# Patient Record
Sex: MALE | Race: WHITE | NOT HISPANIC OR LATINO | Employment: OTHER | URBAN - METROPOLITAN AREA
[De-identification: names, ages, dates, MRNs, and addresses within clinical notes are randomized per-mention and may not be internally consistent; named-entity substitution may affect disease eponyms.]

---

## 2017-03-15 ENCOUNTER — CONVERSION ENCOUNTER (OUTPATIENT)
Dept: FAMILY MEDICINE CLINIC | Facility: CLINIC | Age: 47
End: 2017-03-15

## 2017-03-15 LAB
ALBUMIN SERPL-MCNC: 3.6 G/DL (ref 3.6–5.1)
ALBUMIN/GLOB SERPL: ABNORMAL {RATIO} (ref 1–2.5)
ALP SERPL-CCNC: 102 UNITS/L (ref 40–115)
ALT SERPL-CCNC: 35 UNITS/L (ref 9–46)
AST SERPL-CCNC: 62 UNITS/L (ref 10–40)
BASOPHILS # BLD AUTO: ABNORMAL 10*3/MM3 (ref 0–200)
BASOPHILS NFR BLD AUTO: 0.7 %
BILIRUB SERPL-MCNC: 0.6 MG/DL (ref 0.2–1.2)
BUN SERPL-MCNC: 5 MG/DL (ref 7–25)
BUN/CREAT SERPL: ABNORMAL (ref 6–22)
CALCIUM SERPL-MCNC: 9.2 MG/DL (ref 8.6–10.3)
CHLORIDE SERPL-SCNC: 103 MMOL/L (ref 98–110)
CHOLEST SERPL-MCNC: 157 MG/DL (ref 125–200)
CHOLEST/HDLC SERPL: ABNORMAL {RATIO}
CO2 CONTENT VENOUS: 26 MMOL/L (ref 20–31)
CONV % HGB A1C: ABNORMAL %
CONV NEUTROPHILS/100 LEUKOCYTES IN BODY FLUID BY MANUAL COUNT: 48 %
CONV RF TITER: 6
CONV TOTAL PROTEIN: 7.5 G/DL (ref 6.1–8.1)
CREAT UR-MCNC: 0.65 MG/DL (ref 0.6–1.35)
CRP SERPL-MCNC: 0.29 MG/DL
CYCLIC CITRULLINATED PEPTIDE ANTIBODY: NORMAL
EOSINOPHIL # BLD AUTO: 7.3 %
EOSINOPHIL # BLD AUTO: ABNORMAL 10*3/MM3 (ref 15–500)
ERYTHROCYTE [DISTWIDTH] IN BLOOD BY AUTOMATED COUNT: 14.8 % (ref 11–15)
ERYTHROCYTE [SEDIMENTATION RATE] IN BLOOD BY WESTERGREN METHOD: 22 MM/HR
GLOBULIN UR ELPH-MCNC: ABNORMAL G/DL (ref 1.9–3.7)
GLUCOSE SERPL-MCNC: 115 MG/DL (ref 65–99)
HCT VFR BLD AUTO: 47.1 % (ref 38.5–50)
HDLC SERPL-MCNC: 28 MG/DL
HGB BLD-MCNC: 15.8 G/DL (ref 13.2–17.1)
LDLC SERPL CALC-MCNC: ABNORMAL MG/DL
LYMPHOCYTES # BLD AUTO: ABNORMAL 10*3/MM3 (ref 850–3900)
LYMPHOCYTES NFR BLD AUTO: 33.1 %
MCH RBC QN AUTO: 33.6 PG (ref 27–33)
MCHC RBC AUTO-ENTMCNC: ABNORMAL % (ref 32–36)
MCV RBC AUTO: 100.2 FL (ref 80–100)
MONOCYTES # BLD AUTO: ABNORMAL 10*3/MICROLITER (ref 200–950)
MONOCYTES NFR BLD AUTO: 10.9 %
NEUTROPHILS # BLD AUTO: ABNORMAL 10*3/MM3 (ref 1500–7800)
PLATELET # BLD AUTO: ABNORMAL 10*3/MM3 (ref 140–400)
PMV BLD AUTO: 9.7 FL (ref 7.5–12.5)
POTASSIUM SERPL-SCNC: 4.1 MMOL/L (ref 3.5–5.3)
PSA SERPL-MCNC: 0.7 NG/ML
RBC # BLD AUTO: ABNORMAL 10*6/MM3 (ref 4.2–5.8)
SODIUM SERPL-SCNC: 134 MMOL/L (ref 135–146)
TRIGL SERPL-MCNC: 140 MG/DL
TSH SERPL-ACNC: 2.1 MICROINTL UNITS/ML (ref 0.4–4.5)
WBC # BLD AUTO: ABNORMAL K/UL (ref 3.8–10.8)

## 2017-04-10 ENCOUNTER — HOSPITAL ENCOUNTER (OUTPATIENT)
Dept: OTHER | Facility: HOSPITAL | Age: 47
Discharge: HOME | End: 2017-04-10
Attending: INTERNAL MEDICINE | Admitting: INTERNAL MEDICINE

## 2017-04-11 ENCOUNTER — HOSPITAL ENCOUNTER (OUTPATIENT)
Dept: ULTRASOUND IMAGING | Facility: HOSPITAL | Age: 47
Discharge: HOME | End: 2017-04-11
Attending: INTERNAL MEDICINE | Admitting: INTERNAL MEDICINE

## 2017-07-21 ENCOUNTER — HOSPITAL ENCOUNTER (OUTPATIENT)
Dept: FAMILY MEDICINE CLINIC | Facility: CLINIC | Age: 47
Setting detail: SPECIMEN
Discharge: HOME | End: 2017-07-21
Attending: INTERNAL MEDICINE | Admitting: INTERNAL MEDICINE

## 2017-07-21 LAB
ALBUMIN SERPL-MCNC: 3.3 G/DL (ref 3.5–4.8)
ALBUMIN/GLOB SERPL: 0.8 {RATIO} (ref 1–1.7)
ALP SERPL-CCNC: 104 IU/L (ref 32–91)
ALT SERPL-CCNC: 27 IU/L (ref 17–63)
ANION GAP SERPL CALC-SCNC: 13.3 MMOL/L (ref 10–20)
AST SERPL-CCNC: 45 IU/L (ref 15–41)
BILIRUB SERPL-MCNC: 0.7 MG/DL (ref 0.3–1.2)
BUN SERPL-MCNC: 6 MG/DL (ref 8–20)
BUN/CREAT SERPL: 7.5 (ref 6.2–20.3)
CALCIUM SERPL-MCNC: 9.2 MG/DL (ref 8.9–10.3)
CHLORIDE SERPL-SCNC: 104 MMOL/L (ref 101–111)
CONV CO2: 25 MMOL/L (ref 22–32)
CONV TOTAL PROTEIN: 7.5 G/DL (ref 6.1–7.9)
CREAT UR-MCNC: 0.8 MG/DL (ref 0.7–1.2)
GLOBULIN UR ELPH-MCNC: 4.2 G/DL (ref 2.5–3.8)
GLUCOSE SERPL-MCNC: 119 MG/DL (ref 65–99)
POTASSIUM SERPL-SCNC: 4.3 MMOL/L (ref 3.6–5.1)
SODIUM SERPL-SCNC: 138 MMOL/L (ref 136–144)
TESTOST SERPL-MCNC: 4.4 NG/ML (ref 1.7–7.8)

## 2018-08-09 ENCOUNTER — INPATIENT HOSPITAL (AMBULATORY)
Dept: URBAN - METROPOLITAN AREA HOSPITAL 84 | Facility: HOSPITAL | Age: 48
End: 2018-08-09
Payer: COMMERCIAL

## 2018-08-09 DIAGNOSIS — F10.10 ALCOHOL ABUSE, UNCOMPLICATED: ICD-10-CM

## 2018-08-09 DIAGNOSIS — K70.30 ALCOHOLIC CIRRHOSIS OF LIVER WITHOUT ASCITES: ICD-10-CM

## 2018-08-09 DIAGNOSIS — J44.9 CHRONIC OBSTRUCTIVE PULMONARY DISEASE, UNSPECIFIED: ICD-10-CM

## 2018-08-09 DIAGNOSIS — R94.5 ABNORMAL RESULTS OF LIVER FUNCTION STUDIES: ICD-10-CM

## 2018-08-09 DIAGNOSIS — K72.90 HEPATIC FAILURE, UNSPECIFIED WITHOUT COMA: ICD-10-CM

## 2018-08-09 PROCEDURE — 99222 1ST HOSP IP/OBS MODERATE 55: CPT | Performed by: NURSE PRACTITIONER

## 2018-08-10 ENCOUNTER — INPATIENT HOSPITAL (AMBULATORY)
Dept: URBAN - METROPOLITAN AREA HOSPITAL 84 | Facility: HOSPITAL | Age: 48
End: 2018-08-10
Payer: COMMERCIAL

## 2018-08-10 DIAGNOSIS — F10.10 ALCOHOL ABUSE, UNCOMPLICATED: ICD-10-CM

## 2018-08-10 DIAGNOSIS — K70.30 ALCOHOLIC CIRRHOSIS OF LIVER WITHOUT ASCITES: ICD-10-CM

## 2018-08-10 DIAGNOSIS — D50.9 IRON DEFICIENCY ANEMIA, UNSPECIFIED: ICD-10-CM

## 2018-08-10 DIAGNOSIS — R94.5 ABNORMAL RESULTS OF LIVER FUNCTION STUDIES: ICD-10-CM

## 2018-08-10 PROCEDURE — 99232 SBSQ HOSP IP/OBS MODERATE 35: CPT | Performed by: NURSE PRACTITIONER

## 2018-08-16 ENCOUNTER — OFFICE (AMBULATORY)
Dept: URBAN - METROPOLITAN AREA CLINIC 64 | Facility: CLINIC | Age: 48
End: 2018-08-16

## 2018-08-16 VITALS
DIASTOLIC BLOOD PRESSURE: 58 MMHG | SYSTOLIC BLOOD PRESSURE: 126 MMHG | HEIGHT: 70 IN | HEART RATE: 104 BPM | WEIGHT: 258 LBS

## 2018-08-16 DIAGNOSIS — F10.10 ALCOHOL ABUSE, UNCOMPLICATED: ICD-10-CM

## 2018-08-16 DIAGNOSIS — K70.30 ALCOHOLIC CIRRHOSIS OF LIVER WITHOUT ASCITES: ICD-10-CM

## 2018-08-16 DIAGNOSIS — K25.9 GASTRIC ULCER, UNSPECIFIED AS ACUTE OR CHRONIC, WITHOUT HEMO: ICD-10-CM

## 2018-08-16 DIAGNOSIS — R74.0 NONSPECIFIC ELEVATION OF LEVELS OF TRANSAMINASE AND LACTIC A: ICD-10-CM

## 2018-08-16 DIAGNOSIS — D50.0 IRON DEFICIENCY ANEMIA SECONDARY TO BLOOD LOSS (CHRONIC): ICD-10-CM

## 2018-08-16 DIAGNOSIS — R76.8 OTHER SPECIFIED ABNORMAL IMMUNOLOGICAL FINDINGS IN SERUM: ICD-10-CM

## 2018-08-16 DIAGNOSIS — K72.90 HEPATIC FAILURE, UNSPECIFIED WITHOUT COMA: ICD-10-CM

## 2018-08-16 PROCEDURE — 99214 OFFICE O/P EST MOD 30 MIN: CPT | Performed by: NURSE PRACTITIONER

## 2018-08-16 RX ORDER — ONDANSETRON HYDROCHLORIDE 8 MG/1
24 TABLET, FILM COATED ORAL
Qty: 45 | Refills: 1 | Status: COMPLETED
Start: 2018-08-16 | End: 2022-07-12

## 2018-08-23 ENCOUNTER — HOSPITAL ENCOUNTER (OUTPATIENT)
Dept: HOME HEALTH SERVICES | Facility: HOME HEALTHCARE | Age: 48
Setting detail: SPECIMEN
Discharge: HOME | End: 2018-08-23
Attending: INTERNAL MEDICINE | Admitting: INTERNAL MEDICINE

## 2018-08-24 ENCOUNTER — HOSPITAL ENCOUNTER (OUTPATIENT)
Dept: RESPIRATORY THERAPY | Facility: HOSPITAL | Age: 48
Discharge: HOME | End: 2018-08-24
Attending: NURSE PRACTITIONER | Admitting: NURSE PRACTITIONER

## 2018-08-24 ENCOUNTER — HOSPITAL ENCOUNTER (OUTPATIENT)
Dept: HOME HEALTH SERVICES | Facility: HOME HEALTHCARE | Age: 48
Setting detail: SPECIMEN
Discharge: HOME | End: 2018-08-24
Attending: INTERNAL MEDICINE | Admitting: INTERNAL MEDICINE

## 2018-08-24 LAB
ALBUMIN SERPL-MCNC: 2.2 G/DL (ref 3.5–4.8)
ALBUMIN/GLOB SERPL: 0.4 {RATIO} (ref 1–1.7)
ALP SERPL-CCNC: 106 IU/L (ref 32–91)
ALT SERPL-CCNC: 28 IU/L (ref 17–63)
ANION GAP SERPL CALC-SCNC: 11.1 MMOL/L (ref 10–20)
AST SERPL-CCNC: 60 IU/L (ref 15–41)
BILIRUB SERPL-MCNC: 5.7 MG/DL (ref 0.3–1.2)
BUN SERPL-MCNC: 12 MG/DL (ref 8–20)
BUN/CREAT SERPL: 10 (ref 6.2–20.3)
CALCIUM SERPL-MCNC: 8.2 MG/DL (ref 8.9–10.3)
CHLORIDE SERPL-SCNC: 88 MMOL/L (ref 101–111)
CONV CO2: 29 MMOL/L (ref 22–32)
CONV TOTAL PROTEIN: 7.5 G/DL (ref 6.1–7.9)
CREAT UR-MCNC: 1.2 MG/DL (ref 0.7–1.2)
GLOBULIN UR ELPH-MCNC: 5.3 G/DL (ref 2.5–3.8)
GLUCOSE SERPL-MCNC: 146 MG/DL (ref 65–99)
POTASSIUM SERPL-SCNC: 3.1 MMOL/L (ref 3.6–5.1)
SODIUM SERPL-SCNC: 125 MMOL/L (ref 136–144)

## 2018-08-30 ENCOUNTER — ON CAMPUS - OUTPATIENT (AMBULATORY)
Dept: URBAN - METROPOLITAN AREA HOSPITAL 85 | Facility: HOSPITAL | Age: 48
End: 2018-08-30
Payer: COMMERCIAL

## 2018-08-30 DIAGNOSIS — K70.31 ALCOHOLIC CIRRHOSIS OF LIVER WITH ASCITES: ICD-10-CM

## 2018-08-30 DIAGNOSIS — J44.9 CHRONIC OBSTRUCTIVE PULMONARY DISEASE, UNSPECIFIED: ICD-10-CM

## 2018-08-30 DIAGNOSIS — E87.6 HYPOKALEMIA: ICD-10-CM

## 2018-08-30 DIAGNOSIS — D53.9 NUTRITIONAL ANEMIA, UNSPECIFIED: ICD-10-CM

## 2018-08-30 DIAGNOSIS — R94.5 ABNORMAL RESULTS OF LIVER FUNCTION STUDIES: ICD-10-CM

## 2018-08-30 PROCEDURE — 99204 OFFICE O/P NEW MOD 45 MIN: CPT | Performed by: NURSE PRACTITIONER

## 2018-08-31 ENCOUNTER — ON CAMPUS - OUTPATIENT (AMBULATORY)
Dept: URBAN - METROPOLITAN AREA HOSPITAL 85 | Facility: HOSPITAL | Age: 48
End: 2018-08-31

## 2018-08-31 DIAGNOSIS — K31.89 OTHER DISEASES OF STOMACH AND DUODENUM: ICD-10-CM

## 2018-08-31 DIAGNOSIS — I85.00 ESOPHAGEAL VARICES WITHOUT BLEEDING: ICD-10-CM

## 2018-08-31 DIAGNOSIS — K29.70 GASTRITIS, UNSPECIFIED, WITHOUT BLEEDING: ICD-10-CM

## 2018-08-31 PROCEDURE — 43239 EGD BIOPSY SINGLE/MULTIPLE: CPT | Performed by: INTERNAL MEDICINE

## 2018-09-05 ENCOUNTER — HOSPITAL ENCOUNTER (OUTPATIENT)
Dept: INFUSION THERAPY | Facility: HOSPITAL | Age: 48
Discharge: HOME | End: 2018-09-05
Attending: INTERNAL MEDICINE | Admitting: INTERNAL MEDICINE

## 2018-09-12 ENCOUNTER — OFFICE (AMBULATORY)
Dept: URBAN - METROPOLITAN AREA CLINIC 64 | Facility: CLINIC | Age: 48
End: 2018-09-12
Payer: COMMERCIAL

## 2018-09-12 VITALS
SYSTOLIC BLOOD PRESSURE: 113 MMHG | WEIGHT: 244 LBS | DIASTOLIC BLOOD PRESSURE: 69 MMHG | HEIGHT: 70 IN | HEART RATE: 76 BPM

## 2018-09-12 DIAGNOSIS — K25.9 GASTRIC ULCER, UNSPECIFIED AS ACUTE OR CHRONIC, WITHOUT HEMO: ICD-10-CM

## 2018-09-12 DIAGNOSIS — R11.0 NAUSEA: ICD-10-CM

## 2018-09-12 DIAGNOSIS — K70.30 ALCOHOLIC CIRRHOSIS OF LIVER WITHOUT ASCITES: ICD-10-CM

## 2018-09-12 DIAGNOSIS — D50.0 IRON DEFICIENCY ANEMIA SECONDARY TO BLOOD LOSS (CHRONIC): ICD-10-CM

## 2018-09-12 DIAGNOSIS — R76.8 OTHER SPECIFIED ABNORMAL IMMUNOLOGICAL FINDINGS IN SERUM: ICD-10-CM

## 2018-09-12 DIAGNOSIS — R74.0 NONSPECIFIC ELEVATION OF LEVELS OF TRANSAMINASE AND LACTIC A: ICD-10-CM

## 2018-09-12 DIAGNOSIS — K72.90 HEPATIC FAILURE, UNSPECIFIED WITHOUT COMA: ICD-10-CM

## 2018-09-12 PROCEDURE — 99214 OFFICE O/P EST MOD 30 MIN: CPT | Performed by: NURSE PRACTITIONER

## 2018-09-12 RX ORDER — PROMETHAZINE HYDROCHLORIDE 12.5 MG/1
50 TABLET ORAL
Qty: 60 | Refills: 3 | Status: COMPLETED
Start: 2018-09-12 | End: 2023-07-31

## 2018-09-24 ENCOUNTER — HOSPITAL ENCOUNTER (OUTPATIENT)
Dept: SLEEP MEDICINE | Facility: HOSPITAL | Age: 48
Discharge: HOME | End: 2018-09-24
Attending: NURSE PRACTITIONER | Admitting: NURSE PRACTITIONER

## 2018-10-10 ENCOUNTER — HOSPITAL ENCOUNTER (OUTPATIENT)
Dept: LAB | Facility: HOSPITAL | Age: 48
Setting detail: SPECIMEN
Discharge: HOME | End: 2018-10-10
Attending: INTERNAL MEDICINE | Admitting: INTERNAL MEDICINE

## 2018-10-10 LAB
ALBUMIN SERPL-MCNC: 2.3 G/DL (ref 3.5–4.8)
ALBUMIN/GLOB SERPL: 0.4 {RATIO} (ref 1–1.7)
ALP SERPL-CCNC: 309 IU/L (ref 32–91)
ALT SERPL-CCNC: 28 IU/L (ref 17–63)
ANION GAP SERPL CALC-SCNC: 11.5 MMOL/L (ref 10–20)
AST SERPL-CCNC: 49 IU/L (ref 15–41)
BASOPHILS # BLD AUTO: 0 10*3/UL (ref 0–0.2)
BASOPHILS NFR BLD AUTO: 0 % (ref 0–2)
BILIRUB SERPL-MCNC: 3.8 MG/DL (ref 0.3–1.2)
BILIRUB UR QL STRIP: NEGATIVE MG/DL
BUN SERPL-MCNC: 15 MG/DL (ref 8–20)
BUN/CREAT SERPL: 11.5 (ref 6.2–20.3)
CALCIUM SERPL-MCNC: 9.4 MG/DL (ref 8.9–10.3)
CASTS URNS QL MICRO: NORMAL /[LPF]
CHLORIDE SERPL-SCNC: 78 MMOL/L (ref 101–111)
COLOR UR: YELLOW
CONV BACTERIA IN URINE MICRO: NEGATIVE
CONV CLARITY OF URINE: CLEAR
CONV CO2: 43 MMOL/L (ref 22–32)
CONV HYALINE CASTS IN URINE MICRO: 1 /[LPF] (ref 0–5)
CONV PROTEIN IN URINE BY AUTOMATED TEST STRIP: NEGATIVE MG/DL
CONV SMALL ROUND CELLS: NORMAL /[HPF]
CONV TOTAL PROTEIN: 8.1 G/DL (ref 6.1–7.9)
CONV UROBILINOGEN IN URINE BY AUTOMATED TEST STRIP: 1 MG/DL
CREAT 24H UR-MCNC: 48.3 MG/DL
CREAT UR-MCNC: 1.3 MG/DL (ref 0.7–1.2)
CULTURE INDICATED?: NORMAL
DIFFERENTIAL METHOD BLD: (no result)
EOSINOPHIL # BLD AUTO: 0.1 10*3/UL (ref 0–0.3)
EOSINOPHIL # BLD AUTO: 1 % (ref 0–3)
ERYTHROCYTE [DISTWIDTH] IN BLOOD BY AUTOMATED COUNT: 18 % (ref 11.5–14.5)
GLOBULIN UR ELPH-MCNC: 5.8 G/DL (ref 2.5–3.8)
GLUCOSE SERPL-MCNC: 135 MG/DL (ref 65–99)
GLUCOSE UR QL: NEGATIVE MG/DL
HCT VFR BLD AUTO: 32.5 % (ref 40–54)
HGB BLD-MCNC: 11 G/DL (ref 14–18)
HGB UR QL STRIP: NEGATIVE
IRON SERPL-MCNC: 86 UG/DL (ref 45–182)
KETONES UR QL STRIP: NEGATIVE MG/DL
LEUKOCYTE ESTERASE UR QL STRIP: NEGATIVE
LYMPHOCYTES # BLD AUTO: 2.9 10*3/UL (ref 0.8–4.8)
LYMPHOCYTES NFR BLD AUTO: 24 % (ref 18–42)
MCH RBC QN AUTO: 32.9 PG (ref 26–32)
MCHC RBC AUTO-ENTMCNC: 33.8 G/DL (ref 32–36)
MCV RBC AUTO: 97.1 FL (ref 80–94)
MONOCYTES # BLD AUTO: 1.4 10*3/UL (ref 0.1–1.3)
MONOCYTES NFR BLD AUTO: 11 % (ref 2–11)
NEUTROPHILS # BLD AUTO: 7.7 10*3/UL (ref 2.3–8.6)
NEUTROPHILS NFR BLD AUTO: 64 % (ref 50–75)
NITRITE UR QL STRIP: NEGATIVE
NRBC BLD AUTO-RTO: 0 /100{WBCS}
NRBC/RBC NFR BLD MANUAL: 0 10*3/UL
PH UR STRIP.AUTO: 7.5 [PH] (ref 4.5–8)
PLATELET # BLD AUTO: 222 10*3/UL (ref 150–450)
PMV BLD AUTO: 7.4 FL (ref 7.4–10.4)
POTASSIUM SERPL-SCNC: ABNORMAL MMOL/L
POTASSIUM SERPL-SCNC: ABNORMAL MMOL/L (ref 3.6–5.1)
PROT UR-MCNC: 5 MG/DL
PROT/CREAT UR: 0.1 MG/MG (ref 0–22)
RBC # BLD AUTO: 3.35 10*6/UL (ref 4.6–6)
RBC #/AREA URNS HPF: 0 /[HPF] (ref 0–3)
SODIUM SERPL-SCNC: 130 MMOL/L (ref 136–144)
SP GR UR: 1.01 (ref 1–1.03)
SPERM URNS QL MICRO: NORMAL /[HPF]
SQUAMOUS SPT QL MICRO: 0 /[HPF] (ref 0–5)
UNIDENT CRYS URNS QL MICRO: NORMAL /[HPF]
WBC # BLD AUTO: 12.2 10*3/UL (ref 4.5–11.5)
WBC #/AREA URNS HPF: 1 /[HPF] (ref 0–5)
YEAST SPEC QL WET PREP: NORMAL /[HPF]

## 2018-10-12 ENCOUNTER — HOSPITAL ENCOUNTER (OUTPATIENT)
Dept: LAB | Facility: HOSPITAL | Age: 48
Discharge: HOME | End: 2018-10-12
Attending: INTERNAL MEDICINE | Admitting: INTERNAL MEDICINE

## 2018-10-12 LAB
ANION GAP SERPL CALC-SCNC: 10.9 MMOL/L (ref 10–20)
BUN SERPL-MCNC: 16 MG/DL (ref 8–20)
BUN/CREAT SERPL: 13.3 (ref 6.2–20.3)
CALCIUM SERPL-MCNC: 9.1 MG/DL (ref 8.9–10.3)
CHLORIDE SERPL-SCNC: 82 MMOL/L (ref 101–111)
CONV CO2: 41 MMOL/L (ref 22–32)
CREAT UR-MCNC: 1.2 MG/DL (ref 0.7–1.2)
GLUCOSE SERPL-MCNC: 125 MG/DL (ref 65–99)
POTASSIUM SERPL-SCNC: 2.9 MMOL/L (ref 3.6–5.1)
SODIUM SERPL-SCNC: 131 MMOL/L (ref 136–144)

## 2018-10-26 ENCOUNTER — HOSPITAL ENCOUNTER (OUTPATIENT)
Dept: LAB | Facility: HOSPITAL | Age: 48
Setting detail: SPECIMEN
Discharge: HOME | End: 2018-10-26
Attending: INTERNAL MEDICINE | Admitting: INTERNAL MEDICINE

## 2018-10-26 LAB
ANION GAP SERPL CALC-SCNC: 10.4 MMOL/L (ref 10–20)
BUN SERPL-MCNC: 8 MG/DL (ref 8–20)
BUN/CREAT SERPL: 7.3 (ref 6.2–20.3)
CALCIUM SERPL-MCNC: 8.5 MG/DL (ref 8.9–10.3)
CHLORIDE SERPL-SCNC: 98 MMOL/L (ref 101–111)
CONV CO2: 26 MMOL/L (ref 22–32)
CREAT UR-MCNC: 1.1 MG/DL (ref 0.7–1.2)
GLUCOSE SERPL-MCNC: 166 MG/DL (ref 65–99)
POTASSIUM SERPL-SCNC: 4.4 MMOL/L (ref 3.6–5.1)
SODIUM SERPL-SCNC: 130 MMOL/L (ref 136–144)

## 2018-10-31 ENCOUNTER — HOSPITAL ENCOUNTER (OUTPATIENT)
Dept: INFUSION THERAPY | Facility: HOSPITAL | Age: 48
Discharge: HOME | End: 2018-10-31
Attending: INTERNAL MEDICINE | Admitting: INTERNAL MEDICINE

## 2018-10-31 LAB
ALBUMIN FLD-MCNC: <1 G/DL
AMYLASE FLD-CCNC: 6 U/L
AMYLASE FLUID/SERUM RATIO: NORMAL
BASOPHILS # BLD AUTO: 0.1 10*3/UL (ref 0–0.2)
BASOPHILS NFR BLD AUTO: 1 % (ref 0–2)
CONCENTRATED SMEAR: (no result)
CONV GRANULOCYTES, FLUID: 3 %
CONV MONOCYTES, FLUID: 13 %
CONV SERUM MINUS FLUID: NORMAL
DIFFERENTIAL METHOD BLD: (no result)
EOSINOPHIL # BLD AUTO: 0.8 10*3/UL (ref 0–0.3)
EOSINOPHIL # BLD AUTO: 10 % (ref 0–3)
EOSINOPHIL NFR FLD MANUAL: 4 %
ERYTHROCYTE [DISTWIDTH] IN BLOOD BY AUTOMATED COUNT: 18.1 % (ref 11.5–14.5)
GLUCOSE FLD-MCNC: 156 MG/DL
HCT VFR BLD AUTO: 28.5 % (ref 40–54)
HGB BLD-MCNC: 10 G/DL (ref 14–18)
INR PPP: 1.3
LDH FLD-CCNC: 42 [IU]/L
LDH FLUID/SERUM RATIO: NORMAL
LYMPHOCYTES # BLD AUTO: 2 10*3/UL (ref 0.8–4.8)
LYMPHOCYTES NFR BLD AUTO: 25 % (ref 18–42)
LYMPHOCYTES NFR FLD MANUAL: 79 %
MCH RBC QN AUTO: 35.3 PG (ref 26–32)
MCHC RBC AUTO-ENTMCNC: 35 G/DL (ref 32–36)
MCV RBC AUTO: 100.9 FL (ref 80–94)
MESOTHL CELL NFR FLD MANUAL: 1 %
MONOCYTES # BLD AUTO: 0.6 10*3/UL (ref 0.1–1.3)
MONOCYTES NFR BLD AUTO: 8 % (ref 2–11)
NEUTROPHILS # BLD AUTO: 4.5 10*3/UL (ref 2.3–8.6)
NEUTROPHILS NFR BLD AUTO: 56 % (ref 50–75)
NRBC BLD AUTO-RTO: 0 /100{WBCS}
NRBC/RBC NFR BLD MANUAL: 0 10*3/UL
PH FLD: 7.7 [PH]
PLATELET # BLD AUTO: 145 10*3/UL (ref 150–450)
PMV BLD AUTO: 6.9 FL (ref 7.4–10.4)
PROT FLD-MCNC: 1.4 G/DL
PROTHROMBIN TIME: 12.8 SEC (ref 9.6–11.7)
RBC # BLD AUTO: 2.82 10*6/UL (ref 4.6–6)
SPECIMEN SOURCE: (no result)
TP FLUID/SERUM RATIO: NORMAL
WBC # BLD AUTO: 8.1 10*3/UL (ref 4.5–11.5)
WBC # FLD MANUAL: (no result) {CELLS}/UL

## 2018-11-12 ENCOUNTER — OFFICE (AMBULATORY)
Dept: URBAN - METROPOLITAN AREA CLINIC 64 | Facility: CLINIC | Age: 48
End: 2018-11-12
Payer: COMMERCIAL

## 2018-11-12 VITALS
SYSTOLIC BLOOD PRESSURE: 97 MMHG | HEIGHT: 70 IN | HEART RATE: 77 BPM | DIASTOLIC BLOOD PRESSURE: 61 MMHG | WEIGHT: 232 LBS

## 2018-11-12 DIAGNOSIS — K72.90 HEPATIC FAILURE, UNSPECIFIED WITHOUT COMA: ICD-10-CM

## 2018-11-12 DIAGNOSIS — K70.30 ALCOHOLIC CIRRHOSIS OF LIVER WITHOUT ASCITES: ICD-10-CM

## 2018-11-12 DIAGNOSIS — R18.8 OTHER ASCITES: ICD-10-CM

## 2018-11-12 PROCEDURE — 99214 OFFICE O/P EST MOD 30 MIN: CPT | Performed by: INTERNAL MEDICINE

## 2018-11-12 RX ORDER — ZINC SULFATE 50(220)MG
220 CAPSULE ORAL
Qty: 30 | Refills: 11 | Status: COMPLETED
Start: 2018-11-12 | End: 2023-07-31

## 2018-11-12 RX ORDER — NADOLOL 20 MG/1
TABLET ORAL
Qty: 30 | Status: COMPLETED
End: 2018-11-12

## 2018-11-12 RX ORDER — HYDROXYZINE HYDROCHLORIDE 25 MG/1
100 TABLET, FILM COATED ORAL
Qty: 60 | Refills: 5 | Status: COMPLETED
Start: 2018-11-12 | End: 2019-02-08

## 2018-11-12 RX ORDER — URSODIOL 500 MG/1
TABLET ORAL
Qty: 60 | Status: COMPLETED
End: 2018-11-12

## 2018-12-03 ENCOUNTER — HOSPITAL ENCOUNTER (OUTPATIENT)
Dept: GENERAL RADIOLOGY | Facility: HOSPITAL | Age: 48
Discharge: HOME | End: 2018-12-03
Attending: INTERNAL MEDICINE | Admitting: INTERNAL MEDICINE

## 2018-12-03 ENCOUNTER — HOSPITAL ENCOUNTER (OUTPATIENT)
Dept: FAMILY MEDICINE CLINIC | Facility: CLINIC | Age: 48
Setting detail: SPECIMEN
Discharge: HOME | End: 2018-12-03
Attending: INTERNAL MEDICINE | Admitting: INTERNAL MEDICINE

## 2018-12-03 LAB
ALBUMIN SERPL-MCNC: 2.1 G/DL (ref 3.5–4.8)
ALBUMIN/GLOB SERPL: 0.4 {RATIO} (ref 1–1.7)
ALP SERPL-CCNC: 141 IU/L (ref 32–91)
ALT SERPL-CCNC: 23 IU/L (ref 17–63)
AMMONIA PLAS-MCNC: 21 UMOL/L (ref 9–35)
ANION GAP SERPL CALC-SCNC: 11.2 MMOL/L (ref 10–20)
AST SERPL-CCNC: 47 IU/L (ref 15–41)
BASOPHILS # BLD AUTO: 0.3 10*3/UL (ref 0–0.2)
BASOPHILS NFR BLD AUTO: 1 % (ref 0–2)
BILIRUB SERPL-MCNC: 5.9 MG/DL (ref 0.3–1.2)
BUN SERPL-MCNC: 13 MG/DL (ref 8–20)
BUN/CREAT SERPL: 8.7 (ref 6.2–20.3)
CALCIUM SERPL-MCNC: 8.7 MG/DL (ref 8.9–10.3)
CHLORIDE SERPL-SCNC: 93 MMOL/L (ref 101–111)
CONV ANISOCYTES: SLIGHT
CONV CO2: 24 MMOL/L (ref 22–32)
CONV SMEAR REVIEW: (no result)
CONV TOTAL PROTEIN: 7.5 G/DL (ref 6.1–7.9)
CONV TOXIC GRANULES IN BLOOD BY LIGHT MICROSCOPY: SLIGHT
CREAT UR-MCNC: 1.5 MG/DL (ref 0.7–1.2)
DIFFERENTIAL METHOD BLD: (no result)
EOSINOPHIL # BLD AUTO: 0.9 10*3/UL (ref 0–0.3)
EOSINOPHIL # BLD AUTO: 3 % (ref 0–3)
ERYTHROCYTE [DISTWIDTH] IN BLOOD BY AUTOMATED COUNT: 15.1 % (ref 11.5–14.5)
GLOBULIN UR ELPH-MCNC: 5.4 G/DL (ref 2.5–3.8)
GLUCOSE SERPL-MCNC: 137 MG/DL (ref 65–99)
HCT VFR BLD AUTO: 31.2 % (ref 40–54)
HGB BLD-MCNC: 10.7 G/DL (ref 14–18)
INR PPP: 1.7 (ref 2–3)
LYMPHOCYTES # BLD AUTO: 2.7 10*3/UL (ref 0.8–4.8)
LYMPHOCYTES NFR BLD AUTO: 9 % (ref 18–42)
MCH RBC QN AUTO: 33.3 PG (ref 26–32)
MCHC RBC AUTO-ENTMCNC: 34.4 G/DL (ref 32–36)
MCV RBC AUTO: 96.8 FL (ref 80–94)
MONOCYTES # BLD AUTO: 1.5 10*3/UL (ref 0.1–1.3)
MONOCYTES NFR BLD AUTO: 5 % (ref 2–11)
NEUTROPHILS # BLD AUTO: 24.7 10*3/UL (ref 2.3–8.6)
NEUTROPHILS NFR BLD AUTO: 82 % (ref 50–75)
NRBC BLD AUTO-RTO: 0 /100{WBCS}
PLATELET # BLD AUTO: 184 10*3/UL (ref 150–450)
PMV BLD AUTO: 8.3 FL (ref 7.4–10.4)
POTASSIUM SERPL-SCNC: 4.2 MMOL/L (ref 3.6–5.1)
PROTHROMBIN TIME: 16.1 SEC (ref 19.4–28.5)
RBC # BLD AUTO: 3.22 10*6/UL (ref 4.6–6)
SODIUM SERPL-SCNC: 124 MMOL/L (ref 136–144)
WBC # BLD AUTO: 30.1 10*3/UL (ref 4.5–11.5)

## 2018-12-04 ENCOUNTER — HOSPITAL ENCOUNTER (OUTPATIENT)
Dept: FAMILY MEDICINE CLINIC | Facility: CLINIC | Age: 48
Setting detail: SPECIMEN
Discharge: HOME | End: 2018-12-04
Attending: INTERNAL MEDICINE | Admitting: INTERNAL MEDICINE

## 2018-12-05 ENCOUNTER — HOSPITAL ENCOUNTER (OUTPATIENT)
Dept: INFUSION THERAPY | Facility: HOSPITAL | Age: 48
Discharge: HOME | End: 2018-12-05
Attending: INTERNAL MEDICINE | Admitting: INTERNAL MEDICINE

## 2018-12-05 LAB
BACTERIA SPEC AEROBE CULT: NORMAL
BACTERIA SPEC AEROBE CULT: NORMAL
BASOPHILS # BLD AUTO: 0.1 10*3/UL (ref 0–0.2)
BASOPHILS NFR BLD AUTO: 1 % (ref 0–2)
CONV ABS BANDS: 0.8 10*3/UL
CONV ABS IMM GRAN: 0.24 10*3/UL
CONV ABS OTHER: 0.12 10*3/UL
CONV ANISOCYTES: SLIGHT
CONV MACROCYTES IN BLOOD BY LIGHT MICROSCOPY: SLIGHT
CONV PLATELETS GIANT IN BLOOD BY LIGHT MICROSCOPY: (no result)
CONV POIKILOCYTOSIS IN BLOOD BY LIGHT MICROSCOPY: SLIGHT
CONV PROMYELOCYTES RELATIVE PERCENT BY MANUAL COUNT: 1 %
D-LACTATE SERPL-SCNC: 2.4 MMOL/L (ref 0.5–2.2)
DIFFERENTIAL METHOD BLD: (no result)
EOSINOPHIL # BLD AUTO: 2.6 10*3/UL (ref 0–0.3)
EOSINOPHIL # BLD AUTO: 22 % (ref 0–3)
ERYTHROCYTE [DISTWIDTH] IN BLOOD BY AUTOMATED COUNT: 15.1 % (ref 11.5–14.5)
HCT VFR BLD AUTO: 27.8 % (ref 40–54)
HGB BLD-MCNC: 10 G/DL (ref 14–18)
INR PPP: 1.4
LYMPHOCYTES # BLD AUTO: 1.3 10*3/UL (ref 0.8–4.8)
LYMPHOCYTES NFR BLD AUTO: 11 % (ref 18–42)
Lab: AC
Lab: NORMAL
MCH RBC QN AUTO: 35.1 PG (ref 26–32)
MCHC RBC AUTO-ENTMCNC: 36.1 G/DL (ref 32–36)
MCV RBC AUTO: 97.3 FL (ref 80–94)
METAMYELOCYTES NFR BLD: 1 %
MICRO REPORT STATUS: NORMAL
MICRO REPORT STATUS: NORMAL
MONOCYTES # BLD AUTO: 0.7 10*3/UL (ref 0.1–1.3)
MONOCYTES NFR BLD AUTO: 6 % (ref 2–11)
MYELOCYTES NFR BLD MANUAL: 1 %
NEUTROPHILS # BLD AUTO: 6.1 10*3/UL (ref 2.3–8.6)
NEUTROPHILS NFR BLD AUTO: 50 % (ref 50–75)
NEUTS BAND NFR BLD MANUAL: 7 % (ref 0–5)
PATHOLOGIST REVIEW: (no result)
PLATELET # BLD AUTO: 210 10*3/UL (ref 150–450)
PMV BLD AUTO: 6.8 FL (ref 7.4–10.4)
PROTHROMBIN TIME: 13.6 SEC (ref 9.6–11.7)
RBC # BLD AUTO: 2.86 10*6/UL (ref 4.6–6)
SPECIMEN SOURCE: NORMAL
SPECIMEN SOURCE: NORMAL
WBC # BLD AUTO: 12 10*3/UL (ref 4.5–11.5)

## 2018-12-10 ENCOUNTER — HOSPITAL ENCOUNTER (OUTPATIENT)
Dept: LAB | Facility: HOSPITAL | Age: 48
Discharge: HOME | End: 2018-12-10
Attending: INTERNAL MEDICINE | Admitting: INTERNAL MEDICINE

## 2018-12-10 LAB
ALBUMIN SERPL-MCNC: 2.1 G/DL (ref 3.5–4.8)
ALBUMIN/GLOB SERPL: 0.4 {RATIO} (ref 1–1.7)
ALP SERPL-CCNC: 128 IU/L (ref 32–91)
ALT SERPL-CCNC: 19 IU/L (ref 17–63)
ANION GAP SERPL CALC-SCNC: 14.1 MMOL/L (ref 10–20)
AST SERPL-CCNC: 44 IU/L (ref 15–41)
BASOPHILS # BLD AUTO: 0.1 10*3/UL (ref 0–0.2)
BASOPHILS NFR BLD AUTO: 1 % (ref 0–2)
BILIRUB SERPL-MCNC: 5.6 MG/DL (ref 0.3–1.2)
BILIRUB UR QL STRIP: NEGATIVE MG/DL
BUN SERPL-MCNC: 9 MG/DL (ref 8–20)
BUN/CREAT SERPL: 6.9 (ref 6.2–20.3)
CALCIUM SERPL-MCNC: 8.8 MG/DL (ref 8.9–10.3)
CASTS URNS QL MICRO: ABNORMAL /[LPF]
CHLORIDE SERPL-SCNC: 93 MMOL/L (ref 101–111)
COLOR UR: YELLOW
CONV BACTERIA IN URINE MICRO: NEGATIVE
CONV CLARITY OF URINE: CLEAR
CONV CO2: 23 MMOL/L (ref 22–32)
CONV HYALINE CASTS IN URINE MICRO: 3 /[LPF] (ref 0–5)
CONV PROTEIN IN URINE BY AUTOMATED TEST STRIP: NEGATIVE MG/DL
CONV SMALL ROUND CELLS: ABNORMAL /[HPF]
CONV TOTAL PROTEIN: 7.9 G/DL (ref 6.1–7.9)
CONV UROBILINOGEN IN URINE BY AUTOMATED TEST STRIP: 1 MG/DL
CREAT UR-MCNC: 1.3 MG/DL (ref 0.7–1.2)
CRP SERPL-MCNC: 4.74 MG/DL (ref 0–0.7)
CULTURE INDICATED?: ABNORMAL
DIFFERENTIAL METHOD BLD: (no result)
EOSINOPHIL # BLD AUTO: 1.8 10*3/UL (ref 0–0.3)
EOSINOPHIL # BLD AUTO: 13 % (ref 0–3)
ERYTHROCYTE [DISTWIDTH] IN BLOOD BY AUTOMATED COUNT: 15.7 % (ref 11.5–14.5)
ERYTHROCYTE [SEDIMENTATION RATE] IN BLOOD BY WESTERGREN METHOD: 112 MM/HR (ref 0–20)
GLOBULIN UR ELPH-MCNC: 5.8 G/DL (ref 2.5–3.8)
GLUCOSE SERPL-MCNC: 119 MG/DL (ref 65–99)
GLUCOSE UR QL: NEGATIVE MG/DL
HCT VFR BLD AUTO: 30.4 % (ref 40–54)
HGB BLD-MCNC: 10.4 G/DL (ref 14–18)
HGB UR QL STRIP: ABNORMAL
KETONES UR QL STRIP: NEGATIVE MG/DL
LEUKOCYTE ESTERASE UR QL STRIP: NEGATIVE
LYMPHOCYTES # BLD AUTO: 2.3 10*3/UL (ref 0.8–4.8)
LYMPHOCYTES NFR BLD AUTO: 16 % (ref 18–42)
MCH RBC QN AUTO: 34.7 PG (ref 26–32)
MCHC RBC AUTO-ENTMCNC: 34.2 G/DL (ref 32–36)
MCV RBC AUTO: 101.4 FL (ref 80–94)
MONOCYTES # BLD AUTO: 1.2 10*3/UL (ref 0.1–1.3)
MONOCYTES NFR BLD AUTO: 8 % (ref 2–11)
NEUTROPHILS # BLD AUTO: 9 10*3/UL (ref 2.3–8.6)
NEUTROPHILS NFR BLD AUTO: 62 % (ref 50–75)
NITRITE UR QL STRIP: NEGATIVE
NRBC BLD AUTO-RTO: 0 /100{WBCS}
NRBC/RBC NFR BLD MANUAL: 0 10*3/UL
PH UR STRIP.AUTO: 6.5 [PH] (ref 4.5–8)
PLATELET # BLD AUTO: 236 10*3/UL (ref 150–450)
PMV BLD AUTO: 6.8 FL (ref 7.4–10.4)
POTASSIUM SERPL-SCNC: 4.1 MMOL/L (ref 3.6–5.1)
RBC # BLD AUTO: 2.99 10*6/UL (ref 4.6–6)
RBC #/AREA URNS HPF: 4 /[HPF] (ref 0–3)
SODIUM SERPL-SCNC: 126 MMOL/L (ref 136–144)
SP GR UR: 1.01 (ref 1–1.03)
SPERM URNS QL MICRO: ABNORMAL /[HPF]
SQUAMOUS SPT QL MICRO: 0 /[HPF] (ref 0–5)
UNIDENT CRYS URNS QL MICRO: ABNORMAL /[HPF]
WBC # BLD AUTO: 14.5 10*3/UL (ref 4.5–11.5)
WBC #/AREA URNS HPF: 0 /[HPF] (ref 0–5)
YEAST SPEC QL WET PREP: ABNORMAL /[HPF]

## 2018-12-10 PROCEDURE — 86481 TB AG RESPONSE T-CELL SUSP: CPT

## 2018-12-11 ENCOUNTER — LAB REQUISITION (OUTPATIENT)
Dept: LAB | Facility: HOSPITAL | Age: 48
End: 2018-12-11

## 2018-12-11 DIAGNOSIS — Z00.00 ROUTINE GENERAL MEDICAL EXAMINATION AT A HEALTH CARE FACILITY: ICD-10-CM

## 2018-12-11 LAB
CCP IGG ANTIBODIES: <0.4 U/ML
CONV HIV-1/ HIV-2: NEGATIVE
HCV AB SER DONR QL: NONREACTIVE

## 2018-12-12 LAB
CHROMATIN AB SERPL-ACNC: <20 [IU]/ML (ref 0–20)
TSPOT INTERPRETATION: NEGATIVE
TSPOT INTERPRETATION: NEGATIVE
TSPOT NIL CONTROL INTERPRETATION: NORMAL
TSPOT PANEL A: 0
TSPOT PANEL B: 0
TSPOT POS CONTROL INTERPRETATION: NORMAL

## 2018-12-14 ENCOUNTER — HOSPITAL ENCOUNTER (OUTPATIENT)
Dept: MAMMOGRAPHY | Facility: HOSPITAL | Age: 48
Discharge: HOME | End: 2018-12-14
Attending: INTERNAL MEDICINE | Admitting: INTERNAL MEDICINE

## 2018-12-17 ENCOUNTER — HOSPITAL ENCOUNTER (OUTPATIENT)
Dept: FAMILY MEDICINE CLINIC | Facility: CLINIC | Age: 48
Setting detail: SPECIMEN
Discharge: HOME | End: 2018-12-17
Attending: INTERNAL MEDICINE | Admitting: INTERNAL MEDICINE

## 2018-12-17 LAB
ANION GAP SERPL CALC-SCNC: 14.1 MMOL/L (ref 10–20)
BUN SERPL-MCNC: 5 MG/DL (ref 8–20)
BUN/CREAT SERPL: 4.5 (ref 6.2–20.3)
CALCIUM SERPL-MCNC: 8.7 MG/DL (ref 8.9–10.3)
CHLORIDE SERPL-SCNC: 94 MMOL/L (ref 101–111)
CONV CO2: 23 MMOL/L (ref 22–32)
CREAT UR-MCNC: 1.1 MG/DL (ref 0.7–1.2)
GLUCOSE SERPL-MCNC: 144 MG/DL (ref 65–99)
POTASSIUM SERPL-SCNC: 4.1 MMOL/L (ref 3.6–5.1)
SODIUM SERPL-SCNC: 127 MMOL/L (ref 136–144)

## 2018-12-28 ENCOUNTER — HOSPITAL ENCOUNTER (OUTPATIENT)
Dept: INFUSION THERAPY | Facility: HOSPITAL | Age: 48
Discharge: HOME | End: 2018-12-28
Attending: INTERNAL MEDICINE | Admitting: INTERNAL MEDICINE

## 2019-01-04 ENCOUNTER — HOSPITAL ENCOUNTER (OUTPATIENT)
Dept: FAMILY MEDICINE CLINIC | Facility: CLINIC | Age: 49
Setting detail: SPECIMEN
Discharge: HOME | End: 2019-01-04
Attending: INTERNAL MEDICINE | Admitting: INTERNAL MEDICINE

## 2019-01-04 LAB
ALBUMIN SERPL-MCNC: 2.2 G/DL (ref 3.5–4.8)
ALBUMIN/GLOB SERPL: 0.4 {RATIO} (ref 1–1.7)
ALP SERPL-CCNC: 194 IU/L (ref 32–91)
ALT SERPL-CCNC: 19 IU/L (ref 17–63)
ANION GAP SERPL CALC-SCNC: 11.5 MMOL/L (ref 10–20)
AST SERPL-CCNC: 51 IU/L (ref 15–41)
BASOPHILS # BLD AUTO: 0.2 10*3/UL (ref 0–0.2)
BASOPHILS NFR BLD AUTO: 2 % (ref 0–2)
BILIRUB SERPL-MCNC: 3.6 MG/DL (ref 0.3–1.2)
BUN SERPL-MCNC: 8 MG/DL (ref 8–20)
BUN/CREAT SERPL: 6.2 (ref 6.2–20.3)
CALCIUM SERPL-MCNC: 8.8 MG/DL (ref 8.9–10.3)
CHLORIDE SERPL-SCNC: 93 MMOL/L (ref 101–111)
CONV ABS BANDS: 0.3 10*3/UL
CONV ANISOCYTES: SLIGHT
CONV CO2: 27 MMOL/L (ref 22–32)
CONV POIKILOCYTOSIS IN BLOOD BY LIGHT MICROSCOPY: SLIGHT
CONV TOTAL PROTEIN: 8.1 G/DL (ref 6.1–7.9)
CREAT UR-MCNC: 1.3 MG/DL (ref 0.7–1.2)
DIFFERENTIAL METHOD BLD: (no result)
EOSINOPHIL # BLD AUTO: 1.5 10*3/UL (ref 0–0.3)
EOSINOPHIL # BLD AUTO: 15 % (ref 0–3)
ERYTHROCYTE [DISTWIDTH] IN BLOOD BY AUTOMATED COUNT: 15.7 % (ref 11.5–14.5)
GLOBULIN UR ELPH-MCNC: 5.9 G/DL (ref 2.5–3.8)
GLUCOSE SERPL-MCNC: 129 MG/DL (ref 65–99)
HCT VFR BLD AUTO: 31.8 % (ref 40–54)
HGB BLD-MCNC: 11 G/DL (ref 14–18)
INR PPP: 1.4 (ref 2–3)
LYMPHOCYTES # BLD AUTO: 2 10*3/UL (ref 0.8–4.8)
LYMPHOCYTES NFR BLD AUTO: 20 % (ref 18–42)
MCH RBC QN AUTO: 33.6 PG (ref 26–32)
MCHC RBC AUTO-ENTMCNC: 34.6 G/DL (ref 32–36)
MCV RBC AUTO: 96.9 FL (ref 80–94)
MONOCYTES # BLD AUTO: 1 10*3/UL (ref 0.1–1.3)
MONOCYTES NFR BLD AUTO: 10 % (ref 2–11)
NEUTROPHILS # BLD AUTO: 5.2 10*3/UL (ref 2.3–8.6)
NEUTROPHILS NFR BLD AUTO: 50 % (ref 50–75)
NEUTS BAND NFR BLD MANUAL: 3 % (ref 0–5)
PATHOLOGIST REVIEW: (no result)
PATHOLOGIST REVIEW: (no result)
PLATELET # BLD AUTO: 229 10*3/UL (ref 150–450)
PMV BLD AUTO: 7.2 FL (ref 7.4–10.4)
POTASSIUM SERPL-SCNC: 3.5 MMOL/L (ref 3.6–5.1)
PROTHROMBIN TIME: 13.4 SEC (ref 19.4–28.5)
RBC # BLD AUTO: 3.28 10*6/UL (ref 4.6–6)
SODIUM SERPL-SCNC: 128 MMOL/L (ref 136–144)
WBC # BLD AUTO: 10.2 10*3/UL (ref 4.5–11.5)

## 2019-01-11 ENCOUNTER — HOSPITAL ENCOUNTER (OUTPATIENT)
Dept: FAMILY MEDICINE CLINIC | Facility: CLINIC | Age: 49
Setting detail: SPECIMEN
Discharge: HOME | End: 2019-01-11
Attending: INTERNAL MEDICINE | Admitting: INTERNAL MEDICINE

## 2019-01-11 LAB
ANION GAP SERPL CALC-SCNC: 11.9 MMOL/L (ref 10–20)
BUN SERPL-MCNC: 12 MG/DL (ref 8–20)
BUN/CREAT SERPL: 10.9 (ref 6.2–20.3)
CALCIUM SERPL-MCNC: 8.9 MG/DL (ref 8.9–10.3)
CHLORIDE SERPL-SCNC: 95 MMOL/L (ref 101–111)
CONV CO2: 26 MMOL/L (ref 22–32)
CREAT UR-MCNC: 1.1 MG/DL (ref 0.7–1.2)
GLUCOSE SERPL-MCNC: 142 MG/DL (ref 65–99)
POTASSIUM SERPL-SCNC: 3.9 MMOL/L (ref 3.6–5.1)
SODIUM SERPL-SCNC: 129 MMOL/L (ref 136–144)

## 2019-01-31 ENCOUNTER — HOSPITAL ENCOUNTER (OUTPATIENT)
Dept: FAMILY MEDICINE CLINIC | Facility: CLINIC | Age: 49
Setting detail: SPECIMEN
Discharge: HOME | End: 2019-01-31
Attending: INTERNAL MEDICINE | Admitting: INTERNAL MEDICINE

## 2019-01-31 LAB
ALBUMIN SERPL-MCNC: 2.4 G/DL (ref 3.5–4.8)
ALBUMIN/GLOB SERPL: 0.5 {RATIO} (ref 1–1.7)
ALP SERPL-CCNC: 241 IU/L (ref 32–91)
ALT SERPL-CCNC: 23 IU/L (ref 17–63)
ANION GAP SERPL CALC-SCNC: 13.1 MMOL/L (ref 10–20)
AST SERPL-CCNC: 42 IU/L (ref 15–41)
BASOPHILS # BLD AUTO: 0.1 10*3/UL (ref 0–0.2)
BASOPHILS NFR BLD AUTO: 1 % (ref 0–2)
BILIRUB SERPL-MCNC: 4.2 MG/DL (ref 0.3–1.2)
BUN SERPL-MCNC: 12 MG/DL (ref 8–20)
BUN/CREAT SERPL: 10 (ref 6.2–20.3)
CALCIUM SERPL-MCNC: 9.1 MG/DL (ref 8.9–10.3)
CHLORIDE SERPL-SCNC: 93 MMOL/L (ref 101–111)
CONV CO2: 24 MMOL/L (ref 22–32)
CONV TOTAL PROTEIN: 6.8 G/DL (ref 6.1–7.9)
CREAT UR-MCNC: 1.2 MG/DL (ref 0.7–1.2)
CRP SERPL-MCNC: 0.94 MG/DL (ref 0–0.7)
DIFFERENTIAL METHOD BLD: (no result)
EOSINOPHIL # BLD AUTO: 0.7 10*3/UL (ref 0–0.3)
EOSINOPHIL # BLD AUTO: 8 % (ref 0–3)
ERYTHROCYTE [DISTWIDTH] IN BLOOD BY AUTOMATED COUNT: 17.7 % (ref 11.5–14.5)
GLOBULIN UR ELPH-MCNC: 4.4 G/DL (ref 2.5–3.8)
GLUCOSE SERPL-MCNC: 144 MG/DL (ref 65–99)
HCT VFR BLD AUTO: 31.9 % (ref 40–54)
HGB BLD-MCNC: 11 G/DL (ref 14–18)
INR PPP: 1.3 (ref 2–3)
LYMPHOCYTES # BLD AUTO: 1.8 10*3/UL (ref 0.8–4.8)
LYMPHOCYTES NFR BLD AUTO: 21 % (ref 18–42)
MCH RBC QN AUTO: 33.5 PG (ref 26–32)
MCHC RBC AUTO-ENTMCNC: 34.5 G/DL (ref 32–36)
MCV RBC AUTO: 97.3 FL (ref 80–94)
MONOCYTES # BLD AUTO: 1.2 10*3/UL (ref 0.1–1.3)
MONOCYTES NFR BLD AUTO: 14 % (ref 2–11)
NEUTROPHILS # BLD AUTO: 4.7 10*3/UL (ref 2.3–8.6)
NEUTROPHILS NFR BLD AUTO: 56 % (ref 50–75)
NRBC BLD AUTO-RTO: 0 /100{WBCS}
NRBC/RBC NFR BLD MANUAL: 0 10*3/UL
PLATELET # BLD AUTO: 153 10*3/UL (ref 150–450)
PMV BLD AUTO: 7.8 FL (ref 7.4–10.4)
POTASSIUM SERPL-SCNC: 4.1 MMOL/L (ref 3.6–5.1)
PROTHROMBIN TIME: 12.6 SEC (ref 19.4–28.5)
RBC # BLD AUTO: 3.28 10*6/UL (ref 4.6–6)
SODIUM SERPL-SCNC: 126 MMOL/L (ref 136–144)
WBC # BLD AUTO: 8.4 10*3/UL (ref 4.5–11.5)

## 2019-02-08 ENCOUNTER — OFFICE (AMBULATORY)
Dept: URBAN - METROPOLITAN AREA CLINIC 64 | Facility: CLINIC | Age: 49
End: 2019-02-08
Payer: COMMERCIAL

## 2019-02-08 VITALS
HEART RATE: 99 BPM | WEIGHT: 259 LBS | DIASTOLIC BLOOD PRESSURE: 61 MMHG | SYSTOLIC BLOOD PRESSURE: 99 MMHG | HEIGHT: 70 IN

## 2019-02-08 DIAGNOSIS — R18.8 OTHER ASCITES: ICD-10-CM

## 2019-02-08 DIAGNOSIS — K70.30 ALCOHOLIC CIRRHOSIS OF LIVER WITHOUT ASCITES: ICD-10-CM

## 2019-02-08 PROCEDURE — 99214 OFFICE O/P EST MOD 30 MIN: CPT | Performed by: INTERNAL MEDICINE

## 2019-02-13 ENCOUNTER — HOSPITAL ENCOUNTER (OUTPATIENT)
Dept: INFUSION THERAPY | Facility: HOSPITAL | Age: 49
Discharge: HOME | End: 2019-02-13
Attending: INTERNAL MEDICINE | Admitting: INTERNAL MEDICINE

## 2019-02-13 LAB
INR PPP: 1.3
PROTHROMBIN TIME: 12.9 SEC (ref 9.6–11.7)

## 2019-03-01 ENCOUNTER — HOSPITAL ENCOUNTER (OUTPATIENT)
Dept: LAB | Facility: HOSPITAL | Age: 49
Discharge: HOME | End: 2019-03-01
Attending: INTERNAL MEDICINE | Admitting: INTERNAL MEDICINE

## 2019-03-01 LAB
ANION GAP SERPL CALC-SCNC: 14.3 MMOL/L (ref 10–20)
BUN SERPL-MCNC: 16 MG/DL (ref 8–20)
BUN/CREAT SERPL: 13.3 (ref 6.2–20.3)
CALCIUM SERPL-MCNC: 9.5 MG/DL (ref 8.9–10.3)
CHLORIDE SERPL-SCNC: 95 MMOL/L (ref 101–111)
CONV CO2: 25 MMOL/L (ref 22–32)
CREAT UR-MCNC: 1.2 MG/DL (ref 0.7–1.2)
GLUCOSE SERPL-MCNC: 184 MG/DL (ref 65–99)
POTASSIUM SERPL-SCNC: 4.3 MMOL/L (ref 3.6–5.1)
SODIUM SERPL-SCNC: 130 MMOL/L (ref 136–144)

## 2019-04-03 ENCOUNTER — HOSPITAL ENCOUNTER (OUTPATIENT)
Dept: INFUSION THERAPY | Facility: HOSPITAL | Age: 49
Discharge: HOME | End: 2019-04-03
Attending: INTERNAL MEDICINE | Admitting: INTERNAL MEDICINE

## 2019-04-03 LAB
CONV ABS BANDS: 0.6 10*3/UL
CONV ABS IMM GRAN: 0.09 10*3/UL
CONV MACROCYTES IN BLOOD BY LIGHT MICROSCOPY: SLIGHT
DIFFERENTIAL METHOD BLD: (no result)
EOSINOPHIL # BLD AUTO: 1 10*3/UL (ref 0–0.3)
EOSINOPHIL # BLD AUTO: 11 % (ref 0–3)
ERYTHROCYTE [DISTWIDTH] IN BLOOD BY AUTOMATED COUNT: 15.8 % (ref 11.5–14.5)
HCT VFR BLD AUTO: 35.3 % (ref 40–54)
HGB BLD-MCNC: 12.3 G/DL (ref 14–18)
LYMPHOCYTES # BLD AUTO: 1.7 10*3/UL (ref 0.8–4.8)
LYMPHOCYTES NFR BLD AUTO: 20 % (ref 18–42)
MCH RBC QN AUTO: 34.9 PG (ref 26–32)
MCHC RBC AUTO-ENTMCNC: 34.8 G/DL (ref 32–36)
MCV RBC AUTO: 100.4 FL (ref 80–94)
METAMYELOCYTES NFR BLD: 1 %
MONOCYTES # BLD AUTO: 1.2 10*3/UL (ref 0.1–1.3)
MONOCYTES NFR BLD AUTO: 14 % (ref 2–11)
NEUTROPHILS # BLD AUTO: 4.1 10*3/UL (ref 2.3–8.6)
NEUTROPHILS NFR BLD AUTO: 47 % (ref 50–75)
NEUTS BAND NFR BLD MANUAL: 7 % (ref 0–5)
PLATELET # BLD AUTO: 266 10*3/UL (ref 150–450)
PMV BLD AUTO: 8.4 FL (ref 7.4–10.4)
RBC # BLD AUTO: 3.52 10*6/UL (ref 4.6–6)
WBC # BLD AUTO: 8.7 10*3/UL (ref 4.5–11.5)

## 2019-04-11 ENCOUNTER — HOSPITAL ENCOUNTER (OUTPATIENT)
Dept: LAB | Facility: HOSPITAL | Age: 49
Setting detail: SPECIMEN
Discharge: HOME | End: 2019-04-11
Attending: INTERNAL MEDICINE | Admitting: INTERNAL MEDICINE

## 2019-04-11 LAB
ANION GAP SERPL CALC-SCNC: 14.8 MMOL/L (ref 10–20)
BASOPHILS # BLD AUTO: 0 10*3/UL (ref 0–0.2)
BASOPHILS NFR BLD AUTO: 0 % (ref 0–2)
BILIRUB UR QL STRIP: ABNORMAL
BILIRUB UR QL STRIP: ABNORMAL MG/DL
BUN SERPL-MCNC: 12 MG/DL (ref 8–20)
BUN/CREAT SERPL: 10.9 (ref 6.2–20.3)
CALCIUM SERPL-MCNC: 9.3 MG/DL (ref 8.9–10.3)
CASTS URNS QL MICRO: ABNORMAL /[LPF]
CHLORIDE SERPL-SCNC: 95 MMOL/L (ref 101–111)
COLOR UR: ABNORMAL
CONV BACTERIA IN URINE MICRO: NEGATIVE
CONV CLARITY OF URINE: CLEAR
CONV CO2: 24 MMOL/L (ref 22–32)
CONV HYALINE CASTS IN URINE MICRO: ABNORMAL /[LPF] (ref 0–5)
CONV PROTEIN IN URINE BY AUTOMATED TEST STRIP: NEGATIVE MG/DL
CONV SMALL ROUND CELLS: ABNORMAL /[HPF]
CONV UROBILINOGEN IN URINE BY AUTOMATED TEST STRIP: 1 MG/DL
CREAT 24H UR-MCNC: 143.9 MG/DL
CREAT UR-MCNC: 1.1 MG/DL (ref 0.7–1.2)
CULTURE INDICATED?: ABNORMAL
DIFFERENTIAL METHOD BLD: (no result)
EOSINOPHIL # BLD AUTO: 0.5 10*3/UL (ref 0–0.3)
EOSINOPHIL # BLD AUTO: 8 % (ref 0–3)
ERYTHROCYTE [DISTWIDTH] IN BLOOD BY AUTOMATED COUNT: 15 % (ref 11.5–14.5)
GLUCOSE SERPL-MCNC: 180 MG/DL (ref 65–99)
GLUCOSE UR QL: NEGATIVE MG/DL
HCT VFR BLD AUTO: 35.1 % (ref 40–54)
HGB BLD-MCNC: 12 G/DL (ref 14–18)
HGB UR QL STRIP: ABNORMAL
KETONES UR QL STRIP: NEGATIVE MG/DL
LEUKOCYTE ESTERASE UR QL STRIP: NEGATIVE
LYMPHOCYTES # BLD AUTO: 1.7 10*3/UL (ref 0.8–4.8)
LYMPHOCYTES NFR BLD AUTO: 27 % (ref 18–42)
MAGNESIUM SERPL-MCNC: 1.6 MG/DL (ref 1.8–2.5)
MCH RBC QN AUTO: 34.5 PG (ref 26–32)
MCHC RBC AUTO-ENTMCNC: 34.1 G/DL (ref 32–36)
MCV RBC AUTO: 101.1 FL (ref 80–94)
MONOCYTES # BLD AUTO: 0.8 10*3/UL (ref 0.1–1.3)
MONOCYTES NFR BLD AUTO: 13 % (ref 2–11)
NEUTROPHILS # BLD AUTO: 3.3 10*3/UL (ref 2.3–8.6)
NEUTROPHILS NFR BLD AUTO: 52 % (ref 50–75)
NITRITE UR QL STRIP: NEGATIVE
NRBC BLD AUTO-RTO: 0 /100{WBCS}
NRBC/RBC NFR BLD MANUAL: 0 10*3/UL
PH UR STRIP.AUTO: 5.5 [PH] (ref 4.5–8)
PLATELET # BLD AUTO: 189 10*3/UL (ref 150–450)
PMV BLD AUTO: 8.8 FL (ref 7.4–10.4)
POTASSIUM SERPL-SCNC: 3.8 MMOL/L (ref 3.6–5.1)
PROT UR-MCNC: 3 MG/DL
PROT/CREAT UR: 0 MG/MG (ref 0–22)
RBC # BLD AUTO: 3.47 10*6/UL (ref 4.6–6)
RBC #/AREA URNS HPF: 1 /[HPF] (ref 0–3)
SODIUM SERPL-SCNC: 130 MMOL/L (ref 136–144)
SP GR UR: 1.02 (ref 1–1.03)
SPERM URNS QL MICRO: ABNORMAL /[HPF]
SQUAMOUS SPT QL MICRO: 0 /[HPF] (ref 0–5)
UNIDENT CRYS URNS QL MICRO: ABNORMAL /[HPF]
WBC # BLD AUTO: 6.4 10*3/UL (ref 4.5–11.5)
WBC #/AREA URNS HPF: 0 /[HPF] (ref 0–5)
YEAST SPEC QL WET PREP: ABNORMAL /[HPF]

## 2019-05-07 ENCOUNTER — HOSPITAL ENCOUNTER (OUTPATIENT)
Dept: OTHER | Facility: HOSPITAL | Age: 49
Discharge: HOME | End: 2019-05-07
Attending: INTERNAL MEDICINE | Admitting: INTERNAL MEDICINE

## 2019-05-07 LAB
ALBUMIN SERPL-MCNC: 2.6 G/DL (ref 3.5–4.8)
ALBUMIN/GLOB SERPL: 0.6 {RATIO} (ref 1–1.7)
ALP SERPL-CCNC: 158 IU/L (ref 32–91)
ALT SERPL-CCNC: 19 IU/L (ref 17–63)
ANION GAP SERPL CALC-SCNC: 17.4 MMOL/L (ref 10–20)
AST SERPL-CCNC: 41 IU/L (ref 15–41)
BASOPHILS # BLD AUTO: 0.1 10*3/UL (ref 0–0.2)
BASOPHILS NFR BLD AUTO: 1 % (ref 0–2)
BILIRUB SERPL-MCNC: 4 MG/DL (ref 0.3–1.2)
BUN SERPL-MCNC: 10 MG/DL (ref 8–20)
BUN/CREAT SERPL: 8.3 (ref 6.2–20.3)
CALCIUM SERPL-MCNC: 9.2 MG/DL (ref 8.9–10.3)
CHLORIDE SERPL-SCNC: 97 MMOL/L (ref 101–111)
CONV CO2: 23 MMOL/L (ref 22–32)
CONV TOTAL PROTEIN: 7.2 G/DL (ref 6.1–7.9)
CREAT UR-MCNC: 1.2 MG/DL (ref 0.7–1.2)
DIFFERENTIAL METHOD BLD: (no result)
EOSINOPHIL # BLD AUTO: 0.8 10*3/UL (ref 0–0.3)
EOSINOPHIL # BLD AUTO: 11 % (ref 0–3)
ERYTHROCYTE [DISTWIDTH] IN BLOOD BY AUTOMATED COUNT: 14.7 % (ref 11.5–14.5)
GLOBULIN UR ELPH-MCNC: 4.6 G/DL (ref 2.5–3.8)
GLUCOSE SERPL-MCNC: 145 MG/DL (ref 65–99)
HCT VFR BLD AUTO: 35.8 % (ref 40–54)
HGB BLD-MCNC: 12.2 G/DL (ref 14–18)
INR PPP: 1.3 (ref 2–3)
LYMPHOCYTES # BLD AUTO: 1.8 10*3/UL (ref 0.8–4.8)
LYMPHOCYTES NFR BLD AUTO: 24 % (ref 18–42)
MCH RBC QN AUTO: 34 PG (ref 26–32)
MCHC RBC AUTO-ENTMCNC: 34 G/DL (ref 32–36)
MCV RBC AUTO: 100 FL (ref 80–94)
MONOCYTES # BLD AUTO: 0.9 10*3/UL (ref 0.1–1.3)
MONOCYTES NFR BLD AUTO: 12 % (ref 2–11)
NEUTROPHILS # BLD AUTO: 3.9 10*3/UL (ref 2.3–8.6)
NEUTROPHILS NFR BLD AUTO: 52 % (ref 50–75)
NRBC BLD AUTO-RTO: 0 /100{WBCS}
NRBC/RBC NFR BLD MANUAL: 0 10*3/UL
PLATELET # BLD AUTO: 147 10*3/UL (ref 150–450)
PMV BLD AUTO: 9.2 FL (ref 7.4–10.4)
POTASSIUM SERPL-SCNC: 3.4 MMOL/L (ref 3.6–5.1)
PROTHROMBIN TIME: 12.8 SEC (ref 19.4–28.5)
RBC # BLD AUTO: 3.57 10*6/UL (ref 4.6–6)
SODIUM SERPL-SCNC: 134 MMOL/L (ref 136–144)
WBC # BLD AUTO: 7.5 10*3/UL (ref 4.5–11.5)

## 2019-05-14 ENCOUNTER — OFFICE (AMBULATORY)
Dept: URBAN - METROPOLITAN AREA CLINIC 64 | Facility: CLINIC | Age: 49
End: 2019-05-14
Payer: COMMERCIAL

## 2019-05-14 VITALS
HEIGHT: 70 IN | DIASTOLIC BLOOD PRESSURE: 62 MMHG | HEART RATE: 80 BPM | SYSTOLIC BLOOD PRESSURE: 101 MMHG | WEIGHT: 257 LBS

## 2019-05-14 DIAGNOSIS — R18.8 OTHER ASCITES: ICD-10-CM

## 2019-05-14 DIAGNOSIS — K72.90 HEPATIC FAILURE, UNSPECIFIED WITHOUT COMA: ICD-10-CM

## 2019-05-14 DIAGNOSIS — K70.30 ALCOHOLIC CIRRHOSIS OF LIVER WITHOUT ASCITES: ICD-10-CM

## 2019-05-14 PROCEDURE — 99214 OFFICE O/P EST MOD 30 MIN: CPT | Performed by: INTERNAL MEDICINE

## 2019-05-14 RX ORDER — BUMETANIDE 2 MG/1
4 TABLET ORAL
Qty: 60 | Refills: 11 | Status: COMPLETED
End: 2021-06-07

## 2019-05-14 RX ORDER — SPIRONOLACTONE 25 MG/1
25 TABLET ORAL
Qty: 30 | Refills: 11 | Status: COMPLETED
End: 2023-07-31

## 2019-05-24 ENCOUNTER — CONVERSION ENCOUNTER (OUTPATIENT)
Dept: SURGERY | Facility: CLINIC | Age: 49
End: 2019-05-24

## 2019-06-03 ENCOUNTER — CONVERSION ENCOUNTER (OUTPATIENT)
Dept: FAMILY MEDICINE CLINIC | Facility: CLINIC | Age: 49
End: 2019-06-03

## 2019-06-03 ENCOUNTER — HOSPITAL ENCOUNTER (OUTPATIENT)
Dept: FAMILY MEDICINE CLINIC | Facility: CLINIC | Age: 49
Setting detail: SPECIMEN
Discharge: HOME | End: 2019-06-03
Attending: INTERNAL MEDICINE | Admitting: INTERNAL MEDICINE

## 2019-06-04 VITALS
OXYGEN SATURATION: 97 % | HEART RATE: 93 BPM | WEIGHT: 262.8 LBS | SYSTOLIC BLOOD PRESSURE: 117 MMHG | BODY MASS INDEX: 37.62 KG/M2 | DIASTOLIC BLOOD PRESSURE: 74 MMHG | HEIGHT: 70 IN

## 2019-06-05 ENCOUNTER — HOSPITAL ENCOUNTER (OUTPATIENT)
Dept: FAMILY MEDICINE CLINIC | Facility: CLINIC | Age: 49
Setting detail: SPECIMEN
Discharge: HOME | End: 2019-06-05
Attending: INTERNAL MEDICINE | Admitting: INTERNAL MEDICINE

## 2019-06-05 LAB
ALBUMIN SERPL-MCNC: 2.5 G/DL (ref 3.5–4.8)
ALBUMIN/GLOB SERPL: 0.6 {RATIO} (ref 1–1.7)
ALP SERPL-CCNC: 137 IU/L (ref 32–91)
ALT SERPL-CCNC: 20 IU/L (ref 17–63)
ANION GAP SERPL CALC-SCNC: 11.3 MMOL/L (ref 10–20)
AST SERPL-CCNC: 42 IU/L (ref 15–41)
BASOPHILS # BLD AUTO: 0.1 10*3/UL (ref 0–0.2)
BASOPHILS NFR BLD AUTO: 1 % (ref 0–2)
BILIRUB SERPL-MCNC: 4.3 MG/DL (ref 0.3–1.2)
BILIRUB UR QL STRIP: NEGATIVE MG/DL
BNP SERPL-MCNC: 75 PG/ML
BUN SERPL-MCNC: 10 MG/DL (ref 8–20)
BUN/CREAT SERPL: 9.1 (ref 6.2–20.3)
CALCIUM SERPL-MCNC: 8.8 MG/DL (ref 8.9–10.3)
CASTS URNS QL MICRO: NORMAL /[LPF]
CHLORIDE SERPL-SCNC: 100 MMOL/L (ref 101–111)
COLOR UR: YELLOW
CONV BACTERIA IN URINE MICRO: NEGATIVE
CONV CLARITY OF URINE: CLEAR
CONV CO2: 24 MMOL/L (ref 22–32)
CONV HYALINE CASTS IN URINE MICRO: 1 /[LPF] (ref 0–5)
CONV PROTEIN IN URINE BY AUTOMATED TEST STRIP: NEGATIVE MG/DL
CONV SMALL ROUND CELLS: NORMAL /[HPF]
CONV TOTAL PROTEIN: 7 G/DL (ref 6.1–7.9)
CONV UROBILINOGEN IN URINE BY AUTOMATED TEST STRIP: 1 MG/DL
CREAT UR-MCNC: 1.1 MG/DL (ref 0.7–1.2)
CRP SERPL-MCNC: 0.74 MG/DL (ref 0–0.7)
DIFFERENTIAL METHOD BLD: (no result)
EOSINOPHIL # BLD AUTO: 0.9 10*3/UL (ref 0–0.3)
EOSINOPHIL # BLD AUTO: 12 % (ref 0–3)
ERYTHROCYTE [DISTWIDTH] IN BLOOD BY AUTOMATED COUNT: 14.8 % (ref 11.5–14.5)
ERYTHROCYTE [SEDIMENTATION RATE] IN BLOOD BY WESTERGREN METHOD: 65 MM/HR (ref 0–20)
GLOBULIN UR ELPH-MCNC: 4.5 G/DL (ref 2.5–3.8)
GLUCOSE SERPL-MCNC: 157 MG/DL (ref 65–99)
GLUCOSE UR QL: NEGATIVE MG/DL
HCT VFR BLD AUTO: 35.1 % (ref 40–54)
HGB BLD-MCNC: 12.3 G/DL (ref 14–18)
HGB UR QL STRIP: NEGATIVE
INR PPP: 1.3 (ref 2–3)
KETONES UR QL STRIP: NEGATIVE MG/DL
LEUKOCYTE ESTERASE UR QL STRIP: NEGATIVE
LYMPHOCYTES # BLD AUTO: 2 10*3/UL (ref 0.8–4.8)
LYMPHOCYTES NFR BLD AUTO: 28 % (ref 18–42)
MAGNESIUM SERPL-MCNC: 1.7 MG/DL (ref 1.8–2.5)
MCH RBC QN AUTO: 34.1 PG (ref 26–32)
MCHC RBC AUTO-ENTMCNC: 34.9 G/DL (ref 32–36)
MCV RBC AUTO: 97.5 FL (ref 80–94)
MONOCYTES # BLD AUTO: 0.8 10*3/UL (ref 0.1–1.3)
MONOCYTES NFR BLD AUTO: 12 % (ref 2–11)
NEUTROPHILS # BLD AUTO: 3.3 10*3/UL (ref 2.3–8.6)
NEUTROPHILS NFR BLD AUTO: 47 % (ref 50–75)
NITRITE UR QL STRIP: NEGATIVE
NRBC BLD AUTO-RTO: 0 /100{WBCS}
NRBC/RBC NFR BLD MANUAL: 0 10*3/UL
PH UR STRIP.AUTO: 7.5 [PH] (ref 4.5–8)
PLATELET # BLD AUTO: 172 10*3/UL (ref 150–450)
PMV BLD AUTO: 8.4 FL (ref 7.4–10.4)
POTASSIUM SERPL-SCNC: 3.3 MMOL/L (ref 3.6–5.1)
PROTHROMBIN TIME: 13 SEC (ref 19.4–28.5)
RBC # BLD AUTO: 3.6 10*6/UL (ref 4.6–6)
RBC #/AREA URNS HPF: 1 /[HPF] (ref 0–3)
SODIUM SERPL-SCNC: 132 MMOL/L (ref 136–144)
SP GR UR: 1.01 (ref 1–1.03)
SPERM URNS QL MICRO: NORMAL /[HPF]
SQUAMOUS SPT QL MICRO: 0 /[HPF] (ref 0–5)
UNIDENT CRYS URNS QL MICRO: NORMAL /[HPF]
WBC # BLD AUTO: 7 10*3/UL (ref 4.5–11.5)
WBC #/AREA URNS HPF: 1 /[HPF] (ref 0–5)
YEAST SPEC QL WET PREP: NORMAL /[HPF]

## 2019-06-06 NOTE — PROGRESS NOTES
Visit Type:  Follow-up visit  Referring Provider:  ELIZABETH Talavera  Primary Provider:  Aldair Moreau MD    CC:  umbilical hernia.    History of Present Illness:  He has had a history of midline laparotomy for duodenal ulcer.  He has a history of alcoholic liver disease.  He takes diuretics for ascites.  He has not been drinking alcohol for several months.  He has developed the reducible umbilical hernia that he   would like to get repaired      Past medical history also significant for COPD.      Past Medical History:     Reviewed history from 2019 and no changes required:        rheumatoid arthritis (diagnosed, age 16, per pt)        COPD        benign essential tremors        ED        HTN        allergic rhinitis         etoh cirrhosis - liver failure        CKD        perforated duodenal ulcer s/p patch        alcoholish        Depression        hepatitis        kidney disease        COPD        nonhealing sore    Past Surgical History:     Reviewed history from 2018 and no changes required:              right bunion removal              left forearm cyst removal        perforated duodenal ulcer s/p patch        ascities/paracentesis/endoscopy    Family History Summary:      Reviewed history Last on 2019 and no changes required:2019  Father - Has Family History of Other Medical Problems - prostate problems - Entered On: 3/20/2017  Brother - Has Family History of Other Medical Problems - suicide () - Entered On: 3/20/2017  Father - Has Family History of Lung/Respiratory Disease - COPD - Entered On: 3/20/2017  Mother - Has Family History of Other Cancer - metastatic - Entered On: 3/20/2017    General Comments - FH:  Siblings: 1 brother - healthy    Denies FH colon cancer    Social History:     Reviewed history from 2019 and no changes required:        Patient is a former smoker.        Passive Smoke: N        Alcohol Use: N        Drug Use: N        HIV/High Risk: N         Regular Exercise: N        Hx Domestic Abuse: N        Tenriism Affecting Care: N                Alcohol Use: Y    Social History Summary:  Patient is a former smoker.  Passive Smoke: N  Alcohol Use: N  Drug Use: N  HIV/High Risk: N  Regular Exercise: N  Hx Domestic Abuse: N  Tenriism Affecting Care: N    Alcohol Use: Y  Social History Reviewed: 2019          Vital Signs:    Patient Profile:    49 Years Old Male  Height:     70 inches (177.80 cm)  Weight:     262.8 pounds  BMI:        37.70     O2 Sat:     97 %  Temp:       97.2 degrees F oral  Pulse rate: 93 / minute  BP Sittin / 74  (right arm)    Cuff size:  regular      Problems: Active problems were reviewed with the patient during this visit.  Medications: Medications were reviewed with the patient during this visit.  Allergies: Allergies were reviewed with the patient during this visit.        Vitals Entered By: Marylin Avelar MA (May 24, 2019 12:24 PM)    Active Medications (reviewed today):  BUMETANIDE 1 MG TABLET (BUMETANIDE) TAKE 1 TABLET BY MOUTH TWICE A DAY  ZINC SULFATE 220 (50 ZN) MG ORAL TABLET (ZINC SULFATE) Take 1 tablet by mouth daily  XIFAXAN 550 MG ORAL TABLET (RIFAXIMIN) Take one (1) tablet by mouth twice a day  TAMSULOSIN HCL 0.4 MG ORAL CAPSULE (TAMSULOSIN HCL) Take 1 tablet by mouth daily  PROMETHAZINE HCL 12.5 MG ORAL TABLET (PROMETHAZINE HCL) Take 1 tablet by mouth every 6 hours PRN  ONDANSETRON HCL 8 MG ORAL TABLET (ONDANSETRON HCL) Take one (1) tablet by mouth three times a day  KLOR-CON M20 20 MEQ ORAL TABLET EXTENDED RELEASE (POTASSIUM CHLORIDE DAPHNE ER) Take one (1) tablet by mouth three times a day  MECLIZINE HCL 25 MG ORAL TABLET (MECLIZINE HCL) one tablet by mouth three times a day prn dizziness  FLOMAX 0.4 MG ORAL CAPSULE (TAMSULOSIN HCL)   ZINC SULFATE 220 (50 ZN) MG ORAL TABLET (ZINC SULFATE) one tab daily  ALDACTONE 100 MG ORAL TABLET (SPIRONOLACTONE) one tab daily  XIFAXAN 550 MG TABLET (RIFAXIMIN) TAKE 1 TABLET  BY MOUTH TWICE A DAY  NASACORT ALLERGY 24HR 55 MCG/ACT NASAL AEROSOL (TRIAMCINOLONE ACETONIDE) 1 spray intranasally qd prn  LACTULOSE 10 GM/15 ML SOLUTION (LACTULOSE) TAKE 30 ML BY MOUTH THREE TIMES A DAY AS NEEDED.  FLUTICASONE PROPIONATE 50 MCG/ACT NASAL SUSPENSION (FLUTICASONE PROPIONATE) two puffs each nostril once a day  AZELASTINE 0.1% (137 MCG) SPRY (AZELASTINE HCL) USE 2 SPRAYS EACH NOSTRIL TWICE A DAY    Current Allergies (reviewed today):  * DULOXETINE (Critical)      Risk Factors:     Smoked Tobacco Use:  Former smoker     Cigarettes:  Yes -- ~1 pack(s) per day,      Years smoked:  36  Smokeless Tobacco Use:  Unknown  Passive smoke exposure:  no  Drug use:  no  HIV high-risk behavior:  no  Alcohol use:  no  Exercise:  no  Seatbelt use:  100 %  Sun Exposure:  occasionally    Dietary Counseling: yes    Previous Tobacco Use: Signed On - 03/29/2019  Smoked Tobacco Use:  Current every day smoker     Cigarettes:  Yes -- ~1 pack(s) per day,      Years smoked:  36  Smokeless Tobacco Use:  Unknown     Counseled to quit/cut down:  yes  Passive smoke exposure:  yes  Drug use:  no  HIV high-risk behavior:  no    Previous Alcohol Use: Signed On - 03/29/2019  Alcohol use:  yes     Type:  beer/whiskey     Drinks per day:  social     Has patient --        Felt need to cut down:  no        Been annoyed by complaints:  no        Felt guilty about drinking:  no        Needed eye opener in the morning:  no     Counseled to quit/cut down alcohol use:  no  Exercise:  no  Seatbelt use:  100 %  Sun Exposure:  occasionally      Review of Systems     General       Denies fever, anorexia and weight loss.    GI       Denies abdominal pain, nausea, vomiting, diarrhea, constipation, change in bowel habits, melena, hematochezia, jaundice, gas/bloating, indigestion/heartburn, dysphagia and odynophagia.    Breast       Denies left breast lump, right breast lump, nipple discharge, bloody discharge from nipple, breast pain, abnormal  mammogram and breast enlargement.    CV       Denies chest pains, palpitations, syncope and peripheral edema.    Resp       Denies cough, shortness of breath, hemoptysis, wheezing and pleuritic chest pain.    Vascular       Denies varicose veins, leg swelling, leg redness, leg coolness, pain in legs with walking, resting leg pain, pain at night in legs and blue toe(s).           Denies dysuria, hematuria, discharge, urinary frequency, urinary hesitancy, nocturia, incontinence and erectile dysfunction.    Wound       Denies wound redness, wound discharge, wound pain, opening of wound, purulent discharge and bleeding from wound.    Derm       Denies suspicious lesions, new skin lesions, changing mole(s), rash, itching and history of skin cancer.    Neuro       Denies paralysis, paresthesias, seizures and frequent headaches.    Psych       Denies depression, anxiety, memory loss, suicidal ideation, hallucinations, paranoia, phobia and confusion.    Endo       Denies cold intolerance, heat intolerance, polydipsia, polyphagia, polyuria and unusual weight change.    Heme       Denies abnormal bruising, bleeding and enlarged lymph nodes.    MS       Denies back pain, sciatica and arthritis.    Other       Denies stoma redness, pain around stoma, discharge from stoma, pain from venous catheter, redness at vascular access site and purulent drainage from vascular access site.      Physical Exam    General:      well developed, well nourished, in no acute distress  Head:      normocephalic and atraumatic  Eyes:      PERRLA/EOM intact; fundi benign, conjunctiva and sclera clear  Neck:      no masses, thyromegaly, or abnormal cervical nodes  Chest Wall:      no deformities or breast masses noted  Lungs:      clear bilaterally to A & P  Heart:      regular rate and rhythm, S1, S2 without murmurs, rubs, gallops, or clicks  Abdomen:        Reducible umbilical hernia well-healed midline incision no abdominal distention  Pulses:       pulses normal in all 4 extremities  Extremities:      no clubbing, cyanosis, edema, or deformity noted with normal full range of motion of joints of all four extremities  Neurologic:      no focal deficits, CN II-XII grossly intact with normal reflexes, coordination, muscle strength and tone  Skin:      intact without lesions or rashes  Psych:      alert and cooperative; normal mood and affect; normal attention span and concentration    Diabetes Management Exam:      Foot Exam (with socks and/or shoes not present):        Pulses:           pulses normal in all 4 extremities      Blood Pressure:  Today's BP: 117/74 mm Hg    Labwork:   Most Recent Lab Results:   LDL: 101 MG/DL (CALC) mg/dL 03/14/2017  HbA1c: : 5.2 % OF TOTAL HGB % 03/14/2017          Impression & Recommendations:    Problem # 1:  Hernia Umbilical (ICD-553.1) (DUF55-X99.9)    Orders:  SCHEDULE SURGICAL PROCEDURE (ssp)  Ofc Vst, Est Level V (71032)  SCHEDULE SURGICAL PROCEDURE (ssp)     plan for laparoscopic umbilical hernia repair.  I discussed with him that there is a possibility of significant adhesions   And we may have to convert the operation to an open repair and he understands. there is a possibility of bowel injury and also the   possibility of bowel incorporating into the mesh.    Other Orders:  ENT Office Consult (ENTOC)    Assessment   New Problems:  Depression (NHE27-I49.8)  Skin lesion of face (ICD-709.9) (ZBC78-F85.9)  Hernia Umbilical (ICD-553.1) (OEU95-H70.9)      Plan   Updated Medication List:   BUMETANIDE 1 MG TABLET (BUMETANIDE) TAKE 1 TABLET BY MOUTH TWICE A DAY  ZINC SULFATE 220 (50 ZN) MG ORAL TABLET (ZINC SULFATE) Take 1 tablet by mouth daily  XIFAXAN 550 MG ORAL TABLET (RIFAXIMIN) Take one (1) tablet by mouth twice a day  TAMSULOSIN HCL 0.4 MG ORAL CAPSULE (TAMSULOSIN HCL) Take 1 tablet by mouth daily  PROMETHAZINE HCL 12.5 MG ORAL TABLET (PROMETHAZINE HCL) Take 1 tablet by mouth every 6 hours PRN  ONDANSETRON HCL 8 MG ORAL  TABLET (ONDANSETRON HCL) Take one (1) tablet by mouth three times a day  KLOR-CON M20 20 MEQ ORAL TABLET EXTENDED RELEASE (POTASSIUM CHLORIDE DAPHEN ER) Take one (1) tablet by mouth three times a day  MECLIZINE HCL 25 MG ORAL TABLET (MECLIZINE HCL) one tablet by mouth three times a day prn dizziness  FLOMAX 0.4 MG ORAL CAPSULE (TAMSULOSIN HCL)   ALDACTONE 100 MG ORAL TABLET (SPIRONOLACTONE) one tab daily  XIFAXAN 550 MG TABLET (RIFAXIMIN) TAKE 1 TABLET BY MOUTH TWICE A DAY  NASACORT ALLERGY 24HR 55 MCG/ACT NASAL AEROSOL (TRIAMCINOLONE ACETONIDE) 1 spray intranasally qd prn  LACTULOSE 10 GM/15 ML SOLUTION (LACTULOSE) TAKE 30 ML BY MOUTH THREE TIMES A DAY AS NEEDED.  FLUTICASONE PROPIONATE 50 MCG/ACT NASAL SUSPENSION (FLUTICASONE PROPIONATE) two puffs each nostril once a day  AZELASTINE 0.1% (137 MCG) SPRY (AZELASTINE HCL) USE 2 SPRAYS EACH NOSTRIL TWICE A DAY    New Orders:   SCHEDULE SURGICAL PROCEDURE [ssp]  Ofc Vst, Est Level V [21977]  ENT Office Consult [ENTOC]  SCHEDULE SURGICAL PROCEDURE [ssp]        Surgery Worksheet           ]      Electronically signed by Sapna Elizalde MD on 05/28/2019 at 3:39 PM  ________________________________________________________________________       Disclaimer: Converted Note message may not contain all data elements that existed in the legacy source system. Please see [facility] Legacy System for the original note details.

## 2019-06-07 LAB
INTERPRETATION UR IFE-IMP: NORMAL
PROT PATTERN SERPL IFE-IMP: NORMAL

## 2019-06-12 ENCOUNTER — HOSPITAL ENCOUNTER (OUTPATIENT)
Dept: PREADMISSION TESTING | Facility: HOSPITAL | Age: 49
Discharge: HOME | End: 2019-06-12
Attending: SURGERY | Admitting: SURGERY

## 2019-06-12 LAB
MICRO REPORT STATUS: NORMAL
POTASSIUM SERPL-SCNC: 3.8 MMOL/L (ref 3.6–5.1)

## 2019-06-12 RX ORDER — TAMSULOSIN HYDROCHLORIDE 0.4 MG/1
1 CAPSULE ORAL 2 TIMES DAILY
COMMUNITY
End: 2022-09-21 | Stop reason: SDUPTHER

## 2019-06-12 RX ORDER — SPIRONOLACTONE 25 MG/1
25 TABLET ORAL DAILY
COMMUNITY
End: 2022-11-18 | Stop reason: SDUPTHER

## 2019-06-12 RX ORDER — ONDANSETRON HYDROCHLORIDE 8 MG/1
8 TABLET, FILM COATED ORAL EVERY 8 HOURS PRN
COMMUNITY
End: 2021-01-27

## 2019-06-12 RX ORDER — AZELASTINE 1 MG/ML
2 SPRAY, METERED NASAL 2 TIMES DAILY
COMMUNITY
End: 2019-06-27 | Stop reason: SDUPTHER

## 2019-06-12 RX ORDER — ERGOCALCIFEROL 1.25 MG/1
50000 CAPSULE ORAL
COMMUNITY
End: 2020-11-13 | Stop reason: SDUPTHER

## 2019-06-12 RX ORDER — MECLIZINE HCL 25MG 25 MG/1
25 TABLET, CHEWABLE ORAL 3 TIMES DAILY PRN
COMMUNITY
End: 2022-08-12 | Stop reason: SDUPTHER

## 2019-06-12 RX ORDER — DIPHENHYDRAMINE HCL 50 MG
50 CAPSULE ORAL NIGHTLY PRN
Status: ON HOLD | COMMUNITY
End: 2019-06-18

## 2019-06-12 RX ORDER — UREA 10 %
220 LOTION (ML) TOPICAL DAILY
COMMUNITY
End: 2019-08-05 | Stop reason: SDUPTHER

## 2019-06-12 RX ORDER — LACTULOSE 10 G/15ML
20 SOLUTION ORAL 3 TIMES DAILY
COMMUNITY
End: 2019-09-02 | Stop reason: SDUPTHER

## 2019-06-12 RX ORDER — PROMETHAZINE HYDROCHLORIDE 12.5 MG/1
12.5 TABLET ORAL EVERY 6 HOURS PRN
COMMUNITY
End: 2021-01-27

## 2019-06-12 RX ORDER — POTASSIUM CHLORIDE 20 MEQ/1
20 TABLET, EXTENDED RELEASE ORAL 2 TIMES DAILY
COMMUNITY

## 2019-06-17 ENCOUNTER — TELEPHONE (OUTPATIENT)
Dept: BARIATRICS/WEIGHT MGMT | Facility: CLINIC | Age: 49
End: 2019-06-17

## 2019-06-17 NOTE — TELEPHONE ENCOUNTER
PHILLIP notified us of pt positive MRSA swab. Surgery is tomorrow. S/W MIRNA damian, and no instructions needed.

## 2019-06-18 ENCOUNTER — ANESTHESIA (OUTPATIENT)
Dept: PERIOP | Facility: HOSPITAL | Age: 49
End: 2019-06-18

## 2019-06-18 ENCOUNTER — HOSPITAL ENCOUNTER (OUTPATIENT)
Facility: HOSPITAL | Age: 49
Discharge: HOME | End: 2019-06-19
Attending: SURGERY | Admitting: SURGERY

## 2019-06-18 ENCOUNTER — ANESTHESIA EVENT (OUTPATIENT)
Dept: PERIOP | Facility: HOSPITAL | Age: 49
End: 2019-06-18

## 2019-06-18 VITALS
HEART RATE: 84 BPM | WEIGHT: 263.8 LBS | HEIGHT: 70 IN | BODY MASS INDEX: 37.77 KG/M2 | SYSTOLIC BLOOD PRESSURE: 114 MMHG | RESPIRATION RATE: 20 BRPM | OXYGEN SATURATION: 99 % | DIASTOLIC BLOOD PRESSURE: 68 MMHG

## 2019-06-18 DIAGNOSIS — L72.3 SEBACEOUS CYST: ICD-10-CM

## 2019-06-18 PROBLEM — K42.0 UMBILICAL HERNIA, INCARCERATED: Status: ACTIVE | Noted: 2019-06-18

## 2019-06-18 LAB
ABO GROUP BLD: NORMAL
ALBUMIN SERPL-MCNC: 2.6 G/DL (ref 3.5–4.8)
ALBUMIN/GLOB SERPL: 0.6 G/DL (ref 1–1.7)
ALP SERPL-CCNC: 139 U/L (ref 32–91)
ALT SERPL W P-5'-P-CCNC: 20 U/L (ref 17–63)
ANION GAP SERPL CALCULATED.3IONS-SCNC: 12 MMOL/L (ref 10–20)
AST SERPL-CCNC: 40 U/L (ref 15–41)
BILIRUB SERPL-MCNC: 3.4 MG/DL (ref 0.3–1.2)
BLD GP AB SCN SERPL QL: NEGATIVE
BUN BLD-MCNC: 11 MG/DL (ref 8–20)
BUN/CREAT SERPL: 11 (ref 6.2–20.3)
CALCIUM SPEC-SCNC: 8.8 MG/DL (ref 8.9–10.3)
CHLORIDE SERPL-SCNC: 102 MMOL/L (ref 101–111)
CO2 SERPL-SCNC: 21 MMOL/L (ref 22–32)
CREAT BLD-MCNC: 1 MG/DL (ref 0.7–1.2)
GFR SERPL CREATININE-BSD FRML MDRD: 79 ML/MIN/1.73
GLOBULIN UR ELPH-MCNC: 4.7 GM/DL (ref 2.5–3.8)
GLUCOSE BLD-MCNC: 137 MG/DL (ref 65–99)
POTASSIUM BLD-SCNC: 3.8 MMOL/L (ref 3.6–5.1)
PROT SERPL-MCNC: 7.3 G/DL (ref 6.1–7.9)
RH BLD: POSITIVE
SODIUM BLD-SCNC: 135 MMOL/L (ref 136–144)
T&S EXPIRATION DATE: NORMAL

## 2019-06-18 PROCEDURE — 49652 PR LAP, VENTRAL HERNIA REPAIR,REDUCIBLE: CPT | Performed by: SURGERY

## 2019-06-18 PROCEDURE — 86901 BLOOD TYPING SEROLOGIC RH(D): CPT | Performed by: SURGERY

## 2019-06-18 PROCEDURE — C1781 MESH (IMPLANTABLE): HCPCS | Performed by: SURGERY

## 2019-06-18 PROCEDURE — 25010000002 FENTANYL CITRATE (PF) 250 MCG/5ML SOLUTION: Performed by: ANESTHESIOLOGIST ASSISTANT

## 2019-06-18 PROCEDURE — 86850 RBC ANTIBODY SCREEN: CPT | Performed by: SURGERY

## 2019-06-18 PROCEDURE — 86901 BLOOD TYPING SEROLOGIC RH(D): CPT

## 2019-06-18 PROCEDURE — C9290 INJ, BUPIVACAINE LIPOSOME: HCPCS | Performed by: SURGERY

## 2019-06-18 PROCEDURE — 11403 EXC TR-EXT B9+MARG 2.1-3CM: CPT | Performed by: SURGERY

## 2019-06-18 PROCEDURE — 86900 BLOOD TYPING SEROLOGIC ABO: CPT | Performed by: SURGERY

## 2019-06-18 PROCEDURE — 25010000002 MIDAZOLAM PER 1 MG: Performed by: ANESTHESIOLOGIST ASSISTANT

## 2019-06-18 PROCEDURE — 25010000003 BUPIVACAINE LIPOSOME 1.3 % SUSPENSION: Performed by: SURGERY

## 2019-06-18 PROCEDURE — 25010000002 PROPOFOL 200 MG/20ML EMULSION: Performed by: ANESTHESIOLOGIST ASSISTANT

## 2019-06-18 PROCEDURE — 88304 TISSUE EXAM BY PATHOLOGIST: CPT | Performed by: SURGERY

## 2019-06-18 PROCEDURE — 25010000002 ONDANSETRON PER 1 MG: Performed by: ANESTHESIOLOGIST ASSISTANT

## 2019-06-18 PROCEDURE — 25010000002 HYDROMORPHONE PER 4 MG: Performed by: ANESTHESIOLOGIST ASSISTANT

## 2019-06-18 PROCEDURE — G0378 HOSPITAL OBSERVATION PER HR: HCPCS

## 2019-06-18 PROCEDURE — 80053 COMPREHEN METABOLIC PANEL: CPT | Performed by: SURGERY

## 2019-06-18 PROCEDURE — 25010000002 DEXAMETHASONE PER 1 MG: Performed by: ANESTHESIOLOGIST ASSISTANT

## 2019-06-18 PROCEDURE — 86900 BLOOD TYPING SEROLOGIC ABO: CPT

## 2019-06-18 DEVICE — VENTRALIGHT ST MESH WITH ECHO PS POSITIONING SYSTEM
Type: IMPLANTABLE DEVICE | Site: ABDOMEN | Status: FUNCTIONAL
Brand: VENTRALIGHT ST MESH WITH ECHO PS POSITIONING SYSTEM

## 2019-06-18 RX ORDER — NALOXONE HCL 0.4 MG/ML
0.4 VIAL (ML) INJECTION
Status: DISCONTINUED | OUTPATIENT
Start: 2019-06-18 | End: 2019-06-19 | Stop reason: HOSPADM

## 2019-06-18 RX ORDER — LABETALOL HYDROCHLORIDE 5 MG/ML
5 INJECTION, SOLUTION INTRAVENOUS
Status: DISCONTINUED | OUTPATIENT
Start: 2019-06-18 | End: 2019-06-18 | Stop reason: HOSPADM

## 2019-06-18 RX ORDER — OXYCODONE HYDROCHLORIDE 5 MG/1
7.5 TABLET ORAL EVERY 4 HOURS PRN
Status: DISCONTINUED | OUTPATIENT
Start: 2019-06-18 | End: 2019-06-19 | Stop reason: HOSPADM

## 2019-06-18 RX ORDER — DOCUSATE SODIUM 100 MG/1
100 CAPSULE, LIQUID FILLED ORAL 2 TIMES DAILY PRN
Status: DISCONTINUED | OUTPATIENT
Start: 2019-06-18 | End: 2019-06-19 | Stop reason: HOSPADM

## 2019-06-18 RX ORDER — PROMETHAZINE HYDROCHLORIDE 25 MG/1
25 TABLET ORAL ONCE AS NEEDED
Status: DISCONTINUED | OUTPATIENT
Start: 2019-06-18 | End: 2019-06-18 | Stop reason: HOSPADM

## 2019-06-18 RX ORDER — MORPHINE SULFATE 4 MG/ML
4 INJECTION, SOLUTION INTRAMUSCULAR; INTRAVENOUS
Status: DISCONTINUED | OUTPATIENT
Start: 2019-06-18 | End: 2019-06-18 | Stop reason: HOSPADM

## 2019-06-18 RX ORDER — IPRATROPIUM BROMIDE AND ALBUTEROL SULFATE 2.5; .5 MG/3ML; MG/3ML
3 SOLUTION RESPIRATORY (INHALATION) ONCE AS NEEDED
Status: DISCONTINUED | OUTPATIENT
Start: 2019-06-18 | End: 2019-06-18 | Stop reason: HOSPADM

## 2019-06-18 RX ORDER — OXYCODONE HYDROCHLORIDE 5 MG/1
7.5 TABLET ORAL ONCE AS NEEDED
Status: DISCONTINUED | OUTPATIENT
Start: 2019-06-18 | End: 2019-06-18 | Stop reason: HOSPADM

## 2019-06-18 RX ORDER — ONDANSETRON 4 MG/1
4 TABLET, FILM COATED ORAL EVERY 6 HOURS PRN
Status: DISCONTINUED | OUTPATIENT
Start: 2019-06-18 | End: 2019-06-19 | Stop reason: HOSPADM

## 2019-06-18 RX ORDER — MORPHINE SULFATE 4 MG/ML
2 INJECTION, SOLUTION INTRAMUSCULAR; INTRAVENOUS
Status: DISCONTINUED | OUTPATIENT
Start: 2019-06-18 | End: 2019-06-18 | Stop reason: HOSPADM

## 2019-06-18 RX ORDER — SODIUM CHLORIDE 0.9 % (FLUSH) 0.9 %
3-10 SYRINGE (ML) INJECTION AS NEEDED
Status: DISCONTINUED | OUTPATIENT
Start: 2019-06-18 | End: 2019-06-18 | Stop reason: HOSPADM

## 2019-06-18 RX ORDER — HYDROMORPHONE HCL 110MG/55ML
0.5 PATIENT CONTROLLED ANALGESIA SYRINGE INTRAVENOUS
Status: DISCONTINUED | OUTPATIENT
Start: 2019-06-18 | End: 2019-06-18 | Stop reason: HOSPADM

## 2019-06-18 RX ORDER — ONDANSETRON 4 MG/1
8 TABLET, FILM COATED ORAL EVERY 8 HOURS PRN
Status: DISCONTINUED | OUTPATIENT
Start: 2019-06-18 | End: 2019-06-19 | Stop reason: HOSPADM

## 2019-06-18 RX ORDER — AZELASTINE 1 MG/ML
2 SPRAY, METERED NASAL 2 TIMES DAILY
Status: DISCONTINUED | OUTPATIENT
Start: 2019-06-18 | End: 2019-06-19 | Stop reason: HOSPADM

## 2019-06-18 RX ORDER — SODIUM CHLORIDE 9 MG/ML
INJECTION, SOLUTION INTRAVENOUS AS NEEDED
Status: DISCONTINUED | OUTPATIENT
Start: 2019-06-18 | End: 2019-06-18 | Stop reason: HOSPADM

## 2019-06-18 RX ORDER — BUMETANIDE 1 MG/1
2 TABLET ORAL ONCE
Status: COMPLETED | OUTPATIENT
Start: 2019-06-18 | End: 2019-06-18

## 2019-06-18 RX ORDER — PROMETHAZINE HYDROCHLORIDE 25 MG/1
12.5 TABLET ORAL EVERY 6 HOURS PRN
Status: DISCONTINUED | OUTPATIENT
Start: 2019-06-18 | End: 2019-06-19 | Stop reason: HOSPADM

## 2019-06-18 RX ORDER — POTASSIUM CHLORIDE 20 MEQ/1
40 TABLET, EXTENDED RELEASE ORAL DAILY
Status: DISCONTINUED | OUTPATIENT
Start: 2019-06-19 | End: 2019-06-19 | Stop reason: HOSPADM

## 2019-06-18 RX ORDER — ONDANSETRON 2 MG/ML
4 INJECTION INTRAMUSCULAR; INTRAVENOUS EVERY 6 HOURS PRN
Status: DISCONTINUED | OUTPATIENT
Start: 2019-06-18 | End: 2019-06-19 | Stop reason: HOSPADM

## 2019-06-18 RX ORDER — FENTANYL CITRATE 50 UG/ML
INJECTION, SOLUTION INTRAMUSCULAR; INTRAVENOUS AS NEEDED
Status: DISCONTINUED | OUTPATIENT
Start: 2019-06-18 | End: 2019-06-18 | Stop reason: SURG

## 2019-06-18 RX ORDER — SPIRONOLACTONE 25 MG/1
25 TABLET ORAL DAILY
Status: DISCONTINUED | OUTPATIENT
Start: 2019-06-19 | End: 2019-06-19 | Stop reason: HOSPADM

## 2019-06-18 RX ORDER — BUMETANIDE 2 MG/1
2 TABLET ORAL 2 TIMES DAILY
COMMUNITY

## 2019-06-18 RX ORDER — ACETAMINOPHEN 325 MG/1
650 TABLET ORAL EVERY 4 HOURS PRN
Status: DISCONTINUED | OUTPATIENT
Start: 2019-06-18 | End: 2019-06-19 | Stop reason: HOSPADM

## 2019-06-18 RX ORDER — FENTANYL CITRATE 50 UG/ML
25 INJECTION, SOLUTION INTRAMUSCULAR; INTRAVENOUS
Status: DISCONTINUED | OUTPATIENT
Start: 2019-06-18 | End: 2019-06-18 | Stop reason: HOSPADM

## 2019-06-18 RX ORDER — ROCURONIUM BROMIDE 10 MG/ML
INJECTION, SOLUTION INTRAVENOUS AS NEEDED
Status: DISCONTINUED | OUTPATIENT
Start: 2019-06-18 | End: 2019-06-18 | Stop reason: SURG

## 2019-06-18 RX ORDER — FLUMAZENIL 0.1 MG/ML
0.2 INJECTION INTRAVENOUS AS NEEDED
Status: DISCONTINUED | OUTPATIENT
Start: 2019-06-18 | End: 2019-06-18 | Stop reason: HOSPADM

## 2019-06-18 RX ORDER — ACETAMINOPHEN 160 MG/5ML
650 SOLUTION ORAL EVERY 4 HOURS PRN
Status: DISCONTINUED | OUTPATIENT
Start: 2019-06-18 | End: 2019-06-19 | Stop reason: HOSPADM

## 2019-06-18 RX ORDER — ONDANSETRON 2 MG/ML
4 INJECTION INTRAMUSCULAR; INTRAVENOUS ONCE AS NEEDED
Status: COMPLETED | OUTPATIENT
Start: 2019-06-18 | End: 2019-06-18

## 2019-06-18 RX ORDER — DEXAMETHASONE SODIUM PHOSPHATE 4 MG/ML
INJECTION, SOLUTION INTRA-ARTICULAR; INTRALESIONAL; INTRAMUSCULAR; INTRAVENOUS; SOFT TISSUE AS NEEDED
Status: DISCONTINUED | OUTPATIENT
Start: 2019-06-18 | End: 2019-06-18 | Stop reason: SURG

## 2019-06-18 RX ORDER — MEPERIDINE HYDROCHLORIDE 25 MG/ML
12.5 INJECTION INTRAMUSCULAR; INTRAVENOUS; SUBCUTANEOUS
Status: DISCONTINUED | OUTPATIENT
Start: 2019-06-18 | End: 2019-06-18 | Stop reason: HOSPADM

## 2019-06-18 RX ORDER — CLINDAMYCIN PHOSPHATE 900 MG/50ML
900 INJECTION, SOLUTION INTRAVENOUS ONCE
Status: COMPLETED | OUTPATIENT
Start: 2019-06-18 | End: 2019-06-18

## 2019-06-18 RX ORDER — LORAZEPAM 2 MG/ML
1 INJECTION INTRAMUSCULAR
Status: DISCONTINUED | OUTPATIENT
Start: 2019-06-18 | End: 2019-06-18 | Stop reason: HOSPADM

## 2019-06-18 RX ORDER — FENTANYL CITRATE 50 UG/ML
50 INJECTION, SOLUTION INTRAMUSCULAR; INTRAVENOUS
Status: DISCONTINUED | OUTPATIENT
Start: 2019-06-18 | End: 2019-06-18 | Stop reason: HOSPADM

## 2019-06-18 RX ORDER — FAMOTIDINE 20 MG/1
20 TABLET, FILM COATED ORAL 2 TIMES DAILY
Status: DISCONTINUED | OUTPATIENT
Start: 2019-06-18 | End: 2019-06-19 | Stop reason: HOSPADM

## 2019-06-18 RX ORDER — PROMETHAZINE HYDROCHLORIDE 25 MG/ML
6.25 INJECTION, SOLUTION INTRAMUSCULAR; INTRAVENOUS ONCE AS NEEDED
Status: DISCONTINUED | OUTPATIENT
Start: 2019-06-18 | End: 2019-06-18 | Stop reason: HOSPADM

## 2019-06-18 RX ORDER — MIDAZOLAM HYDROCHLORIDE 1 MG/ML
INJECTION INTRAMUSCULAR; INTRAVENOUS AS NEEDED
Status: DISCONTINUED | OUTPATIENT
Start: 2019-06-18 | End: 2019-06-18 | Stop reason: SURG

## 2019-06-18 RX ORDER — SODIUM CHLORIDE 9 MG/ML
100 INJECTION, SOLUTION INTRAVENOUS CONTINUOUS
Status: DISCONTINUED | OUTPATIENT
Start: 2019-06-18 | End: 2019-06-18

## 2019-06-18 RX ORDER — TAMSULOSIN HYDROCHLORIDE 0.4 MG/1
0.4 CAPSULE ORAL 2 TIMES DAILY
Status: DISCONTINUED | OUTPATIENT
Start: 2019-06-18 | End: 2019-06-19 | Stop reason: HOSPADM

## 2019-06-18 RX ORDER — LACTULOSE 10 G/15ML
20 SOLUTION ORAL 3 TIMES DAILY
Status: DISCONTINUED | OUTPATIENT
Start: 2019-06-18 | End: 2019-06-19 | Stop reason: HOSPADM

## 2019-06-18 RX ORDER — PROMETHAZINE HYDROCHLORIDE 25 MG/1
25 SUPPOSITORY RECTAL ONCE AS NEEDED
Status: DISCONTINUED | OUTPATIENT
Start: 2019-06-18 | End: 2019-06-18 | Stop reason: HOSPADM

## 2019-06-18 RX ORDER — SODIUM CHLORIDE, SODIUM LACTATE, POTASSIUM CHLORIDE, CALCIUM CHLORIDE 600; 310; 30; 20 MG/100ML; MG/100ML; MG/100ML; MG/100ML
9 INJECTION, SOLUTION INTRAVENOUS CONTINUOUS PRN
Status: DISCONTINUED | OUTPATIENT
Start: 2019-06-18 | End: 2019-06-19 | Stop reason: HOSPADM

## 2019-06-18 RX ORDER — HYDROMORPHONE HCL 110MG/55ML
0.25 PATIENT CONTROLLED ANALGESIA SYRINGE INTRAVENOUS
Status: DISCONTINUED | OUTPATIENT
Start: 2019-06-18 | End: 2019-06-18 | Stop reason: HOSPADM

## 2019-06-18 RX ORDER — MECLIZINE HYDROCHLORIDE 25 MG/1
25 TABLET ORAL DAILY
Status: DISCONTINUED | OUTPATIENT
Start: 2019-06-19 | End: 2019-06-19 | Stop reason: HOSPADM

## 2019-06-18 RX ORDER — PROPOFOL 10 MG/ML
INJECTION, EMULSION INTRAVENOUS AS NEEDED
Status: DISCONTINUED | OUTPATIENT
Start: 2019-06-18 | End: 2019-06-18 | Stop reason: SURG

## 2019-06-18 RX ORDER — SODIUM CHLORIDE 0.9 % (FLUSH) 0.9 %
3 SYRINGE (ML) INJECTION EVERY 12 HOURS SCHEDULED
Status: DISCONTINUED | OUTPATIENT
Start: 2019-06-18 | End: 2019-06-18 | Stop reason: HOSPADM

## 2019-06-18 RX ORDER — LIDOCAINE HYDROCHLORIDE 10 MG/ML
INJECTION, SOLUTION EPIDURAL; INFILTRATION; INTRACAUDAL; PERINEURAL AS NEEDED
Status: DISCONTINUED | OUTPATIENT
Start: 2019-06-18 | End: 2019-06-18 | Stop reason: SURG

## 2019-06-18 RX ORDER — BUMETANIDE 1 MG/1
2 TABLET ORAL
Status: DISCONTINUED | OUTPATIENT
Start: 2019-06-19 | End: 2019-06-19 | Stop reason: HOSPADM

## 2019-06-18 RX ORDER — HYDROMORPHONE HCL 110MG/55ML
1 PATIENT CONTROLLED ANALGESIA SYRINGE INTRAVENOUS
Status: DISCONTINUED | OUTPATIENT
Start: 2019-06-18 | End: 2019-06-18 | Stop reason: HOSPADM

## 2019-06-18 RX ORDER — SODIUM CHLORIDE 9 MG/ML
50 INJECTION, SOLUTION INTRAVENOUS CONTINUOUS
Status: DISCONTINUED | OUTPATIENT
Start: 2019-06-18 | End: 2019-06-19 | Stop reason: HOSPADM

## 2019-06-18 RX ORDER — ACETAMINOPHEN 650 MG/1
650 SUPPOSITORY RECTAL EVERY 4 HOURS PRN
Status: DISCONTINUED | OUTPATIENT
Start: 2019-06-18 | End: 2019-06-19 | Stop reason: HOSPADM

## 2019-06-18 RX ORDER — NALOXONE HCL 0.4 MG/ML
0.4 VIAL (ML) INJECTION AS NEEDED
Status: DISCONTINUED | OUTPATIENT
Start: 2019-06-18 | End: 2019-06-18 | Stop reason: HOSPADM

## 2019-06-18 RX ORDER — PHENYLEPHRINE HCL IN 0.9% NACL 0.5 MG/5ML
SYRINGE (ML) INTRAVENOUS AS NEEDED
Status: DISCONTINUED | OUTPATIENT
Start: 2019-06-18 | End: 2019-06-18 | Stop reason: SURG

## 2019-06-18 RX ORDER — MORPHINE SULFATE 4 MG/ML
4 INJECTION, SOLUTION INTRAMUSCULAR; INTRAVENOUS
Status: DISCONTINUED | OUTPATIENT
Start: 2019-06-18 | End: 2019-06-19 | Stop reason: HOSPADM

## 2019-06-18 RX ORDER — HYDRALAZINE HYDROCHLORIDE 20 MG/ML
5 INJECTION INTRAMUSCULAR; INTRAVENOUS
Status: DISCONTINUED | OUTPATIENT
Start: 2019-06-18 | End: 2019-06-18 | Stop reason: HOSPADM

## 2019-06-18 RX ORDER — BUPIVACAINE HYDROCHLORIDE AND EPINEPHRINE 2.5; 5 MG/ML; UG/ML
INJECTION, SOLUTION EPIDURAL; INFILTRATION; INTRACAUDAL; PERINEURAL AS NEEDED
Status: DISCONTINUED | OUTPATIENT
Start: 2019-06-18 | End: 2019-06-18 | Stop reason: HOSPADM

## 2019-06-18 RX ORDER — MORPHINE SULFATE 4 MG/ML
1 INJECTION, SOLUTION INTRAMUSCULAR; INTRAVENOUS
Status: DISCONTINUED | OUTPATIENT
Start: 2019-06-18 | End: 2019-06-18 | Stop reason: HOSPADM

## 2019-06-18 RX ADMIN — SODIUM CHLORIDE 100 ML/HR: 9 INJECTION, SOLUTION INTRAVENOUS at 11:15

## 2019-06-18 RX ADMIN — PHENYLEPHRINE HYDROCHLORIDE 200 MCG: 10 INJECTION INTRAVENOUS at 14:38

## 2019-06-18 RX ADMIN — LACTULOSE 20 G: 10 SOLUTION ORAL at 23:16

## 2019-06-18 RX ADMIN — SODIUM CHLORIDE 50 ML/HR: 900 INJECTION, SOLUTION INTRAVENOUS at 23:21

## 2019-06-18 RX ADMIN — HYDROMORPHONE HYDROCHLORIDE 0.5 MG: 2 INJECTION, SOLUTION INTRAMUSCULAR; INTRAVENOUS; SUBCUTANEOUS at 17:18

## 2019-06-18 RX ADMIN — PHENYLEPHRINE HYDROCHLORIDE 100 MCG: 10 INJECTION INTRAVENOUS at 14:34

## 2019-06-18 RX ADMIN — TAMSULOSIN HYDROCHLORIDE 0.4 MG: 0.4 CAPSULE ORAL at 23:18

## 2019-06-18 RX ADMIN — RIFAXIMIN 550 MG: 550 TABLET ORAL at 23:19

## 2019-06-18 RX ADMIN — ROCURONIUM BROMIDE 50 MG: 10 INJECTION, SOLUTION INTRAVENOUS at 14:02

## 2019-06-18 RX ADMIN — AZELASTINE HYDROCHLORIDE 2 SPRAY: 137 SPRAY, METERED NASAL at 23:16

## 2019-06-18 RX ADMIN — FENTANYL CITRATE 50 MCG: 50 INJECTION, SOLUTION INTRAMUSCULAR; INTRAVENOUS at 13:56

## 2019-06-18 RX ADMIN — FAMOTIDINE 20 MG: 20 TABLET, FILM COATED ORAL at 23:18

## 2019-06-18 RX ADMIN — PHENYLEPHRINE HYDROCHLORIDE 100 MCG: 10 INJECTION INTRAVENOUS at 15:02

## 2019-06-18 RX ADMIN — FENTANYL CITRATE 100 MCG: 50 INJECTION, SOLUTION INTRAMUSCULAR; INTRAVENOUS at 15:43

## 2019-06-18 RX ADMIN — ROCURONIUM BROMIDE 10 MG: 10 INJECTION, SOLUTION INTRAVENOUS at 14:49

## 2019-06-18 RX ADMIN — HYDROMORPHONE HYDROCHLORIDE 0.5 MG: 2 INJECTION, SOLUTION INTRAMUSCULAR; INTRAVENOUS; SUBCUTANEOUS at 16:23

## 2019-06-18 RX ADMIN — PHENYLEPHRINE HYDROCHLORIDE 200 MCG: 10 INJECTION INTRAVENOUS at 15:04

## 2019-06-18 RX ADMIN — MAGNESIUM OXIDE TAB 400 MG (241.3 MG ELEMENTAL MG) 400 MG: 400 (241.3 MG) TAB at 23:19

## 2019-06-18 RX ADMIN — CLINDAMYCIN PHOSPHATE 900 MG: 900 INJECTION, SOLUTION INTRAVENOUS at 13:57

## 2019-06-18 RX ADMIN — DEXAMETHASONE SODIUM PHOSPHATE 4 MG: 4 INJECTION, SOLUTION INTRAMUSCULAR; INTRAVENOUS at 14:10

## 2019-06-18 RX ADMIN — FENTANYL CITRATE 50 MCG: 50 INJECTION, SOLUTION INTRAMUSCULAR; INTRAVENOUS at 14:00

## 2019-06-18 RX ADMIN — PHENYLEPHRINE HYDROCHLORIDE 100 MCG: 10 INJECTION INTRAVENOUS at 14:11

## 2019-06-18 RX ADMIN — ONDANSETRON 4 MG: 2 INJECTION INTRAMUSCULAR; INTRAVENOUS at 15:55

## 2019-06-18 RX ADMIN — PROPOFOL 200 MG: 10 INJECTION, EMULSION INTRAVENOUS at 14:00

## 2019-06-18 RX ADMIN — HYDROMORPHONE HYDROCHLORIDE 0.5 MG: 2 INJECTION, SOLUTION INTRAMUSCULAR; INTRAVENOUS; SUBCUTANEOUS at 16:50

## 2019-06-18 RX ADMIN — SODIUM CHLORIDE: 9 INJECTION, SOLUTION INTRAVENOUS at 13:54

## 2019-06-18 RX ADMIN — OXYCODONE HYDROCHLORIDE 7.5 MG: 5 TABLET ORAL at 23:34

## 2019-06-18 RX ADMIN — PHENYLEPHRINE HYDROCHLORIDE 100 MCG: 10 INJECTION INTRAVENOUS at 15:08

## 2019-06-18 RX ADMIN — MIDAZOLAM 2 MG: 1 INJECTION INTRAMUSCULAR; INTRAVENOUS at 13:56

## 2019-06-18 RX ADMIN — SODIUM CHLORIDE: 9 INJECTION, SOLUTION INTRAVENOUS at 14:50

## 2019-06-18 RX ADMIN — LIDOCAINE HYDROCHLORIDE 50 MG: 10 INJECTION, SOLUTION EPIDURAL; INFILTRATION; INTRACAUDAL; PERINEURAL at 14:00

## 2019-06-18 RX ADMIN — BUMETANIDE 2 MG: 1 TABLET ORAL at 23:17

## 2019-06-18 NOTE — ANESTHESIA POSTPROCEDURE EVALUATION
Patient: Mc Selby    Procedure Summary     Date:  06/18/19 Room / Location:  [facility] OR  / [facility] MAIN OR    Anesthesia Start:  1354 Anesthesia Stop:      Procedures:       UMBILICAL HERNIA REPAIR LAPAROSCOPIC WITH MESH (N/A Abdomen)      EXCISION OF SEBACEOUS CYST OF THE RIGHT BACK (Right ) Diagnosis:  (SEBACEOUS CYST)    Surgeon:  Sapna Elizalde MD Provider:  Darren Cordoba MD    Anesthesia Type:  general ASA Status:  3          Anesthesia Type: general  Last vitals  BP   105/54 (06/18/19 1020)   Temp   97.8 °F (36.6 °C) (06/18/19 1020)   Pulse   89 (06/18/19 1020)   Resp   14 (06/18/19 1020)     SpO2   97 % (06/18/19 1020)     Post Anesthesia Care and Evaluation    Patient location during evaluation: PACU  Patient participation: complete - patient participated  Level of consciousness: sleepy but conscious and responsive to verbal stimuli  Pain score: 3  Pain management: adequate  Airway patency: patent  Anesthetic complications: No anesthetic complications  PONV Status: controlled  Cardiovascular status: acceptable  Respiratory status: acceptable  Hydration status: acceptable  Post Neuraxial Block status: Motor and sensory function returned to baseline and No signs or symptoms of PDPH  Comments: Patient seen and evaluated. Satisfactory progress.

## 2019-06-18 NOTE — ANESTHESIA PREPROCEDURE EVALUATION
Anesthesia Evaluation     Patient summary reviewed and Nursing notes reviewed                Airway   Mallampati: I  TM distance: >3 FB  Neck ROM: full  No difficulty expected  Dental - normal exam     Pulmonary - normal exam   (+) COPD mild,   Cardiovascular - negative cardio ROS and normal exam        Neuro/Psych- negative ROS  GI/Hepatic/Renal/Endo    (+)   liver disease, renal disease CRI,     Musculoskeletal (-) negative ROS    Abdominal   (+) obese,     Bowel sounds: normal.   Substance History - negative use     OB/GYN negative ob/gyn ROS         Other                      Anesthesia Plan    ASA 3     general     intravenous induction   Anesthetic plan, all risks, benefits, and alternatives have been provided, discussed and informed consent has been obtained with: patient.

## 2019-06-18 NOTE — OP NOTE
UMBILICAL HERNIA REPAIR LAPAROSCOPIC, MASS SOFT TISSUE EXCISION  Procedure Note    Mc Selby  6/18/2019    Pre-op Diagnosis:   SEBACEOUS CYST    Incarcerated umbilical hernia      Post-op Diagnosis:   Same    Indications: This is a 49-year-old gentleman with a history of perforated duodenal ulcer who presented with umbilical hernia.  He also had a sebaceous cyst of the right upper back that he wanted removed.    Procedure(s):  UMBILICAL HERNIA REPAIR LAPAROSCOPIC WITH MESH  EXCISION OF SEBACEOUS CYST OF THE RIGHT BACK    Surgeon(s):  Sapna Elizalde MD    Anesthesia: General    Staff:   Circulator: Greer Valdivia RN; Mónica Rosario, RN; Yue Hernandez RN  Scrub Person: Yumiko Alvarado; Bridger Fisher; Cole Almendarez  Assistant: Vannesa Braden RN    Findings: Extensive adhesions.    Operative Procedure:   General endotracheal anesthesia was provided.  The abdomen was prepped and draped in a sterile fashion.  Optiview technique was used to enter the abdomen with a 5 mm trocar.  2 more 5 mm trochars were placed in the left midabdomen and left lower abdomen.  There was extensive adhesions of the small bowel up to the anterior abdominal wall and also the omentum was anterior and adhesed to the abdominal wall.  Combination of scissors and harmonic scalpel was used to take all of this tissue down.  The patient had an umbilical hernia defect about 4 cm in size.  He had redundant skin overlying this umbilical hernia defect and I decided to excise some of this redundant skin.  I also was able to deliver the piece of small intestine that was densely adhesed to the anterior abdominal wall through this opening through the hernia defect and inspected extracorporeally.  There was no enterotomy but there was some bleeding from the surface of the jejunum and I was able to use some interrupted 3-0 silk sutures to achieve hemostasis.  Bowels and return to the abdominal cavity and I used a 1 Ethibond suture to close the  hernia defect by an open technique.  I then reinsufflated the abdominal cavity and used a 6 x 8 inch ventral light ST mesh with echo technology to deploy the mesh right underneath the hernia defect.  I use OPTi fix tacks to tack it in the place and used about 45 tacks.  Local anesthetic was also administered around the periphery of the mesh.  There was good hemostasis achieved.  The abdomen was desufflated and the trochars were removed.  2-0 Vicryl suture was also used to help close the upper layers of the umbilical hernia defect and also 4-0 Monocryl was used to close all the skin incisions.  Patient tolerated the procedure well.    The patient was then turned into a lateral position and the right upper back was prepped and draped.  A 2.1 cm incision was made over a palpable lump in the right upper back.  The lesion was obviously a sebaceous cyst.  It was excised completely with the cyst wall.  The wound was irrigated and closed with interrupted 4-0 Monocryl.    Sapna Elizalde MD  6/18/2019  4:16 PM

## 2019-06-18 NOTE — PROGRESS NOTES
Vital Signs:    Patient Profile:    49 Years Old Male  Height:     70 inches (177.80 cm)  Weight:     263.8 pounds  BMI:        37.85     O2 Sat:     99 %  Temp:       98.8 degrees F oral  Pulse rate: 84 / minute  Resp:       20 per minute  BP Sittin / 68  (left arm)    Cuff size:  regular          Vitals Entered By: Ifeoma Reyes CMA (Torie  3, 2019 2:15 PM)      Referring Provider:  ELIZABETH Talavera  Primary Provider:  Aldair Moreau MD    CC:  3 mos f/u of cirrhosis.    History of Present Illness:  bc of gynecomastia, decreased aldactone to 25 and increased bumex to 2 bid  renal made these changes on   has been eating more lately, so not sure how much of weight gain is from fluid  vs from soft tissue/muscle/fat  doesn't have an appt with renal soon  and doesn't have labs scheduled soon    also having more trouble with joint pain  did see rheum in the past  but since physical exam was ok at that time  was recommended to monitor off of med         Past Medical History:     Reviewed history from 2019 and no changes required:        rheumatoid arthritis (diagnosed, age 16, per pt)        COPD        benign essential tremors        ED        HTN        allergic rhinitis         etoh cirrhosis - liver failure        CKD        perforated duodenal ulcer s/p patch        alcoholish        Depression        hepatitis        kidney disease        COPD        nonhealing sore    Past Surgical History:     Reviewed history from 2019 and no changes required:              right bunion removal              left forearm cyst removal        perforated duodenal ulcer s/p patch        ascities/paracentesis/endoscopy    Family History Summary:      Reviewed history Last on 2019 and no changes required:2019  Father - Has Family History of Other Medical Problems - prostate problems - Entered On: 3/20/2017  Brother - Has Family History of Other Medical Problems - suicide () - Entered On:  3/20/2017  Father - Has Family History of Lung/Respiratory Disease - COPD - Entered On: 3/20/2017  Mother - Has Family History of Other Cancer - metastatic - Entered On: 3/20/2017    General Comments - FH:  Siblings: 1 brother - healthy    Denies FH colon cancer    Social History:     Reviewed history from 05/24/2019 and no changes required:        Patient is a former smoker.        Passive Smoke: N        Alcohol Use: N        Drug Use: N        HIV/High Risk: N        Regular Exercise: N        Hx Domestic Abuse: N        Yazidism Affecting Care: N                Alcohol Use: Y        Risk Factors:     Smoked Tobacco Use:  Former smoker     Cigarettes:  Yes -- ~1 pack(s) per day,      Years smoked:  36  Smokeless Tobacco Use:  Unknown  Passive smoke exposure:  no  Drug use:  no  HIV high-risk behavior:  no  Alcohol use:  no  Exercise:  no  Seatbelt use:  100 %  Sun Exposure:  occasionally    Dietary Counseling: yes    Previous Tobacco Use: Signed On - 05/24/2019  Smoked Tobacco Use:  Former smoker     Cigarettes:  Yes -- ~1 pack(s) per day,      Years smoked:  36  Smokeless Tobacco Use:  Unknown     Counseled to quit/cut down:  yes  Passive smoke exposure:  no  Drug use:  no  HIV high-risk behavior:  no    Previous Alcohol Use: Signed On - 05/24/2019  Alcohol use:  no  Exercise:  no  Seatbelt use:  100 %  Sun Exposure:  occasionally    Dietary Counseling: yes        Physical Exam   Height:  70  Weight:  262.8  BP:  114/68 mm HG    Medication List:  BUMETANIDE 2 MG ORAL TABLET (BUMETANIDE) 1 tab bid  ZINC SULFATE 220 (50 ZN) MG ORAL TABLET (ZINC SULFATE) Take 1 tablet by mouth daily  TAMSULOSIN HCL 0.4 MG ORAL CAPSULE (TAMSULOSIN HCL) Take 1 tablet by mouth daily  PROMETHAZINE HCL 12.5 MG ORAL TABLET (PROMETHAZINE HCL) Take 1 tablet by mouth every 6 hours PRN  ONDANSETRON HCL 8 MG ORAL TABLET (ONDANSETRON HCL) Take one (1) tablet by mouth three times a day  KLOR-CON M20 20 MEQ ORAL TABLET EXTENDED RELEASE  (POTASSIUM CHLORIDE DAPHNE ER) Take one (1) tablet by mouth three times a day  MECLIZINE HCL 25 MG ORAL TABLET (MECLIZINE HCL) one tablet by mouth three times a day prn dizziness  FLOMAX 0.4 MG ORAL CAPSULE (TAMSULOSIN HCL)   ALDACTONE 25 MG ORAL TABLET (SPIRONOLACTONE)   XIFAXAN 550 MG TABLET (RIFAXIMIN) TAKE 1 TABLET BY MOUTH TWICE A DAY  NASACORT ALLERGY 24HR 55 MCG/ACT NASAL AEROSOL (TRIAMCINOLONE ACETONIDE) 1 spray intranasally qd prn  LACTULOSE 10 GM/15 ML SOLUTION (LACTULOSE) TAKE 30 ML BY MOUTH THREE TIMES A DAY AS NEEDED.  FLUTICASONE PROPIONATE 50 MCG/ACT NASAL SUSPENSION (FLUTICASONE PROPIONATE) two puffs each nostril once a day  AZELASTINE 0.1% (137 MCG) SPRY (AZELASTINE HCL) USE 2 SPRAYS EACH NOSTRIL TWICE A DAY      Surgical History         right bunion removal        left forearm cyst removal  perforated duodenal ulcer s/p patch  ascities/paracentesis/endoscopy  ,    Risk Factors  Tobacco Use: Former smoker  Pack per day: ~1  Passive smoke exposure: no  Exercise: no  Illicit Drug use: no      Physical Examination   General Appearance   In no acute distress.  Alert & oriented.  Behavior and affect appropriate to situation  HEENT   PERRLA, EOMI, TM's normal.  Pharynx clear  Cardiovascular   Regular rate and rhythm  Lungs   Clear to auscultation  Musculoskeletal   2+ LE edema        Impression & Recommendations:    Problem # 1:  Anasarca (ICD-782.3) (LUA22-M32.1)  Assessment: Deteriorated  check labs  cont higher dose of bumex for now  His updated medication list for this problem includes:     Bumetanide 2 Mg Oral Tablet (Bumetanide) ..... 1 tab bid     Aldactone 25 Mg Oral Tablet (Spironolactone)      Problem # 2:  HYPOMAGNESEMIA (ICD-275.2) (SLT92-M65.42)  Assessment: Unchanged  check labs    Problem # 3:  Arthritis, rheumatoid, seronegative (ICD-714.0) (MNS56-R81.00)  Assessment: Deteriorated  f/u with rheum    Medications Added to Medication List This Visit:  1)  Bumetanide 2 Mg Oral Tablet  (Bumetanide) .... 1 tab bid  2)  Aldactone 25 Mg Oral Tablet (Spironolactone)    Other Orders:  Future Orders:  [facility] BRAIN NATRIURETIC PEPTIDE (BNP) ... 06/05/2019  [facility] PROTHROMBIN TIME THERAPEUTIC (TPT) ... 06/05/2019  [facility] MAGNESIUM (MG) ... 06/05/2019      Patient Instructions:  1)  During this office visit we discussed the pertinent aspects of the visit and the treatment recommendations.  Patient was given the opportunity to ask questions and discuss other concerns.                Medication Administration    Orders Added:  1)  [facility] BRAIN NATRIURETIC PEPTIDE [BNP]  2)  [facility] PROTHROMBIN TIME THERAPEUTIC [TPT]  3)  [facility] MAGNESIUM [MG]  4)  Ofc Vst, Est Level III [11401]        Electronically signed by Carlita Shepherd on 06/18/2019 at 1:02 PM  ________________________________________________________________________       Disclaimer: Converted Note message may not contain all data elements that existed in the legacy source system. Please see [facility] Legacy System for the original note details.

## 2019-06-19 ENCOUNTER — TELEPHONE (OUTPATIENT)
Dept: BARIATRICS/WEIGHT MGMT | Facility: CLINIC | Age: 49
End: 2019-06-19

## 2019-06-19 VITALS
SYSTOLIC BLOOD PRESSURE: 133 MMHG | TEMPERATURE: 97.8 F | DIASTOLIC BLOOD PRESSURE: 69 MMHG | OXYGEN SATURATION: 98 % | RESPIRATION RATE: 18 BRPM | BODY MASS INDEX: 38.66 KG/M2 | HEIGHT: 70 IN | WEIGHT: 270.06 LBS | HEART RATE: 92 BPM

## 2019-06-19 PROBLEM — K42.9 UMBILICAL HERNIA WITHOUT OBSTRUCTION OR GANGRENE: Status: ACTIVE | Noted: 2019-05-24

## 2019-06-19 PROBLEM — K70.30 ALCOHOLIC CIRRHOSIS: Status: ACTIVE | Noted: 2018-08-07

## 2019-06-19 PROBLEM — K42.9 UMBILICAL HERNIA: Status: ACTIVE | Noted: 2019-06-19

## 2019-06-19 LAB
ALBUMIN SERPL-MCNC: 2.4 G/DL (ref 3.5–4.8)
ALBUMIN/GLOB SERPL: 0.5 G/DL (ref 1–1.7)
ALP SERPL-CCNC: 99 U/L (ref 32–91)
ALT SERPL W P-5'-P-CCNC: 18 U/L (ref 17–63)
ANION GAP SERPL CALCULATED.3IONS-SCNC: 15 MMOL/L (ref 10–20)
AST SERPL-CCNC: 43 U/L (ref 15–41)
BASOPHILS # BLD AUTO: 0 10*3/MM3 (ref 0–0.2)
BASOPHILS NFR BLD AUTO: 0.1 % (ref 0–1.5)
BILIRUB SERPL-MCNC: 3.4 MG/DL (ref 0.3–1.2)
BUN BLD-MCNC: 18 MG/DL (ref 8–20)
BUN/CREAT SERPL: 13.8 (ref 6.2–20.3)
CALCIUM SPEC-SCNC: 8.8 MG/DL (ref 8.9–10.3)
CHLORIDE SERPL-SCNC: 98 MMOL/L (ref 101–111)
CO2 SERPL-SCNC: 17 MMOL/L (ref 22–32)
CREAT BLD-MCNC: 1.3 MG/DL (ref 0.7–1.2)
DEPRECATED RDW RBC AUTO: 52.1 FL (ref 37–54)
EOSINOPHIL # BLD AUTO: 0 10*3/MM3 (ref 0–0.4)
EOSINOPHIL NFR BLD AUTO: 0.1 % (ref 0.3–6.2)
ERYTHROCYTE [DISTWIDTH] IN BLOOD BY AUTOMATED COUNT: 14.9 % (ref 12.3–15.4)
GFR SERPL CREATININE-BSD FRML MDRD: 58 ML/MIN/1.73
GLOBULIN UR ELPH-MCNC: 4.5 GM/DL (ref 2.5–3.8)
GLUCOSE BLD-MCNC: 243 MG/DL (ref 65–99)
HCT VFR BLD AUTO: 33.7 % (ref 37.5–51)
HGB BLD-MCNC: 11.6 G/DL (ref 13–17.7)
LYMPHOCYTES # BLD AUTO: 0.9 10*3/MM3 (ref 0.7–3.1)
LYMPHOCYTES NFR BLD AUTO: 11.5 % (ref 19.6–45.3)
MCH RBC QN AUTO: 34 PG (ref 26.6–33)
MCHC RBC AUTO-ENTMCNC: 34.5 G/DL (ref 31.5–35.7)
MCV RBC AUTO: 98.7 FL (ref 79–97)
MONOCYTES # BLD AUTO: 0.8 10*3/MM3 (ref 0.1–0.9)
MONOCYTES NFR BLD AUTO: 10.8 % (ref 5–12)
NEUTROPHILS # BLD AUTO: 5.9 10*3/MM3 (ref 1.7–7)
NEUTROPHILS NFR BLD AUTO: 77.5 % (ref 42.7–76)
NRBC BLD AUTO-RTO: 0.1 /100 WBC (ref 0–0.2)
PLATELET # BLD AUTO: 143 10*3/MM3 (ref 140–450)
PMV BLD AUTO: 8.5 FL (ref 6–12)
POTASSIUM BLD-SCNC: 4.4 MMOL/L (ref 3.6–5.1)
PROT SERPL-MCNC: 6.9 G/DL (ref 6.1–7.9)
RBC # BLD AUTO: 3.41 10*6/MM3 (ref 4.14–5.8)
SODIUM BLD-SCNC: 130 MMOL/L (ref 136–144)
WBC NRBC COR # BLD: 7.6 10*3/MM3 (ref 3.4–10.8)

## 2019-06-19 PROCEDURE — 80053 COMPREHEN METABOLIC PANEL: CPT | Performed by: SURGERY

## 2019-06-19 PROCEDURE — 85025 COMPLETE CBC W/AUTO DIFF WBC: CPT | Performed by: SURGERY

## 2019-06-19 PROCEDURE — G0378 HOSPITAL OBSERVATION PER HR: HCPCS

## 2019-06-19 PROCEDURE — 99024 POSTOP FOLLOW-UP VISIT: CPT | Performed by: SURGERY

## 2019-06-19 PROCEDURE — 94799 UNLISTED PULMONARY SVC/PX: CPT

## 2019-06-19 RX ORDER — HYDROCODONE BITARTRATE AND ACETAMINOPHEN 5; 325 MG/1; MG/1
1 TABLET ORAL EVERY 6 HOURS PRN
Qty: 30 TABLET | Refills: 0 | Status: ON HOLD | OUTPATIENT
Start: 2019-06-19 | End: 2019-08-27

## 2019-06-19 RX ADMIN — BUMETANIDE 2 MG: 1 TABLET ORAL at 05:54

## 2019-06-19 NOTE — TELEPHONE ENCOUNTER
Farrah ADEN S/w wife and made pt 2week fu appt.     Wife was then transferred to me for questions.  Wife wants to know if it is ok that pt have dental work/fillings done 5 days post op, I let her know that it is ok to proceed with this.

## 2019-06-19 NOTE — DISCHARGE SUMMARY
Date of Discharge:  6/19/2019    Discharge Diagnosis: Incarcerated umbilical hernia with a history of alcoholic liver disease    Presenting Problem/History of Present Illness  Active Hospital Problems    Diagnosis  POA   • Umbilical hernia, incarcerated [K42.0]  Yes      Resolved Hospital Problems   No resolved problems to display.          Hospital Course  Patient is a 49 y.o. male presented with an umbilical hernia for elective repair.  He has had a history of alcoholic liver disease and perforated duodenal ulcer a year ago.  A laparoscopic repair was performed.  There was also an extensive lysis of adhesions.  Postoperatively his vitals were stable and he was able to ambulate.  He did have a decrease in his sodium and chloride and also his creatinine.  There was also a rise of his blood glucose.  I made him aware of these findings but he was asymptomatic and ambulating very well and therefore I discharged him home and told him to follow-up with his primary care physician.  He has an appointment with Dr. Loo of gastroenterology in the near future.    Procedures Performed    Procedure(s):  UMBILICAL HERNIA REPAIR LAPAROSCOPIC WITH MESH WITH EXTENSIVE LYSIS OF ADHESIONS  EXCISION OF SEBACEOUS CYST OF THE RIGHT BACK  -------------------       Consults:   Consults     No orders found for last 30 day(s).          Pertinent Test Results:     Condition on Discharge: Stable    Vital Signs  Temp:  [97 °F (36.1 °C)-98.4 °F (36.9 °C)] 98.1 °F (36.7 °C)  Heart Rate:  [] 92  Resp:  [10-19] 19  BP: ()/(44-78) 123/74    Physical Exam:     General Appearance:    Alert, cooperative, in no acute distress   Head:    Normocephalic, without obvious abnormality, atraumatic   Eyes:            Lids and lashes normal, conjunctivae and sclerae normal, no   icterus, no pallor, corneas clear, PERRLA   Ears:    Ears appear intact with no abnormalities noted   Throat:   No oral lesions, no thrush, oral mucosa moist   Neck:    No adenopathy, supple, trachea midline, no thyromegaly, no   carotid bruit, no JVD   Back:     No kyphosis present, no scoliosis present, no skin lesions,      erythema or scars, no tenderness to percussion or                   palpation,   range of motion normal   Lungs:     Clear to auscultation,respirations regular, even and                  unlabored    Heart:    Regular rhythm and normal rate, normal S1 and S2, no            murmur, no gallop, no rub, no click   Chest Wall:    No abnormalities observed   Abdomen:    Incision sites intact appropriate abdominal tenderness   Rectal:     Deferred   Extremities:   Moves all extremities well, no edema, no cyanosis, no             redness   Pulses:   Pulses palpable and equal bilaterally   Skin:   No bleeding, bruising or rash   Lymph nodes:   No palpable adenopathy   Neurologic:   Cranial nerves 2 - 12 grossly intact, sensation intact, DTR       present and equal bilaterally       Discharge Disposition  Home or Self Care    Discharge Medications     Discharge Medications      New Medications      Instructions Start Date   HYDROcodone-acetaminophen 5-325 MG per tablet  Commonly known as:  NORCO   1 tablet, Oral, Every 6 Hours PRN         Continue These Medications      Instructions Start Date   azelastine 0.1 % nasal spray  Commonly known as:  ASTELIN   2 sprays, Nasal, 2 Times Daily, Use in each nostril as directed       bumetanide 2 MG tablet  Commonly known as:  BUMEX   2 mg, Oral, 2 Times Daily      lactulose 10 GM/15ML solution  Commonly known as:  CHRONULAC   20 g, Oral, 3 Times Daily      meclizine 25 MG chewable tablet chewable tablet   25 mg, Oral      ondansetron 8 MG tablet  Commonly known as:  ZOFRAN   Oral, Every 8 Hours PRN      promethazine 12.5 MG tablet  Commonly known as:  PHENERGAN   12.5 mg, Oral, Every 6 Hours PRN      rifaximin 550 MG tablet  Commonly known as:  XIFAXAN   550 mg, Oral, Every 12 Hours Scheduled      spironolactone 25 MG  tablet  Commonly known as:  ALDACTONE   25 mg, Oral, Daily      tamsulosin 0.4 MG capsule 24 hr capsule  Commonly known as:  FLOMAX   1 capsule, Oral, 2 Times Daily      vitamin D 30112 units capsule capsule  Commonly known as:  ERGOCALCIFEROL   50,000 Units, Oral, Every 14 Days         ASK your doctor about these medications      Instructions Start Date   magnesium oxide 400 (241.3 Mg) MG tablet tablet  Commonly known as:  MAGOX   400 mg, Oral, 2 Times Daily      potassium chloride 20 MEQ CR tablet  Commonly known as:  K-DUR,KLOR-CON   40 mEq, Oral, 2 Times Daily      Zinc Sulfate 220 (50 Zn) MG tablet   220 mg, Oral, Daily             Discharge Diet:   Diet Instructions     Diet:      Diet Texture / Consistency:  Bariatric    Select stage:  Stage 1 Clear Liq. Sugar Free (no carbonation, no straw)          Activity at Discharge:   Activity Instructions     Discharge Activity      No lifting more than 20 pounds for 4 weeks.  Okay to shower          Follow-up Appointments  Future Appointments   Date Time Provider Department Center   7/10/2019  2:00 PM Carlita Shepherd MD [facility] None   12/12/2019  1:20 PM Lala Costa MD [facility] None     [unfilled]    Test Results Pending at Discharge  [unfilled]     Sapna Elizalde MD  06/19/19  7:38 AM

## 2019-06-19 NOTE — PROGRESS NOTES
Case Management Discharge Note    Final Note: Routine D/C Home with spouse. Denies any needs at this time.          Final Discharge Disposition Code: 01 - home or self-care

## 2019-06-19 NOTE — PROGRESS NOTES
Discharge Planning Assessment  [facility]     Patient Name: Mc Selby  MRN: [id number]  Today's Date: 6/19/2019    Admit Date: 6/18/2019    Discharge Needs Assessment     Row Name 06/19/19 1256       Living Environment    Lives With  spouse    Primary Care Provided by  self;spouse/significant other    Provides Primary Care For  no one, unable/limited ability to care for self    Family Caregiver if Needed  none    Able to Return to Prior Arrangements  yes       Discharge Needs Assessment    Equipment Currently Used at Home  cane, straight;walker, rolling uses cane / walker prn    Anticipated Changes Related to Illness  none    Equipment Needed After Discharge  none        Discharge Plan     Row Name 06/19/19 1259       Plan    Plan  Routine D/C Home with spouse. Denies any needs at this time.     Final Discharge Disposition Code  01 - home or self-care    Final Note  Routine D/C Home with spouse. Denies any needs at this time.              Expected Discharge Date and Time     Expected Discharge Date Expected Discharge Time    Jun 19, 2019         Demographic Summary     Row Name 06/19/19 1253       General Information    Admission Type  inpatient    Arrived From  home    Referral Source  interdisciplinary rounds    Reason for Consult  care coordination/care conference    Preferred Language  English     Used During This Interaction  no       Contact Information    Permission Granted to Share Info With      Contact Information Obtained for             Name,   A. Naegele RN    Phone,   [phone]        Functional Status     Row Name 06/19/19 1254       Functional Status    Usual Activity Tolerance  good    Current Activity Tolerance  good       Functional Status, IADL    Medications  independent    Meal Preparation  independent    Housekeeping  independent    Laundry  independent    Shopping  independent       Mental Status    General  Appearance WDL  WDL       Mental Status Summary    Recent Changes in Mental Status/Cognitive Functioning  no changes       Employment/    Employment Status  disabled        Psychosocial     Row Name 06/19/19 1255       Coping/Stress    Major Change/Loss/Stressor  none    Patient Personal Strengths  able to adapt    Sources of Support  none    Reaction to Health Status  accepting    Understanding of Condition and Treatment  adequate understanding of medical condition       Violence Risk    Feels Like Hurting Others  no    Previous Attempt to Harm Others  no        Abuse/Neglect     Row Name 06/19/19 1255       Personal Safety    Feels Unsafe at Home or Work/School  no    Feels Threatened by Someone  no    Does Anyone Try to Keep You From Having Contact with Others or Doing Things Outside Your Home?  no    Physical Signs of Abuse Present  no        Legal    No documentation.       Substance Abuse    No documentation.       Patient Forms    No documentation.           Anna L Naegele, RN

## 2019-06-19 NOTE — PLAN OF CARE
Problem: Alcohol Withdrawal Acute, Risk/Actual (Adult)  Goal: Signs and Symptoms of Listed Potential Problems Will be Absent, Minimized or Managed (Alcohol Withdrawal Acute, Risk/Actual)  Outcome: Ongoing (interventions implemented as appropriate)      Problem: Pain, Acute (Adult)  Goal: Identify Related Risk Factors and Signs and Symptoms  Outcome: Ongoing (interventions implemented as appropriate)    Goal: Acceptable Pain Control/Comfort Level  Outcome: Ongoing (interventions implemented as appropriate)

## 2019-06-20 ENCOUNTER — READMISSION MANAGEMENT (OUTPATIENT)
Dept: CALL CENTER | Facility: HOSPITAL | Age: 49
End: 2019-06-20

## 2019-06-20 LAB
LAB AP CASE REPORT: NORMAL
PATH REPORT.FINAL DX SPEC: NORMAL
PATH REPORT.GROSS SPEC: NORMAL

## 2019-06-20 NOTE — OUTREACH NOTE
Prep Survey      Responses   Facility patient discharged from?  [facility]   Is patient eligible?  Yes   Discharge diagnosis  Incarcerated umbilical hernia, s/p  laparoscopic repair    Does the patient have one of the following disease processes/diagnoses(primary or secondary)?  General Surgery   Does the patient have Home health ordered?  No   Is there a DME ordered?  No   Prep survey completed?  Yes          Keerthi Paulino RN

## 2019-06-20 NOTE — PAYOR COMM NOTE
"Dc routine to Home No Needs 6/19/19.      Pamela Rodgers (49 y.o. Male)     Date of Birth Social Security Number Address Home Phone MRN    1970  [id number] [place] [address] [phone] [id number]    Congregational Marital Status          Ep[employer]        Admission Date Admission Type Admitting Provider Attending Provider Department, Room/Bed    6/18/19 Elective Sapna Elizalde MD  [facility], 4101/1    Discharge Date Discharge Disposition Discharge Destination        6/19/2019 Home or Self Care Home             Attending Provider:  (none)   Allergies:  Duloxetine    Isolation:  None   Infection:  None   Code Status:  Prior    Ht:  177.8 cm (70\")   Wt:  123 kg (270 lb 1 oz)    Admission Cmt:  None   Principal Problem:  None                Active Insurance as of 6/18/2019     Primary Coverage     Payor Plan Insurance Group Employer/Plan Group    ANTH MEDICAID [facility] [id number]     Payor Plan Address Payor Plan Phone Number Payor Plan Fax Number Effective Dates    MAIL STOP:   10/1/2018 - None Entered    [address] [place] [address]       Subscriber Name Subscriber Birth Date Member ID       PAMELA RODGERS 1970 [id number]                 Emergency Contacts      (Rel.) Home Phone Work Phone Mobile Phone    JOSELYN RODGERS (Spouse) [phone] -- [phone]               Discharge Summary      Sapna Elizalde MD at 6/19/2019  7:37 AM            Date of Discharge:  6/19/2019    Discharge Diagnosis: Incarcerated umbilical hernia with a history of alcoholic liver disease    Presenting Problem/History of Present Illness  Active Hospital Problems    Diagnosis  POA   • Umbilical hernia, incarcerated [K42.0]  Yes      Resolved Hospital Problems   No resolved problems to display.          Hospital Course  Patient is a 49 y.o. male presented with an umbilical hernia for elective repair.  He has had a history of alcoholic liver " disease and perforated duodenal ulcer a year ago.  A laparoscopic repair was performed.  There was also an extensive lysis of adhesions.  Postoperatively his vitals were stable and he was able to ambulate.  He did have a decrease in his sodium and chloride and also his creatinine.  There was also a rise of his blood glucose.  I made him aware of these findings but he was asymptomatic and ambulating very well and therefore I discharged him home and told him to follow-up with his primary care physician.  He has an appointment with Dr. Loo of gastroenterology in the near future.    Procedures Performed    Procedure(s):  UMBILICAL HERNIA REPAIR LAPAROSCOPIC WITH MESH WITH EXTENSIVE LYSIS OF ADHESIONS  EXCISION OF SEBACEOUS CYST OF THE RIGHT BACK  -------------------       Consults:   Consults     No orders found for last 30 day(s).          Pertinent Test Results:     Condition on Discharge: Stable    Vital Signs  Temp:  [97 °F (36.1 °C)-98.4 °F (36.9 °C)] 98.1 °F (36.7 °C)  Heart Rate:  [] 92  Resp:  [10-19] 19  BP: ()/(44-78) 123/74    Physical Exam:     General Appearance:    Alert, cooperative, in no acute distress   Head:    Normocephalic, without obvious abnormality, atraumatic   Eyes:            Lids and lashes normal, conjunctivae and sclerae normal, no   icterus, no pallor, corneas clear, PERRLA   Ears:    Ears appear intact with no abnormalities noted   Throat:   No oral lesions, no thrush, oral mucosa moist   Neck:   No adenopathy, supple, trachea midline, no thyromegaly, no   carotid bruit, no JVD   Back:     No kyphosis present, no scoliosis present, no skin lesions,      erythema or scars, no tenderness to percussion or                   palpation,   range of motion normal   Lungs:     Clear to auscultation,respirations regular, even and                  unlabored    Heart:    Regular rhythm and normal rate, normal S1 and S2, no            murmur, no gallop, no rub, no click   Chest Wall:     No abnormalities observed   Abdomen:    Incision sites intact appropriate abdominal tenderness   Rectal:     Deferred   Extremities:   Moves all extremities well, no edema, no cyanosis, no             redness   Pulses:   Pulses palpable and equal bilaterally   Skin:   No bleeding, bruising or rash   Lymph nodes:   No palpable adenopathy   Neurologic:   Cranial nerves 2 - 12 grossly intact, sensation intact, DTR       present and equal bilaterally       Discharge Disposition  Home or Self Care    Discharge Medications     Discharge Medications      New Medications      Instructions Start Date   HYDROcodone-acetaminophen 5-325 MG per tablet  Commonly known as:  NORCO   1 tablet, Oral, Every 6 Hours PRN         Continue These Medications      Instructions Start Date   azelastine 0.1 % nasal spray  Commonly known as:  ASTELIN   2 sprays, Nasal, 2 Times Daily, Use in each nostril as directed       bumetanide 2 MG tablet  Commonly known as:  BUMEX   2 mg, Oral, 2 Times Daily      lactulose 10 GM/15ML solution  Commonly known as:  CHRONULAC   20 g, Oral, 3 Times Daily      meclizine 25 MG chewable tablet chewable tablet   25 mg, Oral      ondansetron 8 MG tablet  Commonly known as:  ZOFRAN   Oral, Every 8 Hours PRN      promethazine 12.5 MG tablet  Commonly known as:  PHENERGAN   12.5 mg, Oral, Every 6 Hours PRN      rifaximin 550 MG tablet  Commonly known as:  XIFAXAN   550 mg, Oral, Every 12 Hours Scheduled      spironolactone 25 MG tablet  Commonly known as:  ALDACTONE   25 mg, Oral, Daily      tamsulosin 0.4 MG capsule 24 hr capsule  Commonly known as:  FLOMAX   1 capsule, Oral, 2 Times Daily      vitamin D 99651 units capsule capsule  Commonly known as:  ERGOCALCIFEROL   50,000 Units, Oral, Every 14 Days         ASK your doctor about these medications      Instructions Start Date   magnesium oxide 400 (241.3 Mg) MG tablet tablet  Commonly known as:  MAGOX   400 mg, Oral, 2 Times Daily      potassium chloride 20 MEQ  CR tablet  Commonly known as:  K-DUR,KLOR-CON   40 mEq, Oral, 2 Times Daily      Zinc Sulfate 220 (50 Zn) MG tablet   220 mg, Oral, Daily             Discharge Diet:   Diet Instructions     Diet:      Diet Texture / Consistency:  Bariatric    Select stage:  Stage 1 Clear Liq. Sugar Free (no carbonation, no straw)          Activity at Discharge:   Activity Instructions     Discharge Activity      No lifting more than 20 pounds for 4 weeks.  Okay to shower          Follow-up Appointments  Future Appointments   Date Time Provider Department Center   7/10/2019  2:00 PM Carlita Shepherd MD [facility] None   12/12/2019  1:20 PM Lala Costa MD [facility] None     [unfilled]    Test Results Pending at Discharge  [unfilled]     Sapna Elizalde MD  06/19/19  7:38 AM      Electronically signed by Sapna Elizalde MD at 6/19/2019  7:40 AM

## 2019-06-21 ENCOUNTER — READMISSION MANAGEMENT (OUTPATIENT)
Dept: CALL CENTER | Facility: HOSPITAL | Age: 49
End: 2019-06-21

## 2019-06-21 NOTE — OUTREACH NOTE
General Surgery Week 1 Survey      Responses   Facility patient discharged from?  [facility]   Does the patient have one of the following disease processes/diagnoses(primary or secondary)?  General Surgery   Is there a successful TCM telephone encounter documented?  No   Week 1 attempt successful?  No   Rescheduled  Revoked   Revoke  Decline to participate [NO ANSWER, LEFT VM]          Arlen Urban LPN

## 2019-06-24 DIAGNOSIS — N18.9 CHRONIC KIDNEY DISEASE, UNSPECIFIED CKD STAGE: Primary | ICD-10-CM

## 2019-06-25 ENCOUNTER — LAB (OUTPATIENT)
Dept: FAMILY MEDICINE CLINIC | Facility: CLINIC | Age: 49
End: 2019-06-25

## 2019-06-25 DIAGNOSIS — N18.9 CHRONIC KIDNEY DISEASE, UNSPECIFIED CKD STAGE: ICD-10-CM

## 2019-06-25 LAB
ANION GAP SERPL CALCULATED.3IONS-SCNC: 9 MMOL/L (ref 10–20)
BUN BLD-MCNC: 12 MG/DL (ref 8–20)
BUN/CREAT SERPL: 10 (ref 6.2–20.3)
CALCIUM SPEC-SCNC: 8.5 MG/DL (ref 8.9–10.3)
CHLORIDE SERPL-SCNC: 98 MMOL/L (ref 101–111)
CO2 SERPL-SCNC: 25 MMOL/L (ref 22–32)
CREAT BLD-MCNC: 1.2 MG/DL (ref 0.7–1.2)
GFR SERPL CREATININE-BSD FRML MDRD: 64 ML/MIN/1.73
GLUCOSE BLD-MCNC: 153 MG/DL (ref 65–99)
POTASSIUM BLD-SCNC: 3.3 MMOL/L (ref 3.6–5.1)
SODIUM BLD-SCNC: 132 MMOL/L (ref 136–144)

## 2019-06-25 PROCEDURE — 80048 BASIC METABOLIC PNL TOTAL CA: CPT | Performed by: INTERNAL MEDICINE

## 2019-06-25 PROCEDURE — 36415 COLL VENOUS BLD VENIPUNCTURE: CPT | Performed by: INTERNAL MEDICINE

## 2019-06-27 RX ORDER — AZELASTINE HYDROCHLORIDE 137 UG/1
SPRAY, METERED NASAL
Qty: 30 SPRAY | Refills: 2 | Status: SHIPPED | OUTPATIENT
Start: 2019-06-27 | End: 2019-09-28 | Stop reason: SDUPTHER

## 2019-06-27 NOTE — PROGRESS NOTES
Please let pt know his potassium is low again.  I see it's been checked a few times since I last saw him 6/5, so i'm not sure what changes have been made.  What are his current doses of bumex, potassium, and aldactone?

## 2019-07-02 DIAGNOSIS — E87.5 HYPERKALEMIA: Primary | ICD-10-CM

## 2019-07-03 ENCOUNTER — TELEPHONE (OUTPATIENT)
Dept: RHEUMATOLOGY | Facility: CLINIC | Age: 49
End: 2019-07-03

## 2019-07-03 NOTE — TELEPHONE ENCOUNTER
Pt wife called and stated they saw Gastro and he was ok to start the Enbrel. They just wanted to let Dr. Riggs know.

## 2019-07-05 ENCOUNTER — OFFICE VISIT (OUTPATIENT)
Dept: BARIATRICS/WEIGHT MGMT | Facility: CLINIC | Age: 49
End: 2019-07-05

## 2019-07-05 ENCOUNTER — TELEPHONE (OUTPATIENT)
Dept: FAMILY MEDICINE CLINIC | Facility: CLINIC | Age: 49
End: 2019-07-05

## 2019-07-05 VITALS
TEMPERATURE: 98.5 F | WEIGHT: 267.6 LBS | SYSTOLIC BLOOD PRESSURE: 110 MMHG | DIASTOLIC BLOOD PRESSURE: 74 MMHG | BODY MASS INDEX: 38.31 KG/M2 | HEIGHT: 70 IN | HEART RATE: 76 BPM | OXYGEN SATURATION: 98 %

## 2019-07-05 DIAGNOSIS — E87.6 HYPOKALEMIA: Primary | ICD-10-CM

## 2019-07-05 DIAGNOSIS — N18.9 CHRONIC KIDNEY DISEASE, UNSPECIFIED CKD STAGE: ICD-10-CM

## 2019-07-05 DIAGNOSIS — Z51.89 VISIT FOR WOUND CHECK: Primary | ICD-10-CM

## 2019-07-05 PROCEDURE — 99024 POSTOP FOLLOW-UP VISIT: CPT | Performed by: SURGERY

## 2019-07-05 NOTE — TELEPHONE ENCOUNTER
Spouse called and scheduled appt on 7/16 to have labs rechecked.  What would you like to order. Patient will also bring lab order from Dr. Hussein.

## 2019-07-11 NOTE — PROGRESS NOTES
He underwent recent umbilical hernia repair with mesh.  Doing very well.  He is eating and having normal bowel movements        Incisions are healing well with no evidence of erythema.      Resume normal activity and follow-up with me as needed

## 2019-07-16 ENCOUNTER — TELEPHONE (OUTPATIENT)
Dept: FAMILY MEDICINE CLINIC | Facility: CLINIC | Age: 49
End: 2019-07-16

## 2019-07-16 ENCOUNTER — LAB (OUTPATIENT)
Dept: FAMILY MEDICINE CLINIC | Facility: CLINIC | Age: 49
End: 2019-07-16

## 2019-07-16 DIAGNOSIS — R80.9 PROTEINURIA, UNSPECIFIED TYPE: ICD-10-CM

## 2019-07-16 DIAGNOSIS — N18.2 CHRONIC RENAL DISEASE, STAGE II: ICD-10-CM

## 2019-07-16 DIAGNOSIS — E87.1 ACUTE HYPONATREMIA: ICD-10-CM

## 2019-07-16 DIAGNOSIS — E87.6 HYPOKALEMIA: ICD-10-CM

## 2019-07-16 DIAGNOSIS — N18.9 CHRONIC KIDNEY DISEASE, UNSPECIFIED CKD STAGE: ICD-10-CM

## 2019-07-16 DIAGNOSIS — I10 ESSENTIAL HYPERTENSION, MALIGNANT: ICD-10-CM

## 2019-07-16 DIAGNOSIS — E55.9 VITAMIN D DEFICIENCY: ICD-10-CM

## 2019-07-16 DIAGNOSIS — E87.1 ACUTE HYPONATREMIA: Primary | ICD-10-CM

## 2019-07-16 LAB
ALBUMIN SERPL-MCNC: 2.6 G/DL (ref 3.5–4.8)
ALBUMIN/GLOB SERPL: 0.5 G/DL (ref 1–1.7)
ALP SERPL-CCNC: 120 U/L (ref 32–91)
ALT SERPL W P-5'-P-CCNC: 13 U/L (ref 17–63)
ANION GAP SERPL CALCULATED.3IONS-SCNC: 12.5 MMOL/L (ref 5–15)
ANION GAP SERPL CALCULATED.3IONS-SCNC: 13.5 MMOL/L (ref 5–15)
AST SERPL-CCNC: 31 U/L (ref 15–41)
BASOPHILS # BLD AUTO: 0.1 10*3/MM3 (ref 0–0.2)
BASOPHILS NFR BLD AUTO: 1.2 % (ref 0–1.5)
BILIRUB SERPL-MCNC: 2.6 MG/DL (ref 0.3–1.2)
BILIRUB UR QL STRIP: NEGATIVE
BNP SERPL-MCNC: 50 PG/ML
BUN BLD-MCNC: 9 MG/DL (ref 8–20)
BUN BLD-MCNC: 9 MG/DL (ref 8–20)
BUN/CREAT SERPL: 9 (ref 6.2–20.3)
BUN/CREAT SERPL: 9 (ref 6.2–20.3)
CALCIUM SPEC-SCNC: 8.7 MG/DL (ref 8.9–10.3)
CALCIUM SPEC-SCNC: 8.9 MG/DL (ref 8.9–10.3)
CHLORIDE SERPL-SCNC: 100 MMOL/L (ref 101–111)
CHLORIDE SERPL-SCNC: 99 MMOL/L (ref 101–111)
CLARITY UR: CLEAR
CO2 SERPL-SCNC: 21 MMOL/L (ref 22–32)
CO2 SERPL-SCNC: 22 MMOL/L (ref 22–32)
COLOR UR: YELLOW
CREAT BLD-MCNC: 1 MG/DL (ref 0.7–1.2)
CREAT BLD-MCNC: 1 MG/DL (ref 0.7–1.2)
CREAT UR-MCNC: 34.6 MG/DL
DEPRECATED RDW RBC AUTO: 52.5 FL (ref 37–54)
EOSINOPHIL # BLD AUTO: 0.6 10*3/MM3 (ref 0–0.4)
EOSINOPHIL NFR BLD AUTO: 8.1 % (ref 0.3–6.2)
ERYTHROCYTE [DISTWIDTH] IN BLOOD BY AUTOMATED COUNT: 15.2 % (ref 12.3–15.4)
GFR SERPL CREATININE-BSD FRML MDRD: 79 ML/MIN/1.73
GFR SERPL CREATININE-BSD FRML MDRD: 79 ML/MIN/1.73
GLOBULIN UR ELPH-MCNC: 5.2 GM/DL (ref 2.5–3.8)
GLUCOSE BLD-MCNC: 169 MG/DL (ref 65–99)
GLUCOSE BLD-MCNC: 170 MG/DL (ref 65–99)
GLUCOSE UR STRIP-MCNC: NEGATIVE MG/DL
HCT VFR BLD AUTO: 35.9 % (ref 37.5–51)
HGB BLD-MCNC: 12.1 G/DL (ref 13–17.7)
HGB UR QL STRIP.AUTO: NEGATIVE
IRON 24H UR-MRATE: 86 MCG/DL (ref 45–182)
KETONES UR QL STRIP: NEGATIVE
LEUKOCYTE ESTERASE UR QL STRIP.AUTO: NEGATIVE
LYMPHOCYTES # BLD AUTO: 1.9 10*3/MM3 (ref 0.7–3.1)
LYMPHOCYTES NFR BLD AUTO: 26.6 % (ref 19.6–45.3)
MCH RBC QN AUTO: 33.5 PG (ref 26.6–33)
MCHC RBC AUTO-ENTMCNC: 33.6 G/DL (ref 31.5–35.7)
MCV RBC AUTO: 99.8 FL (ref 79–97)
MONOCYTES # BLD AUTO: 0.8 10*3/MM3 (ref 0.1–0.9)
MONOCYTES NFR BLD AUTO: 11.8 % (ref 5–12)
NEUTROPHILS # BLD AUTO: 3.7 10*3/MM3 (ref 1.7–7)
NEUTROPHILS NFR BLD AUTO: 52.3 % (ref 42.7–76)
NITRITE UR QL STRIP: NEGATIVE
NRBC BLD AUTO-RTO: 0.1 /100 WBC (ref 0–0.2)
PH UR STRIP.AUTO: 7 [PH] (ref 5–8)
PLATELET # BLD AUTO: 246 10*3/MM3 (ref 140–450)
PMV BLD AUTO: 7.7 FL (ref 6–12)
POTASSIUM BLD-SCNC: 3.5 MMOL/L (ref 3.6–5.1)
POTASSIUM BLD-SCNC: 3.5 MMOL/L (ref 3.6–5.1)
PROT SERPL-MCNC: 7.8 G/DL (ref 6.1–7.9)
PROT UR QL STRIP: NEGATIVE
PROT UR-MCNC: <2 MG/DL
PROT/CREAT UR: NORMAL MG/G CREA (ref 0–200)
RBC # BLD AUTO: 3.6 10*6/MM3 (ref 4.14–5.8)
SODIUM BLD-SCNC: 130 MMOL/L (ref 136–144)
SODIUM BLD-SCNC: 131 MMOL/L (ref 136–144)
SP GR UR STRIP: 1.01 (ref 1–1.03)
TSH SERPL DL<=0.05 MIU/L-ACNC: 1.74 MIU/ML (ref 0.34–5.6)
URATE SERPL-MCNC: 8.3 MG/DL (ref 4.8–8.7)
UROBILINOGEN UR QL STRIP: NORMAL
WBC NRBC COR # BLD: 7 10*3/MM3 (ref 3.4–10.8)

## 2019-07-16 PROCEDURE — 84156 ASSAY OF PROTEIN URINE: CPT | Performed by: INTERNAL MEDICINE

## 2019-07-16 PROCEDURE — 83880 ASSAY OF NATRIURETIC PEPTIDE: CPT | Performed by: INTERNAL MEDICINE

## 2019-07-16 PROCEDURE — 85025 COMPLETE CBC W/AUTO DIFF WBC: CPT | Performed by: INTERNAL MEDICINE

## 2019-07-16 PROCEDURE — 84550 ASSAY OF BLOOD/URIC ACID: CPT | Performed by: INTERNAL MEDICINE

## 2019-07-16 PROCEDURE — 36415 COLL VENOUS BLD VENIPUNCTURE: CPT | Performed by: INTERNAL MEDICINE

## 2019-07-16 PROCEDURE — 80053 COMPREHEN METABOLIC PANEL: CPT | Performed by: INTERNAL MEDICINE

## 2019-07-16 PROCEDURE — 84443 ASSAY THYROID STIM HORMONE: CPT | Performed by: INTERNAL MEDICINE

## 2019-07-16 PROCEDURE — 83540 ASSAY OF IRON: CPT | Performed by: INTERNAL MEDICINE

## 2019-07-16 PROCEDURE — 82306 VITAMIN D 25 HYDROXY: CPT | Performed by: INTERNAL MEDICINE

## 2019-07-16 PROCEDURE — 81003 URINALYSIS AUTO W/O SCOPE: CPT | Performed by: INTERNAL MEDICINE

## 2019-07-16 PROCEDURE — 82570 ASSAY OF URINE CREATININE: CPT | Performed by: INTERNAL MEDICINE

## 2019-07-16 NOTE — TELEPHONE ENCOUNTER
"Pt in today for labwork. Pt states he was told by his \"liver\" doctor that he needs to get Hep B immunization asap. Pt scheduled for shot tomorrow, is this ok?  "

## 2019-07-17 ENCOUNTER — CLINICAL SUPPORT (OUTPATIENT)
Dept: FAMILY MEDICINE CLINIC | Facility: CLINIC | Age: 49
End: 2019-07-17

## 2019-07-17 DIAGNOSIS — Z23 IMMUNIZATION DUE: Primary | ICD-10-CM

## 2019-07-17 LAB — 25(OH)D3 SERPL-MCNC: 33.2 NG/ML (ref 30–100)

## 2019-07-17 PROCEDURE — 90471 IMMUNIZATION ADMIN: CPT | Performed by: INTERNAL MEDICINE

## 2019-07-17 PROCEDURE — 90746 HEPB VACCINE 3 DOSE ADULT IM: CPT | Performed by: INTERNAL MEDICINE

## 2019-07-18 ENCOUNTER — TELEPHONE (OUTPATIENT)
Dept: FAMILY MEDICINE CLINIC | Facility: CLINIC | Age: 49
End: 2019-07-18

## 2019-07-26 ENCOUNTER — TELEPHONE (OUTPATIENT)
Dept: FAMILY MEDICINE CLINIC | Facility: CLINIC | Age: 49
End: 2019-07-26

## 2019-07-26 NOTE — TELEPHONE ENCOUNTER
Wife left a voice message asking if patient can stop taking Magnesium since his levels were good.  Please advise.

## 2019-07-26 NOTE — TELEPHONE ENCOUNTER
Nope - levels are good because he's on it. His water pill makes him lose magnesium, so that's why he has to continue taking it. Thanks

## 2019-08-05 ENCOUNTER — OFFICE VISIT (OUTPATIENT)
Dept: FAMILY MEDICINE CLINIC | Facility: CLINIC | Age: 49
End: 2019-08-05

## 2019-08-05 VITALS
HEIGHT: 70 IN | BODY MASS INDEX: 37.54 KG/M2 | TEMPERATURE: 98.3 F | HEART RATE: 93 BPM | DIASTOLIC BLOOD PRESSURE: 73 MMHG | SYSTOLIC BLOOD PRESSURE: 109 MMHG | RESPIRATION RATE: 16 BRPM | WEIGHT: 262.2 LBS

## 2019-08-05 DIAGNOSIS — M06.00 SERONEGATIVE RHEUMATOID ARTHRITIS (HCC): Primary | ICD-10-CM

## 2019-08-05 DIAGNOSIS — J30.2 SEASONAL ALLERGIES: ICD-10-CM

## 2019-08-05 DIAGNOSIS — K70.31 ALCOHOLIC CIRRHOSIS OF LIVER WITH ASCITES (HCC): ICD-10-CM

## 2019-08-05 DIAGNOSIS — J44.9 CHRONIC OBSTRUCTIVE PULMONARY DISEASE, UNSPECIFIED COPD TYPE (HCC): ICD-10-CM

## 2019-08-05 PROCEDURE — 99214 OFFICE O/P EST MOD 30 MIN: CPT | Performed by: INTERNAL MEDICINE

## 2019-08-05 RX ORDER — ZINC SULFATE 50(220)MG
220 CAPSULE ORAL DAILY
Refills: 3 | COMMUNITY
Start: 2019-07-07 | End: 2022-08-12 | Stop reason: SDUPTHER

## 2019-08-05 RX ORDER — MAGNESIUM OXIDE 400 MG/1
2 TABLET ORAL 2 TIMES DAILY
Refills: 6 | COMMUNITY
Start: 2019-06-06

## 2019-08-05 RX ORDER — MONTELUKAST SODIUM 10 MG/1
10 TABLET ORAL NIGHTLY
Qty: 30 TABLET | Refills: 3 | Status: SHIPPED | OUTPATIENT
Start: 2019-08-05 | End: 2019-11-27 | Stop reason: SDUPTHER

## 2019-08-05 RX ORDER — ALBUTEROL SULFATE 90 UG/1
2 AEROSOL, METERED RESPIRATORY (INHALATION) EVERY 4 HOURS PRN
Qty: 1 INHALER | Refills: 3 | Status: SHIPPED | OUTPATIENT
Start: 2019-08-05 | End: 2019-11-27 | Stop reason: SDUPTHER

## 2019-08-05 NOTE — PROGRESS NOTES
Subjective   Mc Selby is a 49 y.o. male.     Pt is here for med check hypokalema, hypomag, cirrhosis with anasarca/ascites  Also c/o worsening RA pain, atiya in b/l hips  Has been trying to contact rheum, but has not received any calls back yet  Overall is doing well on meds  But today, biggest concerns are SOA, nasal congestion/allergies  And also, thinks area where sebaceous cyst was removed surgically has grown bigger again  Was previously on 2 inhalers  But doesn't have either one currently  And has only been taking zyrtec for allergies         The following portions of the patient's history were reviewed and updated as appropriate: allergies, current medications, past family history, past medical history, past social history, past surgical history and problem list.    Review of Systems   Constitutional: Negative for fatigue and fever.   HENT: Positive for congestion, postnasal drip and rhinorrhea. Negative for ear pain and sore throat.    Eyes: Negative for blurred vision and itching.   Respiratory: Positive for chest tightness, shortness of breath and wheezing.    Cardiovascular: Negative for chest pain and palpitations.   Gastrointestinal: Negative for abdominal pain, diarrhea and vomiting.   Endocrine: Negative for polydipsia and polyuria.   Genitourinary: Negative for dysuria, frequency, hematuria and urgency.   Musculoskeletal: Positive for arthralgias and back pain. Negative for joint swelling and myalgias.   Skin: Positive for skin lesions. Negative for rash.   Neurological: Negative for dizziness, numbness and headache.   Psychiatric/Behavioral: Negative for depressed mood. The patient is not nervous/anxious.          Current Outpatient Medications:   •  Azelastine HCl 137 MCG/SPRAY solution, USE 2 SPRAYS EACH NOSTRIL TWICE A DAY, Disp: 30 spray, Rfl: 2  •  bumetanide (BUMEX) 2 MG tablet, Take 2 mg by mouth 2 (Two) Times a Day., Disp: , Rfl:   •  HYDROcodone-acetaminophen (NORCO) 5-325 MG per tablet,  "Take 1 tablet by mouth Every 6 (Six) Hours As Needed for Moderate Pain ., Disp: 30 tablet, Rfl: 0  •  lactulose (CHRONULAC) 10 GM/15ML solution, Take 20 g by mouth 3 (Three) Times a Day., Disp: , Rfl:   •  magnesium oxide (MAG-OX) 400 MG tablet, Take 1 tablet by mouth 2 (Two) Times a Day., Disp: , Rfl: 6  •  meclizine 25 MG chewable tablet chewable tablet, Chew 25 mg., Disp: , Rfl:   •  ondansetron (ZOFRAN) 8 MG tablet, Take  by mouth Every 8 (Eight) Hours As Needed for Nausea or Vomiting., Disp: , Rfl:   •  potassium chloride (K-DUR,KLOR-CON) 20 MEQ CR tablet, Take 20 mEq by mouth 3 (Three) Times a Day., Disp: , Rfl:   •  promethazine (PHENERGAN) 12.5 MG tablet, Take 12.5 mg by mouth Every 6 (Six) Hours As Needed for Nausea or Vomiting., Disp: , Rfl:   •  rifaximin (XIFAXAN) 550 MG tablet, Take 550 mg by mouth Every 12 (Twelve) Hours., Disp: , Rfl:   •  spironolactone (ALDACTONE) 25 MG tablet, Take 25 mg by mouth Daily., Disp: , Rfl:   •  tamsulosin (FLOMAX) 0.4 MG capsule 24 hr capsule, Take 1 capsule by mouth 2 (Two) Times a Day., Disp: , Rfl:   •  vitamin D (ERGOCALCIFEROL) 57524 units capsule capsule, Take 50,000 Units by mouth Every 14 (Fourteen) Days., Disp: , Rfl:   •  zinc sulfate (ZINCATE) 220 (50 Zn) MG capsule, Take 220 mg by mouth Daily., Disp: , Rfl: 3    Objective   /73 (BP Location: Left arm, Patient Position: Sitting, Cuff Size: Large Adult)   Pulse 93   Temp 98.3 °F (36.8 °C)   Resp 16   Ht 177.8 cm (70\")   Wt 119 kg (262 lb 3.2 oz)   BMI 37.62 kg/m²   Physical Exam   Constitutional: He is oriented to person, place, and time. He appears well-developed and well-nourished. No distress.   HENT:   Head: Normocephalic and atraumatic.   Right Ear: External ear normal.   Left Ear: External ear normal.   Mouth/Throat: Oropharynx is clear and moist. No oropharyngeal exudate.   Eyes: Conjunctivae and EOM are normal. Right eye exhibits no discharge. Left eye exhibits no discharge. No scleral " icterus.   Neck: Normal range of motion. Neck supple. No thyromegaly present.   Cardiovascular: Normal rate, regular rhythm and normal heart sounds. Exam reveals no gallop and no friction rub.   No murmur heard.  Pulmonary/Chest: Effort normal and breath sounds normal. No respiratory distress. He has no wheezes. He has no rales.   Abdominal: Soft. Bowel sounds are normal. He exhibits no distension. There is no tenderness. There is no guarding.   Musculoskeletal: Normal range of motion. He exhibits no edema or deformity.   Lymphadenopathy:     He has no cervical adenopathy.   Neurological: He is alert and oriented to person, place, and time. No cranial nerve deficit.   Skin: Skin is warm and dry. No rash noted. He is not diaphoretic. No erythema.   Does seem that cyst has returned - able to express discharge   Psychiatric: He has a normal mood and affect. His behavior is normal. Thought content normal.   Vitals reviewed.        Assessment/Plan   Problems Addressed this Visit        Digestive    Alcoholic cirrhosis (CMS/HCC)      Other Visit Diagnoses     Seronegative rheumatoid arthritis (CMS/HCC)    -  Primary    Relevant Orders    Ambulatory Referral to Rheumatology    Chronic obstructive pulmonary disease, unspecified COPD type (CMS/HCC)        Relevant Medications    albuterol sulfate  (90 Base) MCG/ACT inhaler    montelukast (SINGULAIR) 10 MG tablet    Seasonal allergies              Restart singulair  Will refill albuterol  If needs it frequently, will restart daily inhaler, I.e. symbicort or advair  Will refer to different rheum since dr pearson has cut back on hours  Cont current meds for cirrhosis - pt is clinically improving  Pt will readdress sebaceous cyst with gen surg       Procedures

## 2019-08-09 ENCOUNTER — TELEPHONE (OUTPATIENT)
Dept: FAMILY MEDICINE CLINIC | Facility: CLINIC | Age: 49
End: 2019-08-09

## 2019-08-09 NOTE — TELEPHONE ENCOUNTER
, I referred the patient to [facility] and I got a fax back saying that they closed in 2018, where would you like me to refer this patient ? Please advise.        Thanks, Katalina

## 2019-08-12 NOTE — TELEPHONE ENCOUNTER
I looked up his insurance and looked online for drs it is either showing me Dr. Oneil Carr or drs in [place] so what would you suggest? Please advise.      Thanks, Katalina

## 2019-08-12 NOTE — TELEPHONE ENCOUNTER
They are both in [facility] is close to [place] and not sure about [place]. I will contact patient and see if he has a preference or not.      Thanks, Katalina

## 2019-08-16 ENCOUNTER — OFFICE VISIT (OUTPATIENT)
Dept: BARIATRICS/WEIGHT MGMT | Facility: CLINIC | Age: 49
End: 2019-08-16

## 2019-08-16 VITALS
TEMPERATURE: 98.2 F | SYSTOLIC BLOOD PRESSURE: 117 MMHG | HEART RATE: 89 BPM | BODY MASS INDEX: 37.51 KG/M2 | WEIGHT: 262 LBS | DIASTOLIC BLOOD PRESSURE: 78 MMHG | HEIGHT: 70 IN

## 2019-08-16 DIAGNOSIS — Z51.89 VISIT FOR WOUND CHECK: Primary | ICD-10-CM

## 2019-08-16 PROCEDURE — 99024 POSTOP FOLLOW-UP VISIT: CPT | Performed by: SURGERY

## 2019-08-16 NOTE — PROGRESS NOTES
"Subjective:    complains of wound near umbilicus  Objective:      /78 (BP Location: Right arm, Patient Position: Sitting, Cuff Size: Adult)   Pulse 89   Temp 98.2 °F (36.8 °C) (Oral)   Ht 177.8 cm (70\")   Wt 119 kg (262 lb)   BMI 37.59 kg/m²     General:  alert, appears stated age and cooperative   Abdomen: Small incision near umbilicus, some scar but no erythema   Incision:   healing well, no drainage, no erythema, no hernia, no seroma, no swelling, no dehiscence, incision well approximated   Heart: Regular rate   Lungs: Clear to auscultation bilaterally     Back- no active drainage from area of excision cyst in the right upper back      I reviewed the patient's new clinical results.        Assessment:   S/p ventral hernia repair, he has a resolving stitch abscess near umbilicus    S/p right upper back seb cyst removal, some recent drainage but no active drainage now     Patient will consider re-excision of the right upper back  "

## 2019-08-20 ENCOUNTER — PREP FOR SURGERY (OUTPATIENT)
Dept: OTHER | Facility: HOSPITAL | Age: 49
End: 2019-08-20

## 2019-08-20 ENCOUNTER — CLINICAL SUPPORT (OUTPATIENT)
Dept: FAMILY MEDICINE CLINIC | Facility: CLINIC | Age: 49
End: 2019-08-20

## 2019-08-20 ENCOUNTER — LAB (OUTPATIENT)
Dept: FAMILY MEDICINE CLINIC | Facility: CLINIC | Age: 49
End: 2019-08-20

## 2019-08-20 DIAGNOSIS — Z23 IMMUNIZATION DUE: Primary | ICD-10-CM

## 2019-08-20 DIAGNOSIS — E87.6 HYPOKALEMIA: Primary | ICD-10-CM

## 2019-08-20 DIAGNOSIS — L72.3 SEBACEOUS CYST: Primary | ICD-10-CM

## 2019-08-20 DIAGNOSIS — L72.3 SEBACEOUS CYST: ICD-10-CM

## 2019-08-20 LAB
ANION GAP SERPL CALCULATED.3IONS-SCNC: 15.8 MMOL/L (ref 5–15)
BUN BLD-MCNC: 11 MG/DL (ref 8–20)
BUN/CREAT SERPL: 10 (ref 6.2–20.3)
CALCIUM SPEC-SCNC: 9.4 MG/DL (ref 8.9–10.3)
CHLORIDE SERPL-SCNC: 102 MMOL/L (ref 101–111)
CO2 SERPL-SCNC: 21 MMOL/L (ref 22–32)
CREAT BLD-MCNC: 1.1 MG/DL (ref 0.7–1.2)
GFR SERPL CREATININE-BSD FRML MDRD: 71 ML/MIN/1.73
GLUCOSE BLD-MCNC: 146 MG/DL (ref 65–99)
POTASSIUM BLD-SCNC: 3.7 MMOL/L (ref 3.6–5.1)
POTASSIUM BLD-SCNC: 3.8 MMOL/L (ref 3.6–5.1)
SODIUM BLD-SCNC: 135 MMOL/L (ref 136–144)

## 2019-08-20 PROCEDURE — 90746 HEPB VACCINE 3 DOSE ADULT IM: CPT | Performed by: INTERNAL MEDICINE

## 2019-08-20 PROCEDURE — 90471 IMMUNIZATION ADMIN: CPT | Performed by: INTERNAL MEDICINE

## 2019-08-20 PROCEDURE — 36415 COLL VENOUS BLD VENIPUNCTURE: CPT

## 2019-08-20 PROCEDURE — 80048 BASIC METABOLIC PNL TOTAL CA: CPT | Performed by: INTERNAL MEDICINE

## 2019-08-20 PROCEDURE — 84132 ASSAY OF SERUM POTASSIUM: CPT | Performed by: INTERNAL MEDICINE

## 2019-08-20 RX ORDER — SODIUM CHLORIDE 0.9 % (FLUSH) 0.9 %
3 SYRINGE (ML) INJECTION EVERY 12 HOURS SCHEDULED
Status: CANCELLED | OUTPATIENT
Start: 2019-08-20

## 2019-08-20 RX ORDER — SODIUM CHLORIDE 0.9 % (FLUSH) 0.9 %
3-10 SYRINGE (ML) INJECTION AS NEEDED
Status: CANCELLED | OUTPATIENT
Start: 2019-08-20

## 2019-08-20 RX ORDER — CEFAZOLIN SODIUM IN 0.9 % NACL 3 G/100 ML
3 INTRAVENOUS SOLUTION, PIGGYBACK (ML) INTRAVENOUS ONCE
Status: CANCELLED | OUTPATIENT
Start: 2019-08-20 | End: 2019-08-20

## 2019-08-22 PROBLEM — L72.3 SEBACEOUS CYST: Status: ACTIVE | Noted: 2019-08-22

## 2019-08-23 ENCOUNTER — APPOINTMENT (OUTPATIENT)
Dept: PREADMISSION TESTING | Facility: HOSPITAL | Age: 49
End: 2019-08-23

## 2019-08-23 VITALS
SYSTOLIC BLOOD PRESSURE: 103 MMHG | OXYGEN SATURATION: 95 % | HEART RATE: 77 BPM | HEIGHT: 70 IN | BODY MASS INDEX: 37.22 KG/M2 | DIASTOLIC BLOOD PRESSURE: 70 MMHG | WEIGHT: 260 LBS

## 2019-08-23 DIAGNOSIS — L72.3 SEBACEOUS CYST: ICD-10-CM

## 2019-08-23 LAB
BASOPHILS # BLD AUTO: 0.1 10*3/MM3 (ref 0–0.2)
BASOPHILS NFR BLD AUTO: 1.1 % (ref 0–1.5)
DEPRECATED RDW RBC AUTO: 47.7 FL (ref 37–54)
EOSINOPHIL # BLD AUTO: 0.6 10*3/MM3 (ref 0–0.4)
EOSINOPHIL NFR BLD AUTO: 8 % (ref 0.3–6.2)
ERYTHROCYTE [DISTWIDTH] IN BLOOD BY AUTOMATED COUNT: 14.3 % (ref 12.3–15.4)
HCT VFR BLD AUTO: 38.1 % (ref 37.5–51)
HGB BLD-MCNC: 13.3 G/DL (ref 13–17.7)
LYMPHOCYTES # BLD AUTO: 2.1 10*3/MM3 (ref 0.7–3.1)
LYMPHOCYTES NFR BLD AUTO: 26.6 % (ref 19.6–45.3)
MCH RBC QN AUTO: 33.3 PG (ref 26.6–33)
MCHC RBC AUTO-ENTMCNC: 34.8 G/DL (ref 31.5–35.7)
MCV RBC AUTO: 95.7 FL (ref 79–97)
MONOCYTES # BLD AUTO: 0.9 10*3/MM3 (ref 0.1–0.9)
MONOCYTES NFR BLD AUTO: 11.5 % (ref 5–12)
NEUTROPHILS # BLD AUTO: 4.3 10*3/MM3 (ref 1.7–7)
NEUTROPHILS NFR BLD AUTO: 52.8 % (ref 42.7–76)
NRBC BLD AUTO-RTO: 0.1 /100 WBC (ref 0–0.2)
PLATELET # BLD AUTO: 196 10*3/MM3 (ref 140–450)
PMV BLD AUTO: 8.3 FL (ref 6–12)
RBC # BLD AUTO: 3.99 10*6/MM3 (ref 4.14–5.8)
WBC NRBC COR # BLD: 8.1 10*3/MM3 (ref 3.4–10.8)

## 2019-08-23 PROCEDURE — 36415 COLL VENOUS BLD VENIPUNCTURE: CPT

## 2019-08-23 PROCEDURE — 85025 COMPLETE CBC W/AUTO DIFF WBC: CPT | Performed by: SURGERY

## 2019-08-27 ENCOUNTER — HOSPITAL ENCOUNTER (OUTPATIENT)
Facility: HOSPITAL | Age: 49
Setting detail: HOSPITAL OUTPATIENT SURGERY
Discharge: HOME | End: 2019-08-27
Attending: SURGERY | Admitting: SURGERY

## 2019-08-27 VITALS
OXYGEN SATURATION: 97 % | HEART RATE: 100 BPM | TEMPERATURE: 98.5 F | DIASTOLIC BLOOD PRESSURE: 73 MMHG | SYSTOLIC BLOOD PRESSURE: 113 MMHG | RESPIRATION RATE: 16 BRPM

## 2019-08-27 DIAGNOSIS — L72.3 SEBACEOUS CYST: ICD-10-CM

## 2019-08-27 PROCEDURE — 88304 TISSUE EXAM BY PATHOLOGIST: CPT | Performed by: SURGERY

## 2019-08-27 PROCEDURE — 11404 EXC TR-EXT B9+MARG 3.1-4 CM: CPT | Performed by: SURGERY

## 2019-08-27 RX ORDER — LIDOCAINE HYDROCHLORIDE AND EPINEPHRINE 10; 10 MG/ML; UG/ML
INJECTION, SOLUTION INFILTRATION; PERINEURAL AS NEEDED
Status: DISCONTINUED | OUTPATIENT
Start: 2019-08-27 | End: 2019-08-27 | Stop reason: HOSPADM

## 2019-08-27 RX ORDER — SODIUM CHLORIDE 0.9 % (FLUSH) 0.9 %
3-10 SYRINGE (ML) INJECTION AS NEEDED
Status: DISCONTINUED | OUTPATIENT
Start: 2019-08-27 | End: 2019-08-27 | Stop reason: HOSPADM

## 2019-08-27 RX ORDER — CEFAZOLIN SODIUM IN 0.9 % NACL 3 G/100 ML
3 INTRAVENOUS SOLUTION, PIGGYBACK (ML) INTRAVENOUS ONCE
Status: COMPLETED | OUTPATIENT
Start: 2019-08-27 | End: 2019-08-27

## 2019-08-27 RX ORDER — SODIUM CHLORIDE 0.9 % (FLUSH) 0.9 %
3 SYRINGE (ML) INJECTION EVERY 12 HOURS SCHEDULED
Status: DISCONTINUED | OUTPATIENT
Start: 2019-08-27 | End: 2019-08-27 | Stop reason: HOSPADM

## 2019-08-27 RX ORDER — SODIUM CHLORIDE 9 MG/ML
9 INJECTION, SOLUTION INTRAVENOUS CONTINUOUS PRN
Status: DISCONTINUED | OUTPATIENT
Start: 2019-08-27 | End: 2019-08-27 | Stop reason: HOSPADM

## 2019-08-27 RX ADMIN — CEFAZOLIN 3 G: 1 INJECTION, POWDER, FOR SOLUTION INTRAMUSCULAR; INTRAVENOUS; PARENTERAL at 12:08

## 2019-08-27 RX ADMIN — SODIUM CHLORIDE 9 ML/HR: 900 INJECTION, SOLUTION INTRAVENOUS at 09:54

## 2019-08-27 NOTE — OP NOTE
MASS SOFT TISSUE EXCISION  Procedure Report    Patient Name:  Mc Selby  YOB: 1970    Date of Surgery:  8/27/2019     Indications: This is a gentleman with a recurrent sebaceous cyst of the right back    Pre-op Diagnosis:   Sebaceous cyst [L72.3]       Post-Op Diagnosis Codes:     * Sebaceous cyst [L72.3]    Procedure/CPT® Codes:      Procedure(s):  EXCISION LESION of right back    Staff:  Surgeon(s):  Sapna Elizalde MD         Anesthesia: Monitored Anesthesia Care    Estimated Blood Loss: none    Implants:    Nothing was implanted during the procedure    Specimen:          Specimens     ID Source Type Tests Collected By Collected At Frozen?      A Back, Upper Tissue · TISSUE PATHOLOGY EXAM   Sapna Elizalde MD 8/27/19 1223      Comment: RIGHT BACK SUBACEOUS CYST              Findings: Sebaceous cyst of the right back    Complications: None    Description of Procedure: Patient was in a lateral position left side down.  The right back was prepped and draped in a sterile fashion.  Local anesthetic was administered an elliptical incision was made around the lesion.  The size of the incision after excision was 3.1 cm.  Hemostasis was achieved with cautery and I closed with interrupted 3-0 and 4-0 Monocryl.  Surgical glue was also applied.      Sapna Elizalde MD     Date: 8/27/2019  Time: 12:36 PM

## 2019-08-29 LAB
LAB AP CASE REPORT: NORMAL
LAB AP CLINICAL INFORMATION: NORMAL
PATH REPORT.FINAL DX SPEC: NORMAL
PATH REPORT.GROSS SPEC: NORMAL

## 2019-09-03 ENCOUNTER — TELEPHONE (OUTPATIENT)
Dept: RHEUMATOLOGY | Facility: CLINIC | Age: 49
End: 2019-09-03

## 2019-09-03 RX ORDER — LACTULOSE 10 G/15ML
SOLUTION ORAL
Qty: 2700 ML | Refills: 1 | Status: SHIPPED | OUTPATIENT
Start: 2019-09-03 | End: 2019-10-11 | Stop reason: SDUPTHER

## 2019-09-03 NOTE — TELEPHONE ENCOUNTER
Pt wife called and stated that the PA was approved for enbrel but there is no RX for this and nothing in last office note about what dose it is and was approved from gastro as well. Please advise

## 2019-09-16 ENCOUNTER — TELEPHONE (OUTPATIENT)
Dept: RHEUMATOLOGY | Facility: CLINIC | Age: 49
End: 2019-09-16

## 2019-09-16 NOTE — TELEPHONE ENCOUNTER
Mc MUÑOZ Approved.  Coverage dates: 8/9/19 - 8/8/20.  PA# [id number].  Needs to go to [facility].

## 2019-09-18 NOTE — TELEPHONE ENCOUNTER
Pts wife Nicky Called wanting an update on pa for enbrel. I spoke with Jalen and this is not completed yet. We will inform them when it is.  I tried returning Nicky's call bbut she did not answer. I left a vmail for her to return my call if needed

## 2019-09-30 RX ORDER — AZELASTINE HYDROCHLORIDE 137 UG/1
SPRAY, METERED NASAL
Qty: 30 SPRAY | Refills: 2 | Status: SHIPPED | OUTPATIENT
Start: 2019-09-30 | End: 2019-12-12 | Stop reason: SDUPTHER

## 2019-10-01 ENCOUNTER — TELEPHONE (OUTPATIENT)
Dept: RHEUMATOLOGY | Facility: CLINIC | Age: 49
End: 2019-10-01

## 2019-10-01 NOTE — TELEPHONE ENCOUNTER
Pts wife Nicky called upset because Mc has being waiting to get started on enbrel mini.   I called [employer] and did PA over the phone    PA Approved for 6 months: 10/1/19- 4/1/2020     I called and notified Nicky by voicemail that PA was approved. They will be notified by mail.Aecharlenena will also notify our office. I also informed her that Mc will need labs and also need to come in for injection training

## 2019-10-02 NOTE — TELEPHONE ENCOUNTER
Received paper copy of PA from [employer].  Patient is scheduled for injection training - Enbrel Mini.  Also received fax from [facility] stating that the prescription has been filled and the patient's copay is $0.  They are contacting the patient for delivery.

## 2019-10-03 ENCOUNTER — CLINICAL SUPPORT (OUTPATIENT)
Dept: RHEUMATOLOGY | Facility: CLINIC | Age: 49
End: 2019-10-03

## 2019-10-03 NOTE — PROGRESS NOTES
Mc is here for Enbrel mini injection training. Pt was given a demo on how to used the injection. Injection given in the right thigh. Mc was monitored in office for 15 minutes. Injection tolerated well. Pt was sent home with enbrel device. He was also infomred of the enbrel nurse. Enbrel Sample- Lot [id number] exp 10/21   How Severe Is Your Skin Lesion?: moderate Has Your Skin Lesion Been Treated?: not been treated Is This A New Presentation, Or A Follow-Up?: Skin Lesions

## 2019-10-07 ENCOUNTER — OFFICE VISIT (OUTPATIENT)
Dept: FAMILY MEDICINE CLINIC | Facility: CLINIC | Age: 49
End: 2019-10-07

## 2019-10-07 VITALS
TEMPERATURE: 97.8 F | BODY MASS INDEX: 38.37 KG/M2 | SYSTOLIC BLOOD PRESSURE: 100 MMHG | RESPIRATION RATE: 20 BRPM | OXYGEN SATURATION: 98 % | WEIGHT: 268 LBS | HEIGHT: 70 IN | DIASTOLIC BLOOD PRESSURE: 68 MMHG | HEART RATE: 77 BPM

## 2019-10-07 DIAGNOSIS — Z23 NEED FOR IMMUNIZATION AGAINST INFLUENZA: ICD-10-CM

## 2019-10-07 DIAGNOSIS — J30.2 SEASONAL ALLERGIES: Primary | ICD-10-CM

## 2019-10-07 DIAGNOSIS — M06.00 SERONEGATIVE RHEUMATOID ARTHRITIS (HCC): ICD-10-CM

## 2019-10-07 DIAGNOSIS — K70.31 ALCOHOLIC CIRRHOSIS OF LIVER WITH ASCITES (HCC): ICD-10-CM

## 2019-10-07 PROCEDURE — 90674 CCIIV4 VAC NO PRSV 0.5 ML IM: CPT | Performed by: INTERNAL MEDICINE

## 2019-10-07 PROCEDURE — 90471 IMMUNIZATION ADMIN: CPT | Performed by: INTERNAL MEDICINE

## 2019-10-07 PROCEDURE — 99213 OFFICE O/P EST LOW 20 MIN: CPT | Performed by: INTERNAL MEDICINE

## 2019-10-07 NOTE — PROGRESS NOTES
Subjective   Mc Selby is a 49 y.o. male.     Allergy meds had been helping until recently  Did do allergy shots before, but was briefly off ins  And hasn't restarted them  Not happy with advanced ent and allergy, however    Does c/o fatigue  But otherwise doing alright  Got first dose of enbrel last Thursday    Continues to abstain from alcohol  And has f/u with GI  Has gained weight, but from PO intake and being sedentary  Not from fluid retention         The following portions of the patient's history were reviewed and updated as appropriate: allergies, current medications, past family history, past medical history, past social history, past surgical history and problem list.    Review of Systems   Constitutional: Positive for fatigue. Negative for fever.   HENT: Positive for congestion, hearing loss, postnasal drip and sinus pressure. Negative for ear pain, rhinorrhea and sore throat.    Eyes: Negative for blurred vision and itching.   Respiratory: Negative for cough and shortness of breath.    Cardiovascular: Negative for chest pain and palpitations.   Gastrointestinal: Negative for abdominal pain, diarrhea and vomiting.   Endocrine: Negative for polydipsia and polyuria.   Genitourinary: Negative for dysuria, frequency, hematuria and urgency.   Musculoskeletal: Positive for arthralgias. Negative for joint swelling and myalgias.   Skin: Negative for rash and skin lesions.   Neurological: Negative for dizziness, numbness and headache.   Psychiatric/Behavioral: Negative for depressed mood. The patient is not nervous/anxious.          Current Outpatient Medications:   •  albuterol sulfate  (90 Base) MCG/ACT inhaler, Inhale 2 puffs Every 4 (Four) Hours As Needed for Wheezing or Shortness of Air., Disp: 1 inhaler, Rfl: 3  •  Azelastine HCl 137 MCG/SPRAY solution, USE 2 SPRAYS EACH NOSTRIL TWICE A DAY, Disp: 30 spray, Rfl: 2  •  bumetanide (BUMEX) 2 MG tablet, Take 2 mg by mouth 2 (Two) Times a Day., Disp: ,  "Rfl:   •  Etanercept (ENBREL MINI) 50 MG/ML solution cartridge, Inject 50 mg under the skin into the appropriate area as directed 1 (One) Time Per Week., Disp: 4 mL, Rfl: 2  •  lactulose (CHRONULAC) 10 GM/15ML solution, TAKE 30 ML BY MOUTH THREE TIMES A DAY AS NEEDED., Disp: 2700 mL, Rfl: 1  •  magnesium oxide (MAG-OX) 400 MG tablet, Take 1 tablet by mouth 2 (Two) Times a Day., Disp: , Rfl: 6  •  meclizine 25 MG chewable tablet chewable tablet, Chew 25 mg., Disp: , Rfl:   •  montelukast (SINGULAIR) 10 MG tablet, Take 1 tablet by mouth Every Night., Disp: 30 tablet, Rfl: 3  •  ondansetron (ZOFRAN) 8 MG tablet, Take  by mouth Every 8 (Eight) Hours As Needed for Nausea or Vomiting., Disp: , Rfl:   •  potassium chloride (K-DUR,KLOR-CON) 20 MEQ CR tablet, Take 20 mEq by mouth 3 (Three) Times a Day., Disp: , Rfl:   •  promethazine (PHENERGAN) 12.5 MG tablet, Take 12.5 mg by mouth Every 6 (Six) Hours As Needed for Nausea or Vomiting., Disp: , Rfl:   •  rifaximin (XIFAXAN) 550 MG tablet, Take 550 mg by mouth Every 12 (Twelve) Hours., Disp: , Rfl:   •  spironolactone (ALDACTONE) 25 MG tablet, Take 25 mg by mouth Daily., Disp: , Rfl:   •  tamsulosin (FLOMAX) 0.4 MG capsule 24 hr capsule, Take 1 capsule by mouth 2 (Two) Times a Day., Disp: , Rfl:   •  vitamin D (ERGOCALCIFEROL) 34120 units capsule capsule, Take 50,000 Units by mouth Every 14 (Fourteen) Days., Disp: , Rfl:   •  zinc sulfate (ZINCATE) 220 (50 Zn) MG capsule, Take 220 mg by mouth Daily., Disp: , Rfl: 3    Objective   /68 (BP Location: Right arm, Patient Position: Sitting, Cuff Size: Large Adult)   Pulse 77   Temp 97.8 °F (36.6 °C) (Oral)   Resp 20   Ht 177.8 cm (70\")   Wt 122 kg (268 lb)   SpO2 98%   BMI 38.45 kg/m²   Physical Exam   Constitutional: He is oriented to person, place, and time. He appears well-developed and well-nourished. No distress.   HENT:   Head: Normocephalic and atraumatic.   Mouth/Throat: Oropharynx is clear and moist. No " oropharyngeal exudate.   Eyes: Conjunctivae and EOM are normal. Right eye exhibits no discharge. Left eye exhibits no discharge. No scleral icterus.   Neck: Normal range of motion. Neck supple. No thyromegaly present.   Cardiovascular: Normal rate, regular rhythm and normal heart sounds. Exam reveals no gallop and no friction rub.   No murmur heard.  Pulmonary/Chest: Effort normal and breath sounds normal. No respiratory distress. He has no wheezes. He has no rales.   Musculoskeletal: Normal range of motion. He exhibits no edema or deformity.   Lymphadenopathy:     He has no cervical adenopathy.   Neurological: He is alert and oriented to person, place, and time. No cranial nerve deficit.   Skin: Skin is warm and dry. No rash noted. He is not diaphoretic. No erythema.   Psychiatric: He has a normal mood and affect. His behavior is normal. Thought content normal.   Vitals reviewed.        Assessment/Plan   Problems Addressed this Visit        Digestive    Alcoholic cirrhosis (CMS/HCC)      Other Visit Diagnoses     Seasonal allergies    -  Primary    Relevant Orders    Ambulatory Referral to Allergy    Seronegative rheumatoid arthritis (CMS/[facility])        Need for immunization against influenza        Relevant Orders    Flucelvax Quad=>4Years ([id number]) (Completed)          Resume allergy injections - refer to family allergy and asthma  Otherwise, recent labs acceptable - potassium was normal  Keep f/u with GI and rheum       Procedures

## 2019-10-11 RX ORDER — LACTULOSE 10 G/15ML
SOLUTION ORAL
Qty: 2700 ML | Refills: 1 | Status: SHIPPED | OUTPATIENT
Start: 2019-10-11 | End: 2020-01-07 | Stop reason: SDUPTHER

## 2019-10-14 ENCOUNTER — TRANSCRIBE ORDERS (OUTPATIENT)
Dept: ADMINISTRATIVE | Facility: HOSPITAL | Age: 49
End: 2019-10-14

## 2019-10-14 DIAGNOSIS — R42 DIZZINESS AND GIDDINESS: Primary | ICD-10-CM

## 2019-10-18 ENCOUNTER — APPOINTMENT (OUTPATIENT)
Dept: MRI IMAGING | Facility: HOSPITAL | Age: 49
End: 2019-10-18

## 2019-10-19 RX ORDER — RIFAXIMIN 550 MG/1
TABLET ORAL
Qty: 42 TABLET | Refills: 8 | Status: CANCELLED | OUTPATIENT
Start: 2019-10-19

## 2019-10-21 ENCOUNTER — TELEPHONE (OUTPATIENT)
Dept: FAMILY MEDICINE CLINIC | Facility: CLINIC | Age: 49
End: 2019-10-21

## 2019-10-21 NOTE — TELEPHONE ENCOUNTER
Spouse called requesting refill for patients   rifaximin (XIFAXAN) 550 MG   Spouse states that patient is out of med, and they will be leaving town on Friday.

## 2019-10-23 ENCOUNTER — APPOINTMENT (OUTPATIENT)
Dept: MRI IMAGING | Facility: HOSPITAL | Age: 49
End: 2019-10-23

## 2019-10-25 NOTE — TELEPHONE ENCOUNTER
Mc Mock spoke with patient who stated that he wants his medication to come from [facility].  [facility] has discontinued the prescription on their end.

## 2019-11-06 ENCOUNTER — APPOINTMENT (OUTPATIENT)
Dept: MRI IMAGING | Facility: HOSPITAL | Age: 49
End: 2019-11-06

## 2019-11-26 DIAGNOSIS — R42 DIZZINESS AND GIDDINESS: ICD-10-CM

## 2019-11-27 RX ORDER — ALBUTEROL SULFATE 90 UG/1
2 AEROSOL, METERED RESPIRATORY (INHALATION) EVERY 4 HOURS PRN
Qty: 6.7 INHALER | Refills: 3 | Status: SHIPPED | OUTPATIENT
Start: 2019-11-27 | End: 2020-01-08

## 2019-11-27 RX ORDER — MONTELUKAST SODIUM 10 MG/1
TABLET ORAL
Qty: 90 TABLET | Refills: 1 | Status: SHIPPED | OUTPATIENT
Start: 2019-11-27 | End: 2020-01-08

## 2019-12-05 ENCOUNTER — LAB (OUTPATIENT)
Dept: FAMILY MEDICINE CLINIC | Facility: CLINIC | Age: 49
End: 2019-12-05

## 2019-12-05 DIAGNOSIS — M06.00 SERONEGATIVE RHEUMATOID ARTHRITIS (HCC): ICD-10-CM

## 2019-12-05 DIAGNOSIS — N39.0 URINARY TRACT INFECTION WITHOUT HEMATURIA, SITE UNSPECIFIED: ICD-10-CM

## 2019-12-05 DIAGNOSIS — M06.00 SERONEGATIVE RHEUMATOID ARTHRITIS (HCC): Primary | ICD-10-CM

## 2019-12-05 LAB
ALBUMIN SERPL-MCNC: 3.3 G/DL (ref 3.5–5.2)
ALBUMIN/GLOB SERPL: 0.6 G/DL
ALP SERPL-CCNC: 158 U/L (ref 39–117)
ALT SERPL W P-5'-P-CCNC: 12 U/L (ref 1–41)
ANION GAP SERPL CALCULATED.3IONS-SCNC: 11.3 MMOL/L (ref 5–15)
AST SERPL-CCNC: 18 U/L (ref 1–40)
BACTERIA UR QL AUTO: ABNORMAL /HPF
BASOPHILS # BLD AUTO: 0.09 10*3/MM3 (ref 0–0.2)
BASOPHILS NFR BLD AUTO: 1.1 % (ref 0–1.5)
BILIRUB SERPL-MCNC: 2.6 MG/DL (ref 0.2–1.2)
BILIRUB UR QL STRIP: NEGATIVE
BUN BLD-MCNC: 10 MG/DL (ref 6–20)
BUN/CREAT SERPL: 9.3 (ref 7–25)
CALCIUM SPEC-SCNC: 9.4 MG/DL (ref 8.6–10.5)
CHLORIDE SERPL-SCNC: 96 MMOL/L (ref 98–107)
CLARITY UR: CLEAR
CO2 SERPL-SCNC: 22.7 MMOL/L (ref 22–29)
COLOR UR: YELLOW
CREAT BLD-MCNC: 1.07 MG/DL (ref 0.76–1.27)
CRP SERPL-MCNC: 0.38 MG/DL (ref 0–0.5)
DEPRECATED RDW RBC AUTO: 41.3 FL (ref 37–54)
EOSINOPHIL # BLD AUTO: 0.53 10*3/MM3 (ref 0–0.4)
EOSINOPHIL NFR BLD AUTO: 6.7 % (ref 0.3–6.2)
ERYTHROCYTE [DISTWIDTH] IN BLOOD BY AUTOMATED COUNT: 12.7 % (ref 12.3–15.4)
ERYTHROCYTE [SEDIMENTATION RATE] IN BLOOD: 53 MM/HR (ref 0–20)
GFR SERPL CREATININE-BSD FRML MDRD: 73 ML/MIN/1.73
GLOBULIN UR ELPH-MCNC: 5.1 GM/DL
GLUCOSE BLD-MCNC: 349 MG/DL (ref 65–99)
GLUCOSE UR STRIP-MCNC: ABNORMAL MG/DL
HCT VFR BLD AUTO: 42.6 % (ref 37.5–51)
HGB BLD-MCNC: 14.5 G/DL (ref 13–17.7)
HGB UR QL STRIP.AUTO: NEGATIVE
HYALINE CASTS UR QL AUTO: ABNORMAL /LPF
IMM GRANULOCYTES # BLD AUTO: 0.03 10*3/MM3 (ref 0–0.05)
IMM GRANULOCYTES NFR BLD AUTO: 0.4 % (ref 0–0.5)
KETONES UR QL STRIP: NEGATIVE
LEUKOCYTE ESTERASE UR QL STRIP.AUTO: NEGATIVE
LYMPHOCYTES # BLD AUTO: 2.31 10*3/MM3 (ref 0.7–3.1)
LYMPHOCYTES NFR BLD AUTO: 29.3 % (ref 19.6–45.3)
MCH RBC QN AUTO: 30.7 PG (ref 26.6–33)
MCHC RBC AUTO-ENTMCNC: 34 G/DL (ref 31.5–35.7)
MCV RBC AUTO: 90.3 FL (ref 79–97)
MONOCYTES # BLD AUTO: 0.92 10*3/MM3 (ref 0.1–0.9)
MONOCYTES NFR BLD AUTO: 11.7 % (ref 5–12)
NEUTROPHILS # BLD AUTO: 4.01 10*3/MM3 (ref 1.7–7)
NEUTROPHILS NFR BLD AUTO: 50.8 % (ref 42.7–76)
NITRITE UR QL STRIP: NEGATIVE
NRBC BLD AUTO-RTO: 0 /100 WBC (ref 0–0.2)
PH UR STRIP.AUTO: 6.5 [PH] (ref 5–8)
PLATELET # BLD AUTO: 146 10*3/MM3 (ref 140–450)
PMV BLD AUTO: 11.9 FL (ref 6–12)
POTASSIUM BLD-SCNC: 3.9 MMOL/L (ref 3.5–5.2)
PROT SERPL-MCNC: 8.4 G/DL (ref 6–8.5)
PROT UR QL STRIP: NEGATIVE
RBC # BLD AUTO: 4.72 10*6/MM3 (ref 4.14–5.8)
RBC # UR: ABNORMAL /HPF
REF LAB TEST METHOD: ABNORMAL
SODIUM BLD-SCNC: 130 MMOL/L (ref 136–145)
SP GR UR STRIP: 1.01 (ref 1–1.03)
SQUAMOUS #/AREA URNS HPF: ABNORMAL /HPF
UROBILINOGEN UR QL STRIP: ABNORMAL
WBC NRBC COR # BLD: 7.89 10*3/MM3 (ref 3.4–10.8)
WBC UR QL AUTO: ABNORMAL /HPF

## 2019-12-05 PROCEDURE — 80053 COMPREHEN METABOLIC PANEL: CPT | Performed by: INTERNAL MEDICINE

## 2019-12-05 PROCEDURE — 36415 COLL VENOUS BLD VENIPUNCTURE: CPT

## 2019-12-05 PROCEDURE — 81001 URINALYSIS AUTO W/SCOPE: CPT | Performed by: INTERNAL MEDICINE

## 2019-12-05 PROCEDURE — 85652 RBC SED RATE AUTOMATED: CPT | Performed by: INTERNAL MEDICINE

## 2019-12-05 PROCEDURE — 86140 C-REACTIVE PROTEIN: CPT | Performed by: INTERNAL MEDICINE

## 2019-12-05 PROCEDURE — 85025 COMPLETE CBC W/AUTO DIFF WBC: CPT | Performed by: INTERNAL MEDICINE

## 2019-12-06 DIAGNOSIS — R73.9 ELEVATED SERUM GLUCOSE: Primary | ICD-10-CM

## 2019-12-11 ENCOUNTER — LAB (OUTPATIENT)
Dept: FAMILY MEDICINE CLINIC | Facility: CLINIC | Age: 49
End: 2019-12-11

## 2019-12-11 DIAGNOSIS — R73.9 ELEVATED SERUM GLUCOSE: ICD-10-CM

## 2019-12-11 LAB
ANION GAP SERPL CALCULATED.3IONS-SCNC: 13.9 MMOL/L (ref 5–15)
BUN BLD-MCNC: 10 MG/DL (ref 6–20)
BUN/CREAT SERPL: 9.3 (ref 7–25)
CALCIUM SPEC-SCNC: 9.5 MG/DL (ref 8.6–10.5)
CHLORIDE SERPL-SCNC: 93 MMOL/L (ref 98–107)
CO2 SERPL-SCNC: 25.1 MMOL/L (ref 22–29)
CREAT BLD-MCNC: 1.08 MG/DL (ref 0.76–1.27)
GFR SERPL CREATININE-BSD FRML MDRD: 72 ML/MIN/1.73
GLUCOSE BLD-MCNC: 227 MG/DL (ref 65–99)
HBA1C MFR BLD: 9.6 % (ref 3.5–5.6)
POTASSIUM BLD-SCNC: 3.6 MMOL/L (ref 3.5–5.2)
SODIUM BLD-SCNC: 132 MMOL/L (ref 136–145)

## 2019-12-11 PROCEDURE — 36415 COLL VENOUS BLD VENIPUNCTURE: CPT

## 2019-12-11 PROCEDURE — 83036 HEMOGLOBIN GLYCOSYLATED A1C: CPT | Performed by: INTERNAL MEDICINE

## 2019-12-11 PROCEDURE — 80048 BASIC METABOLIC PNL TOTAL CA: CPT | Performed by: INTERNAL MEDICINE

## 2019-12-12 ENCOUNTER — APPOINTMENT (OUTPATIENT)
Dept: LAB | Facility: HOSPITAL | Age: 49
End: 2019-12-12

## 2019-12-12 ENCOUNTER — OFFICE VISIT (OUTPATIENT)
Dept: RHEUMATOLOGY | Facility: CLINIC | Age: 49
End: 2019-12-12

## 2019-12-12 VITALS
DIASTOLIC BLOOD PRESSURE: 71 MMHG | BODY MASS INDEX: 37.31 KG/M2 | HEART RATE: 76 BPM | WEIGHT: 260 LBS | SYSTOLIC BLOOD PRESSURE: 105 MMHG

## 2019-12-12 DIAGNOSIS — M06.00 SERONEGATIVE RHEUMATOID ARTHRITIS (HCC): Primary | ICD-10-CM

## 2019-12-12 DIAGNOSIS — D64.9 ANEMIA, UNSPECIFIED TYPE: ICD-10-CM

## 2019-12-12 DIAGNOSIS — R89.9 ABNORMAL LABORATORY TEST: ICD-10-CM

## 2019-12-12 DIAGNOSIS — Z79.899 LONG-TERM USE OF HIGH-RISK MEDICATION: ICD-10-CM

## 2019-12-12 DIAGNOSIS — K74.60 CIRRHOSIS OF LIVER WITH ASCITES, UNSPECIFIED HEPATIC CIRRHOSIS TYPE (HCC): ICD-10-CM

## 2019-12-12 DIAGNOSIS — R18.8 CIRRHOSIS OF LIVER WITH ASCITES, UNSPECIFIED HEPATIC CIRRHOSIS TYPE (HCC): ICD-10-CM

## 2019-12-12 PROCEDURE — 86335 IMMUNFIX E-PHORSIS/URINE/CSF: CPT | Performed by: INTERNAL MEDICINE

## 2019-12-12 PROCEDURE — 86334 IMMUNOFIX E-PHORESIS SERUM: CPT | Performed by: INTERNAL MEDICINE

## 2019-12-12 PROCEDURE — 36415 COLL VENOUS BLD VENIPUNCTURE: CPT | Performed by: INTERNAL MEDICINE

## 2019-12-12 PROCEDURE — 82784 ASSAY IGA/IGD/IGG/IGM EACH: CPT | Performed by: INTERNAL MEDICINE

## 2019-12-12 PROCEDURE — 99214 OFFICE O/P EST MOD 30 MIN: CPT | Performed by: INTERNAL MEDICINE

## 2019-12-12 RX ORDER — METFORMIN HYDROCHLORIDE 500 MG/1
500 TABLET, EXTENDED RELEASE ORAL
Qty: 30 TABLET | Refills: 1 | Status: SHIPPED | OUTPATIENT
Start: 2019-12-12 | End: 2020-01-03

## 2019-12-12 RX ORDER — AZELASTINE HYDROCHLORIDE 137 UG/1
SPRAY, METERED NASAL
Qty: 30 SPRAY | Refills: 2 | Status: SHIPPED | OUTPATIENT
Start: 2019-12-12 | End: 2020-01-08

## 2019-12-12 NOTE — PROGRESS NOTES
HPI:  The patient is a very pleasant 49-year-old male who comes today in follow-up for management of rheumatoid arthritis.  He has background of cirrhosis of the liver secondary to hepatitis B and alcoholism's.  He also has COPD and kidney disease.  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  He was referred to hematologist oncologist but the patient says that he does not recall to be referred to the hematologist oncologist as he has many doctors and appointments to go to.    He has been already cleared to start on Enbrel by his gastroenterologist however I was awaiting for the hematologist oncologist evaluation before starting this medication.  The PA process had already started and Enbrel was approved.  The patient started taking Enbrel approximately 2 months ago when it was approved.  He claims that he has not noticed any improvement in his symptoms and on the contrary, he feels worse while being on Enbrel.    He comes today to the office ambulating without assistance, he says that he has been able to drive again, he is more active, he has been going to his appointments alone and feels more independent but he feels fatigued and more achy, he is not sure if this fatigue is because he has not been as active in the recent year  as he is now and he is just deconditioned.    Today her joint pain is rated as 8 out of 10, he feels stiff all day long, his thumbs, his hips his knees and his feet are the most tender areas.    Denies fever or chills, no changes in his weight, denies oral nasal ulcers, no nausea, vomiting, diarrhea constipation, no chest pain.  No skin rashes, no headaches.  He had a recent sinoplasty by his ENT doctor.  Otherwise no new events.    Social and family history reviewed and unchanged.    Laboratories done 12/5/2019 show a ESR of 53, glucose in the urine approximately 100, CRP 0.38, hemoglobin 14.5, platelet count 146,  glucose 349, sodium 130, alkaline phosphatase 158 (117), ALT and AST normal.      Past Medical History:   Diagnosis Date   • Acute perforated gastric ulcer with hemorrhage (CMS/HCC) 07/16/2018   • Alcoholism (CMS/HCC)    • Allergic rhinitis    • Arthritis     rheumatoid (diagnosed at age 16)   • Cirrhosis (CMS/HCC)     etoh cirrhosis-liver failure   • CKD (chronic kidney disease)    • COPD (chronic obstructive pulmonary disease) (CMS/HCC)    • Depression    • Duodenal ulcer      perforated duodenal s/p patch   • History of transfusion    • Infectious viral hepatitis    • MRSA (methicillin resistant Staphylococcus aureus) carrier 06/12/2019    nasal swab   • Tumors      benign essential   • Umbilical hernia u   • Wound, open     nonhealing sore       Current Outpatient Medications   Medication Sig Dispense Refill   • ALBUTEROL SULFATE  (90 Base) MCG/ACT inhaler INHALE 2 PUFFS EVERY 4 (FOUR) HOURS AS NEEDED FOR WHEEZING OR SHORTNESS OF AIR. 6.7 inhaler 3   • Azelastine HCl 137 MCG/SPRAY solution USE 2 SPRAYS EACH NOSTRIL TWICE A DAY 30 spray 2   • bumetanide (BUMEX) 2 MG tablet Take 2 mg by mouth 2 (Two) Times a Day.     • Etanercept (ENBREL MINI) 50 MG/ML solution cartridge Inject 50 mg under the skin into the appropriate area as directed 1 (One) Time Per Week. 4 mL 2   • lactulose (CHRONULAC) 10 GM/15ML solution TAKE 30 ML BY MOUTH THREE TIMES A DAY AS NEEDED. 2700 mL 1   • magnesium oxide (MAG-OX) 400 MG tablet Take 1 tablet by mouth 2 (Two) Times a Day.  6   • meclizine 25 MG chewable tablet chewable tablet Chew 25 mg.     • metFORMIN ER (GLUCOPHAGE-XR) 500 MG 24 hr tablet Take 1 tablet by mouth Daily With Breakfast. 30 tablet 1   • montelukast (SINGULAIR) 10 MG tablet TAKE 1 TABLET BY MOUTH EVERY DAY AT NIGHT 90 tablet 1   • ondansetron (ZOFRAN) 8 MG tablet Take  by mouth Every 8 (Eight) Hours As Needed for Nausea or Vomiting.     • potassium chloride (K-DUR,KLOR-CON) 20 MEQ CR tablet Take 20 mEq by mouth  3 (Three) Times a Day.     • promethazine (PHENERGAN) 12.5 MG tablet Take 12.5 mg by mouth Every 6 (Six) Hours As Needed for Nausea or Vomiting.     • rifaximin (XIFAXAN) 550 MG tablet Take 1 tablet by mouth Every 12 (Twelve) Hours. 60 tablet 3   • spironolactone (ALDACTONE) 25 MG tablet Take 25 mg by mouth Daily.     • tamsulosin (FLOMAX) 0.4 MG capsule 24 hr capsule Take 1 capsule by mouth 2 (Two) Times a Day.     • vitamin D (ERGOCALCIFEROL) 35147 units capsule capsule Take 50,000 Units by mouth Every 14 (Fourteen) Days.     • zinc sulfate (ZINCATE) 220 (50 Zn) MG capsule Take 220 mg by mouth Daily.  3     No current facility-administered medications for this visit.        Physical exam:    Vitals:    12/12/19 1331   BP: 105/71   Pulse: 76      GENERAL: Well-developed, well-nourished in no acute distress. Alert and oriented x3.  HEENT: Normocephalic, atraumatic. Pupils are equal, round, and reactive to light. Extraocular muscles are intact. Mucous membranes are pink and moist. Nostrils are clear.   NECK: Supple without lymphadenopathy.  LUNGS: Clear to auscultation bilaterally.  HEART: Regular rate and rhythm without murmur, rub or gallop.  CHEST: Respirations easy and unlabored.  EXTREMITIES: No cyanosis, edema or clubbing.  SKIN: Warm, dry and intact.  MSK: Tenderness in both wrists, tenderness in 8 MCP joints and 4 PIP joints.  Mild synovitis noted on the right wrist and 1 PIP joint.    Assessment:  1.  Rheumatoid arthritis.  Unchanged.  He continues to have active disease.  I will recommend the patient to stop Enbrel until he is evaluated by hematology oncology.  Discussed with the patient regarding starting him on a low-dose of prednisone to help with his current symptoms, he prefers to defer for now, he tells me that his blood sugars have been out of control and he wants to avoid prednisone, he received a Medrol Dosepak not too long ago after his recent surgery and his glycemia was very high.    2.   Long-term high-risk medications, the patient is on medications for management of inflammatory arthritis.  I am monitoring for side effects.    2.  Abnormal laboratory test.  Found on prior visit a free kappa lambda light chain.  Referred again to hematologist oncologist.    4.  Cirrhosis of the liver in the setting of alcoholism and hepatitis B.  Followed by her gastroenterologist.  Overall as per the patient doing better.    Plan:  Hold Enbrel  Hematology oncology referral if okay with hematology oncology and if no malignancy is found we will resume Enbrel or change to Humira.  RTC 3 months or sooner if needed          Cancer screening:  Colonoscopy:   NO   Breast U/S; 12/28/18        Bone health: Calcium and vitamin D:  NO        DXA Scan:  12/14/18  Vaccines: Flu: 2019       PNA 23:  12/12/18      Shingles:  Zoster; 1/9/19   X-rays of the chest; 12/14/18 Hands;  NO  Feet:  12/14/18  Hepatitis panel, HIV, QTB/PPD: HEP, HIV and TB 12/10/18    Orders Placed This Encounter   Procedures   • Comprehensive Metabolic Panel     Standing Status:   Future     Standing Expiration Date:   12/12/2020   • C-reactive Protein     Standing Status:   Future     Standing Expiration Date:   12/12/2020   • Sedimentation Rate     Standing Status:   Future     Standing Expiration Date:   12/12/2020   • Immunofixation, Urine - Urine, Clean Catch   • Immunofixation, Serum   • Ambulatory Referral to Hematology / Oncology     Referral Priority:   Routine     Requested Specialty:   Hematology and Oncology     Number of Visits Requested:   1   • CBC With Manual Differential     Standing Status:   Future

## 2019-12-13 ENCOUNTER — TELEPHONE (OUTPATIENT)
Dept: RHEUMATOLOGY | Facility: CLINIC | Age: 49
End: 2019-12-13

## 2019-12-13 DIAGNOSIS — Z79.899 LONG-TERM USE OF HIGH-RISK MEDICATION: ICD-10-CM

## 2019-12-13 DIAGNOSIS — M06.00 SERONEGATIVE RHEUMATOID ARTHRITIS (HCC): Primary | ICD-10-CM

## 2019-12-13 LAB
IGA SERPL-MCNC: 703 MG/DL (ref 90–386)
IGG SERPL-MCNC: 2111 MG/DL (ref 700–1600)
IGM SERPL-MCNC: 754 MG/DL (ref 20–172)
PROT PATTERN SERPL IFE-IMP: ABNORMAL

## 2019-12-13 NOTE — TELEPHONE ENCOUNTER
Mc stopped by office saying that you were going to call in prednisone for him. I do not see this on the feet ticket or in your notes. Was this supposed to get sent in?

## 2019-12-15 NOTE — TELEPHONE ENCOUNTER
We are having communications problems with Mc. I recommended to him a low dose, but he told me that his blood sugar was not under good control specially after the prednisone they gave him when he had the surgery by his ENT.  Please send 7.5 mg of prednisone p.o. daily.  He should be also on Pepcid 20 mg p.o. daily.  Please asked him to touch base with his gastroenterologist if he is okay to take it.  He should follow his blood glucose regularly as the prednisone is probably going to increase his levels.  Once he is evaluated by the hematologist oncologist, I would like him to get back with me to see if we can start him on Enbrel.

## 2019-12-16 LAB — INTERPRETATION UR IFE-IMP: NORMAL

## 2019-12-18 RX ORDER — PREDNISONE 1 MG/1
7.5 TABLET ORAL DAILY
Qty: 45 TABLET | Refills: 0 | Status: SHIPPED | OUTPATIENT
Start: 2019-12-18 | End: 2020-01-15

## 2019-12-18 RX ORDER — FAMOTIDINE 20 MG/1
20 TABLET, FILM COATED ORAL DAILY
Qty: 30 TABLET | Refills: 0 | Status: ON HOLD | OUTPATIENT
Start: 2019-12-18 | End: 2020-01-09

## 2020-01-02 ENCOUNTER — TELEPHONE (OUTPATIENT)
Dept: RHEUMATOLOGY | Facility: CLINIC | Age: 50
End: 2020-01-02

## 2020-01-02 NOTE — TELEPHONE ENCOUNTER
----- Message from Lala Costa MD sent at 12/23/2019 12:04 AM EST -----  Laboratories were reviewed, I just want to make sure that the patient has already been scheduled with hematology oncology.  If the hematologist oncologist is not within [facility], please send the results of serum and urine immunofixation and other records.

## 2020-01-02 NOTE — TELEPHONE ENCOUNTER
It doesn't look like pt has been scheduled. I called cancer [facility] and had to leave a message

## 2020-01-03 RX ORDER — METFORMIN HYDROCHLORIDE 500 MG/1
TABLET, EXTENDED RELEASE ORAL
Qty: 30 TABLET | Refills: 1 | Status: SHIPPED | OUTPATIENT
Start: 2020-01-03 | End: 2020-01-08

## 2020-01-07 ENCOUNTER — CONSULT (OUTPATIENT)
Dept: ONCOLOGY | Facility: CLINIC | Age: 50
End: 2020-01-07

## 2020-01-07 ENCOUNTER — TELEPHONE (OUTPATIENT)
Dept: ONCOLOGY | Facility: HOSPITAL | Age: 50
End: 2020-01-07

## 2020-01-07 ENCOUNTER — OFFICE VISIT (OUTPATIENT)
Dept: LAB | Facility: HOSPITAL | Age: 50
End: 2020-01-07

## 2020-01-07 ENCOUNTER — HOSPITAL ENCOUNTER (INPATIENT)
Facility: HOSPITAL | Age: 50
LOS: 3 days | Discharge: HOME | End: 2020-01-10
Attending: EMERGENCY MEDICINE | Admitting: HOSPITALIST

## 2020-01-07 VITALS
HEIGHT: 70 IN | RESPIRATION RATE: 16 BRPM | TEMPERATURE: 98.3 F | DIASTOLIC BLOOD PRESSURE: 78 MMHG | SYSTOLIC BLOOD PRESSURE: 123 MMHG | WEIGHT: 247.8 LBS | HEART RATE: 73 BPM | BODY MASS INDEX: 35.48 KG/M2

## 2020-01-07 DIAGNOSIS — E87.1 HYPONATREMIA: ICD-10-CM

## 2020-01-07 DIAGNOSIS — K70.31 ALCOHOLIC CIRRHOSIS OF LIVER WITH ASCITES (HCC): ICD-10-CM

## 2020-01-07 DIAGNOSIS — K74.5 BILIARY CIRRHOSIS (HCC): ICD-10-CM

## 2020-01-07 DIAGNOSIS — R73.9 HYPERGLYCEMIA: ICD-10-CM

## 2020-01-07 DIAGNOSIS — R79.9 ABNORMAL BLOOD FINDINGS: Primary | ICD-10-CM

## 2020-01-07 DIAGNOSIS — R53.1 WEAKNESS: Primary | ICD-10-CM

## 2020-01-07 DIAGNOSIS — D47.2 MGUS (MONOCLONAL GAMMOPATHY OF UNKNOWN SIGNIFICANCE): ICD-10-CM

## 2020-01-07 DIAGNOSIS — Z00.00 ROUTINE GENERAL MEDICAL EXAMINATION AT A HEALTH CARE FACILITY: Primary | ICD-10-CM

## 2020-01-07 DIAGNOSIS — R79.89 INCREASED AMMONIA LEVEL: Primary | ICD-10-CM

## 2020-01-07 LAB
ALBUMIN SERPL-MCNC: 3.4 G/DL (ref 3.5–5.2)
ALBUMIN SERPL-MCNC: 3.6 G/DL (ref 3.5–5.2)
ALBUMIN/GLOB SERPL: 0.7 G/DL
ALBUMIN/GLOB SERPL: 0.8 G/DL
ALP SERPL-CCNC: 208 U/L (ref 39–117)
ALP SERPL-CCNC: 242 U/L (ref 39–117)
ALT SERPL W P-5'-P-CCNC: 13 U/L (ref 1–41)
ALT SERPL W P-5'-P-CCNC: 14 U/L (ref 1–41)
AMMONIA BLD-SCNC: 119 UMOL/L (ref 16–60)
ANION GAP SERPL CALCULATED.3IONS-SCNC: 13 MMOL/L (ref 5–15)
ANION GAP SERPL CALCULATED.3IONS-SCNC: 17 MMOL/L (ref 5–15)
AST SERPL-CCNC: 18 U/L (ref 1–40)
AST SERPL-CCNC: 18 U/L (ref 1–40)
B2 MICROGLOB SERPL-MCNC: 4 MG/L (ref 0.8–2.2)
BASOPHILS # BLD AUTO: 0.06 10*3/MM3 (ref 0–0.2)
BASOPHILS # BLD AUTO: 0.1 10*3/MM3 (ref 0–0.2)
BASOPHILS NFR BLD AUTO: 0.7 % (ref 0–1.5)
BASOPHILS NFR BLD AUTO: 0.7 % (ref 0–1.5)
BILIRUB SERPL-MCNC: 1.9 MG/DL (ref 0.2–1.2)
BILIRUB SERPL-MCNC: 2 MG/DL (ref 0.2–1.2)
BUN BLD-MCNC: 13 MG/DL (ref 6–20)
BUN BLD-MCNC: 15 MG/DL (ref 6–20)
BUN/CREAT SERPL: 12 (ref 7–25)
BUN/CREAT SERPL: 12.4 (ref 7–25)
CALCIUM SPEC-SCNC: 10.5 MG/DL (ref 8.6–10.5)
CALCIUM SPEC-SCNC: 9.6 MG/DL (ref 8.6–10.5)
CHLORIDE SERPL-SCNC: 83 MMOL/L (ref 98–107)
CHLORIDE SERPL-SCNC: 86 MMOL/L (ref 98–107)
CO2 SERPL-SCNC: 20 MMOL/L (ref 22–29)
CO2 SERPL-SCNC: 24 MMOL/L (ref 22–29)
CREAT BLD-MCNC: 1.05 MG/DL (ref 0.76–1.27)
CREAT BLD-MCNC: 1.25 MG/DL (ref 0.76–1.27)
DEPRECATED RDW RBC AUTO: 41.7 FL (ref 37–54)
DEPRECATED RDW RBC AUTO: 48.6 FL (ref 37–54)
EOSINOPHIL # BLD AUTO: 0.1 10*3/MM3 (ref 0–0.4)
EOSINOPHIL # BLD AUTO: 0.36 10*3/MM3 (ref 0–0.4)
EOSINOPHIL NFR BLD AUTO: 2.1 % (ref 0.3–6.2)
EOSINOPHIL NFR BLD AUTO: 4 % (ref 0.3–6.2)
ERYTHROCYTE [DISTWIDTH] IN BLOOD BY AUTOMATED COUNT: 13.4 % (ref 12.3–15.4)
ERYTHROCYTE [DISTWIDTH] IN BLOOD BY AUTOMATED COUNT: 14.6 % (ref 12.3–15.4)
GFR SERPL CREATININE-BSD FRML MDRD: 61 ML/MIN/1.73
GFR SERPL CREATININE-BSD FRML MDRD: 74 ML/MIN/1.73
GLOBULIN UR ELPH-MCNC: 4.7 GM/DL
GLOBULIN UR ELPH-MCNC: 4.8 GM/DL
GLUCOSE BLD-MCNC: 610 MG/DL (ref 65–99)
GLUCOSE BLD-MCNC: 827 MG/DL (ref 65–99)
HCT VFR BLD AUTO: 43.2 % (ref 37.5–51)
HCT VFR BLD AUTO: 44.3 % (ref 37.5–51)
HGB BLD-MCNC: 15 G/DL (ref 13–17.7)
HGB BLD-MCNC: 15.7 G/DL (ref 13–17.7)
LDH SERPL-CCNC: 115 U/L (ref 135–225)
LYMPHOCYTES # BLD AUTO: 1.4 10*3/MM3 (ref 0.7–3.1)
LYMPHOCYTES # BLD AUTO: 1.47 10*3/MM3 (ref 0.7–3.1)
LYMPHOCYTES NFR BLD AUTO: 16.5 % (ref 19.6–45.3)
LYMPHOCYTES NFR BLD AUTO: 19.8 % (ref 19.6–45.3)
MCH RBC QN AUTO: 31.5 PG (ref 26.6–33)
MCH RBC QN AUTO: 32.1 PG (ref 26.6–33)
MCHC RBC AUTO-ENTMCNC: 33.9 G/DL (ref 31.5–35.7)
MCHC RBC AUTO-ENTMCNC: 36.3 G/DL (ref 31.5–35.7)
MCV RBC AUTO: 86.6 FL (ref 79–97)
MCV RBC AUTO: 94.7 FL (ref 79–97)
MONOCYTES # BLD AUTO: 0.6 10*3/MM3 (ref 0.1–0.9)
MONOCYTES # BLD AUTO: 1.01 10*3/MM3 (ref 0.1–0.9)
MONOCYTES NFR BLD AUTO: 11.3 % (ref 5–12)
MONOCYTES NFR BLD AUTO: 9 % (ref 5–12)
NEUTROPHILS # BLD AUTO: 4.8 10*3/MM3 (ref 1.7–7)
NEUTROPHILS # BLD AUTO: 6 10*3/MM3 (ref 1.7–7)
NEUTROPHILS NFR BLD AUTO: 67.5 % (ref 42.7–76)
NEUTROPHILS NFR BLD AUTO: 68.4 % (ref 42.7–76)
NRBC BLD AUTO-RTO: 0.1 /100 WBC (ref 0–0.2)
PLATELET # BLD AUTO: 132 10*3/MM3 (ref 140–450)
PLATELET # BLD AUTO: 148 10*3/MM3 (ref 140–450)
PMV BLD AUTO: 11.2 FL (ref 6–12)
PMV BLD AUTO: 9.5 FL (ref 6–12)
POTASSIUM BLD-SCNC: 4.5 MMOL/L (ref 3.5–5.2)
POTASSIUM BLD-SCNC: 4.6 MMOL/L (ref 3.5–5.2)
PROT SERPL-MCNC: 8.1 G/DL (ref 6–8.5)
PROT SERPL-MCNC: 8.4 G/DL (ref 6–8.5)
RBC # BLD AUTO: 4.68 10*6/MM3 (ref 4.14–5.8)
RBC # BLD AUTO: 4.99 10*6/MM3 (ref 4.14–5.8)
SODIUM BLD-SCNC: 120 MMOL/L (ref 136–145)
SODIUM BLD-SCNC: 123 MMOL/L (ref 136–145)
WBC NRBC COR # BLD: 7 10*3/MM3 (ref 3.4–10.8)
WBC NRBC COR # BLD: 8.9 10*3/MM3 (ref 3.4–10.8)

## 2020-01-07 PROCEDURE — 82962 GLUCOSE BLOOD TEST: CPT

## 2020-01-07 PROCEDURE — 80053 COMPREHEN METABOLIC PANEL: CPT | Performed by: EMERGENCY MEDICINE

## 2020-01-07 PROCEDURE — 99223 1ST HOSP IP/OBS HIGH 75: CPT | Performed by: NURSE PRACTITIONER

## 2020-01-07 PROCEDURE — 83880 ASSAY OF NATRIURETIC PEPTIDE: CPT | Performed by: NURSE PRACTITIONER

## 2020-01-07 PROCEDURE — 99205 OFFICE O/P NEW HI 60 MIN: CPT | Performed by: INTERNAL MEDICINE

## 2020-01-07 PROCEDURE — 82140 ASSAY OF AMMONIA: CPT | Performed by: INTERNAL MEDICINE

## 2020-01-07 PROCEDURE — 85025 COMPLETE CBC W/AUTO DIFF WBC: CPT

## 2020-01-07 PROCEDURE — 80053 COMPREHEN METABOLIC PANEL: CPT | Performed by: INTERNAL MEDICINE

## 2020-01-07 PROCEDURE — 82232 ASSAY OF BETA-2 PROTEIN: CPT | Performed by: INTERNAL MEDICINE

## 2020-01-07 PROCEDURE — 85025 COMPLETE CBC W/AUTO DIFF WBC: CPT | Performed by: EMERGENCY MEDICINE

## 2020-01-07 PROCEDURE — 83690 ASSAY OF LIPASE: CPT | Performed by: NURSE PRACTITIONER

## 2020-01-07 PROCEDURE — 83615 LACTATE (LD) (LDH) ENZYME: CPT | Performed by: INTERNAL MEDICINE

## 2020-01-07 PROCEDURE — 36415 COLL VENOUS BLD VENIPUNCTURE: CPT

## 2020-01-07 PROCEDURE — 63710000001 INSULIN REGULAR HUMAN PER 5 UNITS: Performed by: EMERGENCY MEDICINE

## 2020-01-07 PROCEDURE — 99284 EMERGENCY DEPT VISIT MOD MDM: CPT

## 2020-01-07 RX ORDER — LACTULOSE 10 G/15ML
30 SOLUTION ORAL 4 TIMES DAILY
Qty: 3600 ML | Refills: 3 | Status: SHIPPED | OUTPATIENT
Start: 2020-01-07 | End: 2020-01-10 | Stop reason: HOSPADM

## 2020-01-07 RX ORDER — INSULIN GLARGINE 100 [IU]/ML
10 INJECTION, SOLUTION SUBCUTANEOUS NIGHTLY
Status: DISCONTINUED | OUTPATIENT
Start: 2020-01-08 | End: 2020-01-10 | Stop reason: HOSPADM

## 2020-01-07 RX ORDER — DEXTROSE MONOHYDRATE 25 G/50ML
25 INJECTION, SOLUTION INTRAVENOUS
Status: DISCONTINUED | OUTPATIENT
Start: 2020-01-07 | End: 2020-01-10 | Stop reason: HOSPADM

## 2020-01-07 RX ORDER — NICOTINE POLACRILEX 4 MG
15 LOZENGE BUCCAL
Status: DISCONTINUED | OUTPATIENT
Start: 2020-01-07 | End: 2020-01-10 | Stop reason: HOSPADM

## 2020-01-07 RX ORDER — SODIUM CHLORIDE 0.9 % (FLUSH) 0.9 %
10 SYRINGE (ML) INJECTION AS NEEDED
Status: DISCONTINUED | OUTPATIENT
Start: 2020-01-07 | End: 2020-01-10 | Stop reason: HOSPADM

## 2020-01-07 RX ADMIN — INSULIN HUMAN 10 UNITS: 100 INJECTION, SOLUTION PARENTERAL at 23:10

## 2020-01-07 RX ADMIN — SODIUM CHLORIDE 500 ML: 900 INJECTION, SOLUTION INTRAVENOUS at 23:10

## 2020-01-07 NOTE — PROGRESS NOTES
Hematology/Oncology Outpatient Consultation    Patient name: Mc Selby  : 1970  MRN: [id number]  Primary Care Physician: Carlita Shepherd MD  Referring Physician: Lala Costa,*  Reason For Consult:   Monoclonal gammopathy  Chief Complaint   Patient presents with   • Appointment     New patient       History of Present Illness:  · Mr. Selby has a long history of rheumatoid arthritis on and off disease modifying agents.  Patient sees Dr. French and laboratory work-up was initiated secondary to renal failure which revealed monoclonal gammopathy patient was sent to the cancer [facility] and seen initially on 2020.  · Patient has cirrhosis related to alcohol abuse had episodes of hepatic encephalopathy.    · Patient has renal failure stage II at one point he was on hemodialysis briefly.  · Had a perforated gastric ulcer with hemorrhage  Past Medical History:   Diagnosis Date   • Acute perforated gastric ulcer with hemorrhage (CMS/HCC) 2018   • Alcoholism (CMS/HCC)    • Allergic rhinitis    • Arthritis     rheumatoid (diagnosed at age 16)   • Cirrhosis (CMS/HCC)     etoh cirrhosis-liver failure   • CKD (chronic kidney disease)    • COPD (chronic obstructive pulmonary disease) (CMS/HCC)    • Depression    • Duodenal ulcer      perforated duodenal s/p patch   • History of transfusion    • Infectious viral hepatitis    • MRSA (methicillin resistant Staphylococcus aureus) carrier 2019    nasal swab   • Tumors      benign essential   • Umbilical hernia u   • Wound, open     nonhealing sore       Past Surgical History:   Procedure Laterality Date   • CYST REMOVAL Left     forarm   • ENDOSCOPY      ascities/paracentesis   • EXPLORATORY LAPAROTOMY  2018    Over-sew Gastric Ulcer With Gastrostomy and Jejunostomy Tube   • FOOT SURGERY Right     right bunion removal   • FRACTURE SURGERY Right     bunionectomy/ broke toe and put in rods   • MASS EXCISION Right 2019     Procedure: EXCISION OF SEBACEOUS CYST OF THE RIGHT BACK;  Surgeon: Sapna Elizalde MD;  Location: [facility] MAIN OR;  Service: General   • MASS EXCISION Right 8/27/2019    Procedure: EXCISION LESION of right back;  Surgeon: Sapna Elizalde MD;  Location: [facility] MAIN OR;  Service: General   • UMBILICAL HERNIA REPAIR N/A 6/18/2019    Procedure: UMBILICAL HERNIA REPAIR LAPAROSCOPIC WITH MESH WITH EXTENSIVE LYSIS OF ADHESIONS;  Surgeon: Sapna Elizalde MD;  Location: [facility] MAIN OR;  Service: General         Current Outpatient Medications:   •  ALBUTEROL SULFATE  (90 Base) MCG/ACT inhaler, INHALE 2 PUFFS EVERY 4 (FOUR) HOURS AS NEEDED FOR WHEEZING OR SHORTNESS OF AIR., Disp: 6.7 inhaler, Rfl: 3  •  Azelastine HCl 137 MCG/SPRAY solution, USE 2 SPRAYS EACH NOSTRIL TWICE A DAY, Disp: 30 spray, Rfl: 2  •  bumetanide (BUMEX) 2 MG tablet, Take 2 mg by mouth 2 (Two) Times a Day., Disp: , Rfl:   •  lactulose (CHRONULAC) 10 GM/15ML solution, TAKE 30 ML BY MOUTH THREE TIMES A DAY AS NEEDED., Disp: 2700 mL, Rfl: 1  •  magnesium oxide (MAG-OX) 400 MG tablet, Take 1 tablet by mouth 2 (Two) Times a Day., Disp: , Rfl: 6  •  meclizine 25 MG chewable tablet chewable tablet, Chew 25 mg., Disp: , Rfl:   •  metFORMIN ER (GLUCOPHAGE-XR) 500 MG 24 hr tablet, TAKE 1 TABLET BY MOUTH EVERY DAY WITH BREAKFAST, Disp: 30 tablet, Rfl: 1  •  montelukast (SINGULAIR) 10 MG tablet, TAKE 1 TABLET BY MOUTH EVERY DAY AT NIGHT, Disp: 90 tablet, Rfl: 1  •  ondansetron (ZOFRAN) 8 MG tablet, Take  by mouth Every 8 (Eight) Hours As Needed for Nausea or Vomiting., Disp: , Rfl:   •  potassium chloride (K-DUR,KLOR-CON) 20 MEQ CR tablet, Take 20 mEq by mouth 3 (Three) Times a Day., Disp: , Rfl:   •  predniSONE (DELTASONE) 5 MG tablet, Take 1.5 tablets by mouth Daily., Disp: 45 tablet, Rfl: 0  •  promethazine (PHENERGAN) 12.5 MG tablet, Take 12.5 mg by mouth Every 6 (Six) Hours As Needed for Nausea or Vomiting., Disp: , Rfl:   •  rifaximin (XIFAXAN) 550 MG tablet, Take  1 tablet by mouth Every 12 (Twelve) Hours., Disp: 60 tablet, Rfl: 3  •  spironolactone (ALDACTONE) 25 MG tablet, Take 25 mg by mouth Daily., Disp: , Rfl:   •  tamsulosin (FLOMAX) 0.4 MG capsule 24 hr capsule, Take 1 capsule by mouth 2 (Two) Times a Day., Disp: , Rfl:   •  vitamin D (ERGOCALCIFEROL) 06694 units capsule capsule, Take 50,000 Units by mouth Every 14 (Fourteen) Days., Disp: , Rfl:   •  zinc sulfate (ZINCATE) 220 (50 Zn) MG capsule, Take 220 mg by mouth Daily., Disp: , Rfl: 3  •  Etanercept (ENBREL MINI) 50 MG/ML solution cartridge, Inject 50 mg under the skin into the appropriate area as directed 1 (One) Time Per Week., Disp: 4 mL, Rfl: 2  •  famotidine (PEPCID) 20 MG tablet, Take 1 tablet by mouth Daily., Disp: 30 tablet, Rfl: 0    Allergies   Allergen Reactions   • Duloxetine Unknown (See Comments)     Unknown reaction        Immunization History   Administered Date(s) Administered   • Flu Vaccine Intradermal Quad 18-64YR 12/03/2018   • Hepatitis A 12/03/2018   • Hepatitis B Vaccine Adult IM 07/17/2019, 08/20/2019   • Pneumococcal Polysaccharide (PPSV23) 12/12/2018   • Tdap 12/03/2018   • Zostavax 01/09/2019   • flucelvax quad pfs =>4 YRS 10/07/2019       Family History   Problem Relation Age of Onset   • Cancer Mother    • Lung disease Father    • Other Father         prostate problems   • Mental illness Brother        Cancer-related family history includes Cancer in his mother.    Subjective:  Patient is my office for initial visit accompanied by his wife.  Reports to feeling sleepy a lot.  Does not drive.  Reports to losing fluid office extremities.  ROS:    Review of Systems   Constitutional: Negative for fever.   HENT: Negative for nosebleeds and trouble swallowing.    Eyes: Negative for visual disturbance.   Respiratory: Negative for cough, shortness of breath and wheezing.    Cardiovascular: Negative for chest pain.   Gastrointestinal: Negative for abdominal pain and blood in stool.   Endocrine:  "Negative for cold intolerance.   Genitourinary: Negative for dysuria and hematuria.   Musculoskeletal: Negative for joint swelling.   Skin: Negative for rash.   Allergic/Immunologic: Negative for environmental allergies.   Neurological: Negative for seizures.   Hematological: Does not bruise/bleed easily.   Psychiatric/Behavioral: The patient is not nervous/anxious.        Objective:    Vitals:    01/07/20 1432   BP: 123/78   Pulse: 73   Resp: 16   Temp: 98.3 °F (36.8 °C)   Weight: 112 kg (247 lb 12.8 oz)   Height: 177.8 cm (70\")       ECOG  1    Physical Exam:    Physical Exam   Constitutional: He is oriented to person, place, and time. No distress.   Well-built obese   HENT:   Head: Normocephalic and atraumatic.   Eyes: Conjunctivae and EOM are normal. Right eye exhibits no discharge. Left eye exhibits no discharge. No scleral icterus.   Neck: Normal range of motion. Neck supple. No thyromegaly present.   Cardiovascular: Normal rate, regular rhythm and normal heart sounds. Exam reveals no gallop and no friction rub.   Pulmonary/Chest: Effort normal. No stridor. No respiratory distress. He has no wheezes.   Abdominal: Soft. Bowel sounds are normal. He exhibits no mass. There is no tenderness. There is no rebound and no guarding.   Distended no ascites   Musculoskeletal: Normal range of motion. He exhibits no tenderness.   No lower extremity edema   Lymphadenopathy:     He has no cervical adenopathy.   Neurological: He is alert and oriented to person, place, and time. He exhibits normal muscle tone.   Skin: Skin is warm. No rash noted. He is not diaphoretic. No erythema.   Psychiatric: He has a normal mood and affect. His behavior is normal.   Nursing note and vitals reviewed.      RECENT LABS  WBC   Date Value Ref Range Status   01/07/2020 8.90 3.40 - 10.80 10*3/mm3 Final     RBC   Date Value Ref Range Status   01/07/2020 4.99 4.14 - 5.80 10*6/mm3 Final     Hemoglobin   Date Value Ref Range Status   01/07/2020 15.7 " 13.0 - 17.7 g/dL Final     Hematocrit   Date Value Ref Range Status   01/07/2020 43.2 37.5 - 51.0 % Final     MCV   Date Value Ref Range Status   01/07/2020 86.6 79.0 - 97.0 fL Final     MCH   Date Value Ref Range Status   01/07/2020 31.5 26.6 - 33.0 pg Final     MCHC   Date Value Ref Range Status   01/07/2020 36.3 (H) 31.5 - 35.7 g/dL Final     RDW   Date Value Ref Range Status   01/07/2020 13.4 12.3 - 15.4 % Final     RDW-SD   Date Value Ref Range Status   01/07/2020 41.7 37.0 - 54.0 fl Final     MPV   Date Value Ref Range Status   01/07/2020 11.2 6.0 - 12.0 fL Final     Platelets   Date Value Ref Range Status   01/07/2020 148 140 - 450 10*3/mm3 Final     Neutrophil %   Date Value Ref Range Status   01/07/2020 67.5 42.7 - 76.0 % Final     Lymphocyte %   Date Value Ref Range Status   01/07/2020 16.5 (L) 19.6 - 45.3 % Final     Monocyte %   Date Value Ref Range Status   01/07/2020 11.3 5.0 - 12.0 % Final     Eosinophil %   Date Value Ref Range Status   01/07/2020 4.0 0.3 - 6.2 % Final     Basophil %   Date Value Ref Range Status   01/07/2020 0.7 0.0 - 1.5 % Final     Immature Grans %   Date Value Ref Range Status   12/05/2019 0.4 0.0 - 0.5 % Final     Neutrophils, Absolute   Date Value Ref Range Status   01/07/2020 6.00 1.70 - 7.00 10*3/mm3 Final     Lymphocytes, Absolute   Date Value Ref Range Status   01/07/2020 1.47 0.70 - 3.10 10*3/mm3 Final     Monocytes, Absolute   Date Value Ref Range Status   01/07/2020 1.01 (H) 0.10 - 0.90 10*3/mm3 Final     Eosinophils, Absolute   Date Value Ref Range Status   01/07/2020 0.36 0.00 - 0.40 10*3/mm3 Final     Basophils, Absolute   Date Value Ref Range Status   01/07/2020 0.06 0.00 - 0.20 10*3/mm3 Final     Immature Grans, Absolute   Date Value Ref Range Status   12/05/2019 0.03 0.00 - 0.05 10*3/mm3 Final     nRBC   Date Value Ref Range Status   12/05/2019 0.0 0.0 - 0.2 /100 WBC Final       Lab Results   Component Value Date    GLUCOSE 227 (H) 12/11/2019    BUN 10 12/11/2019     CREATININE 1.08 12/11/2019    EGFRIFNONA 72 12/11/2019    EGFRIFAFRI 114 03/15/2017    BCR 9.3 12/11/2019    K 3.6 12/11/2019    CO2 25.1 12/11/2019    CALCIUM 9.5 12/11/2019    ALBUMIN 3.30 (L) 12/05/2019    LABIL2 0.6 (L) 06/05/2019    AST 18 12/05/2019    ALT 12 12/05/2019         Assessment/Plan     Abnormal blood findings  - CBC & Differential  - XR Bone Survey Complete  - Immunoglobulin Free LT Chains Blood  - Lactate Dehydrogenase  - Beta 2 Microglobulin, Serum    Biliary cirrhosis (CMS/HCC)  - Ammonia    Assessment:  1. Hepatic cenephalopathy  2. MGUS  3. Chronic renal failure stage II  4. Cirrhosis  5. Excessive sleepiness  6. ECOG 1    1. Plan:  2. Reviewed the CBC results with the patient CBC is normal.  3. Complete the myeloma work-up obtain a 24-hour urine immunoelectrophoretic studies kappa lambda free light chain ratio.  Obtain whole-body skeletal survey and schedule for bone marrow biopsy.  4. I will check serum ammonia level given his excessive sleepiness.  Patient is not compliant with his lactulose  5. I will see him back in my office in 4 weeks when laboratory work-up and bone marrow results will be available      I have reviewed labs results, imaging, vitals, and medications with the patient today.    Patient verbalized understanding and is in agreement of the above plan.

## 2020-01-07 NOTE — PROGRESS NOTES
Called pt to advise elevation of ammonia level.   Spoke with his wife.   Advised to start lactulose 30 ml po QID - escribed new prescription     [phone] (home)     Electronically signed by JEFFY White, 01/07/20, 5:46 PM.

## 2020-01-08 PROBLEM — S37.009A RENAL INJURY: Status: ACTIVE | Noted: 2020-01-08

## 2020-01-08 PROBLEM — E11.65 TYPE 2 DIABETES MELLITUS WITH HYPERGLYCEMIA: Status: ACTIVE | Noted: 2020-01-08

## 2020-01-08 PROBLEM — K76.82 ENCEPHALOPATHY, HEPATIC: Status: ACTIVE | Noted: 2020-01-08

## 2020-01-08 PROBLEM — E72.20 HYPERAMMONEMIA: Status: ACTIVE | Noted: 2020-01-08

## 2020-01-08 LAB
ALBUMIN SERPL-MCNC: 3.2 G/DL (ref 3.5–5.2)
ALBUMIN/GLOB SERPL: 0.7 G/DL
ALP SERPL-CCNC: 204 U/L (ref 39–117)
ALT SERPL W P-5'-P-CCNC: 13 U/L (ref 1–41)
ANION GAP SERPL CALCULATED.3IONS-SCNC: 12 MMOL/L (ref 5–15)
AST SERPL-CCNC: 17 U/L (ref 1–40)
BASOPHILS # BLD AUTO: 0.1 10*3/MM3 (ref 0–0.2)
BASOPHILS # BLD AUTO: 0.1 10*3/MM3 (ref 0–0.2)
BASOPHILS NFR BLD AUTO: 0.7 % (ref 0–1.5)
BASOPHILS NFR BLD AUTO: 0.9 % (ref 0–1.5)
BILIRUB SERPL-MCNC: 1.6 MG/DL (ref 0.2–1.2)
BUN BLD-MCNC: 11 MG/DL (ref 6–20)
BUN/CREAT SERPL: 10.1 (ref 7–25)
CALCIUM SPEC-SCNC: 10.3 MG/DL (ref 8.6–10.5)
CHLORIDE SERPL-SCNC: 90 MMOL/L (ref 98–107)
CO2 SERPL-SCNC: 25 MMOL/L (ref 22–29)
CREAT BLD-MCNC: 1.09 MG/DL (ref 0.76–1.27)
DEPRECATED RDW RBC AUTO: 45.1 FL (ref 37–54)
DEPRECATED RDW RBC AUTO: 45.5 FL (ref 37–54)
EOSINOPHIL # BLD AUTO: 0.2 10*3/MM3 (ref 0–0.4)
EOSINOPHIL # BLD AUTO: 0.4 10*3/MM3 (ref 0–0.4)
EOSINOPHIL NFR BLD AUTO: 3.4 % (ref 0.3–6.2)
EOSINOPHIL NFR BLD AUTO: 4.6 % (ref 0.3–6.2)
ERYTHROCYTE [DISTWIDTH] IN BLOOD BY AUTOMATED COUNT: 14.1 % (ref 12.3–15.4)
ERYTHROCYTE [DISTWIDTH] IN BLOOD BY AUTOMATED COUNT: 14.2 % (ref 12.3–15.4)
GFR SERPL CREATININE-BSD FRML MDRD: 71 ML/MIN/1.73
GLOBULIN UR ELPH-MCNC: 4.6 GM/DL
GLUCOSE BLD-MCNC: 399 MG/DL (ref 65–99)
GLUCOSE BLDC GLUCOMTR-MCNC: 254 MG/DL (ref 70–105)
GLUCOSE BLDC GLUCOMTR-MCNC: 285 MG/DL (ref 70–105)
GLUCOSE BLDC GLUCOMTR-MCNC: 340 MG/DL (ref 70–105)
GLUCOSE BLDC GLUCOMTR-MCNC: 366 MG/DL (ref 70–105)
GLUCOSE BLDC GLUCOMTR-MCNC: 387 MG/DL (ref 70–105)
GLUCOSE BLDC GLUCOMTR-MCNC: >500 MG/DL (ref 70–105)
GLUCOSE BLDC GLUCOMTR-MCNC: >500 MG/DL (ref 70–105)
HCT VFR BLD AUTO: 40.3 % (ref 37.5–51)
HCT VFR BLD AUTO: 43.6 % (ref 37.5–51)
HGB BLD-MCNC: 14.2 G/DL (ref 13–17.7)
HGB BLD-MCNC: 15 G/DL (ref 13–17.7)
KAPPA LC SERPL-MCNC: 80.1 MG/L (ref 3.3–19.4)
KAPPA LC/LAMBDA SER: 1.83 {RATIO} (ref 0.26–1.65)
LAMBDA LC FREE SERPL-MCNC: 43.7 MG/L (ref 5.7–26.3)
LIPASE SERPL-CCNC: 40 U/L (ref 13–60)
LYMPHOCYTES # BLD AUTO: 1.5 10*3/MM3 (ref 0.7–3.1)
LYMPHOCYTES # BLD AUTO: 2.6 10*3/MM3 (ref 0.7–3.1)
LYMPHOCYTES NFR BLD AUTO: 20.8 % (ref 19.6–45.3)
LYMPHOCYTES NFR BLD AUTO: 29.3 % (ref 19.6–45.3)
MCH RBC QN AUTO: 31.8 PG (ref 26.6–33)
MCH RBC QN AUTO: 32.3 PG (ref 26.6–33)
MCHC RBC AUTO-ENTMCNC: 34.5 G/DL (ref 31.5–35.7)
MCHC RBC AUTO-ENTMCNC: 35.1 G/DL (ref 31.5–35.7)
MCV RBC AUTO: 92 FL (ref 79–97)
MCV RBC AUTO: 92.2 FL (ref 79–97)
MONOCYTES # BLD AUTO: 0.7 10*3/MM3 (ref 0.1–0.9)
MONOCYTES # BLD AUTO: 0.8 10*3/MM3 (ref 0.1–0.9)
MONOCYTES NFR BLD AUTO: 10.4 % (ref 5–12)
MONOCYTES NFR BLD AUTO: 9.1 % (ref 5–12)
NEUTROPHILS # BLD AUTO: 4.6 10*3/MM3 (ref 1.7–7)
NEUTROPHILS # BLD AUTO: 5 10*3/MM3 (ref 1.7–7)
NEUTROPHILS NFR BLD AUTO: 56.1 % (ref 42.7–76)
NEUTROPHILS NFR BLD AUTO: 64.7 % (ref 42.7–76)
NRBC BLD AUTO-RTO: 0.1 /100 WBC (ref 0–0.2)
NRBC BLD AUTO-RTO: 0.1 /100 WBC (ref 0–0.2)
NT-PROBNP SERPL-MCNC: 71.1 PG/ML (ref 5–900)
PLATELET # BLD AUTO: 137 10*3/MM3 (ref 140–450)
PLATELET # BLD AUTO: 144 10*3/MM3 (ref 140–450)
PMV BLD AUTO: 9.7 FL (ref 6–12)
PMV BLD AUTO: 9.8 FL (ref 6–12)
POTASSIUM BLD-SCNC: 3.7 MMOL/L (ref 3.5–5.2)
PROCALCITONIN SERPL-MCNC: 0.18 NG/ML (ref 0.1–0.25)
PROT SERPL-MCNC: 7.8 G/DL (ref 6–8.5)
RBC # BLD AUTO: 4.38 10*6/MM3 (ref 4.14–5.8)
RBC # BLD AUTO: 4.73 10*6/MM3 (ref 4.14–5.8)
SODIUM BLD-SCNC: 127 MMOL/L (ref 136–145)
TSH SERPL DL<=0.05 MIU/L-ACNC: 2.94 UIU/ML (ref 0.27–4.2)
WBC NRBC COR # BLD: 7.1 10*3/MM3 (ref 3.4–10.8)
WBC NRBC COR # BLD: 8.9 10*3/MM3 (ref 3.4–10.8)

## 2020-01-08 PROCEDURE — 82962 GLUCOSE BLOOD TEST: CPT

## 2020-01-08 PROCEDURE — 63710000001 PREDNISONE PER 5 MG: Performed by: NURSE PRACTITIONER

## 2020-01-08 PROCEDURE — 85025 COMPLETE CBC W/AUTO DIFF WBC: CPT | Performed by: HOSPITALIST

## 2020-01-08 PROCEDURE — 84443 ASSAY THYROID STIM HORMONE: CPT | Performed by: NURSE PRACTITIONER

## 2020-01-08 PROCEDURE — 85025 COMPLETE CBC W/AUTO DIFF WBC: CPT | Performed by: NURSE PRACTITIONER

## 2020-01-08 PROCEDURE — 84145 PROCALCITONIN (PCT): CPT | Performed by: HOSPITALIST

## 2020-01-08 PROCEDURE — 63710000001 INSULIN LISPRO (HUMAN) PER 5 UNITS: Performed by: HOSPITALIST

## 2020-01-08 PROCEDURE — 63710000001 INSULIN LISPRO (HUMAN) PER 5 UNITS: Performed by: NURSE PRACTITIONER

## 2020-01-08 PROCEDURE — 80053 COMPREHEN METABOLIC PANEL: CPT | Performed by: NURSE PRACTITIONER

## 2020-01-08 PROCEDURE — 63710000001 INSULIN GLARGINE PER 5 UNITS: Performed by: NURSE PRACTITIONER

## 2020-01-08 PROCEDURE — 99232 SBSQ HOSP IP/OBS MODERATE 35: CPT | Performed by: HOSPITALIST

## 2020-01-08 PROCEDURE — G0108 DIAB MANAGE TRN  PER INDIV: HCPCS

## 2020-01-08 PROCEDURE — 63710000001 INSULIN REGULAR HUMAN PER 5 UNITS: Performed by: NURSE PRACTITIONER

## 2020-01-08 PROCEDURE — 25010000002 ENOXAPARIN PER 10 MG: Performed by: NURSE PRACTITIONER

## 2020-01-08 RX ORDER — METFORMIN HYDROCHLORIDE 500 MG/1
500 TABLET, EXTENDED RELEASE ORAL
COMMUNITY
End: 2020-01-17

## 2020-01-08 RX ORDER — LACTULOSE 10 G/15ML
20 SOLUTION ORAL
Status: COMPLETED | OUTPATIENT
Start: 2020-01-08 | End: 2020-01-08

## 2020-01-08 RX ORDER — PANTOPRAZOLE SODIUM 40 MG/1
40 TABLET, DELAYED RELEASE ORAL
Status: DISCONTINUED | OUTPATIENT
Start: 2020-01-08 | End: 2020-01-10 | Stop reason: HOSPADM

## 2020-01-08 RX ORDER — EPINEPHRINE 0.3 MG/.3ML
0.3 INJECTION SUBCUTANEOUS ONCE AS NEEDED
COMMUNITY
End: 2020-09-02

## 2020-01-08 RX ORDER — TRIAMCINOLONE ACETONIDE 55 UG/1
1 SPRAY, METERED NASAL DAILY
COMMUNITY

## 2020-01-08 RX ORDER — ALBUTEROL SULFATE 90 UG/1
2 AEROSOL, METERED RESPIRATORY (INHALATION) EVERY 4 HOURS PRN
COMMUNITY
End: 2021-08-17 | Stop reason: SDUPTHER

## 2020-01-08 RX ORDER — SODIUM CHLORIDE 9 MG/ML
125 INJECTION, SOLUTION INTRAVENOUS CONTINUOUS
Status: DISCONTINUED | OUTPATIENT
Start: 2020-01-08 | End: 2020-01-10 | Stop reason: HOSPADM

## 2020-01-08 RX ORDER — LACTULOSE 10 G/15ML
10 SOLUTION ORAL 4 TIMES DAILY
Status: DISCONTINUED | OUTPATIENT
Start: 2020-01-08 | End: 2020-01-08 | Stop reason: ALTCHOICE

## 2020-01-08 RX ORDER — AZELASTINE 1 MG/ML
2 SPRAY, METERED NASAL 2 TIMES DAILY
COMMUNITY
End: 2020-03-19

## 2020-01-08 RX ORDER — ALBUTEROL SULFATE 2.5 MG/3ML
2.5 SOLUTION RESPIRATORY (INHALATION) EVERY 6 HOURS PRN
Status: DISCONTINUED | OUTPATIENT
Start: 2020-01-08 | End: 2020-01-10 | Stop reason: HOSPADM

## 2020-01-08 RX ORDER — LACTULOSE 10 G/15ML
30 SOLUTION ORAL 4 TIMES DAILY
Status: DISCONTINUED | OUTPATIENT
Start: 2020-01-08 | End: 2020-01-10 | Stop reason: HOSPADM

## 2020-01-08 RX ORDER — MONTELUKAST SODIUM 10 MG/1
10 TABLET ORAL NIGHTLY
Status: DISCONTINUED | OUTPATIENT
Start: 2020-01-08 | End: 2020-01-10 | Stop reason: HOSPADM

## 2020-01-08 RX ORDER — TAMSULOSIN HYDROCHLORIDE 0.4 MG/1
0.4 CAPSULE ORAL 2 TIMES DAILY
Status: DISCONTINUED | OUTPATIENT
Start: 2020-01-08 | End: 2020-01-10 | Stop reason: HOSPADM

## 2020-01-08 RX ORDER — MONTELUKAST SODIUM 10 MG/1
10 TABLET ORAL NIGHTLY
COMMUNITY
End: 2020-05-26

## 2020-01-08 RX ORDER — AZELASTINE 1 MG/ML
2 SPRAY, METERED NASAL DAILY
Status: DISCONTINUED | OUTPATIENT
Start: 2020-01-08 | End: 2020-01-10 | Stop reason: HOSPADM

## 2020-01-08 RX ORDER — PREDNISONE 1 MG/1
7.5 TABLET ORAL DAILY
Status: DISCONTINUED | OUTPATIENT
Start: 2020-01-08 | End: 2020-01-10 | Stop reason: HOSPADM

## 2020-01-08 RX ADMIN — TAMSULOSIN HYDROCHLORIDE 0.4 MG: 0.4 CAPSULE ORAL at 10:05

## 2020-01-08 RX ADMIN — PANTOPRAZOLE SODIUM 40 MG: 40 TABLET, DELAYED RELEASE ORAL at 06:10

## 2020-01-08 RX ADMIN — LACTULOSE 30 ML: 10 SOLUTION ORAL at 21:11

## 2020-01-08 RX ADMIN — RIFAXIMIN 550 MG: 550 TABLET ORAL at 10:09

## 2020-01-08 RX ADMIN — SODIUM CHLORIDE 125 ML/HR: 0.9 INJECTION, SOLUTION INTRAVENOUS at 06:11

## 2020-01-08 RX ADMIN — INSULIN GLARGINE 10 UNITS: 100 INJECTION, SOLUTION SUBCUTANEOUS at 21:12

## 2020-01-08 RX ADMIN — INSULIN LISPRO 12 UNITS: 100 INJECTION, SOLUTION INTRAVENOUS; SUBCUTANEOUS at 18:22

## 2020-01-08 RX ADMIN — MONTELUKAST SODIUM 10 MG: 10 TABLET, COATED ORAL at 21:11

## 2020-01-08 RX ADMIN — INSULIN LISPRO 7 UNITS: 100 INJECTION, SOLUTION INTRAVENOUS; SUBCUTANEOUS at 01:44

## 2020-01-08 RX ADMIN — Medication 400 MG: at 10:05

## 2020-01-08 RX ADMIN — INSULIN HUMAN 2 UNITS: 100 INJECTION, SOLUTION PARENTERAL at 10:04

## 2020-01-08 RX ADMIN — LACTULOSE 20 G: 10 SOLUTION ORAL at 06:42

## 2020-01-08 RX ADMIN — PREDNISONE 7.5 MG: 5 TABLET ORAL at 10:05

## 2020-01-08 RX ADMIN — INSULIN LISPRO 16 UNITS: 100 INJECTION, SOLUTION INTRAVENOUS; SUBCUTANEOUS at 10:04

## 2020-01-08 RX ADMIN — INSULIN LISPRO 12 UNITS: 100 INJECTION, SOLUTION INTRAVENOUS; SUBCUTANEOUS at 13:04

## 2020-01-08 RX ADMIN — LACTULOSE 20 G: 10 SOLUTION ORAL at 05:45

## 2020-01-08 RX ADMIN — LACTULOSE 30 ML: 10 SOLUTION ORAL at 13:04

## 2020-01-08 RX ADMIN — Medication 400 MG: at 21:11

## 2020-01-08 RX ADMIN — ENOXAPARIN SODIUM 40 MG: 40 INJECTION SUBCUTANEOUS at 16:22

## 2020-01-08 RX ADMIN — INSULIN GLARGINE 10 UNITS: 100 INJECTION, SOLUTION SUBCUTANEOUS at 01:43

## 2020-01-08 RX ADMIN — AZELASTINE 2 SPRAY: 1 SPRAY, METERED NASAL at 10:04

## 2020-01-08 RX ADMIN — TAMSULOSIN HYDROCHLORIDE 0.4 MG: 0.4 CAPSULE ORAL at 21:11

## 2020-01-08 RX ADMIN — RIFAXIMIN 550 MG: 550 TABLET ORAL at 21:12

## 2020-01-08 RX ADMIN — INSULIN LISPRO 20 UNITS: 100 INJECTION, SOLUTION INTRAVENOUS; SUBCUTANEOUS at 21:12

## 2020-01-08 RX ADMIN — LACTULOSE 30 ML: 10 SOLUTION ORAL at 18:23

## 2020-01-08 NOTE — ED PROVIDER NOTES
Subjective   Patient is a 49-year-old male who states that outpatient laboratory data performed earlier today and was called at home by his physician stating that he had hyponatremia and hyperglycemia.  He was instructed come to the ED to be admitted.  The patient has no complaints other than generalized weakness.  His cough fever chest pain vomiting or other complaint          Review of Systems  For headache ears throat cough fever chest pain shortness of breath abdominal pain Bondar dysuria is weight loss or other associated complaints.  A complete review systems is obtained and is otherwise negative.  Past Medical History:   Diagnosis Date   • Acute perforated gastric ulcer with hemorrhage (CMS/HCC) 07/16/2018   • Alcoholism (CMS/HCC)    • Allergic rhinitis    • Arthritis     rheumatoid (diagnosed at age 16)   • Cirrhosis (CMS/HCC)     etoh cirrhosis-liver failure   • CKD (chronic kidney disease)    • COPD (chronic obstructive pulmonary disease) (CMS/HCC)    • Depression    • Duodenal ulcer      perforated duodenal s/p patch   • History of transfusion    • Infectious viral hepatitis    • MRSA (methicillin resistant Staphylococcus aureus) carrier 06/12/2019    nasal swab   • Tumors      benign essential   • Umbilical hernia u   • Wound, open     nonhealing sore       Allergies   Allergen Reactions   • Duloxetine Unknown (See Comments)     Unknown reaction        Past Surgical History:   Procedure Laterality Date   • CYST REMOVAL Left     forarm   • ENDOSCOPY      ascities/paracentesis   • EXPLORATORY LAPAROTOMY  07/16/2018    Over-sew Gastric Ulcer With Gastrostomy and Jejunostomy Tube   • FOOT SURGERY Right     right bunion removal   • FRACTURE SURGERY Right 2005    bunionectomy/ broke toe and put in rods   • MASS EXCISION Right 6/18/2019    Procedure: EXCISION OF SEBACEOUS CYST OF THE RIGHT BACK;  Surgeon: Sapna Elizalde MD;  Location: [facility] OR;  Service: General   • MASS EXCISION Right 8/27/2019     Procedure: EXCISION LESION of right back;  Surgeon: Sapna Elizalde MD;  Location: [facility] MAIN OR;  Service: General   • UMBILICAL HERNIA REPAIR N/A 6/18/2019    Procedure: UMBILICAL HERNIA REPAIR LAPAROSCOPIC WITH MESH WITH EXTENSIVE LYSIS OF ADHESIONS;  Surgeon: Sapna Elizalde MD;  Location: [facility] MAIN OR;  Service: General       Family History   Problem Relation Age of Onset   • Cancer Mother    • Lung disease Father    • Other Father         prostate problems   • Mental illness Brother        Social History     Socioeconomic History   • Marital status:      Spouse name: Nicky Selby   • Number of children: Not on file   • Years of education: Not on file   • Highest education level: Not on file   Tobacco Use   • Smoking status: Former Smoker   • Smokeless tobacco: Former User   Substance and Sexual Activity   • Alcohol use: No     Frequency: Never     Comment: Off alcohol since July 2018   • Drug use: No   • Sexual activity: Defer           Objective   Physical Exam  HEENT exam shows TMs to be clear.  Oropharynx comers but sclerae nonicteric.  Neck has no adenopathy JVD or bruits.  Lungs are clear.  Heart has regular rate rhythm without murmur gallop.  Chest is nontender.  Abdomen soft nontender.  Extremities M is no cyanosis or edema.  Procedures           ED Course            Results for orders placed or performed during the hospital encounter of 01/07/20   Comprehensive Metabolic Panel   Result Value Ref Range    Glucose 827 (C) 65 - 99 mg/dL    BUN 15 6 - 20 mg/dL    Creatinine 1.25 0.76 - 1.27 mg/dL    Sodium 120 (L) 136 - 145 mmol/L    Potassium 4.6 3.5 - 5.2 mmol/L    Chloride 83 (L) 98 - 107 mmol/L    CO2 20.0 (L) 22.0 - 29.0 mmol/L    Calcium 9.6 8.6 - 10.5 mg/dL    Total Protein 8.1 6.0 - 8.5 g/dL    Albumin 3.40 (L) 3.50 - 5.20 g/dL    ALT (SGPT) 14 1 - 41 U/L    AST (SGOT) 18 1 - 40 U/L    Alkaline Phosphatase 208 (H) 39 - 117 U/L    Total Bilirubin 2.0 (H) 0.2 - 1.2 mg/dL    eGFR Non African Amer  61 >60 mL/min/1.73    Globulin 4.7 gm/dL    A/G Ratio 0.7 g/dL    BUN/Creatinine Ratio 12.0 7.0 - 25.0    Anion Gap 17.0 (H) 5.0 - 15.0 mmol/L   CBC Auto Differential   Result Value Ref Range    WBC 7.00 3.40 - 10.80 10*3/mm3    RBC 4.68 4.14 - 5.80 10*6/mm3    Hemoglobin 15.0 13.0 - 17.7 g/dL    Hematocrit 44.3 37.5 - 51.0 %    MCV 94.7 79.0 - 97.0 fL    MCH 32.1 26.6 - 33.0 pg    MCHC 33.9 31.5 - 35.7 g/dL    RDW 14.6 12.3 - 15.4 %    RDW-SD 48.6 37.0 - 54.0 fl    MPV 9.5 6.0 - 12.0 fL    Platelets 132 (L) 140 - 450 10*3/mm3    Neutrophil % 68.4 42.7 - 76.0 %    Lymphocyte % 19.8 19.6 - 45.3 %    Monocyte % 9.0 5.0 - 12.0 %    Eosinophil % 2.1 0.3 - 6.2 %    Basophil % 0.7 0.0 - 1.5 %    Neutrophils, Absolute 4.80 1.70 - 7.00 10*3/mm3    Lymphocytes, Absolute 1.40 0.70 - 3.10 10*3/mm3    Monocytes, Absolute 0.60 0.10 - 0.90 10*3/mm3    Eosinophils, Absolute 0.10 0.00 - 0.40 10*3/mm3    Basophils, Absolute 0.10 0.00 - 0.20 10*3/mm3    nRBC 0.1 0.0 - 0.2 /100 WBC     No radiology results for the last day                                        MDM  Number of Diagnoses or Management Options  Diagnosis management comments: Patient finds consistent with hyperglycemia as well as hyponatremia.  He has no evidence of infectious process or other metabolic abnormalities.  His ammonia is elevated however the patient has no neurologic deficits.  Patient will be started on IV insulin as well as sodium replacement.  He did speak the on-call hospitalist.    Risk of Complications, Morbidity, and/or Mortality  Presenting problems: high  Diagnostic procedures: high  Management options: high    Patient Progress  Patient progress: stable      Final diagnoses:   Weakness   Hyperglycemia   Hyponatremia            Arvind Graff MD  01/07/20 9059

## 2020-01-08 NOTE — H&P
[facility] Medicine Services      Patient Name: Mc Selby  : 1970  MRN: [id number]  Primary Care Physician: Carlita Shepherd MD  Date of admission: 2020    Patient Care Team:  Carlita Shepherd MD as PCP - General (Internal Medicine)  Carlita Shepherd MD as PCP - Family Medicine          Subjective   History Present Illness     Chief Complaint:   Chief Complaint   Patient presents with   • Hyperglycemia       Mr. Selby is a 49 y.o.  presents to [facility] complaining of abnormal lab work     49-year-old male presents to the ER with a chief complaint of abnormal labs with noted elevated ammonia level and significantly elevated glucose.  The patient was contacted by his PCP encouraged to seek immediate medical attention.  The patient reports feeling thirsty and having nausea and vomiting over the last week.  He otherwise denies any complaints of discomfort.  Approximately 3 weeks ago the patient had been taken off of Enbrel, which is prescribed for rheumatoid arthritis, and put on prednisone by his rheumatologist secondary to abnormal lab work (patient does not know what the lab work abnormality was).  The patient denies any recent chest pain, shortness of breath cough or dysuria.  He has had intermittent chills without overt fever.  The patient's PCP called in metformin for the patient but he has not started this medication yet.  The patient has recently seen hematology secondary to abnormal lab work found prior to discontinuation of Enbrel with planned bone marrow biopsy in the near future.      Patient reported past medical history includes CKD stage II that improved from CKD stage III.  Patient had brief period of dialysis following an acute illness in 2017 after having a gastric ulcer perforation with acute interabdominal infection requiring surgical debridement.  The patient has a history of alcoholic cirrhosis.  He had quit drinking several  years ago.  He had a positive hepatitis B screening prior to his dialysis but repeat lab evaluation was negative for hepatitis B.  He is currently is currently saving his hepatitis B vaccination with third shot due in the next few weeks.  The patient has a history of COPD.  He quit smoking 3 years ago after smoking 2 to 3 packs x 38 years.        Review of Systems   Constitution: Positive for chills. Negative for fever.   Cardiovascular: Negative for chest pain.   Respiratory: Negative for cough and shortness of breath.    Gastrointestinal: Positive for nausea and vomiting. Negative for abdominal pain and diarrhea.   Genitourinary: Negative for dysuria.   All other systems reviewed and are negative.          Personal History     Past Medical History:   Past Medical History:   Diagnosis Date   • Acute perforated gastric ulcer with hemorrhage (CMS/HCC) 07/16/2018   • Alcoholism (CMS/HCC)    • Allergic rhinitis    • Arthritis     rheumatoid (diagnosed at age 16)   • Cirrhosis (CMS/HCC)     etoh cirrhosis-liver failure   • CKD (chronic kidney disease)    • COPD (chronic obstructive pulmonary disease) (CMS/HCC)    • Depression    • Duodenal ulcer      perforated duodenal s/p patch   • History of transfusion    • Infectious viral hepatitis    • MRSA (methicillin resistant Staphylococcus aureus) carrier 06/12/2019    nasal swab   • Tumors      benign essential   • Umbilical hernia u   • Wound, open     nonhealing sore       Surgical History:      Past Surgical History:   Procedure Laterality Date   • CYST REMOVAL Left     forarm   • ENDOSCOPY      ascities/paracentesis   • EXPLORATORY LAPAROTOMY  07/16/2018    Over-sew Gastric Ulcer With Gastrostomy and Jejunostomy Tube   • FOOT SURGERY Right     right bunion removal   • FRACTURE SURGERY Right 2005    bunionectomy/ broke toe and put in rods   • MASS EXCISION Right 6/18/2019    Procedure: EXCISION OF SEBACEOUS CYST OF THE RIGHT BACK;  Surgeon: Sapna Elizalde MD;  Location:   [facility];  Service: General   • MASS EXCISION Right 8/27/2019    Procedure: EXCISION LESION of right back;  Surgeon: Sapna Elizalde MD;  Location: [facility] MAIN OR;  Service: General   • UMBILICAL HERNIA REPAIR N/A 6/18/2019    Procedure: UMBILICAL HERNIA REPAIR LAPAROSCOPIC WITH MESH WITH EXTENSIVE LYSIS OF ADHESIONS;  Surgeon: Sapna Elizalde MD;  Location: [facility] MAIN OR;  Service: General           Family History: family history includes Cancer in his mother; Lung disease in his father; Mental illness in his brother; Other in his father. Otherwise pertinent FHx was reviewed and unremarkable.     Social History:  reports that he has quit smoking. He has quit using smokeless tobacco. He reports that he does not drink alcohol or use drugs.      Medications:  Prior to Admission medications    Medication Sig Start Date End Date Taking? Authorizing Provider   ALBUTEROL SULFATE  (90 Base) MCG/ACT inhaler INHALE 2 PUFFS EVERY 4 (FOUR) HOURS AS NEEDED FOR WHEEZING OR SHORTNESS OF AIR. 11/27/19   Carlita Shepherd MD   Azelastine HCl 137 MCG/SPRAY solution USE 2 SPRAYS EACH NOSTRIL TWICE A DAY 12/12/19   Carlita Shepherd MD   bumetanide (BUMEX) 2 MG tablet Take 2 mg by mouth 2 (Two) Times a Day.    Deon Mayes MD   Etanercept (ENBREL MINI) 50 MG/ML solution cartridge Inject 50 mg under the skin into the appropriate area as directed 1 (One) Time Per Week. 9/13/19   Lala Costa MD   famotidine (PEPCID) 20 MG tablet Take 1 tablet by mouth Daily. 12/18/19   Lala Costa MD   lactulose (CHRONULAC) 10 GM/15ML solution Take 30 mL by mouth 4 (Four) Times a Day. 1/7/20   Barbara Salazar APRN   magnesium oxide (MAG-OX) 400 MG tablet Take 1 tablet by mouth 2 (Two) Times a Day. 6/6/19   Deon Mayes MD   meclizine 25 MG chewable tablet chewable tablet Chew 25 mg.    Deon Mayes MD   metFORMIN ER (GLUCOPHAGE-XR) 500 MG 24 hr tablet TAKE 1 TABLET BY MOUTH EVERY DAY WITH  BREAKFAST 1/3/20   Carlita Shepherd MD   montelukast (SINGULAIR) 10 MG tablet TAKE 1 TABLET BY MOUTH EVERY DAY AT NIGHT 11/27/19   Carlita Shepherd MD   ondansetron (ZOFRAN) 8 MG tablet Take  by mouth Every 8 (Eight) Hours As Needed for Nausea or Vomiting.    Deon Mayes MD   potassium chloride (K-DUR,KLOR-CON) 20 MEQ CR tablet Take 20 mEq by mouth 3 (Three) Times a Day.    Deon Mayes MD   predniSONE (DELTASONE) 5 MG tablet Take 1.5 tablets by mouth Daily. 12/18/19   Lala Costa MD   promethazine (PHENERGAN) 12.5 MG tablet Take 12.5 mg by mouth Every 6 (Six) Hours As Needed for Nausea or Vomiting.    Deon Mayes MD   rifaximin (XIFAXAN) 550 MG tablet Take 1 tablet by mouth Every 12 (Twelve) Hours. 10/21/19   Carlita Shepherd MD   spironolactone (ALDACTONE) 25 MG tablet Take 25 mg by mouth Daily.    Deon Mayes MD   tamsulosin (FLOMAX) 0.4 MG capsule 24 hr capsule Take 1 capsule by mouth 2 (Two) Times a Day.    Deon Mayes MD   vitamin D (ERGOCALCIFEROL) 64708 units capsule capsule Take 50,000 Units by mouth Every 14 (Fourteen) Days.    Deon Mayes MD   zinc sulfate (ZINCATE) 220 (50 Zn) MG capsule Take 220 mg by mouth Daily. 7/7/19   Deon Mayes MD   lactulose (CHRONULAC) 10 GM/15ML solution TAKE 30 ML BY MOUTH THREE TIMES A DAY AS NEEDED. 10/11/19 1/7/20  Carlita Shepherd MD       Allergies:    Allergies   Allergen Reactions   • Duloxetine Unknown (See Comments)     Unknown reaction        Objective   Objective     Vital Signs  Temp:  [97.8 °F (36.6 °C)-98.3 °F (36.8 °C)] 97.8 °F (36.6 °C)  Heart Rate:  [73-81] 81  Resp:  [16] 16  BP: (103-123)/(49-78) 103/50  SpO2:  [93 %-95 %] 95 %  on   ;   Device (Oxygen Therapy): room air  Body mass index is 35.87 kg/m².    Physical Exam   Constitutional: He is oriented to person, place, and time. He appears well-developed and well-nourished. No distress.   HENT:   Head:  Normocephalic and atraumatic.   Mouth/Throat: No oropharyngeal exudate.   Significantly dry mucosa   Eyes: Pupils are equal, round, and reactive to light. Conjunctivae and EOM are normal. No scleral icterus.   Neck: Normal range of motion. Neck supple. No JVD present. No tracheal deviation present. No thyromegaly present.   Cardiovascular: Normal rate, regular rhythm, normal heart sounds and intact distal pulses.   No murmur heard.  Pulmonary/Chest: Effort normal and breath sounds normal.   Abdominal: Soft. Bowel sounds are normal. He exhibits distension. There is no tenderness.   Mild abdominal distention with left side being more prominent than the right.  Patient states this is normal for his abdomen since having his abdominal surgery in 2017.   Musculoskeletal: Normal range of motion. He exhibits no edema.   Lymphadenopathy:     He has no cervical adenopathy.   Neurological: He is alert and oriented to person, place, and time. He displays normal reflexes. No cranial nerve deficit or sensory deficit. He exhibits normal muscle tone. Coordination normal.   Mildly slow to answer   Skin: Skin is warm and dry. Capillary refill takes less than 2 seconds. He is not diaphoretic.   Psychiatric: He has a normal mood and affect. His behavior is normal. Judgment and thought content normal.   Vitals reviewed.      Results Review:  I have personally reviewed most recent lab results and agree with findings, most notably: Acute hyperglycemia, hyponatremia.    Results from last 7 days   Lab Units 01/07/20  2203   WBC 10*3/mm3 7.00   HEMOGLOBIN g/dL 15.0   HEMATOCRIT % 44.3   PLATELETS 10*3/mm3 132*     Results from last 7 days   Lab Units 01/07/20  2203   SODIUM mmol/L 120*   POTASSIUM mmol/L 4.6   CHLORIDE mmol/L 83*   CO2 mmol/L 20.0*   BUN mg/dL 15   CREATININE mg/dL 1.25   GLUCOSE mg/dL 827*   CALCIUM mg/dL 9.6   ALT (SGPT) U/L 14   AST (SGOT) U/L 18     Estimated Creatinine Clearance: 88 mL/min (by C-G formula based on SCr  of 1.25 mg/dL).  Brief Urine Lab Results  (Last result in the past 365 days)      Color   Clarity   Blood   Leuk Est   Nitrite   Protein   CREAT   Urine HCG        12/05/19 1425 Yellow Clear Negative Negative Negative Negative               Microbiology Results (last 10 days)     ** No results found for the last 240 hours. **          ECG/EMG Results (most recent)     None              No radiology results for the last 7 days      Estimated Creatinine Clearance: 88 mL/min (by C-G formula based on SCr of 1.25 mg/dL).    Assessment/Plan   Assessment/Plan       Active Hospital Problems    Diagnosis  POA   • Type 2 diabetes mellitus with hyperglycemia (CMS/HCC) [E11.65]  Unknown     Priority: High   • Hyperammonemia (CMS/HCC) [E72.20]  Yes     Priority: High   • Renal injury [S37.009A]  Yes     Priority: High   • Weakness [R53.1]  Yes     Priority: Medium   • Alcoholic cirrhosis (CMS/HCC) [K70.30]  Yes     Priority: Medium      Resolved Hospital Problems   No resolved problems to display.       Active Hospital Problems:  No notes have been filed under this hospital service.  Service: Hospitalist    Hyperglycemia, acute, severe, blood glucose 827-- secondary to new onset diabetes type 2 with steroid therapy: blood glucose every 2 hours and treat with sliding scale x3; Then before meals and at bedtime; add steroid induced hyperglycemia protocol; add Lantus 10 units x 1    --sodium 120, CO2 21 anion gap 17    Diabetes type 2, new onset: Hold metformin; check hemoglobin A1c; add sliding scale; add Lantus 10 units x 1; check TSH    Liver cirrhosis--alcoholic cirrhosis: Continue Xifaxan, spironolactone; continue lactulose 4 times daily; hold Bumex with potassium until hyperglycemia and dehydration resolved    --, protein total 8.1, ALT and AST normal, bilirubin total 2.0, ammonia 119; INR 1.3    Hyperammonia anemia, ammonia level 119: Lactulose every 2 hours x 2 then resume 4 times daily; repeat ammonia  level    Thrombocytopenia, mild, platelets 132--likely secondary to liver cirrhosis: Repeat CBC    --INR 1.3    Renal injury, mild, creatinine 1.25 with comparison 1.08--likely secondary to dehydration related to hyperglycemia and diuretic therapy: IV fluids; repeat BMP; hold Bumex until renal injury resolved    Hyponatremia, marked, sodium 120 with corrected sodium 132: IV fluids; repeat BMP    COPD, chronic:  Hold albuterol HFA; add albuterol mini nebs as needed; continue Astelin nasal spray, Singulair, Nasacort    GERD, chronic: Continue Pepcid    BPH, chronic: Continue Flomax    VTE Prophylaxis - Lovenox 40 mg SC daily.    CODE STATUS:    Code Status and Medical Interventions:   Ordered at: 01/07/20 6549     Code Status:    CPR     Medical Interventions (Level of Support Prior to Arrest):    Full       Admission Status:  I believe this patient meets observation criteria.      I discussed the patient's findings and my recommendations with family.        Electronically signed by CARMEN Dacosta, 01/07/20, 11:57 PM.  [facility] Hospitalist Team

## 2020-01-08 NOTE — PROGRESS NOTES
"      [facility] Medicine Services Daily Progress Note      Hospitalist Team  LOS 1 days      Patient Care Team:  Carlita Shepherd MD as PCP - General (Internal Medicine)  Carlita Shepherd MD as PCP - Family Medicine    Patient Location: 228/1      Subjective   Subjective     Chief Complaint / Subjective  Chief Complaint   Patient presents with   • Hyperglycemia     Evaluated the patient in the morning of 1/8/2020.  Confused.  Afebrile.  No BM and has been getting lactulose.  Blood glucose improving      Present on Admission:  • Weakness  • Alcoholic cirrhosis (CMS/HCC)  • Hyperammonemia (CMS/HCC)  • Renal injury  • Encephalopathy, hepatic (CMS/HCC)      Brief Synopsis of Hospital Course/HPI    The patient is a 49-year-old male with history of alcoholic cirrhosis and rheumatoid arthritis that was sent to the emergency room on 1/7/2020 because of elevated ammonia levels and blood glucose.  The patient apparently was recently put on prednisone about 3 weeks ago and Enbrel had been discontinued.  The patient was recently prescribed metformin by PCP but never took it. The patient follows with hematology/oncology for MGUS.    Review of Systems   Unable to perform ROS: mental status change         Objective   Objective      Vital Signs  Temp:  [97.8 °F (36.6 °C)-98.1 °F (36.7 °C)] 98.1 °F (36.7 °C)  Heart Rate:  [62-81] 62  Resp:  [16-18] 18  BP: (103-131)/(49-82) 118/75  Oxygen Therapy  SpO2: 95 %  Pulse Oximetry Type: Intermittent  Device (Oxygen Therapy): room air  Flowsheet Rows      First Filed Value   Admission Height  177.8 cm (70\") Documented at 01/07/2020 2135   Admission Weight  113 kg (250 lb) Documented at 01/07/2020 2135        Intake & Output (last 3 days)       01/05 0701 - 01/06 0700 01/06 0701 - 01/07 0700 01/07 0701 - 01/08 0700 01/08 0701 - 01/09 0700    IV Piggyback   500     Total Intake(mL/kg)   500 (4.4)     Net   +500                 Lines, Drains & Airways    Active LDAs "     Name:   Placement date:   Placement time:   Site:   Days:    Peripheral IV 01/07/20 2201 Left Forearm   01/07/20 2201    Forearm   less than 1                  Physical Exam:    Physical Exam   Constitutional: He appears well-developed and well-nourished.   HENT:   Head: Normocephalic and atraumatic.   Eyes: Pupils are equal, round, and reactive to light. EOM are normal. No scleral icterus.   Neck: Normal range of motion. Neck supple.   Cardiovascular: Normal rate and regular rhythm.   Pulmonary/Chest: Effort normal and breath sounds normal.   Abdominal: Soft. Bowel sounds are normal. There is no tenderness.   Musculoskeletal:   Moves all extremities equally.   Lymphadenopathy:     He has no cervical adenopathy.   Neurological: He is alert. No cranial nerve deficit or sensory deficit.   Skin: Skin is warm and dry. No rash noted.   Psychiatric: His speech is normal. Cognition and memory are impaired.         Procedures:              Results Review:     I reviewed the patient's new clinical results.      Lab Results (last 24 hours)     Procedure Component Value Units Date/Time    POC Glucose Once [[id number]]  (Abnormal) Collected:  01/07/20 2136    Specimen:  Blood Updated:  01/08/20 1526     Glucose >500 mg/dL      Comment: Serial Number: [id number]:  [id number]       POC Glucose Once [[id number]]  (Abnormal) Collected:  01/08/20 1139    Specimen:  Blood Updated:  01/08/20 1152     Glucose 254 mg/dL      Comment: Serial Number: [id number]:  [id number]       POC Glucose Once [[id number]]  (Abnormal) Collected:  01/08/20 0723    Specimen:  Blood Updated:  01/08/20 0724     Glucose 340 mg/dL      Comment: Serial Number: [id number]:  [id number]       BNP [[id number]]  (Normal) Collected:  01/07/20 2203    Specimen:  Blood Updated:  01/08/20 0715     proBNP 71.1 pg/mL     Narrative:       Among patients with dyspnea, NT-proBNP is highly sensitive for the detection of acute congestive heart failure.  In addition NT-proBNP of <300 pg/ml effectively rules out acute congestive heart failure with 99% negative predictive value.      TSH [[id number]]  (Normal) Collected:  01/08/20 0500    Specimen:  Blood Updated:  01/08/20 0603     TSH 2.940 uIU/mL     Comprehensive Metabolic Panel [[id number]]  (Abnormal) Collected:  01/08/20 0500    Specimen:  Blood Updated:  01/08/20 0556     Glucose 399 mg/dL      BUN 11 mg/dL      Creatinine 1.09 mg/dL      Sodium 127 mmol/L      Potassium 3.7 mmol/L      Chloride 90 mmol/L      CO2 25.0 mmol/L      Calcium 10.3 mg/dL      Total Protein 7.8 g/dL      Albumin 3.20 g/dL      ALT (SGPT) 13 U/L      AST (SGOT) 17 U/L      Alkaline Phosphatase 204 U/L      Total Bilirubin 1.6 mg/dL      eGFR Non African Amer 71 mL/min/1.73      Globulin 4.6 gm/dL      A/G Ratio 0.7 g/dL      BUN/Creatinine Ratio 10.1     Anion Gap 12.0 mmol/L     Narrative:       GFR Normal >60  Chronic Kidney Disease <60  Kidney Failure <15      CBC & Differential [[id number]] Collected:  01/08/20 0500    Specimen:  Blood Updated:  01/08/20 0528    Narrative:       The following orders were created for panel order CBC & Differential.  Procedure                               Abnormality         Status                     ---------                               -----------         ------                     CBC Auto Differential[[id number]]        Normal              Final result                 Please view results for these tests on the individual orders.    CBC Auto Differential [[id number]]  (Normal) Collected:  01/08/20 0500    Specimen:  Blood Updated:  01/08/20 0528     WBC 8.90 10*3/mm3      RBC 4.73 10*6/mm3      Hemoglobin 15.0 g/dL      Hematocrit 43.6 %      MCV 92.2 fL      MCH 31.8 pg      MCHC 34.5 g/dL      RDW 14.2 %      RDW-SD 45.5 fl      MPV 9.7 fL      Platelets 144 10*3/mm3      Neutrophil % 56.1 %      Lymphocyte % 29.3 %      Monocyte % 9.1 %      Eosinophil % 4.6 %      Basophil % 0.9 %       Neutrophils, Absolute 5.00 10*3/mm3      Lymphocytes, Absolute 2.60 10*3/mm3      Monocytes, Absolute 0.80 10*3/mm3      Eosinophils, Absolute 0.40 10*3/mm3      Basophils, Absolute 0.10 10*3/mm3      nRBC 0.1 /100 WBC     POC Glucose Once [[id number]]  (Abnormal) Collected:  01/08/20 0411    Specimen:  Blood Updated:  01/08/20 0412     Glucose 387 mg/dL      Comment: Serial Number: [id number]:  [id number]       POC Glucose Once [[id number]]  (Abnormal) Collected:  01/08/20 0126    Specimen:  Blood Updated:  01/08/20 0127     Glucose >500 mg/dL      Comment: Serial Number: [id number]:  [id number]       Lipase [[id number]]  (Normal) Collected:  01/07/20 2203    Specimen:  Blood Updated:  01/08/20 0029     Lipase 40 U/L     Comprehensive Metabolic Panel [[id number]]  (Abnormal) Collected:  01/07/20 2203    Specimen:  Blood Updated:  01/07/20 2244     Glucose 827 mg/dL      BUN 15 mg/dL      Creatinine 1.25 mg/dL      Sodium 120 mmol/L      Potassium 4.6 mmol/L      Chloride 83 mmol/L      CO2 20.0 mmol/L      Calcium 9.6 mg/dL      Total Protein 8.1 g/dL      Albumin 3.40 g/dL      ALT (SGPT) 14 U/L      AST (SGOT) 18 U/L      Alkaline Phosphatase 208 U/L      Total Bilirubin 2.0 mg/dL      eGFR Non African Amer 61 mL/min/1.73      Globulin 4.7 gm/dL      A/G Ratio 0.7 g/dL      BUN/Creatinine Ratio 12.0     Anion Gap 17.0 mmol/L     Narrative:       GFR Normal >60  Chronic Kidney Disease <60  Kidney Failure <15      CBC & Differential [[id number]] Collected:  01/07/20 2203    Specimen:  Blood Updated:  01/07/20 2212    Narrative:       The following orders were created for panel order CBC & Differential.  Procedure                               Abnormality         Status                     ---------                               -----------         ------                     CBC Auto Differential[[id number]]        Abnormal            Final result                 Please view results for these tests on the  individual orders.    CBC Auto Differential [[id number]]  (Abnormal) Collected:  01/07/20 2203    Specimen:  Blood Updated:  01/07/20 2212     WBC 7.00 10*3/mm3      RBC 4.68 10*6/mm3      Hemoglobin 15.0 g/dL      Hematocrit 44.3 %      MCV 94.7 fL      MCH 32.1 pg      MCHC 33.9 g/dL      RDW 14.6 %      RDW-SD 48.6 fl      MPV 9.5 fL      Platelets 132 10*3/mm3      Neutrophil % 68.4 %      Lymphocyte % 19.8 %      Monocyte % 9.0 %      Eosinophil % 2.1 %      Basophil % 0.7 %      Neutrophils, Absolute 4.80 10*3/mm3      Lymphocytes, Absolute 1.40 10*3/mm3      Monocytes, Absolute 0.60 10*3/mm3      Eosinophils, Absolute 0.10 10*3/mm3      Basophils, Absolute 0.10 10*3/mm3      nRBC 0.1 /100 WBC         No results found for: HGBA1C                Microbiology Results (last 10 days)     ** No results found for the last 240 hours. **          ECG/EMG Results (most recent)     None                  No radiology results for the last 7 days    Xrays, labs reviewed personally by physician.    Medication Review:   I have reviewed the patient's current medication list      Scheduled Meds    azelastine 2 spray Each Nare Daily   enoxaparin 40 mg Subcutaneous Daily   insulin glargine 10 Units Subcutaneous Nightly   insulin lispro 0-24 Units Subcutaneous 4x Daily With Meals & Nightly   insulin regular 2 Units Subcutaneous Daily   lactulose 30 mL Oral 4x Daily   magnesium oxide 400 mg Oral BID   montelukast 10 mg Oral Nightly   pantoprazole 40 mg Oral Q AM   predniSONE 7.5 mg Oral Daily   riFAXIMin 550 mg Oral Q12H   tamsulosin 0.4 mg Oral BID       Meds Infusions    Pharmacy Consult - Steroid Insulin Protocol     sodium chloride 125 mL/hr Last Rate: 125 mL/hr (01/08/20 0611)       Meds PRN  •  albuterol  •  dextrose  •  dextrose  •  glucagon (human recombinant)  •  insulin lispro **AND** insulin lispro  •  Pharmacy Consult - Steroid Insulin Protocol  •  [COMPLETED] Insert peripheral IV **AND** sodium  chloride        Assessment/Plan   Assessment/Plan     Active Hospital Problems    Diagnosis  POA   • Type 2 diabetes mellitus with hyperglycemia (CMS/HCC) [E11.65]  Unknown     Priority: High   • Hyperammonemia (CMS/HCC) [E72.20]  Yes     Priority: High   • Encephalopathy, hepatic (CMS/HCC) [K72.90]  Yes     Priority: High   • Renal injury [S37.009A]  Yes   • Weakness [R53.1]  Yes   • Alcoholic cirrhosis (CMS/HCC) [K70.30]  Yes      Resolved Hospital Problems   No resolved problems to display.       MEDICAL DECISION MAKING COMPLEXITY BY PROBLEM:  Moderate      Uncontrolled diabetes mellitus (POA)  --Recent diagnosis diabetes--> A1c 12.6  --Continue Lantus and ISS.    Hepatic encephalopathy:  --Continue lactulose, Continue Xifaxan, spironolactone  --History of cirrhosis from alcohol abuse     JON:  --Nephrotoxin holds    MGUS:  --Following hematology/oncology consulted     COPD:  --Not in exacerbation  --Continue bronchodilators    GERD:  --Continue Pepcid     BPH:  --Continue Flomax.      Code Status -   Code Status and Medical Interventions:   Ordered at: 01/07/20 4159     Code Status:    CPR     Medical Interventions (Level of Support Prior to Arrest):    Full       Discharge Planning        Electronically signed by Mauro Geiger DO, 01/08/20, 3:49 PM.  [facility] Hospitalist Team

## 2020-01-08 NOTE — ED PROVIDER NOTES
Subjective   History of Present Illness    Review of Systems    Past Medical History:   Diagnosis Date   • Acute perforated gastric ulcer with hemorrhage (CMS/HCC) 07/16/2018   • Alcoholism (CMS/HCC)    • Allergic rhinitis    • Arthritis     rheumatoid (diagnosed at age 16)   • Cirrhosis (CMS/HCC)     etoh cirrhosis-liver failure   • CKD (chronic kidney disease)    • COPD (chronic obstructive pulmonary disease) (CMS/HCC)    • Depression    • Duodenal ulcer      perforated duodenal s/p patch   • History of transfusion    • Infectious viral hepatitis    • MRSA (methicillin resistant Staphylococcus aureus) carrier 06/12/2019    nasal swab   • Tumors      benign essential   • Umbilical hernia u   • Wound, open     nonhealing sore       Allergies   Allergen Reactions   • Duloxetine Unknown (See Comments)     Unknown reaction        Past Surgical History:   Procedure Laterality Date   • CYST REMOVAL Left     forarm   • ENDOSCOPY      ascities/paracentesis   • EXPLORATORY LAPAROTOMY  07/16/2018    Over-sew Gastric Ulcer With Gastrostomy and Jejunostomy Tube   • FOOT SURGERY Right     right bunion removal   • FRACTURE SURGERY Right 2005    bunionectomy/ broke toe and put in rods   • MASS EXCISION Right 6/18/2019    Procedure: EXCISION OF SEBACEOUS CYST OF THE RIGHT BACK;  Surgeon: Sapna Elizalde MD;  Location: [facility] MAIN OR;  Service: General   • MASS EXCISION Right 8/27/2019    Procedure: EXCISION LESION of right back;  Surgeon: Sapna Elizalde MD;  Location: [facility] MAIN OR;  Service: General   • UMBILICAL HERNIA REPAIR N/A 6/18/2019    Procedure: UMBILICAL HERNIA REPAIR LAPAROSCOPIC WITH MESH WITH EXTENSIVE LYSIS OF ADHESIONS;  Surgeon: Sapna Elizalde MD;  Location: [facility] MAIN OR;  Service: General       Family History   Problem Relation Age of Onset   • Cancer Mother    • Lung disease Father    • Other Father         prostate problems   • Mental illness Brother        Social History     Socioeconomic History   • Marital  status:      Spouse name: Nicky Selby   • Number of children: Not on file   • Years of education: Not on file   • Highest education level: Not on file   Tobacco Use   • Smoking status: Former Smoker   • Smokeless tobacco: Former User   Substance and Sexual Activity   • Alcohol use: No     Frequency: Never     Comment: Off alcohol since July 2018   • Drug use: No   • Sexual activity: Defer           Objective   Physical Exam    Procedures           ED Course                                               MDM    Final diagnoses:   None

## 2020-01-08 NOTE — CONSULTS
"Diabetes Education  Assessment/Teaching    Patient Name:  Mc Selby  YOB: 1970  MRN: [id number]  Admit Date:  1/7/2020      Assessment Date:  1/8/2020    Most Recent Value   General Information    Referral From:  MD Linda order, Other (comment) [MD consult for new diagnosis diabetes and admisison blood sugar 827.]   Height  177.8 cm (70\")   Height Method  Stated   Weight  114 kg (251 lb 5.2 oz)   Weight Method  Standing scale   Pregnancy Assessment   Diabetes History   What type of diabetes do you have?  Type 2   Length of Diabetes Diagnosis  Newly diagnosed <6 months   Current DM knowledge  poor   Have you had diabetes education/teaching in the past?  no   Do you test your blood sugar at home?  no   Do you have any diabetes complications?  nephropathy   Education Preferences   What areas of diabetes would you like to learn about?  avoiding high blood sugar, avoiding low blood sugar, diabetes complications, medications for diabetes, testing my blood sugar at home, understanding diabetes   Nutrition Information   Assessment Topics   Healthy Eating - Assessment  Needs education   Taking Medication - Assessment  Needs education   Problem Solving - Assessment  Needs education   Reducing Risk - Assessment  Needs education   Monitoring - Assessment  Needs education   DM Goals   Healthy Eating - Goal  0-7 days from discharge   Taking Medication - Goal  Today   Problem Solving - Goal  Today   Reducing Risk - Goal  Today   Monitoring - Goal  Today            Most Recent Value   DM Education Needs   Meter  Meter provided   Meter Type  Accuchek [Accu-chek Guide Me glucometer given to patient with return demonstration completed by patient using the lancing device, inserting the test strip into the meter and applying blood to the test strip.  Blood sugar 260.]   Frequency of Testing  AC/HS [Discusssed with patient about checking blood sugar before meals and at bedtime. Log book given to patient to record " results.]   Medication  Insulin, Actions, Side effects, Administration, Pen [Handouts given with discussion on types of insulin, storage of insulin, disposal of needles, injection sites, rotation of sites, and how to use and insulin pen. ]   Problem Solving  Hypoglycemia, Hyperglycemia, Signs, Symptoms, Treatment [Handouts given with discussion on hypoglycemia and hyperglycemia signs, symptoms, and treatment.]   Reducing Risks  A1C testing [On 12/11/2019 A1C was 9.6%.  A1C info sheet given with discussion on A1C target and healthy blood sugar range.]   Healthy Eating  RD consult, Other (comment) [Inpatient Nutrition consult ordered. Discussed with patient how 15 grams of carbs is equal to one serving and as a man he could have 4 servings of carbs per meal. ]   Discharge Plan  Home   Motivation  Engaged, Strong   Teaching Method  Discussion, Demonstration, Handouts, Teach back   Patient Response  Verbalized understanding, Demonstrates adequately (wife at bedside and involved with the discussion)            Other Comments:  Patient did return demonstration of applying the pen needle to the pen, priming the pen, administering insulin into the foam pad, and holding the pen for 10 seconds after administration. First Steps Book to managing diabetes given to patient. Prescriptions started in discharge orders for lancets, test strips, alcohol wipes, Lantus Solostar, Humalog kwikpen, and pen needles.        Electronically signed by:  Shital Kelly RN  01/08/20 1:00 PM

## 2020-01-08 NOTE — PROGRESS NOTES
Discharge Planning Assessment  [facility]     Patient Name: Mc Selby  MRN: [id number]  Today's Date: 1/8/2020    Admit Date: 1/7/2020    Discharge Needs Assessment     Row Name 01/08/20 1238       Living Environment    Lives With  spouse    Current Living Arrangements  home/apartment/condo    Primary Care Provided by  self    Provides Primary Care For  no one    Family Caregiver if Needed  spouse    Quality of Family Relationships  helpful;involved    Able to Return to Prior Arrangements  yes       Resource/Environmental Concerns    Resource/Environmental Concerns  none    Transportation Concerns  car, none       Transition Planning    Patient/Family Anticipates Transition to  home with family    Patient/Family Anticipated Services at Transition  none    Transportation Anticipated  family or friend will provide       Discharge Needs Assessment    Readmission Within the Last 30 Days  no previous admission in last 30 days    Concerns to be Addressed  discharge planning;denies needs/concerns at this time    Equipment Currently Used at Home  walker, rolling;shower chair    Anticipated Changes Related to Illness  none    Equipment Needed After Discharge  glucometer        Discharge Plan     Row Name 01/08/20 1239       Plan    Plan  Anticipate routine home.     Patient/Family in Agreement with Plan  yes    Plan Comments  Met with patient at bedside. Lives at home with spouse. Reports IADLs. Patietn still drives. PCP Dr. Shepherd. No issues affording medications. Currently denies any discharge needs or concerns. DC barriers: new DM diagnosis, diabetic educator consulting on case.           Expected Discharge Date and Time     Expected Discharge Date Expected Discharge Time    Jan 11, 2020         Demographic Summary     Row Name 01/08/20 1238       General Information    Admission Type  inpatient    Arrived From  emergency department    Referral Source  admission list    Reason for Consult  discharge planning    Preferred  Language  English     Used During This Interaction  no        Functional Status     Row Name 01/08/20 1238       Functional Status    Usual Activity Tolerance  good    Current Activity Tolerance  good       Functional Status, IADL    Medications  independent    Meal Preparation  independent    Housekeeping  independent    Laundry  independent    Shopping  independent       Mental Status    General Appearance WDL  WDL            Latonya Trevizo RN

## 2020-01-08 NOTE — TELEPHONE ENCOUNTER
I received a call from [facility] that patient had a critical glucose of 610 [facility]. Upon reviewing the patient's chart I saw that his sodium was 123 and LDH was also low. He also had an elevated ammonia level. I discussed this with Dr. Jones and he said because of the combination of these abnormal labs that he needed to go to the ER to be evaluated. I spoke to the patient and then in more detail with his wife and she said she would try to get him to go to the ER.

## 2020-01-08 NOTE — PLAN OF CARE
Problem: Patient Care Overview  Goal: Plan of Care Review  1/8/2020 1642 by Frank Flores RN  Outcome: Ongoing (interventions implemented as appropriate)  Flowsheets  Taken 1/8/2020 0528 by Bebe Campbell RN  Progress: no change  Taken 1/8/2020 0710 by Frank Flores RN  Plan of Care Reviewed With: patient  Taken 1/8/2020 1642 by Frank Flores RN  Outcome Summary: pt taking lactulose. pt has not had a bm during shift. pt is hard of hearing. hearing aid at bedside. pt has family and wife at bedside.  1/8/2020 1641 by Frank Flores RN  Outcome: Ongoing (interventions implemented as appropriate)

## 2020-01-08 NOTE — PLAN OF CARE
Problem: Patient Care Overview  Goal: Plan of Care Review  Outcome: Ongoing (interventions implemented as appropriate)  Flowsheets  Taken 1/8/2020 0528  Progress: no change  Taken 1/8/2020 0314  Plan of Care Reviewed With: patient;spouse  Goal: Individualization and Mutuality  Outcome: Ongoing (interventions implemented as appropriate)  Goal: Discharge Needs Assessment  Outcome: Ongoing (interventions implemented as appropriate)  Flowsheets  Taken 1/8/2020 0304  Equipment Currently Used at Home: none  Taken 1/8/2020 0308  Transportation Anticipated: family or friend will provide  Patient/Family Anticipated Services at Transition: none  Patient/Family Anticipates Transition to: home  Taken 1/8/2020 0528  Concerns to be Addressed: no discharge needs identified  Goal: Interprofessional Rounds/Family Conf  Outcome: Ongoing (interventions implemented as appropriate)     Problem: Renal Failure/Kidney Injury, Acute (Adult)  Goal: Signs and Symptoms of Listed Potential Problems Will be Absent, Minimized or Managed (Renal Failure/Kidney Injury, Acute)  Outcome: Ongoing (interventions implemented as appropriate)

## 2020-01-09 ENCOUNTER — APPOINTMENT (OUTPATIENT)
Dept: GENERAL RADIOLOGY | Facility: HOSPITAL | Age: 50
End: 2020-01-09

## 2020-01-09 DIAGNOSIS — M06.00 SERONEGATIVE RHEUMATOID ARTHRITIS (HCC): ICD-10-CM

## 2020-01-09 DIAGNOSIS — Z79.899 LONG-TERM USE OF HIGH-RISK MEDICATION: ICD-10-CM

## 2020-01-09 LAB
ALBUMIN SERPL-MCNC: 3 G/DL (ref 3.5–5.2)
ALBUMIN/GLOB SERPL: 0.7 G/DL
ALP SERPL-CCNC: 130 U/L (ref 39–117)
ALT SERPL W P-5'-P-CCNC: 13 U/L (ref 1–41)
AMMONIA BLD-SCNC: 75 UMOL/L (ref 16–60)
ANION GAP SERPL CALCULATED.3IONS-SCNC: 11 MMOL/L (ref 5–15)
AST SERPL-CCNC: 19 U/L (ref 1–40)
BASOPHILS # BLD AUTO: 0.1 10*3/MM3 (ref 0–0.2)
BASOPHILS NFR BLD AUTO: 1 % (ref 0–1.5)
BILIRUB SERPL-MCNC: 1.8 MG/DL (ref 0.2–1.2)
BUN BLD-MCNC: 10 MG/DL (ref 6–20)
BUN/CREAT SERPL: 10 (ref 7–25)
CALCIUM SPEC-SCNC: 9.2 MG/DL (ref 8.6–10.5)
CHLORIDE SERPL-SCNC: 100 MMOL/L (ref 98–107)
CO2 SERPL-SCNC: 24 MMOL/L (ref 22–29)
CREAT BLD-MCNC: 1 MG/DL (ref 0.76–1.27)
CRP SERPL-MCNC: 0.44 MG/DL (ref 0–0.5)
DEPRECATED RDW RBC AUTO: 46.4 FL (ref 37–54)
EOSINOPHIL # BLD AUTO: 0.3 10*3/MM3 (ref 0–0.4)
EOSINOPHIL NFR BLD AUTO: 4.7 % (ref 0.3–6.2)
ERYTHROCYTE [DISTWIDTH] IN BLOOD BY AUTOMATED COUNT: 14.3 % (ref 12.3–15.4)
GFR SERPL CREATININE-BSD FRML MDRD: 79 ML/MIN/1.73
GLOBULIN UR ELPH-MCNC: 4.1 GM/DL
GLUCOSE BLD-MCNC: 122 MG/DL (ref 65–99)
GLUCOSE BLDC GLUCOMTR-MCNC: 106 MG/DL (ref 70–105)
GLUCOSE BLDC GLUCOMTR-MCNC: 301 MG/DL (ref 70–105)
GLUCOSE BLDC GLUCOMTR-MCNC: 311 MG/DL (ref 70–105)
GLUCOSE BLDC GLUCOMTR-MCNC: 342 MG/DL (ref 70–105)
HCT VFR BLD AUTO: 41 % (ref 37.5–51)
HGB BLD-MCNC: 14.2 G/DL (ref 13–17.7)
LYMPHOCYTES # BLD AUTO: 2.3 10*3/MM3 (ref 0.7–3.1)
LYMPHOCYTES NFR BLD AUTO: 32.3 % (ref 19.6–45.3)
MAGNESIUM SERPL-MCNC: 1.9 MG/DL (ref 1.6–2.6)
MCH RBC QN AUTO: 32.2 PG (ref 26.6–33)
MCHC RBC AUTO-ENTMCNC: 34.6 G/DL (ref 31.5–35.7)
MCV RBC AUTO: 93.2 FL (ref 79–97)
MONOCYTES # BLD AUTO: 0.7 10*3/MM3 (ref 0.1–0.9)
MONOCYTES NFR BLD AUTO: 9.5 % (ref 5–12)
NEUTROPHILS # BLD AUTO: 3.7 10*3/MM3 (ref 1.7–7)
NEUTROPHILS NFR BLD AUTO: 52.5 % (ref 42.7–76)
NRBC BLD AUTO-RTO: 0.1 /100 WBC (ref 0–0.2)
PLATELET # BLD AUTO: 120 10*3/MM3 (ref 140–450)
PMV BLD AUTO: 9.5 FL (ref 6–12)
POTASSIUM BLD-SCNC: 3.5 MMOL/L (ref 3.5–5.2)
PROCALCITONIN SERPL-MCNC: 0.17 NG/ML (ref 0.1–0.25)
PROT SERPL-MCNC: 7.1 G/DL (ref 6–8.5)
RBC # BLD AUTO: 4.4 10*6/MM3 (ref 4.14–5.8)
SODIUM BLD-SCNC: 135 MMOL/L (ref 136–145)
WBC NRBC COR # BLD: 7 10*3/MM3 (ref 3.4–10.8)

## 2020-01-09 PROCEDURE — 85025 COMPLETE CBC W/AUTO DIFF WBC: CPT | Performed by: HOSPITALIST

## 2020-01-09 PROCEDURE — 63710000001 INSULIN GLARGINE PER 5 UNITS: Performed by: NURSE PRACTITIONER

## 2020-01-09 PROCEDURE — 25010000002 ENOXAPARIN PER 10 MG: Performed by: NURSE PRACTITIONER

## 2020-01-09 PROCEDURE — 80053 COMPREHEN METABOLIC PANEL: CPT | Performed by: HOSPITALIST

## 2020-01-09 PROCEDURE — 86140 C-REACTIVE PROTEIN: CPT | Performed by: HOSPITALIST

## 2020-01-09 PROCEDURE — 63710000001 PREDNISONE PER 5 MG: Performed by: NURSE PRACTITIONER

## 2020-01-09 PROCEDURE — 63710000001 INSULIN REGULAR HUMAN PER 5 UNITS: Performed by: NURSE PRACTITIONER

## 2020-01-09 PROCEDURE — 99232 SBSQ HOSP IP/OBS MODERATE 35: CPT | Performed by: HOSPITALIST

## 2020-01-09 PROCEDURE — 84145 PROCALCITONIN (PCT): CPT | Performed by: HOSPITALIST

## 2020-01-09 PROCEDURE — 74018 RADEX ABDOMEN 1 VIEW: CPT

## 2020-01-09 PROCEDURE — 82962 GLUCOSE BLOOD TEST: CPT

## 2020-01-09 PROCEDURE — 63710000001 INSULIN LISPRO (HUMAN) PER 5 UNITS: Performed by: HOSPITALIST

## 2020-01-09 PROCEDURE — 83735 ASSAY OF MAGNESIUM: CPT | Performed by: HOSPITALIST

## 2020-01-09 PROCEDURE — 82140 ASSAY OF AMMONIA: CPT | Performed by: NURSE PRACTITIONER

## 2020-01-09 RX ORDER — SENNA PLUS 8.6 MG/1
1 TABLET ORAL 2 TIMES DAILY PRN
Status: DISCONTINUED | OUTPATIENT
Start: 2020-01-09 | End: 2020-01-10 | Stop reason: HOSPADM

## 2020-01-09 RX ORDER — FAMOTIDINE 20 MG/1
TABLET, FILM COATED ORAL
Qty: 30 TABLET | Refills: 2 | Status: SHIPPED | OUTPATIENT
Start: 2020-01-09 | End: 2020-04-20

## 2020-01-09 RX ADMIN — Medication 10 ML: at 20:46

## 2020-01-09 RX ADMIN — INSULIN LISPRO 16 UNITS: 100 INJECTION, SOLUTION INTRAVENOUS; SUBCUTANEOUS at 17:34

## 2020-01-09 RX ADMIN — PREDNISONE 7.5 MG: 5 TABLET ORAL at 09:04

## 2020-01-09 RX ADMIN — ENOXAPARIN SODIUM 40 MG: 40 INJECTION SUBCUTANEOUS at 16:03

## 2020-01-09 RX ADMIN — LACTULOSE 30 ML: 10 SOLUTION ORAL at 09:03

## 2020-01-09 RX ADMIN — AZELASTINE 2 SPRAY: 1 SPRAY, METERED NASAL at 09:03

## 2020-01-09 RX ADMIN — RIFAXIMIN 550 MG: 550 TABLET ORAL at 20:46

## 2020-01-09 RX ADMIN — TAMSULOSIN HYDROCHLORIDE 0.4 MG: 0.4 CAPSULE ORAL at 09:04

## 2020-01-09 RX ADMIN — PANTOPRAZOLE SODIUM 40 MG: 40 TABLET, DELAYED RELEASE ORAL at 06:35

## 2020-01-09 RX ADMIN — LACTULOSE 30 ML: 10 SOLUTION ORAL at 20:46

## 2020-01-09 RX ADMIN — SORBITOL SOLUTION (BULK) 400 ML: 70 SOLUTION at 16:03

## 2020-01-09 RX ADMIN — RIFAXIMIN 550 MG: 550 TABLET ORAL at 09:04

## 2020-01-09 RX ADMIN — LACTULOSE 30 ML: 10 SOLUTION ORAL at 17:35

## 2020-01-09 RX ADMIN — INSULIN GLARGINE 10 UNITS: 100 INJECTION, SOLUTION SUBCUTANEOUS at 20:45

## 2020-01-09 RX ADMIN — INSULIN HUMAN 2 UNITS: 100 INJECTION, SOLUTION PARENTERAL at 09:04

## 2020-01-09 RX ADMIN — Medication 400 MG: at 09:04

## 2020-01-09 RX ADMIN — Medication 400 MG: at 20:46

## 2020-01-09 RX ADMIN — INSULIN LISPRO 12 UNITS: 100 INJECTION, SOLUTION INTRAVENOUS; SUBCUTANEOUS at 13:12

## 2020-01-09 RX ADMIN — MONTELUKAST SODIUM 10 MG: 10 TABLET, COATED ORAL at 20:46

## 2020-01-09 RX ADMIN — LACTULOSE 30 ML: 10 SOLUTION ORAL at 13:11

## 2020-01-09 RX ADMIN — TAMSULOSIN HYDROCHLORIDE 0.4 MG: 0.4 CAPSULE ORAL at 20:46

## 2020-01-09 RX ADMIN — INSULIN LISPRO 16 UNITS: 100 INJECTION, SOLUTION INTRAVENOUS; SUBCUTANEOUS at 20:45

## 2020-01-09 NOTE — PLAN OF CARE
Problem: Patient Care Overview  Goal: Plan of Care Review  Outcome: Ongoing (interventions implemented as appropriate)  Flowsheets  Taken 1/8/2020 0528 by Bebe Campbell, RN  Progress: no change  Taken 1/8/2020 1905 by Bebe Campbell RN  Plan of Care Reviewed With: patient  Taken 1/8/2020 1642 by Frank Flores RN  Outcome Summary: pt taking lactulose. pt has not had a bm during shift. pt is hard of hearing. hearing aid at bedside. pt has family and wife at bedside.  Goal: Individualization and Mutuality  Outcome: Ongoing (interventions implemented as appropriate)  Goal: Discharge Needs Assessment  Outcome: Ongoing (interventions implemented as appropriate)  Flowsheets (Taken 1/8/2020 1238 by Latonya Trevizo RN)  Equipment Needed After Discharge: glucometer  Equipment Currently Used at Home: walker, rolling;shower chair  Anticipated Changes Related to Illness: none  Transportation Anticipated: family or friend will provide  Transportation Concerns: car, none  Concerns to be Addressed: discharge planning;denies needs/concerns at this time  Readmission Within the Last 30 Days: no previous admission in last 30 days  Patient/Family Anticipated Services at Transition: none  Patient/Family Anticipates Transition to: home with family  Goal: Interprofessional Rounds/Family Conf  Outcome: Ongoing (interventions implemented as appropriate)     Problem: Renal Failure/Kidney Injury, Acute (Adult)  Goal: Signs and Symptoms of Listed Potential Problems Will be Absent, Minimized or Managed (Renal Failure/Kidney Injury, Acute)  Outcome: Ongoing (interventions implemented as appropriate)

## 2020-01-09 NOTE — PLAN OF CARE
Problem: Patient Care Overview  Goal: Individualization and Mutuality  Outcome: Ongoing (interventions implemented as appropriate)     Problem: Patient Care Overview  Goal: Plan of Care Review  Outcome: Ongoing (interventions implemented as appropriate)  Flowsheets  Taken 1/8/2020 0528 by Bebe Campbell RN  Progress: no change  Taken 1/8/2020 1905 by Bebe Campbell, RN  Plan of Care Reviewed With: patient  Taken 1/8/2020 1642 by Frank Flores RN  Outcome Summary: pt taking lactulose. pt has not had a bm during shift. pt is hard of hearing. hearing aid at bedside. pt has family and wife at bedside.

## 2020-01-09 NOTE — CONSULTS
Nutrition Services    Patient Name:  Mc Selby  YOB: 1970  MRN: [id number]  Admit Date:  1/7/2020    Progress note:    Consulted for diet education r/t pt with a new diagnosis of diabetes. Reviewed in depth carbohydrate counting, Label reading, portion control, & general tips for diabetes management. Answered all questions. Will follow up prn.     Electronically signed by:  Pinky Hancock RD  01/09/20 3:22 PM

## 2020-01-09 NOTE — PAYOR COMM NOTE
"Inpatient authorization request.    Criteria Review   Clinical Indications for Admission to Inpatient Care     Return to top of Systemic or Infectious Condition GRG - GRG  •Hospital admission is needed for appropriate care of the patient because of 1 or more of the following:   Systemic or infectious condition causing severe symptoms or findings not responsive to emergency or observation care treatment (as appropriate) as indicated by 1 or more of the following(1)(2):  ? Severe electrolyte abnormalities(43)        •Severe electrolyte abnormalities as indicated by ALL of the following(1)(2)(3):  •Electrolytes and associated findings are not as expected for patient baseline or acceptable treatment effects.  •Severe abnormalities as indicated by 1 or more of the following:  •Sodium less than 130 mEq/L (mmol/L) (new)        Patient with new onset diabetes. Glucose high of 827; Na low of 120; Ammonia level 119. Diabetic treatment regimen not established at this time         RETURN CONTACT  KAUSHIK LEIVA RN   [facility]   U.. /    PH: [phone]  FX: [phone]    cM Selby (49 y.o. Male)     Date of Birth Social Security Number Address Home Phone MRN    1970  [address] [place] [address] [phone] [id number]    Yazidi Marital Status          Presybeterian        Admission Date Admission Type Admitting Provider Attending Provider Department, Room/Bed    1/7/20 Emergency Mauro Geiger DO Gebre-Egziabher, Aman I[facility] 228/[facility]    Discharge Date Discharge Disposition Discharge Destination                       Attending Provider:  Mauro Geiger DO    Allergies:  Duloxetine    Isolation:  None   Infection:  None   Code Status:  CPR    Ht:  177.8 cm (70\")   Wt:  114 kg (251 lb 5.2 oz)    Admission Cmt:  None   Principal Problem:  None                Active Insurance as of 1/7/2020     Primary Coverage  "    Payor Plan Insurance Group Employer/Plan Group    [employer] A[employer] [id number]     Payor Plan Address Payor Plan Phone Number Payor Plan Fax Number Effective Dates    [address] [phone]  2013 - None Entered    [place] [address]       Subscriber Name Subscriber Birth Date Member ID       JOSELYN RODGERS [birth year]1970 [id number]           Secondary Coverage     Payor Plan Insurance Group Employer/Plan Group    [employer]ICA[employer] [facility] [id number]     Payor Plan Address Payor Plan Phone Number Payor Plan Fax Number Effective Dates    MAIL STOP:   10/1/2018 - None Entered    [address] [address]       [facility] [address]       Subscriber Name Subscriber Birth Date Member ID       MC RODGERS 1970 [id number]                 Emergency Contacts      (Rel.) Home Phone Work Phone Mobile Phone    JOSELYN RODGERS (Spouse) [phone] -- [phone]               History & Physical      Jeannie Galvan FNP at 20 2357     Attestation signed by Mauro Geiger DO at 20 1547    I have reviewed the H&P by Jeannie MORALES.                        [facility] Medicine Services      Patient Name: Mc Rodgers  : 1970  MRN: [id number]  Primary Care Physician: Carlita Shepherd MD  Date of admission: 2020    Patient Care Team:  Carlita Shepherd MD as PCP - General (Internal Medicine)  Carlita Shepherd MD as PCP - Family Medicine          Subjective   History Present Illness     Chief Complaint:   Chief Complaint   Patient presents with   • Hyperglycemia       Mr. Rodgers is a 49 y.o.  presents to [facility] complaining of abnormal lab work     49-year-old male presents to the ER with a chief complaint of abnormal labs with noted elevated ammonia level and significantly elevated glucose.  The patient was contacted by his PCP encouraged to seek immediate medical attention.  The patient reports feeling thirsty and having  nausea and vomiting over the last week.  He otherwise denies any complaints of discomfort.  Approximately 3 weeks ago the patient had been taken off of Enbrel, which is prescribed for rheumatoid arthritis, and put on prednisone by his rheumatologist secondary to abnormal lab work (patient does not know what the lab work abnormality was).  The patient denies any recent chest pain, shortness of breath cough or dysuria.  He has had intermittent chills without overt fever.  The patient's PCP called in metformin for the patient but he has not started this medication yet.  The patient has recently seen hematology secondary to abnormal lab work found prior to discontinuation of Enbrel with planned bone marrow biopsy in the near future.      Patient reported past medical history includes CKD stage II that improved from CKD stage III.  Patient had brief period of dialysis following an acute illness in August 2017 after having a gastric ulcer perforation with acute interabdominal infection requiring surgical debridement.  The patient has a history of alcoholic cirrhosis.  He had quit drinking several years ago.  He had a positive hepatitis B screening prior to his dialysis but repeat lab evaluation was negative for hepatitis B.  He is currently is currently saving his hepatitis B vaccination with third shot due in the next few weeks.  The patient has a history of COPD.  He quit smoking 3 years ago after smoking 2 to 3 packs x 38 years.        Review of Systems   Constitution: Positive for chills. Negative for fever.   Cardiovascular: Negative for chest pain.   Respiratory: Negative for cough and shortness of breath.    Gastrointestinal: Positive for nausea and vomiting. Negative for abdominal pain and diarrhea.   Genitourinary: Negative for dysuria.   All other systems reviewed and are negative.          Personal History     Past Medical History:   Past Medical History:   Diagnosis Date   • Acute perforated gastric ulcer with  hemorrhage (CMS/HCC) 07/16/2018   • Alcoholism (CMS/HCC)    • Allergic rhinitis    • Arthritis     rheumatoid (diagnosed at age 16)   • Cirrhosis (CMS/HCC)     etoh cirrhosis-liver failure   • CKD (chronic kidney disease)    • COPD (chronic obstructive pulmonary disease) (CMS/HCC)    • Depression    • Duodenal ulcer      perforated duodenal s/p patch   • History of transfusion    • Infectious viral hepatitis    • MRSA (methicillin resistant Staphylococcus aureus) carrier 06/12/2019    nasal swab   • Tumors      benign essential   • Umbilical hernia u   • Wound, open     nonhealing sore       Surgical History:      Past Surgical History:   Procedure Laterality Date   • CYST REMOVAL Left     forarm   • ENDOSCOPY      ascities/paracentesis   • EXPLORATORY LAPAROTOMY  07/16/2018    Over-sew Gastric Ulcer With Gastrostomy and Jejunostomy Tube   • FOOT SURGERY Right     right bunion removal   • FRACTURE SURGERY Right 2005    bunionectomy/ broke toe and put in rods   • MASS EXCISION Right 6/18/2019    Procedure: EXCISION OF SEBACEOUS CYST OF THE RIGHT BACK;  Surgeon: Sapna Elizalde MD;  Location: [facility] OR;  Service: General   • MASS EXCISION Right 8/27/2019    Procedure: EXCISION LESION of right back;  Surgeon: Sapna Elizalde MD;  Location: [facility] OR;  Service: General   • UMBILICAL HERNIA REPAIR N/A 6/18/2019    Procedure: UMBILICAL HERNIA REPAIR LAPAROSCOPIC WITH MESH WITH EXTENSIVE LYSIS OF ADHESIONS;  Surgeon: Sapna Elizalde MD;  Location: [facility] OR;  Service: General           Family History: family history includes Cancer in his mother; Lung disease in his father; Mental illness in his brother; Other in his father. Otherwise pertinent FHx was reviewed and unremarkable.     Social History:  reports that he has quit smoking. He has quit using smokeless tobacco. He reports that he does not drink alcohol or use drugs.      Medications:  Prior to Admission medications    Medication Sig Start Date End Date  Taking? Authorizing Provider   ALBUTEROL SULFATE  (90 Base) MCG/ACT inhaler INHALE 2 PUFFS EVERY 4 (FOUR) HOURS AS NEEDED FOR WHEEZING OR SHORTNESS OF AIR. 11/27/19   Carlita Shepherd MD   Azelastine HCl 137 MCG/SPRAY solution USE 2 SPRAYS EACH NOSTRIL TWICE A DAY 12/12/19   Carlita Shepherd MD   bumetanide (BUMEX) 2 MG tablet Take 2 mg by mouth 2 (Two) Times a Day.    Deon Mayes MD   Etanercept (ENBREL MINI) 50 MG/ML solution cartridge Inject 50 mg under the skin into the appropriate area as directed 1 (One) Time Per Week. 9/13/19   Lala Costa MD   famotidine (PEPCID) 20 MG tablet Take 1 tablet by mouth Daily. 12/18/19   Lala Costa MD   lactulose (CHRONULAC) 10 GM/15ML solution Take 30 mL by mouth 4 (Four) Times a Day. 1/7/20   Barbara Salazar APRN   magnesium oxide (MAG-OX) 400 MG tablet Take 1 tablet by mouth 2 (Two) Times a Day. 6/6/19   Deon Mayes MD   meclizine 25 MG chewable tablet chewable tablet Chew 25 mg.    Deon Mayes MD   metFORMIN ER (GLUCOPHAGE-XR) 500 MG 24 hr tablet TAKE 1 TABLET BY MOUTH EVERY DAY WITH BREAKFAST 1/3/20   Carlita Shepherd MD   montelukast (SINGULAIR) 10 MG tablet TAKE 1 TABLET BY MOUTH EVERY DAY AT NIGHT 11/27/19   Carlita Shepherd MD   ondansetron (ZOFRAN) 8 MG tablet Take  by mouth Every 8 (Eight) Hours As Needed for Nausea or Vomiting.    Deon Mayes MD   potassium chloride (K-DUR,KLOR-CON) 20 MEQ CR tablet Take 20 mEq by mouth 3 (Three) Times a Day.    Deon Mayes MD   predniSONE (DELTASONE) 5 MG tablet Take 1.5 tablets by mouth Daily. 12/18/19   Lala Costa MD   promethazine (PHENERGAN) 12.5 MG tablet Take 12.5 mg by mouth Every 6 (Six) Hours As Needed for Nausea or Vomiting.    Deon Mayes MD   rifaximin (XIFAXAN) 550 MG tablet Take 1 tablet by mouth Every 12 (Twelve) Hours. 10/21/19   Carlita Shepherd MD   spironolactone (ALDACTONE) 25 MG tablet  Take 25 mg by mouth Daily.    Deon Mayes MD   tamsulosin (FLOMAX) 0.4 MG capsule 24 hr capsule Take 1 capsule by mouth 2 (Two) Times a Day.    Deon Mayes MD   vitamin D (ERGOCALCIFEROL) 19007 units capsule capsule Take 50,000 Units by mouth Every 14 (Fourteen) Days.    Deon Mayes MD   zinc sulfate (ZINCATE) 220 (50 Zn) MG capsule Take 220 mg by mouth Daily. 7/7/19   Deon Mayes MD   lactulose (CHRONULAC) 10 GM/15ML solution TAKE 30 ML BY MOUTH THREE TIMES A DAY AS NEEDED. 10/11/19 1/7/20  Carlita Shepherd MD       Allergies:    Allergies   Allergen Reactions   • Duloxetine Unknown (See Comments)     Unknown reaction        Objective   Objective     Vital Signs  Temp:  [97.8 °F (36.6 °C)-98.3 °F (36.8 °C)] 97.8 °F (36.6 °C)  Heart Rate:  [73-81] 81  Resp:  [16] 16  BP: (103-123)/(49-78) 103/50  SpO2:  [93 %-95 %] 95 %  on   ;   Device (Oxygen Therapy): room air  Body mass index is 35.87 kg/m².    Physical Exam   Constitutional: He is oriented to person, place, and time. He appears well-developed and well-nourished. No distress.   HENT:   Head: Normocephalic and atraumatic.   Mouth/Throat: No oropharyngeal exudate.   Significantly dry mucosa   Eyes: Pupils are equal, round, and reactive to light. Conjunctivae and EOM are normal. No scleral icterus.   Neck: Normal range of motion. Neck supple. No JVD present. No tracheal deviation present. No thyromegaly present.   Cardiovascular: Normal rate, regular rhythm, normal heart sounds and intact distal pulses.   No murmur heard.  Pulmonary/Chest: Effort normal and breath sounds normal.   Abdominal: Soft. Bowel sounds are normal. He exhibits distension. There is no tenderness.   Mild abdominal distention with left side being more prominent than the right.  Patient states this is normal for his abdomen since having his abdominal surgery in 2017.   Musculoskeletal: Normal range of motion. He exhibits no edema.   Lymphadenopathy:      He has no cervical adenopathy.   Neurological: He is alert and oriented to person, place, and time. He displays normal reflexes. No cranial nerve deficit or sensory deficit. He exhibits normal muscle tone. Coordination normal.   Mildly slow to answer   Skin: Skin is warm and dry. Capillary refill takes less than 2 seconds. He is not diaphoretic.   Psychiatric: He has a normal mood and affect. His behavior is normal. Judgment and thought content normal.   Vitals reviewed.      Results Review:  I have personally reviewed most recent lab results and agree with findings, most notably: Acute hyperglycemia, hyponatremia.    Results from last 7 days   Lab Units 01/07/20  2203   WBC 10*3/mm3 7.00   HEMOGLOBIN g/dL 15.0   HEMATOCRIT % 44.3   PLATELETS 10*3/mm3 132*     Results from last 7 days   Lab Units 01/07/20  2203   SODIUM mmol/L 120*   POTASSIUM mmol/L 4.6   CHLORIDE mmol/L 83*   CO2 mmol/L 20.0*   BUN mg/dL 15   CREATININE mg/dL 1.25   GLUCOSE mg/dL 827*   CALCIUM mg/dL 9.6   ALT (SGPT) U/L 14   AST (SGOT) U/L 18     Estimated Creatinine Clearance: 88 mL/min (by C-G formula based on SCr of 1.25 mg/dL).  Brief Urine Lab Results  (Last result in the past 365 days)      Color   Clarity   Blood   Leuk Est   Nitrite   Protein   CREAT   Urine HCG        12/05/19 1425 Yellow Clear Negative Negative Negative Negative               Microbiology Results (last 10 days)     ** No results found for the last 240 hours. **          ECG/EMG Results (most recent)     None              No radiology results for the last 7 days      Estimated Creatinine Clearance: 88 mL/min (by C-G formula based on SCr of 1.25 mg/dL).    Assessment/Plan   Assessment/Plan       Active Hospital Problems    Diagnosis  POA   • Type 2 diabetes mellitus with hyperglycemia (CMS/HCC) [E11.65]  Unknown     Priority: High   • Hyperammonemia (CMS/HCC) [E72.20]  Yes     Priority: High   • Renal injury [S37.009A]  Yes     Priority: High   • Weakness [R53.1]  Yes      Priority: Medium   • Alcoholic cirrhosis (CMS/HCC) [K70.30]  Yes     Priority: Medium      Resolved Hospital Problems   No resolved problems to display.       Active Hospital Problems:  No notes have been filed under this hospital service.  Service: Hospitalist    Hyperglycemia, acute, severe, blood glucose 827-- secondary to new onset diabetes type 2 with steroid therapy: blood glucose every 2 hours and treat with sliding scale x3; Then before meals and at bedtime; add steroid induced hyperglycemia protocol; add Lantus 10 units x 1    --sodium 120, CO2 21 anion gap 17    Diabetes type 2, new onset: Hold metformin; check hemoglobin A1c; add sliding scale; add Lantus 10 units x 1; check TSH    Liver cirrhosis--alcoholic cirrhosis: Continue Xifaxan, spironolactone; continue lactulose 4 times daily; hold Bumex with potassium until hyperglycemia and dehydration resolved    --, protein total 8.1, ALT and AST normal, bilirubin total 2.0, ammonia 119; INR 1.3    Hyperammonia anemia, ammonia level 119: Lactulose every 2 hours x 2 then resume 4 times daily; repeat ammonia level    Thrombocytopenia, mild, platelets 132--likely secondary to liver cirrhosis: Repeat CBC    --INR 1.3    Renal injury, mild, creatinine 1.25 with comparison 1.08--likely secondary to dehydration related to hyperglycemia and diuretic therapy: IV fluids; repeat BMP; hold Bumex until renal injury resolved    Hyponatremia, marked, sodium 120 with corrected sodium 132: IV fluids; repeat BMP    COPD, chronic:  Hold albuterol HFA; add albuterol mini nebs as needed; continue Astelin nasal spray, Singulair, Nasacort    GERD, chronic: Continue Pepcid    BPH, chronic: Continue Flomax    VTE Prophylaxis - Lovenox 40 mg SC daily.    CODE STATUS:    Code Status and Medical Interventions:   Ordered at: 01/07/20 3740     Code Status:    CPR     Medical Interventions (Level of Support Prior to Arrest):    Full       Admission Status:  I believe this patient  meets observation criteria.      I discussed the patient's findings and my recommendations with family.        Electronically signed by CARMEN Dacosta, 01/07/20, 11:57 PM.  [facility] Hospitalist Team          Electronically signed by Mauro Geiger,  at 01/08/20 1543          Emergency Department Notes      Arvind Graff MD at 01/07/20 0964          Subjective   Patient is a 49-year-old male who states that outpatient laboratory data performed earlier today and was called at home by his physician stating that he had hyponatremia and hyperglycemia.  He was instructed come to the ED to be admitted.  The patient has no complaints other than generalized weakness.  His cough fever chest pain vomiting or other complaint          Review of Systems  For headache ears throat cough fever chest pain shortness of breath abdominal pain Bondar dysuria is weight loss or other associated complaints.  A complete review systems is obtained and is otherwise negative.  Past Medical History:   Diagnosis Date   • Acute perforated gastric ulcer with hemorrhage (CMS/HCC) 07/16/2018   • Alcoholism (CMS/HCC)    • Allergic rhinitis    • Arthritis     rheumatoid (diagnosed at age 16)   • Cirrhosis (CMS/HCC)     etoh cirrhosis-liver failure   • CKD (chronic kidney disease)    • COPD (chronic obstructive pulmonary disease) (CMS/HCC)    • Depression    • Duodenal ulcer      perforated duodenal s/p patch   • History of transfusion    • Infectious viral hepatitis    • MRSA (methicillin resistant Staphylococcus aureus) carrier 06/12/2019    nasal swab   • Tumors      benign essential   • Umbilical hernia u   • Wound, open     nonhealing sore       Allergies   Allergen Reactions   • Duloxetine Unknown (See Comments)     Unknown reaction        Past Surgical History:   Procedure Laterality Date   • CYST REMOVAL Left     forarm   • ENDOSCOPY      ascities/paracentesis   • EXPLORATORY LAPAROTOMY  07/16/2018    Over-sew Gastric Ulcer With  Gastrostomy and Jejunostomy Tube   • FOOT SURGERY Right     right bunion removal   • FRACTURE SURGERY Right 2005    bunionectomy/ broke toe and put in rods   • MASS EXCISION Right 6/18/2019    Procedure: EXCISION OF SEBACEOUS CYST OF THE RIGHT BACK;  Surgeon: Sapna Elizalde MD;  Location: [facility] MAIN OR;  Service: General   • MASS EXCISION Right 8/27/2019    Procedure: EXCISION LESION of right back;  Surgeon: Sapna Elizalde MD;  Location: [facility] MAIN OR;  Service: General   • UMBILICAL HERNIA REPAIR N/A 6/18/2019    Procedure: UMBILICAL HERNIA REPAIR LAPAROSCOPIC WITH MESH WITH EXTENSIVE LYSIS OF ADHESIONS;  Surgeon: Sapna Elizalde MD;  Location: [facility] MAIN OR;  Service: General       Family History   Problem Relation Age of Onset   • Cancer Mother    • Lung disease Father    • Other Father         prostate problems   • Mental illness Brother        Social History     Socioeconomic History   • Marital status:      Spouse name: Nicky Selby   • Number of children: Not on file   • Years of education: Not on file   • Highest education level: Not on file   Tobacco Use   • Smoking status: Former Smoker   • Smokeless tobacco: Former User   Substance and Sexual Activity   • Alcohol use: No     Frequency: Never     Comment: Off alcohol since July 2018   • Drug use: No   • Sexual activity: Defer           Objective   Physical Exam  HEENT exam shows TMs to be clear.  Oropharynx comers but sclerae nonicteric.  Neck has no adenopathy JVD or bruits.  Lungs are clear.  Heart has regular rate rhythm without murmur gallop.  Chest is nontender.  Abdomen soft nontender.  Extremities M is no cyanosis or edema.  Procedures          ED Course            Results for orders placed or performed during the hospital encounter of 01/07/20   Comprehensive Metabolic Panel   Result Value Ref Range    Glucose 827 (C) 65 - 99 mg/dL    BUN 15 6 - 20 mg/dL    Creatinine 1.25 0.76 - 1.27 mg/dL    Sodium 120 (L) 136 - 145 mmol/L    Potassium 4.6 3.5  - 5.2 mmol/L    Chloride 83 (L) 98 - 107 mmol/L    CO2 20.0 (L) 22.0 - 29.0 mmol/L    Calcium 9.6 8.6 - 10.5 mg/dL    Total Protein 8.1 6.0 - 8.5 g/dL    Albumin 3.40 (L) 3.50 - 5.20 g/dL    ALT (SGPT) 14 1 - 41 U/L    AST (SGOT) 18 1 - 40 U/L    Alkaline Phosphatase 208 (H) 39 - 117 U/L    Total Bilirubin 2.0 (H) 0.2 - 1.2 mg/dL    eGFR Non African Amer 61 >60 mL/min/1.73    Globulin 4.7 gm/dL    A/G Ratio 0.7 g/dL    BUN/Creatinine Ratio 12.0 7.0 - 25.0    Anion Gap 17.0 (H) 5.0 - 15.0 mmol/L   CBC Auto Differential   Result Value Ref Range    WBC 7.00 3.40 - 10.80 10*3/mm3    RBC 4.68 4.14 - 5.80 10*6/mm3    Hemoglobin 15.0 13.0 - 17.7 g/dL    Hematocrit 44.3 37.5 - 51.0 %    MCV 94.7 79.0 - 97.0 fL    MCH 32.1 26.6 - 33.0 pg    MCHC 33.9 31.5 - 35.7 g/dL    RDW 14.6 12.3 - 15.4 %    RDW-SD 48.6 37.0 - 54.0 fl    MPV 9.5 6.0 - 12.0 fL    Platelets 132 (L) 140 - 450 10*3/mm3    Neutrophil % 68.4 42.7 - 76.0 %    Lymphocyte % 19.8 19.6 - 45.3 %    Monocyte % 9.0 5.0 - 12.0 %    Eosinophil % 2.1 0.3 - 6.2 %    Basophil % 0.7 0.0 - 1.5 %    Neutrophils, Absolute 4.80 1.70 - 7.00 10*3/mm3    Lymphocytes, Absolute 1.40 0.70 - 3.10 10*3/mm3    Monocytes, Absolute 0.60 0.10 - 0.90 10*3/mm3    Eosinophils, Absolute 0.10 0.00 - 0.40 10*3/mm3    Basophils, Absolute 0.10 0.00 - 0.20 10*3/mm3    nRBC 0.1 0.0 - 0.2 /100 WBC     No radiology results for the last day                                        MDM  Number of Diagnoses or Management Options  Diagnosis management comments: Patient finds consistent with hyperglycemia as well as hyponatremia.  He has no evidence of infectious process or other metabolic abnormalities.  His ammonia is elevated however the patient has no neurologic deficits.  Patient will be started on IV insulin as well as sodium replacement.  He did speak the on-call hospitalist.    Risk of Complications, Morbidity, and/or Mortality  Presenting problems: high  Diagnostic procedures: high  Management  options: high    Patient Progress  Patient progress: stable      Final diagnoses:   Weakness   Hyperglycemia   Hyponatremia            Arvind Graff MD  01/07/20 5364      Electronically signed by Arvind Graff MD at 01/07/20 2358          Physician Progress Notes (last 48 hours) (Notes from 01/07/20 1034 through 01/09/20 1034)      Fely Mauroshell STEWART DO at 01/08/20 1547                [facility] Medicine Services Daily Progress Note      Hospitalist Team  LOS 1 days      Patient Care Team:  Carlita Shepherd MD as PCP - General (Internal Medicine)  Carlita Shepherd MD as PCP - Family Medicine    Patient Location: 228/1      Subjective   Subjective     Chief Complaint / Subjective  Chief Complaint   Patient presents with   • Hyperglycemia     Evaluated the patient in the morning of 1/8/2020.  Confused.  Afebrile.  No BM and has been getting lactulose.  Blood glucose improving      Present on Admission:  • Weakness  • Alcoholic cirrhosis (CMS/HCC)  • Hyperammonemia (CMS/HCC)  • Renal injury  • Encephalopathy, hepatic (CMS/HCC)      Brief Synopsis of Hospital Course/HPI    The patient is a 49-year-old male with history of alcoholic cirrhosis and rheumatoid arthritis that was sent to the emergency room on 1/7/2020 because of elevated ammonia levels and blood glucose.  The patient apparently was recently put on prednisone about 3 weeks ago and Enbrel had been discontinued.  The patient was recently prescribed metformin by PCP but never took it. The patient follows with hematology/oncology for MGUS.    Review of Systems   Unable to perform ROS: mental status change         Objective   Objective      Vital Signs  Temp:  [97.8 °F (36.6 °C)-98.1 °F (36.7 °C)] 98.1 °F (36.7 °C)  Heart Rate:  [62-81] 62  Resp:  [16-18] 18  BP: (103-131)/(49-82) 118/75  Oxygen Therapy  SpO2: 95 %  Pulse Oximetry Type: Intermittent  Device (Oxygen Therapy): room air  Flowsheet Rows      First Filed Value  "  Admission Height  177.8 cm (70\") Documented at 01/07/2020 2135   Admission Weight  113 kg (250 lb) Documented at 01/07/2020 2135        Intake & Output (last 3 days)       01/05 0701 - 01/06 0700 01/06 0701 - 01/07 0700 01/07 0701 - 01/08 0700 01/08 0701 - 01/09 0700    IV Piggyback   500     Total Intake(mL/kg)   500 (4.4)     Net   +500                 Lines, Drains & Airways    Active LDAs     Name:   Placement date:   Placement time:   Site:   Days:    Peripheral IV 01/07/20 2201 Left Forearm   01/07/20 2201    Forearm   less than 1                  Physical Exam:    Physical Exam   Constitutional: He appears well-developed and well-nourished.   HENT:   Head: Normocephalic and atraumatic.   Eyes: Pupils are equal, round, and reactive to light. EOM are normal. No scleral icterus.   Neck: Normal range of motion. Neck supple.   Cardiovascular: Normal rate and regular rhythm.   Pulmonary/Chest: Effort normal and breath sounds normal.   Abdominal: Soft. Bowel sounds are normal. There is no tenderness.   Musculoskeletal:   Moves all extremities equally.   Lymphadenopathy:     He has no cervical adenopathy.   Neurological: He is alert. No cranial nerve deficit or sensory deficit.   Skin: Skin is warm and dry. No rash noted.   Psychiatric: His speech is normal. Cognition and memory are impaired.         Procedures:              Results Review:     I reviewed the patient's new clinical results.      Lab Results (last 24 hours)     Procedure Component Value Units Date/Time    POC Glucose Once [[id number]]  (Abnormal) Collected:  01/07/20 2136    Specimen:  Blood Updated:  01/08/20 1526     Glucose >500 mg/dL      Comment: Serial Number: [id number]:  [id number]       POC Glucose Once [[id number]]  (Abnormal) Collected:  01/08/20 1139    Specimen:  Blood Updated:  01/08/20 1152     Glucose 254 mg/dL      Comment: Serial Number: [id number]:  [id number]       POC Glucose Once [[id number]]  (Abnormal) Collected:  " 01/08/20 0723    Specimen:  Blood Updated:  01/08/20 0724     Glucose 340 mg/dL      Comment: Serial Number: [id number]:  [id number]       BNP [[id number]]  (Normal) Collected:  01/07/20 2203    Specimen:  Blood Updated:  01/08/20 0715     proBNP 71.1 pg/mL     Narrative:       Among patients with dyspnea, NT-proBNP is highly sensitive for the detection of acute congestive heart failure. In addition NT-proBNP of <300 pg/ml effectively rules out acute congestive heart failure with 99% negative predictive value.      TSH [[id number]]  (Normal) Collected:  01/08/20 0500    Specimen:  Blood Updated:  01/08/20 0603     TSH 2.940 uIU/mL     Comprehensive Metabolic Panel [[id number]]  (Abnormal) Collected:  01/08/20 0500    Specimen:  Blood Updated:  01/08/20 0556     Glucose 399 mg/dL      BUN 11 mg/dL      Creatinine 1.09 mg/dL      Sodium 127 mmol/L      Potassium 3.7 mmol/L      Chloride 90 mmol/L      CO2 25.0 mmol/L      Calcium 10.3 mg/dL      Total Protein 7.8 g/dL      Albumin 3.20 g/dL      ALT (SGPT) 13 U/L      AST (SGOT) 17 U/L      Alkaline Phosphatase 204 U/L      Total Bilirubin 1.6 mg/dL      eGFR Non African Amer 71 mL/min/1.73      Globulin 4.6 gm/dL      A/G Ratio 0.7 g/dL      BUN/Creatinine Ratio 10.1     Anion Gap 12.0 mmol/L     Narrative:       GFR Normal >60  Chronic Kidney Disease <60  Kidney Failure <15      CBC & Differential [[id number]] Collected:  01/08/20 0500    Specimen:  Blood Updated:  01/08/20 0528    Narrative:       The following orders were created for panel order CBC & Differential.  Procedure                               Abnormality         Status                     ---------                               -----------         ------                     CBC Auto Differential[[id number]]        Normal              Final result                 Please view results for these tests on the individual orders.    CBC Auto Differential [[id number]]  (Normal) Collected:  01/08/20 0500     Specimen:  Blood Updated:  01/08/20 0528     WBC 8.90 10*3/mm3      RBC 4.73 10*6/mm3      Hemoglobin 15.0 g/dL      Hematocrit 43.6 %      MCV 92.2 fL      MCH 31.8 pg      MCHC 34.5 g/dL      RDW 14.2 %      RDW-SD 45.5 fl      MPV 9.7 fL      Platelets 144 10*3/mm3      Neutrophil % 56.1 %      Lymphocyte % 29.3 %      Monocyte % 9.1 %      Eosinophil % 4.6 %      Basophil % 0.9 %      Neutrophils, Absolute 5.00 10*3/mm3      Lymphocytes, Absolute 2.60 10*3/mm3      Monocytes, Absolute 0.80 10*3/mm3      Eosinophils, Absolute 0.40 10*3/mm3      Basophils, Absolute 0.10 10*3/mm3      nRBC 0.1 /100 WBC     POC Glucose Once [[id number]]  (Abnormal) Collected:  01/08/20 0411    Specimen:  Blood Updated:  01/08/20 0412     Glucose 387 mg/dL      Comment: Serial Number: [id number]:  [id number]       POC Glucose Once [[id number]]  (Abnormal) Collected:  01/08/20 0126    Specimen:  Blood Updated:  01/08/20 0127     Glucose >500 mg/dL      Comment: Serial Number: [id number]:  [id number]       Lipase [[id number]]  (Normal) Collected:  01/07/20 2203    Specimen:  Blood Updated:  01/08/20 0029     Lipase 40 U/L     Comprehensive Metabolic Panel [[id number]]  (Abnormal) Collected:  01/07/20 2203    Specimen:  Blood Updated:  01/07/20 2244     Glucose 827 mg/dL      BUN 15 mg/dL      Creatinine 1.25 mg/dL      Sodium 120 mmol/L      Potassium 4.6 mmol/L      Chloride 83 mmol/L      CO2 20.0 mmol/L      Calcium 9.6 mg/dL      Total Protein 8.1 g/dL      Albumin 3.40 g/dL      ALT (SGPT) 14 U/L      AST (SGOT) 18 U/L      Alkaline Phosphatase 208 U/L      Total Bilirubin 2.0 mg/dL      eGFR Non African Amer 61 mL/min/1.73      Globulin 4.7 gm/dL      A/G Ratio 0.7 g/dL      BUN/Creatinine Ratio 12.0     Anion Gap 17.0 mmol/L     Narrative:       GFR Normal >60  Chronic Kidney Disease <60  Kidney Failure <15      CBC & Differential [[id number]] Collected:  01/07/20 2203    Specimen:  Blood Updated:  01/07/20 2212     Narrative:       The following orders were created for panel order CBC & Differential.  Procedure                               Abnormality         Status                     ---------                               -----------         ------                     CBC Auto Differential[[id number]]        Abnormal            Final result                 Please view results for these tests on the individual orders.    CBC Auto Differential [[id number]]  (Abnormal) Collected:  01/07/20 2203    Specimen:  Blood Updated:  01/07/20 2212     WBC 7.00 10*3/mm3      RBC 4.68 10*6/mm3      Hemoglobin 15.0 g/dL      Hematocrit 44.3 %      MCV 94.7 fL      MCH 32.1 pg      MCHC 33.9 g/dL      RDW 14.6 %      RDW-SD 48.6 fl      MPV 9.5 fL      Platelets 132 10*3/mm3      Neutrophil % 68.4 %      Lymphocyte % 19.8 %      Monocyte % 9.0 %      Eosinophil % 2.1 %      Basophil % 0.7 %      Neutrophils, Absolute 4.80 10*3/mm3      Lymphocytes, Absolute 1.40 10*3/mm3      Monocytes, Absolute 0.60 10*3/mm3      Eosinophils, Absolute 0.10 10*3/mm3      Basophils, Absolute 0.10 10*3/mm3      nRBC 0.1 /100 WBC         No results found for: HGBA1C                Microbiology Results (last 10 days)     ** No results found for the last 240 hours. **          ECG/EMG Results (most recent)     None                  No radiology results for the last 7 days    Xrays, labs reviewed personally by physician.    Medication Review:   I have reviewed the patient's current medication list      Scheduled Meds    azelastine 2 spray Each Nare Daily   enoxaparin 40 mg Subcutaneous Daily   insulin glargine 10 Units Subcutaneous Nightly   insulin lispro 0-24 Units Subcutaneous 4x Daily With Meals & Nightly   insulin regular 2 Units Subcutaneous Daily   lactulose 30 mL Oral 4x Daily   magnesium oxide 400 mg Oral BID   montelukast 10 mg Oral Nightly   pantoprazole 40 mg Oral Q AM   predniSONE 7.5 mg Oral Daily   riFAXIMin 550 mg Oral Q12H   tamsulosin 0.4 mg Oral  BID       Meds Infusions    Pharmacy Consult - Steroid Insulin Protocol     sodium chloride 125 mL/hr Last Rate: 125 mL/hr (01/08/20 0611)       Meds PRN  •  albuterol  •  dextrose  •  dextrose  •  glucagon (human recombinant)  •  insulin lispro **AND** insulin lispro  •  Pharmacy Consult - Steroid Insulin Protocol  •  [COMPLETED] Insert peripheral IV **AND** sodium chloride        Assessment/Plan   Assessment/Plan     Active Hospital Problems    Diagnosis  POA   • Type 2 diabetes mellitus with hyperglycemia (CMS/formerly [facility]) [E11.65]  Unknown     Priority: High   • Hyperammonemia (CMS/formerly [facility]) [E72.20]  Yes     Priority: High   • Encephalopathy, hepatic (CMS/HCC) [K72.90]  Yes     Priority: High   • Renal injury [S37.009A]  Yes   • Weakness [R53.1]  Yes   • Alcoholic cirrhosis (CMS/formerly [facility]) [K70.30]  Yes      Resolved Hospital Problems   No resolved problems to display.       MEDICAL DECISION MAKING COMPLEXITY BY PROBLEM:  Moderate      Uncontrolled diabetes mellitus (POA)  --Recent diagnosis diabetes--> A1c 12.6  --Continue Lantus and ISS.    Hepatic encephalopathy:  --Continue lactulose, Continue Xifaxan, spironolactone  --History of cirrhosis from alcohol abuse     JON:  --Nephrotoxin holds    MGUS:  --Following hematology/oncology consulted     COPD:  --Not in exacerbation  --Continue bronchodilators    GERD:  --Continue Pepcid     BPH:  --Continue Flomax.      Code Status -   Code Status and Medical Interventions:   Ordered at: 01/07/20 9656     Code Status:    CPR     Medical Interventions (Level of Support Prior to Arrest):    Full       Discharge Planning        Electronically signed by Mauro Gegier DO, 01/08/20, 3:49 PM.  [facility] Hospitalist Team        Electronically signed by Mauro Geiger DO at 01/08/20 1601       Consult Notes (last 48 hours) (Notes from 01/07/20 1034 through 01/09/20 1034)    No notes of this type exist for this encounter.

## 2020-01-09 NOTE — PROGRESS NOTES
"      [facility] Medicine Services Daily Progress Note      Hospitalist Team  LOS 2 days      Patient Care Team:  Carlita Shepherd MD as PCP - General (Internal Medicine)  Carlita Shepherd MD as PCP - Family Medicine    Patient Location: 228/1      Subjective   Subjective     Chief Complaint / Subjective  Chief Complaint   Patient presents with   • Hyperglycemia     Confusion better.  No bowel movement yet despite taking lactulose.  Afebrile.  Blood glucose controlled.    Present on Admission:  • Weakness  • Alcoholic cirrhosis (CMS/HCC)  • Hyperammonemia (CMS/HCC)  • Renal injury  • Encephalopathy, hepatic (CMS/HCC)      Brief Synopsis of Hospital Course/HPI    The patient is a 49-year-old male with history of alcoholic cirrhosis and rheumatoid arthritis that was sent to the emergency room on 1/7/2020 because of elevated ammonia levels and blood glucose.  The patient apparently was recently put on prednisone about 3 weeks ago and Enbrel had been discontinued.  The patient was recently prescribed metformin by PCP but never took it. The patient follows with hematology/oncology for MGUS.    Review of Systems   Unable to perform ROS: mental status change         Objective   Objective      Vital Signs  Temp:  [97.6 °F (36.4 °C)-97.8 °F (36.6 °C)] 97.6 °F (36.4 °C)  Heart Rate:  [68-71] 71  Resp:  [16-18] 18  BP: (111-126)/(67-82) 126/82  Oxygen Therapy  SpO2: 95 %  Pulse Oximetry Type: Intermittent  Device (Oxygen Therapy): room air  Flowsheet Rows      First Filed Value   Admission Height  177.8 cm (70\") Documented at 01/07/2020 2135   Admission Weight  113 kg (250 lb) Documented at 01/07/2020 2135        Intake & Output (last 3 days)       01/06 0701 - 01/07 0700 01/07 0701 - 01/08 0700 01/08 0701 - 01/09 0700 01/09 0701 - 01/10 0700    P.O.   240     IV Piggyback  500      Total Intake(mL/kg)  500 (4.4) 240 (2.1)     Net  +500 +240                 Lines, Drains & Airways    Active LDAs     " Name:   Placement date:   Placement time:   Site:   Days:    Peripheral IV 01/07/20 2201 Left Forearm   01/07/20 2201    Forearm   less than 1                  Physical Exam:    Physical Exam   Constitutional: He appears well-developed and well-nourished.   HENT:   Head: Normocephalic and atraumatic.   Eyes: Pupils are equal, round, and reactive to light. EOM are normal. No scleral icterus.   Neck: Normal range of motion. Neck supple.   Cardiovascular: Normal rate and regular rhythm.   Pulmonary/Chest: Effort normal and breath sounds normal.   Abdominal: Soft. Bowel sounds are normal. There is no tenderness.   Musculoskeletal:   Moves all extremities equally.   Lymphadenopathy:     He has no cervical adenopathy.   Neurological: He is alert. No cranial nerve deficit or sensory deficit.   Skin: Skin is warm and dry. No rash noted.   Psychiatric: His speech is normal. Cognition and memory are impaired.         Procedures:              Results Review:     I reviewed the patient's new clinical results.      Lab Results (last 24 hours)     Procedure Component Value Units Date/Time    POC Glucose Once [[id number]]  (Abnormal) Collected:  01/09/20 1223    Specimen:  Blood Updated:  01/09/20 1233     Glucose 301 mg/dL      Comment: Serial Number: [id number]:  [id number]       POC Glucose Once [[id number]]  (Abnormal) Collected:  01/09/20 0714    Specimen:  Blood Updated:  01/09/20 0716     Glucose 106 mg/dL      Comment: Serial Number: [id number]:  [id number]       Comprehensive Metabolic Panel [[id number]]  (Abnormal) Collected:  01/09/20 0444    Specimen:  Blood Updated:  01/09/20 0524     Glucose 122 mg/dL      BUN 10 mg/dL      Creatinine 1.00 mg/dL      Sodium 135 mmol/L      Potassium 3.5 mmol/L      Chloride 100 mmol/L      CO2 24.0 mmol/L      Calcium 9.2 mg/dL      Total Protein 7.1 g/dL      Albumin 3.00 g/dL      ALT (SGPT) 13 U/L      AST (SGOT) 19 U/L      Alkaline Phosphatase 130 U/L      Total  Bilirubin 1.8 mg/dL      eGFR Non African Amer 79 mL/min/1.73      Globulin 4.1 gm/dL      A/G Ratio 0.7 g/dL      BUN/Creatinine Ratio 10.0     Anion Gap 11.0 mmol/L     Narrative:       GFR Normal >60  Chronic Kidney Disease <60  Kidney Failure <15      Procalcitonin [[id number]]  (Normal) Collected:  01/09/20 0444    Specimen:  Blood Updated:  01/09/20 0520     Procalcitonin 0.17 ng/mL     Narrative:       Results may be falsely decreased if patient taking Biotin.     Magnesium [[id number]]  (Normal) Collected:  01/09/20 0444    Specimen:  Blood Updated:  01/09/20 0518     Magnesium 1.9 mg/dL     C-reactive Protein [[id number]]  (Normal) Collected:  01/09/20 0444    Specimen:  Blood Updated:  01/09/20 0518     C-Reactive Protein 0.44 mg/dL     Ammonia [[id number]]  (Abnormal) Collected:  01/09/20 0444    Specimen:  Blood Updated:  01/09/20 0516     Ammonia 75 umol/L     CBC & Differential [[id number]] Collected:  01/09/20 0444    Specimen:  Blood Updated:  01/09/20 0454    Narrative:       The following orders were created for panel order CBC & Differential.  Procedure                               Abnormality         Status                     ---------                               -----------         ------                     CBC Auto Differential[[id number]]        Abnormal            Final result                 Please view results for these tests on the individual orders.    CBC Auto Differential [[id number]]  (Abnormal) Collected:  01/09/20 0444    Specimen:  Blood Updated:  01/09/20 0454     WBC 7.00 10*3/mm3      RBC 4.40 10*6/mm3      Hemoglobin 14.2 g/dL      Hematocrit 41.0 %      MCV 93.2 fL      MCH 32.2 pg      MCHC 34.6 g/dL      RDW 14.3 %      RDW-SD 46.4 fl      MPV 9.5 fL      Platelets 120 10*3/mm3      Neutrophil % 52.5 %      Lymphocyte % 32.3 %      Monocyte % 9.5 %      Eosinophil % 4.7 %      Basophil % 1.0 %      Neutrophils, Absolute 3.70 10*3/mm3      Lymphocytes, Absolute 2.30 10*3/mm3       Monocytes, Absolute 0.70 10*3/mm3      Eosinophils, Absolute 0.30 10*3/mm3      Basophils, Absolute 0.10 10*3/mm3      nRBC 0.1 /100 WBC     POC Glucose Once [[id number]]  (Abnormal) Collected:  01/08/20 2006    Specimen:  Blood Updated:  01/08/20 2009     Glucose 366 mg/dL      Comment: Serial Number: [id number]:  [id number]       Procalcitonin [[id number]]  (Normal) Collected:  01/08/20 1720    Specimen:  Blood Updated:  01/08/20 1914     Procalcitonin 0.18 ng/mL     Narrative:       Results may be falsely decreased if patient taking Biotin.     CBC & Differential [[id number]] Collected:  01/08/20 1720    Specimen:  Blood Updated:  01/08/20 1826    Narrative:       The following orders were created for panel order CBC & Differential.  Procedure                               Abnormality         Status                     ---------                               -----------         ------                     CBC Auto Differential[[id number]]        Abnormal            Final result                 Please view results for these tests on the individual orders.    CBC Auto Differential [[id number]]  (Abnormal) Collected:  01/08/20 1720    Specimen:  Blood Updated:  01/08/20 1826     WBC 7.10 10*3/mm3      RBC 4.38 10*6/mm3      Hemoglobin 14.2 g/dL      Hematocrit 40.3 %      MCV 92.0 fL      MCH 32.3 pg      MCHC 35.1 g/dL      RDW 14.1 %      RDW-SD 45.1 fl      MPV 9.8 fL      Platelets 137 10*3/mm3      Neutrophil % 64.7 %      Lymphocyte % 20.8 %      Monocyte % 10.4 %      Eosinophil % 3.4 %      Basophil % 0.7 %      Neutrophils, Absolute 4.60 10*3/mm3      Lymphocytes, Absolute 1.50 10*3/mm3      Monocytes, Absolute 0.70 10*3/mm3      Eosinophils, Absolute 0.20 10*3/mm3      Basophils, Absolute 0.10 10*3/mm3      nRBC 0.1 /100 WBC     POC Glucose Once [[id number]]  (Abnormal) Collected:  01/08/20 1817    Specimen:  Blood Updated:  01/08/20 1818     Glucose 285 mg/dL      Comment: Serial Number:  [id number]:  [id number]       POC Glucose Once [[id number]]  (Abnormal) Collected:  01/07/20 2136    Specimen:  Blood Updated:  01/08/20 1526     Glucose >500 mg/dL      Comment: Serial Number: [id number]:  [id number]           No results found for: HGBA1C                Microbiology Results (last 10 days)     ** No results found for the last 240 hours. **          ECG/EMG Results (most recent)     None                  Xr Abdomen Kub    Result Date: 1/9/2020  Moderate to large generalized colonic stool burden. Correlate for constipation. No acute abdominal findings.  Electronically Signed By-Dr. Marylin Scherer MD On:1/9/2020 10:21 AM This report was finalized on [id number] by Dr. Marylin Scherer MD.      Xrays, labs reviewed personally by physician.    Medication Review:   I have reviewed the patient's current medication list      Scheduled Meds    azelastine 2 spray Each Nare Daily   enoxaparin 40 mg Subcutaneous Daily   insulin glargine 10 Units Subcutaneous Nightly   insulin lispro 0-24 Units Subcutaneous 4x Daily With Meals & Nightly   insulin regular 2 Units Subcutaneous Daily   lactulose 30 mL Oral 4x Daily   magnesium oxide 400 mg Oral BID   montelukast 10 mg Oral Nightly   pantoprazole 40 mg Oral Q AM   predniSONE 7.5 mg Oral Daily   riFAXIMin 550 mg Oral Q12H   smog enema 400 mL Rectal Once   tamsulosin 0.4 mg Oral BID       Meds Infusions    Pharmacy Consult - Steroid Insulin Protocol     sodium chloride 125 mL/hr Last Rate: 125 mL/hr (01/08/20 0611)       Meds PRN  •  albuterol  •  dextrose  •  dextrose  •  glucagon (human recombinant)  •  insulin lispro **AND** insulin lispro  •  Pharmacy Consult - Steroid Insulin Protocol  •  senna  •  [COMPLETED] Insert peripheral IV **AND** sodium chloride        Assessment/Plan   Assessment/Plan     Active Hospital Problems    Diagnosis  POA   • Type 2 diabetes mellitus with hyperglycemia (CMS/[facility]) [E11.65]  Unknown     Priority: High   •  Hyperammonemia (CMS/HCC) [E72.20]  Yes     Priority: High   • Encephalopathy, hepatic (CMS/HCC) [K72.90]  Yes     Priority: High   • Renal injury [S37.009A]  Yes   • Weakness [R53.1]  Yes   • Alcoholic cirrhosis (CMS/HCC) [K70.30]  Yes      Resolved Hospital Problems   No resolved problems to display.       MEDICAL DECISION MAKING COMPLEXITY BY PROBLEM:  Moderate      Uncontrolled diabetes mellitus (POA)  --Recent diagnosis diabetes--> A1c 12.6  --Continue Lantus and ISS.    Hepatic encephalopathy:  --Continue lactulose, Continue Xifaxan, spironolactone  --History of cirrhosis from alcohol abuse    Constipation:  --Ordered KUB 1/9/2020  --We will give enema     JON:  --Resolved.  --Likely from dehydration from hyperglycemia induced polyuria  --Nephrotoxin holds    MGUS:  --Following hematology/oncology consulted     COPD:  --Not in exacerbation  --Continue bronchodilators    GERD:  --Continue Pepcid     BPH:  --Continue Flomax.      Code Status -   Code Status and Medical Interventions:   Ordered at: 01/07/20 8782     Code Status:    CPR     Medical Interventions (Level of Support Prior to Arrest):    Full       Discharge Planning        Electronically signed by Mauro Geiger DO, 01/09/20, 2:44 PM.  [facility] Hospitalist Team

## 2020-01-10 ENCOUNTER — TELEPHONE (OUTPATIENT)
Dept: FAMILY MEDICINE CLINIC | Facility: CLINIC | Age: 50
End: 2020-01-10

## 2020-01-10 VITALS
SYSTOLIC BLOOD PRESSURE: 111 MMHG | WEIGHT: 255.95 LBS | BODY MASS INDEX: 36.64 KG/M2 | OXYGEN SATURATION: 92 % | RESPIRATION RATE: 18 BRPM | HEIGHT: 70 IN | DIASTOLIC BLOOD PRESSURE: 70 MMHG | TEMPERATURE: 97.6 F | HEART RATE: 81 BPM

## 2020-01-10 LAB
ALBUMIN SERPL-MCNC: 3.1 G/DL (ref 3.5–5.2)
ALBUMIN/GLOB SERPL: 0.7 G/DL
ALP SERPL-CCNC: 154 U/L (ref 39–117)
ALT SERPL W P-5'-P-CCNC: 13 U/L (ref 1–41)
ANION GAP SERPL CALCULATED.3IONS-SCNC: 8 MMOL/L (ref 5–15)
AST SERPL-CCNC: 21 U/L (ref 1–40)
BILIRUB SERPL-MCNC: 1.4 MG/DL (ref 0.2–1.2)
BUN BLD-MCNC: 8 MG/DL (ref 6–20)
BUN/CREAT SERPL: 10.1 (ref 7–25)
CALCIUM SPEC-SCNC: 9.2 MG/DL (ref 8.6–10.5)
CHLORIDE SERPL-SCNC: 100 MMOL/L (ref 98–107)
CO2 SERPL-SCNC: 24 MMOL/L (ref 22–29)
CREAT BLD-MCNC: 0.79 MG/DL (ref 0.76–1.27)
CRP SERPL-MCNC: 0.4 MG/DL (ref 0–0.5)
GFR SERPL CREATININE-BSD FRML MDRD: 103 ML/MIN/1.73
GLOBULIN UR ELPH-MCNC: 4.2 GM/DL
GLUCOSE BLD-MCNC: 170 MG/DL (ref 65–99)
GLUCOSE BLDC GLUCOMTR-MCNC: 142 MG/DL (ref 70–105)
GLUCOSE BLDC GLUCOMTR-MCNC: 271 MG/DL (ref 70–105)
MAGNESIUM SERPL-MCNC: 2 MG/DL (ref 1.6–2.6)
POTASSIUM BLD-SCNC: 3.9 MMOL/L (ref 3.5–5.2)
PROT SERPL-MCNC: 7.3 G/DL (ref 6–8.5)
SODIUM BLD-SCNC: 132 MMOL/L (ref 136–145)

## 2020-01-10 PROCEDURE — 83735 ASSAY OF MAGNESIUM: CPT | Performed by: HOSPITALIST

## 2020-01-10 PROCEDURE — 63710000001 INSULIN LISPRO (HUMAN) PER 5 UNITS: Performed by: HOSPITALIST

## 2020-01-10 PROCEDURE — 63710000001 INSULIN REGULAR HUMAN PER 5 UNITS: Performed by: NURSE PRACTITIONER

## 2020-01-10 PROCEDURE — 99238 HOSP IP/OBS DSCHRG MGMT 30/<: CPT | Performed by: HOSPITALIST

## 2020-01-10 PROCEDURE — 80053 COMPREHEN METABOLIC PANEL: CPT | Performed by: HOSPITALIST

## 2020-01-10 PROCEDURE — 63710000001 PREDNISONE PER 5 MG: Performed by: NURSE PRACTITIONER

## 2020-01-10 PROCEDURE — 82962 GLUCOSE BLOOD TEST: CPT

## 2020-01-10 PROCEDURE — 86140 C-REACTIVE PROTEIN: CPT | Performed by: HOSPITALIST

## 2020-01-10 RX ORDER — PEN NEEDLE, DIABETIC 30 GX3/16"
1 NEEDLE, DISPOSABLE MISCELLANEOUS
Qty: 200 EACH | Refills: 0 | Status: SHIPPED | OUTPATIENT
Start: 2020-01-10 | End: 2020-02-17 | Stop reason: SDUPTHER

## 2020-01-10 RX ORDER — ISOPROPYL ALCOHOL 700 MG/ML
1 CLOTH TOPICAL
Qty: 200 EACH | Refills: 0 | Status: SHIPPED | OUTPATIENT
Start: 2020-01-10 | End: 2020-09-02

## 2020-01-10 RX ORDER — INSULIN GLARGINE 100 [IU]/ML
10 INJECTION, SOLUTION SUBCUTANEOUS NIGHTLY
Qty: 10 ML | Refills: 0 | Status: SHIPPED | OUTPATIENT
Start: 2020-01-10 | End: 2020-01-17

## 2020-01-10 RX ORDER — LACTULOSE 10 G/15ML
30 SOLUTION ORAL 2 TIMES DAILY
Qty: 150 ML | Refills: 0 | Status: SHIPPED | OUTPATIENT
Start: 2020-01-10 | End: 2021-07-22 | Stop reason: HOSPADM

## 2020-01-10 RX ORDER — LANCETS
1 EACH MISCELLANEOUS
Qty: 204 EACH | Refills: 0 | Status: SHIPPED | OUTPATIENT
Start: 2020-01-10 | End: 2020-07-27 | Stop reason: SDUPTHER

## 2020-01-10 RX ORDER — INSULIN LISPRO 100 [IU]/ML
2 INJECTION, SOLUTION INTRAVENOUS; SUBCUTANEOUS
Qty: 3 ML | Refills: 0 | Status: SHIPPED | OUTPATIENT
Start: 2020-01-10 | End: 2020-01-17

## 2020-01-10 RX ORDER — SENNA PLUS 8.6 MG/1
1 TABLET ORAL 2 TIMES DAILY PRN
Qty: 30 TABLET | Refills: 0 | Status: SHIPPED | OUTPATIENT
Start: 2020-01-10 | End: 2022-08-12 | Stop reason: SDUPTHER

## 2020-01-10 RX ADMIN — TAMSULOSIN HYDROCHLORIDE 0.4 MG: 0.4 CAPSULE ORAL at 10:46

## 2020-01-10 RX ADMIN — RIFAXIMIN 550 MG: 550 TABLET ORAL at 10:47

## 2020-01-10 RX ADMIN — PANTOPRAZOLE SODIUM 40 MG: 40 TABLET, DELAYED RELEASE ORAL at 05:26

## 2020-01-10 RX ADMIN — INSULIN HUMAN 2 UNITS: 100 INJECTION, SOLUTION PARENTERAL at 10:46

## 2020-01-10 RX ADMIN — LACTULOSE 30 ML: 10 SOLUTION ORAL at 10:48

## 2020-01-10 RX ADMIN — PREDNISONE 7.5 MG: 5 TABLET ORAL at 10:46

## 2020-01-10 RX ADMIN — Medication 400 MG: at 10:46

## 2020-01-10 RX ADMIN — INSULIN LISPRO 12 UNITS: 100 INJECTION, SOLUTION INTRAVENOUS; SUBCUTANEOUS at 12:15

## 2020-01-10 NOTE — DISCHARGE SUMMARY
[facility] Medicine Services  DISCHARGE SUMMARY        Prepared For PCP:  Carlita Shepherd MD    Patient Name: Mc Selby  : 1970  MRN: [id number]      Date of Admission:   2020    Date of Discharge:  1/10/2020    Length of stay:  LOS: 3 days     Hospital Course     Presenting Problem:   Hyponatremia [E87.1]  Weakness [R53.1]  Hyperglycemia [R73.9]      Active Hospital Problems    Diagnosis  POA   • **Type 2 diabetes mellitus with hyperglycemia (CMS/HCC) [E11.65]  Unknown     Priority: High   • Hyperammonemia (CMS/HCC) [E72.20]  Yes     Priority: High   • Encephalopathy, hepatic (CMS/HCC) [K72.90]  Yes     Priority: High   • Renal injury [S37.009A]  Yes     Priority: Medium   • Weakness [R53.1]  Yes     Priority: Medium   • Alcoholic cirrhosis (CMS/HCC) [K70.30]  Yes     Priority: Medium      Resolved Hospital Problems   No resolved problems to display.           Hospital Course:    The patient was admitted to the medical floor for treatment of hepatic encephalopathy and hyperglycemia from uncontrolled diabetes mellitus.  Hemoglobin A1c came back at 9.6.  The patient's symptoms improved with IV fluids.  He did have evidence of severe dehydration as manifested with constipation since he did not have bowel movements after being given several rounds of lactulose.  He finally had a bowel movement after being given enema on 2020.  The patient admitted to drinking half a gallon of orange juice daily as a replacement for alcohol which he used to consume daily in the past.  The patient was seen by the nutritionist and was educated on diabetes mellitus and diet control.  Lactulose has been added to his discharge medication list and is also on Xifaxan at home.  Prescription for Lantus and mealtime insulin has been given.  We will defer to her rheumatologist regarding discontinuation of prednisone 7.5 mg p.o. daily . The patient will be discharged in the morning of 1/10/2020 and  needs to follow-up with his PCP within 2 weeks.      Recommendation for Outpatient Providers:             Reasons For Change In Medications and Indications for New Medications:        Day of Discharge     HPI:        The patient is a 49-year-old male with history of alcoholic cirrhosis and rheumatoid arthritis that was sent to the emergency room on 1/7/2020 because of elevated ammonia levels and blood glucose.  The patient apparently was recently put on prednisone about 3 weeks ago and Enbrel had been discontinued.  The patient was recently prescribed metformin by PCP but never took it. The patient follows with hematology/oncology for MGUS.    Vital Signs:   Temp:  [97.4 °F (36.3 °C)-97.9 °F (36.6 °C)] 97.6 °F (36.4 °C)  Heart Rate:  [69-81] 81  Resp:  [18-20] 18  BP: (110-117)/(70-72) 111/70     Physical Exam:  Physical Exam   Constitutional: He is oriented to person, place, and time. He appears well-developed and well-nourished.   HENT:   Head: Normocephalic and atraumatic.   Eyes: Pupils are equal, round, and reactive to light. EOM are normal.   Neck: Normal range of motion. Neck supple.   Cardiovascular: Normal rate and normal heart sounds.   Pulmonary/Chest: Effort normal and breath sounds normal.   Abdominal: Soft. Bowel sounds are normal.   Musculoskeletal: Normal range of motion.   Neurological: He is alert and oriented to person, place, and time.   Skin: Skin is warm and dry.   Psychiatric: He has a normal mood and affect.       Pertinent  and/or Most Recent Results     Results from last 7 days   Lab Units 01/10/20  0510 01/09/20  0444 01/08/20  1720 01/08/20  0500 01/07/20  2203 01/07/20  1536 01/07/20  1349   WBC 10*3/mm3  --  7.00 7.10 8.90 7.00  --  8.90   HEMOGLOBIN g/dL  --  14.2 14.2 15.0 15.0  --  15.7   HEMATOCRIT %  --  41.0 40.3 43.6 44.3  --  43.2   PLATELETS 10*3/mm3  --  120* 137* 144 132*  --  148   SODIUM mmol/L 132* 135*  --  127* 120* 123*  --    POTASSIUM mmol/L 3.9 3.5  --  3.7 4.6 4.5   --    CHLORIDE mmol/L 100 100  --  90* 83* 86*  --    CO2 mmol/L 24.0 24.0  --  25.0 20.0* 24.0  --    BUN mg/dL 8 10  --  11 15 13  --    CREATININE mg/dL 0.79 1.00  --  1.09 1.25 1.05  --    GLUCOSE mg/dL 170* 122*  --  399* 827* 610*  --    CALCIUM mg/dL 9.2 9.2  --  10.3 9.6 10.5  --      Results from last 7 days   Lab Units 01/10/20  0510 01/09/20  0444 01/08/20  0500 01/07/20  2203 01/07/20  1536   BILIRUBIN mg/dL 1.4* 1.8* 1.6* 2.0* 1.9*   ALK PHOS U/L 154* 130* 204* 208* 242*   ALT (SGPT) U/L 13 13 13 14 13   AST (SGOT) U/L 21 19 17 18 18           Invalid input(s): TG, LDLCALC, LDLREALC  Results from last 7 days   Lab Units 01/09/20  0444 01/08/20  1720 01/08/20  0500 01/07/20  2203   TSH uIU/mL  --   --  2.940  --    PROBNP pg/mL  --   --   --  71.1   PROCALCITONIN ng/mL 0.17 0.18  --   --        Brief Urine Lab Results  (Last result in the past 365 days)      Color   Clarity   Blood   Leuk Est   Nitrite   Protein   CREAT   Urine HCG        12/05/19 1425 Yellow Clear Negative Negative Negative Negative               Microbiology Results Abnormal     None          Xr Abdomen Kub    Result Date: 1/9/2020  Impression: Moderate to large generalized colonic stool burden. Correlate for constipation. No acute abdominal findings.  Electronically Signed By-Dr. Marylin Scherer MD On:1/9/2020 10:21 AM This report was finalized on [id number] by Dr. Marylin Scherer MD.                 Test Results Pending at Discharge: None      Procedures Performed: None           Consults:   Consults     Date and Time Order Name Status Description    1/7/2020 8641 Hospitalist (on-call MD unless specified) Completed             Discharge Details        Discharge Medications      New Medications      Instructions Start Date   ACCU-CHEK FASTCLIX LANCETS misc   1 each, Does not apply, 4 Times Daily Before Meals & Nightly      Alcohol Wipes 70 % misc   1 each, Apply externally, 4 Times Daily Before Meals & Nightly      glucose blood  test strip  Commonly known as:  ACCU-CHEK GUIDE   Check blood sugar before meals and at bedtime. Use as instructed      insulin glargine 100 UNIT/ML injection  Commonly known as:  LANTUS   10 Units, Subcutaneous, Nightly      Insulin Lispro (1 Unit Dial) 100 UNIT/ML solution pen-injector  Commonly known as:  HUMALOG KWIKPEN   2 Units, Subcutaneous, 3 Times Daily After Meals      Pen Needles 32G X 4 MM misc   1 each, Does not apply, 4 Times Daily Before Meals & Nightly      senna 8.6 MG tablet  Commonly known as:  SENOKOT   1 tablet, Oral, 2 Times Daily PRN         Changes to Medications      Instructions Start Date   lactulose 10 GM/15ML solution  Commonly known as:  CHRONULAC  What changed:  when to take this   30 mL, Oral, 2 Times Daily         Continue These Medications      Instructions Start Date   albuterol sulfate  (90 Base) MCG/ACT inhaler  Commonly known as:  PROVENTIL HFA;VENTOLIN HFA;PROAIR HFA   2 puffs, Inhalation, Every 4 Hours PRN      azelastine 0.1 % nasal spray  Commonly known as:  ASTELIN   2 sprays, Nasal, 2 Times Daily, Use in each nostril as directed       bumetanide 2 MG tablet  Commonly known as:  BUMEX   2 mg, Oral, 2 Times Daily      EPINEPHrine 0.3 MG/0.3ML solution auto-injector injection  Commonly known as:  EPIPEN   0.3 mg, Subcutaneous, Once As Needed      famotidine 20 MG tablet  Commonly known as:  PEPCID   TAKE 1 TABLET BY MOUTH EVERY DAY      magnesium oxide 400 MG tablet  Commonly known as:  MAG-OX   2 tablets, Oral, 2 Times Daily      meclizine 25 MG chewable tablet chewable tablet   25-50 mg, Oral, 3 Times Daily PRN      metFORMIN  MG 24 hr tablet  Commonly known as:  GLUCOPHAGE-XR   500 mg, Oral, Daily With Breakfast      montelukast 10 MG tablet  Commonly known as:  SINGULAIR   10 mg, Oral, Nightly      ondansetron 8 MG tablet  Commonly known as:  ZOFRAN   8 mg, Oral, Every 8 Hours PRN      potassium chloride 20 MEQ CR tablet  Commonly known as:  K-DUR,KLOR-CON    40 mEq, Oral, 3 Times Daily      predniSONE 5 MG tablet  Commonly known as:  DELTASONE   7.5 mg, Oral, Daily      promethazine 12.5 MG tablet  Commonly known as:  PHENERGAN   12.5 mg, Oral, Every 6 Hours PRN      riFAXIMin 550 MG tablet  Commonly known as:  XIFAXAN   550 mg, Oral, Every 12 Hours Scheduled      spironolactone 25 MG tablet  Commonly known as:  ALDACTONE   25 mg, Oral, Daily      tamsulosin 0.4 MG capsule 24 hr capsule  Commonly known as:  FLOMAX   1 capsule, Oral, 2 Times Daily      Triamcinolone Acetonide 55 MCG/ACT nasal inhaler  Commonly known as:  NASACORT   1 spray, Nasal, Daily      vitamin D 1.25 MG (53175 UT) capsule capsule  Commonly known as:  ERGOCALCIFEROL   50,000 Units, Oral, Every 14 Days, Every other Sunday      zinc sulfate 220 (50 Zn) MG capsule  Commonly known as:  ZINCATE   220 mg, Oral, Daily             Allergies   Allergen Reactions   • Duloxetine Unknown (See Comments)     Unknown reaction          Discharge Disposition: Home      Diet:  Hospital:  Diet Order   Procedures   • Diet Diabetic/Consistent Carbs; Diabetic - Consistent Carb         Discharge Activity: As tolerated        CODE STATUS:    Code Status and Medical Interventions:   Ordered at: 01/07/20 8276     Code Status:    CPR     Medical Interventions (Level of Support Prior to Arrest):    Full         Follow-up Appointments  Future Appointments   Date Time Provider Department Center   1/13/2020  1:30 PM LAB [facility] None   1/17/2020  2:00 PM Carlita Shepherd MD [facility] None   1/21/2020  1:00 PM [facility] 1 [facility]   2/12/2020  2:45 PM Celia Ellsworth MD [facility] None   2/12/2020  2:45 PM LAB MD [facility] LAB NA  [facility] None   4/2/2020  1:00 PM Lala Costa MD [facility] NA None       Additional Instructions for the Follow-ups that You Need to Schedule     Discharge Follow-up with PCP   As directed       Currently Documented PCP:    Carlita Shepherd MD     PCP Phone Number:    [phone]     Follow Up Details:  2 weeks                 Condition on Discharge:      Stable    Electronically signed by Mauro Geiger DO, 01/10/20, 1:04 PM.    Time: I spent  20 minutes on this discharge activity which included face-to-face encounter with the patient/reviewing the data in the system/coordination of the care with the nursing staff as well as consultants/documentation/entering orders.

## 2020-01-10 NOTE — PAYOR COMM NOTE
"RECONSIDERATION  For case# [id number]    Patient remains on medical unit with elevated glucose levels.   Per H&P note patient is newly diagnosed diabetic without established home regimen.     ---------------  [place]:  Does not meet observation d/c criteria after 3 days acute care.  Diabetes: Observation Care (OC-014)  Discharge to non-acute-care follow-up[E] is appropriate for patient with ALL of the following(1)(2)(3)(4)(5)(6)(7)(8)(9):   Glucose level acceptable and stable   Outpatient glucose management regimen arranged (ie, patient or caregiver has access to medications and supplies, understands treatment regimen, and is judged to be able to adhere to regimen)   Mental status at baseline  -----------------        RECONSIDERATION   AUTHORIZATION PENDING:   PLEASE FAX OR CALL DETERMINATION TO CONTACT BELOW:     THANK YOU,  FRANCIA Pennington, RN  Utilization Review  [facility]  Phone: [phone]  Fax: [phone]      Mc Selby (49 y.o. Male)     Date of Birth Social Security Number Address Home Phone MRN    1970  [address] [place] [address] [phone] [id number]    [employer] Marital Status          Mosque        Admission Date Admission Type Admitting Provider Attending Provider Department, Room/Bed    1/7/20 Emergency Mauro Geiger DO Gebre-Egziabher, Aman I, DO [facility] 2B MEDICAL INPATIENT, 228/1    Discharge Date Discharge Disposition Discharge Destination                       Attending Provider:  Mauro Geiger DO    Allergies:  Duloxetine    Isolation:  None   Infection:  None   Code Status:  CPR    Ht:  177.8 cm (70\")   Wt:  116 kg (255 lb 15.3 oz)    Admission Cmt:  None   Principal Problem:  None                Active Insurance as of 1/7/2020     Primary Coverage     Payor Plan Insurance Group Employer/Plan Group    [employer] COMMERCIAL AETNA [id number]     Payor Plan Address Payor Plan Phone Number Payor Plan Fax " Number Effective Dates    [address] [phone]  1/1/2013 - None Entered    [place] [facility] [place] [address]       Subscriber Name Subscriber Birth Date Member ID       RODGERSRADHAJOSELYN EMIGDIO 10/29/1972 [id number]           Secondary Coverage     Payor Plan Insurance Group Employer/Plan Group    [id number]     Payor Plan Address Payor Plan Phone Number Payor Plan Fax Number Effective Dates    MAIL STOP:   10/1/2018 - None Entered    [address] [address]       [facility] [address]       Subscriber Name Subscriber Birth Date Member ID       PAMELA RODGERS 1970 [id number]                 Emergency Contacts      (Rel.) Home Phone Work Phone Mobile Phone    JOSELYN RODGERS (Spouse) [phone] -- [phone]              Facility-Administered Medications as of 1/10/2020   Medication Dose Route Frequency Provider Last Rate Last Dose   • albuterol (PROVENTIL) nebulizer solution 0.083% 2.5 mg/3mL  2.5 mg Nebulization Q6H PRN Jeannie Galvan FNP       • azelastine (ASTELIN) nasal spray 2 spray  2 spray Each Nare Daily Jeannie Galvan FNP   2 spray at 01/09/20 0903   • dextrose (D50W) 25 g/ 50mL Intravenous Solution 25 g  25 g Intravenous Q15 Min PRN Jeannie Galvan FNP       • dextrose (GLUTOSE) oral gel 15 g  15 g Oral Q15 Min PRN Jeannie Galvan FNP       • enoxaparin (LOVENOX) syringe 40 mg  40 mg Subcutaneous Daily Jeannie Galvan FNP   40 mg at 01/09/20 1603   • glucagon (human recombinant) (GLUCAGEN DIAGNOSTIC) injection 1 mg  1 mg Subcutaneous Q15 Min PRN Jeannie Galvan FNP       • insulin glargine (LANTUS) injection 10 Units  10 Units Subcutaneous Nightly Jeannie Galvan FNP   10 Units at 01/09/20 2045   • insulin lispro (humaLOG) injection 0-24 Units  0-24 Units Subcutaneous 4x Daily With Meals & Nightly Mauro Geiger DO   12 Units at 01/10/20 1215    And   • insulin lispro (humaLOG) injection 0-24 Units  0-24 Units Subcutaneous PRN Mauro Geiger DO       • [COMPLETED]  insulin regular (humuLIN R,novoLIN R) injection 10 Units  10 Units Intravenous Once Arvind Graff MD   10 Units at 01/07/20 2310   • insulin regular (humuLIN R,novoLIN R) injection 2 Units  2 Units Subcutaneous Daily Jeannie Galvan FNP   2 Units at 01/10/20 1046   • [COMPLETED] lactulose (CHRONULAC) 10 GM/15ML solution 20 g  20 g Oral Q2H Jeannie Galvan FNP   20 g at 01/08/20 0642   • lactulose (CHRONULAC) 10 GM/15ML solution 30 mL  30 mL Oral 4x Daily Jeannie Galvan FNP   30 mL at 01/10/20 1048   • magnesium oxide (MAGOX) tablet 400 mg  400 mg Oral BID Jeannie Galvan FNP   400 mg at 01/10/20 1046   • montelukast (SINGULAIR) tablet 10 mg  10 mg Oral Nightly Jeannie Galvan FNP   10 mg at 01/09/20 2046   • pantoprazole (PROTONIX) EC tablet 40 mg  40 mg Oral Q AM Jeannie Galvan FNP   40 mg at 01/10/20 0526   • Pharmacy Consult - Steroid Insulin Protocol   Does not apply Continuous PRN Jeannie Galvan FNP       • predniSONE (DELTASONE) tablet 7.5 mg  7.5 mg Oral Daily Jeannie Galvan FNP   7.5 mg at 01/10/20 1046   • riFAXIMin (XIFAXAN) tablet 550 mg  550 mg Oral Q12H Jeannie Galvan FNP   550 mg at 01/10/20 1047   • senna (SENOKOT) tablet 1 tablet  1 tablet Oral BID PRN Hector-Egsruthiabher Mauro I, DO       • [COMPLETED] smog enema  400 mL Rectal Once Hector-Madisonabher Mauro I, DO   400 mL at 01/09/20 1603   • [COMPLETED] sodium chloride 0.9 % bolus 500 mL  500 mL Intravenous Once Arvind Graff MD   Stopped at 01/07/20 2340   • sodium chloride 0.9 % flush 10 mL  10 mL Intravenous PRN Arvind Graff MD   10 mL at 01/09/20 2046   • sodium chloride 0.9 % infusion  125 mL/hr Intravenous Continuous Jeannie Galvan  mL/hr at 01/08/20 0611 125 mL/hr at 01/08/20 0611   • tamsulosin (FLOMAX) 24 hr capsule 0.4 mg  0.4 mg Oral BID Jeannie Galvan FNP   0.4 mg at 01/10/20 1046     Lab Results (last 24 hours)     Procedure Component Value Units Date/Time    POC Glucose Once [[id number]]  (Abnormal) Collected:  01/10/20 1148    Specimen:  Blood Updated:   01/10/20 1201     Glucose 271 mg/dL      Comment: Serial Number: [id number]:  [id number]       POC Glucose Once [[id number]]  (Abnormal) Collected:  01/10/20 0654    Specimen:  Blood Updated:  01/10/20 0655     Glucose 142 mg/dL      Comment: Serial Number: [id number]:  [id number]       Comprehensive Metabolic Panel [[id number]]  (Abnormal) Collected:  01/10/20 0510    Specimen:  Blood Updated:  01/10/20 0552     Glucose 170 mg/dL      BUN 8 mg/dL      Creatinine 0.79 mg/dL      Sodium 132 mmol/L      Potassium 3.9 mmol/L      Chloride 100 mmol/L      CO2 24.0 mmol/L      Calcium 9.2 mg/dL      Total Protein 7.3 g/dL      Albumin 3.10 g/dL      ALT (SGPT) 13 U/L      AST (SGOT) 21 U/L      Alkaline Phosphatase 154 U/L      Total Bilirubin 1.4 mg/dL      eGFR Non African Amer 103 mL/min/1.73      Globulin 4.2 gm/dL      A/G Ratio 0.7 g/dL      BUN/Creatinine Ratio 10.1     Anion Gap 8.0 mmol/L     Narrative:       GFR Normal >60  Chronic Kidney Disease <60  Kidney Failure <15      Magnesium [[id number]]  (Normal) Collected:  01/10/20 0510    Specimen:  Blood Updated:  01/10/20 0552     Magnesium 2.0 mg/dL     C-reactive Protein [[id number]]  (Normal) Collected:  01/10/20 0510    Specimen:  Blood Updated:  01/10/20 0552     C-Reactive Protein 0.40 mg/dL     POC Glucose Once [[id number]]  (Abnormal) Collected:  01/09/20 1924    Specimen:  Blood Updated:  01/09/20 1926     Glucose 311 mg/dL      Comment: Serial Number: [id number]:  [id number]       POC Glucose Once [[id number]]  (Abnormal) Collected:  01/09/20 1643    Specimen:  Blood Updated:  01/09/20 1651     Glucose 342 mg/dL      Comment: Serial Number: [id number]:  [id number]              Physician Progress Notes (last 24 hours) (Notes from 01/09/20 1250 through 01/10/20 1250)      Mauor Geiger DO at 01/09/20 1443                [facility] Medicine Services Daily Progress Note      Hospitalist Team  LOS 2  "days      Patient Care Team:  Carlita Shepherd MD as PCP - General (Internal Medicine)  Carlita Shepherd MD as PCP - Family Medicine    Patient Location: 228/1      Subjective   Subjective     Chief Complaint / Subjective  Chief Complaint   Patient presents with   • Hyperglycemia     Confusion better.  No bowel movement yet despite taking lactulose.  Afebrile.  Blood glucose controlled.    Present on Admission:  • Weakness  • Alcoholic cirrhosis (CMS/HCC)  • Hyperammonemia (CMS/HCC)  • Renal injury  • Encephalopathy, hepatic (CMS/HCC)      Brief Synopsis of Hospital Course/HPI    The patient is a 49-year-old male with history of alcoholic cirrhosis and rheumatoid arthritis that was sent to the emergency room on 1/7/2020 because of elevated ammonia levels and blood glucose.  The patient apparently was recently put on prednisone about 3 weeks ago and Enbrel had been discontinued.  The patient was recently prescribed metformin by PCP but never took it. The patient follows with hematology/oncology for MGUS.    Review of Systems   Unable to perform ROS: mental status change         Objective   Objective      Vital Signs  Temp:  [97.6 °F (36.4 °C)-97.8 °F (36.6 °C)] 97.6 °F (36.4 °C)  Heart Rate:  [68-71] 71  Resp:  [16-18] 18  BP: (111-126)/(67-82) 126/82  Oxygen Therapy  SpO2: 95 %  Pulse Oximetry Type: Intermittent  Device (Oxygen Therapy): room air  Flowsheet Rows      First Filed Value   Admission Height  177.8 cm (70\") Documented at 01/07/2020 2135   Admission Weight  113 kg (250 lb) Documented at 01/07/2020 2135        Intake & Output (last 3 days)       01/06 0701 - 01/07 0700 01/07 0701 - 01/08 0700 01/08 0701 - 01/09 0700 01/09 0701 - 01/10 0700    P.O.   240     IV Piggyback  500      Total Intake(mL/kg)  500 (4.4) 240 (2.1)     Net  +500 +240                 Lines, Drains & Airways    Active LDAs     Name:   Placement date:   Placement time:   Site:   Days:    Peripheral IV 01/07/20 2201 Left Forearm   " 01/07/20    2201    Forearm   less than 1                  Physical Exam:    Physical Exam   Constitutional: He appears well-developed and well-nourished.   HENT:   Head: Normocephalic and atraumatic.   Eyes: Pupils are equal, round, and reactive to light. EOM are normal. No scleral icterus.   Neck: Normal range of motion. Neck supple.   Cardiovascular: Normal rate and regular rhythm.   Pulmonary/Chest: Effort normal and breath sounds normal.   Abdominal: Soft. Bowel sounds are normal. There is no tenderness.   Musculoskeletal:   Moves all extremities equally.   Lymphadenopathy:     He has no cervical adenopathy.   Neurological: He is alert. No cranial nerve deficit or sensory deficit.   Skin: Skin is warm and dry. No rash noted.   Psychiatric: His speech is normal. Cognition and memory are impaired.         Procedures:              Results Review:     I reviewed the patient's new clinical results.      Lab Results (last 24 hours)     Procedure Component Value Units Date/Time    POC Glucose Once [[id number]]  (Abnormal) Collected:  01/09/20 1223    Specimen:  Blood Updated:  01/09/20 1233     Glucose 301 mg/dL      Comment: Serial Number: [id number]:  [id number]       POC Glucose Once [[id number]]  (Abnormal) Collected:  01/09/20 0714    Specimen:  Blood Updated:  01/09/20 0716     Glucose 106 mg/dL      Comment: Serial Number: [id number]:  [id number]       Comprehensive Metabolic Panel [[id number]]  (Abnormal) Collected:  01/09/20 0444    Specimen:  Blood Updated:  01/09/20 0524     Glucose 122 mg/dL      BUN 10 mg/dL      Creatinine 1.00 mg/dL      Sodium 135 mmol/L      Potassium 3.5 mmol/L      Chloride 100 mmol/L      CO2 24.0 mmol/L      Calcium 9.2 mg/dL      Total Protein 7.1 g/dL      Albumin 3.00 g/dL      ALT (SGPT) 13 U/L      AST (SGOT) 19 U/L      Alkaline Phosphatase 130 U/L      Total Bilirubin 1.8 mg/dL      eGFR Non African Amer 79 mL/min/1.73      Globulin 4.1 gm/dL      A/G Ratio 0.7  g/dL      BUN/Creatinine Ratio 10.0     Anion Gap 11.0 mmol/L     Narrative:       GFR Normal >60  Chronic Kidney Disease <60  Kidney Failure <15      Procalcitonin [[id number]]  (Normal) Collected:  01/09/20 0444    Specimen:  Blood Updated:  01/09/20 0520     Procalcitonin 0.17 ng/mL     Narrative:       Results may be falsely decreased if patient taking Biotin.     Magnesium [[id number]]  (Normal) Collected:  01/09/20 0444    Specimen:  Blood Updated:  01/09/20 0518     Magnesium 1.9 mg/dL     C-reactive Protein [[id number]]  (Normal) Collected:  01/09/20 0444    Specimen:  Blood Updated:  01/09/20 0518     C-Reactive Protein 0.44 mg/dL     Ammonia [[id number]]  (Abnormal) Collected:  01/09/20 0444    Specimen:  Blood Updated:  01/09/20 0516     Ammonia 75 umol/L     CBC & Differential [[id number]] Collected:  01/09/20 0444    Specimen:  Blood Updated:  01/09/20 0454    Narrative:       The following orders were created for panel order CBC & Differential.  Procedure                               Abnormality         Status                     ---------                               -----------         ------                     CBC Auto Differential[[id number]]        Abnormal            Final result                 Please view results for these tests on the individual orders.    CBC Auto Differential [[id number]]  (Abnormal) Collected:  01/09/20 0444    Specimen:  Blood Updated:  01/09/20 0454     WBC 7.00 10*3/mm3      RBC 4.40 10*6/mm3      Hemoglobin 14.2 g/dL      Hematocrit 41.0 %      MCV 93.2 fL      MCH 32.2 pg      MCHC 34.6 g/dL      RDW 14.3 %      RDW-SD 46.4 fl      MPV 9.5 fL      Platelets 120 10*3/mm3      Neutrophil % 52.5 %      Lymphocyte % 32.3 %      Monocyte % 9.5 %      Eosinophil % 4.7 %      Basophil % 1.0 %      Neutrophils, Absolute 3.70 10*3/mm3      Lymphocytes, Absolute 2.30 10*3/mm3      Monocytes, Absolute 0.70 10*3/mm3      Eosinophils, Absolute 0.30 10*3/mm3      Basophils, Absolute  0.10 10*3/mm3      nRBC 0.1 /100 WBC     POC Glucose Once [[id number]]  (Abnormal) Collected:  01/08/20 2006    Specimen:  Blood Updated:  01/08/20 2009     Glucose 366 mg/dL      Comment: Serial Number: [id number]:  [id number]       Procalcitonin [[id number]]  (Normal) Collected:  01/08/20 1720    Specimen:  Blood Updated:  01/08/20 1914     Procalcitonin 0.18 ng/mL     Narrative:       Results may be falsely decreased if patient taking Biotin.     CBC & Differential [[id number]] Collected:  01/08/20 1720    Specimen:  Blood Updated:  01/08/20 1826    Narrative:       The following orders were created for panel order CBC & Differential.  Procedure                               Abnormality         Status                     ---------                               -----------         ------                     CBC Auto Differential[[id number]]        Abnormal            Final result                 Please view results for these tests on the individual orders.    CBC Auto Differential [[id number]]  (Abnormal) Collected:  01/08/20 1720    Specimen:  Blood Updated:  01/08/20 1826     WBC 7.10 10*3/mm3      RBC 4.38 10*6/mm3      Hemoglobin 14.2 g/dL      Hematocrit 40.3 %      MCV 92.0 fL      MCH 32.3 pg      MCHC 35.1 g/dL      RDW 14.1 %      RDW-SD 45.1 fl      MPV 9.8 fL      Platelets 137 10*3/mm3      Neutrophil % 64.7 %      Lymphocyte % 20.8 %      Monocyte % 10.4 %      Eosinophil % 3.4 %      Basophil % 0.7 %      Neutrophils, Absolute 4.60 10*3/mm3      Lymphocytes, Absolute 1.50 10*3/mm3      Monocytes, Absolute 0.70 10*3/mm3      Eosinophils, Absolute 0.20 10*3/mm3      Basophils, Absolute 0.10 10*3/mm3      nRBC 0.1 /100 WBC     POC Glucose Once [[id number]]  (Abnormal) Collected:  01/08/20 1817    Specimen:  Blood Updated:  01/08/20 1818     Glucose 285 mg/dL      Comment: Serial Number: [id number]:  [id number]       POC Glucose Once [[id number]]  (Abnormal) Collected:  01/07/20 2136    Specimen:   Blood Updated:  01/08/20 1526     Glucose >500 mg/dL      Comment: Serial Number: [id number]:  [id number]           No results found for: HGBA1C                Microbiology Results (last 10 days)     ** No results found for the last 240 hours. **          ECG/EMG Results (most recent)     None                  Xr Abdomen Kub    Result Date: 1/9/2020  Moderate to large generalized colonic stool burden. Correlate for constipation. No acute abdominal findings.  Electronically Signed By-Dr. Marylin Scherer MD On:1/9/2020 10:21 AM This report was finalized on [id number] by Dr. Marylin Scherer MD.      Xrays, labs reviewed personally by physician.    Medication Review:   I have reviewed the patient's current medication list      Scheduled Meds    azelastine 2 spray Each Nare Daily   enoxaparin 40 mg Subcutaneous Daily   insulin glargine 10 Units Subcutaneous Nightly   insulin lispro 0-24 Units Subcutaneous 4x Daily With Meals & Nightly   insulin regular 2 Units Subcutaneous Daily   lactulose 30 mL Oral 4x Daily   magnesium oxide 400 mg Oral BID   montelukast 10 mg Oral Nightly   pantoprazole 40 mg Oral Q AM   predniSONE 7.5 mg Oral Daily   riFAXIMin 550 mg Oral Q12H   smog enema 400 mL Rectal Once   tamsulosin 0.4 mg Oral BID       Meds Infusions    Pharmacy Consult - Steroid Insulin Protocol     sodium chloride 125 mL/hr Last Rate: 125 mL/hr (01/08/20 0611)       Meds PRN  •  albuterol  •  dextrose  •  dextrose  •  glucagon (human recombinant)  •  insulin lispro **AND** insulin lispro  •  Pharmacy Consult - Steroid Insulin Protocol  •  senna  •  [COMPLETED] Insert peripheral IV **AND** sodium chloride        Assessment/Plan   Assessment/Plan     Active Hospital Problems    Diagnosis  POA   • Type 2 diabetes mellitus with hyperglycemia (CMS/HCC) [E11.65]  Unknown     Priority: High   • Hyperammonemia (CMS/HCC) [E72.20]  Yes     Priority: High   • Encephalopathy, hepatic (CMS/HCC) [K72.90]  Yes     Priority: High    • Renal injury [S37.009A]  Yes   • Weakness [R53.1]  Yes   • Alcoholic cirrhosis (CMS/HCC) [K70.30]  Yes      Resolved Hospital Problems   No resolved problems to display.       MEDICAL DECISION MAKING COMPLEXITY BY PROBLEM:  Moderate      Uncontrolled diabetes mellitus (POA)  --Recent diagnosis diabetes--> A1c 12.6  --Continue Lantus and ISS.    Hepatic encephalopathy:  --Continue lactulose, Continue Xifaxan, spironolactone  --History of cirrhosis from alcohol abuse    Constipation:  --Ordered KUB 1/9/2020  --We will give enema     JON:  --Resolved.  --Likely from dehydration from hyperglycemia induced polyuria  --Nephrotoxin holds    MGUS:  --Following hematology/oncology consulted     COPD:  --Not in exacerbation  --Continue bronchodilators    GERD:  --Continue Pepcid     BPH:  --Continue Flomax.      Code Status -   Code Status and Medical Interventions:   Ordered at: 01/07/20 5202     Code Status:    CPR     Medical Interventions (Level of Support Prior to Arrest):    Full       Discharge Planning        Electronically signed by Mauro Geiger DO, 01/09/20, 2:44 PM.  [facility] Hospitalist Team        Electronically signed by Mauro Geiger DO at 01/09/20 7583

## 2020-01-10 NOTE — PAYOR COMM NOTE
"AUTHORIZATION PENDING:   PLEASE CALL OR FAX DETERMINATION TO CONTACT BELOW. THANK YOU.        Stephani Kirby RN MSN  /UR  [facility]  [phone] office  [phone] fax  [email or web address]    [facility]  NPI: [id number]  Tax: [id number]      Mc Rodgers (49 y.o. Male) 1970  Auth # [id number]      Clinical update as requested.        Date of Birth Social Security Number Address Home Phone MRN    1970  [address] [place] [address] [phone] [id number]    Holiness Marital Status          Yazdanism        Admission Date Admission Type Admitting Provider Attending Provider Department, Room/Bed    1/7/20 Emergency Mauro Geiger, Mauro Becerra DO [facility] MEDICAL INPATIENT, 228/1    Discharge Date Discharge Disposition Discharge Destination                       Attending Provider:  Mauro Geiger DO    Allergies:  Duloxetine    Isolation:  None   Infection:  None   Code Status:  CPR    Ht:  177.8 cm (70\")   Wt:  116 kg (255 lb 15.3 oz)    Admission Cmt:  None   Principal Problem:  Type 2 diabetes mellitus with hyperglycemia (CMS/HCC) [E11.65]                 Active Insurance as of 1/7/2020     Primary Coverage     Payor Plan Insurance Group Employer/Plan Group    [employer]ERCIAL AETNA [id number]     Payor Plan Address Payor Plan Phone Number Payor Plan Fax Number Effective Dates    [address] [phone]  1/1/2013 - None Entered    [place] [address]       Subscriber Name Subscriber Birth Date Member ID       JOSELYN RODGERS 10/29/1972 [id number]           Secondary Coverage     Payor Plan Insurance Group Employer/Plan Group    [id number]     Payor Plan Address Payor Plan Phone Number Payor Plan Fax Number Effective Dates    MAIL STOP:   10/1/2018 - None Entered    [address] [address]       [facility] [address]       Subscriber Name " "Subscriber Birth Date Member ID       PAMELA RODGERS 1970 [id number]                 Emergency Contacts      (Rel.) Home Phone Work Phone Mobile Phone    JOSELYN RODGERS (Spouse) [phone] -- [phone]               Physician Progress Notes (last 72 hours) (Notes from 01/07/20 1325 through 01/10/20 1325)      Mauro Geiger DO at 01/09/20 1443                [facility] Medicine Services Daily Progress Note      Hospitalist Team  LOS 2 days      Patient Care Team:  Carlita Shepherd MD as PCP - General (Internal Medicine)  Carlita Shepherd MD as PCP - Family Medicine    Patient Location: 228/1      Subjective   Subjective     Chief Complaint / Subjective  Chief Complaint   Patient presents with   • Hyperglycemia     Confusion better.  No bowel movement yet despite taking lactulose.  Afebrile.  Blood glucose controlled.    Present on Admission:  • Weakness  • Alcoholic cirrhosis (CMS/HCC)  • Hyperammonemia (CMS/HCC)  • Renal injury  • Encephalopathy, hepatic (CMS/HCC)      Brief Synopsis of Hospital Course/HPI    The patient is a 49-year-old male with history of alcoholic cirrhosis and rheumatoid arthritis that was sent to the emergency room on 1/7/2020 because of elevated ammonia levels and blood glucose.  The patient apparently was recently put on prednisone about 3 weeks ago and Enbrel had been discontinued.  The patient was recently prescribed metformin by PCP but never took it. The patient follows with hematology/oncology for MGUS.    Review of Systems   Unable to perform ROS: mental status change         Objective   Objective      Vital Signs  Temp:  [97.6 °F (36.4 °C)-97.8 °F (36.6 °C)] 97.6 °F (36.4 °C)  Heart Rate:  [68-71] 71  Resp:  [16-18] 18  BP: (111-126)/(67-82) 126/82  Oxygen Therapy  SpO2: 95 %  Pulse Oximetry Type: Intermittent  Device (Oxygen Therapy): room air  Flowsheet Rows      First Filed Value   Admission Height  177.8 cm (70\") " Documented at 01/07/2020 2135   Admission Weight  113 kg (250 lb) Documented at 01/07/2020 2135        Intake & Output (last 3 days)       01/06 0701 - 01/07 0700 01/07 0701 - 01/08 0700 01/08 0701 - 01/09 0700 01/09 0701 - 01/10 0700    P.O.   240     IV Piggyback  500      Total Intake(mL/kg)  500 (4.4) 240 (2.1)     Net  +500 +240                 Lines, Drains & Airways    Active LDAs     Name:   Placement date:   Placement time:   Site:   Days:    Peripheral IV 01/07/20 2201 Left Forearm   01/07/20 2201    Forearm   less than 1                  Physical Exam:    Physical Exam   Constitutional: He appears well-developed and well-nourished.   HENT:   Head: Normocephalic and atraumatic.   Eyes: Pupils are equal, round, and reactive to light. EOM are normal. No scleral icterus.   Neck: Normal range of motion. Neck supple.   Cardiovascular: Normal rate and regular rhythm.   Pulmonary/Chest: Effort normal and breath sounds normal.   Abdominal: Soft. Bowel sounds are normal. There is no tenderness.   Musculoskeletal:   Moves all extremities equally.   Lymphadenopathy:     He has no cervical adenopathy.   Neurological: He is alert. No cranial nerve deficit or sensory deficit.   Skin: Skin is warm and dry. No rash noted.   Psychiatric: His speech is normal. Cognition and memory are impaired.         Procedures:              Results Review:     I reviewed the patient's new clinical results.      Lab Results (last 24 hours)     Procedure Component Value Units Date/Time    POC Glucose Once [[id number]]  (Abnormal) Collected:  01/09/20 1223    Specimen:  Blood Updated:  01/09/20 1233     Glucose 301 mg/dL      Comment: Serial Number: [id number]:  [id number]       POC Glucose Once [[id number]]  (Abnormal) Collected:  01/09/20 0714    Specimen:  Blood Updated:  01/09/20 0716     Glucose 106 mg/dL      Comment: Serial Number: [id number]:  [id number]       Comprehensive Metabolic Panel [[id number]]  (Abnormal)  Collected:  01/09/20 0444    Specimen:  Blood Updated:  01/09/20 0524     Glucose 122 mg/dL      BUN 10 mg/dL      Creatinine 1.00 mg/dL      Sodium 135 mmol/L      Potassium 3.5 mmol/L      Chloride 100 mmol/L      CO2 24.0 mmol/L      Calcium 9.2 mg/dL      Total Protein 7.1 g/dL      Albumin 3.00 g/dL      ALT (SGPT) 13 U/L      AST (SGOT) 19 U/L      Alkaline Phosphatase 130 U/L      Total Bilirubin 1.8 mg/dL      eGFR Non African Amer 79 mL/min/1.73      Globulin 4.1 gm/dL      A/G Ratio 0.7 g/dL      BUN/Creatinine Ratio 10.0     Anion Gap 11.0 mmol/L     Narrative:       GFR Normal >60  Chronic Kidney Disease <60  Kidney Failure <15      Procalcitonin [[id number]]  (Normal) Collected:  01/09/20 0444    Specimen:  Blood Updated:  01/09/20 0520     Procalcitonin 0.17 ng/mL     Narrative:       Results may be falsely decreased if patient taking Biotin.     Magnesium [[id number]]  (Normal) Collected:  01/09/20 0444    Specimen:  Blood Updated:  01/09/20 0518     Magnesium 1.9 mg/dL     C-reactive Protein [[id number]]  (Normal) Collected:  01/09/20 0444    Specimen:  Blood Updated:  01/09/20 0518     C-Reactive Protein 0.44 mg/dL     Ammonia [[id number]]  (Abnormal) Collected:  01/09/20 0444    Specimen:  Blood Updated:  01/09/20 0516     Ammonia 75 umol/L     CBC & Differential [[id number]] Collected:  01/09/20 0444    Specimen:  Blood Updated:  01/09/20 0454    Narrative:       The following orders were created for panel order CBC & Differential.  Procedure                               Abnormality         Status                     ---------                               -----------         ------                     CBC Auto Differential[[id number]]        Abnormal            Final result                 Please view results for these tests on the individual orders.    CBC Auto Differential [[id number]]  (Abnormal) Collected:  01/09/20 0444    Specimen:  Blood Updated:  01/09/20 0454     WBC 7.00 10*3/mm3      RBC  4.40 10*6/mm3      Hemoglobin 14.2 g/dL      Hematocrit 41.0 %      MCV 93.2 fL      MCH 32.2 pg      MCHC 34.6 g/dL      RDW 14.3 %      RDW-SD 46.4 fl      MPV 9.5 fL      Platelets 120 10*3/mm3      Neutrophil % 52.5 %      Lymphocyte % 32.3 %      Monocyte % 9.5 %      Eosinophil % 4.7 %      Basophil % 1.0 %      Neutrophils, Absolute 3.70 10*3/mm3      Lymphocytes, Absolute 2.30 10*3/mm3      Monocytes, Absolute 0.70 10*3/mm3      Eosinophils, Absolute 0.30 10*3/mm3      Basophils, Absolute 0.10 10*3/mm3      nRBC 0.1 /100 WBC     POC Glucose Once [[id number]]  (Abnormal) Collected:  01/08/20 2006    Specimen:  Blood Updated:  01/08/20 2009     Glucose 366 mg/dL      Comment: Serial Number: [id number]:  [id number]       Procalcitonin [[id number]]  (Normal) Collected:  01/08/20 1720    Specimen:  Blood Updated:  01/08/20 1914     Procalcitonin 0.18 ng/mL     Narrative:       Results may be falsely decreased if patient taking Biotin.     CBC & Differential [[id number]] Collected:  01/08/20 1720    Specimen:  Blood Updated:  01/08/20 1826    Narrative:       The following orders were created for panel order CBC & Differential.  Procedure                               Abnormality         Status                     ---------                               -----------         ------                     CBC Auto Differential[[id number]]        Abnormal            Final result                 Please view results for these tests on the individual orders.    CBC Auto Differential [[id number]]  (Abnormal) Collected:  01/08/20 1720    Specimen:  Blood Updated:  01/08/20 1826     WBC 7.10 10*3/mm3      RBC 4.38 10*6/mm3      Hemoglobin 14.2 g/dL      Hematocrit 40.3 %      MCV 92.0 fL      MCH 32.3 pg      MCHC 35.1 g/dL      RDW 14.1 %      RDW-SD 45.1 fl      MPV 9.8 fL      Platelets 137 10*3/mm3      Neutrophil % 64.7 %      Lymphocyte % 20.8 %      Monocyte % 10.4 %      Eosinophil % 3.4 %      Basophil % 0.7 %       Neutrophils, Absolute 4.60 10*3/mm3      Lymphocytes, Absolute 1.50 10*3/mm3      Monocytes, Absolute 0.70 10*3/mm3      Eosinophils, Absolute 0.20 10*3/mm3      Basophils, Absolute 0.10 10*3/mm3      nRBC 0.1 /100 WBC     POC Glucose Once [[id number]]  (Abnormal) Collected:  01/08/20 1817    Specimen:  Blood Updated:  01/08/20 1818     Glucose 285 mg/dL      Comment: Serial Number: [id number]:  [id number]       POC Glucose Once [[id number]]  (Abnormal) Collected:  01/07/20 2136    Specimen:  Blood Updated:  01/08/20 1526     Glucose >500 mg/dL      Comment: Serial Number: [id number]:  [id number]           No results found for: HGBA1C                Microbiology Results (last 10 days)     ** No results found for the last 240 hours. **          ECG/EMG Results (most recent)     None                  Xr Abdomen Kub    Result Date: 1/9/2020  Moderate to large generalized colonic stool burden. Correlate for constipation. No acute abdominal findings.  Electronically Signed By-Dr. Marylin Scherer MD On:1/9/2020 10:21 AM This report was finalized on [id number] by Dr. Marylin Scherer MD.      Xrays, labs reviewed personally by physician.    Medication Review:   I have reviewed the patient's current medication list      Scheduled Meds    azelastine 2 spray Each Nare Daily   enoxaparin 40 mg Subcutaneous Daily   insulin glargine 10 Units Subcutaneous Nightly   insulin lispro 0-24 Units Subcutaneous 4x Daily With Meals & Nightly   insulin regular 2 Units Subcutaneous Daily   lactulose 30 mL Oral 4x Daily   magnesium oxide 400 mg Oral BID   montelukast 10 mg Oral Nightly   pantoprazole 40 mg Oral Q AM   predniSONE 7.5 mg Oral Daily   riFAXIMin 550 mg Oral Q12H   smog enema 400 mL Rectal Once   tamsulosin 0.4 mg Oral BID       Meds Infusions    Pharmacy Consult - Steroid Insulin Protocol     sodium chloride 125 mL/hr Last Rate: 125 mL/hr (01/08/20 0611)       Meds PRN  •  albuterol  •  dextrose  •  dextrose  •   glucagon (human recombinant)  •  insulin lispro **AND** insulin lispro  •  Pharmacy Consult - Steroid Insulin Protocol  •  senna  •  [COMPLETED] Insert peripheral IV **AND** sodium chloride        Assessment/Plan   Assessment/Plan     Active Hospital Problems    Diagnosis  POA   • Type 2 diabetes mellitus with hyperglycemia (CMS/HCC) [E11.65]  Unknown     Priority: High   • Hyperammonemia (CMS/HCC) [E72.20]  Yes     Priority: High   • Encephalopathy, hepatic (CMS/HCC) [K72.90]  Yes     Priority: High   • Renal injury [S37.009A]  Yes   • Weakness [R53.1]  Yes   • Alcoholic cirrhosis (CMS/HCC) [K70.30]  Yes      Resolved Hospital Problems   No resolved problems to display.       MEDICAL DECISION MAKING COMPLEXITY BY PROBLEM:  Moderate      Uncontrolled diabetes mellitus (POA)  --Recent diagnosis diabetes--> A1c 12.6  --Continue Lantus and ISS.    Hepatic encephalopathy:  --Continue lactulose, Continue Xifaxan, spironolactone  --History of cirrhosis from alcohol abuse    Constipation:  --Ordered KUB 1/9/2020  --We will give enema     JON:  --Resolved.  --Likely from dehydration from hyperglycemia induced polyuria  --Nephrotoxin holds    MGUS:  --Following hematology/oncology consulted     COPD:  --Not in exacerbation  --Continue bronchodilators    GERD:  --Continue Pepcid     BPH:  --Continue Flomax.      Code Status -   Code Status and Medical Interventions:   Ordered at: 01/07/20 1481     Code Status:    CPR     Medical Interventions (Level of Support Prior to Arrest):    Full       Discharge Planning        Electronically signed by Mauro Geiger DO, 01/09/20, 2:44 PM.  [facility], [place] by [facility] Hospitalist Team        Electronically signed by Mauro Geiger DO at 01/09/20 8657     Mauro Geiger DO at 01/08/20 1547                [facility] Medicine Services Daily Progress Note      Hospitalist Team  LOS 1 days      Patient Care Team:  Carlita Shepherd MD as PCP - General  "(Internal Medicine)  Carlita Shepherd MD as PCP - Family Medicine    Patient Location: 228/1      Subjective   Subjective     Chief Complaint / Subjective  Chief Complaint   Patient presents with   • Hyperglycemia     Evaluated the patient in the morning of 1/8/2020.  Confused.  Afebrile.  No BM and has been getting lactulose.  Blood glucose improving      Present on Admission:  • Weakness  • Alcoholic cirrhosis (CMS/HCC)  • Hyperammonemia (CMS/HCC)  • Renal injury  • Encephalopathy, hepatic (CMS/HCC)      Brief Synopsis of Hospital Course/HPI    The patient is a 49-year-old male with history of alcoholic cirrhosis and rheumatoid arthritis that was sent to the emergency room on 1/7/2020 because of elevated ammonia levels and blood glucose.  The patient apparently was recently put on prednisone about 3 weeks ago and Enbrel had been discontinued.  The patient was recently prescribed metformin by PCP but never took it. The patient follows with hematology/oncology for MGUS.    Review of Systems   Unable to perform ROS: mental status change         Objective   Objective      Vital Signs  Temp:  [97.8 °F (36.6 °C)-98.1 °F (36.7 °C)] 98.1 °F (36.7 °C)  Heart Rate:  [62-81] 62  Resp:  [16-18] 18  BP: (103-131)/(49-82) 118/75  Oxygen Therapy  SpO2: 95 %  Pulse Oximetry Type: Intermittent  Device (Oxygen Therapy): room air  Flowsheet Rows      First Filed Value   Admission Height  177.8 cm (70\") Documented at 01/07/2020 2135   Admission Weight  113 kg (250 lb) Documented at 01/07/2020 2135        Intake & Output (last 3 days)       01/05 0701 - 01/06 0700 01/06 0701 - 01/07 0700 01/07 0701 - 01/08 0700 01/08 0701 - 01/09 0700    IV Piggyback   500     Total Intake(mL/kg)   500 (4.4)     Net   +500                 Lines, Drains & Airways    Active LDAs     Name:   Placement date:   Placement time:   Site:   Days:    Peripheral IV 01/07/20 2201 Left Forearm   01/07/20 2201    Forearm   less than 1          "         Physical Exam:    Physical Exam   Constitutional: He appears well-developed and well-nourished.   HENT:   Head: Normocephalic and atraumatic.   Eyes: Pupils are equal, round, and reactive to light. EOM are normal. No scleral icterus.   Neck: Normal range of motion. Neck supple.   Cardiovascular: Normal rate and regular rhythm.   Pulmonary/Chest: Effort normal and breath sounds normal.   Abdominal: Soft. Bowel sounds are normal. There is no tenderness.   Musculoskeletal:   Moves all extremities equally.   Lymphadenopathy:     He has no cervical adenopathy.   Neurological: He is alert. No cranial nerve deficit or sensory deficit.   Skin: Skin is warm and dry. No rash noted.   Psychiatric: His speech is normal. Cognition and memory are impaired.         Procedures:              Results Review:     I reviewed the patient's new clinical results.      Lab Results (last 24 hours)     Procedure Component Value Units Date/Time    POC Glucose Once [[id number]]  (Abnormal) Collected:  01/07/20 2136    Specimen:  Blood Updated:  01/08/20 1526     Glucose >500 mg/dL      Comment: Serial Number: [id number]:  [id number]       POC Glucose Once [[id number]]  (Abnormal) Collected:  01/08/20 1139    Specimen:  Blood Updated:  01/08/20 1152     Glucose 254 mg/dL      Comment: Serial Number: [id number]:  [id number]       POC Glucose Once [[id number]]  (Abnormal) Collected:  01/08/20 0723    Specimen:  Blood Updated:  01/08/20 0724     Glucose 340 mg/dL      Comment: Serial Number: [id number]:  [id number]       BNP [[id number]]  (Normal) Collected:  01/07/20 2203    Specimen:  Blood Updated:  01/08/20 0715     proBNP 71.1 pg/mL     Narrative:       Among patients with dyspnea, NT-proBNP is highly sensitive for the detection of acute congestive heart failure. In addition NT-proBNP of <300 pg/ml effectively rules out acute congestive heart failure with 99% negative predictive value.      TSH [[id number]]  (Normal)  Collected:  01/08/20 0500    Specimen:  Blood Updated:  01/08/20 0603     TSH 2.940 uIU/mL     Comprehensive Metabolic Panel [[id number]]  (Abnormal) Collected:  01/08/20 0500    Specimen:  Blood Updated:  01/08/20 0556     Glucose 399 mg/dL      BUN 11 mg/dL      Creatinine 1.09 mg/dL      Sodium 127 mmol/L      Potassium 3.7 mmol/L      Chloride 90 mmol/L      CO2 25.0 mmol/L      Calcium 10.3 mg/dL      Total Protein 7.8 g/dL      Albumin 3.20 g/dL      ALT (SGPT) 13 U/L      AST (SGOT) 17 U/L      Alkaline Phosphatase 204 U/L      Total Bilirubin 1.6 mg/dL      eGFR Non African Amer 71 mL/min/1.73      Globulin 4.6 gm/dL      A/G Ratio 0.7 g/dL      BUN/Creatinine Ratio 10.1     Anion Gap 12.0 mmol/L     Narrative:       GFR Normal >60  Chronic Kidney Disease <60  Kidney Failure <15      CBC & Differential [[id number]] Collected:  01/08/20 0500    Specimen:  Blood Updated:  01/08/20 0528    Narrative:       The following orders were created for panel order CBC & Differential.  Procedure                               Abnormality         Status                     ---------                               -----------         ------                     CBC Auto Differential[[id number]]        Normal              Final result                 Please view results for these tests on the individual orders.    CBC Auto Differential [[id number]]  (Normal) Collected:  01/08/20 0500    Specimen:  Blood Updated:  01/08/20 0528     WBC 8.90 10*3/mm3      RBC 4.73 10*6/mm3      Hemoglobin 15.0 g/dL      Hematocrit 43.6 %      MCV 92.2 fL      MCH 31.8 pg      MCHC 34.5 g/dL      RDW 14.2 %      RDW-SD 45.5 fl      MPV 9.7 fL      Platelets 144 10*3/mm3      Neutrophil % 56.1 %      Lymphocyte % 29.3 %      Monocyte % 9.1 %      Eosinophil % 4.6 %      Basophil % 0.9 %      Neutrophils, Absolute 5.00 10*3/mm3      Lymphocytes, Absolute 2.60 10*3/mm3      Monocytes, Absolute 0.80 10*3/mm3      Eosinophils, Absolute 0.40 10*3/mm3       Basophils, Absolute 0.10 10*3/mm3      nRBC 0.1 /100 WBC     POC Glucose Once [[id number]]  (Abnormal) Collected:  01/08/20 0411    Specimen:  Blood Updated:  01/08/20 0412     Glucose 387 mg/dL      Comment: Serial Number: [id number]:  [id number]       POC Glucose Once [[id number]]  (Abnormal) Collected:  01/08/20 0126    Specimen:  Blood Updated:  01/08/20 0127     Glucose >500 mg/dL      Comment: Serial Number: [id number]:  [id number]       Lipase [[id number]]  (Normal) Collected:  01/07/20 2203    Specimen:  Blood Updated:  01/08/20 0029     Lipase 40 U/L     Comprehensive Metabolic Panel [[id number]]  (Abnormal) Collected:  01/07/20 2203    Specimen:  Blood Updated:  01/07/20 2244     Glucose 827 mg/dL      BUN 15 mg/dL      Creatinine 1.25 mg/dL      Sodium 120 mmol/L      Potassium 4.6 mmol/L      Chloride 83 mmol/L      CO2 20.0 mmol/L      Calcium 9.6 mg/dL      Total Protein 8.1 g/dL      Albumin 3.40 g/dL      ALT (SGPT) 14 U/L      AST (SGOT) 18 U/L      Alkaline Phosphatase 208 U/L      Total Bilirubin 2.0 mg/dL      eGFR Non African Amer 61 mL/min/1.73      Globulin 4.7 gm/dL      A/G Ratio 0.7 g/dL      BUN/Creatinine Ratio 12.0     Anion Gap 17.0 mmol/L     Narrative:       GFR Normal >60  Chronic Kidney Disease <60  Kidney Failure <15      CBC & Differential [[id number]] Collected:  01/07/20 2203    Specimen:  Blood Updated:  01/07/20 2212    Narrative:       The following orders were created for panel order CBC & Differential.  Procedure                               Abnormality         Status                     ---------                               -----------         ------                     CBC Auto Differential[[id number]]        Abnormal            Final result                 Please view results for these tests on the individual orders.    CBC Auto Differential [[id number]]  (Abnormal) Collected:  01/07/20 2203    Specimen:  Blood Updated:  01/07/20 2212     WBC 7.00 10*3/mm3       RBC 4.68 10*6/mm3      Hemoglobin 15.0 g/dL      Hematocrit 44.3 %      MCV 94.7 fL      MCH 32.1 pg      MCHC 33.9 g/dL      RDW 14.6 %      RDW-SD 48.6 fl      MPV 9.5 fL      Platelets 132 10*3/mm3      Neutrophil % 68.4 %      Lymphocyte % 19.8 %      Monocyte % 9.0 %      Eosinophil % 2.1 %      Basophil % 0.7 %      Neutrophils, Absolute 4.80 10*3/mm3      Lymphocytes, Absolute 1.40 10*3/mm3      Monocytes, Absolute 0.60 10*3/mm3      Eosinophils, Absolute 0.10 10*3/mm3      Basophils, Absolute 0.10 10*3/mm3      nRBC 0.1 /100 WBC         No results found for: HGBA1C                Microbiology Results (last 10 days)     ** No results found for the last 240 hours. **          ECG/EMG Results (most recent)     None                  No radiology results for the last 7 days    Xrays, labs reviewed personally by physician.    Medication Review:   I have reviewed the patient's current medication list      Scheduled Meds    azelastine 2 spray Each Nare Daily   enoxaparin 40 mg Subcutaneous Daily   insulin glargine 10 Units Subcutaneous Nightly   insulin lispro 0-24 Units Subcutaneous 4x Daily With Meals & Nightly   insulin regular 2 Units Subcutaneous Daily   lactulose 30 mL Oral 4x Daily   magnesium oxide 400 mg Oral BID   montelukast 10 mg Oral Nightly   pantoprazole 40 mg Oral Q AM   predniSONE 7.5 mg Oral Daily   riFAXIMin 550 mg Oral Q12H   tamsulosin 0.4 mg Oral BID       Meds Infusions    Pharmacy Consult - Steroid Insulin Protocol     sodium chloride 125 mL/hr Last Rate: 125 mL/hr (01/08/20 0611)       Meds PRN  •  albuterol  •  dextrose  •  dextrose  •  glucagon (human recombinant)  •  insulin lispro **AND** insulin lispro  •  Pharmacy Consult - Steroid Insulin Protocol  •  [COMPLETED] Insert peripheral IV **AND** sodium chloride        Assessment/Plan   Assessment/Plan     Active Hospital Problems    Diagnosis  POA   • Type 2 diabetes mellitus with hyperglycemia (CMS/[facility]) [E11.65]  Unknown     Priority:  High   • Hyperammonemia (CMS/HCC) [E72.20]  Yes     Priority: High   • Encephalopathy, hepatic (CMS/HCC) [K72.90]  Yes     Priority: High   • Renal injury [S37.009A]  Yes   • Weakness [R53.1]  Yes   • Alcoholic cirrhosis (CMS/HCC) [K70.30]  Yes      Resolved Hospital Problems   No resolved problems to display.       MEDICAL DECISION MAKING COMPLEXITY BY PROBLEM:  Moderate      Uncontrolled diabetes mellitus (POA)  --Recent diagnosis diabetes--> A1c 12.6  --Continue Lantus and ISS.    Hepatic encephalopathy:  --Continue lactulose, Continue Xifaxan, spironolactone  --History of cirrhosis from alcohol abuse     JON:  --Nephrotoxin holds    MGUS:  --Following hematology/oncology consulted     COPD:  --Not in exacerbation  --Continue bronchodilators    GERD:  --Continue Pepcid     BPH:  --Continue Flomax.      Code Status -   Code Status and Medical Interventions:   Ordered at: 01/07/20 8849     Code Status:    CPR     Medical Interventions (Level of Support Prior to Arrest):    Full       Discharge Planning        Electronically signed by Mauro Geiger DO, 01/08/20, 3:49 PM.  [facility] Hospitalist Team        Electronically signed by Mauro Geiger DO at 01/08/20 1006

## 2020-01-10 NOTE — PAYOR COMM NOTE
"This is discharge notice for Mc Rodgers , auth# [id number]  Pt discharged routine home on 1/10/20.    Final 2 inpt days pending--clinical update sent early today (1/10).    KRISTOPHER TRIVEDI RN  UTILIZATION REVIEW  [facility]  PH: [phone]  FX: [phone]    Mc Rodgers (49 y.o. Male)     Date of Birth Social Security Number Address Home Phone MRN    1970  [address] [place] [address] [phone] [id number]    Syn[employer] Marital Status          Hinduism        Admission Date Admission Type Admitting Provider Attending Provider Department, Room/Bed    1/7/20 Emergency Mauro Geiger DO  [facility] 2B MEDICAL INPATIENT, 228/1    Discharge Date Discharge Disposition Discharge Destination        1/10/2020 Home or Self Care              Attending Provider:  (none)   Allergies:  Duloxetine    Isolation:  None   Infection:  None   Code Status:  CPR    Ht:  177.8 cm (70\")   Wt:  116 kg (255 lb 15.3 oz)    Admission Cmt:  None   Principal Problem:  Type 2 diabetes mellitus with hyperglycemia (CMS/HCC) [E11.65]                 Active Insurance as of 1/7/2020     Primary Coverage     Payor Plan Insurance Group Employer/Plan Group    [employer]ET[employer] COMMERCIAL AETNA [id number]     Payor Plan Address Payor Plan Phone Number Payor Plan Fax Number Effective Dates    [address] [phone]  1/1/2013 - None Entered    [facility] [id number]       Subscriber Name Subscriber Birth Date Member ID       ANA MARIAJOSELYN BEAL [birth year]1970 [id number]           Secondary Coverage     Payor Plan Insurance Group Employer/Plan Group    [id number]     Payor Plan Address Payor Plan Phone Number Payor Plan Fax Number Effective Dates    MAIL STOP:   10/1/2018 - None Entered    [address] [address]       [place] [id number]       Subscriber Name Subscriber Birth Date Member ID       RODGERSNIESHAMC P 1970 [id number]                 Emergency Contacts     "  (Rel.) Home Phone Work Phone Mobile Phone    JOSELYN RODGERS (Spouse) [phone] -- [phone]               Discharge Summary      HectorRileyRowdymajo Mauro STEWARTDO at 01/10/20 1304                [facility]  DISCHARGE SUMMARY        Prepared For PCP:  Carlita Shepherd MD    Patient Name: Mc Rodgers  : 1970  MRN: [id number]      Date of Admission:   2020    Date of Discharge:  1/10/2020    Length of stay:  LOS: 3 days     Hospital Course     Presenting Problem:   Hyponatremia [E87.1]  Weakness [R53.1]  Hyperglycemia [R73.9]      Active Hospital Problems    Diagnosis  POA   • **Type 2 diabetes mellitus with hyperglycemia (CMS/HCC) [E11.65]  Unknown     Priority: High   • Hyperammonemia (CMS/HCC) [E72.20]  Yes     Priority: High   • Encephalopathy, hepatic (CMS/HCC) [K72.90]  Yes     Priority: High   • Renal injury [S37.009A]  Yes     Priority: Medium   • Weakness [R53.1]  Yes     Priority: Medium   • Alcoholic cirrhosis (CMS/HCC) [K70.30]  Yes     Priority: Medium      Resolved Hospital Problems   No resolved problems to display.           Hospital Course:    The patient was admitted to the medical floor for treatment of hepatic encephalopathy and hyperglycemia from uncontrolled diabetes mellitus.  Hemoglobin A1c came back at 9.6.  The patient's symptoms improved with IV fluids.  He did have evidence of severe dehydration as manifested with constipation since he did not have bowel movements after being given several rounds of lactulose.  He finally had a bowel movement after being given enema on 2020.  The patient admitted to drinking half a gallon of orange juice daily as a replacement for alcohol which he used to consume daily in the past.  The patient was seen by the nutritionist and was educated on diabetes mellitus and diet control.  Lactulose has been added to his discharge medication list and is also on Xifaxan at home.  Prescription  for Lantus and mealtime insulin has been given.  We will defer to her rheumatologist regarding discontinuation of prednisone 7.5 mg p.o. daily . The patient will be discharged in the morning of 1/10/2020 and needs to follow-up with his PCP within 2 weeks.      Recommendation for Outpatient Providers:             Reasons For Change In Medications and Indications for New Medications:        Day of Discharge     HPI:        The patient is a 49-year-old male with history of alcoholic cirrhosis and rheumatoid arthritis that was sent to the emergency room on 1/7/2020 because of elevated ammonia levels and blood glucose.  The patient apparently was recently put on prednisone about 3 weeks ago and Enbrel had been discontinued.  The patient was recently prescribed metformin by PCP but never took it. The patient follows with hematology/oncology for MGUS.    Vital Signs:   Temp:  [97.4 °F (36.3 °C)-97.9 °F (36.6 °C)] 97.6 °F (36.4 °C)  Heart Rate:  [69-81] 81  Resp:  [18-20] 18  BP: (110-117)/(70-72) 111/70     Physical Exam:  Physical Exam   Constitutional: He is oriented to person, place, and time. He appears well-developed and well-nourished.   HENT:   Head: Normocephalic and atraumatic.   Eyes: Pupils are equal, round, and reactive to light. EOM are normal.   Neck: Normal range of motion. Neck supple.   Cardiovascular: Normal rate and normal heart sounds.   Pulmonary/Chest: Effort normal and breath sounds normal.   Abdominal: Soft. Bowel sounds are normal.   Musculoskeletal: Normal range of motion.   Neurological: He is alert and oriented to person, place, and time.   Skin: Skin is warm and dry.   Psychiatric: He has a normal mood and affect.       Pertinent  and/or Most Recent Results     Results from last 7 days   Lab Units 01/10/20  0510 01/09/20  0444 01/08/20  1720 01/08/20  0500 01/07/20  2203 01/07/20  1536 01/07/20  1349   WBC 10*3/mm3  --  7.00 7.10 8.90 7.00  --  8.90   HEMOGLOBIN g/dL  --  14.2 14.2 15.0 15.0   --  15.7   HEMATOCRIT %  --  41.0 40.3 43.6 44.3  --  43.2   PLATELETS 10*3/mm3  --  120* 137* 144 132*  --  148   SODIUM mmol/L 132* 135*  --  127* 120* 123*  --    POTASSIUM mmol/L 3.9 3.5  --  3.7 4.6 4.5  --    CHLORIDE mmol/L 100 100  --  90* 83* 86*  --    CO2 mmol/L 24.0 24.0  --  25.0 20.0* 24.0  --    BUN mg/dL 8 10  --  11 15 13  --    CREATININE mg/dL 0.79 1.00  --  1.09 1.25 1.05  --    GLUCOSE mg/dL 170* 122*  --  399* 827* 610*  --    CALCIUM mg/dL 9.2 9.2  --  10.3 9.6 10.5  --      Results from last 7 days   Lab Units 01/10/20  0510 01/09/20  0444 01/08/20  0500 01/07/20  2203 01/07/20  1536   BILIRUBIN mg/dL 1.4* 1.8* 1.6* 2.0* 1.9*   ALK PHOS U/L 154* 130* 204* 208* 242*   ALT (SGPT) U/L 13 13 13 14 13   AST (SGOT) U/L 21 19 17 18 18           Invalid input(s): TG, LDLCALC, LDLREALC  Results from last 7 days   Lab Units 01/09/20  0444 01/08/20  1720 01/08/20  0500 01/07/20  2203   TSH uIU/mL  --   --  2.940  --    PROBNP pg/mL  --   --   --  71.1   PROCALCITONIN ng/mL 0.17 0.18  --   --        Brief Urine Lab Results  (Last result in the past 365 days)      Color   Clarity   Blood   Leuk Est   Nitrite   Protein   CREAT   Urine HCG        12/05/19 1425 Yellow Clear Negative Negative Negative Negative               Microbiology Results Abnormal     None          Xr Abdomen Kub    Result Date: 1/9/2020  Impression: Moderate to large generalized colonic stool burden. Correlate for constipation. No acute abdominal findings.  Electronically Signed By-Dr. Marylin Scherer MD On:1/9/2020 10:21 AM This report was finalized on [id number] by Dr. Marylin Scherer MD.                 Test Results Pending at Discharge: None      Procedures Performed: None           Consults:   Consults     Date and Time Order Name Status Description    1/7/2020 4906 Hospitalist (on-call MD unless specified) Completed             Discharge Details        Discharge Medications      New Medications      Instructions Start Date    ACCU-CHEK FASTCLIX LANCETS misc   1 each, Does not apply, 4 Times Daily Before Meals & Nightly      Alcohol Wipes 70 % misc   1 each, Apply externally, 4 Times Daily Before Meals & Nightly      glucose blood test strip  Commonly known as:  ACCU-CHEK GUIDE   Check blood sugar before meals and at bedtime. Use as instructed      insulin glargine 100 UNIT/ML injection  Commonly known as:  LANTUS   10 Units, Subcutaneous, Nightly      Insulin Lispro (1 Unit Dial) 100 UNIT/ML solution pen-injector  Commonly known as:  HUMALOG KWIKPEN   2 Units, Subcutaneous, 3 Times Daily After Meals      Pen Needles 32G X 4 MM misc   1 each, Does not apply, 4 Times Daily Before Meals & Nightly      senna 8.6 MG tablet  Commonly known as:  SENOKOT   1 tablet, Oral, 2 Times Daily PRN         Changes to Medications      Instructions Start Date   lactulose 10 GM/15ML solution  Commonly known as:  CHRONULAC  What changed:  when to take this   30 mL, Oral, 2 Times Daily         Continue These Medications      Instructions Start Date   albuterol sulfate  (90 Base) MCG/ACT inhaler  Commonly known as:  PROVENTIL HFA;VENTOLIN HFA;PROAIR HFA   2 puffs, Inhalation, Every 4 Hours PRN      azelastine 0.1 % nasal spray  Commonly known as:  ASTELIN   2 sprays, Nasal, 2 Times Daily, Use in each nostril as directed       bumetanide 2 MG tablet  Commonly known as:  BUMEX   2 mg, Oral, 2 Times Daily      EPINEPHrine 0.3 MG/0.3ML solution auto-injector injection  Commonly known as:  EPIPEN   0.3 mg, Subcutaneous, Once As Needed      famotidine 20 MG tablet  Commonly known as:  PEPCID   TAKE 1 TABLET BY MOUTH EVERY DAY      magnesium oxide 400 MG tablet  Commonly known as:  MAG-OX   2 tablets, Oral, 2 Times Daily      meclizine 25 MG chewable tablet chewable tablet   25-50 mg, Oral, 3 Times Daily PRN      metFORMIN  MG 24 hr tablet  Commonly known as:  GLUCOPHAGE-XR   500 mg, Oral, Daily With Breakfast      montelukast 10 MG tablet  Commonly  known as:  SINGULAIR   10 mg, Oral, Nightly      ondansetron 8 MG tablet  Commonly known as:  ZOFRAN   8 mg, Oral, Every 8 Hours PRN      potassium chloride 20 MEQ CR tablet  Commonly known as:  K-DURKLOR-CON   40 mEq, Oral, 3 Times Daily      predniSONE 5 MG tablet  Commonly known as:  DELTASONE   7.5 mg, Oral, Daily      promethazine 12.5 MG tablet  Commonly known as:  PHENERGAN   12.5 mg, Oral, Every 6 Hours PRN      riFAXIMin 550 MG tablet  Commonly known as:  XIFAXAN   550 mg, Oral, Every 12 Hours Scheduled      spironolactone 25 MG tablet  Commonly known as:  ALDACTONE   25 mg, Oral, Daily      tamsulosin 0.4 MG capsule 24 hr capsule  Commonly known as:  FLOMAX   1 capsule, Oral, 2 Times Daily      Triamcinolone Acetonide 55 MCG/ACT nasal inhaler  Commonly known as:  NASACORT   1 spray, Nasal, Daily      vitamin D 1.25 MG (91040 UT) capsule capsule  Commonly known as:  ERGOCALCIFEROL   50,000 Units, Oral, Every 14 Days, Every other Sunday      zinc sulfate 220 (50 Zn) MG capsule  Commonly known as:  ZINCATE   220 mg, Oral, Daily             Allergies   Allergen Reactions   • Duloxetine Unknown (See Comments)     Unknown reaction          Discharge Disposition: Home      Diet:  Hospital:  Diet Order   Procedures   • Diet Diabetic/Consistent Carbs; Diabetic - Consistent Carb         Discharge Activity: As tolerated        CODE STATUS:    Code Status and Medical Interventions:   Ordered at: 01/07/20 2877     Code Status:    CPR     Medical Interventions (Level of Support Prior to Arrest):    Full         Follow-up Appointments  Future Appointments   Date Time Provider Department Center   1/13/2020  1:30 PM LAB [facility] None   1/17/2020  2:00 PM Carlita Shepherd MD [facility] None   1/21/2020  1:00 PM [facility] IR 1 [facility]   2/12/2020  2:45 PM Celia Ellsworth MD [facility] ONC NA None   2/12/2020  2:45 PM LAB MD BH L[facility] ONC LAB NA [facility] None   4/2/2020  1:00 PM Julissa  MD [facility] NA None       Additional Instructions for the Follow-ups that You Need to Schedule     Discharge Follow-up with PCP   As directed       Currently Documented PCP:    Carlita Shepherd MD    PCP Phone Number:    [phone]     Follow Up Details:  2 weeks                 Condition on Discharge:      Stable    Electronically signed by Mauro Geiger DO, 01/10/20, 1:04 PM.    Time: I spent  20 minutes on this discharge activity which included face-to-face encounter with the patient/reviewing the data in the system/coordination of the care with the nursing staff as well as consultants/documentation/entering orders.      Electronically signed by Mauro Geiger DO at 01/10/20 6039

## 2020-01-10 NOTE — PLAN OF CARE
Problem: Patient Care Overview  Goal: Plan of Care Review  Outcome: Ongoing (interventions implemented as appropriate)  Flowsheets (Taken 1/10/2020 0232)  Progress: no change  Plan of Care Reviewed With: patient  Outcome Summary: Patient is still taking lactulose and reports having several BM tonight. He denies any pain or discomfort. Will continue to monitor patient

## 2020-01-11 ENCOUNTER — READMISSION MANAGEMENT (OUTPATIENT)
Dept: CALL CENTER | Facility: HOSPITAL | Age: 50
End: 2020-01-11

## 2020-01-11 NOTE — OUTREACH NOTE
Prep Survey      Responses   Facility patient discharged from?  [facility]   Is patient eligible?  Yes   Discharge diagnosis  Encephalopathy, hepatic,    Type 2 diabetes mellitus with hyperglycemia    Does the patient have one of the following disease processes/diagnoses(primary or secondary)?  Other   Does the patient have Home health ordered?  No   Is there a DME ordered?  No   Prep survey completed?  Yes          Urmila Arboleda RN

## 2020-01-12 DIAGNOSIS — Z79.899 LONG-TERM USE OF HIGH-RISK MEDICATION: ICD-10-CM

## 2020-01-12 DIAGNOSIS — M06.00 SERONEGATIVE RHEUMATOID ARTHRITIS (HCC): ICD-10-CM

## 2020-01-13 ENCOUNTER — TRANSITIONAL CARE MANAGEMENT TELEPHONE ENCOUNTER (OUTPATIENT)
Dept: FAMILY MEDICINE CLINIC | Facility: CLINIC | Age: 50
End: 2020-01-13

## 2020-01-13 ENCOUNTER — LAB (OUTPATIENT)
Dept: FAMILY MEDICINE CLINIC | Facility: CLINIC | Age: 50
End: 2020-01-13

## 2020-01-13 ENCOUNTER — TELEPHONE (OUTPATIENT)
Dept: DIABETES SERVICES | Facility: HOSPITAL | Age: 50
End: 2020-01-13

## 2020-01-13 DIAGNOSIS — K70.31 ALCOHOLIC CIRRHOSIS OF LIVER WITH ASCITES (HCC): ICD-10-CM

## 2020-01-13 DIAGNOSIS — K76.82 HEPATIC ENCEPHALOPATHY (HCC): Primary | ICD-10-CM

## 2020-01-13 DIAGNOSIS — K76.82 HEPATIC ENCEPHALOPATHY (HCC): ICD-10-CM

## 2020-01-13 LAB
ALBUMIN SERPL-MCNC: 3.7 G/DL (ref 3.5–5.2)
ALBUMIN/GLOB SERPL: 0.8 G/DL
ALP SERPL-CCNC: 159 U/L (ref 39–117)
ALPHA-FETOPROTEIN: 3.54 NG/ML (ref 0–8.3)
ALT SERPL W P-5'-P-CCNC: 17 U/L (ref 1–41)
ANION GAP SERPL CALCULATED.3IONS-SCNC: 15.6 MMOL/L (ref 5–15)
AST SERPL-CCNC: 23 U/L (ref 1–40)
BASOPHILS # BLD AUTO: 0.07 10*3/MM3 (ref 0–0.2)
BASOPHILS NFR BLD AUTO: 1 % (ref 0–1.5)
BILIRUB SERPL-MCNC: 1.8 MG/DL (ref 0.2–1.2)
BUN BLD-MCNC: 9 MG/DL (ref 6–20)
BUN/CREAT SERPL: 8.7 (ref 7–25)
CALCIUM SPEC-SCNC: 9.6 MG/DL (ref 8.6–10.5)
CHLORIDE SERPL-SCNC: 95 MMOL/L (ref 98–107)
CO2 SERPL-SCNC: 19.4 MMOL/L (ref 22–29)
CREAT BLD-MCNC: 1.04 MG/DL (ref 0.76–1.27)
DEPRECATED RDW RBC AUTO: 43.2 FL (ref 37–54)
EOSINOPHIL # BLD AUTO: 0.16 10*3/MM3 (ref 0–0.4)
EOSINOPHIL NFR BLD AUTO: 2.3 % (ref 0.3–6.2)
ERYTHROCYTE [DISTWIDTH] IN BLOOD BY AUTOMATED COUNT: 12.9 % (ref 12.3–15.4)
GFR SERPL CREATININE-BSD FRML MDRD: 75 ML/MIN/1.73
GLOBULIN UR ELPH-MCNC: 4.8 GM/DL
GLUCOSE BLD-MCNC: 298 MG/DL (ref 65–99)
HCT VFR BLD AUTO: 45 % (ref 37.5–51)
HGB BLD-MCNC: 15.3 G/DL (ref 13–17.7)
IMM GRANULOCYTES # BLD AUTO: 0.02 10*3/MM3 (ref 0–0.05)
IMM GRANULOCYTES NFR BLD AUTO: 0.3 % (ref 0–0.5)
INR PPP: 1.24 (ref 0.9–1.1)
LYMPHOCYTES # BLD AUTO: 1.53 10*3/MM3 (ref 0.7–3.1)
LYMPHOCYTES NFR BLD AUTO: 22.4 % (ref 19.6–45.3)
MCH RBC QN AUTO: 31.2 PG (ref 26.6–33)
MCHC RBC AUTO-ENTMCNC: 34 G/DL (ref 31.5–35.7)
MCV RBC AUTO: 91.8 FL (ref 79–97)
MONOCYTES # BLD AUTO: 0.77 10*3/MM3 (ref 0.1–0.9)
MONOCYTES NFR BLD AUTO: 11.3 % (ref 5–12)
NEUTROPHILS # BLD AUTO: 4.27 10*3/MM3 (ref 1.7–7)
NEUTROPHILS NFR BLD AUTO: 62.7 % (ref 42.7–76)
NRBC BLD AUTO-RTO: 0 /100 WBC (ref 0–0.2)
PLATELET # BLD AUTO: 118 10*3/MM3 (ref 140–450)
PMV BLD AUTO: 13 FL (ref 6–12)
POTASSIUM BLD-SCNC: 4.2 MMOL/L (ref 3.5–5.2)
PROT SERPL-MCNC: 8.5 G/DL (ref 6–8.5)
PROTHROMBIN TIME: 12.5 SECONDS (ref 9.6–11.7)
RBC # BLD AUTO: 4.9 10*6/MM3 (ref 4.14–5.8)
SODIUM BLD-SCNC: 130 MMOL/L (ref 136–145)
WBC NRBC COR # BLD: 6.82 10*3/MM3 (ref 3.4–10.8)

## 2020-01-13 PROCEDURE — 85610 PROTHROMBIN TIME: CPT | Performed by: INTERNAL MEDICINE

## 2020-01-13 PROCEDURE — 85025 COMPLETE CBC W/AUTO DIFF WBC: CPT | Performed by: INTERNAL MEDICINE

## 2020-01-13 PROCEDURE — 82105 ALPHA-FETOPROTEIN SERUM: CPT | Performed by: INTERNAL MEDICINE

## 2020-01-13 PROCEDURE — 80053 COMPREHEN METABOLIC PANEL: CPT | Performed by: INTERNAL MEDICINE

## 2020-01-13 PROCEDURE — 36415 COLL VENOUS BLD VENIPUNCTURE: CPT

## 2020-01-13 RX ORDER — RIFAXIMIN 550 MG/1
TABLET ORAL
Qty: 180 TABLET | Refills: 1 | Status: SHIPPED | OUTPATIENT
Start: 2020-01-13 | End: 2020-07-07

## 2020-01-13 NOTE — PAYOR COMM NOTE
"Here is 1/9 clinical requested .  Patient discharged on 01/10/2020. See attached dc summary. Auth # [id number]. Please confirm than all inpatient days have been approved.       Thank you,    Regina Segura RN  Utilization Review          [facility]   [address] [place]  [address] [phone] Office  [phone] Fax  [email or web address]   [facility]       Mc Rodgers (49 y.o. Male)     Date of Birth Social Security Number Address Home Phone MRN    1970  [address] [place] [address] [phone] [id number]    Episcopal Marital Status          Buddhism        Admission Date Admission Type Admitting Provider Attending Provider Department, Room/Bed    1/7/20 Emergency Mauro Geiger DO  [facility] 2B MEDICAL INPATIENT, 228/1    Discharge Date Discharge Disposition Discharge Destination        1/10/2020 Home or Self Care              Attending Provider:  (none)   Allergies:  Duloxetine    Isolation:  None   Infection:  None   Code Status:  Prior    Ht:  177.8 cm (70\")   Wt:  116 kg (255 lb 15.3 oz)    Admission Cmt:  None   Principal Problem:  Type 2 diabetes mellitus with hyperglycemia (CMS/[facility]) [E11.65]                 Active Insurance as of 1/7/2020     Primary Coverage     Payor Plan Insurance Group Employer/Plan Group    [employer]ET[employer] COMMERCIAL AETNA [id number]     Payor Plan Address Payor Plan Phone Number Payor Plan Fax Number Effective Dates    [address] [phone]  1/1/2013 - None Entered    [facility] [id number]       Subscriber Name Subscriber Birth Date Member ID       JOSELYN RODGERS 10/29/1972 [id number]           Secondary Coverage     Payor Plan Insurance Group Employer/Plan Group    [employer] [place] [id number]     Payor Plan Address Payor Plan Phone Number Payor Plan Fax Number Effective Dates    MAIL STOP:   10/1/2018 - None Entered    [address] [address]       [facility] [address]       " "Subscriber Name Subscriber Birth Date Member ID       PAMELA RODGERS 1970 [id number]                 Emergency Contacts      (Rel.) Home Phone Work Phone Mobile Phone    JOSELYN RODGERS (Spouse) [phone] -- [phone]               Physician Progress Notes (most recent note)      Mauro Geiger DO at 01/09/20 1443                [facility] Medicine Services Daily Progress Note      Hospitalist Team  LOS 2 days      Patient Care Team:  Carlita Shepherd MD as PCP - General (Internal Medicine)  Carlita Shepherd MD as PCP - Family Medicine    Patient Location: 228/1      Subjective   Subjective     Chief Complaint / Subjective  Chief Complaint   Patient presents with   • Hyperglycemia     Confusion better.  No bowel movement yet despite taking lactulose.  Afebrile.  Blood glucose controlled.    Present on Admission:  • Weakness  • Alcoholic cirrhosis (CMS/HCC)  • Hyperammonemia (CMS/HCC)  • Renal injury  • Encephalopathy, hepatic (CMS/HCC)      Brief Synopsis of Hospital Course/HPI    The patient is a 49-year-old male with history of alcoholic cirrhosis and rheumatoid arthritis that was sent to the emergency room on 1/7/2020 because of elevated ammonia levels and blood glucose.  The patient apparently was recently put on prednisone about 3 weeks ago and Enbrel had been discontinued.  The patient was recently prescribed metformin by PCP but never took it. The patient follows with hematology/oncology for MGUS.    Review of Systems   Unable to perform ROS: mental status change         Objective   Objective      Vital Signs  Temp:  [97.6 °F (36.4 °C)-97.8 °F (36.6 °C)] 97.6 °F (36.4 °C)  Heart Rate:  [68-71] 71  Resp:  [16-18] 18  BP: (111-126)/(67-82) 126/82  Oxygen Therapy  SpO2: 95 %  Pulse Oximetry Type: Intermittent  Device (Oxygen Therapy): room air  Flowsheet Rows      First Filed Value   Admission Height  177.8 cm (70\") Documented at 01/07/2020 2135 "   Admission Weight  113 kg (250 lb) Documented at 01/07/2020 2135        Intake & Output (last 3 days)       01/06 0701 - 01/07 0700 01/07 0701 - 01/08 0700 01/08 0701 - 01/09 0700 01/09 0701 - 01/10 0700    P.O.   240     IV Piggyback  500      Total Intake(mL/kg)  500 (4.4) 240 (2.1)     Net  +500 +240                 Lines, Drains & Airways    Active LDAs     Name:   Placement date:   Placement time:   Site:   Days:    Peripheral IV 01/07/20 2201 Left Forearm   01/07/20 2201    Forearm   less than 1                  Physical Exam:    Physical Exam   Constitutional: He appears well-developed and well-nourished.   HENT:   Head: Normocephalic and atraumatic.   Eyes: Pupils are equal, round, and reactive to light. EOM are normal. No scleral icterus.   Neck: Normal range of motion. Neck supple.   Cardiovascular: Normal rate and regular rhythm.   Pulmonary/Chest: Effort normal and breath sounds normal.   Abdominal: Soft. Bowel sounds are normal. There is no tenderness.   Musculoskeletal:   Moves all extremities equally.   Lymphadenopathy:     He has no cervical adenopathy.   Neurological: He is alert. No cranial nerve deficit or sensory deficit.   Skin: Skin is warm and dry. No rash noted.   Psychiatric: His speech is normal. Cognition and memory are impaired.         Procedures:              Results Review:     I reviewed the patient's new clinical results.      Lab Results (last 24 hours)     Procedure Component Value Units Date/Time    POC Glucose Once [[id number]]  (Abnormal) Collected:  01/09/20 1223    Specimen:  Blood Updated:  01/09/20 1233     Glucose 301 mg/dL      Comment: Serial Number: [id number]:  [id number]       POC Glucose Once [[id number]]  (Abnormal) Collected:  01/09/20 0714    Specimen:  Blood Updated:  01/09/20 0716     Glucose 106 mg/dL      Comment: Serial Number: [id number]:  [id number]       Comprehensive Metabolic Panel [[id number]]  (Abnormal) Collected:  01/09/20 0444     Specimen:  Blood Updated:  01/09/20 0524     Glucose 122 mg/dL      BUN 10 mg/dL      Creatinine 1.00 mg/dL      Sodium 135 mmol/L      Potassium 3.5 mmol/L      Chloride 100 mmol/L      CO2 24.0 mmol/L      Calcium 9.2 mg/dL      Total Protein 7.1 g/dL      Albumin 3.00 g/dL      ALT (SGPT) 13 U/L      AST (SGOT) 19 U/L      Alkaline Phosphatase 130 U/L      Total Bilirubin 1.8 mg/dL      eGFR Non African Amer 79 mL/min/1.73      Globulin 4.1 gm/dL      A/G Ratio 0.7 g/dL      BUN/Creatinine Ratio 10.0     Anion Gap 11.0 mmol/L     Narrative:       GFR Normal >60  Chronic Kidney Disease <60  Kidney Failure <15      Procalcitonin [[id number]]  (Normal) Collected:  01/09/20 0444    Specimen:  Blood Updated:  01/09/20 0520     Procalcitonin 0.17 ng/mL     Narrative:       Results may be falsely decreased if patient taking Biotin.     Magnesium [[id number]]  (Normal) Collected:  01/09/20 0444    Specimen:  Blood Updated:  01/09/20 0518     Magnesium 1.9 mg/dL     C-reactive Protein [[id number]]  (Normal) Collected:  01/09/20 0444    Specimen:  Blood Updated:  01/09/20 0518     C-Reactive Protein 0.44 mg/dL     Ammonia [[id number]]  (Abnormal) Collected:  01/09/20 0444    Specimen:  Blood Updated:  01/09/20 0516     Ammonia 75 umol/L     CBC & Differential [[id number]] Collected:  01/09/20 0444    Specimen:  Blood Updated:  01/09/20 0454    Narrative:       The following orders were created for panel order CBC & Differential.  Procedure                               Abnormality         Status                     ---------                               -----------         ------                     CBC Auto Differential[[id number]]        Abnormal            Final result                 Please view results for these tests on the individual orders.    CBC Auto Differential [[id number]]  (Abnormal) Collected:  01/09/20 0444    Specimen:  Blood Updated:  01/09/20 0454     WBC 7.00 10*3/mm3      RBC 4.40 10*6/mm3      Hemoglobin  14.2 g/dL      Hematocrit 41.0 %      MCV 93.2 fL      MCH 32.2 pg      MCHC 34.6 g/dL      RDW 14.3 %      RDW-SD 46.4 fl      MPV 9.5 fL      Platelets 120 10*3/mm3      Neutrophil % 52.5 %      Lymphocyte % 32.3 %      Monocyte % 9.5 %      Eosinophil % 4.7 %      Basophil % 1.0 %      Neutrophils, Absolute 3.70 10*3/mm3      Lymphocytes, Absolute 2.30 10*3/mm3      Monocytes, Absolute 0.70 10*3/mm3      Eosinophils, Absolute 0.30 10*3/mm3      Basophils, Absolute 0.10 10*3/mm3      nRBC 0.1 /100 WBC     POC Glucose Once [[id number]]  (Abnormal) Collected:  01/08/20 2006    Specimen:  Blood Updated:  01/08/20 2009     Glucose 366 mg/dL      Comment: Serial Number: [id number]:  [id number]       Procalcitonin [[id number]]  (Normal) Collected:  01/08/20 1720    Specimen:  Blood Updated:  01/08/20 1914     Procalcitonin 0.18 ng/mL     Narrative:       Results may be falsely decreased if patient taking Biotin.     CBC & Differential [[id number]] Collected:  01/08/20 1720    Specimen:  Blood Updated:  01/08/20 1826    Narrative:       The following orders were created for panel order CBC & Differential.  Procedure                               Abnormality         Status                     ---------                               -----------         ------                     CBC Auto Differential[[id number]]        Abnormal            Final result                 Please view results for these tests on the individual orders.    CBC Auto Differential [[id number]]  (Abnormal) Collected:  01/08/20 1720    Specimen:  Blood Updated:  01/08/20 1826     WBC 7.10 10*3/mm3      RBC 4.38 10*6/mm3      Hemoglobin 14.2 g/dL      Hematocrit 40.3 %      MCV 92.0 fL      MCH 32.3 pg      MCHC 35.1 g/dL      RDW 14.1 %      RDW-SD 45.1 fl      MPV 9.8 fL      Platelets 137 10*3/mm3      Neutrophil % 64.7 %      Lymphocyte % 20.8 %      Monocyte % 10.4 %      Eosinophil % 3.4 %      Basophil % 0.7 %      Neutrophils, Absolute 4.60  10*3/mm3      Lymphocytes, Absolute 1.50 10*3/mm3      Monocytes, Absolute 0.70 10*3/mm3      Eosinophils, Absolute 0.20 10*3/mm3      Basophils, Absolute 0.10 10*3/mm3      nRBC 0.1 /100 WBC     POC Glucose Once [[id number]]  (Abnormal) Collected:  01/08/20 1817    Specimen:  Blood Updated:  01/08/20 1818     Glucose 285 mg/dL      Comment: Serial Number: [id number]:  [id number]       POC Glucose Once [[id number]]  (Abnormal) Collected:  01/07/20 2136    Specimen:  Blood Updated:  01/08/20 1526     Glucose >500 mg/dL      Comment: Serial Number: [id number]:  [id number]           No results found for: HGBA1C                Microbiology Results (last 10 days)     ** No results found for the last 240 hours. **          ECG/EMG Results (most recent)     None                  Xr Abdomen Kub    Result Date: 1/9/2020  Moderate to large generalized colonic stool burden. Correlate for constipation. No acute abdominal findings.  Electronically Signed By-Dr. Marylin Scherer MD On:1/9/2020 10:21 AM This report was finalized on [id number] by Dr. Marylin Scheerr MD.      Xrays, labs reviewed personally by physician.    Medication Review:   I have reviewed the patient's current medication list      Scheduled Meds    azelastine 2 spray Each Nare Daily   enoxaparin 40 mg Subcutaneous Daily   insulin glargine 10 Units Subcutaneous Nightly   insulin lispro 0-24 Units Subcutaneous 4x Daily With Meals & Nightly   insulin regular 2 Units Subcutaneous Daily   lactulose 30 mL Oral 4x Daily   magnesium oxide 400 mg Oral BID   montelukast 10 mg Oral Nightly   pantoprazole 40 mg Oral Q AM   predniSONE 7.5 mg Oral Daily   riFAXIMin 550 mg Oral Q12H   smog enema 400 mL Rectal Once   tamsulosin 0.4 mg Oral BID       Meds Infusions    Pharmacy Consult - Steroid Insulin Protocol     sodium chloride 125 mL/hr Last Rate: 125 mL/hr (01/08/20 0611)       Meds PRN  •  albuterol  •  dextrose  •  dextrose  •  glucagon (human recombinant)  •   insulin lispro **AND** insulin lispro  •  Pharmacy Consult - Steroid Insulin Protocol  •  senna  •  [COMPLETED] Insert peripheral IV **AND** sodium chloride        Assessment/Plan   Assessment/Plan     Active Hospital Problems    Diagnosis  POA   • Type 2 diabetes mellitus with hyperglycemia (CMS/HCC) [E11.65]  Unknown     Priority: High   • Hyperammonemia (CMS/HCC) [E72.20]  Yes     Priority: High   • Encephalopathy, hepatic (CMS/HCC) [K72.90]  Yes     Priority: High   • Renal injury [S37.009A]  Yes   • Weakness [R53.1]  Yes   • Alcoholic cirrhosis (CMS/HCC) [K70.30]  Yes      Resolved Hospital Problems   No resolved problems to display.       MEDICAL DECISION MAKING COMPLEXITY BY PROBLEM:  Moderate      Uncontrolled diabetes mellitus (POA)  --Recent diagnosis diabetes--> A1c 12.6  --Continue Lantus and ISS.    Hepatic encephalopathy:  --Continue lactulose, Continue Xifaxan, spironolactone  --History of cirrhosis from alcohol abuse    Constipation:  --Ordered KUB 1/9/2020  --We will give enema     JON:  --Resolved.  --Likely from dehydration from hyperglycemia induced polyuria  --Nephrotoxin holds    MGUS:  --Following hematology/oncology consulted     COPD:  --Not in exacerbation  --Continue bronchodilators    GERD:  --Continue Pepcid     BPH:  --Continue Flomax.      Code Status -   Code Status and Medical Interventions:   Ordered at: 01/07/20 5307     Code Status:    CPR     Medical Interventions (Level of Support Prior to Arrest):    Full       Discharge Planning        Electronically signed by Mauro Geiger DO, 01/09/20, 2:44 PM.  [facility] Hospitalist Team        Electronically signed by Mauro Geiger DO at 01/09/20 9084       Consult Notes (most recent note)    No notes of this type exist for this encounter.            Discharge Summary      Mauro Geiger DO at 01/10/20 1304                [facility] Medicine Services  DISCHARGE SUMMARY        Prepared  For PCP:  Carlita Shepherd MD    Patient Name: Mc Selby  : 1970  MRN: [id number]      Date of Admission:   2020    Date of Discharge:  1/10/2020    Length of stay:  LOS: 3 days     Hospital Course     Presenting Problem:   Hyponatremia [E87.1]  Weakness [R53.1]  Hyperglycemia [R73.9]      Active Hospital Problems    Diagnosis  POA   • **Type 2 diabetes mellitus with hyperglycemia (CMS/HCC) [E11.65]  Unknown     Priority: High   • Hyperammonemia (CMS/HCC) [E72.20]  Yes     Priority: High   • Encephalopathy, hepatic (CMS/HCC) [K72.90]  Yes     Priority: High   • Renal injury [S37.009A]  Yes     Priority: Medium   • Weakness [R53.1]  Yes     Priority: Medium   • Alcoholic cirrhosis (CMS/HCC) [K70.30]  Yes     Priority: Medium      Resolved Hospital Problems   No resolved problems to display.           Hospital Course:    The patient was admitted to the medical floor for treatment of hepatic encephalopathy and hyperglycemia from uncontrolled diabetes mellitus.  Hemoglobin A1c came back at 9.6.  The patient's symptoms improved with IV fluids.  He did have evidence of severe dehydration as manifested with constipation since he did not have bowel movements after being given several rounds of lactulose.  He finally had a bowel movement after being given enema on 2020.  The patient admitted to drinking half a gallon of orange juice daily as a replacement for alcohol which he used to consume daily in the past.  The patient was seen by the nutritionist and was educated on diabetes mellitus and diet control.  Lactulose has been added to his discharge medication list and is also on Xifaxan at home.  Prescription for Lantus and mealtime insulin has been given.  We will defer to her rheumatologist regarding discontinuation of prednisone 7.5 mg p.o. daily . The patient will be discharged in the morning of 1/10/2020 and needs to follow-up with his PCP within 2 weeks.      Recommendation for Outpatient  Providers:             Reasons For Change In Medications and Indications for New Medications:        Day of Discharge     HPI:        The patient is a 49-year-old male with history of alcoholic cirrhosis and rheumatoid arthritis that was sent to the emergency room on 1/7/2020 because of elevated ammonia levels and blood glucose.  The patient apparently was recently put on prednisone about 3 weeks ago and Enbrel had been discontinued.  The patient was recently prescribed metformin by PCP but never took it. The patient follows with hematology/oncology for MGUS.    Vital Signs:   Temp:  [97.4 °F (36.3 °C)-97.9 °F (36.6 °C)] 97.6 °F (36.4 °C)  Heart Rate:  [69-81] 81  Resp:  [18-20] 18  BP: (110-117)/(70-72) 111/70     Physical Exam:  Physical Exam   Constitutional: He is oriented to person, place, and time. He appears well-developed and well-nourished.   HENT:   Head: Normocephalic and atraumatic.   Eyes: Pupils are equal, round, and reactive to light. EOM are normal.   Neck: Normal range of motion. Neck supple.   Cardiovascular: Normal rate and normal heart sounds.   Pulmonary/Chest: Effort normal and breath sounds normal.   Abdominal: Soft. Bowel sounds are normal.   Musculoskeletal: Normal range of motion.   Neurological: He is alert and oriented to person, place, and time.   Skin: Skin is warm and dry.   Psychiatric: He has a normal mood and affect.       Pertinent  and/or Most Recent Results     Results from last 7 days   Lab Units 01/10/20  0510 01/09/20  0444 01/08/20  1720 01/08/20  0500 01/07/20  2203 01/07/20  1536 01/07/20  1349   WBC 10*3/mm3  --  7.00 7.10 8.90 7.00  --  8.90   HEMOGLOBIN g/dL  --  14.2 14.2 15.0 15.0  --  15.7   HEMATOCRIT %  --  41.0 40.3 43.6 44.3  --  43.2   PLATELETS 10*3/mm3  --  120* 137* 144 132*  --  148   SODIUM mmol/L 132* 135*  --  127* 120* 123*  --    POTASSIUM mmol/L 3.9 3.5  --  3.7 4.6 4.5  --    CHLORIDE mmol/L 100 100  --  90* 83* 86*  --    CO2 mmol/L 24.0 24.0  --   25.0 20.0* 24.0  --    BUN mg/dL 8 10  --  11 15 13  --    CREATININE mg/dL 0.79 1.00  --  1.09 1.25 1.05  --    GLUCOSE mg/dL 170* 122*  --  399* 827* 610*  --    CALCIUM mg/dL 9.2 9.2  --  10.3 9.6 10.5  --      Results from last 7 days   Lab Units 01/10/20  0510 01/09/20  0444 01/08/20  0500 01/07/20  2203 01/07/20  1536   BILIRUBIN mg/dL 1.4* 1.8* 1.6* 2.0* 1.9*   ALK PHOS U/L 154* 130* 204* 208* 242*   ALT (SGPT) U/L 13 13 13 14 13   AST (SGOT) U/L 21 19 17 18 18           Invalid input(s): TG, LDLCALC, LDLREALC  Results from last 7 days   Lab Units 01/09/20  0444 01/08/20  1720 01/08/20  0500 01/07/20  2203   TSH uIU/mL  --   --  2.940  --    PROBNP pg/mL  --   --   --  71.1   PROCALCITONIN ng/mL 0.17 0.18  --   --        Brief Urine Lab Results  (Last result in the past 365 days)      Color   Clarity   Blood   Leuk Est   Nitrite   Protein   CREAT   Urine HCG        12/05/19 1425 Yellow Clear Negative Negative Negative Negative               Microbiology Results Abnormal     None          Xr Abdomen Kub    Result Date: 1/9/2020  Impression: Moderate to large generalized colonic stool burden. Correlate for constipation. No acute abdominal findings.  Electronically Signed By-Dr. Marylin Scherer MD On:1/9/2020 10:21 AM This report was finalized on [id number] by Dr. Marylin Scherer MD.                 Test Results Pending at Discharge: None      Procedures Performed: None           Consults:   Consults     Date and Time Order Name Status Description    1/7/2020 2558 Hospitalist (on-call MD unless specified) Completed             Discharge Details        Discharge Medications      New Medications      Instructions Start Date   ACCU-CHEK FASTCLIX LANCETS misc   1 each, Does not apply, 4 Times Daily Before Meals & Nightly      Alcohol Wipes 70 % misc   1 each, Apply externally, 4 Times Daily Before Meals & Nightly      glucose blood test strip  Commonly known as:  ACCU-CHEK GUIDE   Check blood sugar before meals  and at bedtime. Use as instructed      insulin glargine 100 UNIT/ML injection  Commonly known as:  LANTUS   10 Units, Subcutaneous, Nightly      Insulin Lispro (1 Unit Dial) 100 UNIT/ML solution pen-injector  Commonly known as:  HUMALOG KWIKPEN   2 Units, Subcutaneous, 3 Times Daily After Meals      Pen Needles 32G X 4 MM misc   1 each, Does not apply, 4 Times Daily Before Meals & Nightly      senna 8.6 MG tablet  Commonly known as:  SENOKOT   1 tablet, Oral, 2 Times Daily PRN         Changes to Medications      Instructions Start Date   lactulose 10 GM/15ML solution  Commonly known as:  CHRONULAC  What changed:  when to take this   30 mL, Oral, 2 Times Daily         Continue These Medications      Instructions Start Date   albuterol sulfate  (90 Base) MCG/ACT inhaler  Commonly known as:  PROVENTIL HFA;VENTOLIN HFA;PROAIR HFA   2 puffs, Inhalation, Every 4 Hours PRN      azelastine 0.1 % nasal spray  Commonly known as:  ASTELIN   2 sprays, Nasal, 2 Times Daily, Use in each nostril as directed       bumetanide 2 MG tablet  Commonly known as:  BUMEX   2 mg, Oral, 2 Times Daily      EPINEPHrine 0.3 MG/0.3ML solution auto-injector injection  Commonly known as:  EPIPEN   0.3 mg, Subcutaneous, Once As Needed      famotidine 20 MG tablet  Commonly known as:  PEPCID   TAKE 1 TABLET BY MOUTH EVERY DAY      magnesium oxide 400 MG tablet  Commonly known as:  MAG-OX   2 tablets, Oral, 2 Times Daily      meclizine 25 MG chewable tablet chewable tablet   25-50 mg, Oral, 3 Times Daily PRN      metFORMIN  MG 24 hr tablet  Commonly known as:  GLUCOPHAGE-XR   500 mg, Oral, Daily With Breakfast      montelukast 10 MG tablet  Commonly known as:  SINGULAIR   10 mg, Oral, Nightly      ondansetron 8 MG tablet  Commonly known as:  ZOFRAN   8 mg, Oral, Every 8 Hours PRN      potassium chloride 20 MEQ CR tablet  Commonly known as:  K-DUR,KLOR-CON   40 mEq, Oral, 3 Times Daily      predniSONE 5 MG tablet  Commonly known as:   DELTASONE   7.5 mg, Oral, Daily      promethazine 12.5 MG tablet  Commonly known as:  PHENERGAN   12.5 mg, Oral, Every 6 Hours PRN      riFAXIMin 550 MG tablet  Commonly known as:  XIFAXAN   550 mg, Oral, Every 12 Hours Scheduled      spironolactone 25 MG tablet  Commonly known as:  ALDACTONE   25 mg, Oral, Daily      tamsulosin 0.4 MG capsule 24 hr capsule  Commonly known as:  FLOMAX   1 capsule, Oral, 2 Times Daily      Triamcinolone Acetonide 55 MCG/ACT nasal inhaler  Commonly known as:  NASACORT   1 spray, Nasal, Daily      vitamin D 1.25 MG (43613 UT) capsule capsule  Commonly known as:  ERGOCALCIFEROL   50,000 Units, Oral, Every 14 Days, Every other Sunday      zinc sulfate 220 (50 Zn) MG capsule  Commonly known as:  ZINCATE   220 mg, Oral, Daily             Allergies   Allergen Reactions   • Duloxetine Unknown (See Comments)     Unknown reaction          Discharge Disposition: Home      Diet:  Hospital:  Diet Order   Procedures   • Diet Diabetic/Consistent Carbs; Diabetic - Consistent Carb         Discharge Activity: As tolerated        CODE STATUS:    Code Status and Medical Interventions:   Ordered at: 01/07/20 7465     Code Status:    CPR     Medical Interventions (Level of Support Prior to Arrest):    Full         Follow-up Appointments  Future Appointments   Date Time Provider Department Center   1/13/2020  1:30 PM LAB [facility] None   1/17/2020  2:00 PM Carlita Shepherd MD [facility] None   1/21/2020  1:00 PM [facility] IR 1 BH [facility] IR [facility]   2/12/2020  2:45 PM Celia Ellsworth MD [facility] ONC NA None   2/12/2020  2:45 PM LAB MD  [facility] ONC LAB NA  [facility] O[facility] None   4/2/2020  1:00 PM Lala Costa MD [facility] R[facility] NA None       Additional Instructions for the Follow-ups that You Need to Schedule     Discharge Follow-up with PCP   As directed       Currently Documented PCP:    Carlita Shepherd MD    PCP Phone Number:    [phone]     Follow Up Details:  2 weeks                  Condition on Discharge:      Stable    Electronically signed by Mauro Geiger DO, 01/10/20, 1:04 PM.    Time: I spent  20 minutes on this discharge activity which included face-to-face encounter with the patient/reviewing the data in the system/coordination of the care with the nursing staff as well as consultants/documentation/entering orders.      Electronically signed by Mauro Geiger DO at 01/10/20 1314

## 2020-01-13 NOTE — PAYOR COMM NOTE
"Reconsideration pending. Patient discharged on 01/10/2020. Please confirm determination on pending denial.  See attached DC summary.   Ref# [id number]    Thank you,       Regina Segura RN  Utilization Review          [facility]   [address] [place]  [address] [phone] Office  [phone] Fax  [email or web address]   [facility]       Bal Mc P (49 y.o. Male)     Date of Birth Social Security Number Address Home Phone MRN    1970  [address] [place] [address] [phone] [id number]    Y[employer] Marital Status          Jew        Admission Date Admission Type Admitting Provider Attending Provider Department, Room/Bed    1/7/20 Emergency Mauro Geiger DO  [facility] 2B MEDICAL INPATIENT, 228/1    Discharge Date Discharge Disposition Discharge Destination        1/10/2020 Home or Self Care              Attending Provider:  (none)   Allergies:  Duloxetine    Isolation:  None   Infection:  None   Code Status:  Prior    Ht:  177.8 cm (70\")   Wt:  116 kg (255 lb 15.3 oz)    Admission Cmt:  None   Principal Problem:  Type 2 diabetes mellitus with hyperglycemia (CMS/[facility]ly [facility]) [E11.65]                 Active Insurance as of 1/7/2020     Primary Coverage     Payor Plan Insurance Group Employer/Plan Group    [employer]ET[employer] COMMERCIAL AET[employer] [id number]     Payor Plan Address Payor Plan Phone Number Payor Plan Fax Number Effective Dates    [address] [phone]  1/1/2013 - None Entered    [place] [address]       Subscriber Name Subscriber Birth Date Member ID       JOSELYN RODGERS 10/29/1972 [id number]           Secondary Coverage     Payor Plan Insurance Group Employer/Plan Group    [id number]     Payor Plan Address Payor Plan Phone Number Payor Plan Fax Number Effective Dates    MAIL STOP:   10/1/2018 - None Entered    [address] [address]       [facility] [address]       Subscriber Name " Subscriber Birth Date Member ID       MC SELBY 1970 [id number]                 Emergency Contacts      (Rel.) Home Phone Work Phone Mobile Phone    JOSELYN SELBY (Spouse) [phone] -- [phone]               Discharge Summary      UlicesMauro valdovinos DO at 01/10/20 1304                [facility]  DISCHARGE SUMMARY        Prepared For PCP:  Carlita Shepherd MD    Patient Name: Mc Selby  : 1970  MRN: [id number]      Date of Admission:   2020    Date of Discharge:  1/10/2020    Length of stay:  LOS: 3 days     Hospital Course     Presenting Problem:   Hyponatremia [E87.1]  Weakness [R53.1]  Hyperglycemia [R73.9]      Active Hospital Problems    Diagnosis  POA   • **Type 2 diabetes mellitus with hyperglycemia (CMS/HCC) [E11.65]  Unknown     Priority: High   • Hyperammonemia (CMS/HCC) [E72.20]  Yes     Priority: High   • Encephalopathy, hepatic (CMS/HCC) [K72.90]  Yes     Priority: High   • Renal injury [S37.009A]  Yes     Priority: Medium   • Weakness [R53.1]  Yes     Priority: Medium   • Alcoholic cirrhosis (CMS/HCC) [K70.30]  Yes     Priority: Medium      Resolved Hospital Problems   No resolved problems to display.           Hospital Course:    The patient was admitted to the medical floor for treatment of hepatic encephalopathy and hyperglycemia from uncontrolled diabetes mellitus.  Hemoglobin A1c came back at 9.6.  The patient's symptoms improved with IV fluids.  He did have evidence of severe dehydration as manifested with constipation since he did not have bowel movements after being given several rounds of lactulose.  He finally had a bowel movement after being given enema on 2020.  The patient admitted to drinking half a gallon of orange juice daily as a replacement for alcohol which he used to consume daily in the past.  The patient was seen by the nutritionist and was educated on diabetes mellitus and diet  control.  Lactulose has been added to his discharge medication list and is also on Xifaxan at home.  Prescription for Lantus and mealtime insulin has been given.  We will defer to her rheumatologist regarding discontinuation of prednisone 7.5 mg p.o. daily . The patient will be discharged in the morning of 1/10/2020 and needs to follow-up with his PCP within 2 weeks.      Recommendation for Outpatient Providers:             Reasons For Change In Medications and Indications for New Medications:        Day of Discharge     HPI:        The patient is a 49-year-old male with history of alcoholic cirrhosis and rheumatoid arthritis that was sent to the emergency room on 1/7/2020 because of elevated ammonia levels and blood glucose.  The patient apparently was recently put on prednisone about 3 weeks ago and Enbrel had been discontinued.  The patient was recently prescribed metformin by PCP but never took it. The patient follows with hematology/oncology for MGUS.    Vital Signs:   Temp:  [97.4 °F (36.3 °C)-97.9 °F (36.6 °C)] 97.6 °F (36.4 °C)  Heart Rate:  [69-81] 81  Resp:  [18-20] 18  BP: (110-117)/(70-72) 111/70     Physical Exam:  Physical Exam   Constitutional: He is oriented to person, place, and time. He appears well-developed and well-nourished.   HENT:   Head: Normocephalic and atraumatic.   Eyes: Pupils are equal, round, and reactive to light. EOM are normal.   Neck: Normal range of motion. Neck supple.   Cardiovascular: Normal rate and normal heart sounds.   Pulmonary/Chest: Effort normal and breath sounds normal.   Abdominal: Soft. Bowel sounds are normal.   Musculoskeletal: Normal range of motion.   Neurological: He is alert and oriented to person, place, and time.   Skin: Skin is warm and dry.   Psychiatric: He has a normal mood and affect.       Pertinent  and/or Most Recent Results     Results from last 7 days   Lab Units 01/10/20  0510 01/09/20  0444 01/08/20  1720 01/08/20  0500 01/07/20  2203  01/07/20  1536 01/07/20  1349   WBC 10*3/mm3  --  7.00 7.10 8.90 7.00  --  8.90   HEMOGLOBIN g/dL  --  14.2 14.2 15.0 15.0  --  15.7   HEMATOCRIT %  --  41.0 40.3 43.6 44.3  --  43.2   PLATELETS 10*3/mm3  --  120* 137* 144 132*  --  148   SODIUM mmol/L 132* 135*  --  127* 120* 123*  --    POTASSIUM mmol/L 3.9 3.5  --  3.7 4.6 4.5  --    CHLORIDE mmol/L 100 100  --  90* 83* 86*  --    CO2 mmol/L 24.0 24.0  --  25.0 20.0* 24.0  --    BUN mg/dL 8 10  --  11 15 13  --    CREATININE mg/dL 0.79 1.00  --  1.09 1.25 1.05  --    GLUCOSE mg/dL 170* 122*  --  399* 827* 610*  --    CALCIUM mg/dL 9.2 9.2  --  10.3 9.6 10.5  --      Results from last 7 days   Lab Units 01/10/20  0510 01/09/20  0444 01/08/20  0500 01/07/20  2203 01/07/20  1536   BILIRUBIN mg/dL 1.4* 1.8* 1.6* 2.0* 1.9*   ALK PHOS U/L 154* 130* 204* 208* 242*   ALT (SGPT) U/L 13 13 13 14 13   AST (SGOT) U/L 21 19 17 18 18           Invalid input(s): TG, LDLCALC, LDLREALC  Results from last 7 days   Lab Units 01/09/20  0444 01/08/20  1720 01/08/20  0500 01/07/20  2203   TSH uIU/mL  --   --  2.940  --    PROBNP pg/mL  --   --   --  71.1   PROCALCITONIN ng/mL 0.17 0.18  --   --        Brief Urine Lab Results  (Last result in the past 365 days)      Color   Clarity   Blood   Leuk Est   Nitrite   Protein   CREAT   Urine HCG        12/05/19 1425 Yellow Clear Negative Negative Negative Negative               Microbiology Results Abnormal     None          Xr Abdomen Kub    Result Date: 1/9/2020  Impression: Moderate to large generalized colonic stool burden. Correlate for constipation. No acute abdominal findings.  Electronically Signed By-Dr. Marylin Scherer MD On:1/9/2020 10:21 AM This report was finalized on [id number] by Dr. Marylin Scherer MD.                 Test Results Pending at Discharge: None      Procedures Performed: None           Consults:   Consults     Date and Time Order Name Status Description    1/7/2020 5847 Hospitalist (on-call MD unless specified)  Completed             Discharge Details        Discharge Medications      New Medications      Instructions Start Date   ACCU-CHEK FASTCLIX LANCETS misc   1 each, Does not apply, 4 Times Daily Before Meals & Nightly      Alcohol Wipes 70 % misc   1 each, Apply externally, 4 Times Daily Before Meals & Nightly      glucose blood test strip  Commonly known as:  ACCU-CHEK GUIDE   Check blood sugar before meals and at bedtime. Use as instructed      insulin glargine 100 UNIT/ML injection  Commonly known as:  LANTUS   10 Units, Subcutaneous, Nightly      Insulin Lispro (1 Unit Dial) 100 UNIT/ML solution pen-injector  Commonly known as:  HUMALOG KWIKPEN   2 Units, Subcutaneous, 3 Times Daily After Meals      Pen Needles 32G X 4 MM misc   1 each, Does not apply, 4 Times Daily Before Meals & Nightly      senna 8.6 MG tablet  Commonly known as:  SENOKOT   1 tablet, Oral, 2 Times Daily PRN         Changes to Medications      Instructions Start Date   lactulose 10 GM/15ML solution  Commonly known as:  CHRONULAC  What changed:  when to take this   30 mL, Oral, 2 Times Daily         Continue These Medications      Instructions Start Date   albuterol sulfate  (90 Base) MCG/ACT inhaler  Commonly known as:  PROVENTIL HFA;VENTOLIN HFA;PROAIR HFA   2 puffs, Inhalation, Every 4 Hours PRN      azelastine 0.1 % nasal spray  Commonly known as:  ASTELIN   2 sprays, Nasal, 2 Times Daily, Use in each nostril as directed       bumetanide 2 MG tablet  Commonly known as:  BUMEX   2 mg, Oral, 2 Times Daily      EPINEPHrine 0.3 MG/0.3ML solution auto-injector injection  Commonly known as:  EPIPEN   0.3 mg, Subcutaneous, Once As Needed      famotidine 20 MG tablet  Commonly known as:  PEPCID   TAKE 1 TABLET BY MOUTH EVERY DAY      magnesium oxide 400 MG tablet  Commonly known as:  MAG-OX   2 tablets, Oral, 2 Times Daily      meclizine 25 MG chewable tablet chewable tablet   25-50 mg, Oral, 3 Times Daily PRN      metFORMIN  MG 24 hr  tablet  Commonly known as:  GLUCOPHAGE-XR   500 mg, Oral, Daily With Breakfast      montelukast 10 MG tablet  Commonly known as:  SINGULAIR   10 mg, Oral, Nightly      ondansetron 8 MG tablet  Commonly known as:  ZOFRAN   8 mg, Oral, Every 8 Hours PRN      potassium chloride 20 MEQ CR tablet  Commonly known as:  K-DUR,KLOR-CON   40 mEq, Oral, 3 Times Daily      predniSONE 5 MG tablet  Commonly known as:  DELTASONE   7.5 mg, Oral, Daily      promethazine 12.5 MG tablet  Commonly known as:  PHENERGAN   12.5 mg, Oral, Every 6 Hours PRN      riFAXIMin 550 MG tablet  Commonly known as:  XIFAXAN   550 mg, Oral, Every 12 Hours Scheduled      spironolactone 25 MG tablet  Commonly known as:  ALDACTONE   25 mg, Oral, Daily      tamsulosin 0.4 MG capsule 24 hr capsule  Commonly known as:  FLOMAX   1 capsule, Oral, 2 Times Daily      Triamcinolone Acetonide 55 MCG/ACT nasal inhaler  Commonly known as:  NASACORT   1 spray, Nasal, Daily      vitamin D 1.25 MG (88973 UT) capsule capsule  Commonly known as:  ERGOCALCIFEROL   50,000 Units, Oral, Every 14 Days, Every other Sunday      zinc sulfate 220 (50 Zn) MG capsule  Commonly known as:  ZINCATE   220 mg, Oral, Daily             Allergies   Allergen Reactions   • Duloxetine Unknown (See Comments)     Unknown reaction          Discharge Disposition: Home      Diet:  Hospital:  Diet Order   Procedures   • Diet Diabetic/Consistent Carbs; Diabetic - Consistent Carb         Discharge Activity: As tolerated        CODE STATUS:    Code Status and Medical Interventions:   Ordered at: 01/07/20 5498     Code Status:    CPR     Medical Interventions (Level of Support Prior to Arrest):    Full         Follow-up Appointments  Future Appointments   Date Time Provider Department Center   1/13/2020  1:30 PM LAB [facility] None   1/17/2020  2:00 PM Carlita Shepherd MD [facility] None   1/21/2020  1:00 PM [facility] 1 [facility]   2/12/2020  2:45 PM Celia Ellsworth  MD [facility] ON[facility] NA None   2/12/2020  2:45 PM LAB MD [facility] ONC LAB NA [facility] None   4/2/2020  1:00 PM Lala Costa MD [facility] NA None       Additional Instructions for the Follow-ups that You Need to Schedule     Discharge Follow-up with PCP   As directed       Currently Documented PCP:    Carlita Shepherd MD    PCP Phone Number:    [phone]     Follow Up Details:  2 weeks                 Condition on Discharge:      Stable    Electronically signed by Mauro Geiger DO, 01/10/20, 1:04 PM.    Time: I spent  20 minutes on this discharge activity which included face-to-face encounter with the patient/reviewing the data in the system/coordination of the care with the nursing staff as well as consultants/documentation/entering orders.      Electronically signed by Mauro Geiger DO at 01/10/20 1318

## 2020-01-13 NOTE — TELEPHONE ENCOUNTER
TCM started. Pt already has 30 min appt scheduled 1/17 @ 1400. Asked pt to call back if he had any other concerns.

## 2020-01-13 NOTE — PAYOR COMM NOTE
"See attached clinical for 1/10/2020.  Patient discharged 1/10/2020. Auth# [id number]    Regina Segura RN  Utilization Review          [facility]   [address] [place]  [address] [phone] Office  [phone] Fax  [email or web address]   [facility]       Mc Rodgers (49 y.o. Male)     Date of Birth Social Security Number Address Home Phone MRN    1970  [address] [place] [address] [phone] [id number]    Jewish Marital Status          Jain        Admission Date Admission Type Admitting Provider Attending Provider Department, Room/Bed    1/7/20 Emergency Mauro Geiger DO  [facility] 2B MEDICAL INPATIENT, 228/1    Discharge Date Discharge Disposition Discharge Destination        1/10/2020 Home or Self Care              Attending Provider:  (none)   Allergies:  Duloxetine    Isolation:  None   Infection:  None   Code Status:  Prior    Ht:  177.8 cm (70\")   Wt:  116 kg (255 lb 15.3 oz)    Admission Cmt:  None   Principal Problem:  Type 2 diabetes mellitus with hyperglycemia (CMS/HCC) [E11.65]                 Active Insurance as of 1/7/2020     Primary Coverage     Payor Plan Insurance Group Employer/Plan Group    [employer]ET[employer] COMMERCIAL AETNA [id number]     Payor Plan Address Payor Plan Phone Number Payor Plan Fax Number Effective Dates    [address] [phone]  1/1/2013 - None Entered    [facility] [id number]       Subscriber Name Subscriber Birth Date Member ID       RODGERSJOSELYN EMIGDIO 10/29/1972 [id number]           Secondary Coverage     Payor Plan Insurance Group Employer/Plan Group    [id number]     Payor Plan Address Payor Plan Phone Number Payor Plan Fax Number Effective Dates    MAIL STOP:   10/1/2018 - None Entered    [address] [address]       [facility] [address]       Subscriber Name Subscriber Birth Date Member ID       ANA MARIAMC CARIAS 1970 [id number]           " "      Emergency Contacts      (Rel.) Home Phone Work Phone Mobile Phone    JOSELYN RODGERS (Spouse) [phone] -- [phone]               Physician Progress Notes (most recent note)      Mauro Geiger DO at 01/09/20 1443                [facility] Medicine Services Daily Progress Note      Hospitalist Team  LOS 2 days      Patient Care Team:  Carlita Shepherd MD as PCP - General (Internal Medicine)  Carlita Shepherd MD as PCP - Family Medicine    Patient Location: 228/1      Subjective   Subjective     Chief Complaint / Subjective  Chief Complaint   Patient presents with   • Hyperglycemia     Confusion better.  No bowel movement yet despite taking lactulose.  Afebrile.  Blood glucose controlled.    Present on Admission:  • Weakness  • Alcoholic cirrhosis (CMS/HCC)  • Hyperammonemia (CMS/HCC)  • Renal injury  • Encephalopathy, hepatic (CMS/HCC)      Brief Synopsis of Hospital Course/HPI    The patient is a 49-year-old male with history of alcoholic cirrhosis and rheumatoid arthritis that was sent to the emergency room on 1/7/2020 because of elevated ammonia levels and blood glucose.  The patient apparently was recently put on prednisone about 3 weeks ago and Enbrel had been discontinued.  The patient was recently prescribed metformin by PCP but never took it. The patient follows with hematology/oncology for MGUS.    Review of Systems   Unable to perform ROS: mental status change         Objective   Objective      Vital Signs  Temp:  [97.6 °F (36.4 °C)-97.8 °F (36.6 °C)] 97.6 °F (36.4 °C)  Heart Rate:  [68-71] 71  Resp:  [16-18] 18  BP: (111-126)/(67-82) 126/82  Oxygen Therapy  SpO2: 95 %  Pulse Oximetry Type: Intermittent  Device (Oxygen Therapy): room air  Flowsheet Rows      First Filed Value   Admission Height  177.8 cm (70\") Documented at 01/07/2020 2135   Admission Weight  113 kg (250 lb) Documented at 01/07/2020 2135        Intake & Output (last 3 days)  "      01/06 0701 - 01/07 0700 01/07 0701 - 01/08 0700 01/08 0701 - 01/09 0700 01/09 0701 - 01/10 0700    P.O.   240     IV Piggyback  500      Total Intake(mL/kg)  500 (4.4) 240 (2.1)     Net  +500 +240                 Lines, Drains & Airways    Active LDAs     Name:   Placement date:   Placement time:   Site:   Days:    Peripheral IV 01/07/20 2201 Left Forearm   01/07/20 2201    Forearm   less than 1                  Physical Exam:    Physical Exam   Constitutional: He appears well-developed and well-nourished.   HENT:   Head: Normocephalic and atraumatic.   Eyes: Pupils are equal, round, and reactive to light. EOM are normal. No scleral icterus.   Neck: Normal range of motion. Neck supple.   Cardiovascular: Normal rate and regular rhythm.   Pulmonary/Chest: Effort normal and breath sounds normal.   Abdominal: Soft. Bowel sounds are normal. There is no tenderness.   Musculoskeletal:   Moves all extremities equally.   Lymphadenopathy:     He has no cervical adenopathy.   Neurological: He is alert. No cranial nerve deficit or sensory deficit.   Skin: Skin is warm and dry. No rash noted.   Psychiatric: His speech is normal. Cognition and memory are impaired.         Procedures:              Results Review:     I reviewed the patient's new clinical results.      Lab Results (last 24 hours)     Procedure Component Value Units Date/Time    POC Glucose Once [[id number]]  (Abnormal) Collected:  01/09/20 1223    Specimen:  Blood Updated:  01/09/20 1233     Glucose 301 mg/dL      Comment: Serial Number: [id number]:  [id number]       POC Glucose Once [[id number]]  (Abnormal) Collected:  01/09/20 0714    Specimen:  Blood Updated:  01/09/20 0716     Glucose 106 mg/dL      Comment: Serial Number: [id number]:  [id number]       Comprehensive Metabolic Panel [[id number]]  (Abnormal) Collected:  01/09/20 0444    Specimen:  Blood Updated:  01/09/20 0524     Glucose 122 mg/dL      BUN 10 mg/dL      Creatinine 1.00 mg/dL       Sodium 135 mmol/L      Potassium 3.5 mmol/L      Chloride 100 mmol/L      CO2 24.0 mmol/L      Calcium 9.2 mg/dL      Total Protein 7.1 g/dL      Albumin 3.00 g/dL      ALT (SGPT) 13 U/L      AST (SGOT) 19 U/L      Alkaline Phosphatase 130 U/L      Total Bilirubin 1.8 mg/dL      eGFR Non African Amer 79 mL/min/1.73      Globulin 4.1 gm/dL      A/G Ratio 0.7 g/dL      BUN/Creatinine Ratio 10.0     Anion Gap 11.0 mmol/L     Narrative:       GFR Normal >60  Chronic Kidney Disease <60  Kidney Failure <15      Procalcitonin [[id number]]  (Normal) Collected:  01/09/20 0444    Specimen:  Blood Updated:  01/09/20 0520     Procalcitonin 0.17 ng/mL     Narrative:       Results may be falsely decreased if patient taking Biotin.     Magnesium [[id number]]  (Normal) Collected:  01/09/20 0444    Specimen:  Blood Updated:  01/09/20 0518     Magnesium 1.9 mg/dL     C-reactive Protein [[id number]]  (Normal) Collected:  01/09/20 0444    Specimen:  Blood Updated:  01/09/20 0518     C-Reactive Protein 0.44 mg/dL     Ammonia [[id number]]  (Abnormal) Collected:  01/09/20 0444    Specimen:  Blood Updated:  01/09/20 0516     Ammonia 75 umol/L     CBC & Differential [[id number]] Collected:  01/09/20 0444    Specimen:  Blood Updated:  01/09/20 0454    Narrative:       The following orders were created for panel order CBC & Differential.  Procedure                               Abnormality         Status                     ---------                               -----------         ------                     CBC Auto Differential[[id number]]        Abnormal            Final result                 Please view results for these tests on the individual orders.    CBC Auto Differential [[id number]]  (Abnormal) Collected:  01/09/20 0444    Specimen:  Blood Updated:  01/09/20 0454     WBC 7.00 10*3/mm3      RBC 4.40 10*6/mm3      Hemoglobin 14.2 g/dL      Hematocrit 41.0 %      MCV 93.2 fL      MCH 32.2 pg      MCHC 34.6 g/dL      RDW 14.3 %      RDW-SD  46.4 fl      MPV 9.5 fL      Platelets 120 10*3/mm3      Neutrophil % 52.5 %      Lymphocyte % 32.3 %      Monocyte % 9.5 %      Eosinophil % 4.7 %      Basophil % 1.0 %      Neutrophils, Absolute 3.70 10*3/mm3      Lymphocytes, Absolute 2.30 10*3/mm3      Monocytes, Absolute 0.70 10*3/mm3      Eosinophils, Absolute 0.30 10*3/mm3      Basophils, Absolute 0.10 10*3/mm3      nRBC 0.1 /100 WBC     POC Glucose Once [[id number]]  (Abnormal) Collected:  01/08/20 2006    Specimen:  Blood Updated:  01/08/20 2009     Glucose 366 mg/dL      Comment: Serial Number: [id number]:  [id number]       Procalcitonin [[id number]]  (Normal) Collected:  01/08/20 1720    Specimen:  Blood Updated:  01/08/20 1914     Procalcitonin 0.18 ng/mL     Narrative:       Results may be falsely decreased if patient taking Biotin.     CBC & Differential [[id number]] Collected:  01/08/20 1720    Specimen:  Blood Updated:  01/08/20 1826    Narrative:       The following orders were created for panel order CBC & Differential.  Procedure                               Abnormality         Status                     ---------                               -----------         ------                     CBC Auto Differential[[id number]]        Abnormal            Final result                 Please view results for these tests on the individual orders.    CBC Auto Differential [[id number]]  (Abnormal) Collected:  01/08/20 1720    Specimen:  Blood Updated:  01/08/20 1826     WBC 7.10 10*3/mm3      RBC 4.38 10*6/mm3      Hemoglobin 14.2 g/dL      Hematocrit 40.3 %      MCV 92.0 fL      MCH 32.3 pg      MCHC 35.1 g/dL      RDW 14.1 %      RDW-SD 45.1 fl      MPV 9.8 fL      Platelets 137 10*3/mm3      Neutrophil % 64.7 %      Lymphocyte % 20.8 %      Monocyte % 10.4 %      Eosinophil % 3.4 %      Basophil % 0.7 %      Neutrophils, Absolute 4.60 10*3/mm3      Lymphocytes, Absolute 1.50 10*3/mm3      Monocytes, Absolute 0.70 10*3/mm3      Eosinophils, Absolute  0.20 10*3/mm3      Basophils, Absolute 0.10 10*3/mm3      nRBC 0.1 /100 WBC     POC Glucose Once [[id number]]  (Abnormal) Collected:  01/08/20 1817    Specimen:  Blood Updated:  01/08/20 1818     Glucose 285 mg/dL      Comment: Serial Number: [id number]:  [id number]       POC Glucose Once [[id number]]  (Abnormal) Collected:  01/07/20 2136    Specimen:  Blood Updated:  01/08/20 1526     Glucose >500 mg/dL      Comment: Serial Number: [id number]:  [id number]           No results found for: HGBA1C                Microbiology Results (last 10 days)     ** No results found for the last 240 hours. **          ECG/EMG Results (most recent)     None                  Xr Abdomen Kub    Result Date: 1/9/2020  Moderate to large generalized colonic stool burden. Correlate for constipation. No acute abdominal findings.  Electronically Signed By-Dr. Marylin Scherer MD On:1/9/2020 10:21 AM This report was finalized on [id number] by Dr. Marylin Scherer MD.      Xrays, labs reviewed personally by physician.    Medication Review:   I have reviewed the patient's current medication list      Scheduled Meds    azelastine 2 spray Each Nare Daily   enoxaparin 40 mg Subcutaneous Daily   insulin glargine 10 Units Subcutaneous Nightly   insulin lispro 0-24 Units Subcutaneous 4x Daily With Meals & Nightly   insulin regular 2 Units Subcutaneous Daily   lactulose 30 mL Oral 4x Daily   magnesium oxide 400 mg Oral BID   montelukast 10 mg Oral Nightly   pantoprazole 40 mg Oral Q AM   predniSONE 7.5 mg Oral Daily   riFAXIMin 550 mg Oral Q12H   smog enema 400 mL Rectal Once   tamsulosin 0.4 mg Oral BID       Meds Infusions    Pharmacy Consult - Steroid Insulin Protocol     sodium chloride 125 mL/hr Last Rate: 125 mL/hr (01/08/20 0611)       Meds PRN  •  albuterol  •  dextrose  •  dextrose  •  glucagon (human recombinant)  •  insulin lispro **AND** insulin lispro  •  Pharmacy Consult - Steroid Insulin Protocol  •  senna  •  [COMPLETED]  Insert peripheral IV **AND** sodium chloride        Assessment/Plan   Assessment/Plan     Active Hospital Problems    Diagnosis  POA   • Type 2 diabetes mellitus with hyperglycemia (CMS/HCC) [E11.65]  Unknown     Priority: High   • Hyperammonemia (CMS/HCC) [E72.20]  Yes     Priority: High   • Encephalopathy, hepatic (CMS/HCC) [K72.90]  Yes     Priority: High   • Renal injury [S37.009A]  Yes   • Weakness [R53.1]  Yes   • Alcoholic cirrhosis (CMS/HCC) [K70.30]  Yes      Resolved Hospital Problems   No resolved problems to display.       MEDICAL DECISION MAKING COMPLEXITY BY PROBLEM:  Moderate      Uncontrolled diabetes mellitus (POA)  --Recent diagnosis diabetes--> A1c 12.6  --Continue Lantus and ISS.    Hepatic encephalopathy:  --Continue lactulose, Continue Xifaxan, spironolactone  --History of cirrhosis from alcohol abuse    Constipation:  --Ordered KUB 1/9/2020  --We will give enema     JON:  --Resolved.  --Likely from dehydration from hyperglycemia induced polyuria  --Nephrotoxin holds    MGUS:  --Following hematology/oncology consulted     COPD:  --Not in exacerbation  --Continue bronchodilators    GERD:  --Continue Pepcid     BPH:  --Continue Flomax.      Code Status -   Code Status and Medical Interventions:   Ordered at: 01/07/20 2779     Code Status:    CPR     Medical Interventions (Level of Support Prior to Arrest):    Full       Discharge Planning        Electronically signed by Mauro Geiger DO, 01/09/20, 2:44 PM.  [facility] Hospitalist Team        Electronically signed by Mauro Geiger DO at 01/09/20 0321       Consult Notes (most recent note)    No notes of this type exist for this encounter.

## 2020-01-14 ENCOUNTER — TELEPHONE (OUTPATIENT)
Dept: DIABETES SERVICES | Facility: HOSPITAL | Age: 50
End: 2020-01-14

## 2020-01-14 ENCOUNTER — READMISSION MANAGEMENT (OUTPATIENT)
Dept: CALL CENTER | Facility: HOSPITAL | Age: 50
End: 2020-01-14

## 2020-01-15 RX ORDER — PREDNISONE 1 MG/1
TABLET ORAL
Qty: 45 TABLET | Refills: 0 | Status: SHIPPED | OUTPATIENT
Start: 2020-01-15 | End: 2020-02-11

## 2020-01-15 NOTE — OUTREACH NOTE
Medical Week 1 Survey      Responses   Facility patient discharged from?  [facility]   Does the patient have one of the following disease processes/diagnoses(primary or secondary)?  Other   Is there a successful TCM telephone encounter documented?  Yes          Jeannie Carbajal LPN

## 2020-01-17 ENCOUNTER — OFFICE VISIT (OUTPATIENT)
Dept: FAMILY MEDICINE CLINIC | Facility: CLINIC | Age: 50
End: 2020-01-17

## 2020-01-17 ENCOUNTER — READMISSION MANAGEMENT (OUTPATIENT)
Dept: CALL CENTER | Facility: HOSPITAL | Age: 50
End: 2020-01-17

## 2020-01-17 VITALS
DIASTOLIC BLOOD PRESSURE: 73 MMHG | RESPIRATION RATE: 20 BRPM | TEMPERATURE: 98.3 F | BODY MASS INDEX: 35.85 KG/M2 | WEIGHT: 250.4 LBS | HEIGHT: 70 IN | SYSTOLIC BLOOD PRESSURE: 104 MMHG | HEART RATE: 87 BPM

## 2020-01-17 DIAGNOSIS — M06.9 RHEUMATOID ARTHRITIS INVOLVING MULTIPLE SITES, UNSPECIFIED RHEUMATOID FACTOR PRESENCE: ICD-10-CM

## 2020-01-17 DIAGNOSIS — K76.82 ENCEPHALOPATHY, HEPATIC (HCC): ICD-10-CM

## 2020-01-17 DIAGNOSIS — E11.65 TYPE 2 DIABETES MELLITUS WITH HYPERGLYCEMIA, WITHOUT LONG-TERM CURRENT USE OF INSULIN (HCC): ICD-10-CM

## 2020-01-17 DIAGNOSIS — K70.30 ALCOHOLIC CIRRHOSIS OF LIVER WITHOUT ASCITES (HCC): ICD-10-CM

## 2020-01-17 DIAGNOSIS — Z23 IMMUNIZATION DUE: Primary | ICD-10-CM

## 2020-01-17 PROCEDURE — 90746 HEPB VACCINE 3 DOSE ADULT IM: CPT | Performed by: INTERNAL MEDICINE

## 2020-01-17 PROCEDURE — 90471 IMMUNIZATION ADMIN: CPT | Performed by: INTERNAL MEDICINE

## 2020-01-17 PROCEDURE — 99496 TRANSJ CARE MGMT HIGH F2F 7D: CPT | Performed by: INTERNAL MEDICINE

## 2020-01-17 RX ORDER — INSULIN ASPART 100 [IU]/ML
INJECTION, SOLUTION INTRAVENOUS; SUBCUTANEOUS
COMMUNITY
Start: 2020-01-10 | End: 2020-02-14 | Stop reason: SDUPTHER

## 2020-01-17 RX ORDER — BLOOD-GLUCOSE METER
1 KIT MISCELLANEOUS
Qty: 1 EACH | Refills: 0 | Status: SHIPPED | OUTPATIENT
Start: 2020-01-17 | End: 2020-09-02

## 2020-01-17 RX ORDER — INSULIN DETEMIR 100 [IU]/ML
INJECTION, SOLUTION SUBCUTANEOUS
COMMUNITY
Start: 2020-01-10 | End: 2020-01-17

## 2020-01-17 NOTE — OUTREACH NOTE
Medical Week 2 Survey      Responses   Facility patient discharged from?  [facility]   Does the patient have one of the following disease processes/diagnoses(primary or secondary)?  Other   Week 2 attempt successful?  No   Rescheduled  Revoked   Revoke  Decline to participate [NO ANSWER, LEFT VM]          Arlen Urban LPN

## 2020-01-20 ENCOUNTER — OFFICE (AMBULATORY)
Dept: URBAN - METROPOLITAN AREA CLINIC 64 | Facility: CLINIC | Age: 50
End: 2020-01-20

## 2020-01-20 VITALS
HEART RATE: 74 BPM | WEIGHT: 254 LBS | HEIGHT: 70 IN | SYSTOLIC BLOOD PRESSURE: 132 MMHG | DIASTOLIC BLOOD PRESSURE: 84 MMHG

## 2020-01-20 DIAGNOSIS — K70.31 ALCOHOLIC CIRRHOSIS OF LIVER WITH ASCITES: ICD-10-CM

## 2020-01-20 DIAGNOSIS — K72.90 HEPATIC FAILURE, UNSPECIFIED WITHOUT COMA: ICD-10-CM

## 2020-01-20 PROCEDURE — 99214 OFFICE O/P EST MOD 30 MIN: CPT | Performed by: INTERNAL MEDICINE

## 2020-01-21 ENCOUNTER — HOSPITAL ENCOUNTER (OUTPATIENT)
Dept: CT IMAGING | Facility: HOSPITAL | Age: 50
Discharge: HOME | End: 2020-01-21
Admitting: RADIOLOGY

## 2020-01-21 VITALS
RESPIRATION RATE: 15 BRPM | HEIGHT: 70 IN | SYSTOLIC BLOOD PRESSURE: 104 MMHG | DIASTOLIC BLOOD PRESSURE: 69 MMHG | BODY MASS INDEX: 36.11 KG/M2 | OXYGEN SATURATION: 96 % | HEART RATE: 74 BPM | WEIGHT: 252.2 LBS | TEMPERATURE: 98.1 F

## 2020-01-21 DIAGNOSIS — K76.82 ENCEPHALOPATHY, HEPATIC (HCC): ICD-10-CM

## 2020-01-21 DIAGNOSIS — K76.82 ENCEPHALOPATHY, HEPATIC (HCC): Primary | ICD-10-CM

## 2020-01-21 DIAGNOSIS — D47.2 MGUS (MONOCLONAL GAMMOPATHY OF UNKNOWN SIGNIFICANCE): ICD-10-CM

## 2020-01-21 LAB
APTT PPP: 27.3 SECONDS (ref 24–31)
BASOPHILS # BLD AUTO: 0.1 10*3/MM3 (ref 0–0.2)
BASOPHILS NFR BLD AUTO: 1.2 % (ref 0–1.5)
DEPRECATED RDW RBC AUTO: 46.4 FL (ref 37–54)
EOSINOPHIL # BLD AUTO: 0.3 10*3/MM3 (ref 0–0.4)
EOSINOPHIL NFR BLD AUTO: 4.1 % (ref 0.3–6.2)
ERYTHROCYTE [DISTWIDTH] IN BLOOD BY AUTOMATED COUNT: 14.5 % (ref 12.3–15.4)
HCT VFR BLD AUTO: 44.4 % (ref 37.5–51)
HGB BLD-MCNC: 15.7 G/DL (ref 13–17.7)
INR PPP: 1.13 (ref 0.9–1.1)
LYMPHOCYTES # BLD AUTO: 1.8 10*3/MM3 (ref 0.7–3.1)
LYMPHOCYTES NFR BLD AUTO: 25.4 % (ref 19.6–45.3)
MCH RBC QN AUTO: 32.6 PG (ref 26.6–33)
MCHC RBC AUTO-ENTMCNC: 35.3 G/DL (ref 31.5–35.7)
MCV RBC AUTO: 92.3 FL (ref 79–97)
MONOCYTES # BLD AUTO: 0.8 10*3/MM3 (ref 0.1–0.9)
MONOCYTES NFR BLD AUTO: 11.1 % (ref 5–12)
NEUTROPHILS # BLD AUTO: 4.2 10*3/MM3 (ref 1.7–7)
NEUTROPHILS NFR BLD AUTO: 58.2 % (ref 42.7–76)
NRBC BLD AUTO-RTO: 0.1 /100 WBC (ref 0–0.2)
PLATELET # BLD AUTO: 154 10*3/MM3 (ref 140–450)
PMV BLD AUTO: 8.8 FL (ref 6–12)
PROTHROMBIN TIME: 11.6 SECONDS (ref 9.6–11.7)
RBC # BLD AUTO: 4.81 10*6/MM3 (ref 4.14–5.8)
WBC NRBC COR # BLD: 7.2 10*3/MM3 (ref 3.4–10.8)

## 2020-01-21 PROCEDURE — 85025 COMPLETE CBC W/AUTO DIFF WBC: CPT | Performed by: RADIOLOGY

## 2020-01-21 PROCEDURE — 88311 DECALCIFY TISSUE: CPT | Performed by: NURSE PRACTITIONER

## 2020-01-21 PROCEDURE — 88312 SPECIAL STAINS GROUP 1: CPT | Performed by: NURSE PRACTITIONER

## 2020-01-21 PROCEDURE — 85610 PROTHROMBIN TIME: CPT | Performed by: RADIOLOGY

## 2020-01-21 PROCEDURE — 88333 PATH CONSLTJ SURG CYTO XM 1: CPT | Performed by: NURSE PRACTITIONER

## 2020-01-21 PROCEDURE — 88305 TISSUE EXAM BY PATHOLOGIST: CPT | Performed by: NURSE PRACTITIONER

## 2020-01-21 PROCEDURE — 99153 MOD SED SAME PHYS/QHP EA: CPT

## 2020-01-21 PROCEDURE — 77012 CT SCAN FOR NEEDLE BIOPSY: CPT

## 2020-01-21 PROCEDURE — 85730 THROMBOPLASTIN TIME PARTIAL: CPT | Performed by: RADIOLOGY

## 2020-01-21 PROCEDURE — 99152 MOD SED SAME PHYS/QHP 5/>YRS: CPT

## 2020-01-21 PROCEDURE — 25010000002 FENTANYL CITRATE (PF) 100 MCG/2ML SOLUTION: Performed by: RADIOLOGY

## 2020-01-21 PROCEDURE — 88313 SPECIAL STAINS GROUP 2: CPT | Performed by: NURSE PRACTITIONER

## 2020-01-21 PROCEDURE — 88334 PATH CONSLTJ SURG CYTO XM EA: CPT | Performed by: NURSE PRACTITIONER

## 2020-01-21 PROCEDURE — 85097 BONE MARROW INTERPRETATION: CPT | Performed by: NURSE PRACTITIONER

## 2020-01-21 PROCEDURE — 25010000002 MIDAZOLAM PER 1 MG: Performed by: RADIOLOGY

## 2020-01-21 RX ORDER — MIDAZOLAM HYDROCHLORIDE 1 MG/ML
INJECTION INTRAMUSCULAR; INTRAVENOUS
Status: COMPLETED | OUTPATIENT
Start: 2020-01-21 | End: 2020-01-21

## 2020-01-21 RX ORDER — HYDROCODONE BITARTRATE AND ACETAMINOPHEN 5; 325 MG/1; MG/1
2 TABLET ORAL EVERY 6 HOURS PRN
Status: DISCONTINUED | OUTPATIENT
Start: 2020-01-21 | End: 2020-01-22 | Stop reason: HOSPADM

## 2020-01-21 RX ORDER — FENTANYL CITRATE 50 UG/ML
INJECTION, SOLUTION INTRAMUSCULAR; INTRAVENOUS
Status: COMPLETED | OUTPATIENT
Start: 2020-01-21 | End: 2020-01-21

## 2020-01-21 RX ADMIN — FENTANYL CITRATE 100 MCG: 50 INJECTION, SOLUTION INTRAMUSCULAR; INTRAVENOUS at 12:26

## 2020-01-21 RX ADMIN — FENTANYL CITRATE 100 MCG: 50 INJECTION, SOLUTION INTRAMUSCULAR; INTRAVENOUS at 12:30

## 2020-01-21 RX ADMIN — MIDAZOLAM 1 MG: 1 INJECTION INTRAMUSCULAR; INTRAVENOUS at 12:26

## 2020-01-21 NOTE — DISCHARGE INSTRUCTIONS
A responsible adult should stay with you and you should rest quietly for the rest of the day. Do not drink alcohol, drive or cook for 24 hours following your procedure.  Progress your diet as tolerated.  Resume your usual medications including aspirin.  When you remove your dressing in 48 hours, a small amount of blood is to be expected. Do not be alarmed.  If you feel it is bleeding excessively apply pressure and proceed to the Emergency room.  Do not shower, bath, or get your dressing wet at all for 48 hours.  You may shower after the dressing is removed. No lifting more that 10 pounds for 48 hours.  If severe pain, increased shortness of air or racing heartbeat occur, seek immediate medical attention.  Follow up with Dr. Schaefer for results.

## 2020-01-21 NOTE — POST-PROCEDURE NOTE
IR POST OP NOTE    Procedure:CT guided bone marrow biopsy      Pre Op DX:MGUS      Post Op DX:same      Anesthesia: Local, CS      Findings:See dictation      Complications:No immediate.       Provider Signature: Dr. Arvind Chau

## 2020-01-23 ENCOUNTER — HOSPITAL ENCOUNTER (OUTPATIENT)
Dept: GENERAL RADIOLOGY | Facility: HOSPITAL | Age: 50
Discharge: HOME | End: 2020-01-23
Admitting: INTERNAL MEDICINE

## 2020-01-23 DIAGNOSIS — R79.9 ABNORMAL BLOOD FINDINGS: ICD-10-CM

## 2020-01-23 LAB
CYTO UR: NORMAL
LAB AP CASE REPORT: NORMAL
LAB AP DIAGNOSIS COMMENT: NORMAL
PATH REPORT.FINAL DX SPEC: NORMAL
PATH REPORT.GROSS SPEC: NORMAL
REF LAB TEST METHOD: NORMAL

## 2020-01-23 PROCEDURE — 77075 RADEX OSSEOUS SURVEY COMPL: CPT

## 2020-01-24 ENCOUNTER — TELEPHONE (OUTPATIENT)
Dept: ONCOLOGY | Facility: CLINIC | Age: 50
End: 2020-01-24

## 2020-01-24 DIAGNOSIS — E61.1 IRON DEFICIENCY: Primary | ICD-10-CM

## 2020-01-24 NOTE — TELEPHONE ENCOUNTER
Patient called back.  Informed him of stool cards.  Instructed him to read instructions.  He v/u.  Stool heme mailed to patient.

## 2020-01-29 RX ORDER — METFORMIN HYDROCHLORIDE 500 MG/1
TABLET, EXTENDED RELEASE ORAL
Qty: 30 TABLET | Refills: 1 | Status: SHIPPED | OUTPATIENT
Start: 2020-01-29 | End: 2020-02-20

## 2020-01-30 ENCOUNTER — LAB (OUTPATIENT)
Dept: LAB | Facility: HOSPITAL | Age: 50
End: 2020-01-30

## 2020-01-30 DIAGNOSIS — E61.1 IRON DEFICIENCY: ICD-10-CM

## 2020-01-30 LAB
HEMOCCULT STL QL IA: POSITIVE
KARYOTYP MAR: NORMAL

## 2020-01-30 PROCEDURE — 82274 ASSAY TEST FOR BLOOD FECAL: CPT

## 2020-01-31 ENCOUNTER — TELEPHONE (OUTPATIENT)
Dept: ONCOLOGY | Facility: CLINIC | Age: 50
End: 2020-01-31

## 2020-01-31 NOTE — TELEPHONE ENCOUNTER
Attempted to contact patient regarding f/u with Dr. Loo.  LMV informing patient that the stool heme results were positive and that he needed to f/u with Dr. Loo.  Instructed him to call back with any questions or concerns.

## 2020-01-31 NOTE — TELEPHONE ENCOUNTER
----- Message from JEFFY Stephen sent at 1/30/2020  1:26 PM EST -----  Ask patient to follow-up with I.  Dr. Loo.  Send results to [facility].

## 2020-02-03 LAB
ALBUMIN 24H MFR UR ELPH: 16.4 %
ALPHA1 GLOB 24H MFR UR ELPH: 1.7 %
ALPHA2 GLOB 24H MFR UR ELPH: 7.3 %
B-GLOBULIN MFR UR ELPH: 22.3 %
GAMMA GLOB 24H MFR UR ELPH: 52.4 %
HIV 1 & 2 AB SER-IMP: NORMAL
INTERPRETATION UR IFE-IMP: NORMAL
M PROTEIN 24H MFR UR ELPH: NORMAL %
PROT 24H UR-MRATE: <120 MG/24 HR (ref 30–150)
PROT UR-MCNC: <4 MG/DL

## 2020-02-06 DIAGNOSIS — Z79.899 LONG-TERM USE OF HIGH-RISK MEDICATION: ICD-10-CM

## 2020-02-06 DIAGNOSIS — M06.00 SERONEGATIVE RHEUMATOID ARTHRITIS (HCC): ICD-10-CM

## 2020-02-11 RX ORDER — PREDNISONE 1 MG/1
TABLET ORAL
Qty: 45 TABLET | Refills: 0 | Status: SHIPPED | OUTPATIENT
Start: 2020-02-11 | End: 2020-04-03 | Stop reason: SDUPTHER

## 2020-02-14 ENCOUNTER — TELEPHONE (OUTPATIENT)
Dept: FAMILY MEDICINE CLINIC | Facility: CLINIC | Age: 50
End: 2020-02-14

## 2020-02-14 ENCOUNTER — TELEPHONE (OUTPATIENT)
Dept: ONCOLOGY | Facility: CLINIC | Age: 50
End: 2020-02-14

## 2020-02-14 NOTE — TELEPHONE ENCOUNTER
Wife left a voice message that when patient was discharged from the hospital he was on two insulins - Novolog and Levemir.  He is almost out of Novolog that he uses three times daily.  Needs this sent in today please.

## 2020-02-14 NOTE — TELEPHONE ENCOUNTER
Spoke to I scheduling and the last appointment patient had with Dr. Loo was 1/20/20 and the office note is in patients chart. He isn't schedule for any future appointments. msd

## 2020-02-17 RX ORDER — PEN NEEDLE, DIABETIC 30 GX3/16"
1 NEEDLE, DISPOSABLE MISCELLANEOUS
Qty: 200 EACH | Refills: 0 | Status: SHIPPED | OUTPATIENT
Start: 2020-02-17 | End: 2020-02-25 | Stop reason: SDUPTHER

## 2020-02-17 RX ORDER — INSULIN ASPART 100 [IU]/ML
2 INJECTION, SOLUTION INTRAVENOUS; SUBCUTANEOUS
Qty: 3 PEN | Refills: 3 | Status: SHIPPED | OUTPATIENT
Start: 2020-02-17 | End: 2021-05-12

## 2020-02-17 NOTE — TELEPHONE ENCOUNTER
Spoke with patient's wife at 11:55am.  Said patient uses 2 units three times daily.  He also needs BD Pen Needles sent in too.

## 2020-02-19 NOTE — PROGRESS NOTES
Hematology/Oncology Outpatient Consultation    Patient name: Mc Selby  : 1970  MRN: [id number]  Primary Care Physician: Carlita Shepherd MD  Referring Physician: Carlita Shepherd MD  Reason For Consult:   Monoclonal gammopathy  History of Present Illness:  · Mr. Selby has a long history of rheumatoid arthritis on and off disease modifying agents.  Patient sees Dr. Sylvester and laboratory work-up was initiated secondary to renal failure which revealed monoclonal gammopathy patient was sent to the cancer [facility] and seen initially on 2020.  · Patient has cirrhosis related to alcohol abuse had episodes of hepatic encephalopathy.    · Patient has renal failure stage II at one point he was on hemodialysis briefly.  · Had a perforated gastric ulcer with hemorrhage.  · 2020 -  Ammonia 119.  · 2020 - [place] free light chains 80.1, Lambda free light chains 43.7, Kappa/Lambda ratio 1.83, Beta 2 Microglobulin 4.0.  · 2020 - AFP 3.54.  · 2020  - Bone marrrow biopsy negative for involvement by malignant lymphoma and metastatic malignancy.  · 2020 - Whole body skeletal survey.- nl  · 2020 - 24-hour urine protein < 120,.Unable to quantitate M-spike due to small quantity of monoclonal protein. Urine KULDEEP shows IgG monoclonal protein with kappa light chain specificity.    Past Medical History:   Diagnosis Date   • Acute perforated gastric ulcer with hemorrhage (CMS/HCC) 2018   • Alcoholism (CMS/HCC)    • Allergic rhinitis    • Arthritis     rheumatoid (diagnosed at age 16)   • Cirrhosis (CMS/HCC)     etoh cirrhosis-liver failure   • CKD (chronic kidney disease)    • COPD (chronic obstructive pulmonary disease) (CMS/HCC)    • Depression    • Duodenal ulcer      perforated duodenal s/p patch   • History of transfusion    • Infectious viral hepatitis    • MRSA (methicillin resistant Staphylococcus aureus) carrier 2019    nasal swab   • Tumors      benign essential   •  Type 2 diabetes mellitus with hyperglycemia (CMS/HCC) 1/8/2020   • Umbilical hernia u   • Wound, open     nonhealing sore       Past Surgical History:   Procedure Laterality Date   • CYST REMOVAL Left     forarm   • ENDOSCOPY      ascities/paracentesis   • EXPLORATORY LAPAROTOMY  07/16/2018    Over-sew Gastric Ulcer With Gastrostomy and Jejunostomy Tube   • FOOT SURGERY Right     right bunion removal   • FRACTURE SURGERY Right 2005    bunionectomy/ broke toe and put in rods   • MASS EXCISION Right 6/18/2019    Procedure: EXCISION OF SEBACEOUS CYST OF THE RIGHT BACK;  Surgeon: Sapna Elizalde MD;  Location: [facility] MAIN OR;  Service: General   • MASS EXCISION Right 8/27/2019    Procedure: EXCISION LESION of right back;  Surgeon: Sapna Elizalde MD;  Location: [facility] MAIN OR;  Service: General   • UMBILICAL HERNIA REPAIR N/A 6/18/2019    Procedure: UMBILICAL HERNIA REPAIR LAPAROSCOPIC WITH MESH WITH EXTENSIVE LYSIS OF ADHESIONS;  Surgeon: Sapna Elizalde MD;  Location: [facility] MAIN OR;  Service: General         Current Outpatient Medications:   •  ACCU-CHEK FASTCLIX LANCETS misc, 1 each 4 (Four) Times a Day Before Meals & at Bedtime., Disp: 204 each, Rfl: 0  •  albuterol sulfate  (90 Base) MCG/ACT inhaler, Inhale 2 puffs Every 4 (Four) Hours As Needed for Wheezing., Disp: , Rfl:   •  azelastine (ASTELIN) 0.1 % nasal spray, 2 sprays into the nostril(s) as directed by provider 2 (Two) Times a Day. Use in each nostril as directed, Disp: , Rfl:   •  bumetanide (BUMEX) 2 MG tablet, Take 2 mg by mouth 2 (Two) Times a Day., Disp: , Rfl:   •  EPINEPHrine (EPIPEN) 0.3 MG/0.3ML solution auto-injector injection, Inject 0.3 mg under the skin into the appropriate area as directed 1 (One) Time As Needed., Disp: , Rfl:   •  famotidine (PEPCID) 20 MG tablet, TAKE 1 TABLET BY MOUTH EVERY DAY, Disp: 30 tablet, Rfl: 2  •  glucose blood (ACCU-CHEK GRAZYNA PLUS) test strip, Use as instructed four times a day and prn E11.9, Disp: 600 each, Rfl:  12  •  glucose monitor monitoring kit, 1 each 4 (Four) Times a Day Before Meals & at Bedtime., Disp: 1 each, Rfl: 0  •  insulin detemir (LEVEMIR) 100 UNIT/ML injection, INJECT 20 UNITS UNDER THE SKIN INTO THE APPROPRIATE AREA AS DIRECTED DAILY., Disp: 6 pen, Rfl: 2  •  Insulin Pen Needle (PEN NEEDLES) 32G X 4 MM misc, 1 each 4 (Four) Times a Day Before Meals & at Bedtime., Disp: 200 each, Rfl: 0  •  Isopropyl Alcohol (ALCOHOL WIPES) 70 % misc, Apply 1 each topically 4 (Four) Times a Day Before Meals & at Bedtime., Disp: 200 each, Rfl: 0  •  lactulose (CHRONULAC) 10 GM/15ML solution, Take 30 mL by mouth 2 (Two) Times a Day., Disp: 150 mL, Rfl: 0  •  magnesium oxide (MAG-OX) 400 MG tablet, Take 2 tablets by mouth 2 (Two) Times a Day., Disp: , Rfl: 6  •  meclizine 25 MG chewable tablet chewable tablet, Chew 25-50 mg 3 (Three) Times a Day As Needed., Disp: , Rfl:   •  montelukast (SINGULAIR) 10 MG tablet, Take 10 mg by mouth Every Night., Disp: , Rfl:   •  NOVOLOG FLEXPEN 100 UNIT/ML solution pen-injector sc pen, Inject 2 Units under the skin into the appropriate area as directed 3 (Three) Times a Day With Meals., Disp: 3 pen, Rfl: 3  •  ondansetron (ZOFRAN) 8 MG tablet, Take 8 mg by mouth Every 8 (Eight) Hours As Needed for Nausea or Vomiting., Disp: , Rfl:   •  potassium chloride (K-DUR,KLOR-CON) 20 MEQ CR tablet, Take 40 mEq by mouth 3 (Three) Times a Day., Disp: , Rfl:   •  predniSONE (DELTASONE) 5 MG tablet, TAKE 1.5 TABLETS BY MOUTH EVERY DAY, Disp: 45 tablet, Rfl: 0  •  promethazine (PHENERGAN) 12.5 MG tablet, Take 12.5 mg by mouth Every 6 (Six) Hours As Needed for Nausea or Vomiting., Disp: , Rfl:   •  senna (SENOKOT) 8.6 MG tablet, Take 1 tablet by mouth 2 (Two) Times a Day As Needed for Constipation., Disp: 30 tablet, Rfl: 0  •  spironolactone (ALDACTONE) 25 MG tablet, Take 25 mg by mouth Daily., Disp: , Rfl:   •  tamsulosin (FLOMAX) 0.4 MG capsule 24 hr capsule, Take 1 capsule by mouth 2 (Two) Times a Day.,  Disp: , Rfl:   •  Triamcinolone Acetonide (NASACORT) 55 MCG/ACT nasal inhaler, 1 spray into the nostril(s) as directed by provider Daily., Disp: , Rfl:   •  vitamin D (ERGOCALCIFEROL) 85813 units capsule capsule, Take 50,000 Units by mouth Every 14 (Fourteen) Days. Every other Sunday, Disp: , Rfl:   •  XIFAXAN 550 MG tablet, TAKE 1 TABLET BY MOUTH EVERY 12 HOURS, Disp: 180 tablet, Rfl: 1  •  zinc sulfate (ZINCATE) 220 (50 Zn) MG capsule, Take 220 mg by mouth Daily., Disp: , Rfl: 3  •  metFORMIN ER (GLUCOPHAGE-XR) 500 MG 24 hr tablet, TAKE 1 TABLET BY MOUTH EVERY DAY WITH BREAKFAST, Disp: 30 tablet, Rfl: 1    Allergies   Allergen Reactions   • Duloxetine Unknown (See Comments)     Unknown reaction        Immunization History   Administered Date(s) Administered   • Flu Vaccine Intradermal Quad 18-64YR 12/03/2018   • Hepatitis A 12/03/2018   • Hepatitis B Vaccine Adult IM 07/17/2019, 08/20/2019, 01/17/2020   • Pneumococcal Polysaccharide (PPSV23) 12/12/2018   • Tdap 12/03/2018   • Zostavax 01/09/2019   • flucelvax quad pfs =>4 YRS 10/07/2019       Family History   Problem Relation Age of Onset   • Cancer Mother    • Lung disease Father    • Other Father         prostate problems   • Mental illness Brother        Cancer-related family history includes Cancer in his mother.    Subjective:    ROS:    Review of Systems   Constitutional: Negative for fever.   HENT: Negative for nosebleeds and trouble swallowing.    Eyes: Negative for visual disturbance.   Respiratory: Negative for cough, shortness of breath and wheezing.    Cardiovascular: Negative for chest pain.   Gastrointestinal: Negative for abdominal pain and blood in stool.   Endocrine: Negative for cold intolerance.   Genitourinary: Negative for dysuria and hematuria.   Musculoskeletal: Negative for joint swelling.   Skin: Negative for rash.   Allergic/Immunologic: Negative for environmental allergies.   Neurological: Negative for seizures.   Hematological: Does not  "bruise/bleed easily.   Psychiatric/Behavioral: The patient is not nervous/anxious.        Objective:    Vitals:    02/24/20 1413   BP: 107/74   Pulse: 91   Temp: 97.9 °F (36.6 °C)   Weight: 117 kg (258 lb 9.6 oz)   Height: 177.8 cm (70\")       ECOG  1    Physical Exam:    Physical Exam   Constitutional: He is oriented to person, place, and time. No distress.   Well-built obese   HENT:   Head: Normocephalic and atraumatic.   Eyes: Conjunctivae and EOM are normal. Right eye exhibits no discharge. Left eye exhibits no discharge. No scleral icterus.   Neck: Normal range of motion. Neck supple. No thyromegaly present.   Cardiovascular: Normal rate, regular rhythm and normal heart sounds. Exam reveals no gallop and no friction rub.   Pulmonary/Chest: Effort normal. No stridor. No respiratory distress. He has no wheezes.   Abdominal: Soft. Bowel sounds are normal. He exhibits no mass. There is no tenderness. There is no rebound and no guarding.   Distended no ascites   Musculoskeletal: Normal range of motion. He exhibits no tenderness.   No lower extremity edema   Lymphadenopathy:     He has no cervical adenopathy.   Neurological: He is alert and oriented to person, place, and time. He exhibits normal muscle tone.   Skin: Skin is warm. No rash noted. He is not diaphoretic. No erythema.   Psychiatric: He has a normal mood and affect. His behavior is normal.   Nursing note and vitals reviewed.      RECENT LABS  WBC   Date Value Ref Range Status   02/24/2020 10.52 3.40 - 10.80 10*3/mm3 Final     RBC   Date Value Ref Range Status   02/24/2020 4.89 4.14 - 5.80 10*6/mm3 Final     Hemoglobin   Date Value Ref Range Status   02/24/2020 15.3 13.0 - 17.7 g/dL Final     Hematocrit   Date Value Ref Range Status   02/24/2020 43.5 37.5 - 51.0 % Final     MCV   Date Value Ref Range Status   02/24/2020 89.0 79.0 - 97.0 fL Final     MCH   Date Value Ref Range Status   02/24/2020 31.3 26.6 - 33.0 pg Final     MCHC   Date Value Ref Range " Status   02/24/2020 35.2 31.5 - 35.7 g/dL Final     RDW   Date Value Ref Range Status   02/24/2020 14.8 12.3 - 15.4 % Final     RDW-SD   Date Value Ref Range Status   02/24/2020 46.6 37.0 - 54.0 fl Final     MPV   Date Value Ref Range Status   02/24/2020 10.5 6.0 - 12.0 fL Final     Platelets   Date Value Ref Range Status   02/24/2020 186 140 - 450 10*3/mm3 Final     Neutrophil %   Date Value Ref Range Status   02/24/2020 64.2 42.7 - 76.0 % Final     Lymphocyte %   Date Value Ref Range Status   02/24/2020 18.7 (L) 19.6 - 45.3 % Final     Monocyte %   Date Value Ref Range Status   02/24/2020 11.4 5.0 - 12.0 % Final     Eosinophil %   Date Value Ref Range Status   02/24/2020 5.1 0.3 - 6.2 % Final     Basophil %   Date Value Ref Range Status   02/24/2020 0.6 0.0 - 1.5 % Final     Immature Grans %   Date Value Ref Range Status   01/13/2020 0.3 0.0 - 0.5 % Final     Neutrophils, Absolute   Date Value Ref Range Status   02/24/2020 6.75 1.70 - 7.00 10*3/mm3 Final     Lymphocytes, Absolute   Date Value Ref Range Status   02/24/2020 1.97 0.70 - 3.10 10*3/mm3 Final     Monocytes, Absolute   Date Value Ref Range Status   02/24/2020 1.20 (H) 0.10 - 0.90 10*3/mm3 Final     Eosinophils, Absolute   Date Value Ref Range Status   02/24/2020 0.54 (H) 0.00 - 0.40 10*3/mm3 Final     Basophils, Absolute   Date Value Ref Range Status   02/24/2020 0.06 0.00 - 0.20 10*3/mm3 Final     Immature Grans, Absolute   Date Value Ref Range Status   01/13/2020 0.02 0.00 - 0.05 10*3/mm3 Final     nRBC   Date Value Ref Range Status   01/21/2020 0.1 0.0 - 0.2 /100 WBC Final       Lab Results   Component Value Date    GLUCOSE 298 (H) 01/13/2020    BUN 9 01/13/2020    CREATININE 1.04 01/13/2020    EGFRIFNONA 75 01/13/2020    EGFRIFAFRI 114 03/15/2017    BCR 8.7 01/13/2020    K 4.2 01/13/2020    CO2 19.4 (L) 01/13/2020    CALCIUM 9.6 01/13/2020    ALBUMIN 3.70 01/13/2020    LABIL2 0.6 (L) 06/05/2019    AST 23 01/13/2020    ALT 17 01/13/2020          Assessment/Plan     There are no diagnoses linked to this encounter.  Assessment:  1. Hepatic cenephalopathy  2. MGUS  3. Chronic renal failure stage II  4. Cirrhosis  5. Excessive sleepiness  6. ECOG 1    1. Plan:  2. Reviewed the CBC results with the patient CBC is normal.  3. Complete the myeloma work-up obtain a 24-hour urine immunoelectrophoretic studies kappa lambda free light chain ratio.  Obtain whole-body skeletal survey and schedule for bone marrow biopsy.  4. I will check serum ammonia level given his excessive sleepiness.  Patient is not compliant with his lactulose  5. I will see him back in my office in 4 weeks when laboratory work-up and bone marrow results will be available      I have reviewed labs results, imaging, vitals, and medications with the patient today.    Patient verbalized understanding and is in agreement of the above plan.

## 2020-02-20 RX ORDER — METFORMIN HYDROCHLORIDE 500 MG/1
TABLET, EXTENDED RELEASE ORAL
Qty: 30 TABLET | Refills: 1 | Status: SHIPPED | OUTPATIENT
Start: 2020-02-20 | End: 2020-06-09

## 2020-02-24 ENCOUNTER — OFFICE VISIT (OUTPATIENT)
Dept: LAB | Facility: HOSPITAL | Age: 50
End: 2020-02-24

## 2020-02-24 ENCOUNTER — OFFICE VISIT (OUTPATIENT)
Dept: ONCOLOGY | Facility: CLINIC | Age: 50
End: 2020-02-24

## 2020-02-24 VITALS
BODY MASS INDEX: 37.02 KG/M2 | TEMPERATURE: 97.9 F | HEIGHT: 70 IN | SYSTOLIC BLOOD PRESSURE: 107 MMHG | DIASTOLIC BLOOD PRESSURE: 74 MMHG | WEIGHT: 258.6 LBS | HEART RATE: 91 BPM

## 2020-02-24 DIAGNOSIS — C90.00 MULTIPLE MYELOMA, REMISSION STATUS UNSPECIFIED (HCC): Primary | ICD-10-CM

## 2020-02-24 DIAGNOSIS — R79.9 ABNORMAL BLOOD FINDINGS: ICD-10-CM

## 2020-02-24 LAB
BASOPHILS # BLD AUTO: 0.06 10*3/MM3 (ref 0–0.2)
BASOPHILS NFR BLD AUTO: 0.6 % (ref 0–1.5)
DEPRECATED RDW RBC AUTO: 46.6 FL (ref 37–54)
EOSINOPHIL # BLD AUTO: 0.54 10*3/MM3 (ref 0–0.4)
EOSINOPHIL NFR BLD AUTO: 5.1 % (ref 0.3–6.2)
ERYTHROCYTE [DISTWIDTH] IN BLOOD BY AUTOMATED COUNT: 14.8 % (ref 12.3–15.4)
HCT VFR BLD AUTO: 43.5 % (ref 37.5–51)
HGB BLD-MCNC: 15.3 G/DL (ref 13–17.7)
LYMPHOCYTES # BLD AUTO: 1.97 10*3/MM3 (ref 0.7–3.1)
LYMPHOCYTES NFR BLD AUTO: 18.7 % (ref 19.6–45.3)
MCH RBC QN AUTO: 31.3 PG (ref 26.6–33)
MCHC RBC AUTO-ENTMCNC: 35.2 G/DL (ref 31.5–35.7)
MCV RBC AUTO: 89 FL (ref 79–97)
MONOCYTES # BLD AUTO: 1.2 10*3/MM3 (ref 0.1–0.9)
MONOCYTES NFR BLD AUTO: 11.4 % (ref 5–12)
NEUTROPHILS # BLD AUTO: 6.75 10*3/MM3 (ref 1.7–7)
NEUTROPHILS NFR BLD AUTO: 64.2 % (ref 42.7–76)
PLATELET # BLD AUTO: 186 10*3/MM3 (ref 140–450)
PMV BLD AUTO: 10.5 FL (ref 6–12)
RBC # BLD AUTO: 4.89 10*6/MM3 (ref 4.14–5.8)
WBC NRBC COR # BLD: 10.52 10*3/MM3 (ref 3.4–10.8)

## 2020-02-24 PROCEDURE — 85025 COMPLETE CBC W/AUTO DIFF WBC: CPT

## 2020-02-24 PROCEDURE — 99215 OFFICE O/P EST HI 40 MIN: CPT | Performed by: INTERNAL MEDICINE

## 2020-02-24 PROCEDURE — 36415 COLL VENOUS BLD VENIPUNCTURE: CPT

## 2020-02-24 NOTE — PROGRESS NOTES
Hematology/Oncology Outpatient Follow Up    Mc Selby  1970    Primary Care Physician: Carlita Shepherd MD  Referring Physician: Carlita Shepherd MD  Chief Complaint:   Monoclonal gammopathy/IgG kappa monoclonal gammopathy  History of Present Illness:     · Mr. Selby has a long history of rheumatoid arthritis on and off disease modifying agents.  Patient sees Dr. Rosas and laboratory work-up was initiated secondary to renal failure which revealed monoclonal gammopathy patient was sent to the [facility] and seen initially on 1/7/2020.  · Patient has cirrhosis related to alcohol abuse had episodes of hepatic encephalopathy.    · Patient has renal failure stage II at one point he was on hemodialysis briefly.  · Had a perforated gastric ulcer with hemorrhage  · 12/12/2019 serum immunofixation shows IgG monoclonal protein with kappa light chain specificity.  IgG 2111, IgA 703, IgM 754  · 1/21/2020 bone marrow biopsy and aspirate-right iliac crest bone marrow biopsy with aspirate smears and cell block preparation shows normocellular bone marrow with 50% cellularity.  Absent iron stores.  Negative for involvement by malignant lymphoma and metastatic malignancy.  Cytogenetics normal 46 XY flow cytometry normal  · 1/23/2020 whole-body skeletal survey geographic lucency within the right distal tibial diaphysis could relate to underlying lytic lesion, consider additional imaging or bone scan. 2. Small area of lucency with endosteal scalloping involving the distal left fibular diaphysis could relate to lytic lesion, dedicated AP and lateral views of the left fibula may be helpful. 2. Incidental findings above. 3. Carotid atherosclerotic disease. Consider follow-up carotid ultrasound if not previously performed.  · 1/30/2020 urine electrophoresis shows IgG monoclonal protein with kappa light chain specificity.  Small quantity of monoclonal protein unable to quantitate M spike.  Beta-2 microglobulin 4.0.   Serum kappa lambda free light chain ratio 1.83 creatinine 1.05 alk phos 242 total bilirubin 1.9    Past Medical History:   Diagnosis Date   • Acute perforated gastric ulcer with hemorrhage (CMS/HCC) 07/16/2018   • Alcoholism (CMS/HCC)    • Allergic rhinitis    • Arthritis     rheumatoid (diagnosed at age 16)   • Cirrhosis (CMS/HCC)     etoh cirrhosis-liver failure   • CKD (chronic kidney disease)    • COPD (chronic obstructive pulmonary disease) (CMS/HCC)    • Depression    • Duodenal ulcer      perforated duodenal s/p patch   • History of transfusion    • Infectious viral hepatitis    • MRSA (methicillin resistant Staphylococcus aureus) carrier 06/12/2019    nasal swab   • Tumors      benign essential   • Type 2 diabetes mellitus with hyperglycemia (CMS/HCC) 1/8/2020   • Umbilical hernia u   • Wound, open     nonhealing sore       Past Surgical History:   Procedure Laterality Date   • CYST REMOVAL Left     forarm   • ENDOSCOPY      ascities/paracentesis   • EXPLORATORY LAPAROTOMY  07/16/2018    Over-sew Gastric Ulcer With Gastrostomy and Jejunostomy Tube   • FOOT SURGERY Right     right bunion removal   • FRACTURE SURGERY Right 2005    bunionectomy/ broke toe and put in rods   • MASS EXCISION Right 6/18/2019    Procedure: EXCISION OF SEBACEOUS CYST OF THE RIGHT BACK;  Surgeon: Sapna Elizalde MD;  Location: [facility] MAIN OR;  Service: General   • MASS EXCISION Right 8/27/2019    Procedure: EXCISION LESION of right back;  Surgeon: Sapna Elizalde MD;  Location: [facility] MAIN OR;  Service: General   • UMBILICAL HERNIA REPAIR N/A 6/18/2019    Procedure: UMBILICAL HERNIA REPAIR LAPAROSCOPIC WITH MESH WITH EXTENSIVE LYSIS OF ADHESIONS;  Surgeon: Sapna Elizalde MD;  Location: [facility] MAIN OR;  Service: General         Current Outpatient Medications:   •  ACCU-CHEK FASTCLIX LANCETS misc, 1 each 4 (Four) Times a Day Before Meals & at Bedtime., Disp: 204 each, Rfl: 0  •  albuterol sulfate  (90 Base) MCG/ACT inhaler, Inhale 2  puffs Every 4 (Four) Hours As Needed for Wheezing., Disp: , Rfl:   •  azelastine (ASTELIN) 0.1 % nasal spray, 2 sprays into the nostril(s) as directed by provider 2 (Two) Times a Day. Use in each nostril as directed, Disp: , Rfl:   •  bumetanide (BUMEX) 2 MG tablet, Take 2 mg by mouth 2 (Two) Times a Day., Disp: , Rfl:   •  EPINEPHrine (EPIPEN) 0.3 MG/0.3ML solution auto-injector injection, Inject 0.3 mg under the skin into the appropriate area as directed 1 (One) Time As Needed., Disp: , Rfl:   •  famotidine (PEPCID) 20 MG tablet, TAKE 1 TABLET BY MOUTH EVERY DAY, Disp: 30 tablet, Rfl: 2  •  glucose blood (ACCU-CHEK GRAZYNA PLUS) test strip, Use as instructed four times a day and prn E11.9, Disp: 600 each, Rfl: 12  •  glucose monitor monitoring kit, 1 each 4 (Four) Times a Day Before Meals & at Bedtime., Disp: 1 each, Rfl: 0  •  insulin detemir (LEVEMIR) 100 UNIT/ML injection, INJECT 20 UNITS UNDER THE SKIN INTO THE APPROPRIATE AREA AS DIRECTED DAILY., Disp: 6 pen, Rfl: 2  •  Insulin Pen Needle (PEN NEEDLES) 32G X 4 MM misc, 1 each 4 (Four) Times a Day Before Meals & at Bedtime., Disp: 200 each, Rfl: 0  •  Isopropyl Alcohol (ALCOHOL WIPES) 70 % misc, Apply 1 each topically 4 (Four) Times a Day Before Meals & at Bedtime., Disp: 200 each, Rfl: 0  •  lactulose (CHRONULAC) 10 GM/15ML solution, Take 30 mL by mouth 2 (Two) Times a Day., Disp: 150 mL, Rfl: 0  •  magnesium oxide (MAG-OX) 400 MG tablet, Take 2 tablets by mouth 2 (Two) Times a Day., Disp: , Rfl: 6  •  meclizine 25 MG chewable tablet chewable tablet, Chew 25-50 mg 3 (Three) Times a Day As Needed., Disp: , Rfl:   •  montelukast (SINGULAIR) 10 MG tablet, Take 10 mg by mouth Every Night., Disp: , Rfl:   •  NOVOLOG FLEXPEN 100 UNIT/ML solution pen-injector sc pen, Inject 2 Units under the skin into the appropriate area as directed 3 (Three) Times a Day With Meals., Disp: 3 pen, Rfl: 3  •  ondansetron (ZOFRAN) 8 MG tablet, Take 8 mg by mouth Every 8 (Eight) Hours As  Needed for Nausea or Vomiting., Disp: , Rfl:   •  potassium chloride (K-DUR,KLOR-CON) 20 MEQ CR tablet, Take 40 mEq by mouth 3 (Three) Times a Day., Disp: , Rfl:   •  predniSONE (DELTASONE) 5 MG tablet, TAKE 1.5 TABLETS BY MOUTH EVERY DAY, Disp: 45 tablet, Rfl: 0  •  promethazine (PHENERGAN) 12.5 MG tablet, Take 12.5 mg by mouth Every 6 (Six) Hours As Needed for Nausea or Vomiting., Disp: , Rfl:   •  senna (SENOKOT) 8.6 MG tablet, Take 1 tablet by mouth 2 (Two) Times a Day As Needed for Constipation., Disp: 30 tablet, Rfl: 0  •  spironolactone (ALDACTONE) 25 MG tablet, Take 25 mg by mouth Daily., Disp: , Rfl:   •  tamsulosin (FLOMAX) 0.4 MG capsule 24 hr capsule, Take 1 capsule by mouth 2 (Two) Times a Day., Disp: , Rfl:   •  Triamcinolone Acetonide (NASACORT) 55 MCG/ACT nasal inhaler, 1 spray into the nostril(s) as directed by provider Daily., Disp: , Rfl:   •  vitamin D (ERGOCALCIFEROL) 43999 units capsule capsule, Take 50,000 Units by mouth Every 14 (Fourteen) Days. Every other Sunday, Disp: , Rfl:   •  XIFAXAN 550 MG tablet, TAKE 1 TABLET BY MOUTH EVERY 12 HOURS, Disp: 180 tablet, Rfl: 1  •  zinc sulfate (ZINCATE) 220 (50 Zn) MG capsule, Take 220 mg by mouth Daily., Disp: , Rfl: 3  •  metFORMIN ER (GLUCOPHAGE-XR) 500 MG 24 hr tablet, TAKE 1 TABLET BY MOUTH EVERY DAY WITH BREAKFAST, Disp: 30 tablet, Rfl: 1    Allergies   Allergen Reactions   • Duloxetine Unknown (See Comments)     Unknown reaction        Family History   Problem Relation Age of Onset   • Cancer Mother    • Lung disease Father    • Other Father         prostate problems   • Mental illness Brother        Cancer-related family history includes Cancer in his mother.    Social History     Tobacco Use   • Smoking status: Former Smoker   • Smokeless tobacco: Former User   Substance Use Topics   • Alcohol use: No     Frequency: Never     Comment: Off alcohol since July 2018   • Drug use: No       I have reviewed the history of present illness, past  "medical history, family history, social history, lab results, all notes and other records since the patient was last seen at the cancer care center.    SUBJECTIVE:      Patient is my office for follow-up accompanied by his wife.  No excessive drowsiness or sleepiness.  Denies visible blood loss.  Reports a joint pains which is chronic.  Also has back pain.  Has some skin itching.    ROS:      Review of Systems   Constitutional: Negative for fever.   HENT: Negative for nosebleeds and trouble swallowing.    Eyes: Negative for visual disturbance.   Respiratory: Negative for cough, shortness of breath and wheezing.    Cardiovascular: Negative for chest pain.   Gastrointestinal: Negative for abdominal pain and blood in stool.   Endocrine: Negative for cold intolerance.   Genitourinary: Negative for dysuria and hematuria.   Musculoskeletal: Negative for joint swelling.   Skin: Negative for rash.   Allergic/Immunologic: Negative for environmental allergies.   Neurological: Negative for seizures.   Hematological: Does not bruise/bleed easily.   Psychiatric/Behavioral: The patient is not nervous/anxious.          Objective:       Vitals:    02/24/20 1413   BP: 107/74   Pulse: 91   Temp: 97.9 °F (36.6 °C)   Weight: 117 kg (258 lb 9.6 oz)   Height: 177.8 cm (70\")         PHYSICAL EXAM:      Physical Exam   Constitutional: He is oriented to person, place, and time. No distress.   Well-built obese   HENT:   Head: Normocephalic and atraumatic.   Eyes: Conjunctivae and EOM are normal. Right eye exhibits no discharge. Left eye exhibits no discharge. No scleral icterus.   Neck: Normal range of motion. Neck supple. No thyromegaly present.   Cardiovascular: Normal rate, regular rhythm and normal heart sounds. Exam reveals no gallop and no friction rub.   Pulmonary/Chest: Effort normal. No stridor. No respiratory distress. He has no wheezes.   Abdominal: Soft. Bowel sounds are normal. He exhibits no mass. There is no tenderness. There " is no rebound and no guarding.   Obese distended   Musculoskeletal: Normal range of motion. He exhibits no tenderness.   Lymphadenopathy:     He has no cervical adenopathy.   Neurological: He is alert and oriented to person, place, and time. He exhibits normal muscle tone.   Skin: Skin is warm. No rash noted. He is not diaphoretic. No erythema.   Psychiatric: He has a normal mood and affect. His behavior is normal.   Nursing note and vitals reviewed.       RECENT LABS:     WBC   Date Value Ref Range Status   02/24/2020 10.52 3.40 - 10.80 10*3/mm3 Final     RBC   Date Value Ref Range Status   02/24/2020 4.89 4.14 - 5.80 10*6/mm3 Final     Hemoglobin   Date Value Ref Range Status   02/24/2020 15.3 13.0 - 17.7 g/dL Final     Hematocrit   Date Value Ref Range Status   02/24/2020 43.5 37.5 - 51.0 % Final     MCV   Date Value Ref Range Status   02/24/2020 89.0 79.0 - 97.0 fL Final     MCH   Date Value Ref Range Status   02/24/2020 31.3 26.6 - 33.0 pg Final     MCHC   Date Value Ref Range Status   02/24/2020 35.2 31.5 - 35.7 g/dL Final     RDW   Date Value Ref Range Status   02/24/2020 14.8 12.3 - 15.4 % Final     RDW-SD   Date Value Ref Range Status   02/24/2020 46.6 37.0 - 54.0 fl Final     MPV   Date Value Ref Range Status   02/24/2020 10.5 6.0 - 12.0 fL Final     Platelets   Date Value Ref Range Status   02/24/2020 186 140 - 450 10*3/mm3 Final     Neutrophil %   Date Value Ref Range Status   02/24/2020 64.2 42.7 - 76.0 % Final     Lymphocyte %   Date Value Ref Range Status   02/24/2020 18.7 (L) 19.6 - 45.3 % Final     Monocyte %   Date Value Ref Range Status   02/24/2020 11.4 5.0 - 12.0 % Final     Eosinophil %   Date Value Ref Range Status   02/24/2020 5.1 0.3 - 6.2 % Final     Basophil %   Date Value Ref Range Status   02/24/2020 0.6 0.0 - 1.5 % Final     Immature Grans %   Date Value Ref Range Status   01/13/2020 0.3 0.0 - 0.5 % Final     Neutrophils, Absolute   Date Value Ref Range Status   02/24/2020 6.75 1.70 -  7.00 10*3/mm3 Final     Lymphocytes, Absolute   Date Value Ref Range Status   02/24/2020 1.97 0.70 - 3.10 10*3/mm3 Final     Monocytes, Absolute   Date Value Ref Range Status   02/24/2020 1.20 (H) 0.10 - 0.90 10*3/mm3 Final     Eosinophils, Absolute   Date Value Ref Range Status   02/24/2020 0.54 (H) 0.00 - 0.40 10*3/mm3 Final     Basophils, Absolute   Date Value Ref Range Status   02/24/2020 0.06 0.00 - 0.20 10*3/mm3 Final     Immature Grans, Absolute   Date Value Ref Range Status   01/13/2020 0.02 0.00 - 0.05 10*3/mm3 Final     nRBC   Date Value Ref Range Status   01/21/2020 0.1 0.0 - 0.2 /100 WBC Final       Lab Results   Component Value Date    GLUCOSE 298 (H) 01/13/2020    BUN 9 01/13/2020    CREATININE 1.04 01/13/2020    EGFRIFNONA 75 01/13/2020    EGFRIFAFRI 114 03/15/2017    BCR 8.7 01/13/2020    K 4.2 01/13/2020    CO2 19.4 (L) 01/13/2020    CALCIUM 9.6 01/13/2020    ALBUMIN 3.70 01/13/2020    LABIL2 0.6 (L) 06/05/2019    AST 23 01/13/2020    ALT 17 01/13/2020         Assessment/Plan      ASSESSMENT:     1. Hepatic cenephalopathy  2. IgG kappa multiple myeloma  3. Chronic renal failure stage II  4. Cirrhosis  5. Excessive sleepiness  6. ECOG 1    PLAN:      1. PET scan to evaluate bone lytic lesion likely related to multiple myeloma  2. GI evaluation pending  3. Continue lactulose  4. RTC after PET CT scan    I have reviewed labs results, imaging, vitals, and medications with the patient today.     I counselled Mc of the risks of continuing to use tobacco and cessation.  During this visit, I spent 3-10 minutes counseling the patient regarding tobacco cessation. Patient verbalized understanding and is in agreement of the above plan.          This report was compiled using Dragon voice recognition software. I have made every effort to proof read this document; however, typographical errors may persist.

## 2020-02-25 RX ORDER — PEN NEEDLE, DIABETIC 30 GX3/16"
1 NEEDLE, DISPOSABLE MISCELLANEOUS
Qty: 200 EACH | Refills: 1 | Status: SHIPPED | OUTPATIENT
Start: 2020-02-25 | End: 2020-02-28 | Stop reason: SDUPTHER

## 2020-02-28 ENCOUNTER — TELEPHONE (OUTPATIENT)
Dept: FAMILY MEDICINE CLINIC | Facility: CLINIC | Age: 50
End: 2020-02-28

## 2020-02-28 RX ORDER — PEN NEEDLE, DIABETIC 30 GX3/16"
1 NEEDLE, DISPOSABLE MISCELLANEOUS
Qty: 360 EACH | Refills: 1 | Status: SHIPPED | OUTPATIENT
Start: 2020-02-28 | End: 2020-09-02

## 2020-02-28 NOTE — TELEPHONE ENCOUNTER
Patients wife was calling to check the status of a [employer] message she sent 2-25-20 regarding her husbands insurance now requiring a 90 day supply of his medication please refer to open my chart message enc from 2-25-20 for further information wife Nicky can be reached at [phone]   Wife confirmed [facility] [address] [facility]

## 2020-02-28 NOTE — TELEPHONE ENCOUNTER
It looks like [facility] filled patients pen needles. Pts wife was wanting them filled for 90 day due to insurance. If I am looking at it correctly it looks like she sent it in for 50 days. Is there a way to correct this?

## 2020-03-03 ENCOUNTER — DOCUMENTATION (OUTPATIENT)
Dept: ONCOLOGY | Facility: CLINIC | Age: 50
End: 2020-03-03

## 2020-03-03 NOTE — PROGRESS NOTES
I spoke with Vilma.  I set up a peer to peer for the PET scan at 9:15 on 3/4 with myself.  Electronically signed by JEFFY Stephen, 03/03/20, 10:13 AM.

## 2020-03-04 ENCOUNTER — DOCUMENTATION (OUTPATIENT)
Dept: ONCOLOGY | Facility: HOSPITAL | Age: 50
End: 2020-03-04

## 2020-03-04 NOTE — PROGRESS NOTES
I spoke with Dr. Bradford with [facility].  I explained the PET scan was being requested to further evaluate for possible multiple myeloma.  This was initial work-up.  His bone survey showed lucency in his right tibia and left fibula.  Bone marrow aspiration was negative.  PET scan would be to evaluate for plasmacytoma or other areas of bone disease.  Auth number [id number] for PET scan.   Electronically signed by JEFFY Stephen, 03/04/20, 9:27 AM.

## 2020-03-09 ENCOUNTER — ON CAMPUS - OUTPATIENT (AMBULATORY)
Dept: URBAN - METROPOLITAN AREA HOSPITAL 2 | Facility: HOSPITAL | Age: 50
End: 2020-03-09

## 2020-03-09 ENCOUNTER — OFFICE (AMBULATORY)
Dept: URBAN - METROPOLITAN AREA PATHOLOGY 4 | Facility: PATHOLOGY | Age: 50
End: 2020-03-09
Payer: COMMERCIAL

## 2020-03-09 VITALS
SYSTOLIC BLOOD PRESSURE: 115 MMHG | SYSTOLIC BLOOD PRESSURE: 91 MMHG | HEART RATE: 75 BPM | OXYGEN SATURATION: 94 % | SYSTOLIC BLOOD PRESSURE: 146 MMHG | DIASTOLIC BLOOD PRESSURE: 98 MMHG | SYSTOLIC BLOOD PRESSURE: 148 MMHG | WEIGHT: 262 LBS | SYSTOLIC BLOOD PRESSURE: 122 MMHG | DIASTOLIC BLOOD PRESSURE: 83 MMHG | RESPIRATION RATE: 16 BRPM | DIASTOLIC BLOOD PRESSURE: 73 MMHG | SYSTOLIC BLOOD PRESSURE: 134 MMHG | SYSTOLIC BLOOD PRESSURE: 86 MMHG | SYSTOLIC BLOOD PRESSURE: 119 MMHG | HEIGHT: 70 IN | HEART RATE: 74 BPM | DIASTOLIC BLOOD PRESSURE: 64 MMHG | DIASTOLIC BLOOD PRESSURE: 54 MMHG | DIASTOLIC BLOOD PRESSURE: 65 MMHG | TEMPERATURE: 97.9 F | OXYGEN SATURATION: 97 % | DIASTOLIC BLOOD PRESSURE: 67 MMHG | OXYGEN SATURATION: 98 % | HEART RATE: 72 BPM | OXYGEN SATURATION: 95 % | HEART RATE: 80 BPM | SYSTOLIC BLOOD PRESSURE: 123 MMHG | RESPIRATION RATE: 15 BRPM | HEART RATE: 76 BPM | DIASTOLIC BLOOD PRESSURE: 74 MMHG | RESPIRATION RATE: 18 BRPM | OXYGEN SATURATION: 99 % | HEART RATE: 88 BPM | HEART RATE: 78 BPM | SYSTOLIC BLOOD PRESSURE: 92 MMHG | DIASTOLIC BLOOD PRESSURE: 78 MMHG | DIASTOLIC BLOOD PRESSURE: 60 MMHG | SYSTOLIC BLOOD PRESSURE: 157 MMHG

## 2020-03-09 DIAGNOSIS — R19.5 OTHER FECAL ABNORMALITIES: ICD-10-CM

## 2020-03-09 DIAGNOSIS — K63.5 POLYP OF COLON: ICD-10-CM

## 2020-03-09 DIAGNOSIS — D12.2 BENIGN NEOPLASM OF ASCENDING COLON: ICD-10-CM

## 2020-03-09 DIAGNOSIS — Z80.0 FAMILY HISTORY OF MALIGNANT NEOPLASM OF DIGESTIVE ORGANS: ICD-10-CM

## 2020-03-09 DIAGNOSIS — D12.6 BENIGN NEOPLASM OF COLON, UNSPECIFIED: ICD-10-CM

## 2020-03-09 DIAGNOSIS — D12.0 BENIGN NEOPLASM OF CECUM: ICD-10-CM

## 2020-03-09 LAB
GI HISTOLOGY: A. SELECT: (no result)
GI HISTOLOGY: B. UNSPECIFIED: (no result)
GI HISTOLOGY: PDF REPORT: (no result)

## 2020-03-09 PROCEDURE — 45385 COLONOSCOPY W/LESION REMOVAL: CPT | Performed by: INTERNAL MEDICINE

## 2020-03-09 PROCEDURE — 88305 TISSUE EXAM BY PATHOLOGIST: CPT | Mod: 26 | Performed by: INTERNAL MEDICINE

## 2020-03-10 ENCOUNTER — HOSPITAL ENCOUNTER (OUTPATIENT)
Dept: PET IMAGING | Facility: HOSPITAL | Age: 50
Discharge: HOME | End: 2020-03-10
Admitting: NURSE PRACTITIONER

## 2020-03-10 DIAGNOSIS — C90.00 MULTIPLE MYELOMA, REMISSION STATUS UNSPECIFIED (HCC): ICD-10-CM

## 2020-03-10 LAB — GLUCOSE BLDC GLUCOMTR-MCNC: 103 MG/DL (ref 70–105)

## 2020-03-10 PROCEDURE — A9552 F18 FDG: HCPCS | Performed by: NURSE PRACTITIONER

## 2020-03-10 PROCEDURE — 78815 PET IMAGE W/CT SKULL-THIGH: CPT

## 2020-03-10 PROCEDURE — 82962 GLUCOSE BLOOD TEST: CPT

## 2020-03-10 PROCEDURE — 0 FLUDEOXYGLUCOSE F18 SOLUTION: Performed by: NURSE PRACTITIONER

## 2020-03-10 RX ADMIN — FLUDEOXYGLUCOSE F18 1 DOSE: 300 INJECTION INTRAVENOUS at 13:04

## 2020-03-10 NOTE — PROGRESS NOTES
Hematology/Oncology Outpatient Follow Up    Mc Selby  1970    Primary Care Physician: Carlita Shepherd MD  Referring Physician: Carlita Shepherd MD  Chief Complaint:  Monoclonal gammopathy/IgG kappa monoclonal gammopathy  History of Present Illness:   · Mr. Selby has a long history of rheumatoid arthritis on and off disease modifying agents.  Patient sees Dr. Rosas and laboratory work-up was initiated secondary to renal failure which revealed monoclonal gammopathy patient was sent to the [facility] and seen initially on 1/7/2020.  · Patient has cirrhosis related to alcohol abuse had episodes of hepatic encephalopathy.    · Patient has renal failure stage II at one point he was on hemodialysis briefly.  · Had a perforated gastric ulcer with hemorrhage  · 12/12/2019 serum immunofixation shows IgG monoclonal protein with kappa light chain specificity.  IgG 2111, IgA 703, IgM 754  · 1/21/2020 bone marrow biopsy and aspirate-right iliac crest bone marrow biopsy with aspirate smears and cell block preparation shows normocellular bone marrow with 50% cellularity.  Absent iron stores.  Negative for involvement by malignant lymphoma and metastatic malignancy.  Cytogenetics normal 46 XY flow cytometry normal  · 1/23/2020 whole-body skeletal survey geographic lucency within the right distal tibial diaphysis could relate to underlying lytic lesion, consider additional imaging or bone scan. 2. Small area of lucency with endosteal scalloping involving the distal left fibular diaphysis could relate to lytic lesion, dedicated AP and lateral views of the left fibula may be helpful. 2. Incidental findings above. 3. Carotid atherosclerotic disease. Consider follow-up carotid ultrasound if not previously performed.  · 1/30/2020 urine electrophoresis shows IgG monoclonal protein with kappa light chain specificity.  Small quantity of monoclonal protein unable to quantitate M spike.  Beta-2 microglobulin 4.0.   Serum kappa lambda free light chain ratio 1.83 creatinine 1.05 alk phos 242 total bilirubin 1.9  · 3/10/2020 - PET scan -- 1. No convincing evidence of osseous metastatic disease in this patient with history of multiple myeloma. Uptake surrounding each hip and around the elbow, wrist and finger joints, is favored to represent benign inflammatory/osteoarthritic change. 2. Few mildly prominent bilateral axillary lymph nodes demonstrate intermediate FDG accumulation. Both benign and malignant etiologies are in the differential. Consider short-term CT chest follow-up. 3. Hypermetabolic activity in a symmetric fashion the bilateral tonsillar pillars without discrete mass on CT. Correlate for tonsillar inflammation. 4. Additional CT findings as described above, including: Cirrhosis, hepatosplenomegaly, ascites, cholelithiasis, right renal cyst, dense coronary artery calcification, gynecomastia, maxillary sinusitis.    Past Medical History:   Diagnosis Date   • Acute perforated gastric ulcer with hemorrhage (CMS/HCC) 07/16/2018   • Alcoholism (CMS/HCC)    • Allergic rhinitis    • Arthritis     rheumatoid (diagnosed at age 16)   • Cirrhosis (CMS/HCC)     etoh cirrhosis-liver failure   • CKD (chronic kidney disease)    • COPD (chronic obstructive pulmonary disease) (CMS/HCC)    • Depression    • Duodenal ulcer      perforated duodenal s/p patch   • History of transfusion    • Infectious viral hepatitis    • MRSA (methicillin resistant Staphylococcus aureus) carrier 06/12/2019    nasal swab   • Tumors      benign essential   • Type 2 diabetes mellitus with hyperglycemia (CMS/HCC) 1/8/2020   • Umbilical hernia u   • Wound, open     nonhealing sore       Past Surgical History:   Procedure Laterality Date   • CYST REMOVAL Left     forarm   • ENDOSCOPY      ascities/paracentesis   • EXPLORATORY LAPAROTOMY  07/16/2018    Over-sew Gastric Ulcer With Gastrostomy and Jejunostomy Tube   • FOOT SURGERY Right     right bunion removal    • FRACTURE SURGERY Right 2005    bunionectomy/ broke toe and put in rods   • MASS EXCISION Right 6/18/2019    Procedure: EXCISION OF SEBACEOUS CYST OF THE RIGHT BACK;  Surgeon: Sapna Elizalde MD;  Location: [facility] MAIN OR;  Service: General   • MASS EXCISION Right 8/27/2019    Procedure: EXCISION LESION of right back;  Surgeon: Sapna Elizalde MD;  Location: [facility] MAIN OR;  Service: General   • UMBILICAL HERNIA REPAIR N/A 6/18/2019    Procedure: UMBILICAL HERNIA REPAIR LAPAROSCOPIC WITH MESH WITH EXTENSIVE LYSIS OF ADHESIONS;  Surgeon: Sapna Elizalde MD;  Location: [facility] MAIN OR;  Service: General         Current Outpatient Medications:   •  ACCU-CHEK FASTCLIX LANCETS misc, 1 each 4 (Four) Times a Day Before Meals & at Bedtime., Disp: 204 each, Rfl: 0  •  albuterol sulfate  (90 Base) MCG/ACT inhaler, Inhale 2 puffs Every 4 (Four) Hours As Needed for Wheezing., Disp: , Rfl:   •  azelastine (ASTELIN) 0.1 % nasal spray, 2 sprays into the nostril(s) as directed by provider 2 (Two) Times a Day. Use in each nostril as directed, Disp: , Rfl:   •  bumetanide (BUMEX) 2 MG tablet, Take 2 mg by mouth 2 (Two) Times a Day., Disp: , Rfl:   •  EPINEPHrine (EPIPEN) 0.3 MG/0.3ML solution auto-injector injection, Inject 0.3 mg under the skin into the appropriate area as directed 1 (One) Time As Needed., Disp: , Rfl:   •  famotidine (PEPCID) 20 MG tablet, TAKE 1 TABLET BY MOUTH EVERY DAY, Disp: 30 tablet, Rfl: 2  •  glucose blood (ACCU-CHEK GRAZYNA PLUS) test strip, Use as instructed four times a day and prn E11.9, Disp: 600 each, Rfl: 12  •  glucose monitor monitoring kit, 1 each 4 (Four) Times a Day Before Meals & at Bedtime., Disp: 1 each, Rfl: 0  •  insulin detemir (LEVEMIR) 100 UNIT/ML injection, INJECT 20 UNITS UNDER THE SKIN INTO THE APPROPRIATE AREA AS DIRECTED DAILY., Disp: 6 pen, Rfl: 2  •  Insulin Pen Needle (PEN NEEDLES) 32G X 4 MM misc, 1 each 4 (Four) Times a Day Before Meals & at Bedtime., Disp: 360 each, Rfl: 1  •   Isopropyl Alcohol (ALCOHOL WIPES) 70 % misc, Apply 1 each topically 4 (Four) Times a Day Before Meals & at Bedtime., Disp: 200 each, Rfl: 0  •  lactulose (CHRONULAC) 10 GM/15ML solution, Take 30 mL by mouth 2 (Two) Times a Day., Disp: 150 mL, Rfl: 0  •  magnesium oxide (MAG-OX) 400 MG tablet, Take 2 tablets by mouth 2 (Two) Times a Day., Disp: , Rfl: 6  •  meclizine 25 MG chewable tablet chewable tablet, Chew 25-50 mg 3 (Three) Times a Day As Needed., Disp: , Rfl:   •  metFORMIN ER (GLUCOPHAGE-XR) 500 MG 24 hr tablet, TAKE 1 TABLET BY MOUTH EVERY DAY WITH BREAKFAST, Disp: 30 tablet, Rfl: 1  •  montelukast (SINGULAIR) 10 MG tablet, Take 10 mg by mouth Every Night., Disp: , Rfl:   •  NOVOLOG FLEXPEN 100 UNIT/ML solution pen-injector sc pen, Inject 2 Units under the skin into the appropriate area as directed 3 (Three) Times a Day With Meals., Disp: 3 pen, Rfl: 3  •  ondansetron (ZOFRAN) 8 MG tablet, Take 8 mg by mouth Every 8 (Eight) Hours As Needed for Nausea or Vomiting., Disp: , Rfl:   •  potassium chloride (K-DUR,KLOR-CON) 20 MEQ CR tablet, Take 40 mEq by mouth 3 (Three) Times a Day., Disp: , Rfl:   •  predniSONE (DELTASONE) 5 MG tablet, TAKE 1.5 TABLETS BY MOUTH EVERY DAY, Disp: 45 tablet, Rfl: 0  •  promethazine (PHENERGAN) 12.5 MG tablet, Take 12.5 mg by mouth Every 6 (Six) Hours As Needed for Nausea or Vomiting., Disp: , Rfl:   •  senna (SENOKOT) 8.6 MG tablet, Take 1 tablet by mouth 2 (Two) Times a Day As Needed for Constipation., Disp: 30 tablet, Rfl: 0  •  spironolactone (ALDACTONE) 25 MG tablet, Take 25 mg by mouth Daily., Disp: , Rfl:   •  tamsulosin (FLOMAX) 0.4 MG capsule 24 hr capsule, Take 1 capsule by mouth 2 (Two) Times a Day., Disp: , Rfl:   •  Triamcinolone Acetonide (NASACORT) 55 MCG/ACT nasal inhaler, 1 spray into the nostril(s) as directed by provider Daily., Disp: , Rfl:   •  vitamin D (ERGOCALCIFEROL) 13364 units capsule capsule, Take 50,000 Units by mouth Every 14 (Fourteen) Days. Every other  Sunday, Disp: , Rfl:   •  XIFAXAN 550 MG tablet, TAKE 1 TABLET BY MOUTH EVERY 12 HOURS, Disp: 180 tablet, Rfl: 1  •  zinc sulfate (ZINCATE) 220 (50 Zn) MG capsule, Take 220 mg by mouth Daily., Disp: , Rfl: 3    Allergies   Allergen Reactions   • Duloxetine Unknown (See Comments)     Unknown reaction        Family History   Problem Relation Age of Onset   • Cancer Mother    • Lung disease Father    • Other Father         prostate problems   • Mental illness Brother        Cancer-related family history includes Cancer in his mother.    Social History     Tobacco Use   • Smoking status: Former Smoker   • Smokeless tobacco: Former User   Substance Use Topics   • Alcohol use: No     Frequency: Never     Comment: Off alcohol since July 2018   • Drug use: No       I have reviewed the history of present illness, past medical history, family history, social history, lab results, all notes and other records since the patient was last seen at the cancer care center.    SUBJECTIVE:      Patient is my office for follow-up accompanied by his wife.  No excessive drowsiness or sleepiness.  Denies visible blood loss.  Reports a joint pains which is chronic.  Also has back pain.  Has some skin itching.  Ports a chronic sinusitis.  Has occasional difficulty swallowing.    ROS:      Review of Systems   Constitutional: Negative for fever.   HENT: Negative for nosebleeds and trouble swallowing.    Eyes: Negative for visual disturbance.   Respiratory: Negative for cough, shortness of breath and wheezing.    Cardiovascular: Negative for chest pain.   Gastrointestinal: Negative for abdominal pain and blood in stool.   Endocrine: Negative for cold intolerance.   Genitourinary: Negative for dysuria and hematuria.   Musculoskeletal: Negative for joint swelling.   Skin: Negative for rash.   Allergic/Immunologic: Negative for environmental allergies.   Neurological: Negative for seizures.   Hematological: Does not bruise/bleed easily.  "  Psychiatric/Behavioral: The patient is not nervous/anxious.          Objective:       Vitals:    03/11/20 1329   BP: 131/80   Pulse: 69   Resp: 16   Temp: 98.3 °F (36.8 °C)   Weight: 116 kg (256 lb 3.2 oz)   Height: 177.8 cm (70\")         PHYSICAL EXAM:      Physical Exam   Constitutional: He is oriented to person, place, and time. No distress.   Well-built obese   HENT:   Head: Normocephalic and atraumatic.   Eyes: Conjunctivae and EOM are normal. Right eye exhibits no discharge. Left eye exhibits no discharge. No scleral icterus.   Neck: Normal range of motion. Neck supple. No thyromegaly present.   Cardiovascular: Normal rate, regular rhythm and normal heart sounds. Exam reveals no gallop and no friction rub.   Pulmonary/Chest: Effort normal. No stridor. No respiratory distress. He has no wheezes.   Abdominal: Soft. Bowel sounds are normal. He exhibits no mass. There is no tenderness. There is no rebound and no guarding.   Obese distended   Musculoskeletal: Normal range of motion. He exhibits no tenderness.   Lymphadenopathy:     He has no cervical adenopathy.   Neurological: He is alert and oriented to person, place, and time. He exhibits normal muscle tone.   Skin: Skin is warm. No rash noted. He is not diaphoretic. No erythema.   Psychiatric: He has a normal mood and affect. His behavior is normal.   Nursing note and vitals reviewed.       RECENT LABS:     WBC   Date Value Ref Range Status   03/11/2020 8.82 3.40 - 10.80 10*3/mm3 Final     RBC   Date Value Ref Range Status   03/11/2020 4.83 4.14 - 5.80 10*6/mm3 Final     Hemoglobin   Date Value Ref Range Status   03/11/2020 14.9 13.0 - 17.7 g/dL Final     Hematocrit   Date Value Ref Range Status   03/11/2020 42.6 37.5 - 51.0 % Final     MCV   Date Value Ref Range Status   03/11/2020 88.2 79.0 - 97.0 fL Final     MCH   Date Value Ref Range Status   03/11/2020 30.8 26.6 - 33.0 pg Final     MCHC   Date Value Ref Range Status   03/11/2020 35.0 31.5 - 35.7 g/dL " Final     RDW   Date Value Ref Range Status   03/11/2020 14.2 12.3 - 15.4 % Final     RDW-SD   Date Value Ref Range Status   03/11/2020 45.5 37.0 - 54.0 fl Final     MPV   Date Value Ref Range Status   03/11/2020 10.0 6.0 - 12.0 fL Final     Platelets   Date Value Ref Range Status   03/11/2020 201 140 - 450 10*3/mm3 Final     Neutrophil %   Date Value Ref Range Status   03/11/2020 67.7 42.7 - 76.0 % Final     Lymphocyte %   Date Value Ref Range Status   03/11/2020 17.2 (L) 19.6 - 45.3 % Final     Monocyte %   Date Value Ref Range Status   03/11/2020 10.0 5.0 - 12.0 % Final     Eosinophil %   Date Value Ref Range Status   03/11/2020 4.5 0.3 - 6.2 % Final     Basophil %   Date Value Ref Range Status   03/11/2020 0.6 0.0 - 1.5 % Final     Immature Grans %   Date Value Ref Range Status   01/13/2020 0.3 0.0 - 0.5 % Final     Neutrophils, Absolute   Date Value Ref Range Status   03/11/2020 5.97 1.70 - 7.00 10*3/mm3 Final     Lymphocytes, Absolute   Date Value Ref Range Status   03/11/2020 1.52 0.70 - 3.10 10*3/mm3 Final     Monocytes, Absolute   Date Value Ref Range Status   03/11/2020 0.88 0.10 - 0.90 10*3/mm3 Final     Eosinophils, Absolute   Date Value Ref Range Status   03/11/2020 0.40 0.00 - 0.40 10*3/mm3 Final     Basophils, Absolute   Date Value Ref Range Status   03/11/2020 0.05 0.00 - 0.20 10*3/mm3 Final     Immature Grans, Absolute   Date Value Ref Range Status   01/13/2020 0.02 0.00 - 0.05 10*3/mm3 Final     nRBC   Date Value Ref Range Status   01/21/2020 0.1 0.0 - 0.2 /100 WBC Final       Lab Results   Component Value Date    GLUCOSE 298 (H) 01/13/2020    BUN 9 01/13/2020    CREATININE 1.04 01/13/2020    EGFRIFNONA 75 01/13/2020    EGFRIFAFRI 114 03/15/2017    BCR 8.7 01/13/2020    K 4.2 01/13/2020    CO2 19.4 (L) 01/13/2020    CALCIUM 9.6 01/13/2020    ALBUMIN 3.70 01/13/2020    LABIL2 0.6 (L) 06/05/2019    AST 23 01/13/2020    ALT 17 01/13/2020         Assessment/Plan      ASSESSMENT:     1. Cirrhosis and  portal hypertension and history of hepatic cenephalopathy  2. IgG kappa multiple myeloma  3. Chronic renal failure stage II  4. Cirrhosis  5. ECOG 1    PLAN:      1. Discussed the PET CT scan findings with the patient and his wife.  No evidence of bone lytic lesions.    2. Patient will be seeing ENT tomorrow we will ask for tonsillar evaluation given the abnormality on the PET CT scan.    3. Lactulose for hepatic encephalopathy  4. Creatinine is stable.  5. Repeat myeloma work-up in 6 months before I see him back in my office.    I have reviewed labs results, imaging, vitals, and medications with the patient today.     I counselled Mc of the risks of continuing to use tobacco and cessation.  During this visit, I spent 3-10 minutes counseling the patient regarding tobacco cessation. Patient verbalized understanding and is in agreement of the above plan.  Electronically signed by Celia Ellsworth MD, 03/11/20, 4:22 PM.          This report was compiled using Dragon voice recognition software. I have made every effort to proof read this document; however, typographical errors may persist.

## 2020-03-11 ENCOUNTER — OFFICE VISIT (OUTPATIENT)
Dept: ONCOLOGY | Facility: CLINIC | Age: 50
End: 2020-03-11

## 2020-03-11 ENCOUNTER — OFFICE VISIT (OUTPATIENT)
Dept: LAB | Facility: HOSPITAL | Age: 50
End: 2020-03-11

## 2020-03-11 VITALS
TEMPERATURE: 98.3 F | WEIGHT: 256.2 LBS | SYSTOLIC BLOOD PRESSURE: 131 MMHG | HEIGHT: 70 IN | RESPIRATION RATE: 16 BRPM | HEART RATE: 69 BPM | DIASTOLIC BLOOD PRESSURE: 80 MMHG | BODY MASS INDEX: 36.68 KG/M2

## 2020-03-11 DIAGNOSIS — D47.2 MGUS (MONOCLONAL GAMMOPATHY OF UNKNOWN SIGNIFICANCE): Primary | ICD-10-CM

## 2020-03-11 DIAGNOSIS — R59.0 LOCALIZED ENLARGED LYMPH NODES: ICD-10-CM

## 2020-03-11 DIAGNOSIS — E61.1 IRON DEFICIENCY: Primary | ICD-10-CM

## 2020-03-11 LAB
BASOPHILS # BLD AUTO: 0.05 10*3/MM3 (ref 0–0.2)
BASOPHILS NFR BLD AUTO: 0.6 % (ref 0–1.5)
DEPRECATED RDW RBC AUTO: 45.5 FL (ref 37–54)
EOSINOPHIL # BLD AUTO: 0.4 10*3/MM3 (ref 0–0.4)
EOSINOPHIL NFR BLD AUTO: 4.5 % (ref 0.3–6.2)
ERYTHROCYTE [DISTWIDTH] IN BLOOD BY AUTOMATED COUNT: 14.2 % (ref 12.3–15.4)
HCT VFR BLD AUTO: 42.6 % (ref 37.5–51)
HGB BLD-MCNC: 14.9 G/DL (ref 13–17.7)
LYMPHOCYTES # BLD AUTO: 1.52 10*3/MM3 (ref 0.7–3.1)
LYMPHOCYTES NFR BLD AUTO: 17.2 % (ref 19.6–45.3)
MCH RBC QN AUTO: 30.8 PG (ref 26.6–33)
MCHC RBC AUTO-ENTMCNC: 35 G/DL (ref 31.5–35.7)
MCV RBC AUTO: 88.2 FL (ref 79–97)
MONOCYTES # BLD AUTO: 0.88 10*3/MM3 (ref 0.1–0.9)
MONOCYTES NFR BLD AUTO: 10 % (ref 5–12)
NEUTROPHILS # BLD AUTO: 5.97 10*3/MM3 (ref 1.7–7)
NEUTROPHILS NFR BLD AUTO: 67.7 % (ref 42.7–76)
PLATELET # BLD AUTO: 201 10*3/MM3 (ref 140–450)
PMV BLD AUTO: 10 FL (ref 6–12)
RBC # BLD AUTO: 4.83 10*6/MM3 (ref 4.14–5.8)
WBC NRBC COR # BLD: 8.82 10*3/MM3 (ref 3.4–10.8)

## 2020-03-11 PROCEDURE — 85025 COMPLETE CBC W/AUTO DIFF WBC: CPT

## 2020-03-11 PROCEDURE — 36415 COLL VENOUS BLD VENIPUNCTURE: CPT

## 2020-03-11 PROCEDURE — 99215 OFFICE O/P EST HI 40 MIN: CPT | Performed by: INTERNAL MEDICINE

## 2020-03-13 DIAGNOSIS — Z79.899 LONG-TERM USE OF HIGH-RISK MEDICATION: ICD-10-CM

## 2020-03-13 DIAGNOSIS — M06.00 SERONEGATIVE RHEUMATOID ARTHRITIS (HCC): ICD-10-CM

## 2020-03-18 RX ORDER — PREDNISONE 1 MG/1
TABLET ORAL
Qty: 45 TABLET | Refills: 0 | OUTPATIENT
Start: 2020-03-18

## 2020-03-19 RX ORDER — AZELASTINE HYDROCHLORIDE 137 UG/1
SPRAY, METERED NASAL
Qty: 90 SPRAY | Refills: 0 | Status: SHIPPED | OUTPATIENT
Start: 2020-03-19 | End: 2020-06-11

## 2020-03-26 ENCOUNTER — LAB (OUTPATIENT)
Dept: FAMILY MEDICINE CLINIC | Facility: CLINIC | Age: 50
End: 2020-03-26

## 2020-03-26 ENCOUNTER — OFFICE VISIT (OUTPATIENT)
Dept: FAMILY MEDICINE CLINIC | Facility: CLINIC | Age: 50
End: 2020-03-26

## 2020-03-26 VITALS
RESPIRATION RATE: 24 BRPM | WEIGHT: 260.6 LBS | TEMPERATURE: 99.7 F | SYSTOLIC BLOOD PRESSURE: 124 MMHG | HEIGHT: 70 IN | OXYGEN SATURATION: 95 % | DIASTOLIC BLOOD PRESSURE: 86 MMHG | HEART RATE: 103 BPM | BODY MASS INDEX: 37.31 KG/M2

## 2020-03-26 DIAGNOSIS — M06.00 SERONEGATIVE RHEUMATOID ARTHRITIS (HCC): Primary | ICD-10-CM

## 2020-03-26 DIAGNOSIS — Z12.5 SCREENING FOR PROSTATE CANCER: ICD-10-CM

## 2020-03-26 DIAGNOSIS — E11.65 TYPE 2 DIABETES MELLITUS WITH HYPERGLYCEMIA, WITH LONG-TERM CURRENT USE OF INSULIN (HCC): ICD-10-CM

## 2020-03-26 DIAGNOSIS — E55.9 VITAMIN D DEFICIENCY: ICD-10-CM

## 2020-03-26 DIAGNOSIS — Z79.4 TYPE 2 DIABETES MELLITUS WITH HYPERGLYCEMIA, WITH LONG-TERM CURRENT USE OF INSULIN (HCC): ICD-10-CM

## 2020-03-26 DIAGNOSIS — M79.644 THUMB PAIN, RIGHT: ICD-10-CM

## 2020-03-26 DIAGNOSIS — N18.9 CHRONIC KIDNEY DISEASE, UNSPECIFIED CKD STAGE: ICD-10-CM

## 2020-03-26 DIAGNOSIS — M06.00 SERONEGATIVE RHEUMATOID ARTHRITIS (HCC): ICD-10-CM

## 2020-03-26 LAB
BASOPHILS # BLD AUTO: 0.09 10*3/MM3 (ref 0–0.2)
BASOPHILS NFR BLD AUTO: 0.9 % (ref 0–1.5)
DEPRECATED RDW RBC AUTO: 46.2 FL (ref 37–54)
EOSINOPHIL # BLD AUTO: 0.36 10*3/MM3 (ref 0–0.4)
EOSINOPHIL NFR BLD AUTO: 3.4 % (ref 0.3–6.2)
ERYTHROCYTE [DISTWIDTH] IN BLOOD BY AUTOMATED COUNT: 13.7 % (ref 12.3–15.4)
HCT VFR BLD AUTO: 46.7 % (ref 37.5–51)
HGB BLD-MCNC: 16 G/DL (ref 13–17.7)
IMM GRANULOCYTES # BLD AUTO: 0.04 10*3/MM3 (ref 0–0.05)
IMM GRANULOCYTES NFR BLD AUTO: 0.4 % (ref 0–0.5)
LYMPHOCYTES # BLD AUTO: 1.81 10*3/MM3 (ref 0.7–3.1)
LYMPHOCYTES NFR BLD AUTO: 17.1 % (ref 19.6–45.3)
MCH RBC QN AUTO: 31.3 PG (ref 26.6–33)
MCHC RBC AUTO-ENTMCNC: 34.3 G/DL (ref 31.5–35.7)
MCV RBC AUTO: 91.2 FL (ref 79–97)
MONOCYTES # BLD AUTO: 1.02 10*3/MM3 (ref 0.1–0.9)
MONOCYTES NFR BLD AUTO: 9.6 % (ref 5–12)
NEUTROPHILS # BLD AUTO: 7.26 10*3/MM3 (ref 1.7–7)
NEUTROPHILS NFR BLD AUTO: 68.6 % (ref 42.7–76)
NRBC BLD AUTO-RTO: 0 /100 WBC (ref 0–0.2)
PLATELET # BLD AUTO: 232 10*3/MM3 (ref 140–450)
PMV BLD AUTO: 11.2 FL (ref 6–12)
RBC # BLD AUTO: 5.12 10*6/MM3 (ref 4.14–5.8)
WBC NRBC COR # BLD: 10.58 10*3/MM3 (ref 3.4–10.8)

## 2020-03-26 PROCEDURE — 84443 ASSAY THYROID STIM HORMONE: CPT | Performed by: INTERNAL MEDICINE

## 2020-03-26 PROCEDURE — 85025 COMPLETE CBC W/AUTO DIFF WBC: CPT | Performed by: INTERNAL MEDICINE

## 2020-03-26 PROCEDURE — 36415 COLL VENOUS BLD VENIPUNCTURE: CPT

## 2020-03-26 PROCEDURE — 85652 RBC SED RATE AUTOMATED: CPT | Performed by: INTERNAL MEDICINE

## 2020-03-26 PROCEDURE — 80061 LIPID PANEL: CPT | Performed by: INTERNAL MEDICINE

## 2020-03-26 PROCEDURE — 82306 VITAMIN D 25 HYDROXY: CPT | Performed by: INTERNAL MEDICINE

## 2020-03-26 PROCEDURE — 80053 COMPREHEN METABOLIC PANEL: CPT | Performed by: INTERNAL MEDICINE

## 2020-03-26 PROCEDURE — 99214 OFFICE O/P EST MOD 30 MIN: CPT | Performed by: INTERNAL MEDICINE

## 2020-03-26 PROCEDURE — 83735 ASSAY OF MAGNESIUM: CPT | Performed by: INTERNAL MEDICINE

## 2020-03-26 PROCEDURE — G0103 PSA SCREENING: HCPCS | Performed by: INTERNAL MEDICINE

## 2020-03-26 PROCEDURE — 86140 C-REACTIVE PROTEIN: CPT | Performed by: INTERNAL MEDICINE

## 2020-03-26 PROCEDURE — 83036 HEMOGLOBIN GLYCOSYLATED A1C: CPT | Performed by: INTERNAL MEDICINE

## 2020-03-26 NOTE — PROGRESS NOTES
Subjective   Mc Selby is a 50 y.o. male.     Pt is here for med check DM, RA, etoh cirrhosis, and also c/o R thumb pain and vertigo  Is having more sinus issues, which is causing more trouble with vertigo  Will happen all of a sudden  Is on medications, previously had sinuplasty  R thumb has been an issue for a few months now  Is taking 2 tylenol to try to help  Currently on prednisone - was told to stop enbrel while having work up by H/O  So is hoping rheum will let him restart it    Glucoses are up and down  Fasting in AM is usually in 140s  After eating, can be 200s-300s  Starting to figure out what he can/can't eat  If he checks pre-meal, and gets a low-deng number like 75, won't take insulin then  Will check after eating, and then decide if he should take the 2 units  Does take 20u of levemir at night  He often does snack in the night  He'll doze off around 9p, wake up around 1a and stay up until 5a  Then get back up again around 8-9a    Dr corado said pt's meld score is improving to the point  Where they can likely dc some medications  And that he can drink a beer or two here and there  Pt doesn't think he'd overdrink anymore since most of his friends that drank  Don't come around anymore       The following portions of the patient's history were reviewed and updated as appropriate: allergies, current medications, past family history, past medical history, past social history, past surgical history and problem list.    Review of Systems   Constitutional: Negative for fatigue and fever.   HENT: Positive for congestion, rhinorrhea and sinus pressure. Negative for ear pain and sore throat.    Eyes: Negative for blurred vision and itching.   Respiratory: Negative for cough and shortness of breath.    Cardiovascular: Negative for chest pain and palpitations.   Gastrointestinal: Negative for abdominal pain, diarrhea and vomiting.   Endocrine: Negative for polydipsia and polyuria.   Genitourinary: Negative for  dysuria, frequency, hematuria and urgency.   Musculoskeletal: Positive for arthralgias and back pain. Negative for joint swelling and myalgias.   Skin: Negative for rash and skin lesions.   Neurological: Positive for dizziness. Negative for numbness and headache.   Psychiatric/Behavioral: Negative for depressed mood. The patient is not nervous/anxious.          Current Outpatient Medications:   •  ACCU-CHEK FASTCLIX LANCETS misc, 1 each 4 (Four) Times a Day Before Meals & at Bedtime., Disp: 204 each, Rfl: 0  •  albuterol sulfate  (90 Base) MCG/ACT inhaler, Inhale 2 puffs Every 4 (Four) Hours As Needed for Wheezing., Disp: , Rfl:   •  Azelastine HCl 137 MCG/SPRAY solution, USE 2 SPRAYS EACH NOSTRIL TWICE A DAY, Disp: 90 spray, Rfl: 0  •  bumetanide (BUMEX) 2 MG tablet, Take 2 mg by mouth 2 (Two) Times a Day., Disp: , Rfl:   •  EPINEPHrine (EPIPEN) 0.3 MG/0.3ML solution auto-injector injection, Inject 0.3 mg under the skin into the appropriate area as directed 1 (One) Time As Needed., Disp: , Rfl:   •  famotidine (PEPCID) 20 MG tablet, TAKE 1 TABLET BY MOUTH EVERY DAY, Disp: 30 tablet, Rfl: 2  •  glucose blood (ACCU-CHEK GRAZYNA PLUS) test strip, Use as instructed four times a day and prn E11.9, Disp: 600 each, Rfl: 12  •  glucose monitor monitoring kit, 1 each 4 (Four) Times a Day Before Meals & at Bedtime., Disp: 1 each, Rfl: 0  •  insulin detemir (LEVEMIR) 100 UNIT/ML injection, INJECT 20 UNITS UNDER THE SKIN INTO THE APPROPRIATE AREA AS DIRECTED DAILY., Disp: 6 pen, Rfl: 2  •  Insulin Pen Needle (PEN NEEDLES) 32G X 4 MM misc, 1 each 4 (Four) Times a Day Before Meals & at Bedtime., Disp: 360 each, Rfl: 1  •  Isopropyl Alcohol (ALCOHOL WIPES) 70 % misc, Apply 1 each topically 4 (Four) Times a Day Before Meals & at Bedtime., Disp: 200 each, Rfl: 0  •  lactulose (CHRONULAC) 10 GM/15ML solution, Take 30 mL by mouth 2 (Two) Times a Day., Disp: 150 mL, Rfl: 0  •  magnesium oxide (MAG-OX) 400 MG tablet, Take 2  tablets by mouth 2 (Two) Times a Day., Disp: , Rfl: 6  •  meclizine 25 MG chewable tablet chewable tablet, Chew 25-50 mg 3 (Three) Times a Day As Needed., Disp: , Rfl:   •  metFORMIN ER (GLUCOPHAGE-XR) 500 MG 24 hr tablet, TAKE 1 TABLET BY MOUTH EVERY DAY WITH BREAKFAST, Disp: 30 tablet, Rfl: 1  •  montelukast (SINGULAIR) 10 MG tablet, Take 10 mg by mouth Every Night., Disp: , Rfl:   •  NOVOLOG FLEXPEN 100 UNIT/ML solution pen-injector sc pen, Inject 2 Units under the skin into the appropriate area as directed 3 (Three) Times a Day With Meals., Disp: 3 pen, Rfl: 3  •  ondansetron (ZOFRAN) 8 MG tablet, Take 8 mg by mouth Every 8 (Eight) Hours As Needed for Nausea or Vomiting., Disp: , Rfl:   •  potassium chloride (K-DUR,KLOR-CON) 20 MEQ CR tablet, Take 40 mEq by mouth 3 (Three) Times a Day., Disp: , Rfl:   •  predniSONE (DELTASONE) 5 MG tablet, TAKE 1.5 TABLETS BY MOUTH EVERY DAY, Disp: 45 tablet, Rfl: 0  •  promethazine (PHENERGAN) 12.5 MG tablet, Take 12.5 mg by mouth Every 6 (Six) Hours As Needed for Nausea or Vomiting., Disp: , Rfl:   •  senna (SENOKOT) 8.6 MG tablet, Take 1 tablet by mouth 2 (Two) Times a Day As Needed for Constipation., Disp: 30 tablet, Rfl: 0  •  spironolactone (ALDACTONE) 25 MG tablet, Take 25 mg by mouth Daily., Disp: , Rfl:   •  tamsulosin (FLOMAX) 0.4 MG capsule 24 hr capsule, Take 1 capsule by mouth 2 (Two) Times a Day., Disp: , Rfl:   •  Triamcinolone Acetonide (NASACORT) 55 MCG/ACT nasal inhaler, 1 spray into the nostril(s) as directed by provider Daily., Disp: , Rfl:   •  vitamin D (ERGOCALCIFEROL) 24410 units capsule capsule, Take 50,000 Units by mouth Every 14 (Fourteen) Days. Every other Sunday, Disp: , Rfl:   •  XIFAXAN 550 MG tablet, TAKE 1 TABLET BY MOUTH EVERY 12 HOURS, Disp: 180 tablet, Rfl: 1  •  zinc sulfate (ZINCATE) 220 (50 Zn) MG capsule, Take 220 mg by mouth Daily., Disp: , Rfl: 3    Objective   /86 (BP Location: Right arm, Patient Position: Sitting, Cuff Size:  "Adult)   Pulse 103   Temp 99.7 °F (37.6 °C) (Oral)   Resp 24   Ht 177.8 cm (70\")   Wt 118 kg (260 lb 9.6 oz)   SpO2 95%   BMI 37.39 kg/m²   Physical Exam   Constitutional: He is oriented to person, place, and time. He appears well-developed and well-nourished. No distress.   HENT:   Head: Normocephalic and atraumatic.   Mouth/Throat: Oropharynx is clear and moist. No oropharyngeal exudate.   Eyes: Conjunctivae and EOM are normal. Right eye exhibits no discharge. Left eye exhibits no discharge. No scleral icterus.   Neck: Normal range of motion. Neck supple. No thyromegaly present.   Cardiovascular: Normal rate, regular rhythm and normal heart sounds. Exam reveals no gallop and no friction rub.   No murmur heard.  Pulmonary/Chest: Effort normal and breath sounds normal. No respiratory distress. He has no wheezes. He has no rales.   Musculoskeletal: Normal range of motion. He exhibits no edema or deformity.   Lymphadenopathy:     He has no cervical adenopathy.   Neurological: He is alert and oriented to person, place, and time. No cranial nerve deficit.   Skin: Skin is warm and dry. No rash noted. He is not diaphoretic. No erythema.   Psychiatric: He has a normal mood and affect. His behavior is normal. Thought content normal.   Vitals reviewed.        Assessment/Plan   Problems Addressed this Visit        Endocrine    Type 2 diabetes mellitus with hyperglycemia (CMS/[facility])    Relevant Orders    CBC Auto Differential    Comprehensive Metabolic Panel    Lipid Panel    TSH    Hemoglobin A1c    Magnesium      Other Visit Diagnoses     Seronegative rheumatoid arthritis (CMS/[facility])    -  Primary    Relevant Orders    CBC Auto Differential    Comprehensive Metabolic Panel    Lipid Panel    TSH    Magnesium    Sedimentation Rate    C-reactive protein    Ambulatory Referral to Hand Surgery    Chronic kidney disease, unspecified CKD stage        Relevant Orders    CBC Auto Differential    Comprehensive Metabolic Panel    " Lipid Panel    TSH    Magnesium    Vitamin D deficiency        Relevant Orders    Vitamin D 25 Hydroxy    Screening for prostate cancer        Relevant Orders    PSA Screen    Thumb pain, right        Relevant Orders    Ambulatory Referral to Hand Surgery          Check labs today  Discussed trying to increase prednisone to help sinuses - pt does not want to at this time  Will continue to monitor  Meclizine does help with vertigo  Will refer to hand surgery, though if dr pearson may do steroid injection into hand  Prednisone is likely causing higher glucoses  Especially if pt stops that and starts enbrel, will hopefully be able to stay on this regimen  Glucoses certainly sound like they have been running in better ranges  Continue diet/exercise       Procedures

## 2020-03-27 ENCOUNTER — TELEPHONE (OUTPATIENT)
Dept: RHEUMATOLOGY | Facility: CLINIC | Age: 50
End: 2020-03-27

## 2020-03-27 LAB
25(OH)D3 SERPL-MCNC: 31.3 NG/ML (ref 30–100)
ALBUMIN SERPL-MCNC: 3.7 G/DL (ref 3.5–5.2)
ALBUMIN/GLOB SERPL: 0.8 G/DL
ALP SERPL-CCNC: 145 U/L (ref 39–117)
ALT SERPL W P-5'-P-CCNC: 16 U/L (ref 1–41)
ANION GAP SERPL CALCULATED.3IONS-SCNC: 14.4 MMOL/L (ref 5–15)
AST SERPL-CCNC: 23 U/L (ref 1–40)
BILIRUB SERPL-MCNC: 1.1 MG/DL (ref 0.2–1.2)
BUN BLD-MCNC: 17 MG/DL (ref 6–20)
BUN/CREAT SERPL: 15.7 (ref 7–25)
CALCIUM SPEC-SCNC: 9.3 MG/DL (ref 8.6–10.5)
CHLORIDE SERPL-SCNC: 101 MMOL/L (ref 98–107)
CHOLEST SERPL-MCNC: 141 MG/DL (ref 0–200)
CO2 SERPL-SCNC: 20.6 MMOL/L (ref 22–29)
CREAT BLD-MCNC: 1.08 MG/DL (ref 0.76–1.27)
CRP SERPL-MCNC: 0.92 MG/DL (ref 0–0.5)
ERYTHROCYTE [SEDIMENTATION RATE] IN BLOOD: 42 MM/HR (ref 0–20)
GFR SERPL CREATININE-BSD FRML MDRD: 72 ML/MIN/1.73
GLOBULIN UR ELPH-MCNC: 4.8 GM/DL
GLUCOSE BLD-MCNC: 205 MG/DL (ref 65–99)
HBA1C MFR BLD: 7.9 % (ref 3.5–5.6)
HDLC SERPL-MCNC: 39 MG/DL (ref 40–60)
LDLC SERPL CALC-MCNC: 85 MG/DL (ref 0–100)
LDLC/HDLC SERPL: 2.19 {RATIO}
MAGNESIUM SERPL-MCNC: 2.1 MG/DL (ref 1.6–2.6)
POTASSIUM BLD-SCNC: 4.3 MMOL/L (ref 3.5–5.2)
PROT SERPL-MCNC: 8.5 G/DL (ref 6–8.5)
PSA SERPL-MCNC: 0.37 NG/ML (ref 0–4)
SODIUM BLD-SCNC: 136 MMOL/L (ref 136–145)
TRIGL SERPL-MCNC: 83 MG/DL (ref 0–150)
TSH SERPL DL<=0.05 MIU/L-ACNC: 2.14 UIU/ML (ref 0.27–4.2)
VLDLC SERPL-MCNC: 16.6 MG/DL (ref 5–40)

## 2020-04-03 DIAGNOSIS — Z79.899 LONG-TERM USE OF HIGH-RISK MEDICATION: ICD-10-CM

## 2020-04-03 DIAGNOSIS — M06.00 SERONEGATIVE RHEUMATOID ARTHRITIS (HCC): ICD-10-CM

## 2020-04-03 RX ORDER — PREDNISONE 1 MG/1
7.5 TABLET ORAL DAILY
Qty: 45 TABLET | Refills: 0 | Status: SHIPPED | OUTPATIENT
Start: 2020-04-03 | End: 2020-06-09

## 2020-04-07 ENCOUNTER — TELEPHONE (OUTPATIENT)
Dept: FAMILY MEDICINE CLINIC | Facility: CLINIC | Age: 50
End: 2020-04-07

## 2020-04-07 NOTE — TELEPHONE ENCOUNTER
Pts wife called back in regards to RA medication. They had to reschedule pt. Unfortunately Dr. Riggs said she will not continue the Enbrel until she see pt. Appt right now is set for 5/12/20. Pt has been put on prednisone for a month, he states that it is helping at this time and is taking tylenol as needed as well.    Pts wife also wanted to let you know that the hand surgeon will not be scheduling appts until after June. They have to call today to get him on the schedule.

## 2020-04-18 DIAGNOSIS — Z79.899 LONG-TERM USE OF HIGH-RISK MEDICATION: ICD-10-CM

## 2020-04-18 DIAGNOSIS — M06.00 SERONEGATIVE RHEUMATOID ARTHRITIS (HCC): ICD-10-CM

## 2020-04-20 RX ORDER — FAMOTIDINE 20 MG/1
TABLET, FILM COATED ORAL
Qty: 90 TABLET | Refills: 0 | Status: SHIPPED | OUTPATIENT
Start: 2020-04-20 | End: 2021-05-12

## 2020-05-03 DIAGNOSIS — Z79.899 LONG-TERM USE OF HIGH-RISK MEDICATION: ICD-10-CM

## 2020-05-03 DIAGNOSIS — M06.00 SERONEGATIVE RHEUMATOID ARTHRITIS (HCC): ICD-10-CM

## 2020-05-08 RX ORDER — PREDNISONE 1 MG/1
TABLET ORAL
Qty: 45 TABLET | Refills: 0 | OUTPATIENT
Start: 2020-05-08

## 2020-05-12 ENCOUNTER — HOSPITAL ENCOUNTER (OUTPATIENT)
Dept: GENERAL RADIOLOGY | Facility: HOSPITAL | Age: 50
Discharge: HOME | End: 2020-05-12
Admitting: INTERNAL MEDICINE

## 2020-05-12 ENCOUNTER — OFFICE VISIT (OUTPATIENT)
Dept: RHEUMATOLOGY | Facility: CLINIC | Age: 50
End: 2020-05-12

## 2020-05-12 ENCOUNTER — TELEPHONE (OUTPATIENT)
Dept: RHEUMATOLOGY | Facility: CLINIC | Age: 50
End: 2020-05-12

## 2020-05-12 ENCOUNTER — TELEPHONE (OUTPATIENT)
Dept: FAMILY MEDICINE CLINIC | Facility: CLINIC | Age: 50
End: 2020-05-12

## 2020-05-12 VITALS — WEIGHT: 263 LBS | BODY MASS INDEX: 37.74 KG/M2

## 2020-05-12 DIAGNOSIS — M06.9 RHEUMATOID ARTHRITIS, INVOLVING UNSPECIFIED SITE, UNSPECIFIED RHEUMATOID FACTOR PRESENCE: Primary | ICD-10-CM

## 2020-05-12 DIAGNOSIS — K70.31 ALCOHOLIC CIRRHOSIS OF LIVER WITH ASCITES (HCC): ICD-10-CM

## 2020-05-12 DIAGNOSIS — C90.00 MULTIPLE MYELOMA, REMISSION STATUS UNSPECIFIED (HCC): ICD-10-CM

## 2020-05-12 DIAGNOSIS — Z79.899 LONG-TERM USE OF HIGH-RISK MEDICATION: ICD-10-CM

## 2020-05-12 DIAGNOSIS — M25.551 HIP PAIN, RIGHT: ICD-10-CM

## 2020-05-12 DIAGNOSIS — M06.9 RHEUMATOID ARTHRITIS, INVOLVING UNSPECIFIED SITE, UNSPECIFIED RHEUMATOID FACTOR PRESENCE: ICD-10-CM

## 2020-05-12 PROCEDURE — 99214 OFFICE O/P EST MOD 30 MIN: CPT | Performed by: INTERNAL MEDICINE

## 2020-05-12 PROCEDURE — 73502 X-RAY EXAM HIP UNI 2-3 VIEWS: CPT

## 2020-05-12 RX ORDER — PREDNISONE 1 MG/1
TABLET ORAL
Qty: 45 TABLET | Refills: 0 | Status: SHIPPED | OUTPATIENT
Start: 2020-05-12 | End: 2020-06-09

## 2020-05-12 NOTE — PROGRESS NOTES
HPI:  The patient is a 50-year-old male who suffers from rheumatoid arthritis.  He was seen in the office 12/12/2019.  At that time, the patient was sent to the hematology oncology in evaluation of possible multiple myeloma.  The patient was seen February 24, 2020 by his hematologist oncologist, as per the records, IgG kappa multiple myeloma was diagnosed.  He continues under evaluation and monitoring.  His next follow-up appointment with hematology oncology will be next month Torie 10, 2020.  As per the patient, there was no contraindication as per hematology to restart her on Enbrel.    Since the last time I saw him, the patient was referred to [facility], hand orthopedic doctor and had received multiple injections in the right hand, the last one being approximately 4 weeks ago.  He refers that he did not have any relief from the corticosteroid injections, he will undergo a EMG nerve conduction test this coming Thursday.  He claims that his right hand is the most painful area, his right thumb particularly is achy constantly, pain is rated 9 out of 10, he has stiffness that lasts most of the days.  He feels like he is 16 again because that is when he felt his worst of his rheumatoid arthritis.    Review of systems negative for fever or chills, he has gained a few pounds last time I saw him, denies oral nasal ulcers, no nausea, vomiting, diarrhea constipation, no chest pain or shortness of breath, there is no cough.  All other systems reviewed and they were negative.    Laboratories dated 3/26/2020 show ESR of 0.92 ESR of 42, CBC for the most part unremarkable, normal creatinine and slight elevation in alkaline phosphatase.            Past Medical History:   Diagnosis Date   • Acute perforated gastric ulcer with hemorrhage (CMS/HCC) 07/16/2018   • Alcoholism (CMS/HCC)    • Allergic rhinitis    • Arthritis     rheumatoid (diagnosed at age 16)   • Cirrhosis (CMS/HCC)     etoh cirrhosis-liver failure   • CKD  (chronic kidney disease)    • COPD (chronic obstructive pulmonary disease) (CMS/[facility])    • Depression    • Duodenal ulcer      perforated duodenal s/p patch   • History of transfusion    • Infectious viral hepatitis    • MRSA (methicillin resistant Staphylococcus aureus) carrier 06/12/2019    nasal swab   • Tumors      benign essential   • Type 2 diabetes mellitus with hyperglycemia (CMS/[facility]) 1/8/2020   • Umbilical hernia u   • Wound, open     nonhealing sore       Current Outpatient Medications   Medication Sig Dispense Refill   • ACCU-CHEK FASTCLIX LANCETS misc 1 each 4 (Four) Times a Day Before Meals & at Bedtime. 204 each 0   • albuterol sulfate  (90 Base) MCG/ACT inhaler Inhale 2 puffs Every 4 (Four) Hours As Needed for Wheezing.     • Azelastine HCl 137 MCG/SPRAY solution USE 2 SPRAYS EACH NOSTRIL TWICE A DAY 90 spray 0   • bumetanide (BUMEX) 2 MG tablet Take 2 mg by mouth 2 (Two) Times a Day.     • EPINEPHrine (EPIPEN) 0.3 MG/0.3ML solution auto-injector injection Inject 0.3 mg under the skin into the appropriate area as directed 1 (One) Time As Needed.     • famotidine (PEPCID) 20 MG tablet TAKE 1 TABLET BY MOUTH EVERY DAY 90 tablet 0   • glucose blood (ACCU-CHEK GRAZYNA PLUS) test strip Use as instructed four times a day and prn E11.9 600 each 12   • glucose monitor monitoring kit 1 each 4 (Four) Times a Day Before Meals & at Bedtime. 1 each 0   • insulin detemir (LEVEMIR) 100 UNIT/ML injection INJECT 20 UNITS UNDER THE SKIN INTO THE APPROPRIATE AREA AS DIRECTED DAILY. 6 pen 2   • Insulin Pen Needle (PEN NEEDLES) 32G X 4 MM misc 1 each 4 (Four) Times a Day Before Meals & at Bedtime. 360 each 1   • Isopropyl Alcohol (ALCOHOL WIPES) 70 % misc Apply 1 each topically 4 (Four) Times a Day Before Meals & at Bedtime. 200 each 0   • lactulose (CHRONULAC) 10 GM/15ML solution Take 30 mL by mouth 2 (Two) Times a Day. 150 mL 0   • magnesium oxide (MAG-OX) 400 MG tablet Take 2 tablets by mouth 2 (Two) Times a Day.   6   • meclizine 25 MG chewable tablet chewable tablet Chew 25-50 mg 3 (Three) Times a Day As Needed.     • metFORMIN ER (GLUCOPHAGE-XR) 500 MG 24 hr tablet TAKE 1 TABLET BY MOUTH EVERY DAY WITH BREAKFAST 30 tablet 1   • montelukast (SINGULAIR) 10 MG tablet Take 10 mg by mouth Every Night.     • NOVOLOG FLEXPEN 100 UNIT/ML solution pen-injector sc pen Inject 2 Units under the skin into the appropriate area as directed 3 (Three) Times a Day With Meals. 3 pen 3   • ondansetron (ZOFRAN) 8 MG tablet Take 8 mg by mouth Every 8 (Eight) Hours As Needed for Nausea or Vomiting.     • potassium chloride (K-DUR,KLOR-CON) 20 MEQ CR tablet Take 40 mEq by mouth 3 (Three) Times a Day.     • predniSONE (DELTASONE) 5 MG tablet Take 1.5 tablets by mouth Daily. 45 tablet 0   • promethazine (PHENERGAN) 12.5 MG tablet Take 12.5 mg by mouth Every 6 (Six) Hours As Needed for Nausea or Vomiting.     • senna (SENOKOT) 8.6 MG tablet Take 1 tablet by mouth 2 (Two) Times a Day As Needed for Constipation. 30 tablet 0   • spironolactone (ALDACTONE) 25 MG tablet Take 25 mg by mouth Daily.     • tamsulosin (FLOMAX) 0.4 MG capsule 24 hr capsule Take 1 capsule by mouth 2 (Two) Times a Day.     • Triamcinolone Acetonide (NASACORT) 55 MCG/ACT nasal inhaler 1 spray into the nostril(s) as directed by provider Daily.     • vitamin D (ERGOCALCIFEROL) 47160 units capsule capsule Take 50,000 Units by mouth Every 14 (Fourteen) Days. Every other Sunday     • XIFAXAN 550 MG tablet TAKE 1 TABLET BY MOUTH EVERY 12 HOURS 180 tablet 1   • zinc sulfate (ZINCATE) 220 (50 Zn) MG capsule Take 220 mg by mouth Daily.  3     No current facility-administered medications for this visit.        Physical exam:    There were no vitals filed for this visit.  GENERAL: Well-developed, well-nourished in no acute distress. Alert and oriented x3.  HEENT: Normocephalic, atraumatic. Pupils are equal, round, and reactive to light. Extraocular muscles are intact. Mucous membranes are  pink and moist. Nostrils are clear.   NECK: Supple without lymphadenopathy.  LUNGS: Clear to auscultation bilaterally.  HEART: Regular rate and rhythm without murmur, rub or gallop.  CHEST: Respirations easy and unlabored.  EXTREMITIES: No cyanosis, edema or clubbing.  SKIN: Warm, dry and intact.  MSK: Chronic deformities unchanged.  There is tenderness in the elbows.  Tenderness and the second third and fourth MCP joints of both hands, tenderness in the first MCP joint and CMC joint on the right.  Synovitis noted in both elbows.  There is tenderness and swelling noted in the left wrist.  There is no major tenderness in the trochanteric bursa bilaterally.  Roll log test is negative.  Bogoie test elicits tenderness to the groin area on the right.    Assessment:  1.  Rheumatoid arthritis, seronegative.  Deteriorated.  He has signs of high disease activity.  We will give her a tapering dose of prednisone and restart him on Enbrel.    2.  Long-term high-risk medications.  Patient is on medications for management of inflammatory arthritis.  I am monitoring for side effects.  TB test to be updated.    Cancer screening:  Colonoscopy: 2020    Breast U/S; 12/28/18        Bone health: Calcium and vitamin D:  NO        DXA Scan:  12/14/18  Vaccines: Flu: 2019       PNA 23:  12/12/18      Shingles:  Zoster; 1/9/19   X-rays of the chest; 12/14/18 Hands;  NO  Feet:  12/14/18  Hepatitis panel, HIV, QTB/PPD: HEP, HIV and TB 12/10/18    3.  IgG kappa multiple myeloma, managed by hematology oncology.  Has an upcoming appointment this Torie 10, 2020.    4.  Cirrhosis of the liver, secondary to alcoholism. No hepatis B as per the patient.    5. Hip pain. New. Secondary to OA.     Plan:  Update TB test.  Resume Enbrel 50 mg subcutaneously once a week  prednisone 15 mg p.o. daily for 1 week and then continue with 10 mg p.o. daily for 1 week then continue with 5 mg p.o. daily for 1 week and then decrease to 5 mg every other day and then  stop  Continue with Pepsis for GI protection  Monitor BP and Glycemia regularly  Continue to follow with hematology oncology  X-ray of the right hip to be done  RTC 3 months or sooner if needed.

## 2020-05-13 DIAGNOSIS — M06.9 RHEUMATOID ARTHRITIS, INVOLVING UNSPECIFIED SITE, UNSPECIFIED RHEUMATOID FACTOR PRESENCE: Primary | ICD-10-CM

## 2020-05-14 DIAGNOSIS — M06.00 SERONEGATIVE RHEUMATOID ARTHRITIS (HCC): Primary | ICD-10-CM

## 2020-05-20 ENCOUNTER — TELEPHONE (OUTPATIENT)
Dept: FAMILY MEDICINE CLINIC | Facility: CLINIC | Age: 50
End: 2020-05-20

## 2020-05-20 NOTE — TELEPHONE ENCOUNTER
Cannot reach Medicaid because no extension was left.  There is no option to speak with someone without an extension.  So I called patient to inform him we had already done a referral to [facility] and wife said that [facility] is not taking new patients and, therefore, won't see him.  He needs a referral to a Rheumatologist in the area that takes his secondary insurance Medicaid.  Wife said she was told there was not a Rheumatologist that takes his Medicaid but she wants us to find one.  I told her someone would get back with them.  Can you refer to a Rheumatologist that takes both of his insurances?

## 2020-05-20 NOTE — TELEPHONE ENCOUNTER
[employer] CALLED WANTING TO KNOW IF REFERRAL CAN BE SENT TO SEE A RHEUMATOLOGIST. THE CURRENT ONE PT IS SEEING IS MOVING. [facility] IS SENDING OVER A FAX TO THE OFFICE.     PLEASE ADVISE

## 2020-05-21 NOTE — TELEPHONE ENCOUNTER
There is not a Rheumatologist in the area that takes Medicaid [facility] doesn't and neither does [facility]'t [facility] a [facility]ology dept anymore. So the only place that I could think of would be around [facility].

## 2020-05-21 NOTE — TELEPHONE ENCOUNTER
Gave message to patient's wife at 9:03am.  She will speak with patient's  and call us back to let us know if he is willing to go to [facility].

## 2020-05-26 RX ORDER — MONTELUKAST SODIUM 10 MG/1
TABLET ORAL
Qty: 90 TABLET | Refills: 1 | Status: SHIPPED | OUTPATIENT
Start: 2020-05-26 | End: 2020-09-02

## 2020-06-05 ENCOUNTER — LAB (OUTPATIENT)
Dept: LAB | Facility: HOSPITAL | Age: 50
End: 2020-06-05

## 2020-06-05 ENCOUNTER — APPOINTMENT (OUTPATIENT)
Dept: PET IMAGING | Facility: HOSPITAL | Age: 50
End: 2020-06-05

## 2020-06-05 DIAGNOSIS — R79.9 ABNORMAL BLOOD FINDINGS: ICD-10-CM

## 2020-06-05 DIAGNOSIS — E61.1 IRON DEFICIENCY: Primary | ICD-10-CM

## 2020-06-05 DIAGNOSIS — Z00.00 ROUTINE GENERAL MEDICAL EXAMINATION AT A HEALTH CARE FACILITY: ICD-10-CM

## 2020-06-05 DIAGNOSIS — K70.31 ALCOHOLIC CIRRHOSIS OF LIVER WITH ASCITES (HCC): ICD-10-CM

## 2020-06-08 ENCOUNTER — HOSPITAL ENCOUNTER (OUTPATIENT)
Dept: PET IMAGING | Facility: HOSPITAL | Age: 50
Discharge: HOME | End: 2020-06-08
Admitting: NURSE PRACTITIONER

## 2020-06-08 DIAGNOSIS — D47.2 MGUS (MONOCLONAL GAMMOPATHY OF UNKNOWN SIGNIFICANCE): ICD-10-CM

## 2020-06-08 DIAGNOSIS — R59.0 LOCALIZED ENLARGED LYMPH NODES: ICD-10-CM

## 2020-06-08 DIAGNOSIS — M16.0 OSTEOARTHRITIS OF BOTH HIPS, UNSPECIFIED OSTEOARTHRITIS TYPE: Primary | ICD-10-CM

## 2020-06-08 LAB — CREAT BLDA-MCNC: 1.1 MG/DL (ref 0.6–1.3)

## 2020-06-08 PROCEDURE — 71260 CT THORAX DX C+: CPT

## 2020-06-08 PROCEDURE — 0 IOPAMIDOL PER 1 ML: Performed by: NURSE PRACTITIONER

## 2020-06-08 PROCEDURE — 82565 ASSAY OF CREATININE: CPT

## 2020-06-08 RX ADMIN — IOPAMIDOL 100 ML: 755 INJECTION, SOLUTION INTRAVENOUS at 09:15

## 2020-06-09 ENCOUNTER — OFFICE VISIT (OUTPATIENT)
Dept: ORTHOPEDIC SURGERY | Facility: CLINIC | Age: 50
End: 2020-06-09

## 2020-06-09 ENCOUNTER — TELEPHONE (OUTPATIENT)
Dept: ONCOLOGY | Facility: CLINIC | Age: 50
End: 2020-06-09

## 2020-06-09 VITALS
WEIGHT: 264 LBS | BODY MASS INDEX: 37.8 KG/M2 | SYSTOLIC BLOOD PRESSURE: 112 MMHG | HEART RATE: 81 BPM | DIASTOLIC BLOOD PRESSURE: 77 MMHG | HEIGHT: 70 IN

## 2020-06-09 DIAGNOSIS — M16.11 PRIMARY OSTEOARTHRITIS OF RIGHT HIP: Primary | ICD-10-CM

## 2020-06-09 DIAGNOSIS — E66.01 MORBID OBESITY (HCC): ICD-10-CM

## 2020-06-09 LAB
ALBUMIN 24H MFR UR ELPH: 12.1 %
ALPHA1 GLOB 24H MFR UR ELPH: 4.4 %
ALPHA2 GLOB 24H MFR UR ELPH: 12.6 %
B-GLOBULIN MFR UR ELPH: 21.4 %
GAMMA GLOB 24H MFR UR ELPH: 49.6 %
HIV 1 & 2 AB SER-IMP: ABNORMAL
INTERPRETATION UR IFE-IMP: ABNORMAL
M PROTEIN 24H MFR UR ELPH: ABNORMAL %
PROT 24H UR-MRATE: <180 MG/24 HR (ref 30–150)
PROT UR-MCNC: <4 MG/DL

## 2020-06-09 PROCEDURE — 99203 OFFICE O/P NEW LOW 30 MIN: CPT | Performed by: PHYSICIAN ASSISTANT

## 2020-06-09 NOTE — PROGRESS NOTES
ORTHOPEDIC VISIT    Referring Provider: Carlita Shepherd MD  Primary Care Provider: Carlita Shepherd MD         Subjective:  Chief Complaint:  Chief Complaint   Patient presents with   • Right Hip - Initial Evaluation, Pain     X several years, nki       HPI:  Mc Selby is a 50 y.o. male who presents for his initial visit for right hip pain ongoing for many years.  He reports that the pain is increased in the last 6 to 8 months since he has stopped using alcohol.  He reports a history of rheumatoid arthritis.  He denies any specific injury.  He describes a sharp, shooting pain that radiates down to his knee, also with numbness and tingling.  His pain is mostly located in the groin.  His pain does increase with weightbearing.  He is currently on prednisone and Enbrel.  He reports that he is unable to take NSAIDs due to his kidneys and liver.  He denies any history of previous surgery or injections in the hip.  He reports he is currently being evaluated for multiple myeloma.  Referred for consultation by Dr. Shepherd.    Past Medical History:   Diagnosis Date   • Acute perforated gastric ulcer with hemorrhage (CMS/HCC) 07/16/2018   • Alcoholism (CMS/HCC)    • Allergic rhinitis    • Arthritis     rheumatoid (diagnosed at age 16)   • Cirrhosis (CMS/HCC)     etoh cirrhosis-liver failure   • CKD (chronic kidney disease)    • COPD (chronic obstructive pulmonary disease) (CMS/HCC)    • Depression    • Duodenal ulcer      perforated duodenal s/p patch   • History of transfusion    • Infectious viral hepatitis    • MRSA (methicillin resistant Staphylococcus aureus) carrier 06/12/2019    nasal swab   • Tumors      benign essential   • Type 2 diabetes mellitus with hyperglycemia (CMS/HCC) 1/8/2020   • Umbilical hernia u   • Wound, open     nonhealing sore       Past Surgical History:   Procedure Laterality Date   • CYST REMOVAL Left     forarm   • ENDOSCOPY      ascities/paracentesis   • EXPLORATORY LAPAROTOMY   2018    Over-sew Gastric Ulcer With Gastrostomy and Jejunostomy Tube   • FOOT SURGERY Right     right bunion removal   • FRACTURE SURGERY Right 2005    bunionectomy/ broke toe and put in rods   • MASS EXCISION Right 2019    Procedure: EXCISION OF SEBACEOUS CYST OF THE RIGHT BACK;  Surgeon: Sapna Elizalde MD;  Location: [facility] MAIN OR;  Service: General   • MASS EXCISION Right 2019    Procedure: EXCISION LESION of right back;  Surgeon: Sapna Elizalde MD;  Location: [facility] MAIN OR;  Service: General   • UMBILICAL HERNIA REPAIR N/A 2019    Procedure: UMBILICAL HERNIA REPAIR LAPAROSCOPIC WITH MESH WITH EXTENSIVE LYSIS OF ADHESIONS;  Surgeon: Sapna Elizalde MD;  Location: [facility] MAIN OR;  Service: General       Family History   Problem Relation Age of Onset   • Cancer Mother    • Lung disease Father    • Other Father         prostate problems   • Mental illness Brother        Social History     Occupational History   • Not on file   Tobacco Use   • Smoking status: Former Smoker     Types: Cigarettes     Last attempt to quit: 2017     Years since quittin.8   • Smokeless tobacco: Former User   Substance and Sexual Activity   • Alcohol use: No     Frequency: Never     Comment: Off alcohol since 2018   • Drug use: No   • Sexual activity: Defer        Medications:    Current Outpatient Medications:   •  ACCU-CHEK FASTCLIX LANCETS misc, 1 each 4 (Four) Times a Day Before Meals & at Bedtime., Disp: 204 each, Rfl: 0  •  albuterol sulfate  (90 Base) MCG/ACT inhaler, Inhale 2 puffs Every 4 (Four) Hours As Needed for Wheezing., Disp: , Rfl:   •  Azelastine HCl 137 MCG/SPRAY solution, USE 2 SPRAYS EACH NOSTRIL TWICE A DAY, Disp: 90 spray, Rfl: 0  •  bumetanide (BUMEX) 2 MG tablet, Take 2 mg by mouth 2 (Two) Times a Day., Disp: , Rfl:   •  EPINEPHrine (EPIPEN) 0.3 MG/0.3ML solution auto-injector injection, Inject 0.3 mg under the skin into the appropriate area as directed 1 (One) Time As Needed.,  Disp: , Rfl:   •  Etanercept (Enbrel Mini) 50 MG/ML solution cartridge, Inject 50 mg under the skin into the appropriate area as directed 1 (One) Time Per Week., Disp: 12 Cartridge, Rfl: 0  •  famotidine (PEPCID) 20 MG tablet, TAKE 1 TABLET BY MOUTH EVERY DAY, Disp: 90 tablet, Rfl: 0  •  glucose blood (ACCU-CHEK GRAZYNA PLUS) test strip, Use as instructed four times a day and prn E11.9, Disp: 600 each, Rfl: 12  •  glucose monitor monitoring kit, 1 each 4 (Four) Times a Day Before Meals & at Bedtime., Disp: 1 each, Rfl: 0  •  insulin detemir (LEVEMIR) 100 UNIT/ML injection, INJECT 20 UNITS UNDER THE SKIN INTO THE APPROPRIATE AREA AS DIRECTED DAILY., Disp: 6 pen, Rfl: 2  •  Insulin Pen Needle (PEN NEEDLES) 32G X 4 MM misc, 1 each 4 (Four) Times a Day Before Meals & at Bedtime., Disp: 360 each, Rfl: 1  •  Isopropyl Alcohol (ALCOHOL WIPES) 70 % misc, Apply 1 each topically 4 (Four) Times a Day Before Meals & at Bedtime., Disp: 200 each, Rfl: 0  •  lactulose (CHRONULAC) 10 GM/15ML solution, Take 30 mL by mouth 2 (Two) Times a Day., Disp: 150 mL, Rfl: 0  •  magnesium oxide (MAG-OX) 400 MG tablet, Take 2 tablets by mouth 2 (Two) Times a Day., Disp: , Rfl: 6  •  meclizine 25 MG chewable tablet chewable tablet, Chew 25-50 mg 3 (Three) Times a Day As Needed., Disp: , Rfl:   •  montelukast (SINGULAIR) 10 MG tablet, TAKE 1 TABLET BY MOUTH EVERY DAY EVERY NIGHT, Disp: 90 tablet, Rfl: 1  •  NOVOLOG FLEXPEN 100 UNIT/ML solution pen-injector sc pen, Inject 2 Units under the skin into the appropriate area as directed 3 (Three) Times a Day With Meals., Disp: 3 pen, Rfl: 3  •  ondansetron (ZOFRAN) 8 MG tablet, Take 8 mg by mouth Every 8 (Eight) Hours As Needed for Nausea or Vomiting., Disp: , Rfl:   •  potassium chloride (K-DUR,KLOR-CON) 20 MEQ CR tablet, Take 40 mEq by mouth 3 (Three) Times a Day., Disp: , Rfl:   •  predniSONE (DELTASONE) 5 MG tablet, Take 3 tablets by mouth Daily for 7 days, THEN 2 tablets Daily for 7 days, THEN 1  "tablet Daily for 7 days, THEN 1 tablet Every Other Day for 7 days., Disp: 45 tablet, Rfl: 0  •  promethazine (PHENERGAN) 12.5 MG tablet, Take 12.5 mg by mouth Every 6 (Six) Hours As Needed for Nausea or Vomiting., Disp: , Rfl:   •  senna (SENOKOT) 8.6 MG tablet, Take 1 tablet by mouth 2 (Two) Times a Day As Needed for Constipation., Disp: 30 tablet, Rfl: 0  •  spironolactone (ALDACTONE) 25 MG tablet, Take 25 mg by mouth Daily., Disp: , Rfl:   •  tamsulosin (FLOMAX) 0.4 MG capsule 24 hr capsule, Take 1 capsule by mouth 2 (Two) Times a Day., Disp: , Rfl:   •  Triamcinolone Acetonide (NASACORT) 55 MCG/ACT nasal inhaler, 1 spray into the nostril(s) as directed by provider Daily., Disp: , Rfl:   •  vitamin D (ERGOCALCIFEROL) 07436 units capsule capsule, Take 50,000 Units by mouth Every 14 (Fourteen) Days. Every other Sunday, Disp: , Rfl:   •  XIFAXAN 550 MG tablet, TAKE 1 TABLET BY MOUTH EVERY 12 HOURS, Disp: 180 tablet, Rfl: 1  •  zinc sulfate (ZINCATE) 220 (50 Zn) MG capsule, Take 220 mg by mouth Daily., Disp: , Rfl: 3    Allergies:  Allergies   Allergen Reactions   • Duloxetine Rash         Review of Systems:  Gen -no fever, chills , sweats, headache   Eyes - no irritation or discharge   ENT -  no ear pain , runny nose , sore throat , difficulty swallowing   Resp - no cough , congestion , excessive expectoration   CVS - no chest pain , palpitations.   Abd - no pain , nausea , vomiting , diarrhea   Skin - no rash , lesions.   Neuro - no dizziness    Please see HPI for any other pertinent positives.  All other systems were reviewed and are negative.       Objective   Objective:    /77 (BP Location: Right arm, Patient Position: Sitting, Cuff Size: Large Adult)   Pulse 81   Ht 177.8 cm (70\")   Wt 120 kg (264 lb)   BMI 37.88 kg/m²     Physical Examination:  Alert, oriented, morbidly obese individual in no acute distress, ambulating unassisted  Right lower extremity shows no appreciable swelling in the thigh area. " It is grossly well aligned, and the patient is neurovascularly intact distally. Plantar and dorsiflexion is 5/5. There is no tenderness to palpation but significant pain with range of motion, which is significantly decreased with internal rotation.         Imaging:  XR - 1 Result   I have personally reviewed   Results for orders placed during the hospital encounter of 05/12/20   XR HIP W OR WO PELVIS 2-3 VIEW RIGHT 5/12/2020    and my interpretation of the image is as follows: Poor image quality.  Moderate to severe DJD of the right hip with decreased joint space.  Concerning for avascular necrosis.  No fractures or dislocations are appreciated.            Assessment:  1. Primary osteoarthritis of right hip    2. Morbid obesity (CMS/HCC)                 Plan:  The patient has multiple risk factors for avascular necrosis and his x-rays are concerning for this as well.  At this time, I am recommending an MRI for further investigation.  Weight loss is highly recommended.  Further recommendations will be pending.  He should follow-up with Dr. Donovan after his MRI.  Natural history and expected course discussed. Questions answered.  Educational materials distributed.  OTC analgesics as needed.  MRI.  weight loss  activtiy modification  assistive devices             TONI Jefferson  06/09/20  15:22

## 2020-06-09 NOTE — PATIENT INSTRUCTIONS
"Osteoarthritis    Osteoarthritis is a type of arthritis that affects tissue that covers the ends of bones in joints (cartilage). Cartilage acts as a cushion between the bones and helps them move smoothly. Osteoarthritis results when cartilage in the joints gets worn down. Osteoarthritis is sometimes called \"wear and tear\" arthritis.  Osteoarthritis is the most common form of arthritis. It often occurs in older people. It is a condition that gets worse over time (a progressive condition). Joints that are most often affected by this condition are in:  · Fingers.  · Toes.  · Hips.  · Knees.  · Spine, including neck and lower back.  What are the causes?  This condition is caused by age-related wearing down of cartilage that covers the ends of bones.  What increases the risk?  The following factors may make you more likely to develop this condition:  · Older age.  · Being overweight or obese.  · Overuse of joints, such as in athletes.  · Past injury of a joint.  · Past surgery on a joint.  · Family history of osteoarthritis.  What are the signs or symptoms?  The main symptoms of this condition are pain, swelling, and stiffness in the joint. The joint may lose its shape over time. Small pieces of bone or cartilage may break off and float inside of the joint, which may cause more pain and damage to the joint. Small deposits of bone (osteophytes) may grow on the edges of the joint. Other symptoms may include:  · A grating or scraping feeling inside the joint when you move it.  · Popping or creaking sounds when you move.  Symptoms may affect one or more joints. Osteoarthritis in a major joint, such as your knee or hip, can make it painful to walk or exercise. If you have osteoarthritis in your hands, you might not be able to  items, twist your hand, or control small movements of your hands and fingers (fine motor skills).  How is this diagnosed?  This condition may be diagnosed based on:  · Your medical history.  · A " physical exam.  · Your symptoms.  · X-rays of the affected joint(s).  · Blood tests to rule out other types of arthritis.  How is this treated?  There is no cure for this condition, but treatment can help to control pain and improve joint function. Treatment plans may include:  · A prescribed exercise program that allows for rest and joint relief. You may work with a physical therapist.  · A weight control plan.  · Pain relief techniques, such as:  ? Applying heat and cold to the joint.  ? Electric pulses delivered to nerve endings under the skin (transcutaneous electrical nerve stimulation, or TENS).  ? Massage.  ? Certain nutritional supplements.  · NSAIDs or prescription medicines to help relieve pain.  · Medicine to help relieve pain and inflammation (corticosteroids). This can be given by mouth (orally) or as an injection.  · Assistive devices, such as a brace, wrap, splint, specialized glove, or cane.  · Surgery, such as:  ? An osteotomy. This is done to reposition the bones and relieve pain or to remove loose pieces of bone and cartilage.  ? Joint replacement surgery. You may need this surgery if you have very bad (advanced) osteoarthritis.  Follow these instructions at home:  Activity  · Rest your affected joints as directed by your health care provider.  · Do not drive or use heavy machinery while taking prescription pain medicine.  · Exercise as directed. Your health care provider or physical therapist may recommend specific types of exercise, such as:  ? Strengthening exercises. These are done to strengthen the muscles that support joints that are affected by arthritis. They can be performed with weights or with exercise bands to add resistance.  ? Aerobic activities. These are exercises, such as brisk walking or water aerobics, that get your heart pumping.  ? Range-of-motion activities. These keep your joints easy to move.  ? Balance and agility exercises.  Managing pain, stiffness, and swelling          · If directed, apply heat to the affected area as often as told by your health care provider. Use the heat source that your health care provider recommends, such as a moist heat pack or a heating pad.  ? If you have a removable assistive device, remove it as told by your health care provider.  ? Place a towel between your skin and the heat source. If your health care provider tells you to keep the assistive device on while you apply heat, place a towel between the assistive device and the heat source.  ? Leave the heat on for 20-30 minutes.  ? Remove the heat if your skin turns bright red. This is especially important if you are unable to feel pain, heat, or cold. You may have a greater risk of getting burned.  · If directed, put ice on the affected joint:  ? If you have a removable assistive device, remove it as told by your health care provider.  ? Put ice in a plastic bag.  ? Place a towel between your skin and the bag. If your health care provider tells you to keep the assistive device on during icing, place a towel between the assistive device and the bag.  ? Leave the ice on for 20 minutes, 2-3 times a day.  General instructions  · Take over-the-counter and prescription medicines only as told by your health care provider.  · Maintain a healthy weight. Follow instructions from your health care provider for weight control. These may include dietary restrictions.  · Do not use any products that contain nicotine or tobacco, such as cigarettes and e-cigarettes. These can delay bone healing. If you need help quitting, ask your health care provider.  · Use assistive devices as directed by your health care provider.  · Keep all follow-up visits as told by your health care provider. This is important.  Where to find more information  · National [place] of Arthritis and Musculoskeletal and Skin Diseases: www.niams.nih.gov  · [employer] [place] on Aging: www.sandie.nih.gov  · American College of Rheumatology:  www.rheumatology.org  Contact a health care provider if:  · Your skin turns red.  · You develop a rash.  · You have pain that gets worse.  · You have a fever along with joint or muscle aches.  Get help right away if:  · You lose a lot of weight.  · You suddenly lose your appetite.  · You have night sweats.  Summary  · Osteoarthritis is a type of arthritis that affects tissue covering the ends of bones in joints (cartilage).  · This condition is caused by age-related wearing down of cartilage that covers the ends of bones.  · The main symptom of this condition is pain, swelling, and stiffness in the joint.  · There is no cure for this condition, but treatment can help to control pain and improve joint function.  This information is not intended to replace advice given to you by your health care provider. Make sure you discuss any questions you have with your health care provider.  Document Released: 12/18/2006 Document Revised: 11/30/2018 Document Reviewed: 08/21/2017  [employer] Patient Education © 2020 [employer]      Preventing Health Risks of Being Overweight  Maintaining a healthy body weight is an important part of your overall health. Your healthy body weight depends on your age, gender, and height. Being overweight puts you at risk for many health problems, including:  · Heart disease.  · Diabetes.  · Problems sleeping.  · Joint problems.  You can make changes to your diet and lifestyle to prevent these risks. Consider working with a health care provider or a dietitian to make these changes.  What nutrition changes can be made?    · Eat only as much as your body needs. In most cases, this is about 2,000 calories a day, but the amount varies depending on your height, gender, and activity level. Ask your health care provider how many calories you should have each day. Eating more than your body needs on a regular basis can cause you to become overweight or obese.  · Eat slowly, and stop eating when you feel  full.  · Choose healthy foods, including:  ? Fruits and vegetables.  ? Lean meats.  ? Low-fat dairy products.  ? High-fiber foods, such as whole grains and beans.  ? Healthy snacks like vegetable sticks, a piece of fruit, or a small amount of yogurt or cheese.  · Avoid foods and drinks that are high in sugar, salt (sodium), saturated fat, or trans fat. This includes:  ? Many desserts such as candy, cookies, and ice cream.  ? Soda.  ? Fried foods.  ? Processed meats such as hot dogs or lunch meats.  ? Prepackaged snack foods.  What lifestyle changes can be made?    · Exercise for at least 150 minutes a week to prevent weight gain, or as often as recommended by your health care provider. Do moderate-intensity exercise, such as brisk walking.  ? Spread it out by exercising for 30 minutes 5 days a week, or in short 10-minute bursts several times a day.  · Find other ways to stay active and burn calories, such as yard work or a hobby that involves physical activity.  · Get at least 8 hours of sleep each night. When you are well-rested, you are more likely to be active and make healthy choices during the day. To sleep better:  ? Try to go to bed and wake up at about the same time every day.  ? Keep your bedroom dark, quiet, and cool.  ? Make sure that your bed is comfortable.  ? Avoid stimulating activities, such as watching television or exercising, for at least one hour before bedtime.  Why are these changes important?  Eating healthy and being active helps you lose weight and prevent health problems caused by being overweight. Making these changes can also help you manage stress, feel better mentally, and connect with friends and family.  What can happen if changes are not made?  Being overweight can affect you for your entire life. You may develop joint or bone problems that make it painful or difficult for you to play sports or do activities you enjoy. Being overweight puts stress on your heart and lungs and can lead  to medical problems like diabetes, heart disease, and sleeping problems.  Where to find support  You can get support for preventing health risks of being overweight from:  · Your health care provider or a dietitian. They can provide guidance about healthy eating and healthy lifestyle choices.  · Weight loss support groups, online or in-person.  Where to find more information  · MyPlate: www.choosemyplate.gov  ? This an online tool that provides personalized recommendations about foods to eat each day.  · The Centers for Disease Control and Prevention: www.cdc.gov/healthyweight  ? This resource gives tips for managing weight and having an active lifestyle.  Summary  · To prevent unhealthy weight gain, it is important to maintain a healthy diet high in vegetables and whole grains, exercise regularly, and get at least 8 hours of sleep each night.  · Making these changes helps prevent many long-term (chronic) health conditions that can shorten your life, such as diabetes, heart disease, and stroke.  This information is not intended to replace advice given to you by your health care provider. Make sure you discuss any questions you have with your health care provider.  Document Released: 11/14/2018 Document Revised: 12/21/2018 Document Reviewed: 11/14/2018  [employer] Patient Education © 2020 [employer]

## 2020-06-10 ENCOUNTER — OFFICE VISIT (OUTPATIENT)
Dept: ONCOLOGY | Facility: CLINIC | Age: 50
End: 2020-06-10

## 2020-06-10 ENCOUNTER — APPOINTMENT (OUTPATIENT)
Dept: LAB | Facility: HOSPITAL | Age: 50
End: 2020-06-10

## 2020-06-10 VITALS
WEIGHT: 264 LBS | HEART RATE: 93 BPM | TEMPERATURE: 98.4 F | HEIGHT: 70 IN | SYSTOLIC BLOOD PRESSURE: 124 MMHG | RESPIRATION RATE: 16 BRPM | BODY MASS INDEX: 37.8 KG/M2 | DIASTOLIC BLOOD PRESSURE: 91 MMHG

## 2020-06-10 DIAGNOSIS — C90.00 MULTIPLE MYELOMA, REMISSION STATUS UNSPECIFIED (HCC): Primary | ICD-10-CM

## 2020-06-10 LAB
ALBUMIN SERPL-MCNC: 3.8 G/DL (ref 3.5–5.2)
ALBUMIN/GLOB SERPL: 0.8 G/DL
ALP SERPL-CCNC: 136 U/L (ref 39–117)
ALT SERPL W P-5'-P-CCNC: 21 U/L (ref 1–41)
ANION GAP SERPL CALCULATED.3IONS-SCNC: 12 MMOL/L (ref 5–15)
AST SERPL-CCNC: 25 U/L (ref 1–40)
B2 MICROGLOB SERPL-MCNC: 3.1 MG/L (ref 0.8–2.2)
BASOPHILS # BLD AUTO: 0.06 10*3/MM3 (ref 0–0.2)
BASOPHILS NFR BLD AUTO: 0.8 % (ref 0–1.5)
BILIRUB SERPL-MCNC: 1.2 MG/DL (ref 0.2–1.2)
BUN BLD-MCNC: ABNORMAL MG/DL
BUN/CREAT SERPL: ABNORMAL
CALCIUM SPEC-SCNC: 9.7 MG/DL (ref 8.6–10.5)
CHLORIDE SERPL-SCNC: 101 MMOL/L (ref 98–107)
CO2 SERPL-SCNC: 23 MMOL/L (ref 22–29)
CREAT BLD-MCNC: 1.04 MG/DL (ref 0.76–1.27)
DEPRECATED RDW RBC AUTO: 48.1 FL (ref 37–54)
EOSINOPHIL # BLD AUTO: 0.5 10*3/MM3 (ref 0–0.4)
EOSINOPHIL NFR BLD AUTO: 6.9 % (ref 0.3–6.2)
ERYTHROCYTE [DISTWIDTH] IN BLOOD BY AUTOMATED COUNT: 14.9 % (ref 12.3–15.4)
GFR SERPL CREATININE-BSD FRML MDRD: 75 ML/MIN/1.73
GLOBULIN UR ELPH-MCNC: 4.5 GM/DL
GLUCOSE BLD-MCNC: 213 MG/DL (ref 65–99)
HCT VFR BLD AUTO: 46.4 % (ref 37.5–51)
HGB BLD-MCNC: 16.4 G/DL (ref 13–17.7)
IGA1 MFR SER: 427 MG/DL (ref 70–400)
IGG1 SER-MCNC: 1731 MG/DL (ref 700–1600)
IGM SERPL-MCNC: 576 MG/DL (ref 40–230)
LDH SERPL-CCNC: 176 U/L (ref 135–225)
LYMPHOCYTES # BLD AUTO: 1.38 10*3/MM3 (ref 0.7–3.1)
LYMPHOCYTES NFR BLD AUTO: 19.1 % (ref 19.6–45.3)
MCH RBC QN AUTO: 31.6 PG (ref 26.6–33)
MCHC RBC AUTO-ENTMCNC: 35.3 G/DL (ref 31.5–35.7)
MCV RBC AUTO: 89.4 FL (ref 79–97)
MONOCYTES # BLD AUTO: 0.86 10*3/MM3 (ref 0.1–0.9)
MONOCYTES NFR BLD AUTO: 11.9 % (ref 5–12)
NEUTROPHILS # BLD AUTO: 4.42 10*3/MM3 (ref 1.7–7)
NEUTROPHILS NFR BLD AUTO: 61.3 % (ref 42.7–76)
PLATELET # BLD AUTO: 149 10*3/MM3 (ref 140–450)
PMV BLD AUTO: 10.9 FL (ref 6–12)
POTASSIUM BLD-SCNC: 3.7 MMOL/L (ref 3.5–5.2)
PROT SERPL-MCNC: 8.3 G/DL (ref 6–8.5)
RBC # BLD AUTO: 5.19 10*6/MM3 (ref 4.14–5.8)
SODIUM BLD-SCNC: 136 MMOL/L (ref 136–145)
WBC NRBC COR # BLD: 7.22 10*3/MM3 (ref 3.4–10.8)

## 2020-06-10 PROCEDURE — 99214 OFFICE O/P EST MOD 30 MIN: CPT | Performed by: INTERNAL MEDICINE

## 2020-06-10 PROCEDURE — 83615 LACTATE (LD) (LDH) ENZYME: CPT | Performed by: INTERNAL MEDICINE

## 2020-06-10 PROCEDURE — 85025 COMPLETE CBC W/AUTO DIFF WBC: CPT | Performed by: INTERNAL MEDICINE

## 2020-06-10 PROCEDURE — 36415 COLL VENOUS BLD VENIPUNCTURE: CPT | Performed by: INTERNAL MEDICINE

## 2020-06-10 PROCEDURE — 80053 COMPREHEN METABOLIC PANEL: CPT | Performed by: INTERNAL MEDICINE

## 2020-06-10 PROCEDURE — 82784 ASSAY IGA/IGD/IGG/IGM EACH: CPT | Performed by: INTERNAL MEDICINE

## 2020-06-10 PROCEDURE — 82232 ASSAY OF BETA-2 PROTEIN: CPT | Performed by: INTERNAL MEDICINE

## 2020-06-10 NOTE — PROGRESS NOTES
Hematology/Oncology Outpatient Follow Up    Mc Selby  1970    Primary Care Physician: Carlita Shepherd MD  Referring Physician: Carlita Shepherd MD  Chief Complaint:  Monoclonal gammopathy/IgG kappa monoclonal gammopathy of unknown significance  History of Present Illness:   · Mr. Selby has a long history of rheumatoid arthritis on and off disease modifying agents.  Patient sees Dr. Rosas and laboratory work-up was initiated secondary to renal failure which revealed monoclonal gammopathy patient was sent to the cancer [facility] center and seen initially on 1/7/2020.  · Patient has cirrhosis related to alcohol abuse had episodes of hepatic encephalopathy.    · Patient has renal failure stage II at one point he was on hemodialysis briefly.  · Had a perforated gastric ulcer with hemorrhage  · 12/12/2019 serum immunofixation shows IgG monoclonal protein with kappa light chain specificity.  IgG 2111, IgA 703, IgM 754  · 1/21/2020 bone marrow biopsy and aspirate-right iliac crest bone marrow biopsy with aspirate smears and cell block preparation shows normocellular bone marrow with 50% cellularity.  Absent iron stores.  Negative for involvement by malignant lymphoma and metastatic malignancy.  Cytogenetics normal 46 XY flow cytometry normal  · 1/23/2020 whole-body skeletal survey geographic lucency within the right distal tibial diaphysis could relate to underlying lytic lesion, consider additional imaging or bone scan. 2. Small area of lucency with endosteal scalloping involving the distal left fibular diaphysis could relate to lytic lesion, dedicated AP and lateral views of the left fibula may be helpful. 2. Incidental findings above. 3. Carotid atherosclerotic disease. Consider follow-up carotid ultrasound if not previously performed.  · 1/30/2020 urine electrophoresis shows IgG monoclonal protein with kappa light chain specificity.  Small quantity of monoclonal protein unable to quantitate M spike.   Beta-2 microglobulin 4.0.  Serum kappa lambda free light chain ratio 1.83 creatinine 1.05 alk phos 242 total bilirubin 1.9  · 3/10/2020- PET- 1. No convincing evidence of osseous metastatic disease in this patient with history of multiple myeloma. Uptake surrounding each hip and around the elbow, wrist and finger joints, is favored to represent benign inflammatory/osteoarthritic change. 2. Few mildly prominent bilateral axillary lymph nodes demonstrate intermediate FDG accumulation. Both benign and malignant etiologies are in the differential. Consider short-term CT chest follow-up. 3. Hypermetabolic activity in a symmetric fashion the bilateral tonsillar pillars without discrete mass on CT. Correlate for tonsillar inflammation. 4. Additional CT findings as described above, including: Cirrhosis, hepatosplenomegaly, ascites, cholelithiasis, right renal cyst, dense coronary artery calcification, gynecomastia, maxillary sinusitis.  · 6/8/2020- CT chest W - 1. No evidence of mediastinal, hilar or axillary lymphadenopathy 2. Bilateral subcentimeter nodules in the lower lobes which appear increased in number from CT of 08/08/2018 and were also poorly seen on PET/CT of 03/10/2020, these are probably benign however should be followed in one year due to their very small size. 3. upper abdomen demonstrates changes of hepatosplenomegaly, cholelithiasis and upper pole right renal cyst    Past Medical History:   Diagnosis Date   • Acute perforated gastric ulcer with hemorrhage (CMS/HCC) 07/16/2018   • Alcoholism (CMS/HCC)    • Allergic rhinitis    • Arthritis     rheumatoid (diagnosed at age 16)   • Cirrhosis (CMS/HCC)     etoh cirrhosis-liver failure   • CKD (chronic kidney disease)    • COPD (chronic obstructive pulmonary disease) (CMS/HCC)    • Depression    • Duodenal ulcer      perforated duodenal s/p patch   • History of transfusion    • Infectious viral hepatitis    • MRSA (methicillin resistant Staphylococcus aureus)  carrier 06/12/2019    nasal swab   • Tumors      benign essential   • Type 2 diabetes mellitus with hyperglycemia (CMS/HCC) 1/8/2020   • Umbilical hernia u   • Wound, open     nonhealing sore       Past Surgical History:   Procedure Laterality Date   • CYST REMOVAL Left     forarm   • ENDOSCOPY      ascities/paracentesis   • EXPLORATORY LAPAROTOMY  07/16/2018    Over-sew Gastric Ulcer With Gastrostomy and Jejunostomy Tube   • FOOT SURGERY Right     right bunion removal   • FRACTURE SURGERY Right 2005    bunionectomy/ broke toe and put in rods   • MASS EXCISION Right 6/18/2019    Procedure: EXCISION OF SEBACEOUS CYST OF THE RIGHT BACK;  Surgeon: Sapna Elizalde MD;  Location: [facility] MAIN OR;  Service: General   • MASS EXCISION Right 8/27/2019    Procedure: EXCISION LESION of right back;  Surgeon: Sapna Elizalde MD;  Location: [facility] MAIN OR;  Service: General   • UMBILICAL HERNIA REPAIR N/A 6/18/2019    Procedure: UMBILICAL HERNIA REPAIR LAPAROSCOPIC WITH MESH WITH EXTENSIVE LYSIS OF ADHESIONS;  Surgeon: Sapna Elizalde MD;  Location: [facility] MAIN OR;  Service: General         Current Outpatient Medications:   •  ACCU-CHEK FASTCLIX LANCETS misc, 1 each 4 (Four) Times a Day Before Meals & at Bedtime., Disp: 204 each, Rfl: 0  •  albuterol sulfate  (90 Base) MCG/ACT inhaler, Inhale 2 puffs Every 4 (Four) Hours As Needed for Wheezing., Disp: , Rfl:   •  Azelastine HCl 137 MCG/SPRAY solution, USE 2 SPRAYS EACH NOSTRIL TWICE A DAY, Disp: 90 spray, Rfl: 0  •  bumetanide (BUMEX) 2 MG tablet, Take 2 mg by mouth 2 (Two) Times a Day., Disp: , Rfl:   •  EPINEPHrine (EPIPEN) 0.3 MG/0.3ML solution auto-injector injection, Inject 0.3 mg under the skin into the appropriate area as directed 1 (One) Time As Needed., Disp: , Rfl:   •  Etanercept (Enbrel Mini) 50 MG/ML solution cartridge, Inject 50 mg under the skin into the appropriate area as directed 1 (One) Time Per Week., Disp: 12 Cartridge, Rfl: 0  •  famotidine (PEPCID) 20 MG  tablet, TAKE 1 TABLET BY MOUTH EVERY DAY, Disp: 90 tablet, Rfl: 0  •  glucose blood (ACCU-CHEK GRAZYNA PLUS) test strip, Use as instructed four times a day and prn E11.9, Disp: 600 each, Rfl: 12  •  glucose monitor monitoring kit, 1 each 4 (Four) Times a Day Before Meals & at Bedtime., Disp: 1 each, Rfl: 0  •  insulin detemir (LEVEMIR) 100 UNIT/ML injection, INJECT 20 UNITS UNDER THE SKIN INTO THE APPROPRIATE AREA AS DIRECTED DAILY., Disp: 6 pen, Rfl: 2  •  Insulin Pen Needle (PEN NEEDLES) 32G X 4 MM misc, 1 each 4 (Four) Times a Day Before Meals & at Bedtime., Disp: 360 each, Rfl: 1  •  Isopropyl Alcohol (ALCOHOL WIPES) 70 % misc, Apply 1 each topically 4 (Four) Times a Day Before Meals & at Bedtime., Disp: 200 each, Rfl: 0  •  lactulose (CHRONULAC) 10 GM/15ML solution, Take 30 mL by mouth 2 (Two) Times a Day., Disp: 150 mL, Rfl: 0  •  magnesium oxide (MAG-OX) 400 MG tablet, Take 2 tablets by mouth 2 (Two) Times a Day., Disp: , Rfl: 6  •  meclizine 25 MG chewable tablet chewable tablet, Chew 25-50 mg 3 (Three) Times a Day As Needed., Disp: , Rfl:   •  montelukast (SINGULAIR) 10 MG tablet, TAKE 1 TABLET BY MOUTH EVERY DAY EVERY NIGHT, Disp: 90 tablet, Rfl: 1  •  NOVOLOG FLEXPEN 100 UNIT/ML solution pen-injector sc pen, Inject 2 Units under the skin into the appropriate area as directed 3 (Three) Times a Day With Meals., Disp: 3 pen, Rfl: 3  •  ondansetron (ZOFRAN) 8 MG tablet, Take 8 mg by mouth Every 8 (Eight) Hours As Needed for Nausea or Vomiting., Disp: , Rfl:   •  potassium chloride (K-DUR,KLOR-CON) 20 MEQ CR tablet, Take 40 mEq by mouth 3 (Three) Times a Day., Disp: , Rfl:   •  promethazine (PHENERGAN) 12.5 MG tablet, Take 12.5 mg by mouth Every 6 (Six) Hours As Needed for Nausea or Vomiting., Disp: , Rfl:   •  senna (SENOKOT) 8.6 MG tablet, Take 1 tablet by mouth 2 (Two) Times a Day As Needed for Constipation., Disp: 30 tablet, Rfl: 0  •  spironolactone (ALDACTONE) 25 MG tablet, Take 25 mg by mouth Daily.,  Disp: , Rfl:   •  tamsulosin (FLOMAX) 0.4 MG capsule 24 hr capsule, Take 1 capsule by mouth 2 (Two) Times a Day., Disp: , Rfl:   •  Triamcinolone Acetonide (NASACORT) 55 MCG/ACT nasal inhaler, 1 spray into the nostril(s) as directed by provider Daily., Disp: , Rfl:   •  vitamin D (ERGOCALCIFEROL) 60862 units capsule capsule, Take 50,000 Units by mouth Every 14 (Fourteen) Days. Every other , Disp: , Rfl:   •  XIFAXAN 550 MG tablet, TAKE 1 TABLET BY MOUTH EVERY 12 HOURS, Disp: 180 tablet, Rfl: 1  •  zinc sulfate (ZINCATE) 220 (50 Zn) MG capsule, Take 220 mg by mouth Daily., Disp: , Rfl: 3    Allergies   Allergen Reactions   • Duloxetine Rash       Family History   Problem Relation Age of Onset   • Cancer Mother    • Lung disease Father    • Other Father         prostate problems   • Mental illness Brother        Cancer-related family history includes Cancer in his mother.    Social History     Tobacco Use   • Smoking status: Former Smoker     Types: Cigarettes     Last attempt to quit: 2017     Years since quittin.8   • Smokeless tobacco: Former User   Substance Use Topics   • Alcohol use: No     Frequency: Never     Comment: Off alcohol since 2018   • Drug use: No       I have reviewed the history of present illness, past medical history, family history, social history, lab results, all notes and other records since the patient was last seen at the cancer care center.    SUBJECTIVE:      Patient is my office for follow-up accompanied by his wife.  No excessive drowsiness or sleepiness.  Takes lactulose maybe once a month especially for his constipation.  Denies visible blood loss.  Reports a joint pains which is chronic.  Also has back pain.  Has some skin itching.  He was diagnosed with right hip avascular necrosis and needs joint replacement.  ROS:      Review of Systems   Constitutional: Negative for fever.   HENT: Negative for nosebleeds and trouble swallowing.    Eyes: Negative for visual  "disturbance.   Respiratory: Negative for cough, shortness of breath and wheezing.    Cardiovascular: Negative for chest pain.   Gastrointestinal: Negative for abdominal pain and blood in stool.   Endocrine: Negative for cold intolerance.   Genitourinary: Negative for dysuria and hematuria.   Musculoskeletal: Negative for joint swelling.   Skin: Negative for rash.   Allergic/Immunologic: Negative for environmental allergies.   Neurological: Negative for seizures.   Hematological: Does not bruise/bleed easily.   Psychiatric/Behavioral: The patient is not nervous/anxious.        MD performed ROS and are negative except as mentioned in Subjective.    Objective:       Vitals:    06/10/20 1513   BP: 124/91   Pulse: 93   Resp: 16   Temp: 98.4 °F (36.9 °C)   Weight: 120 kg (264 lb)   Height: 177.8 cm (70\")   PainSc:   6       /91   Pulse 93   Temp 98.4 °F (36.9 °C)   Resp 16   Ht 177.8 cm (70\")   Wt 120 kg (264 lb)   BMI 37.88 kg/m²     PHYSICAL EXAM:      Physical Exam   Constitutional: He is oriented to person, place, and time. No distress.   Well-built obese   HENT:   Head: Normocephalic and atraumatic.   Eyes: Conjunctivae and EOM are normal. Right eye exhibits no discharge. Left eye exhibits no discharge. No scleral icterus.   Neck: Normal range of motion. Neck supple. No thyromegaly present.   Cardiovascular: Normal rate, regular rhythm and normal heart sounds. Exam reveals no gallop and no friction rub.   Pulmonary/Chest: Effort normal. No stridor. No respiratory distress. He has no wheezes.   Abdominal: Soft. Bowel sounds are normal. He exhibits no mass. There is no tenderness. There is no rebound and no guarding.   Obese distended   Musculoskeletal: Normal range of motion. He exhibits no tenderness.   Lymphadenopathy:     He has no cervical adenopathy.   Neurological: He is alert and oriented to person, place, and time. He exhibits normal muscle tone.   Skin: Skin is warm. No rash noted. He is not " diaphoretic. No erythema.   Psychiatric: He has a normal mood and affect. His behavior is normal.   Nursing note and vitals reviewed.     Physical exam done by MD.    RECENT LABS:     WBC   Date Value Ref Range Status   06/10/2020 7.22 3.40 - 10.80 10*3/mm3 Final     RBC   Date Value Ref Range Status   06/10/2020 5.19 4.14 - 5.80 10*6/mm3 Final     Hemoglobin   Date Value Ref Range Status   06/10/2020 16.4 13.0 - 17.7 g/dL Final     Hematocrit   Date Value Ref Range Status   06/10/2020 46.4 37.5 - 51.0 % Final     MCV   Date Value Ref Range Status   06/10/2020 89.4 79.0 - 97.0 fL Final     MCH   Date Value Ref Range Status   06/10/2020 31.6 26.6 - 33.0 pg Final     MCHC   Date Value Ref Range Status   06/10/2020 35.3 31.5 - 35.7 g/dL Final     RDW   Date Value Ref Range Status   06/10/2020 14.9 12.3 - 15.4 % Final     RDW-SD   Date Value Ref Range Status   06/10/2020 48.1 37.0 - 54.0 fl Final     MPV   Date Value Ref Range Status   06/10/2020 10.9 6.0 - 12.0 fL Final     Platelets   Date Value Ref Range Status   06/10/2020 149 140 - 450 10*3/mm3 Final     Neutrophil %   Date Value Ref Range Status   06/10/2020 61.3 42.7 - 76.0 % Final     Lymphocyte %   Date Value Ref Range Status   06/10/2020 19.1 (L) 19.6 - 45.3 % Final     Monocyte %   Date Value Ref Range Status   06/10/2020 11.9 5.0 - 12.0 % Final     Eosinophil %   Date Value Ref Range Status   06/10/2020 6.9 (H) 0.3 - 6.2 % Final     Basophil %   Date Value Ref Range Status   06/10/2020 0.8 0.0 - 1.5 % Final     Immature Grans %   Date Value Ref Range Status   03/26/2020 0.4 0.0 - 0.5 % Final     Neutrophils, Absolute   Date Value Ref Range Status   06/10/2020 4.42 1.70 - 7.00 10*3/mm3 Final     Lymphocytes, Absolute   Date Value Ref Range Status   06/10/2020 1.38 0.70 - 3.10 10*3/mm3 Final     Monocytes, Absolute   Date Value Ref Range Status   06/10/2020 0.86 0.10 - 0.90 10*3/mm3 Final     Eosinophils, Absolute   Date Value Ref Range Status   06/10/2020  0.50 (H) 0.00 - 0.40 10*3/mm3 Final     Basophils, Absolute   Date Value Ref Range Status   06/10/2020 0.06 0.00 - 0.20 10*3/mm3 Final     Immature Grans, Absolute   Date Value Ref Range Status   03/26/2020 0.04 0.00 - 0.05 10*3/mm3 Final     nRBC   Date Value Ref Range Status   03/26/2020 0.0 0.0 - 0.2 /100 WBC Final       Lab Results   Component Value Date    GLUCOSE 205 (H) 03/26/2020    BUN 17 03/26/2020    CREATININE 1.10 06/08/2020    EGFRIFNONA 72 03/26/2020    EGFRIFAFRI 114 03/15/2017    BCR 15.7 03/26/2020    K 4.3 03/26/2020    CO2 20.6 (L) 03/26/2020    CALCIUM 9.3 03/26/2020    ALBUMIN 3.70 03/26/2020    LABIL2 0.6 (L) 06/05/2019    AST 23 03/26/2020    ALT 16 03/26/2020         Assessment/Plan      ASSESSMENT:     1. Hepatic cenephalopathy  2. IgG kappa monoclonal gammopathy of unknown significance  3. Chronic renal failure stage II  4. Lung nodules  5. Right hip avascular necrosis  6. Very obese  7. ECOG 1    PLAN:      1. Reviewed the CBC results with the patient.  Hemoglobin is completely normal.   2. He reports his diabetes is controlling well now.  3. Urine electrophoretic studies does not show evidence of M protein with likely only a trace amount.  Patient does not have multiple myeloma.  No treatment indicated.  Patient has MGUS will repeat labs in 1 year time.  4. Patient can undergo hip replacement, MGUS is not a contraindication.  5. Encouraged him to lose weight by diet control.  Given his arthritis he cannot exercise much.  6. Discussed with him about the CT scan findings which shows multiple small lung nodules with recommendation to follow-up in 1 year.  I will schedule a CT scan.  7. I will see him back in my office in 1 year with prior myeloma labs and CT chest    I have reviewed labs results, imaging, vitals, and medications with the patient today.  Patient verbalized understanding and is in agreement of the above plan.          This report was compiled using Dragon voice recognition  software. I have made every effort to proof read this document; however, typographical errors may persist.

## 2020-06-11 LAB
ALBUMIN SERPL-MCNC: 3.4 G/DL (ref 2.9–4.4)
ALBUMIN/GLOB SERPL: 0.7 {RATIO} (ref 0.7–1.7)
ALPHA1 GLOB FLD ELPH-MCNC: 0.3 G/DL (ref 0–0.4)
ALPHA2 GLOB SERPL ELPH-MCNC: 0.7 G/DL (ref 0.4–1)
B-GLOBULIN SERPL ELPH-MCNC: 1.3 G/DL (ref 0.7–1.3)
BUN BLD-MCNC: 14 MG/DL (ref 6–20)
GAMMA GLOB SERPL ELPH-MCNC: 2.4 G/DL (ref 0.4–1.8)
GLOBULIN SER CALC-MCNC: 4.6 G/DL (ref 2.2–3.9)
IGA SERPL-MCNC: 433 MG/DL (ref 90–386)
IGG SERPL-MCNC: 1798 MG/DL (ref 603–1613)
IGM SERPL-MCNC: 586 MG/DL (ref 20–172)
KAPPA LC SERPL-MCNC: 70.4 MG/L (ref 3.3–19.4)
KAPPA LC/LAMBDA SER: 2.29 {RATIO} (ref 0.26–1.65)
LAMBDA LC FREE SERPL-MCNC: 30.8 MG/L (ref 5.7–26.3)
Lab: ABNORMAL
M-SPIKE: ABNORMAL G/DL
PROT PATTERN SERPL ELPH-IMP: ABNORMAL
PROT PATTERN SERPL IFE-IMP: ABNORMAL
PROT SERPL-MCNC: 8 G/DL (ref 6–8.5)

## 2020-06-11 RX ORDER — AZELASTINE 1 MG/ML
SPRAY, METERED NASAL
Qty: 3 EACH | Refills: 3 | Status: SHIPPED | OUTPATIENT
Start: 2020-06-11 | End: 2020-09-02

## 2020-06-15 ENCOUNTER — HOSPITAL ENCOUNTER (OUTPATIENT)
Dept: MRI IMAGING | Facility: HOSPITAL | Age: 50
Discharge: HOME | End: 2020-06-15
Admitting: PHYSICIAN ASSISTANT

## 2020-06-15 DIAGNOSIS — M16.11 PRIMARY OSTEOARTHRITIS OF RIGHT HIP: ICD-10-CM

## 2020-06-15 DIAGNOSIS — E66.01 MORBID OBESITY (HCC): ICD-10-CM

## 2020-06-15 PROCEDURE — 73721 MRI JNT OF LWR EXTRE W/O DYE: CPT

## 2020-06-30 ENCOUNTER — OFFICE VISIT (OUTPATIENT)
Dept: ORTHOPEDIC SURGERY | Facility: CLINIC | Age: 50
End: 2020-06-30

## 2020-06-30 VITALS — BODY MASS INDEX: 38.2 KG/M2 | HEIGHT: 70 IN | WEIGHT: 266.8 LBS

## 2020-06-30 DIAGNOSIS — M16.11 PRIMARY OSTEOARTHRITIS OF RIGHT HIP: ICD-10-CM

## 2020-06-30 DIAGNOSIS — M16.0 PRIMARY OSTEOARTHRITIS OF BOTH HIPS: Primary | ICD-10-CM

## 2020-06-30 DIAGNOSIS — M87.00 AVN (AVASCULAR NECROSIS OF BONE) (HCC): ICD-10-CM

## 2020-06-30 DIAGNOSIS — M25.551 RIGHT HIP PAIN: ICD-10-CM

## 2020-06-30 PROCEDURE — 99203 OFFICE O/P NEW LOW 30 MIN: CPT | Performed by: ORTHOPAEDIC SURGERY

## 2020-06-30 NOTE — PROGRESS NOTES
NEW VISIT    Patient: Mc Selby  ?  YOB: 1970    MRN: [id number]  ?  Chief Complaint   Patient presents with   • Right Hip - Establish Care      ?  HPI:   Mc Juan is here to establish care as far as his right hip is concerned.  He has been having pain and discomfort in the right hip for the past 6 to 8 months.  Symptoms have gotten worse over the past 3 to 4 weeks.  He has a history of rheumatoid arthritis and has been on Enbrel for several years.  He is trying to locate a rheumatologist who can accommodate him in their schedule.  His wife is his primary caregiver.  The patient states that he was very sick about a year and a half ago.  He suffered a perforated duodenal ulcer which led to his being hospitalized for prolonged period of time.  He has a history of excessive indulgence in alcohol for several years.  He also took ibuprofen for several years because of his rheumatoid disease.  The patient feels does would have affected his GI function and also possibly cause some damage to his hip.  We have discussed the etiology of avascular necrosis of the femoral head with the patient.  He has recently started driving about 8 weeks ago and wants to become functional.  We also discussed issues with his obesity where he weighs 266 pounds and definitely needs to lose some weight to improve his lower extremity function.      Pain Location: right hip  Radiation: none  Quality: dull, aching  Intensity/Severity: moderate  Duration: 10-12 months  Onset quality: gradual   Timing: intermittent  Aggravating Factors: kneeling, rising after sitting  Alleviating Factors: NSAIDs  Previous Episodes: yes  Associated Symptoms: pain, swelling  ADLs Affected: ambulating, recreational activities/sports  Previous Treatment: Anti-inflammatory medication.    This patient is a new patient.  This problem is new to this examiner.      Allergies:   Allergies   Allergen Reactions   • Duloxetine Rash       Medications:    Home Medications:  Current Outpatient Medications on File Prior to Visit   Medication Sig   • ACCU-CHEK FASTCLIX LANCETS misc 1 each 4 (Four) Times a Day Before Meals & at Bedtime.   • albuterol sulfate  (90 Base) MCG/ACT inhaler Inhale 2 puffs Every 4 (Four) Hours As Needed for Wheezing.   • azelastine (ASTELIN) 0.1 % nasal spray INSTILL 2 SPRAYS INTO EACH BNOSTRIL TWICE A DAY   • bumetanide (BUMEX) 2 MG tablet Take 2 mg by mouth 2 (Two) Times a Day.   • EPINEPHrine (EPIPEN) 0.3 MG/0.3ML solution auto-injector injection Inject 0.3 mg under the skin into the appropriate area as directed 1 (One) Time As Needed.   • Etanercept (Enbrel Mini) 50 MG/ML solution cartridge Inject 50 mg under the skin into the appropriate area as directed 1 (One) Time Per Week.   • famotidine (PEPCID) 20 MG tablet TAKE 1 TABLET BY MOUTH EVERY DAY   • glucose blood (ACCU-CHEK GRAZYNA PLUS) test strip Use as instructed four times a day and prn E11.9   • glucose monitor monitoring kit 1 each 4 (Four) Times a Day Before Meals & at Bedtime.   • insulin detemir (LEVEMIR) 100 UNIT/ML injection INJECT 20 UNITS UNDER THE SKIN INTO THE APPROPRIATE AREA AS DIRECTED DAILY.   • Insulin Pen Needle (PEN NEEDLES) 32G X 4 MM misc 1 each 4 (Four) Times a Day Before Meals & at Bedtime.   • Isopropyl Alcohol (ALCOHOL WIPES) 70 % misc Apply 1 each topically 4 (Four) Times a Day Before Meals & at Bedtime.   • lactulose (CHRONULAC) 10 GM/15ML solution Take 30 mL by mouth 2 (Two) Times a Day.   • magnesium oxide (MAG-OX) 400 MG tablet Take 2 tablets by mouth 2 (Two) Times a Day.   • meclizine 25 MG chewable tablet chewable tablet Chew 25-50 mg 3 (Three) Times a Day As Needed.   • montelukast (SINGULAIR) 10 MG tablet TAKE 1 TABLET BY MOUTH EVERY DAY EVERY NIGHT   • NOVOLOG FLEXPEN 100 UNIT/ML solution pen-injector sc pen Inject 2 Units under the skin into the appropriate area as directed 3 (Three) Times a Day With Meals.   • ondansetron (ZOFRAN) 8 MG tablet  Take 8 mg by mouth Every 8 (Eight) Hours As Needed for Nausea or Vomiting.   • potassium chloride (K-DUR,KLOR-CON) 20 MEQ CR tablet Take 40 mEq by mouth 3 (Three) Times a Day.   • promethazine (PHENERGAN) 12.5 MG tablet Take 12.5 mg by mouth Every 6 (Six) Hours As Needed for Nausea or Vomiting.   • senna (SENOKOT) 8.6 MG tablet Take 1 tablet by mouth 2 (Two) Times a Day As Needed for Constipation.   • spironolactone (ALDACTONE) 25 MG tablet Take 25 mg by mouth Daily.   • tamsulosin (FLOMAX) 0.4 MG capsule 24 hr capsule Take 1 capsule by mouth 2 (Two) Times a Day.   • Triamcinolone Acetonide (NASACORT) 55 MCG/ACT nasal inhaler 1 spray into the nostril(s) as directed by provider Daily.   • vitamin D (ERGOCALCIFEROL) 32366 units capsule capsule Take 50,000 Units by mouth Every 14 (Fourteen) Days. Every other Sunday   • XIFAXAN 550 MG tablet TAKE 1 TABLET BY MOUTH EVERY 12 HOURS   • zinc sulfate (ZINCATE) 220 (50 Zn) MG capsule Take 220 mg by mouth Daily.     No current facility-administered medications on file prior to visit.      Current Medications:  Scheduled Meds:  PRN Meds:.    I have reviewed the patient's medical history in detail and updated the computerized patient record.  Review and summarization of old records include:    Past Medical History:   Diagnosis Date   • Acute perforated gastric ulcer with hemorrhage (CMS/HCC) 07/16/2018   • Alcoholism (CMS/HCC)    • Allergic rhinitis    • Arthritis     rheumatoid (diagnosed at age 16)   • Cirrhosis (CMS/HCC)     etoh cirrhosis-liver failure   • CKD (chronic kidney disease)    • COPD (chronic obstructive pulmonary disease) (CMS/HCC)    • Depression    • Duodenal ulcer      perforated duodenal s/p patch   • History of transfusion    • Infectious viral hepatitis    • MRSA (methicillin resistant Staphylococcus aureus) carrier 06/12/2019    nasal swab   • Tumors      benign essential   • Type 2 diabetes mellitus with hyperglycemia (CMS/HCC) 1/8/2020   • Umbilical  hernia u   • Wound, open     nonhealing sore     Past Surgical History:   Procedure Laterality Date   • CYST REMOVAL Left     forarm   • ENDOSCOPY      ascities/paracentesis   • EXPLORATORY LAPAROTOMY  2018    Over-sew Gastric Ulcer With Gastrostomy and Jejunostomy Tube   • FOOT SURGERY Right     right bunion removal   • FRACTURE SURGERY Right 2005    bunionectomy/ broke toe and put in rods   • MASS EXCISION Right 2019    Procedure: EXCISION OF SEBACEOUS CYST OF THE RIGHT BACK;  Surgeon: Sapna Elizalde MD;  Location: [facility] MAIN OR;  Service: General   • MASS EXCISION Right 2019    Procedure: EXCISION LESION of right back;  Surgeon: Sapna Elizalde MD;  Location: [facility] MAIN OR;  Service: General   • UMBILICAL HERNIA REPAIR N/A 2019    Procedure: UMBILICAL HERNIA REPAIR LAPAROSCOPIC WITH MESH WITH EXTENSIVE LYSIS OF ADHESIONS;  Surgeon: Sapna Elizalde MD;  Location: [facility] MAIN OR;  Service: General     Social History     Occupational History   • Not on file   Tobacco Use   • Smoking status: Former Smoker     Types: Cigarettes     Last attempt to quit: 2017     Years since quittin.8   • Smokeless tobacco: Former User   Substance and Sexual Activity   • Alcohol use: No     Frequency: Never     Comment: Off alcohol since 2018   • Drug use: No   • Sexual activity: Defer      Family History   Problem Relation Age of Onset   • Cancer Mother    • Lung disease Father    • Other Father         prostate problems   • Mental illness Brother          Review of Systems   Constitutional: Negative.    HENT: Negative.    Eyes: Negative.    Respiratory: Negative.    Cardiovascular: Negative.    Gastrointestinal: Negative.    Endocrine: Negative.    Genitourinary: Negative.    Musculoskeletal: Positive for arthralgias and gait problem.   Skin: Negative.    Allergic/Immunologic: Negative.    Hematological: Negative.    Psychiatric/Behavioral: Negative.           Wt Readings from Last 3 Encounters:  "  06/30/20 121 kg (266 lb 12.8 oz)   06/10/20 120 kg (264 lb)   06/09/20 120 kg (264 lb)     Ht Readings from Last 3 Encounters:   06/30/20 177.8 cm (70\")   06/10/20 177.8 cm (70\")   06/09/20 177.8 cm (70\")     Body mass index is 38.28 kg/m².  Facility age limit for growth percentiles is 20 years.  There were no vitals filed for this visit.      Physical Exam  Constitutional: Patient is oriented to person, place, and time. Appears well-developed and well-nourished.   HENT:   Head: Normocephalic and atraumatic.   Eyes: Conjunctivae and EOM are normal. Pupils are equal, round, and reactive to light.   Cardiovascular: Normal rate, regular rhythm, normal heart sounds and intact distal pulses.   Pulmonary/Chest: Effort normal and breath sounds normal.   Musculoskeletal:   See detailed exam below   Neurological: Alert and oriented to person, place, and time. No sensory deficit. Coordination normal.   Skin: Skin is warm and dry. Capillary refill takes less than 2 seconds. No rash noted. No erythema.   Psychiatric: Patient has a normal mood and affect. His behavior is normal. Judgment and thought content normal.   Nursing note and vitals reviewed.      Ortho Exam:   Bilateral hip (djd): Neurovascular status is intact. Patient does have a limp on the affected side. Internal and external rotations are associated with pain and discomfort. Anterior joint line pain and tenderness is significant. Stinchfield sign is positive. Figure of 4 sign is positive. Patient is unable to perform an active straight leg raise exam. Greater trochanter is tender. Crossover adduction test is positive. Cross body adduction is limited and painful for the patient. Patient has very significant limp and joint line tenderness anteriorly, posteriorly and medially. Dorsalis pedis and posterior tibial artery pulses are palpable. Common peroneal nerve function is well preserved.           Diagnostics:  Reviewed MRI report of RIGHT HIP, summary of " impression below:  MRI reports from the hospital are available.  These images are discussed with the patient and his wife Serina who is on the telephone while he is in the office with me.  There is severe arthritis of both the hips with increased edema and a subchondral cystic change on the right side.  There is no evidence of fracture or avascular necrosis.  There are some degenerative tears involving the acetabular labrum.  Based on these MRI findings and with clinical correlation there is a good reason to treat him with conservative, nonoperative care at this point.  He will eventually need hip replacement surgery based on progression of his symptoms.    Assessment:  Mc was seen today for establish care.    Diagnoses and all orders for this visit:    Primary osteoarthritis of both hips          Procedures  ?    Plan    · For temporary relief of symptoms discussed with the patient both for diagnostic and therapeutic purposes to undergo a hip injection under radiographic imaging in the interventional radiology suite.  We will set up this injection with steroid for the patient.  · Discussed with the patient about a consultation with a rheumatologist who can monitor his rheumatological disease along with the blood work as well as placing him on disease modifying agents.  · Rest, ice, compression, and elevation (RICE) therapy  · Stretching and strengthening exercises of the hip flexors and abductors.  · Calcium and vitamin D for bone health.  · Tylenol 500-1000mg by mouth every 6 hours as needed for pain   · Follow up in 3 month(s).  · The patient was counseled regarding reduction of BMI including, but not limited to the following: Reduction of portion sizes, reduction of carbohydrate intake, decreased calorie count, increased physical exercise and activity, counseling with a professional such as a registered dietitian and consultation with a bariatric surgeon for possible bariatric procedure.  Should the  patient decide to proceed with a bariatric procedure this would not be considered a cosmetic procedure for this patient, but a means to improve the quality of life and to improve the musculoskeletal function.  Patient's current BMI is 38 and would need to lose weight which is required for elective orthopedic surgery to minimize perioperative complications.    Date of encounter: 06/30/2020   Duran Donovan MD

## 2020-07-01 ENCOUNTER — TELEPHONE (OUTPATIENT)
Dept: FAMILY MEDICINE CLINIC | Facility: CLINIC | Age: 50
End: 2020-07-01

## 2020-07-01 NOTE — TELEPHONE ENCOUNTER
JOSELYN CALLED AND REQUESTED TO SCHEDULE  A LAB APPOINTMENT . HUB ATTEMPTED TO CONTACT PRACTICE LINE WAS BUSY . PATIENT CALL BACK [phone]

## 2020-07-01 NOTE — TELEPHONE ENCOUNTER
Spoke with patient's wife at 3:38pm and scheduled a [facility] appt on 7/6/2020 at 3:30pm.  He has orders from Dr Hussein.

## 2020-07-06 ENCOUNTER — LAB (OUTPATIENT)
Dept: FAMILY MEDICINE CLINIC | Facility: CLINIC | Age: 50
End: 2020-07-06

## 2020-07-06 DIAGNOSIS — N18.2 CHRONIC KIDNEY DISEASE, STAGE II (MILD): ICD-10-CM

## 2020-07-06 DIAGNOSIS — E87.6 HYPOPOTASSEMIA: Primary | ICD-10-CM

## 2020-07-06 DIAGNOSIS — E87.6 HYPOKALEMIA: ICD-10-CM

## 2020-07-06 DIAGNOSIS — I10 ESSENTIAL (PRIMARY) HYPERTENSION: ICD-10-CM

## 2020-07-06 PROCEDURE — 81001 URINALYSIS AUTO W/SCOPE: CPT

## 2020-07-06 PROCEDURE — 36415 COLL VENOUS BLD VENIPUNCTURE: CPT

## 2020-07-06 PROCEDURE — 84156 ASSAY OF PROTEIN URINE: CPT | Performed by: INTERNAL MEDICINE

## 2020-07-06 PROCEDURE — 82570 ASSAY OF URINE CREATININE: CPT | Performed by: INTERNAL MEDICINE

## 2020-07-06 PROCEDURE — 83735 ASSAY OF MAGNESIUM: CPT | Performed by: INTERNAL MEDICINE

## 2020-07-06 PROCEDURE — 80053 COMPREHEN METABOLIC PANEL: CPT | Performed by: INTERNAL MEDICINE

## 2020-07-06 PROCEDURE — 85025 COMPLETE CBC W/AUTO DIFF WBC: CPT | Performed by: INTERNAL MEDICINE

## 2020-07-07 LAB
ALBUMIN SERPL-MCNC: 3.9 G/DL (ref 3.5–5.2)
ALBUMIN/GLOB SERPL: 1 G/DL
ALP SERPL-CCNC: 111 U/L (ref 39–117)
ALT SERPL W P-5'-P-CCNC: 15 U/L (ref 1–41)
ANION GAP SERPL CALCULATED.3IONS-SCNC: 11.2 MMOL/L (ref 5–15)
AST SERPL-CCNC: 23 U/L (ref 1–40)
BACTERIA UR QL AUTO: NORMAL /HPF
BASOPHILS # BLD AUTO: 0.1 10*3/MM3 (ref 0–0.2)
BASOPHILS NFR BLD AUTO: 1.5 % (ref 0–1.5)
BILIRUB SERPL-MCNC: 1.6 MG/DL (ref 0–1.2)
BILIRUB UR QL STRIP: NEGATIVE
BUN SERPL-MCNC: 16 MG/DL (ref 6–20)
BUN/CREAT SERPL: 14.3 (ref 7–25)
CALCIUM SPEC-SCNC: 9.2 MG/DL (ref 8.6–10.5)
CHLORIDE SERPL-SCNC: 100 MMOL/L (ref 98–107)
CLARITY UR: CLEAR
CO2 SERPL-SCNC: 21.8 MMOL/L (ref 22–29)
COLOR UR: YELLOW
CREAT SERPL-MCNC: 1.12 MG/DL (ref 0.76–1.27)
CREAT UR-MCNC: 49.1 MG/DL
DEPRECATED RDW RBC AUTO: 45 FL (ref 37–54)
EOSINOPHIL # BLD AUTO: 0.52 10*3/MM3 (ref 0–0.4)
EOSINOPHIL NFR BLD AUTO: 7.7 % (ref 0.3–6.2)
ERYTHROCYTE [DISTWIDTH] IN BLOOD BY AUTOMATED COUNT: 13.7 % (ref 12.3–15.4)
GFR SERPL CREATININE-BSD FRML MDRD: 69 ML/MIN/1.73
GLOBULIN UR ELPH-MCNC: 3.9 GM/DL
GLUCOSE SERPL-MCNC: 139 MG/DL (ref 65–99)
GLUCOSE UR STRIP-MCNC: NEGATIVE MG/DL
HCT VFR BLD AUTO: 45.8 % (ref 37.5–51)
HGB BLD-MCNC: 16.2 G/DL (ref 13–17.7)
HGB UR QL STRIP.AUTO: NEGATIVE
HYALINE CASTS UR QL AUTO: NORMAL /LPF
IMM GRANULOCYTES # BLD AUTO: 0.02 10*3/MM3 (ref 0–0.05)
IMM GRANULOCYTES NFR BLD AUTO: 0.3 % (ref 0–0.5)
KETONES UR QL STRIP: NEGATIVE
LEUKOCYTE ESTERASE UR QL STRIP.AUTO: ABNORMAL
LYMPHOCYTES # BLD AUTO: 1.35 10*3/MM3 (ref 0.7–3.1)
LYMPHOCYTES NFR BLD AUTO: 20 % (ref 19.6–45.3)
MAGNESIUM SERPL-MCNC: 2 MG/DL (ref 1.6–2.6)
MCH RBC QN AUTO: 32 PG (ref 26.6–33)
MCHC RBC AUTO-ENTMCNC: 35.4 G/DL (ref 31.5–35.7)
MCV RBC AUTO: 90.3 FL (ref 79–97)
MONOCYTES # BLD AUTO: 0.8 10*3/MM3 (ref 0.1–0.9)
MONOCYTES NFR BLD AUTO: 11.9 % (ref 5–12)
NEUTROPHILS NFR BLD AUTO: 3.96 10*3/MM3 (ref 1.7–7)
NEUTROPHILS NFR BLD AUTO: 58.6 % (ref 42.7–76)
NITRITE UR QL STRIP: NEGATIVE
NRBC BLD AUTO-RTO: 0 /100 WBC (ref 0–0.2)
PH UR STRIP.AUTO: 6.5 [PH] (ref 5–8)
PLATELET # BLD AUTO: 171 10*3/MM3 (ref 140–450)
PMV BLD AUTO: 11.9 FL (ref 6–12)
POTASSIUM SERPL-SCNC: 4 MMOL/L (ref 3.5–5.2)
PROT SERPL-MCNC: 7.8 G/DL (ref 6–8.5)
PROT UR QL STRIP: NEGATIVE
PROT UR-MCNC: 4 MG/DL
RBC # BLD AUTO: 5.07 10*6/MM3 (ref 4.14–5.8)
RBC # UR: NORMAL /HPF
REF LAB TEST METHOD: NORMAL
SODIUM SERPL-SCNC: 133 MMOL/L (ref 136–145)
SP GR UR STRIP: 1.01 (ref 1–1.03)
SQUAMOUS #/AREA URNS HPF: NORMAL /HPF
UROBILINOGEN UR QL STRIP: ABNORMAL
WBC # BLD AUTO: 6.75 10*3/MM3 (ref 3.4–10.8)
WBC UR QL AUTO: NORMAL /HPF

## 2020-07-07 RX ORDER — RIFAXIMIN 550 MG/1
TABLET ORAL
Qty: 180 TABLET | Refills: 1 | Status: SHIPPED | OUTPATIENT
Start: 2020-07-07 | End: 2020-07-23 | Stop reason: SDUPTHER

## 2020-07-22 ENCOUNTER — TELEPHONE (OUTPATIENT)
Dept: FAMILY MEDICINE CLINIC | Facility: CLINIC | Age: 50
End: 2020-07-22

## 2020-07-22 NOTE — TELEPHONE ENCOUNTER
The PA was denied. You can try to send a letter Im not sure on what she is wanting us to do. We cant make them pay.    Do you want to appeal or change medication? I will put the denial letter and copy of PA on your desk.

## 2020-07-22 NOTE — TELEPHONE ENCOUNTER
WIFE REQUESTED TO GET A PHONE CALL ABOUT THE MEDICATION XIFAXAN 550 MG tablet, WIFE STATES THAT THE MEDICATION WAS DENIED BY THE INSURANCE AND WANTED TO KNOW SOMEONE IS GOING TO WORK ON TRYING TO GET IT APPROVED.     BEST CALL BACK  [phone]

## 2020-07-23 ENCOUNTER — TELEPHONE (OUTPATIENT)
Dept: FAMILY MEDICINE CLINIC | Facility: CLINIC | Age: 50
End: 2020-07-23

## 2020-07-23 NOTE — TELEPHONE ENCOUNTER
Gave message to patient's wife at 10:01am.  She said that the GI doctor has never prescribed the medication to patient and when she called them recently they told her an appeal would have to come from the prescribing doctor, which is you.  Patient does not see GI doctor again until September.  Wife said patient has been on this medication for two years now and he is almost out of it.  What do you recommend?

## 2020-07-23 NOTE — TELEPHONE ENCOUNTER
please let pt/wife know ins approved 2nd submission of PA for xifaxan and radha is sending approval to pharmacy

## 2020-07-27 ENCOUNTER — OFFICE VISIT (OUTPATIENT)
Dept: FAMILY MEDICINE CLINIC | Facility: CLINIC | Age: 50
End: 2020-07-27

## 2020-07-27 ENCOUNTER — LAB (OUTPATIENT)
Dept: FAMILY MEDICINE CLINIC | Facility: CLINIC | Age: 50
End: 2020-07-27

## 2020-07-27 VITALS
DIASTOLIC BLOOD PRESSURE: 78 MMHG | BODY MASS INDEX: 37.8 KG/M2 | HEART RATE: 81 BPM | WEIGHT: 264 LBS | TEMPERATURE: 97.8 F | HEIGHT: 70 IN | RESPIRATION RATE: 20 BRPM | SYSTOLIC BLOOD PRESSURE: 108 MMHG

## 2020-07-27 DIAGNOSIS — K70.30 ALCOHOLIC CIRRHOSIS OF LIVER WITHOUT ASCITES (HCC): ICD-10-CM

## 2020-07-27 DIAGNOSIS — E11.65 TYPE 2 DIABETES MELLITUS WITH HYPERGLYCEMIA, WITH LONG-TERM CURRENT USE OF INSULIN (HCC): ICD-10-CM

## 2020-07-27 DIAGNOSIS — M06.00 SERONEGATIVE RHEUMATOID ARTHRITIS (HCC): ICD-10-CM

## 2020-07-27 DIAGNOSIS — Z79.4 TYPE 2 DIABETES MELLITUS WITH HYPERGLYCEMIA, WITH LONG-TERM CURRENT USE OF INSULIN (HCC): Primary | ICD-10-CM

## 2020-07-27 DIAGNOSIS — E55.9 VITAMIN D DEFICIENCY: ICD-10-CM

## 2020-07-27 DIAGNOSIS — E11.65 TYPE 2 DIABETES MELLITUS WITH HYPERGLYCEMIA, WITH LONG-TERM CURRENT USE OF INSULIN (HCC): Primary | ICD-10-CM

## 2020-07-27 DIAGNOSIS — Z79.4 TYPE 2 DIABETES MELLITUS WITH HYPERGLYCEMIA, WITH LONG-TERM CURRENT USE OF INSULIN (HCC): ICD-10-CM

## 2020-07-27 DIAGNOSIS — E83.42 HYPOMAGNESEMIA: ICD-10-CM

## 2020-07-27 PROCEDURE — 83735 ASSAY OF MAGNESIUM: CPT | Performed by: INTERNAL MEDICINE

## 2020-07-27 PROCEDURE — 86140 C-REACTIVE PROTEIN: CPT | Performed by: INTERNAL MEDICINE

## 2020-07-27 PROCEDURE — 84443 ASSAY THYROID STIM HORMONE: CPT | Performed by: INTERNAL MEDICINE

## 2020-07-27 PROCEDURE — 82306 VITAMIN D 25 HYDROXY: CPT | Performed by: INTERNAL MEDICINE

## 2020-07-27 PROCEDURE — 36415 COLL VENOUS BLD VENIPUNCTURE: CPT

## 2020-07-27 PROCEDURE — 99214 OFFICE O/P EST MOD 30 MIN: CPT | Performed by: INTERNAL MEDICINE

## 2020-07-27 PROCEDURE — 83036 HEMOGLOBIN GLYCOSYLATED A1C: CPT | Performed by: INTERNAL MEDICINE

## 2020-07-27 PROCEDURE — 80061 LIPID PANEL: CPT | Performed by: INTERNAL MEDICINE

## 2020-07-27 PROCEDURE — 85652 RBC SED RATE AUTOMATED: CPT | Performed by: INTERNAL MEDICINE

## 2020-07-27 PROCEDURE — 80053 COMPREHEN METABOLIC PANEL: CPT | Performed by: INTERNAL MEDICINE

## 2020-07-27 PROCEDURE — 85025 COMPLETE CBC W/AUTO DIFF WBC: CPT | Performed by: INTERNAL MEDICINE

## 2020-07-27 RX ORDER — LANCETS
1 EACH MISCELLANEOUS
Qty: 204 EACH | Refills: 0 | Status: SHIPPED | OUTPATIENT
Start: 2020-07-27 | End: 2020-09-02

## 2020-07-28 LAB
25(OH)D3 SERPL-MCNC: 50.9 NG/ML (ref 30–100)
ALBUMIN SERPL-MCNC: 4 G/DL (ref 3.5–5.2)
ALBUMIN/GLOB SERPL: 0.9 G/DL
ALP SERPL-CCNC: 101 U/L (ref 39–117)
ALT SERPL W P-5'-P-CCNC: 15 U/L (ref 1–41)
ANION GAP SERPL CALCULATED.3IONS-SCNC: 10.7 MMOL/L (ref 5–15)
AST SERPL-CCNC: 22 U/L (ref 1–40)
BASOPHILS # BLD AUTO: 0.08 10*3/MM3 (ref 0–0.2)
BASOPHILS NFR BLD AUTO: 1.1 % (ref 0–1.5)
BILIRUB SERPL-MCNC: 1.4 MG/DL (ref 0–1.2)
BUN SERPL-MCNC: 17 MG/DL (ref 6–20)
BUN/CREAT SERPL: 16 (ref 7–25)
CALCIUM SPEC-SCNC: 9.5 MG/DL (ref 8.6–10.5)
CHLORIDE SERPL-SCNC: 102 MMOL/L (ref 98–107)
CHOLEST SERPL-MCNC: 147 MG/DL (ref 0–200)
CO2 SERPL-SCNC: 24.3 MMOL/L (ref 22–29)
CREAT SERPL-MCNC: 1.06 MG/DL (ref 0.76–1.27)
CRP SERPL-MCNC: 0.28 MG/DL (ref 0–0.5)
DEPRECATED RDW RBC AUTO: 49.6 FL (ref 37–54)
EOSINOPHIL # BLD AUTO: 0.54 10*3/MM3 (ref 0–0.4)
EOSINOPHIL NFR BLD AUTO: 7.1 % (ref 0.3–6.2)
ERYTHROCYTE [DISTWIDTH] IN BLOOD BY AUTOMATED COUNT: 14 % (ref 12.3–15.4)
ERYTHROCYTE [SEDIMENTATION RATE] IN BLOOD: 6 MM/HR (ref 0–20)
GFR SERPL CREATININE-BSD FRML MDRD: 73 ML/MIN/1.73
GLOBULIN UR ELPH-MCNC: 4.3 GM/DL
GLUCOSE SERPL-MCNC: 117 MG/DL (ref 65–99)
HBA1C MFR BLD: 6 % (ref 3.5–5.6)
HCT VFR BLD AUTO: 50.5 % (ref 37.5–51)
HDLC SERPL-MCNC: 34 MG/DL (ref 40–60)
HGB BLD-MCNC: 16.8 G/DL (ref 13–17.7)
IMM GRANULOCYTES # BLD AUTO: 0.02 10*3/MM3 (ref 0–0.05)
IMM GRANULOCYTES NFR BLD AUTO: 0.3 % (ref 0–0.5)
LDLC SERPL CALC-MCNC: 103 MG/DL (ref 0–100)
LDLC/HDLC SERPL: 3.04 {RATIO}
LYMPHOCYTES # BLD AUTO: 1.92 10*3/MM3 (ref 0.7–3.1)
LYMPHOCYTES NFR BLD AUTO: 25.2 % (ref 19.6–45.3)
MAGNESIUM SERPL-MCNC: 2.2 MG/DL (ref 1.6–2.6)
MCH RBC QN AUTO: 31.8 PG (ref 26.6–33)
MCHC RBC AUTO-ENTMCNC: 33.3 G/DL (ref 31.5–35.7)
MCV RBC AUTO: 95.5 FL (ref 79–97)
MONOCYTES # BLD AUTO: 1.03 10*3/MM3 (ref 0.1–0.9)
MONOCYTES NFR BLD AUTO: 13.5 % (ref 5–12)
NEUTROPHILS NFR BLD AUTO: 4.02 10*3/MM3 (ref 1.7–7)
NEUTROPHILS NFR BLD AUTO: 52.8 % (ref 42.7–76)
NRBC BLD AUTO-RTO: 0 /100 WBC (ref 0–0.2)
PLATELET # BLD AUTO: 184 10*3/MM3 (ref 140–450)
PMV BLD AUTO: 11.2 FL (ref 6–12)
POTASSIUM SERPL-SCNC: 4 MMOL/L (ref 3.5–5.2)
PROT SERPL-MCNC: 8.3 G/DL (ref 6–8.5)
RBC # BLD AUTO: 5.29 10*6/MM3 (ref 4.14–5.8)
SODIUM SERPL-SCNC: 137 MMOL/L (ref 136–145)
TRIGL SERPL-MCNC: 49 MG/DL (ref 0–150)
TSH SERPL DL<=0.05 MIU/L-ACNC: 1.54 UIU/ML (ref 0.27–4.2)
VLDLC SERPL-MCNC: 9.8 MG/DL (ref 5–40)
WBC # BLD AUTO: 7.61 10*3/MM3 (ref 3.4–10.8)

## 2020-08-10 ENCOUNTER — TELEPHONE (OUTPATIENT)
Dept: FAMILY MEDICINE CLINIC | Facility: CLINIC | Age: 50
End: 2020-08-10

## 2020-08-10 NOTE — TELEPHONE ENCOUNTER
PATIENT CALLED REQUESTING MEDICAL RECORDS TO BE SENT TO  DR. MARTE, IN [facility].     THIS WAS A REFERRAL FOR RHEUMATOLOGY.    PATIENT CALL BACK NUMBER [phone]

## 2020-08-12 ENCOUNTER — TRANSCRIBE ORDERS (OUTPATIENT)
Dept: ADMINISTRATIVE | Facility: HOSPITAL | Age: 50
End: 2020-08-12

## 2020-08-12 ENCOUNTER — HOSPITAL ENCOUNTER (OUTPATIENT)
Dept: INTERVENTIONAL RADIOLOGY/VASCULAR | Facility: HOSPITAL | Age: 50
Discharge: HOME | End: 2020-08-12
Admitting: ORTHOPAEDIC SURGERY

## 2020-08-12 DIAGNOSIS — M16.0 PRIMARY OSTEOARTHRITIS OF BOTH HIPS: ICD-10-CM

## 2020-08-12 DIAGNOSIS — M87.00 AVN (AVASCULAR NECROSIS OF BONE) (HCC): ICD-10-CM

## 2020-08-12 DIAGNOSIS — M25.551 RIGHT HIP PAIN: ICD-10-CM

## 2020-08-12 PROCEDURE — 77002 NEEDLE LOCALIZATION BY XRAY: CPT

## 2020-08-12 RX ORDER — BUPIVACAINE HYDROCHLORIDE 2.5 MG/ML
30 INJECTION, SOLUTION EPIDURAL; INFILTRATION; INTRACAUDAL ONCE
Status: DISCONTINUED | OUTPATIENT
Start: 2020-08-12 | End: 2020-08-13 | Stop reason: HOSPADM

## 2020-08-12 RX ORDER — METHYLPREDNISOLONE ACETATE 80 MG/ML
80 INJECTION, SUSPENSION INTRA-ARTICULAR; INTRALESIONAL; INTRAMUSCULAR; SOFT TISSUE ONCE
Status: DISCONTINUED | OUTPATIENT
Start: 2020-08-12 | End: 2020-08-13 | Stop reason: HOSPADM

## 2020-08-13 ENCOUNTER — TELEPHONE (OUTPATIENT)
Dept: FAMILY MEDICINE CLINIC | Facility: CLINIC | Age: 50
End: 2020-08-13

## 2020-08-13 NOTE — TELEPHONE ENCOUNTER
PATIENT SAYS DR. MARTE IN [facility] SEE HIM UNTIL DEC. 11, 2020.    HE GOT A STEROID AND ANTIBIOTIC SHOT FOR INFLAMMATION IN HIS HIP ON 8/12/2020 BY DR CLEMENT.     PATIENT JUST WANTED DR VELASQUEZ TO KNOW THIS.    PATIENT CALL BACK NUMBER [phone]

## 2020-08-27 ENCOUNTER — TELEPHONE (OUTPATIENT)
Dept: FAMILY MEDICINE CLINIC | Facility: CLINIC | Age: 50
End: 2020-08-27

## 2020-09-01 RX ORDER — PREDNISONE 10 MG/1
10 TABLET ORAL DAILY
Qty: 90 TABLET | Refills: 0 | Status: SHIPPED | OUTPATIENT
Start: 2020-09-01 | End: 2020-12-06

## 2020-09-01 NOTE — TELEPHONE ENCOUNTER
PATIENT CALLED BACK TO ALERT JANIA AND DR VELASQUEZ THAT HIS LAST ENBREL WAS TAKEN ON 8-25-20 PLEASE ADVISE     PATIENT CALL BACK NUMBER [phone]

## 2020-09-02 ENCOUNTER — TELEPHONE (OUTPATIENT)
Dept: FAMILY MEDICINE CLINIC | Facility: CLINIC | Age: 50
End: 2020-09-02

## 2020-09-02 ENCOUNTER — HOSPITAL ENCOUNTER (OUTPATIENT)
Facility: HOSPITAL | Age: 50
Setting detail: OBSERVATION
Discharge: HOME | End: 2020-09-03
Attending: EMERGENCY MEDICINE | Admitting: HOSPITALIST

## 2020-09-02 ENCOUNTER — APPOINTMENT (OUTPATIENT)
Dept: CT IMAGING | Facility: HOSPITAL | Age: 50
End: 2020-09-02

## 2020-09-02 DIAGNOSIS — R10.10 PAIN OF UPPER ABDOMEN: ICD-10-CM

## 2020-09-02 DIAGNOSIS — K74.60 HEPATIC CIRRHOSIS, UNSPECIFIED HEPATIC CIRRHOSIS TYPE, UNSPECIFIED WHETHER ASCITES PRESENT (HCC): ICD-10-CM

## 2020-09-02 DIAGNOSIS — R10.9 ABDOMINAL PAIN, UNSPECIFIED ABDOMINAL LOCATION: Primary | ICD-10-CM

## 2020-09-02 DIAGNOSIS — K80.80 BILIARY CALCULUS OF OTHER SITE WITHOUT OBSTRUCTION: ICD-10-CM

## 2020-09-02 PROBLEM — E83.42 HYPOMAGNESEMIA: Chronic | Status: ACTIVE | Noted: 2020-09-02

## 2020-09-02 PROBLEM — J44.9 COPD (CHRONIC OBSTRUCTIVE PULMONARY DISEASE) (HCC): Chronic | Status: ACTIVE | Noted: 2020-09-02

## 2020-09-02 PROBLEM — E11.65 TYPE 2 DIABETES MELLITUS WITH HYPERGLYCEMIA (HCC): Chronic | Status: ACTIVE | Noted: 2020-01-08

## 2020-09-02 PROBLEM — K21.9 GERD WITHOUT ESOPHAGITIS: Chronic | Status: ACTIVE | Noted: 2020-09-02

## 2020-09-02 PROBLEM — E55.9 VITAMIN D DEFICIENCY: Chronic | Status: ACTIVE | Noted: 2020-09-02

## 2020-09-02 PROBLEM — K70.30 ALCOHOLIC CIRRHOSIS: Chronic | Status: ACTIVE | Noted: 2018-08-07

## 2020-09-02 PROBLEM — I01.1 RHEUMATOID AORTITIS: Chronic | Status: ACTIVE | Noted: 2020-09-02

## 2020-09-02 PROBLEM — J30.2 SEASONAL ALLERGIES: Chronic | Status: ACTIVE | Noted: 2020-09-02

## 2020-09-02 PROBLEM — E66.9 OBESITY (BMI 30-39.9): Chronic | Status: ACTIVE | Noted: 2020-09-02

## 2020-09-02 PROBLEM — K80.20 CHOLELITHIASIS: Chronic | Status: ACTIVE | Noted: 2020-09-02

## 2020-09-02 LAB
ALBUMIN SERPL-MCNC: 3.8 G/DL (ref 3.5–5.2)
ALBUMIN/GLOB SERPL: 0.9 G/DL
ALP SERPL-CCNC: 126 U/L (ref 39–117)
ALT SERPL W P-5'-P-CCNC: 12 U/L (ref 1–41)
AMMONIA BLD-SCNC: 35 UMOL/L (ref 16–60)
ANION GAP SERPL CALCULATED.3IONS-SCNC: 15 MMOL/L (ref 5–15)
APTT PPP: 29.9 SECONDS (ref 24–31)
AST SERPL-CCNC: 24 U/L (ref 1–40)
BASOPHILS # BLD AUTO: 0.1 10*3/MM3 (ref 0–0.2)
BASOPHILS NFR BLD AUTO: 1.4 % (ref 0–1.5)
BILIRUB SERPL-MCNC: 1 MG/DL (ref 0–1.2)
BILIRUB UR QL STRIP: NEGATIVE
BUN SERPL-MCNC: 14 MG/DL (ref 6–20)
BUN SERPL-MCNC: ABNORMAL MG/DL
BUN/CREAT SERPL: ABNORMAL
CALCIUM SPEC-SCNC: 9 MG/DL (ref 8.6–10.5)
CHLORIDE SERPL-SCNC: 99 MMOL/L (ref 98–107)
CLARITY UR: CLEAR
CO2 SERPL-SCNC: 21 MMOL/L (ref 22–29)
COLOR UR: YELLOW
CREAT SERPL-MCNC: 0.9 MG/DL (ref 0.76–1.27)
DEPRECATED RDW RBC AUTO: 46.4 FL (ref 37–54)
EOSINOPHIL # BLD AUTO: 0.5 10*3/MM3 (ref 0–0.4)
EOSINOPHIL NFR BLD AUTO: 6.6 % (ref 0.3–6.2)
ERYTHROCYTE [DISTWIDTH] IN BLOOD BY AUTOMATED COUNT: 14.4 % (ref 12.3–15.4)
GFR SERPL CREATININE-BSD FRML MDRD: 89 ML/MIN/1.73
GLOBULIN UR ELPH-MCNC: 4.2 GM/DL
GLUCOSE BLDC GLUCOMTR-MCNC: 150 MG/DL (ref 70–105)
GLUCOSE BLDC GLUCOMTR-MCNC: 165 MG/DL (ref 70–105)
GLUCOSE BLDC GLUCOMTR-MCNC: 206 MG/DL (ref 70–105)
GLUCOSE SERPL-MCNC: 196 MG/DL (ref 65–99)
GLUCOSE UR STRIP-MCNC: NEGATIVE MG/DL
HCT VFR BLD AUTO: 47.9 % (ref 37.5–51)
HGB BLD-MCNC: 16.1 G/DL (ref 13–17.7)
HGB UR QL STRIP.AUTO: NEGATIVE
INR PPP: 1.09 (ref 0.93–1.1)
KETONES UR QL STRIP: NEGATIVE
LEUKOCYTE ESTERASE UR QL STRIP.AUTO: NEGATIVE
LIPASE SERPL-CCNC: 34 U/L (ref 13–60)
LYMPHOCYTES # BLD AUTO: 1.4 10*3/MM3 (ref 0.7–3.1)
LYMPHOCYTES NFR BLD AUTO: 18.5 % (ref 19.6–45.3)
MAGNESIUM SERPL-MCNC: 1.9 MG/DL (ref 1.6–2.6)
MCH RBC QN AUTO: 31.4 PG (ref 26.6–33)
MCHC RBC AUTO-ENTMCNC: 33.7 G/DL (ref 31.5–35.7)
MCV RBC AUTO: 93.3 FL (ref 79–97)
MONOCYTES # BLD AUTO: 0.9 10*3/MM3 (ref 0.1–0.9)
MONOCYTES NFR BLD AUTO: 12.3 % (ref 5–12)
NEUTROPHILS NFR BLD AUTO: 4.6 10*3/MM3 (ref 1.7–7)
NEUTROPHILS NFR BLD AUTO: 61.2 % (ref 42.7–76)
NITRITE UR QL STRIP: NEGATIVE
NRBC BLD AUTO-RTO: 0 /100 WBC (ref 0–0.2)
PH UR STRIP.AUTO: 6.5 [PH] (ref 5–8)
PLATELET # BLD AUTO: 172 10*3/MM3 (ref 140–450)
PMV BLD AUTO: 8.7 FL (ref 6–12)
POTASSIUM SERPL-SCNC: 4.1 MMOL/L (ref 3.5–5.2)
PROT SERPL-MCNC: 8 G/DL (ref 6–8.5)
PROT UR QL STRIP: NEGATIVE
PROTHROMBIN TIME: 11.7 SECONDS (ref 9.6–11.7)
RBC # BLD AUTO: 5.14 10*6/MM3 (ref 4.14–5.8)
SODIUM SERPL-SCNC: 135 MMOL/L (ref 136–145)
SP GR UR STRIP: 1.01 (ref 1–1.03)
UROBILINOGEN UR QL STRIP: NORMAL
WBC # BLD AUTO: 7.6 10*3/MM3 (ref 3.4–10.8)

## 2020-09-02 PROCEDURE — 96376 TX/PRO/DX INJ SAME DRUG ADON: CPT

## 2020-09-02 PROCEDURE — G0378 HOSPITAL OBSERVATION PER HR: HCPCS

## 2020-09-02 PROCEDURE — 81003 URINALYSIS AUTO W/O SCOPE: CPT | Performed by: EMERGENCY MEDICINE

## 2020-09-02 PROCEDURE — 63710000001 PREDNISONE PER 5 MG: Performed by: NURSE PRACTITIONER

## 2020-09-02 PROCEDURE — 99284 EMERGENCY DEPT VISIT MOD MDM: CPT

## 2020-09-02 PROCEDURE — 25010000002 PIPERACILLIN SOD-TAZOBACTAM PER 1 G: Performed by: NURSE PRACTITIONER

## 2020-09-02 PROCEDURE — 99220 PR INITIAL OBSERVATION CARE/DAY 70 MINUTES: CPT | Performed by: HOSPITALIST

## 2020-09-02 PROCEDURE — 63710000001 INSULIN LISPRO (HUMAN) PER 5 UNITS: Performed by: NURSE PRACTITIONER

## 2020-09-02 PROCEDURE — 96365 THER/PROPH/DIAG IV INF INIT: CPT

## 2020-09-02 PROCEDURE — 85610 PROTHROMBIN TIME: CPT | Performed by: EMERGENCY MEDICINE

## 2020-09-02 PROCEDURE — 25010000002 KETOROLAC TROMETHAMINE PER 15 MG: Performed by: NURSE PRACTITIONER

## 2020-09-02 PROCEDURE — 25010000002 ONDANSETRON PER 1 MG: Performed by: EMERGENCY MEDICINE

## 2020-09-02 PROCEDURE — 96375 TX/PRO/DX INJ NEW DRUG ADDON: CPT

## 2020-09-02 PROCEDURE — 83036 HEMOGLOBIN GLYCOSYLATED A1C: CPT | Performed by: NURSE PRACTITIONER

## 2020-09-02 PROCEDURE — 85025 COMPLETE CBC W/AUTO DIFF WBC: CPT | Performed by: EMERGENCY MEDICINE

## 2020-09-02 PROCEDURE — 82140 ASSAY OF AMMONIA: CPT | Performed by: NURSE PRACTITIONER

## 2020-09-02 PROCEDURE — 85730 THROMBOPLASTIN TIME PARTIAL: CPT | Performed by: EMERGENCY MEDICINE

## 2020-09-02 PROCEDURE — 83690 ASSAY OF LIPASE: CPT | Performed by: EMERGENCY MEDICINE

## 2020-09-02 PROCEDURE — 80053 COMPREHEN METABOLIC PANEL: CPT | Performed by: EMERGENCY MEDICINE

## 2020-09-02 PROCEDURE — 96361 HYDRATE IV INFUSION ADD-ON: CPT

## 2020-09-02 PROCEDURE — 25010000002 HYDROMORPHONE PER 4 MG: Performed by: EMERGENCY MEDICINE

## 2020-09-02 PROCEDURE — 82962 GLUCOSE BLOOD TEST: CPT

## 2020-09-02 PROCEDURE — 74176 CT ABD & PELVIS W/O CONTRAST: CPT

## 2020-09-02 PROCEDURE — 25010000002 PIPERACILLIN SOD-TAZOBACTAM PER 1 G: Performed by: EMERGENCY MEDICINE

## 2020-09-02 PROCEDURE — 99214 OFFICE O/P EST MOD 30 MIN: CPT | Performed by: SURGERY

## 2020-09-02 PROCEDURE — 83735 ASSAY OF MAGNESIUM: CPT | Performed by: NURSE PRACTITIONER

## 2020-09-02 PROCEDURE — 96374 THER/PROPH/DIAG INJ IV PUSH: CPT

## 2020-09-02 PROCEDURE — 63710000001 INSULIN GLARGINE PER 5 UNITS: Performed by: NURSE PRACTITIONER

## 2020-09-02 RX ORDER — HYDROMORPHONE HCL 110MG/55ML
1 PATIENT CONTROLLED ANALGESIA SYRINGE INTRAVENOUS ONCE
Status: COMPLETED | OUTPATIENT
Start: 2020-09-02 | End: 2020-09-02

## 2020-09-02 RX ORDER — ALBUTEROL SULFATE 2.5 MG/3ML
2.5 SOLUTION RESPIRATORY (INHALATION) EVERY 4 HOURS PRN
Status: DISCONTINUED | OUTPATIENT
Start: 2020-09-02 | End: 2020-09-03 | Stop reason: HOSPADM

## 2020-09-02 RX ORDER — POTASSIUM CHLORIDE 20 MEQ/1
40 TABLET, EXTENDED RELEASE ORAL 3 TIMES DAILY
Status: DISCONTINUED | OUTPATIENT
Start: 2020-09-02 | End: 2020-09-03 | Stop reason: HOSPADM

## 2020-09-02 RX ORDER — SODIUM CHLORIDE 0.9 % (FLUSH) 0.9 %
10 SYRINGE (ML) INJECTION EVERY 12 HOURS SCHEDULED
Status: DISCONTINUED | OUTPATIENT
Start: 2020-09-02 | End: 2020-09-03 | Stop reason: HOSPADM

## 2020-09-02 RX ORDER — DEXTROSE MONOHYDRATE 25 G/50ML
25 INJECTION, SOLUTION INTRAVENOUS
Status: DISCONTINUED | OUTPATIENT
Start: 2020-09-02 | End: 2020-09-03 | Stop reason: HOSPADM

## 2020-09-02 RX ORDER — MONTELUKAST SODIUM 10 MG/1
10 TABLET ORAL NIGHTLY
Status: DISCONTINUED | OUTPATIENT
Start: 2020-09-02 | End: 2020-09-03 | Stop reason: HOSPADM

## 2020-09-02 RX ORDER — MAGNESIUM SULFATE 1 G/100ML
1 INJECTION INTRAVENOUS AS NEEDED
Status: DISCONTINUED | OUTPATIENT
Start: 2020-09-02 | End: 2020-09-03 | Stop reason: HOSPADM

## 2020-09-02 RX ORDER — ACETAMINOPHEN 650 MG/1
650 SUPPOSITORY RECTAL EVERY 4 HOURS PRN
Status: DISCONTINUED | OUTPATIENT
Start: 2020-09-02 | End: 2020-09-03 | Stop reason: HOSPADM

## 2020-09-02 RX ORDER — MAGNESIUM SULFATE HEPTAHYDRATE 40 MG/ML
2 INJECTION, SOLUTION INTRAVENOUS AS NEEDED
Status: DISCONTINUED | OUTPATIENT
Start: 2020-09-02 | End: 2020-09-03 | Stop reason: HOSPADM

## 2020-09-02 RX ORDER — PREDNISONE 10 MG/1
10 TABLET ORAL DAILY
Status: DISCONTINUED | OUTPATIENT
Start: 2020-09-02 | End: 2020-09-03 | Stop reason: HOSPADM

## 2020-09-02 RX ORDER — SODIUM CHLORIDE 0.9 % (FLUSH) 0.9 %
10 SYRINGE (ML) INJECTION AS NEEDED
Status: DISCONTINUED | OUTPATIENT
Start: 2020-09-02 | End: 2020-09-03 | Stop reason: HOSPADM

## 2020-09-02 RX ORDER — MONTELUKAST SODIUM 10 MG/1
10 TABLET ORAL NIGHTLY
COMMUNITY
End: 2020-12-22

## 2020-09-02 RX ORDER — ACETAMINOPHEN 325 MG/1
650 TABLET ORAL EVERY 4 HOURS PRN
Status: DISCONTINUED | OUTPATIENT
Start: 2020-09-02 | End: 2020-09-03 | Stop reason: HOSPADM

## 2020-09-02 RX ORDER — INSULIN GLARGINE 100 [IU]/ML
15 INJECTION, SOLUTION SUBCUTANEOUS NIGHTLY
Status: DISCONTINUED | OUTPATIENT
Start: 2020-09-02 | End: 2020-09-03 | Stop reason: HOSPADM

## 2020-09-02 RX ORDER — KETOROLAC TROMETHAMINE 30 MG/ML
30 INJECTION, SOLUTION INTRAMUSCULAR; INTRAVENOUS EVERY 6 HOURS PRN
Status: DISCONTINUED | OUTPATIENT
Start: 2020-09-02 | End: 2020-09-03 | Stop reason: HOSPADM

## 2020-09-02 RX ORDER — PROMETHAZINE HYDROCHLORIDE 12.5 MG/1
12.5 TABLET ORAL EVERY 6 HOURS PRN
Status: DISCONTINUED | OUTPATIENT
Start: 2020-09-02 | End: 2020-09-03 | Stop reason: HOSPADM

## 2020-09-02 RX ORDER — LACTULOSE 10 G/15ML
30 SOLUTION ORAL 2 TIMES DAILY
Status: DISCONTINUED | OUTPATIENT
Start: 2020-09-02 | End: 2020-09-03 | Stop reason: HOSPADM

## 2020-09-02 RX ORDER — BUMETANIDE 1 MG/1
2 TABLET ORAL
Status: DISCONTINUED | OUTPATIENT
Start: 2020-09-02 | End: 2020-09-03 | Stop reason: HOSPADM

## 2020-09-02 RX ORDER — ONDANSETRON 2 MG/ML
4 INJECTION INTRAMUSCULAR; INTRAVENOUS EVERY 6 HOURS PRN
Status: DISCONTINUED | OUTPATIENT
Start: 2020-09-02 | End: 2020-09-03 | Stop reason: HOSPADM

## 2020-09-02 RX ORDER — TAMSULOSIN HYDROCHLORIDE 0.4 MG/1
0.4 CAPSULE ORAL 2 TIMES DAILY
Status: DISCONTINUED | OUTPATIENT
Start: 2020-09-02 | End: 2020-09-03 | Stop reason: HOSPADM

## 2020-09-02 RX ORDER — PANTOPRAZOLE SODIUM 40 MG/10ML
40 INJECTION, POWDER, LYOPHILIZED, FOR SOLUTION INTRAVENOUS
Status: DISCONTINUED | OUTPATIENT
Start: 2020-09-03 | End: 2020-09-03 | Stop reason: HOSPADM

## 2020-09-02 RX ORDER — ACETAMINOPHEN 160 MG/5ML
650 SOLUTION ORAL EVERY 4 HOURS PRN
Status: DISCONTINUED | OUTPATIENT
Start: 2020-09-02 | End: 2020-09-03 | Stop reason: HOSPADM

## 2020-09-02 RX ORDER — ONDANSETRON 4 MG/1
4 TABLET, FILM COATED ORAL EVERY 6 HOURS PRN
Status: DISCONTINUED | OUTPATIENT
Start: 2020-09-02 | End: 2020-09-03 | Stop reason: HOSPADM

## 2020-09-02 RX ORDER — ONDANSETRON 2 MG/ML
4 INJECTION INTRAMUSCULAR; INTRAVENOUS ONCE
Status: COMPLETED | OUTPATIENT
Start: 2020-09-02 | End: 2020-09-02

## 2020-09-02 RX ORDER — NICOTINE POLACRILEX 4 MG
15 LOZENGE BUCCAL
Status: DISCONTINUED | OUTPATIENT
Start: 2020-09-02 | End: 2020-09-03 | Stop reason: HOSPADM

## 2020-09-02 RX ORDER — SPIRONOLACTONE 25 MG/1
25 TABLET ORAL DAILY
Status: DISCONTINUED | OUTPATIENT
Start: 2020-09-02 | End: 2020-09-03 | Stop reason: HOSPADM

## 2020-09-02 RX ORDER — POTASSIUM CHLORIDE 20 MEQ/1
40 TABLET, EXTENDED RELEASE ORAL AS NEEDED
Status: DISCONTINUED | OUTPATIENT
Start: 2020-09-02 | End: 2020-09-03 | Stop reason: HOSPADM

## 2020-09-02 RX ORDER — AZELASTINE 1 MG/ML
2 SPRAY, METERED NASAL 2 TIMES DAILY
COMMUNITY
End: 2021-08-11 | Stop reason: SDUPTHER

## 2020-09-02 RX ORDER — SODIUM CHLORIDE, SODIUM LACTATE, POTASSIUM CHLORIDE, CALCIUM CHLORIDE 600; 310; 30; 20 MG/100ML; MG/100ML; MG/100ML; MG/100ML
100 INJECTION, SOLUTION INTRAVENOUS CONTINUOUS
Status: DISCONTINUED | OUTPATIENT
Start: 2020-09-02 | End: 2020-09-03 | Stop reason: HOSPADM

## 2020-09-02 RX ADMIN — KETOROLAC TROMETHAMINE 30 MG: 30 INJECTION, SOLUTION INTRAMUSCULAR at 15:19

## 2020-09-02 RX ADMIN — SPIRONOLACTONE 25 MG: 25 TABLET, FILM COATED ORAL at 15:19

## 2020-09-02 RX ADMIN — PIPERACILLIN AND TAZOBACTAM 3.38 G: 3; .375 INJECTION, POWDER, LYOPHILIZED, FOR SOLUTION INTRAVENOUS at 13:26

## 2020-09-02 RX ADMIN — Medication 10 ML: at 20:47

## 2020-09-02 RX ADMIN — MONTELUKAST SODIUM 10 MG: 10 TABLET, COATED ORAL at 20:47

## 2020-09-02 RX ADMIN — BUMETANIDE 2 MG: 1 TABLET ORAL at 18:06

## 2020-09-02 RX ADMIN — HYDROMORPHONE HYDROCHLORIDE 1 MG: 2 INJECTION, SOLUTION INTRAMUSCULAR; INTRAVENOUS; SUBCUTANEOUS at 10:08

## 2020-09-02 RX ADMIN — LACTULOSE 30 ML: 10 SOLUTION ORAL at 20:47

## 2020-09-02 RX ADMIN — POTASSIUM CHLORIDE 40 MEQ: 1500 TABLET, EXTENDED RELEASE ORAL at 15:19

## 2020-09-02 RX ADMIN — HYDROMORPHONE HYDROCHLORIDE 1 MG: 2 INJECTION, SOLUTION INTRAMUSCULAR; INTRAVENOUS; SUBCUTANEOUS at 13:25

## 2020-09-02 RX ADMIN — INSULIN GLARGINE 15 UNITS: 100 INJECTION, SOLUTION SUBCUTANEOUS at 20:46

## 2020-09-02 RX ADMIN — PIPERACILLIN AND TAZOBACTAM 3.38 G: 3; .375 INJECTION, POWDER, FOR SOLUTION INTRAVENOUS at 19:18

## 2020-09-02 RX ADMIN — KETOROLAC TROMETHAMINE 30 MG: 30 INJECTION, SOLUTION INTRAMUSCULAR at 22:40

## 2020-09-02 RX ADMIN — RIFAXIMIN 550 MG: 550 TABLET ORAL at 21:22

## 2020-09-02 RX ADMIN — TAMSULOSIN HYDROCHLORIDE 0.4 MG: 0.4 CAPSULE ORAL at 20:47

## 2020-09-02 RX ADMIN — SODIUM CHLORIDE, SODIUM LACTATE, POTASSIUM CHLORIDE, AND CALCIUM CHLORIDE 100 ML/HR: 600; 310; 30; 20 INJECTION, SOLUTION INTRAVENOUS at 15:38

## 2020-09-02 RX ADMIN — SODIUM CHLORIDE 500 ML: 900 INJECTION, SOLUTION INTRAVENOUS at 10:07

## 2020-09-02 RX ADMIN — ONDANSETRON 4 MG: 2 INJECTION INTRAMUSCULAR; INTRAVENOUS at 10:08

## 2020-09-02 RX ADMIN — PREDNISONE 10 MG: 10 TABLET ORAL at 15:19

## 2020-09-02 RX ADMIN — INSULIN LISPRO 5 UNITS: 100 INJECTION, SOLUTION INTRAVENOUS; SUBCUTANEOUS at 20:46

## 2020-09-02 RX ADMIN — POTASSIUM CHLORIDE 40 MEQ: 1500 TABLET, EXTENDED RELEASE ORAL at 20:47

## 2020-09-02 RX ADMIN — MAGNESIUM OXIDE TAB 400 MG (241.3 MG ELEMENTAL MG) 400 MG: 400 (241.3 MG) TAB at 20:47

## 2020-09-02 NOTE — ED PROVIDER NOTES
Subjective   History of Present Illness  Abdominal pain  50-year-old male complains of moderate to severe intensity right upper abdominal pain since 530 this morning.  He states he has had some nausea vomiting.  He states he last ate around 230 with some peanut butter.  He reports no fevers or chills or diarrhea.  He reports no melena or hematochezia.  Review of Systems   Constitutional: Negative.    HENT: Negative.    Eyes: Negative.    Respiratory: Negative.    Cardiovascular: Negative.    Gastrointestinal: Positive for abdominal pain, nausea and vomiting. Negative for blood in stool, constipation and diarrhea.   Genitourinary: Negative.    Musculoskeletal: Negative.    Skin: Negative.    Neurological: Negative.    Psychiatric/Behavioral: Negative.        Past Medical History:   Diagnosis Date   • Acute perforated gastric ulcer with hemorrhage (CMS/HCC) 07/16/2018   • Alcoholism (CMS/HCC)    • Allergic rhinitis    • Arthritis     rheumatoid (diagnosed at age 16)   • Cirrhosis (CMS/HCC)     etoh cirrhosis-liver failure   • CKD (chronic kidney disease)    • COPD (chronic obstructive pulmonary disease) (CMS/HCC)    • Depression    • Duodenal ulcer      perforated duodenal s/p patch   • History of transfusion    • Infectious viral hepatitis    • MRSA (methicillin resistant Staphylococcus aureus) carrier 06/12/2019    nasal swab   • Tumors      benign essential   • Type 2 diabetes mellitus with hyperglycemia (CMS/HCC) 1/8/2020   • Umbilical hernia u   • Wound, open     nonhealing sore       Allergies   Allergen Reactions   • Duloxetine Rash       Past Surgical History:   Procedure Laterality Date   • CYST REMOVAL Left     forarm   • ENDOSCOPY      ascities/paracentesis   • EXPLORATORY LAPAROTOMY  07/16/2018    Over-sew Gastric Ulcer With Gastrostomy and Jejunostomy Tube   • FOOT SURGERY Right     right bunion removal   • FRACTURE SURGERY Right 2005    bunionectomy/ broke toe and put in rods   • MASS EXCISION Right  6/18/2019    Procedure: EXCISION OF SEBACEOUS CYST OF THE RIGHT BACK;  Surgeon: Sapna Elizalde MD;  Location: [facility] MAIN OR;  Service: General   • MASS EXCISION Right 8/27/2019    Procedure: EXCISION LESION of right back;  Surgeon: Sapna Elizalde MD;  Location: [facility] MAIN OR;  Service: General   • UMBILICAL HERNIA REPAIR N/A 6/18/2019    Procedure: UMBILICAL HERNIA REPAIR LAPAROSCOPIC WITH MESH WITH EXTENSIVE LYSIS OF ADHESIONS;  Surgeon: Sapna Elizalde MD;  Location: [facility] MAIN OR;  Service: General       Family History   Problem Relation Age of Onset   • Cancer Mother    • Lung disease Father    • Other Father         prostate problems   • Mental illness Brother        Social History     Socioeconomic History   • Marital status:      Spouse name: Nicky Selby   • Number of children: Not on file   • Years of education: Not on file   • Highest education level: Not on file   Tobacco Use   • Smoking status: Former Smoker     Types: Cigarettes     Last attempt to quit: 8/21/2017     Years since quitting: 3.0   • Smokeless tobacco: Former User   Substance and Sexual Activity   • Alcohol use: No     Frequency: Never     Comment: Off alcohol since July 2018   • Drug use: No   • Sexual activity: Defer     Prior to Admission medications    Medication Sig Start Date End Date Taking? Authorizing Provider   Accu-Chek FastClix Lancets misc 1 each 4 (Four) Times a Day Before Meals & at Bedtime. 7/27/20   Carlita Shepherd MD   albuterol sulfate  (90 Base) MCG/ACT inhaler Inhale 2 puffs Every 4 (Four) Hours As Needed for Wheezing.    ProviderDeon MD   azelastine (ASTELIN) 0.1 % nasal spray INSTILL 2 SPRAYS INTO EACH BNOSTRIL TWICE A DAY 6/11/20   Carlita Shepherd MD   bumetanide (BUMEX) 2 MG tablet Take 2 mg by mouth 2 (Two) Times a Day.    ProviderDeon MD   EPINEPHrine (EPIPEN) 0.3 MG/0.3ML solution auto-injector injection Inject 0.3 mg under the skin into the appropriate area as directed 1  (One) Time As Needed.    Deon Mayes MD   Etanercept (Enbrel Mini) 50 MG/ML solution cartridge Inject 50 mg under the skin into the appropriate area as directed 1 (One) Time Per Week. 5/13/20   Lala Costa MD   famotidine (PEPCID) 20 MG tablet TAKE 1 TABLET BY MOUTH EVERY DAY 4/20/20   Lala Costa MD   glucose blood (ACCU-CHEK GRAZYNA PLUS) test strip Use as instructed four times a day and prn E11.9 1/17/20   Carlita Shepherd MD   glucose monitor monitoring kit 1 each 4 (Four) Times a Day Before Meals & at Bedtime. 1/17/20   Carlita Shepherd MD   insulin detemir (LEVEMIR) 100 UNIT/ML injection Inject 24 Units under the skin into the appropriate area as directed Daily. 7/27/20   Carlita Shepherd MD   Insulin Pen Needle (PEN NEEDLES) 32G X 4 MM misc 1 each 4 (Four) Times a Day Before Meals & at Bedtime. 2/28/20   Carlita Shepherd MD   Isopropyl Alcohol (ALCOHOL WIPES) 70 % misc Apply 1 each topically 4 (Four) Times a Day Before Meals & at Bedtime. 1/10/20   Mauro Geiger, DO   lactulose (CHRONULAC) 10 GM/15ML solution Take 30 mL by mouth 2 (Two) Times a Day. 1/10/20   Mauro Geiger DO   magnesium oxide (MAG-OX) 400 MG tablet Take 2 tablets by mouth 2 (Two) Times a Day. 6/6/19   Deon Mayes MD   meclizine 25 MG chewable tablet chewable tablet Chew 25-50 mg 3 (Three) Times a Day As Needed.    Deon Mayes MD   montelukast (SINGULAIR) 10 MG tablet TAKE 1 TABLET BY MOUTH EVERY DAY EVERY NIGHT 5/26/20   Carlita Shepherd MD   NOVOLOG FLEXPEN 100 UNIT/ML solution pen-injector sc pen Inject 2 Units under the skin into the appropriate area as directed 3 (Three) Times a Day With Meals. 2/17/20   Carlita Shepherd MD   ondansetron (ZOFRAN) 8 MG tablet Take 8 mg by mouth Every 8 (Eight) Hours As Needed for Nausea or Vomiting.    Provider, MD Deon   potassium chloride (K-DUR,KLOR-CON) 20 MEQ CR tablet Take 40 mEq by mouth 3  "(Three) Times a Day.    Deon Mayes MD   predniSONE (DELTASONE) 10 MG tablet Take 1 tablet by mouth Daily. 9/1/20   Carlita Shepherd MD   promethazine (PHENERGAN) 12.5 MG tablet Take 12.5 mg by mouth Every 6 (Six) Hours As Needed for Nausea or Vomiting.    Deon Mayes MD   riFAXIMin (Xifaxan) 550 MG tablet Take 1 tablet by mouth Every 12 (Twelve) Hours. 7/23/20   Carlita Shepherd MD   senna (SENOKOT) 8.6 MG tablet Take 1 tablet by mouth 2 (Two) Times a Day As Needed for Constipation. 1/10/20   Mauro Geiger I,    spironolactone (ALDACTONE) 25 MG tablet Take 25 mg by mouth Daily.    Deon Mayes MD   tamsulosin (FLOMAX) 0.4 MG capsule 24 hr capsule Take 1 capsule by mouth 2 (Two) Times a Day.    Deon Mayes MD   Triamcinolone Acetonide (NASACORT) 55 MCG/ACT nasal inhaler 1 spray into the nostril(s) as directed by provider Daily.    Deon Mayes MD   vitamin D (ERGOCALCIFEROL) 76000 units capsule capsule Take 50,000 Units by mouth Every 14 (Fourteen) Days. Every other Sunday    Deon Mayes MD   zinc sulfate (ZINCATE) 220 (50 Zn) MG capsule Take 220 mg by mouth Daily. 7/7/19   Deon Mayes MD     /74   Pulse 76   Temp 97.6 °F (36.4 °C) (Oral)   Resp 18   Ht 177.8 cm (70\")   Wt 122 kg (268 lb 15.4 oz)   SpO2 94%   BMI 38.59 kg/m²   I examined the patient using the appropriate personal protective equipment.          Objective   Physical Exam  General: Well-developed well-appearing, no acute distress, alert and appropriate  Eyes:  sclera nonicteric  HEENT: Mucous membranes moist, no mucosal swelling  Neck: Supple, no nuchal rigidity, no lymphadenopathy  Respirations: Respirations nonlabored, equal breath sounds bilaterally, clear lungs  Heart regular rate and rhythm, no murmurs rubs or gallops,   Abdomen soft, tender palpation right upper quadrant, no rebound or guarding, nondistended, no hepatosplenomegaly, no hernia, no " mass, normal bowel sounds, no CVA tenderness  Extremities no clubbing cyanosis or edema, calves are symmetric and nontender  Neuro cranial nerves grossly intact, no focal limb deficits  Psych oriented, pleasant affect  Skin no rash, brisk cap refill  Procedures           ED Course      Results for orders placed or performed during the hospital encounter of 09/02/20   Comprehensive Metabolic Panel   Result Value Ref Range    Glucose 196 (H) 65 - 99 mg/dL    BUN      Creatinine 0.90 0.76 - 1.27 mg/dL    Sodium 135 (L) 136 - 145 mmol/L    Potassium 4.1 3.5 - 5.2 mmol/L    Chloride 99 98 - 107 mmol/L    CO2 21.0 (L) 22.0 - 29.0 mmol/L    Calcium 9.0 8.6 - 10.5 mg/dL    Total Protein 8.0 6.0 - 8.5 g/dL    Albumin 3.80 3.50 - 5.20 g/dL    ALT (SGPT) 12 1 - 41 U/L    AST (SGOT) 24 1 - 40 U/L    Alkaline Phosphatase 126 (H) 39 - 117 U/L    Total Bilirubin 1.0 0.0 - 1.2 mg/dL    eGFR Non African Amer 89 >60 mL/min/1.73    Globulin 4.2 gm/dL    A/G Ratio 0.9 g/dL    BUN/Creatinine Ratio      Anion Gap 15.0 5.0 - 15.0 mmol/L   Lipase   Result Value Ref Range    Lipase 34 13 - 60 U/L   Protime-INR   Result Value Ref Range    Protime 11.7 9.6 - 11.7 Seconds    INR 1.09 0.93 - 1.10   aPTT   Result Value Ref Range    PTT 29.9 24.0 - 31.0 seconds   Urinalysis With Culture If Indicated - Urine, Clean Catch   Result Value Ref Range    Color, UA Yellow Yellow, Straw    Appearance, UA Clear Clear    pH, UA 6.5 5.0 - 8.0    Specific Gravity, UA 1.012 1.005 - 1.030    Glucose, UA Negative Negative    Ketones, UA Negative Negative    Bilirubin, UA Negative Negative    Blood, UA Negative Negative    Protein, UA Negative Negative    Leuk Esterase, UA Negative Negative    Nitrite, UA Negative Negative    Urobilinogen, UA 1.0 E.U./dL 0.2 - 1.0 E.U./dL   CBC Auto Differential   Result Value Ref Range    WBC 7.60 3.40 - 10.80 10*3/mm3    RBC 5.14 4.14 - 5.80 10*6/mm3    Hemoglobin 16.1 13.0 - 17.7 g/dL    Hematocrit 47.9 37.5 - 51.0 %    MCV  93.3 79.0 - 97.0 fL    MCH 31.4 26.6 - 33.0 pg    MCHC 33.7 31.5 - 35.7 g/dL    RDW 14.4 12.3 - 15.4 %    RDW-SD 46.4 37.0 - 54.0 fl    MPV 8.7 6.0 - 12.0 fL    Platelets 172 140 - 450 10*3/mm3    Neutrophil % 61.2 42.7 - 76.0 %    Lymphocyte % 18.5 (L) 19.6 - 45.3 %    Monocyte % 12.3 (H) 5.0 - 12.0 %    Eosinophil % 6.6 (H) 0.3 - 6.2 %    Basophil % 1.4 0.0 - 1.5 %    Neutrophils, Absolute 4.60 1.70 - 7.00 10*3/mm3    Lymphocytes, Absolute 1.40 0.70 - 3.10 10*3/mm3    Monocytes, Absolute 0.90 0.10 - 0.90 10*3/mm3    Eosinophils, Absolute 0.50 (H) 0.00 - 0.40 10*3/mm3    Basophils, Absolute 0.10 0.00 - 0.20 10*3/mm3    nRBC 0.0 0.0 - 0.2 /100 WBC   BUN   Result Value Ref Range    BUN 14 6 - 20 mg/dL     Ct Abdomen Pelvis Without Contrast    Result Date: 9/2/2020   1. Cirrhotic liver morphology with portal hypertension changes including small quantity ascites and splenomegaly. 2. Cholelithiasis. There is mild bladder suspension without convincing CT evidence of acute cholecystitis. 3. No active bowel inflammatory changes are identified. 4. Small 4 mm or less noncalcified bibasilar pulmonary nodules are stable since 06/08/2020. Consider continued CT chest follow-up in 12 months to document stability. 4. Additional chronic findings as described above.    Electronically Signed By-Dr. Marylin Scherer MD On:9/2/2020 11:17 AM This report was finalized on [id number] by Dr. Marylin Scherer MD.                                         MDM  Patient presents with some right upper quadrant abdominal pain does have a history of cirrhosis does not have diffuse abdominal tenderness to suggest peritonitis or acute abdomen but does localize to the right upper quadrant does have cholelithiasis on CT scan.  He is afebrile with his persistent pain in the emergency room despite analgesia was concern for cholecystitis was ordered IV Zosyn.  The case discussed with Dr. Conley with general surgery as well as with the hospitalist  service for observation for further evaluation.  Patient is agreeable to the plan.  Final diagnoses:   Abdominal pain, unspecified abdominal location   Biliary calculus of other site without obstruction   Hepatic cirrhosis, unspecified hepatic cirrhosis type, unspecified whether ascites present (CMS/HCC)            Jefferson Booker MD  09/02/20 5918

## 2020-09-02 NOTE — PLAN OF CARE
Problem: Patient Care Overview  Goal: Plan of Care Review  Outcome: Ongoing (interventions implemented as appropriate)  Flowsheets (Taken 9/2/2020 2518)  Progress: no change  Plan of Care Reviewed With: patient  Outcome Summary: Patient admitted from ED with abdominal pain. Patient is stable at this time, will continue to monitor.

## 2020-09-02 NOTE — ED NOTES
Pt c/o epigastric pain starting apx 0530 this morning radiating to back. Pt denies cardiac hx. Pt presents extremely diaphoretic and anxious.      Prior, MIRNA Hernandez  09/02/20 [id number]

## 2020-09-02 NOTE — H&P
[facility] Medicine Services      Patient Name: Mc Selby  : 1970  MRN: [id number]  Primary Care Physician: Carlita Shepherd MD  Date of admission: 2020    Patient Care Team:  Carlita Shepherd MD as PCP - General (Internal Medicine)  Carlita Shepherd MD as PCP - Family Medicine          Subjective   History Present Illness     Chief Complaint:   Chief Complaint   Patient presents with   • Abdominal Pain         Mr. Selby is a 50 y.o. male with a past medical history of cirrhosis, COPD, GERD, hypomagnesemia, type 2 diabetes mellitus, and vitamin D deficiency who presented [facility] on 2020 with complaints of abdominal pain.  Patient states around 5:30 this morning he developed pain in the right side of his back that radiated to the right side of his abdomen.  He states his current pain is a 10 out of 10 in his right upper quadrant and describes it as sore.  He denies any aggravating or alleviating factors.  He has had accompanied diaphoresis, nausea, and vomiting x2.  His last bowel movement was Monday at 3 PM.  He denies any recent fever, chills, cough, shortness of breath, or chest pain.    In the ED, CT abdomen pelvis showed Cirrhotic liver morphology with portal hypertension changes including  small quantity ascites and splenomegaly. Cholelithiasis. There is mild bladder suspension without convincing CT evidence of acute cholecystitis. No active bowel inflammatory changes are identified.  Small 4 mm or less noncalcified bibasilar pulmonary nodules are  stable since 2020. Consider continued CT chest follow-up in 12 months to document stability.  All labs unremarkable.  all vital signs stable upon admission.  Patient received Dilaudid, Zofran, Zosyn, normal saline 500 mL bolus in the ED.  General surgery consulted.    Review of Systems   Constitution:        Diaphoresis   HENT: Negative.    Respiratory: Negative.    Skin: Negative.     Musculoskeletal: Positive for back pain.        Right sided    Gastrointestinal: Positive for abdominal pain, nausea and vomiting.   Genitourinary: Negative.    Neurological: Negative.    Psychiatric/Behavioral: Negative.          Personal History     Past Medical History:   Past Medical History:   Diagnosis Date   • Acute perforated gastric ulcer with hemorrhage (CMS/HCC) 07/16/2018   • Alcoholism (CMS/HCC)    • Allergic rhinitis    • Arthritis     rheumatoid (diagnosed at age 16)   • Cirrhosis (CMS/HCC)     etoh cirrhosis-liver failure   • CKD (chronic kidney disease)    • COPD (chronic obstructive pulmonary disease) (CMS/HCC)    • Depression    • Duodenal ulcer      perforated duodenal s/p patch   • GERD without esophagitis 9/2/2020   • History of transfusion    • Infectious viral hepatitis    • MRSA (methicillin resistant Staphylococcus aureus) carrier 06/12/2019    nasal swab   • Tumors      benign essential   • Type 2 diabetes mellitus with hyperglycemia (CMS/HCC) 1/8/2020   • Umbilical hernia u   • Wound, open     nonhealing sore       Surgical History:      Past Surgical History:   Procedure Laterality Date   • CYST REMOVAL Left     forarm   • ENDOSCOPY      ascities/paracentesis   • EXPLORATORY LAPAROTOMY  07/16/2018    Over-sew Gastric Ulcer With Gastrostomy and Jejunostomy Tube   • FOOT SURGERY Right     right bunion removal   • FRACTURE SURGERY Right 2005    bunionectomy/ broke toe and put in rods   • MASS EXCISION Right 6/18/2019    Procedure: EXCISION OF SEBACEOUS CYST OF THE RIGHT BACK;  Surgeon: Sapna Elizalde MD;  Location: [facility] OR;  Service: General   • MASS EXCISION Right 8/27/2019    Procedure: EXCISION LESION of right back;  Surgeon: Sapna Elizalde MD;  Location: [facility] OR;  Service: General   • UMBILICAL HERNIA REPAIR N/A 6/18/2019    Procedure: UMBILICAL HERNIA REPAIR LAPAROSCOPIC WITH MESH WITH EXTENSIVE LYSIS OF ADHESIONS;  Surgeon: Sapna Elizalde MD;  Location: [facility] OR;   Service: General           Family History: family history includes Cancer in his mother; Lung disease in his father; Mental illness in his brother; Other in his father. Otherwise pertinent FHx was reviewed and unremarkable.     Social History:  reports that he quit smoking about 3 years ago. His smoking use included cigarettes. He has quit using smokeless tobacco. He reports that he does not drink alcohol or use drugs.      Medications:  Prior to Admission medications    Medication Sig Start Date End Date Taking? Authorizing Provider   Accu-Chek FastClix Lancets misc 1 each 4 (Four) Times a Day Before Meals & at Bedtime. 7/27/20   Carlita Shepherd MD   albuterol sulfate  (90 Base) MCG/ACT inhaler Inhale 2 puffs Every 4 (Four) Hours As Needed for Wheezing.    ProviderDeon MD   azelastine (ASTELIN) 0.1 % nasal spray INSTILL 2 SPRAYS INTO EACH BNOSTRIL TWICE A DAY 6/11/20   Carlita Shepherd MD   bumetanide (BUMEX) 2 MG tablet Take 2 mg by mouth 2 (Two) Times a Day.    ProviderDeon MD   EPINEPHrine (EPIPEN) 0.3 MG/0.3ML solution auto-injector injection Inject 0.3 mg under the skin into the appropriate area as directed 1 (One) Time As Needed.    Deon Mayes MD   Etanercept (Enbrel Mini) 50 MG/ML solution cartridge Inject 50 mg under the skin into the appropriate area as directed 1 (One) Time Per Week. 5/13/20   Lala Costa MD   famotidine (PEPCID) 20 MG tablet TAKE 1 TABLET BY MOUTH EVERY DAY 4/20/20   Lala Costa MD   glucose blood (ACCU-CHEK GRAZYNA PLUS) test strip Use as instructed four times a day and prn E11.9 1/17/20   Carlita Shepherd MD   glucose monitor monitoring kit 1 each 4 (Four) Times a Day Before Meals & at Bedtime. 1/17/20   Carlita Shepherd MD   insulin detemir (LEVEMIR) 100 UNIT/ML injection Inject 24 Units under the skin into the appropriate area as directed Daily. 7/27/20   Carlita Shepherd MD   Insulin Pen Needle (PEN  NEEDLES) 32G X 4 MM misc 1 each 4 (Four) Times a Day Before Meals & at Bedtime. 2/28/20   Carlita Shepherd MD   Isopropyl Alcohol (ALCOHOL WIPES) 70 % misc Apply 1 each topically 4 (Four) Times a Day Before Meals & at Bedtime. 1/10/20   Mauro Geiger DO   lactulose (CHRONULAC) 10 GM/15ML solution Take 30 mL by mouth 2 (Two) Times a Day. 1/10/20   Mauro Geiger DO   magnesium oxide (MAG-OX) 400 MG tablet Take 2 tablets by mouth 2 (Two) Times a Day. 6/6/19   Deon Mayes MD   meclizine 25 MG chewable tablet chewable tablet Chew 25-50 mg 3 (Three) Times a Day As Needed.    Deon Mayes MD   montelukast (SINGULAIR) 10 MG tablet TAKE 1 TABLET BY MOUTH EVERY DAY EVERY NIGHT 5/26/20   Carlita Shepherd MD   NOVOLOG FLEXPEN 100 UNIT/ML solution pen-injector sc pen Inject 2 Units under the skin into the appropriate area as directed 3 (Three) Times a Day With Meals. 2/17/20   Carlita Shepherd MD   ondansetron (ZOFRAN) 8 MG tablet Take 8 mg by mouth Every 8 (Eight) Hours As Needed for Nausea or Vomiting.    Deon Mayes MD   potassium chloride (K-DUR,KLOR-CON) 20 MEQ CR tablet Take 40 mEq by mouth 3 (Three) Times a Day.    Deon Mayes MD   predniSONE (DELTASONE) 10 MG tablet Take 1 tablet by mouth Daily. 9/1/20   Carlita Shepherd MD   promethazine (PHENERGAN) 12.5 MG tablet Take 12.5 mg by mouth Every 6 (Six) Hours As Needed for Nausea or Vomiting.    Deon Mayes MD   riFAXIMin (Xifaxan) 550 MG tablet Take 1 tablet by mouth Every 12 (Twelve) Hours. 7/23/20   Carlita Shepherd MD   senna (SENOKOT) 8.6 MG tablet Take 1 tablet by mouth 2 (Two) Times a Day As Needed for Constipation. 1/10/20   Mauro Geiger DO   spironolactone (ALDACTONE) 25 MG tablet Take 25 mg by mouth Daily.    Provider, MD Deon   tamsulosin (FLOMAX) 0.4 MG capsule 24 hr capsule Take 1 capsule by mouth 2 (Two) Times a Day.    Provider, MD Deon    Triamcinolone Acetonide (NASACORT) 55 MCG/ACT nasal inhaler 1 spray into the nostril(s) as directed by provider Daily.    Deon Mayes MD   vitamin D (ERGOCALCIFEROL) 23540 units capsule capsule Take 50,000 Units by mouth Every 14 (Fourteen) Days. Every other Sunday    Deon Mayes MD   zinc sulfate (ZINCATE) 220 (50 Zn) MG capsule Take 220 mg by mouth Daily. 7/7/19   Deon Mayes MD       Allergies:    Allergies   Allergen Reactions   • Duloxetine Rash       Objective   Objective     Vital Signs  Temp:  [97.6 °F (36.4 °C)] 97.6 °F (36.4 °C)  Heart Rate:  [76-83] 76  Resp:  [18-22] 18  BP: (129-157)/(58-98) 142/75  SpO2:  [92 %-97 %] 92 %  on   ;      Body mass index is 38.59 kg/m².    Physical Exam   Constitutional: He is oriented to person, place, and time. He appears well-developed and well-nourished.   HENT:   Head: Normocephalic and atraumatic.   Right Ear: External ear normal.   Left Ear: External ear normal.   Nose: Nose normal.   Mouth/Throat: Oropharynx is clear and moist.   Eyes: Pupils are equal, round, and reactive to light. Conjunctivae and EOM are normal.   Neck: Normal range of motion.   Cardiovascular: Normal rate, regular rhythm and normal heart sounds.   S1, S2 audible   Pulmonary/Chest: Effort normal and breath sounds normal.   On room air    Abdominal: Soft. Bowel sounds are normal. There is tenderness.   RUQ tenderness +valles's sign   Musculoskeletal: Normal range of motion.   Neurological: He is alert and oriented to person, place, and time.   Skin: Skin is warm and dry.   Psychiatric: He has a normal mood and affect. His behavior is normal. Judgment and thought content normal.   Vitals reviewed.      Results Review:  I have personally reviewed most recent cardiac tracings, lab results and radiology images and interpretations and agree with findings.    Results from last 7 days   Lab Units 09/02/20  1006   WBC 10*3/mm3 7.60   HEMOGLOBIN g/dL 16.1   HEMATOCRIT %  47.9   PLATELETS 10*3/mm3 172   INR  1.09     Results from last 7 days   Lab Units 09/02/20  1006   SODIUM mmol/L 135*   POTASSIUM mmol/L 4.1   CHLORIDE mmol/L 99   CO2 mmol/L 21.0*   BUN  14   CREATININE mg/dL 0.90   GLUCOSE mg/dL 196*   CALCIUM mg/dL 9.0   ALT (SGPT) U/L 12   AST (SGOT) U/L 24     Estimated Creatinine Clearance: 125.8 mL/min (by C-G formula based on SCr of 0.9 mg/dL).  Brief Urine Lab Results  (Last result in the past 365 days)      Color   Clarity   Blood   Leuk Est   Nitrite   Protein   CREAT   Urine HCG        09/02/20 1133 Yellow Clear Negative Negative Negative Negative               Microbiology Results (last 10 days)     ** No results found for the last 240 hours. **          ECG/EMG Results (most recent)     None                    Ct Abdomen Pelvis Without Contrast    Result Date: 9/2/2020   1. Cirrhotic liver morphology with portal hypertension changes including small quantity ascites and splenomegaly. 2. Cholelithiasis. There is mild bladder suspension without convincing CT evidence of acute cholecystitis. 3. No active bowel inflammatory changes are identified. 4. Small 4 mm or less noncalcified bibasilar pulmonary nodules are stable since 06/08/2020. Consider continued CT chest follow-up in 12 months to document stability. 4. Additional chronic findings as described above.    Electronically Signed By-Dr. Marylin Scherer MD On:9/2/2020 11:17 AM This report was finalized on [id number] by Dr. Marylin Scherer MD.        Estimated Creatinine Clearance: 125.8 mL/min (by C-G formula based on SCr of 0.9 mg/dL).    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       Acute abdominal pain secondary to cholelithiasis   - CT abdomen pelvis showed Cirrhotic liver morphology with portal hypertension changes including small quantity ascites and splenomegaly. Cholelithiasis. There is mild bladder suspension without convincing CT evidence of acute cholecystitis. No active bowel inflammatory changes are identified.  Small 4 mm or less noncalcified bibasilar pulmonary nodules are  stable since 06/08/2020. Consider continued CT chest follow-up in 12 months to document stability.   - WBC normal, trend   - Patient received Dilaudid, Zofran, Zosyn, normal saline 500 mL bolus in ED.    - Continue Zosyn, IVF, and antiemetics for now   - NPO   - General surgery consulted    Alcoholic cirrhosis  - LFT's stable- monitor   - Check ammonia, patient currently alert and oriented X3 at this time  - Continue Bumex/potassium, spironolactone, lactulose, and rifaximin    Type II diabetes mellitus  - Start sliding scale, Accuchecks ACHS  - Holding home novolog, continue lantus (decreased dose due to patient decreased intake)   -Hemoglobin A1c 6.0    GERD  - IV protonix ordered     Vitamin D deficiency   -Patient takes 50,000 units every 2 weeks    Chronic hypomagnesemia  -Check mag  -Continue Mag-Ox    COPD  - No in exacerbation  - Currently on room air   -Continue home breathing treatments    Rheumatoid arthritis  - Continue predisone     Seasonal allergies  - Continue singulair     Obesity  - BMI 38.5  - Encourage lifestyle modifications       VTE Prophylaxis - SCD's    CODE STATUS:    Code Status and Medical Interventions:   Ordered at: 09/02/20 1339     Level Of Support Discussed With:    Patient     Code Status:    CPR     Medical  Interventions (Level of Support Prior to Arrest):    Full       This patient has been examined wearing appropriate Personal Protective Equipment. 09/02/20      I discussed the patient's findings and my recommendations with patient.        Electronically signed by JEFFY Davis, 09/02/20, 1:04 PM.  [facility]

## 2020-09-02 NOTE — CONSULTS
GENERAL SURGERY CONSULTATION NOTE    Consult requested by: Dr. Booker    Patient Care Team:  Carlita Shepherd MD as PCP - General (Internal Medicine)  Carlita Shepherd MD as PCP - Family Medicine    Reason for consult: RUQ abdominal pain    Subjective     Patient is a 50 y.o. male presents with right flank and right upper quadrant abdominal pain which started at approximately 5:30 this morning.  The pain reportedly woke the patient up from sleep, and then began to wrap around the right side of the upper abdomen and into the patient's epigastrium.  Patient reports that he has had pain similar to this in the past, however it normally resolves within an hour or so.  Since 5:00 this morning he has had bloating, and dull aching abdominal pain which is made better by moving.  His pain is associated with nausea, but no vomiting and decreased appetite.  The patient states that he has gotten some IV pain medicine which helped a little bit, however he is in significant discomfort even now.  The patient's past medical history is significant for alcoholic cirrhosis of the liver, perforated duodenal ulcer status post exploratory laparotomy and Chavez patch by Dr. Sapna Elizalde, and umbilical hernia repair performed by Dr. Sapna Elizalde.    Review of Systems   Constitutional: Positive for appetite change. Negative for chills and fever.   HENT: Negative for congestion and sore throat.    Respiratory: Negative for cough and shortness of breath.    Cardiovascular: Negative for chest pain and palpitations.   Gastrointestinal: Positive for abdominal distention, abdominal pain and nausea. Negative for constipation, diarrhea, vomiting and GERD.   Genitourinary: Negative for difficulty urinating, dysuria and frequency.   Musculoskeletal: Negative for arthralgias and back pain.   Skin: Negative for rash and skin lesions.   Neurological: Negative for dizziness, seizures and memory problem.   Hematological: Negative for adenopathy. Does  05/19/2017    DR GLORIA BARNES, THERE WAS A THROMBOSED INTERNAL HEMORRHOID. COLON MUCOSA WAS NORMAL. STOOL CX'S COLLECTED. ILEUM WAS NORMAL. 2 MM ASCENDING COLON POLYP BIOPSIED AND REMOVED. IN THE SIGMOID COLON, AN 8 MM POLYP WAS REMOVED TO A CLEAN BASE WITH HOT SNARE POLYPECTOMY. RETROFLEXED VIEW OF THE RECTUM SHOWED GRADE 1 INTERNAL HEMORRHOIDS. REPEAT IN 5 YRS.     OVARY REMOVAL Left 12/23/16     and D and C    SPINE SURGERY  1990    X3 FIRST ONE 1990 AND OTHERS IN THE FOLLOWING YEARS    TOTAL HIP ARTHROPLASTY  2007    LEFT HIP REPLACEMENT    TUBAL LIGATION  1996       Allergies   Allergen Reactions    Cephalexin     Ciprofloxacin Itching    Coumadin [Warfarin Sodium]     Diovan [Valsartan]     Lovenox [Enoxaparin Sodium]      Passing out     Ultram [Tramadol]      Blurred vision        Current Outpatient Prescriptions   Medication Sig Dispense Refill    tiZANidine (ZANAFLEX) 4 MG tablet Take 1 tablet by mouth 3 times daily 90 tablet 2    acetaminophen-codeine (TYLENOL #4) 300-60 MG per tablet Take 1 tablet by mouth every 8 hours as needed (severe pain) for up to 30 days. . 90 tablet 2    estrogens, conjugated, (PREMARIN) 0.3 MG tablet Take 1 tablet by mouth daily 21 tablet 3    chlorthalidone (HYGROTON) 25 MG tablet TAKE ONE TABLET BY MOUTH ONCE DAILY 90 tablet 0    atenolol (TENORMIN) 100 MG tablet TAKE ONE TABLET BY MOUTH ONCE DAILY 30 tablet 2    progesterone (PROMETRIUM) 100 MG capsule TAKE ONE CAPSULE BY MOUTH ONCE DAILY IN THE EVENING 30 capsule 5    folic acid (FOLVITE) 1 MG tablet Take 1 tablet by mouth daily 90 tablet 3    esomeprazole (NEXIUM) 20 MG delayed release capsule Take 2 capsules by mouth every morning (before breakfast) (Patient taking differently: Take 20 mg by mouth every morning (before breakfast) ) 30 capsule 5    amLODIPine (NORVASC) 10 MG tablet TAKE ONE-HALF TO ONE TABLET BY MOUTH ONCE DAILY 90 tablet 3    Homeopathic Products (ARNICARE EX) Apply topically      not bruise/bleed easily.   Psychiatric/Behavioral: Negative for sleep disturbance and depressed mood.        History  Past Medical History:   Diagnosis Date   • Acute perforated gastric ulcer with hemorrhage (CMS/HCC) 07/16/2018   • Alcoholism (CMS/HCC)    • Allergic rhinitis    • Arthritis     rheumatoid (diagnosed at age 16)   • Cirrhosis (CMS/HCC)     etoh cirrhosis-liver failure   • CKD (chronic kidney disease)    • COPD (chronic obstructive pulmonary disease) (CMS/HCC)    • Depression    • Duodenal ulcer      perforated duodenal s/p patch   • GERD without esophagitis 9/2/2020   • History of transfusion    • Infectious viral hepatitis    • MRSA (methicillin resistant Staphylococcus aureus) carrier 06/12/2019    nasal swab   • Tumors      benign essential   • Type 2 diabetes mellitus with hyperglycemia (CMS/HCC) 1/8/2020   • Umbilical hernia u   • Wound, open     nonhealing sore     Past Surgical History:   Procedure Laterality Date   • CYST REMOVAL Left     forarm   • ENDOSCOPY      ascities/paracentesis   • EXPLORATORY LAPAROTOMY  07/16/2018    Over-sew Gastric Ulcer With Gastrostomy and Jejunostomy Tube   • FOOT SURGERY Right     right bunion removal   • FRACTURE SURGERY Right 2005    bunionectomy/ broke toe and put in rods   • MASS EXCISION Right 6/18/2019    Procedure: EXCISION OF SEBACEOUS CYST OF THE RIGHT BACK;  Surgeon: Sapna Elizalde MD;  Location: [facility] MAIN OR;  Service: General   • MASS EXCISION Right 8/27/2019    Procedure: EXCISION LESION of right back;  Surgeon: Sapna Elizalde MD;  Location: [facility] OR;  Service: General   • UMBILICAL HERNIA REPAIR N/A 6/18/2019    Procedure: UMBILICAL HERNIA REPAIR LAPAROSCOPIC WITH MESH WITH EXTENSIVE LYSIS OF ADHESIONS;  Surgeon: Sapna Elizalde MD;  Location: [facility] MAIN OR;  Service: General     Family History   Problem Relation Age of Onset   • Cancer Mother    • Lung disease Father    • Other Father         prostate problems   • Mental illness Brother       Social History     Tobacco Use   • Smoking status: Former Smoker     Types: Cigarettes     Last attempt to quit: 8/21/2017     Years since quitting: 3.0   • Smokeless tobacco: Former User   Substance Use Topics   • Alcohol use: No     Frequency: Never     Comment: Off alcohol since July 2018   • Drug use: No       (Not in a hospital admission)  Allergies:  Duloxetine    Objective     Vital Signs  Temp:  [97.6 °F (36.4 °C)] 97.6 °F (36.4 °C)  Heart Rate:  [76-83] 76  Resp:  [18-22] 18  BP: (129-157)/(58-98) 142/75    Physical Exam   Constitutional: He is oriented to person, place, and time. He appears well-developed and well-nourished.   HENT:   Head: Normocephalic and atraumatic.   Eyes: Pupils are equal, round, and reactive to light.   Neck: Normal range of motion.   Cardiovascular: Normal rate and regular rhythm.   Pulmonary/Chest: Effort normal and breath sounds normal.   Abdominal: Soft. He exhibits no distension. There is tenderness in the right upper quadrant and epigastric area. There is positive Sutton's sign. There is no rigidity, no rebound and no guarding. No hernia.   Patient has a well-healed vertical midline incision from just below the umbilicus to the epigastrium.  The abdomen is obese.   Musculoskeletal: Normal range of motion. He exhibits no edema.   Lymphadenopathy:     He has no cervical adenopathy.     He has no axillary adenopathy.   Neurological: He is alert and oriented to person, place, and time.   Skin: Skin is warm and dry. No rash noted.   Psychiatric: He has a normal mood and affect.   Vitals reviewed.      Results Review:   Lab Results (last 24 hours)     Procedure Component Value Units Date/Time    Urinalysis With Culture If Indicated - Urine, Clean Catch [[id number]]  (Normal) Collected:  09/02/20 1133    Specimen:  Urine, Clean Catch Updated:  09/02/20 1153     Color, UA Yellow     Appearance, UA Clear     pH, UA 6.5     Specific Gravity, UA 1.012     Glucose, UA Negative      multivitamin (THERAGRAN) per tablet Take 1 tablet by mouth daily.  LORazepam (ATIVAN) 1 MG tablet BID for 3 days. Once daily for 3 days. 1/2 tab for 4 days. . 11 tablet 0     No current facility-administered medications for this visit. Family History   Problem Relation Age of Onset    Cancer Mother 36        BREAST CA    High Blood Pressure Father     Stroke Father 28        3 CVA's many TIAs    Heart Attack Father     Coronary Art Dis Father 64        CABG but  after surgery    High Cholesterol Father        Social History     Social History    Marital status:      Spouse name: N/A    Number of children: N/A    Years of education: N/A     Occupational History    Not on file. Social History Main Topics    Smoking status: Former Smoker     Years: 0.50     Types: Cigarettes     Quit date: 2013    Smokeless tobacco: Never Used      Comment: 1 pack per week.     Alcohol use Yes      Comment: occasional use 2-3 glasses a week     Drug use: Yes     Types: Marijuana      Comment: history of marijuana use in [place] Sexual activity: Yes     Partners: Male     Other Topics Concern    Not on file     Social History Narrative    No narrative on file         Imaging Studies:   MRI CS (2016)  \"Impression   Signal abnormality within the central cord at the C4 vertebral body level   consistent with infarct, age indeterminate.       Prior posterior fixation at C3-C6.  There is 2-3 mm anterolisthesis at C3-4.       Small amount of fluid is present posteriorly in the C3-4 and C4-5 disc spaces   without suspicious endplate signal change.  This likely reflects degeneration   or pseudoarthrosis effect.  Discitis is considered much less likely.  No   prevertebral soft tissue swelling.       Basilar invagination with indentation of the cervicomedullary junction   anteriorly.       Moderate spinal canal stenosis at the C7-T1, T1-2, and T2-3 levels. [place] is   otherwise Ketones, UA Negative     Bilirubin, UA Negative     Blood, UA Negative     Protein, UA Negative     Leuk Esterase, UA Negative     Nitrite, UA Negative     Urobilinogen, UA 1.0 E.U./dL    Narrative:       Urine microscopic not indicated.    BUN [[id number]]  (Normal) Collected:  09/02/20 1006    Specimen:  Blood Updated:  09/02/20 1111     BUN 14 mg/dL     Comprehensive Metabolic Panel [[id number]]  (Abnormal) Collected:  09/02/20 1006    Specimen:  Blood Updated:  09/02/20 1111     Glucose 196 mg/dL      BUN --     Comment: Testing performed by alternate method        Creatinine 0.90 mg/dL      Sodium 135 mmol/L      Potassium 4.1 mmol/L      Comment: Specimen hemolyzed.  Results may be affected. Result checked         Chloride 99 mmol/L      CO2 21.0 mmol/L      Calcium 9.0 mg/dL      Total Protein 8.0 g/dL      Albumin 3.80 g/dL      ALT (SGPT) 12 U/L      Comment: Specimen hemolyzed.  Results may be affected.        AST (SGOT) 24 U/L      Comment: Specimen hemolyzed.  Results may be affected.        Alkaline Phosphatase 126 U/L      Total Bilirubin 1.0 mg/dL      eGFR Non African Amer 89 mL/min/1.73      Globulin 4.2 gm/dL      A/G Ratio 0.9 g/dL      BUN/Creatinine Ratio --     Comment: Testing not performed        Anion Gap 15.0 mmol/L     Narrative:       GFR Normal >60  Chronic Kidney Disease <60  Kidney Failure <15      Lipase [[id number]]  (Normal) Collected:  09/02/20 1006    Specimen:  Blood Updated:  09/02/20 1100     Lipase 34 U/L     aPTT [[id number]]  (Normal) Collected:  09/02/20 1006    Specimen:  Blood Updated:  09/02/20 1039     PTT 29.9 seconds     Protime-INR [[id number]]  (Normal) Collected:  09/02/20 1006    Specimen:  Blood Updated:  09/02/20 1039     Protime 11.7 Seconds      INR 1.09    CBC & Differential [[id number]] Collected:  09/02/20 1006    Specimen:  Blood Updated:  09/02/20 1027    Narrative:       The following orders were created for panel order CBC & Differential.  Procedure                                Abnormality         Status                     ---------                               -----------         ------                     CBC Auto Differential[[id number]]        Abnormal            Final result                 Please view results for these tests on the individual orders.    CBC Auto Differential [[id number]]  (Abnormal) Collected:  09/02/20 1006    Specimen:  Blood Updated:  09/02/20 1027     WBC 7.60 10*3/mm3      RBC 5.14 10*6/mm3      Hemoglobin 16.1 g/dL      Hematocrit 47.9 %      MCV 93.3 fL      MCH 31.4 pg      MCHC 33.7 g/dL      RDW 14.4 %      RDW-SD 46.4 fl      MPV 8.7 fL      Platelets 172 10*3/mm3      Neutrophil % 61.2 %      Lymphocyte % 18.5 %      Monocyte % 12.3 %      Eosinophil % 6.6 %      Basophil % 1.4 %      Neutrophils, Absolute 4.60 10*3/mm3      Lymphocytes, Absolute 1.40 10*3/mm3      Monocytes, Absolute 0.90 10*3/mm3      Eosinophils, Absolute 0.50 10*3/mm3      Basophils, Absolute 0.10 10*3/mm3      nRBC 0.0 /100 WBC         Ct Abdomen Pelvis Without Contrast    Result Date: 9/2/2020   1. Cirrhotic liver morphology with portal hypertension changes including small quantity ascites and splenomegaly. 2. Cholelithiasis. There is mild bladder suspension without convincing CT evidence of acute cholecystitis. 3. No active bowel inflammatory changes are identified. 4. Small 4 mm or less noncalcified bibasilar pulmonary nodules are stable since 06/08/2020. Consider continued CT chest follow-up in 12 months to document stability. 4. Additional chronic findings as described above.    Electronically Signed By-Dr. Marylin Scherer MD On:9/2/2020 11:17 AM This report was finalized on [id number] by Dr. Marylin Scherer MD.        I reviewed the patient's new imaging results and agree with the interpretation.  I reviewed the patient's other test results and agree with the interpretation    Assessment/Plan     Principal Problem:    Abdominal pain  Active Problems:     Alcoholic cirrhosis (CMS/HCC)    Type 2 diabetes mellitus with hyperglycemia (CMS/HCC)    Obesity (BMI 30-39.9)    GERD without esophagitis    Vitamin D deficiency    COPD (chronic obstructive pulmonary disease) (CMS/HCC)    Hypomagnesemia    Rheumatoid aortitis    Seasonal allergies    Cholelithiasis    On CT scan, the patient has evidence of cholelithiasis without any evidence of cholecystitis.  His laboratory values also do not support cholecystitis as his white blood cell count and differential are normal.  It is possible that he may have early acute cholecystitis, and therefore I think we should begin empiric antibiotics at this time.  I discussed with the patient that given his multiple operations in the area of his gallbladder as well as his cirrhosis of the liver, any surgical intervention on this area is extremely high risk of bleeding and complications.  He would likely require an open surgical removal of his gallbladder which would leave him in the hospital for many days with drains and with significant right upper quadrant discomfort.  Therefore, I would reserve surgical intervention as a last ditch effort.  I recommend bowel rest, antibiotics, pain control.  Given his cirrhosis, I do not believe that HIDA scan will be effective in determining whether or not his gallbladder demonstrates cholecystitis, and therefore I would not recommend performing a HIDA scan at this time.  Ultrasound is also unlikely to shed any light not seen on CT scan.  We will follow the patient's symptoms closely, and hopefully he will resolve with nonoperative management.  Should the patient fail nonoperative management, I would likely consider placement of a cholecystostomy tube prior to proceeding to the operating room given his alcoholic cirrhosis with evidence of portal hypertension and scarring from his previous duodenal ulcer surgery.    Wicho Conley MD  09/02/20  13:46         Body mass index is 26.64 kg/m². Pain Score:   5 /10    Goals of chronic pain therapy:      Multi-disciplinary comprehensive pain management with functional improvement. Prescription pain medication monitoring:      MEDD current = 27   ORT Score =  0    Other Risk factors - Anxiety, concurrent benzo, recreational THC. OARRS:    Checked today: Yes    Adverse report: No   UDS:    Date of last UDS: 05/18/2018    Adverse report: No      Medication Effects -        Analgesia:      Average level of pain for the past month = 9/10     Percentage of pain relief = 75%     Activities Improved: Activities of daily living = 0%     Sleep = 0%     Mood = 0%     Social functioning = 0%     Adverse Effects: Any adverse effects: No     Type/Severity of side effect: None    Aberrant Behavior:     Requests for frequent early refills: No     Increased dose without authorization: No     Reports lost or stolen prescriptions: No     Attempts to obtain prescriptions from other providers: No     Use of pain medication in response to situational stressor: No      Increasingly unkempt or impaired: No     Negative mood changes: No     Abusing alcohol or illicit drugs: No     Appears intoxicated: No     Other: NA     Controlled Substances Monitoring:    Attestation: The Prescription Monitoring Report for this patient was reviewed today. Jerald Stanford MD)  Documentation: Possible medication side effects, risk of tolerance/dependence & alternative treatments discussed., Obtaining appropriate analgesic effect of treatment., No signs of potential drug abuse or diversion identified. Jerald Stanford MD)      Overall Progress:       PEG Score = 7 / 10     Physical Therapy: Benefit 25% - PT/Chiropractic care   Counseling: ongoing   Injections: N/A - declines     Patient compliance on plan of care:  Compliant   Progress on current plan:  Poor    Assessment:    1.  Postlaminectomy syndrome, lumbar  Chronic  - tiZANidine safe opioid use. Follow-up: RTC X 3 months. The entire treatment plan was discussed with patient and agreed. Thank you for allowing us to participate in the care of your patient - please do not hesitate to contact us if you have any questions or concerns.         Rafy Payne MD  Board Certified in Anesthesiology and Pain Medicine

## 2020-09-03 ENCOUNTER — READMISSION MANAGEMENT (OUTPATIENT)
Dept: CALL CENTER | Facility: HOSPITAL | Age: 50
End: 2020-09-03

## 2020-09-03 VITALS
HEIGHT: 70 IN | RESPIRATION RATE: 15 BRPM | SYSTOLIC BLOOD PRESSURE: 144 MMHG | WEIGHT: 266.76 LBS | OXYGEN SATURATION: 94 % | TEMPERATURE: 98.3 F | DIASTOLIC BLOOD PRESSURE: 89 MMHG | HEART RATE: 77 BPM | BODY MASS INDEX: 38.19 KG/M2

## 2020-09-03 PROBLEM — M06.9 RHEUMATOID ARTHRITIS INVOLVING MULTIPLE SITES (HCC): Status: ACTIVE | Noted: 2020-09-02

## 2020-09-03 LAB
ALBUMIN SERPL-MCNC: 3.2 G/DL (ref 3.5–5.2)
ALBUMIN/GLOB SERPL: 0.9 G/DL
ALP SERPL-CCNC: 89 U/L (ref 39–117)
ALT SERPL W P-5'-P-CCNC: 10 U/L (ref 1–41)
ANION GAP SERPL CALCULATED.3IONS-SCNC: 10 MMOL/L (ref 5–15)
AST SERPL-CCNC: 19 U/L (ref 1–40)
BILIRUB SERPL-MCNC: 1.3 MG/DL (ref 0–1.2)
BUN SERPL-MCNC: 17 MG/DL (ref 6–20)
BUN SERPL-MCNC: ABNORMAL MG/DL
BUN/CREAT SERPL: ABNORMAL
CALCIUM SPEC-SCNC: 8.5 MG/DL (ref 8.6–10.5)
CHLORIDE SERPL-SCNC: 104 MMOL/L (ref 98–107)
CO2 SERPL-SCNC: 23 MMOL/L (ref 22–29)
CREAT SERPL-MCNC: 1.07 MG/DL (ref 0.76–1.27)
DEPRECATED RDW RBC AUTO: 46.4 FL (ref 37–54)
ERYTHROCYTE [DISTWIDTH] IN BLOOD BY AUTOMATED COUNT: 14.5 % (ref 12.3–15.4)
GFR SERPL CREATININE-BSD FRML MDRD: 73 ML/MIN/1.73
GLOBULIN UR ELPH-MCNC: 3.7 GM/DL
GLUCOSE BLDC GLUCOMTR-MCNC: 143 MG/DL (ref 70–105)
GLUCOSE BLDC GLUCOMTR-MCNC: 161 MG/DL (ref 70–105)
GLUCOSE SERPL-MCNC: 119 MG/DL (ref 65–99)
HBA1C MFR BLD: 6 % (ref 3.5–5.6)
HCT VFR BLD AUTO: 43.5 % (ref 37.5–51)
HGB BLD-MCNC: 15.1 G/DL (ref 13–17.7)
MAGNESIUM SERPL-MCNC: 1.9 MG/DL (ref 1.6–2.6)
MCH RBC QN AUTO: 32.2 PG (ref 26.6–33)
MCHC RBC AUTO-ENTMCNC: 34.6 G/DL (ref 31.5–35.7)
MCV RBC AUTO: 93 FL (ref 79–97)
PLATELET # BLD AUTO: 177 10*3/MM3 (ref 140–450)
PMV BLD AUTO: 8.9 FL (ref 6–12)
POTASSIUM SERPL-SCNC: 4.1 MMOL/L (ref 3.5–5.2)
PROT SERPL-MCNC: 6.9 G/DL (ref 6–8.5)
RBC # BLD AUTO: 4.68 10*6/MM3 (ref 4.14–5.8)
SODIUM SERPL-SCNC: 137 MMOL/L (ref 136–145)
WBC # BLD AUTO: 9.4 10*3/MM3 (ref 3.4–10.8)

## 2020-09-03 PROCEDURE — 80053 COMPREHEN METABOLIC PANEL: CPT | Performed by: NURSE PRACTITIONER

## 2020-09-03 PROCEDURE — 25010000002 KETOROLAC TROMETHAMINE PER 15 MG: Performed by: NURSE PRACTITIONER

## 2020-09-03 PROCEDURE — 83735 ASSAY OF MAGNESIUM: CPT | Performed by: NURSE PRACTITIONER

## 2020-09-03 PROCEDURE — 96366 THER/PROPH/DIAG IV INF ADDON: CPT

## 2020-09-03 PROCEDURE — 85027 COMPLETE CBC AUTOMATED: CPT | Performed by: NURSE PRACTITIONER

## 2020-09-03 PROCEDURE — 82962 GLUCOSE BLOOD TEST: CPT

## 2020-09-03 PROCEDURE — 96376 TX/PRO/DX INJ SAME DRUG ADON: CPT

## 2020-09-03 PROCEDURE — 63710000001 INSULIN LISPRO (HUMAN) PER 5 UNITS: Performed by: NURSE PRACTITIONER

## 2020-09-03 PROCEDURE — 96375 TX/PRO/DX INJ NEW DRUG ADDON: CPT

## 2020-09-03 PROCEDURE — 25010000002 ONDANSETRON PER 1 MG: Performed by: NURSE PRACTITIONER

## 2020-09-03 PROCEDURE — G0378 HOSPITAL OBSERVATION PER HR: HCPCS

## 2020-09-03 PROCEDURE — 63710000001 PREDNISONE PER 5 MG: Performed by: NURSE PRACTITIONER

## 2020-09-03 PROCEDURE — 99217 PR OBSERVATION CARE DISCHARGE MANAGEMENT: CPT | Performed by: HOSPITALIST

## 2020-09-03 PROCEDURE — 25010000002 PIPERACILLIN SOD-TAZOBACTAM PER 1 G: Performed by: NURSE PRACTITIONER

## 2020-09-03 PROCEDURE — 99213 OFFICE O/P EST LOW 20 MIN: CPT | Performed by: SURGERY

## 2020-09-03 RX ORDER — OXYCODONE HYDROCHLORIDE 5 MG/1
5 TABLET ORAL EVERY 4 HOURS PRN
Qty: 18 TABLET | Refills: 0 | Status: SHIPPED | OUTPATIENT
Start: 2020-09-03 | End: 2020-09-09 | Stop reason: SDUPTHER

## 2020-09-03 RX ORDER — OXYCODONE HYDROCHLORIDE 5 MG/1
5 TABLET ORAL EVERY 4 HOURS PRN
Status: DISCONTINUED | OUTPATIENT
Start: 2020-09-03 | End: 2020-09-03 | Stop reason: HOSPADM

## 2020-09-03 RX ORDER — PANTOPRAZOLE SODIUM 40 MG/1
40 TABLET, DELAYED RELEASE ORAL
Qty: 30 TABLET | Refills: 0 | Status: SHIPPED | OUTPATIENT
Start: 2020-09-03 | End: 2020-09-09 | Stop reason: SDUPTHER

## 2020-09-03 RX ORDER — AMOXICILLIN AND CLAVULANATE POTASSIUM 875; 125 MG/1; MG/1
1 TABLET, FILM COATED ORAL 2 TIMES DAILY
Qty: 16 TABLET | Refills: 0 | Status: SHIPPED | OUTPATIENT
Start: 2020-09-03 | End: 2020-09-11

## 2020-09-03 RX ORDER — OXYCODONE HYDROCHLORIDE 5 MG/1
10 TABLET ORAL EVERY 4 HOURS PRN
Status: DISCONTINUED | OUTPATIENT
Start: 2020-09-03 | End: 2020-09-03 | Stop reason: HOSPADM

## 2020-09-03 RX ADMIN — KETOROLAC TROMETHAMINE 30 MG: 30 INJECTION, SOLUTION INTRAMUSCULAR at 04:59

## 2020-09-03 RX ADMIN — TAMSULOSIN HYDROCHLORIDE 0.4 MG: 0.4 CAPSULE ORAL at 09:03

## 2020-09-03 RX ADMIN — SPIRONOLACTONE 25 MG: 25 TABLET, FILM COATED ORAL at 09:03

## 2020-09-03 RX ADMIN — INSULIN LISPRO 2 UNITS: 100 INJECTION, SOLUTION INTRAVENOUS; SUBCUTANEOUS at 12:16

## 2020-09-03 RX ADMIN — PANTOPRAZOLE SODIUM 40 MG: 40 INJECTION, POWDER, FOR SOLUTION INTRAVENOUS at 05:57

## 2020-09-03 RX ADMIN — PREDNISONE 10 MG: 10 TABLET ORAL at 09:03

## 2020-09-03 RX ADMIN — BUMETANIDE 2 MG: 1 TABLET ORAL at 09:03

## 2020-09-03 RX ADMIN — PIPERACILLIN AND TAZOBACTAM 3.38 G: 3; .375 INJECTION, POWDER, FOR SOLUTION INTRAVENOUS at 12:17

## 2020-09-03 RX ADMIN — POTASSIUM CHLORIDE 40 MEQ: 1500 TABLET, EXTENDED RELEASE ORAL at 09:04

## 2020-09-03 RX ADMIN — PIPERACILLIN AND TAZOBACTAM 3.38 G: 3; .375 INJECTION, POWDER, FOR SOLUTION INTRAVENOUS at 03:16

## 2020-09-03 RX ADMIN — RIFAXIMIN 550 MG: 550 TABLET ORAL at 09:03

## 2020-09-03 RX ADMIN — Medication 10 ML: at 09:03

## 2020-09-03 RX ADMIN — LACTULOSE 30 ML: 10 SOLUTION ORAL at 09:03

## 2020-09-03 RX ADMIN — OXYCODONE HYDROCHLORIDE 10 MG: 5 TABLET ORAL at 14:12

## 2020-09-03 RX ADMIN — POTASSIUM CHLORIDE 40 MEQ: 1500 TABLET, EXTENDED RELEASE ORAL at 16:35

## 2020-09-03 RX ADMIN — ONDANSETRON 4 MG: 2 INJECTION INTRAMUSCULAR; INTRAVENOUS at 05:57

## 2020-09-03 RX ADMIN — MAGNESIUM OXIDE TAB 400 MG (241.3 MG ELEMENTAL MG) 400 MG: 400 (241.3 MG) TAB at 09:03

## 2020-09-03 NOTE — PROGRESS NOTES
Continued Stay Note  DUANE Kaye     Patient Name: Mc Selby  MRN: [id number]  Today's Date: 9/3/2020    Admit Date: 9/2/2020    Discharge Plan     Row Name 09/03/20 1416       Plan    Plan Comments  SW met with pt wearing appropriate PPE. Pt reported having a past history of alcohol abuse but denied current use. Pt stated that he started drinking back at the end of June twice weekly. Pt reported that he does not need any assistance with community resources for treatment and stated that he is able to do it on his own.     Row Name 09/03/20 1221         Juana GILLILAND MSW

## 2020-09-03 NOTE — DISCHARGE SUMMARY
[facility] Medicine Services  DISCHARGE SUMMARY        Prepared For PCP:  Carlita Shepherd MD    Patient Name: Mc Selby  : 1970  MRN: [id number]      Date of Admission:   2020    Date of Discharge:  9/3/2020    Length of stay:  LOS: 0 days     Hospital Course     Presenting Problem:   Biliary calculus of other site without obstruction [K80.80]  Abdominal pain, unspecified abdominal location [R10.9]  Hepatic cirrhosis, unspecified hepatic cirrhosis type, unspecified whether ascites present (CMS/HCC) [K74.60]      Active Hospital Problems    Diagnosis  POA   • **Cholelithiasis [K80.20]  Yes     Priority: High   • Abdominal pain [R10.9]  Yes   • Obesity (BMI 30-39.9) [E66.9]  Yes   • GERD without esophagitis [K21.9]  Yes   • Vitamin D deficiency [E55.9]  Yes   • COPD (chronic obstructive pulmonary disease) (CMS/HCC) [J44.9]  Yes   • Hypomagnesemia [E83.42]  Yes   • Rheumatoid arthritis involving multiple sites (CMS/HCC) [M06.9]  Yes   • Seasonal allergies [J30.2]  Yes   • Type 2 diabetes mellitus with hyperglycemia (CMS/HCC) [E11.65]  Yes   • Alcoholic cirrhosis (CMS/HCC) [K70.30]  Yes      Resolved Hospital Problems   No resolved problems to display.     Assessment and plan:     Acute abdominal pain with cholelithiasis, possible early acute cholecystitis  -Patient is a poor surgical candidate  -Conservative management is recommended by general surgery  -Empiric antibiotics Zosyn initiated by general surgery; will give Augmentin to complete course     Alcoholic cirrhosis  -AFP was normal at 3.54 2020  -Normal transaminases  -Creatinine 0.9, sodium 135, INR 1.09  -Ammonia 35  -Continue Bumex/potassium, spironolactone, lactulose, and rifaximin     Type 2 diabetes mellitus  -Hemoglobin A1c 6.0  -Continue Lantus (dosage reduced due decreased oral intake) and pre-meal humalog     COPD not in exacerbation  -Continue home inhalers     Rheumatoid arthritis  -Continue  prednisone  -Patient was on sample doses of Enbrel but has been out of it for about 2 weeks      GERD  -PPI     Vitamin D and magnesium deficiency  -Continue home replacement     Small 4 mm or less noncalcified bibasilar pulmonary nodules are  stable since 06/08/2020  -Consider continued CT chest follow-up in 12 months to document stability     Obesity  -Lifestyle modification counseling     Seasonal allergies  -Continue [facility] Course:  cM Selby is a 50 y.o. male with alcoholic cirrhosis, diabetes, COPD, GERD and vitamin D deficiency who presented to the emergency room complaining of right upper quadrant abdominal pain and right back pain associated with nausea and vomiting x2 and diaphoresis but no hematemesis, coffee-ground emesis, melena, bright red blood per rectum, constipation, or diarrhea.  Patient denied fever, chills, chest pain, or shortness of breath.     General surgery saw the patient in consultation and recommended IV Zosyn.  They felt that the patient would be a poor surgical candidate and would require an open procedure which would be high risk.  The patient had improvement and general surgery advanced his diet and recommended discharge home on oral antibiotics.  I discussed the case with Dr. Shepherd the patient's PCP for continuity of care.  Patient is now felt to be stable for discharge.      Recommendation for Outpatient Providers:     Close outpatient follow-up to assure continued improvement.      Reasons For Change In Medications and Indications for New Medications:    Augmentin and oxycodone for abdominal pain felt to possibly be related to early acute cholecystitis.  Protonix for GERD with complaints of indigestion.    Day of Discharge     HPI: Patient reports feeling better.  He had some mild substernal and midepigastric and right upper quadrant pain today that has improved with oxycodone.      Vital Signs:   Temp:  [97.9 °F (36.6 °C)-98.3 °F (36.8 °C)] 98.1 °F (36.7  °C)  Heart Rate:  [66-71] 66  Resp:  [16-18] 18  BP: (127-147)/(80-83) 127/83     Physical Exam:  Physical Exam  Well-developed over-nourished gentleman in no acute distress sitting up in bed awake and alert; mucous membranes moist; sclerae anicteric; lungs clear to auscultation bilaterally; CV regular rate and rhythm; abdomen soft mildly tender midepigastrium and right upper quadrant without guarding or masses, nondistended with active bowel sounds; extremities with no edema, cyanosis or calf tenderness; palpable pedal pulses bilaterally; no Carmona catheter.  Pertinent  and/or Most Recent Results     Results from last 7 days   Lab Units 09/03/20  0338 09/02/20  1006   WBC 10*3/mm3 9.40 7.60   HEMOGLOBIN g/dL 15.1 16.1   HEMATOCRIT % 43.5 47.9   PLATELETS 10*3/mm3 177 172   SODIUM mmol/L 137 135*   POTASSIUM mmol/L 4.1 4.1   CHLORIDE mmol/L 104 99   CO2 mmol/L 23.0 21.0*   BUN  17 14   CREATININE mg/dL 1.07 0.90   GLUCOSE mg/dL 119* 196*   CALCIUM mg/dL 8.5* 9.0     Results from last 7 days   Lab Units 09/03/20  0338 09/02/20  1006   BILIRUBIN mg/dL 1.3* 1.0   ALK PHOS U/L 89 126*   ALT (SGPT) U/L 10 12   AST (SGOT) U/L 19 24   PROTIME Seconds  --  11.7   INR   --  1.09   APTT seconds  --  29.9           Invalid input(s): TG, LDLCALC, LDLREALC  Results from last 7 days   Lab Units 09/02/20  1006   HEMOGLOBIN A1C % 6.0*       Brief Urine Lab Results  (Last result in the past 365 days)      Color   Clarity   Blood   Leuk Est   Nitrite   Protein   CREAT   Urine HCG        09/02/20 1133 Yellow Clear Negative Negative Negative Negative               Microbiology Results Abnormal     None          Ct Abdomen Pelvis Without Contrast    Result Date: 9/2/2020  Impression:  1. Cirrhotic liver morphology with portal hypertension changes including small quantity ascites and splenomegaly. 2. Cholelithiasis. There is mild bladder suspension without convincing CT evidence of acute cholecystitis. 3. No active bowel inflammatory  changes are identified. 4. Small 4 mm or less noncalcified bibasilar pulmonary nodules are stable since 06/08/2020. Consider continued CT chest follow-up in 12 months to document stability. 4. Additional chronic findings as described above.    Electronically Signed By-Dr. Marylin Scherer MD On:9/2/2020 11:17 AM This report was finalized on [id number] by Dr. Marylin Scherer MD.    Ir Inject/asp Large Joint Or Bursa    Result Date: 8/14/2020  Impression: Fluoroscopy guided steroid and anesthetic injection of right hip.  Electronically Signed By-Arvind Chau On:8/14/2020 8:17 AM This report was finalized on [id number] by  Arvind Chau, .                             Test Results Pending at Discharge        Procedures Performed           Consults:   Consults     Date and Time Order Name Status Description    9/2/2020 1239 Hospitalist (on-call MD unless specified) Completed     9/2/2020 1216 Surgery (on-call MD unless specified) Completed             Discharge Details        Discharge Medications      New Medications      Instructions Start Date   amoxicillin-clavulanate 875-125 MG per tablet  Commonly known as:  Augmentin   1 tablet, Oral, 2 Times Daily      oxyCODONE 5 MG immediate release tablet  Commonly known as:  ROXICODONE   5 mg, Oral, Every 4 Hours PRN      pantoprazole 40 MG EC tablet  Commonly known as:  Protonix   40 mg, Oral, Every Morning Before Breakfast         Changes to Medications      Instructions Start Date   insulin detemir 100 UNIT/ML injection  Commonly known as:  LEVEMIR  What changed:  how much to take   15 Units, Subcutaneous, Nightly         Continue These Medications      Instructions Start Date   albuterol sulfate  (90 Base) MCG/ACT inhaler  Commonly known as:  PROVENTIL HFA;VENTOLIN HFA;PROAIR HFA   2 puffs, Inhalation, Every 4 Hours PRN      azelastine 0.1 % nasal spray  Commonly known as:  ASTELIN   2 sprays, Nasal, 2 Times Daily      bumetanide 2 MG tablet  Commonly known as:   BUMEX   2 mg, Oral, 2 Times Daily      Etanercept 50 MG/ML solution cartridge  Commonly known as:  Enbrel Mini   50 mg, Subcutaneous, Weekly      famotidine 20 MG tablet  Commonly known as:  PEPCID   TAKE 1 TABLET BY MOUTH EVERY DAY      lactulose 10 GM/15ML solution  Commonly known as:  CHRONULAC   30 mL, Oral, 2 Times Daily      magnesium oxide 400 MG tablet  Commonly known as:  MAG-OX   2 tablets, Oral, 2 Times Daily      meclizine 25 MG chewable tablet chewable tablet   25 mg, Oral, 3 Times Daily PRN      montelukast 10 MG tablet  Commonly known as:  SINGULAIR   10 mg, Oral, Nightly      NovoLOG FlexPen 100 UNIT/ML solution pen-injector sc pen  Generic drug:  insulin aspart   2 Units, Subcutaneous, 3 Times Daily With Meals      ondansetron 8 MG tablet  Commonly known as:  ZOFRAN   8 mg, Oral, Every 8 Hours PRN      potassium chloride 20 MEQ CR tablet  Commonly known as:  K-DUR,KLOR-CON   40 mEq, Oral, 3 Times Daily      predniSONE 10 MG tablet  Commonly known as:  DELTASONE   10 mg, Oral, Daily      promethazine 12.5 MG tablet  Commonly known as:  PHENERGAN   12.5 mg, Oral, Every 6 Hours PRN      riFAXIMin 550 MG tablet  Commonly known as:  Xifaxan   550 mg, Oral, Every 12 Hours      senna 8.6 MG tablet  Commonly known as:  SENOKOT   1 tablet, Oral, 2 Times Daily PRN      spironolactone 25 MG tablet  Commonly known as:  ALDACTONE   25 mg, Oral, Daily      tamsulosin 0.4 MG capsule 24 hr capsule  Commonly known as:  FLOMAX   1 capsule, Oral, 2 Times Daily      Triamcinolone Acetonide 55 MCG/ACT nasal inhaler  Commonly known as:  NASACORT   1 spray, Nasal, Daily      vitamin D 1.25 MG (21737 UT) capsule capsule  Commonly known as:  ERGOCALCIFEROL   50,000 Units, Oral, Every 14 Days, Every other Sunday      zinc sulfate 220 (50 Zn) MG capsule  Commonly known as:  ZINCATE   220 mg, Oral, Daily             Allergies   Allergen Reactions   • Duloxetine Rash         Discharge Disposition:  Home or Self  Care    Diet:  Hospital:  Diet Order   Procedures   • Diet Gastrointestinal; Low Fat         Discharge Activity:   Activity Instructions     Activity as Tolerated              CODE STATUS:    Code Status and Medical Interventions:   Ordered at: 09/02/20 1339     Level Of Support Discussed With:    Patient     Code Status:    CPR     Medical Interventions (Level of Support Prior to Arrest):    Full         Follow-up Appointments  Future Appointments   Date Time Provider Department Center   10/20/2020  2:00 PM Carlita Shepherd MD [facility] None   6/9/2021  2:00 PM Celia Perdomo MD [facility]   6/9/2021  2:00 PM LAB MD [facility]       Additional Instructions for the Follow-ups that You Need to Schedule     Call MD With Problems / Concerns   As directed      Instructions: Call [phone] or email [email or web address] for problems or concerns.    Order Comments:  Instructions: Call [phone] or email [email or web address] for problems or concerns.          Discharge Follow-up with PCP   As directed       Currently Documented PCP:    Carlita Shepherd MD    PCP Phone Number:    [phone]     Follow Up Details:  2-3 days                 Condition on Discharge:      Stable      This patient has been examined wearing appropriate Personal Protective Equipment 09/03/20      Electronically signed by Reshma Garcia MD, 09/03/20, 2:54 PM.      Time: I spent 45 minutes on this discharge activity which included face-to-face encounter with the patient/reviewing the data in the system/coordination of the care with the nursing staff as well as consultants/documentation/entering orders.

## 2020-09-03 NOTE — OUTREACH NOTE
Prep Survey      Responses   [facility] facility patient discharged from?  [facility]   Is LACE score < 7 ?  No   Eligibility  [facility]   Date of Admission  09/02/20   Date of Discharge  09/03/20   Discharge Disposition  Home or Self Care   Discharge diagnosis  alcoholic cirrhosis  Biliary calculus of other site without obstruction    COVID-19 Test Status  Not tested   Does the patient have one of the following disease processes/diagnoses(primary or secondary)?  Other   Does the patient have Home health ordered?  No   Is there a DME ordered?  No   Prep survey completed?  Yes          Shital Heaton RN

## 2020-09-03 NOTE — PROGRESS NOTES
GENERAL SURGERY PROGRESS NOTE    9/3/2020   LOS: 0 days   Patient Care Team:  Carlita Shepherd MD as PCP - General (Internal Medicine)  Carlita Shepherd MD as PCP - Family Medicine    Chief Complaint: biliary colic, gallstones    Subjective   HPI: Patient is a 50 y.o. male presents with right flank and right upper quadrant abdominal pain which started at approximately 5:30 this morning.  The pain reportedly woke the patient up from sleep, and then began to wrap around the right side of the upper abdomen and into the patient's epigastrium.  Patient reports that he has had pain similar to this in the past, however it normally resolves within an hour or so.  Since 5:00 this morning he has had bloating, and dull aching abdominal pain which is made better by moving.  His pain is associated with nausea, but no vomiting and decreased appetite.  The patient states that he has gotten some IV pain medicine which helped a little bit, however he is in significant discomfort even now.  The patient's past medical history is significant for alcoholic cirrhosis of the liver, perforated duodenal ulcer status post exploratory laparotomy and Chavez patch by Dr. Sapna Elizalde, and umbilical hernia repair performed by Dr. Sapna Elizalde.    Interval History:   Pain improved today, no increase with CLD. Afebrile    Objective     Vital Signs  Temp:  [97.9 °F (36.6 °C)-98.3 °F (36.8 °C)] 98.3 °F (36.8 °C)  Heart Rate:  [69-80] 69  Resp:  [16-22] 18  BP: (130-157)/(58-98) 145/80    Physical Exam   Constitutional: He is oriented to person, place, and time. He appears well-developed and well-nourished. No distress.   HENT:   Head: Normocephalic and atraumatic.   Cardiovascular: Normal rate and regular rhythm.   Pulmonary/Chest: Effort normal and breath sounds normal.   Abdominal: Soft. He exhibits no distension. There is no tenderness.   Obese   Musculoskeletal: Normal range of motion. He exhibits no edema.   Neurological: He is alert and  oriented to person, place, and time.   Skin: Skin is warm and dry. No rash noted.   Psychiatric: He has a normal mood and affect. His behavior is normal.   Vitals reviewed.       Results Review:    Lab Results (last 24 hours)     Procedure Component Value Units Date/Time    BUN [[id number]]  (Normal) Collected:  09/03/20 0338    Specimen:  Blood Updated:  09/03/20 0748     BUN 17 mg/dL     POC Glucose Once [[id number]]  (Abnormal) Collected:  09/03/20 0708    Specimen:  Blood Updated:  09/03/20 0710     Glucose 143 mg/dL      Comment: Serial Number: [id number]:  [id number]       Comprehensive Metabolic Panel [[id number]]  (Abnormal) Collected:  09/03/20 0338    Specimen:  Blood Updated:  09/03/20 0434     Glucose 119 mg/dL      BUN --     Comment: Testing performed by alternate method        Creatinine 1.07 mg/dL      Sodium 137 mmol/L      Potassium 4.1 mmol/L      Chloride 104 mmol/L      CO2 23.0 mmol/L      Calcium 8.5 mg/dL      Total Protein 6.9 g/dL      Albumin 3.20 g/dL      ALT (SGPT) 10 U/L      AST (SGOT) 19 U/L      Alkaline Phosphatase 89 U/L      Total Bilirubin 1.3 mg/dL      eGFR Non African Amer 73 mL/min/1.73      Globulin 3.7 gm/dL      A/G Ratio 0.9 g/dL      BUN/Creatinine Ratio --     Comment: Testing not performed        Anion Gap 10.0 mmol/L     Narrative:       GFR Normal >60  Chronic Kidney Disease <60  Kidney Failure <15      Magnesium [[id number]]  (Normal) Collected:  09/03/20 0338    Specimen:  Blood Updated:  09/03/20 0434     Magnesium 1.9 mg/dL     CBC (No Diff) [[id number]]  (Normal) Collected:  09/03/20 0338    Specimen:  Blood Updated:  09/03/20 0420     WBC 9.40 10*3/mm3      RBC 4.68 10*6/mm3      Hemoglobin 15.1 g/dL      Hematocrit 43.5 %      MCV 93.0 fL      MCH 32.2 pg      MCHC 34.6 g/dL      RDW 14.5 %      RDW-SD 46.4 fl      MPV 8.9 fL      Platelets 177 10*3/mm3     POC Glucose Once [[id number]]  (Abnormal) Collected:  09/02/20 1941    Specimen:  Blood Updated:   09/02/20 1943     Glucose 206 mg/dL      Comment: Serial Number: [id number]:  [id number]       POC Glucose Once [[id number]]  (Abnormal) Collected:  09/02/20 1810    Specimen:  Blood Updated:  09/02/20 1812     Glucose 150 mg/dL      Comment: Serial Number: [id number]:  [id number]       POC Glucose Once [[id number]]  (Abnormal) Collected:  09/02/20 1643    Specimen:  Blood Updated:  09/02/20 1653     Glucose 165 mg/dL      Comment: Serial Number: [id number]:  [id number]       Ammonia [[id number]]  (Normal) Collected:  09/02/20 1435    Specimen:  Blood Updated:  09/02/20 1508     Ammonia 35 umol/L     Magnesium [[id number]]  (Normal) Collected:  09/02/20 1006    Specimen:  Blood Updated:  09/02/20 1419     Magnesium 1.9 mg/dL     Hemoglobin A1c [[id number]] Collected:  09/02/20 1006    Specimen:  Blood Updated:  09/02/20 1416    Urinalysis With Culture If Indicated - Urine, Clean Catch [[id number]]  (Normal) Collected:  09/02/20 1133    Specimen:  Urine, Clean Catch Updated:  09/02/20 1153     Color, UA Yellow     Appearance, UA Clear     pH, UA 6.5     Specific Gravity, UA 1.012     Glucose, UA Negative     Ketones, UA Negative     Bilirubin, UA Negative     Blood, UA Negative     Protein, UA Negative     Leuk Esterase, UA Negative     Nitrite, UA Negative     Urobilinogen, UA 1.0 E.U./dL    Narrative:       Urine microscopic not indicated.    BUN [[id number]]  (Normal) Collected:  09/02/20 1006    Specimen:  Blood Updated:  09/02/20 1111     BUN 14 mg/dL     Comprehensive Metabolic Panel [[id number]]  (Abnormal) Collected:  09/02/20 1006    Specimen:  Blood Updated:  09/02/20 1111     Glucose 196 mg/dL      BUN --     Comment: Testing performed by alternate method        Creatinine 0.90 mg/dL      Sodium 135 mmol/L      Potassium 4.1 mmol/L      Comment: Specimen hemolyzed.  Results may be affected. Result checked         Chloride 99 mmol/L      CO2 21.0 mmol/L      Calcium 9.0 mg/dL      Total Protein  8.0 g/dL      Albumin 3.80 g/dL      ALT (SGPT) 12 U/L      Comment: Specimen hemolyzed.  Results may be affected.        AST (SGOT) 24 U/L      Comment: Specimen hemolyzed.  Results may be affected.        Alkaline Phosphatase 126 U/L      Total Bilirubin 1.0 mg/dL      eGFR Non African Amer 89 mL/min/1.73      Globulin 4.2 gm/dL      A/G Ratio 0.9 g/dL      BUN/Creatinine Ratio --     Comment: Testing not performed        Anion Gap 15.0 mmol/L     Narrative:       GFR Normal >60  Chronic Kidney Disease <60  Kidney Failure <15      Lipase [[id number]]  (Normal) Collected:  09/02/20 1006    Specimen:  Blood Updated:  09/02/20 1100     Lipase 34 U/L     aPTT [[id number]]  (Normal) Collected:  09/02/20 1006    Specimen:  Blood Updated:  09/02/20 1039     PTT 29.9 seconds     Protime-INR [[id number]]  (Normal) Collected:  09/02/20 1006    Specimen:  Blood Updated:  09/02/20 1039     Protime 11.7 Seconds      INR 1.09    CBC & Differential [[id number]] Collected:  09/02/20 1006    Specimen:  Blood Updated:  09/02/20 1027    Narrative:       The following orders were created for panel order CBC & Differential.  Procedure                               Abnormality         Status                     ---------                               -----------         ------                     CBC Auto Differential[[id number]]        Abnormal            Final result                 Please view results for these tests on the individual orders.    CBC Auto Differential [[id number]]  (Abnormal) Collected:  09/02/20 1006    Specimen:  Blood Updated:  09/02/20 1027     WBC 7.60 10*3/mm3      RBC 5.14 10*6/mm3      Hemoglobin 16.1 g/dL      Hematocrit 47.9 %      MCV 93.3 fL      MCH 31.4 pg      MCHC 33.7 g/dL      RDW 14.4 %      RDW-SD 46.4 fl      MPV 8.7 fL      Platelets 172 10*3/mm3      Neutrophil % 61.2 %      Lymphocyte % 18.5 %      Monocyte % 12.3 %      Eosinophil % 6.6 %      Basophil % 1.4 %      Neutrophils, Absolute 4.60  10*3/mm3      Lymphocytes, Absolute 1.40 10*3/mm3      Monocytes, Absolute 0.90 10*3/mm3      Eosinophils, Absolute 0.50 10*3/mm3      Basophils, Absolute 0.10 10*3/mm3      nRBC 0.0 /100 WBC         Imaging Results (Last 24 Hours)     Procedure Component Value Units Date/Time    CT Abdomen Pelvis Without Contrast [[id number]] Collected:  09/02/20 1109     Updated:  09/02/20 1119    Narrative:       CT ABDOMEN PELVIS WO CONTRAST-     Date of Exam: 9/2/2020 10:53 AM     Indication: Epigastric pain with nausea for 2 days.     Comparison: PET/CT 03/10/2020, CT abdomen 08/31/2018. CT chest  06/08/2020.     Technique: Contiguous axial CT images were obtained from the lung bases  to the pubic symphysis without contrast. Sagittal and coronal  reconstructions were performed.  Automated exposure control and  iterative reconstruction methods were used.     FINDINGS:     4 mm or less noncalcified bilateral pulmonary nodules are stable since  06/08/2020, cannot be satisfactorily visualized on previous PET/CT due  to obscuration by respiratory motion, are partially obscured by right  basilar atelectasis on the 08/31/2018 examination. No acute basilar  airspace disease is seen.     Cirrhotic liver morphology. Small quantity ascites adjacent to the  liver. Trace pelvic free fluid. No focal liver lesion is seen on this  noncontrast examination.     The gallbladder is distended up to 4.8 cm. Multiple gallstones are  present. No choledocholithiasis or abnormal biliary ductal dilation is  seen.      Spleen size is borderline enlarged, 14.4 cm AP, without focal lesion.  The pancreas, adrenals, and left kidney demonstrate normal noncontrast  appearance. A 3.1 cm cyst projects anteriorly from the right mid kidney,  only slightly larger than on the 2018 comparison. A tiny nonobstructing  stone is seen at the left lower renal pole. No ureteral stone or  hydronephrosis is seen. Urinary bladder, prostate and rectum are normal.     Signs of  prior umbilical hernia repair.. The unopacified bowel appears  nonthickened, nondilated, noninflamed.     No acute osseous abnormalities are identified.          Impression:          1. Cirrhotic liver morphology with portal hypertension changes including  small quantity ascites and splenomegaly.  2. Cholelithiasis. There is mild bladder suspension without convincing  CT evidence of acute cholecystitis.  3. No active bowel inflammatory changes are identified.  4. Small 4 mm or less noncalcified bibasilar pulmonary nodules are  stable since 06/08/2020. Consider continued CT chest follow-up in 12  months to document stability.  4. Additional chronic findings as described above.           Electronically Signed By-Dr. Marylin Scherer MD On:9/2/2020 11:17 AM  This report was finalized on [id number] by Dr. Marylin Scherer MD.           I have reviewed the above results and noted them below    Medication Review:    Current Facility-Administered Medications:   •  acetaminophen (TYLENOL) tablet 650 mg, 650 mg, Oral, Q4H PRN **OR** acetaminophen (TYLENOL) 160 MG/5ML solution 650 mg, 650 mg, Oral, Q4H PRN **OR** acetaminophen (TYLENOL) suppository 650 mg, 650 mg, Rectal, Q4H PRN, Ifeoma Pavon, APRN  •  albuterol (PROVENTIL) nebulizer solution 0.083% 2.5 mg/3mL, 2.5 mg, Nebulization, Q4H PRN, Ifeoma Pavon Z, APRN  •  bumetanide (BUMEX) tablet 2 mg, 2 mg, Oral, BID, Ifeoma Pavon, APRN, 2 mg at 09/03/20 0903  •  dextrose (D50W) 25 g/ 50mL Intravenous Solution 25 g, 25 g, Intravenous, Q15 Min PRN, Ifeoma Pavon, APRN  •  dextrose (GLUTOSE) oral gel 15 g, 15 g, Oral, Q15 Min PRN, Ifeoma Pavon, APRN  •  glucagon (human recombinant) (GLUCAGEN DIAGNOSTIC) injection 1 mg, 1 mg, Subcutaneous, PRN, Ifeoma Pavon, APRN  •  insulin glargine (LANTUS) injection 15 Units, 15 Units, Subcutaneous, Nightly, Ifeoma Pavon, APRN, 15 Units at 09/02/20 2046  •  insulin lispro (humaLOG) injection 0-14 Units,  0-14 Units, Subcutaneous, TID AC **AND** insulin lispro (humaLOG) injection 0-14 Units, 0-14 Units, Subcutaneous, PRN, Ifeoma Pavon APRN, 5 Units at 09/02/20 2046  •  ketamine (KETALAR) 27 mg in sodium chloride 0.9 % 100 mL infusion, 27 mg, Intravenous, Daily PRN **AND** Ketamine Vital Signs & Assessment, , , Per Order Details, Ifeoma Pavon APRN  •  ketorolac (TORADOL) injection 30 mg, 30 mg, Intravenous, Q6H PRN, Ifeoma Pavon APRN, 30 mg at 09/03/20 0459  •  lactated ringers infusion, 100 mL/hr, Intravenous, Continuous, Ifeoma Pavon APRN, Stopped at 09/02/20 2051  •  lactulose (CHRONULAC) 10 GM/15ML solution 30 mL, 30 mL, Oral, BID, Ifeoma Pavon APRN, 30 mL at 09/03/20 0903  •  magnesium oxide (MAGOX) tablet 400 mg, 400 mg, Oral, BID, Ifeoma Pavon APRN, 400 mg at 09/03/20 0903  •  Magnesium Sulfate 2 gram infusion - Mg less than or equal to 1.5 mg/dL, 2 g, Intravenous, PRN **OR** Magnesium Sulfate 1 gram infusion - Mg 1.6-1.9 mg/dL, 1 g, Intravenous, PRN, Ifeoma Pavon APRN  •  montelukast (SINGULAIR) tablet 10 mg, 10 mg, Oral, Nightly, Ifeoma Pavon APRN, 10 mg at 09/02/20 2047  •  ondansetron (ZOFRAN) tablet 4 mg, 4 mg, Oral, Q6H PRN **OR** ondansetron (ZOFRAN) injection 4 mg, 4 mg, Intravenous, Q6H PRN, Ifeoma Pavon APRMARIEL, 4 mg at 09/03/20 0557  •  oxyCODONE (ROXICODONE) immediate release tablet 5 mg, 5 mg, Oral, Q4H PRN **OR** oxyCODONE (ROXICODONE) immediate release tablet 10 mg, 10 mg, Oral, Q4H PRN, Wicho Conley MD  •  pantoprazole (PROTONIX) injection 40 mg, 40 mg, Intravenous, Q AM, Ifeoma Pavon APRN, 40 mg at 09/03/20 0557  •  piperacillin-tazobactam (ZOSYN) IVPB 3.375 g in 100 mL NS (CD), 3.375 g, Intravenous, Q8H, Ifeoma Pavon APRN, Stopped at 09/03/20 0743  •  potassium chloride (K-DUR,KLOR-CON) CR tablet 40 mEq, 40 mEq, Oral, TID, Ifeoma Pavon APRN, 40 mEq at 09/03/20 0904  •  potassium chloride  (K-DUR,KLOR-CON) CR tablet 40 mEq, 40 mEq, Oral, PRN, Kellams, Ifeoma Z, APRN  •  predniSONE (DELTASONE) tablet 10 mg, 10 mg, Oral, Daily, Kellams, Ifeoma Z, APRN, 10 mg at 09/03/20 0903  •  promethazine (PHENERGAN) tablet 12.5 mg, 12.5 mg, Oral, Q6H PRN, Kellams, Ifeoma Z, APRN  •  riFAXIMin (XIFAXAN) tablet 550 mg, 550 mg, Oral, BID, Kellams, Ifeoma Z, APRN, 550 mg at 09/03/20 0903  •  [COMPLETED] Insert peripheral IV, , , Once **AND** sodium chloride 0.9 % flush 10 mL, 10 mL, Intravenous, PRN, Jefferson Booker MD  •  sodium chloride 0.9 % flush 10 mL, 10 mL, Intravenous, Q12H, Kellams, Ifeoma Z, APRN, 10 mL at 09/03/20 0903  •  sodium chloride 0.9 % flush 10 mL, 10 mL, Intravenous, PRN, Kellams, Ifeoma Z, APRN  •  spironolactone (ALDACTONE) tablet 25 mg, 25 mg, Oral, Daily, Kellams, Ifeoma Z, APRN, 25 mg at 09/03/20 0903  •  tamsulosin (FLOMAX) 24 hr capsule 0.4 mg, 0.4 mg, Oral, BID, Kellams, Ifeoma Z, APRN, 0.4 mg at 09/03/20 0903    Assessment/Plan     Principal Problem:    Abdominal pain  Active Problems:    Alcoholic cirrhosis (CMS/HCC)    Type 2 diabetes mellitus with hyperglycemia (CMS/HCC)    Obesity (BMI 30-39.9)    GERD without esophagitis    Vitamin D deficiency    COPD (chronic obstructive pulmonary disease) (CMS/HCC)    Hypomagnesemia    Rheumatoid aortitis    Seasonal allergies    Cholelithiasis    Discussed with patient again that he likely has biliary colic due to his gallstones.  Given his history of cirrhosis of the liver and perforated duodenal ulcer, any type of surgical intervention on this area is likely to result in an open operation and be high risk for complications.  Therefore, my recommendation would be to see if we are able to manage his symptoms medically.  Discussed with patient that he should adhere to a low-fat diet going forward to prevent further recurrences in the future.  Recommend continuing empiric antibiotics, may transition to oral antibiotics for discharge.   Consider Augmentin.  Oral analgesics for pain control  If patient is able to tolerate a low-fat diet and pain controlled with oral analgesics he may be discharged home from a surgical standpoint.  He may follow-up with me in a few weeks, to ensure that he is improving.  Would continue to recommend the patient follow-up with his gastroenterologist regarding his cirrhosis of the liver.  Right now the patient appears well compensated.    Wicho Conley MD  09/03/20  10:20

## 2020-09-03 NOTE — PROGRESS NOTES
Discharge Planning Assessment  [facility]     Patient Name: Mc Selby  MRN: [id number]  Today's Date: 9/3/2020    Admit Date: 9/2/2020    Discharge Needs Assessment     Row Name 09/03/20 1220       Living Environment    Lives With  spouse    Current Living Arrangements  home/apartment/condo    Primary Care Provided by  self    Provides Primary Care For  no one    Family Caregiver if Needed  none    Able to Return to Prior Arrangements  yes       Resource/Environmental Concerns    Resource/Environmental Concerns  none    Transportation Concerns  car, none       Transition Planning    Patient/Family Anticipates Transition to  home with family    Patient/Family Anticipated Services at Transition  none    Transportation Anticipated  family or friend will provide       Discharge Needs Assessment    Readmission Within the Last 30 Days  no previous admission in last 30 days    Concerns to be Addressed  denies needs/concerns at this time    Equipment Currently Used at Home  glucometer    Anticipated Changes Related to Illness  none    Equipment Needed After Discharge  none    Provided Post Acute Provider List?  N/A        Discharge Plan     Row Name 09/03/20 1221       Plan    Plan  DC plan: home with family    Patient/Family in Agreement with Plan  yes    Plan Comments  due to covid precautions spoke  to patient by phone, patient denies needs.  independent at home, able to afford medications.               Demographic Summary     Row Name 09/03/20 1220       General Information    Admission Type  observation    Arrived From  home    Referral Source  admission list    Reason for Consult  discharge planning    Preferred Language  English     Used During This Interaction  no        Functional Status     Row Name 09/03/20 1220       Functional Status    Usual Activity Tolerance  excellent    Current Activity Tolerance  excellent       Functional Status, IADL    Medications  independent    Meal Preparation   independent    Housekeeping  independent    Laundry  independent    Shopping  independent       Mental Status Summary    Recent Changes in Mental Status/Cognitive Functioning  no changes          Lacey Khanna RN

## 2020-09-04 ENCOUNTER — TRANSITIONAL CARE MANAGEMENT TELEPHONE ENCOUNTER (OUTPATIENT)
Dept: CALL CENTER | Facility: HOSPITAL | Age: 50
End: 2020-09-04

## 2020-09-04 NOTE — OUTREACH NOTE
Call Center TCM Note      Responses   [facility] patient discharged from?  [facility]   COVID-19 Test Status  Not tested   Does the patient have one of the following disease processes/diagnoses(primary or secondary)?  Other   TCM attempt successful?  No   Unsuccessful attempts  Attempt 2          Claire Thibodeaux RN    9/4/2020, 11:05

## 2020-09-04 NOTE — OUTREACH NOTE
Call Center TCM Note      Responses   [facility] patient discharged from?  [facility]   COVID-19 Test Status  Not tested   Does the patient have one of the following disease processes/diagnoses(primary or secondary)?  Other   TCM attempt successful?  No   Unsuccessful attempts  Attempt 1          Claire Thibodeaux RN    9/4/2020, 10:29

## 2020-09-06 ENCOUNTER — TRANSITIONAL CARE MANAGEMENT TELEPHONE ENCOUNTER (OUTPATIENT)
Dept: CALL CENTER | Facility: HOSPITAL | Age: 50
End: 2020-09-06

## 2020-09-06 NOTE — OUTREACH NOTE
"Call Center TCM Note      Responses   [facility] patient discharged from?  [facility]   COVID-19 Test Status  Not tested   Does the patient have one of the following disease processes/diagnoses(primary or secondary)?  Other   TCM attempt successful?  Yes   Call start time  1128   Call end time  1136   Discharge diagnosis  alcoholic cirrhosis  Biliary calculus of other site without obstruction    Person spoke with today (if not patient) and relationship  Nicky-spouse    Meds reviewed with patient/caregiver?  Yes   Is the patient having any side effects they believe may be caused by any medication additions or changes?  No   Does the patient have all medications ordered at discharge?  Yes   Is the patient taking all medications as directed (includes completed medication regime)?  Yes   Comments regarding appointments  appt with surgeon on 9/9/20   Does the patient have a primary care provider?   Yes   Does the patient have an appointment with their PCP within 7 days of discharge?  No   What is preventing the patient from scheduling follow up appointments within 7 days of discharge?  -- [There was not appt 9/24/20 with PCP]   Nursing Interventions  -- [Routed to PCP office]   Has the patient kept scheduled appointments due by today?  N/A   Pulse Ox monitoring  None   Psychosocial issues?  No   Did the patient receive a copy of their discharge instructions?  Yes   Nursing interventions  Reviewed instructions with patient   What is the patient's perception of their health status since discharge?  Improving [Nicky reports, \"he was good when he first got of the hospital but pain intensified.\" Call was placed to surgeon's office. Pt was informed to double up on pain meds. Pain is controlled. No BM's since 8/31/20 or 9/1/20. ]   Is the patient/caregiver able to teach back signs and symptoms related to disease process for when to call PCP?  Yes   Is the patient/caregiver able to teach back signs and symptoms related to " disease process for when to call 911?  Yes   Is the patient/caregiver able to teach back the hierarchy of who to call/visit for symptoms/problems? PCP, Specialist, Home health nurse, Urgent Care, ED, 911  Yes   If the patient is a current smoker, are they able to teach back resources for cessation?  -- [Nonsmoker]   TCM call completed?  Yes          Jemima Pierce RN    9/6/2020, 11:36

## 2020-09-08 NOTE — PROGRESS NOTES
Spoke with patient at 2:30pm and scheduled a hospital follow up with Dr Shepherd on 09/09/2020 at 9am - 30 minutes.

## 2020-09-09 ENCOUNTER — OFFICE VISIT (OUTPATIENT)
Dept: FAMILY MEDICINE CLINIC | Facility: CLINIC | Age: 50
End: 2020-09-09

## 2020-09-09 ENCOUNTER — OFFICE VISIT (OUTPATIENT)
Dept: SURGERY | Facility: CLINIC | Age: 50
End: 2020-09-09

## 2020-09-09 VITALS
DIASTOLIC BLOOD PRESSURE: 78 MMHG | WEIGHT: 256.4 LBS | TEMPERATURE: 96.9 F | BODY MASS INDEX: 36.71 KG/M2 | RESPIRATION RATE: 18 BRPM | HEIGHT: 70 IN | HEART RATE: 71 BPM | OXYGEN SATURATION: 97 % | SYSTOLIC BLOOD PRESSURE: 118 MMHG

## 2020-09-09 VITALS
BODY MASS INDEX: 36.68 KG/M2 | HEIGHT: 70 IN | HEART RATE: 88 BPM | DIASTOLIC BLOOD PRESSURE: 74 MMHG | WEIGHT: 256.2 LBS | SYSTOLIC BLOOD PRESSURE: 133 MMHG | TEMPERATURE: 97.3 F | OXYGEN SATURATION: 95 %

## 2020-09-09 DIAGNOSIS — K80.20 CALCULUS OF GALLBLADDER WITHOUT CHOLECYSTITIS WITHOUT OBSTRUCTION: Primary | ICD-10-CM

## 2020-09-09 DIAGNOSIS — K21.00 GASTROESOPHAGEAL REFLUX DISEASE WITH ESOPHAGITIS: ICD-10-CM

## 2020-09-09 DIAGNOSIS — M06.09 RHEUMATOID ARTHRITIS OF MULTIPLE SITES WITH NEGATIVE RHEUMATOID FACTOR (HCC): ICD-10-CM

## 2020-09-09 DIAGNOSIS — Z79.4 TYPE 2 DIABETES MELLITUS WITH HYPERGLYCEMIA, WITH LONG-TERM CURRENT USE OF INSULIN (HCC): Chronic | ICD-10-CM

## 2020-09-09 DIAGNOSIS — R13.10 DYSPHAGIA, UNSPECIFIED TYPE: ICD-10-CM

## 2020-09-09 DIAGNOSIS — E11.65 TYPE 2 DIABETES MELLITUS WITH HYPERGLYCEMIA, WITH LONG-TERM CURRENT USE OF INSULIN (HCC): Chronic | ICD-10-CM

## 2020-09-09 DIAGNOSIS — R10.10 PAIN OF UPPER ABDOMEN: ICD-10-CM

## 2020-09-09 PROCEDURE — 99496 TRANSJ CARE MGMT HIGH F2F 7D: CPT | Performed by: INTERNAL MEDICINE

## 2020-09-09 PROCEDURE — 99213 OFFICE O/P EST LOW 20 MIN: CPT | Performed by: SURGERY

## 2020-09-09 RX ORDER — PANTOPRAZOLE SODIUM 40 MG/1
40 TABLET, DELAYED RELEASE ORAL 2 TIMES DAILY
Qty: 60 TABLET | Refills: 0 | Status: SHIPPED | OUTPATIENT
Start: 2020-09-09 | End: 2020-09-18

## 2020-09-09 RX ORDER — OXYCODONE HYDROCHLORIDE 5 MG/1
5 TABLET ORAL EVERY 4 HOURS PRN
Qty: 30 TABLET | Refills: 0 | Status: SHIPPED | OUTPATIENT
Start: 2020-09-09 | End: 2020-09-15 | Stop reason: SDUPTHER

## 2020-09-09 NOTE — PROGRESS NOTES
Transitional Care Follow Up Visit    No chief complaint on file.      Subjective     History of Present Illness     Mc Selby is a 50 y.o. male who presents for a transitional care management visit from recent stay at [facility] .  Within 48 business hours after discharge our office contacted him via telephone to coordinate his care and needs.  I reviewed and discussed the details of that call along with the discharge summary, hospital problems, inpatient lab results, inpatient diagnostic studies, and consultation reports with Mc.     Date of TCM Phone Call 1/13/2020 9/3/2020   [facility]   Date of Admission 1/7/2020 9/2/2020   Date of Discharge 1/10/2020 9/3/2020   Discharge Disposition Home or Self Care Home or Self Care       Risk for Readmission (LACE) Score: 7 (9/3/2020  6:00 AM)      Course During Hospital Stay(Reviewed from Discharge Summary Note)      Admission Diagnosis/es:  Biliary calculus of other site without obstruction [K80.80]  Abdominal pain, unspecified abdominal location [R10.9]  Hepatic cirrhosis, unspecified hepatic cirrhosis type, unspecified whether ascites present (CMS/HCC) [K74.60]     Discharge Diagnosis/es: same as above    Hospital Course:  Mc Selby is a 50 y.o. male with alcoholic cirrhosis, diabetes, COPD, GERD and vitamin D deficiency who presented to the emergency room complaining of right upper quadrant abdominal pain and right back pain associated with nausea and vomiting x2 and diaphoresis but no hematemesis, coffee-ground emesis, melena, bright red blood per rectum, constipation, or diarrhea.  Patient denied fever, chills, chest pain, or shortness of breath.     General surgery saw the patient in consultation and recommended IV Zosyn.  They felt that the patient would be a poor surgical candidate and would require an open procedure which would be high risk.  The patient had improvement and general surgery advanced his diet and recommended discharge  home on oral antibiotics.  I discussed the case with Dr. Shepherd the patient's PCP for continuity of care.  Patient is now felt to be stable for discharge.    Hospital Consultants: Gen Surg    Scheduled  Or Suggested Follow ups:    Pending Test Results: none    Current outpatient and discharge medications have been reconciled for the patient.  Reviewed by: Carlita Shepherd MD      Review of Systems   Constitutional: Negative for activity change, appetite change, fatigue and fever.   HENT: Positive for trouble swallowing. Negative for congestion, ear pain, postnasal drip, sinus pain and sore throat.    Eyes: Negative for photophobia, pain and redness.   Respiratory: Negative for cough and shortness of breath.    Cardiovascular: Negative for chest pain and palpitations.   Gastrointestinal: Positive for abdominal distention, abdominal pain, constipation and nausea. Negative for diarrhea and vomiting.   Endocrine: Negative for polydipsia and polyuria.   Genitourinary: Negative for dysuria and hematuria.   Musculoskeletal: Negative for arthralgias and myalgias.   Skin: Negative for rash and wound.   Neurological: Negative for dizziness, light-headedness and headaches.   Psychiatric/Behavioral: Positive for dysphoric mood. Negative for sleep disturbance. The patient is not nervous/anxious.        Objective     There were no vitals taken for this visit.    Physical Exam   Constitutional: He is oriented to person, place, and time. He appears well-developed and well-nourished. No distress.   HENT:   Head: Normocephalic and atraumatic.   Right Ear: External ear normal.   Left Ear: External ear normal.   Mouth/Throat: Oropharynx is clear and moist.   Eyes: Conjunctivae and EOM are normal. Right eye exhibits no discharge. Left eye exhibits no discharge. No scleral icterus.   Neck: Normal range of motion. Neck supple. No tracheal deviation present.   Cardiovascular: Normal rate, regular rhythm and normal heart sounds. Exam  reveals no gallop and no friction rub.   No murmur heard.  Pulmonary/Chest: Effort normal and breath sounds normal. No respiratory distress. He has no wheezes. He has no rales.   Musculoskeletal: Normal range of motion. He exhibits no edema or deformity.   Lymphadenopathy:     He has no cervical adenopathy.   Neurological: He is alert and oriented to person, place, and time. No cranial nerve deficit. He exhibits normal muscle tone.   Skin: Skin is warm and dry. No rash noted. He is not diaphoretic.   Psychiatric: He has a normal mood and affect. His behavior is normal. Thought content normal.   Vitals reviewed.      Hospital Data Reviewed:    Lab Results   Component Value Date    BUN  09/03/2020      Comment:      Testing performed by alternate method    BUN 17 09/03/2020    CREATININE 1.07 09/03/2020    EGFRIFNONA 73 09/03/2020     09/03/2020    K 4.1 09/03/2020     09/03/2020    CALCIUM 8.5 (L) 09/03/2020    ALBUMIN 3.20 (L) 09/03/2020    BILITOT 1.3 (H) 09/03/2020    ALKPHOS 89 09/03/2020    AST 19 09/03/2020    ALT 10 09/03/2020    TRIG 49 07/27/2020    HDL 34 (L) 07/27/2020    VLDL 9.8 07/27/2020     (H) 07/27/2020    LDLHDL 3.04 07/27/2020    WBC 9.40 09/03/2020    RBC 4.68 09/03/2020    HCT 43.5 09/03/2020    MCV 93.0 09/03/2020    MCH 32.2 09/03/2020    TSH 1.540 07/27/2020    INR 1.09 09/02/2020    GWFR70SW 50.9 07/27/2020      Assessment/Plan     Ct Abdomen Pelvis Without Contrast    Result Date: 9/2/2020  Impression:  1. Cirrhotic liver morphology with portal hypertension changes including small quantity ascites and splenomegaly. 2. Cholelithiasis. There is mild bladder suspension without convincing CT evidence of acute cholecystitis. 3. No active bowel inflammatory changes are identified. 4. Small 4 mm or less noncalcified bibasilar pulmonary nodules are stable since 06/08/2020. Consider continued CT chest follow-up in 12 months to document stability. 4. Additional chronic findings as  described above.    Electronically Signed By-Dr. Marylin Scherer MD On:9/2/2020 11:17 AM This report was finalized on [id number] by Dr. Marylin Scherer MD.    Ir Inject/asp Large Joint Or Bursa    Result Date: 8/14/2020  Impression: Fluoroscopy guided steroid and anesthetic injection of right hip.  Electronically Signed By-Arvind Chau On:8/14/2020 8:17 AM This report was finalized on [id number] by  Arvind Chau, .      Assessment/Plan     Diagnoses and all orders for this visit:    1. Calculus of gallbladder without cholecystitis without obstruction (Primary)    2. Pain of upper abdomen  -     oxyCODONE (ROXICODONE) 5 MG immediate release tablet; Take 1 tablet by mouth Every 4 (Four) Hours As Needed for Moderate Pain  for up to 10 days.  Dispense: 30 tablet; Refill: 0    3. Dysphagia, unspecified type    4. Gastroesophageal reflux disease with esophagitis    5. Rheumatoid arthritis of multiple sites with negative rheumatoid factor (CMS/[facility])    6. Type 2 diabetes mellitus with hyperglycemia, with long-term current use of insulin (CMS/[facility])    Other orders  -     pantoprazole (Protonix) 40 MG EC tablet; Take 1 tablet by mouth 2 (two) times a day.  Dispense: 60 tablet; Refill: 0    pt continues to have significant pain, nausea, and decreased appetite  Dysphagia x 2 weeks after getting brussel sprout and steak stuck  Still has a lot of indigestion as well  Increase ppi to bid for esophagitis  Continue oxycodone for now  Pt has f/u appt with Gen Surg today  May need to discuss if stent in bile duct is a possibility if pt is still not a good surgical candidate  Surgery is definitely risky, but pt does not want to continue pain med indefinitely  Complete abx  Glucoses are acceptable  Continue to be cautious with insulin while having poor PO intake  RA seems to be clinically controlled - continue prednisone 10mg daily for now    No follow-ups on file.

## 2020-09-09 NOTE — PROGRESS NOTES
GENERAL SURGERY PROGRESS NOTE    9/9/2020   LOS: 0 days   Patient Care Team:  Carlita Shepherd MD as PCP - General (Internal Medicine)  Carlita Shepherd MD as PCP - Family Medicine    Chief Complaint: biliary colic, gallstones    Subjective   HPI: Patient is a 50 y.o. male presents with right flank and right upper quadrant abdominal pain which started at approximately 5:30 this morning.  The pain reportedly woke the patient up from sleep, and then began to wrap around the right side of the upper abdomen and into the patient's epigastrium.  Patient reports that he has had pain similar to this in the past, however it normally resolves within an hour or so.  Since 5:00 this morning he has had bloating, and dull aching abdominal pain which is made better by moving.  His pain is associated with nausea, but no vomiting and decreased appetite.  The patient states that he has gotten some IV pain medicine which helped a little bit, however he is in significant discomfort even now.  The patient's past medical history is significant for alcoholic cirrhosis of the liver, perforated duodenal ulcer status post exploratory laparotomy and Chavez patch by Dr. Sapna Elizalde, and umbilical hernia repair performed by Dr. Sapna Elizalde.    Interval History:   Patient returns to my office today with continued complaints of indigestion, acid reflux, and right upper quadrant abdominal pain which occasionally radiates into his back.  He is not having any fevers.  He reports that he has a poor appetite and is not able to eat much.  Eating seems to exacerbate his symptoms.    Objective     Vital Signs  Temp:  [96.9 °F (36.1 °C)-97.3 °F (36.3 °C)] 97.3 °F (36.3 °C)  Heart Rate:  [71-88] 88  Resp:  [18] 18  BP: (118-133)/(74-78) 133/74    Physical Exam   Constitutional: He is oriented to person, place, and time. He appears well-developed and well-nourished. No distress.   HENT:   Head: Normocephalic and atraumatic.   Cardiovascular: Normal  rate and regular rhythm.   Pulmonary/Chest: Effort normal and breath sounds normal.   Abdominal: Soft. He exhibits no distension. There is no tenderness.   Obese   Musculoskeletal: Normal range of motion. He exhibits no edema.   Neurological: He is alert and oriented to person, place, and time.   Skin: Skin is warm and dry. No rash noted.   Psychiatric: He has a normal mood and affect. His behavior is normal.   Vitals reviewed.       Results Review:    Lab Results (last 24 hours)     ** No results found for the last 24 hours. **        Imaging Results (Last 24 Hours)     ** No results found for the last 24 hours. **           I have reviewed the above results and noted them below    Medication Review:    Current Outpatient Medications:   •  albuterol sulfate  (90 Base) MCG/ACT inhaler, Inhale 2 puffs Every 4 (Four) Hours As Needed for Wheezing., Disp: , Rfl:   •  amoxicillin-clavulanate (Augmentin) 875-125 MG per tablet, Take 1 tablet by mouth 2 (Two) Times a Day for 8 days., Disp: 16 tablet, Rfl: 0  •  azelastine (ASTELIN) 0.1 % nasal spray, 2 sprays into the nostril(s) as directed by provider 2 (Two) Times a Day., Disp: , Rfl:   •  bumetanide (BUMEX) 2 MG tablet, Take 2 mg by mouth 2 (Two) Times a Day., Disp: , Rfl:   •  Etanercept (Enbrel Mini) 50 MG/ML solution cartridge, Inject 50 mg under the skin into the appropriate area as directed 1 (One) Time Per Week., Disp: 12 Cartridge, Rfl: 0  •  famotidine (PEPCID) 20 MG tablet, TAKE 1 TABLET BY MOUTH EVERY DAY, Disp: 90 tablet, Rfl: 0  •  insulin detemir (LEVEMIR) 100 UNIT/ML injection, Inject 15 Units under the skin into the appropriate area as directed Every Night., Disp: , Rfl: 12  •  lactulose (CHRONULAC) 10 GM/15ML solution, Take 30 mL by mouth 2 (Two) Times a Day., Disp: 150 mL, Rfl: 0  •  magnesium oxide (MAG-OX) 400 MG tablet, Take 2 tablets by mouth 2 (Two) Times a Day., Disp: , Rfl: 6  •  meclizine 25 MG chewable tablet chewable tablet, Chew 25 mg 3  (Three) Times a Day As Needed., Disp: , Rfl:   •  montelukast (SINGULAIR) 10 MG tablet, Take 10 mg by mouth Every Night., Disp: , Rfl:   •  NOVOLOG FLEXPEN 100 UNIT/ML solution pen-injector sc pen, Inject 2 Units under the skin into the appropriate area as directed 3 (Three) Times a Day With Meals., Disp: 3 pen, Rfl: 3  •  ondansetron (ZOFRAN) 8 MG tablet, Take 8 mg by mouth Every 8 (Eight) Hours As Needed for Nausea or Vomiting., Disp: , Rfl:   •  oxyCODONE (ROXICODONE) 5 MG immediate release tablet, Take 1 tablet by mouth Every 4 (Four) Hours As Needed for Moderate Pain  for up to 10 days., Disp: 30 tablet, Rfl: 0  •  pantoprazole (Protonix) 40 MG EC tablet, Take 1 tablet by mouth 2 (two) times a day., Disp: 60 tablet, Rfl: 0  •  potassium chloride (K-DUR,KLOR-CON) 20 MEQ CR tablet, Take 40 mEq by mouth 3 (Three) Times a Day., Disp: , Rfl:   •  predniSONE (DELTASONE) 10 MG tablet, Take 1 tablet by mouth Daily., Disp: 90 tablet, Rfl: 0  •  promethazine (PHENERGAN) 12.5 MG tablet, Take 12.5 mg by mouth Every 6 (Six) Hours As Needed for Nausea or Vomiting., Disp: , Rfl:   •  riFAXIMin (Xifaxan) 550 MG tablet, Take 1 tablet by mouth Every 12 (Twelve) Hours., Disp: 180 tablet, Rfl: 1  •  senna (SENOKOT) 8.6 MG tablet, Take 1 tablet by mouth 2 (Two) Times a Day As Needed for Constipation., Disp: 30 tablet, Rfl: 0  •  spironolactone (ALDACTONE) 25 MG tablet, Take 25 mg by mouth Daily., Disp: , Rfl:   •  tamsulosin (FLOMAX) 0.4 MG capsule 24 hr capsule, Take 1 capsule by mouth 2 (Two) Times a Day., Disp: , Rfl:   •  Triamcinolone Acetonide (NASACORT) 55 MCG/ACT nasal inhaler, 1 spray into the nostril(s) as directed by provider Daily., Disp: , Rfl:   •  vitamin D (ERGOCALCIFEROL) 71307 units capsule capsule, Take 50,000 Units by mouth Every 14 (Fourteen) Days. Every other Sunday, Disp: , Rfl:   •  zinc sulfate (ZINCATE) 220 (50 Zn) MG capsule, Take 220 mg by mouth Daily., Disp: , Rfl: 3    Assessment/Plan     Active  Problems:  Gallstones  Indigestion  Gastric dyspepsia    Discussed with patient again that he likely has biliary colic due to his gallstones.  Given his history of cirrhosis of the liver and perforated duodenal ulcer, any type of surgical intervention on this area is likely to result in an open operation and be high risk for complications.  Therefore, my recommendation would be to see if we are able to manage his symptoms medically.  Discussed with patient that he should adhere to a low-fat diet going forward to prevent further recurrences in the future.  Complete antibiotics.  Oral analgesics for pain control  Will prescribe Carafate in the off chance that his indigestion and acid reflux is secondary to gastric dyspepsia.  Would continue to recommend the patient follow-up with his gastroenterologist regarding his cirrhosis of the liver.  Right now the patient appears well compensated.  I would like to exclude peptic ulcer disease as a source for the patient's symptoms, and he may benefit from an EGD going forward.  Again, I would believe that the patient has high likelihood for conversion to open surgery for removal of his gallbladder as well as a high likelihood for complications and decompensation of his liver disease. Without convincing evidence for cholecystitis and without ruling out other sources in the upper GI tract as a source for his symptoms, I would not recommend surgical intervention.  F/u with GI and see me after    Wicho Conley MD  09/09/20  14:14

## 2020-09-14 ENCOUNTER — READMISSION MANAGEMENT (OUTPATIENT)
Dept: CALL CENTER | Facility: HOSPITAL | Age: 50
End: 2020-09-14

## 2020-09-14 NOTE — OUTREACH NOTE
"Medical Week 2 Survey      Responses   [facility] patient discharged from?  [facility]   COVID-19 Test Status  Not tested   Does the patient have one of the following disease processes/diagnoses(primary or secondary)?  Other   Week 2 attempt successful?  Yes   Call start time  1730   Discharge diagnosis  alcoholic cirrhosis  Biliary calculus of other site without obstruction    Call end time  1732   Meds reviewed with patient/caregiver?  Yes   Is the patient having any side effects they believe may be caused by any medication additions or changes?  No   Does the patient have all medications ordered at discharge?  Yes   Is the patient taking all medications as directed (includes completed medication regime)?  Yes   Does the patient have a primary care provider?   Yes   Does the patient have an appointment with their PCP within 7 days of discharge?  Yes   Has the patient kept scheduled appointments due by today?  Yes   Comments  Has followed up with Dr. Shepherd and Dr. Conley   Has home health visited the patient within 72 hours of discharge?  N/A   Pulse Ox monitoring  None   Did the patient receive a copy of their discharge instructions?  Yes   Nursing interventions  Reviewed instructions with patient   What is the patient's perception of their health status since discharge?  Improving   Is the patient/caregiver able to teach back signs and symptoms related to disease process for when to call PCP?  Yes   Is the patient/caregiver able to teach back signs and symptoms related to disease process for when to call 911?  Yes   Is the patient/caregiver able to teach back the hierarchy of who to call/visit for symptoms/problems? PCP, Specialist, Home health nurse, Urgent Care, ED, 911  Yes   Additional teach back comments  States he is doing better but still having pain and he is to see pain management to get better control of it.  States \"I'm going to see my liver doctor Friday\".  Has followed up with PCP and surgeon.   "   Week 2 Call Completed?  Yes   Graduated  Yes   Did the patient feel the follow up calls were helpful during their recovery period?  Yes   Was the number of calls appropriate?  Yes   Graduated/Revoked comments  Denies questions or needs at this time          Jeannie Carbajal LPN

## 2020-09-15 ENCOUNTER — TELEPHONE (OUTPATIENT)
Dept: FAMILY MEDICINE CLINIC | Facility: CLINIC | Age: 50
End: 2020-09-15

## 2020-09-15 DIAGNOSIS — R10.10 PAIN OF UPPER ABDOMEN: ICD-10-CM

## 2020-09-15 RX ORDER — OXYCODONE HYDROCHLORIDE 5 MG/1
5 TABLET ORAL EVERY 4 HOURS PRN
Qty: 30 TABLET | Refills: 0 | Status: SHIPPED | OUTPATIENT
Start: 2020-09-15 | End: 2020-09-25

## 2020-09-15 NOTE — TELEPHONE ENCOUNTER
AUBRIE FROM [facility] CALLED TO REPORT THEY ARE OUT OF OXYCODONE AT THEIR LOCATION, IF COULD BE CALLED IN A THE [facility] LOCATION/ PH: [phone]

## 2020-09-18 ENCOUNTER — TELEPHONE (OUTPATIENT)
Dept: FAMILY MEDICINE CLINIC | Facility: CLINIC | Age: 50
End: 2020-09-18

## 2020-09-18 ENCOUNTER — OFFICE (AMBULATORY)
Dept: URBAN - METROPOLITAN AREA CLINIC 64 | Facility: CLINIC | Age: 50
End: 2020-09-18

## 2020-09-18 VITALS
HEART RATE: 76 BPM | WEIGHT: 261 LBS | DIASTOLIC BLOOD PRESSURE: 80 MMHG | HEIGHT: 70 IN | SYSTOLIC BLOOD PRESSURE: 144 MMHG

## 2020-09-18 DIAGNOSIS — K80.20 CALCULUS OF GALLBLADDER WITHOUT CHOLECYSTITIS WITHOUT OBSTRU: ICD-10-CM

## 2020-09-18 DIAGNOSIS — K70.30 ALCOHOLIC CIRRHOSIS OF LIVER WITHOUT ASCITES: ICD-10-CM

## 2020-09-18 PROCEDURE — 99214 OFFICE O/P EST MOD 30 MIN: CPT | Performed by: INTERNAL MEDICINE

## 2020-09-18 RX ORDER — ESOMEPRAZOLE MAGNESIUM 40 MG/1
40 CAPSULE, DELAYED RELEASE ORAL
Qty: 90 CAPSULE | Refills: 1 | Status: SHIPPED | OUTPATIENT
Start: 2020-09-18 | End: 2021-03-10 | Stop reason: SDUPTHER

## 2020-09-18 NOTE — TELEPHONE ENCOUNTER
PHARM TECH MARIANA CALLING FROM [employer] ABOUT    pantoprazole (Protonix) 40 MG EC tablet   IT IS NOT COVERED BY INSURANCE.    NEEDING AN ALTERNATE MEDICATION TO BE COVERED BY INSURANCE, NEEDING OTC LISTED TO BE COVERED    [facility] LISTED   PREVACID OTC  PRILOCEC OTC  NEXUM 24HR OTC       [employer] CALL BACK [phone]

## 2020-10-07 RX ORDER — PANTOPRAZOLE SODIUM 40 MG/1
TABLET, DELAYED RELEASE ORAL
Qty: 60 TABLET | Refills: 0 | OUTPATIENT
Start: 2020-10-07

## 2020-10-14 ENCOUNTER — TELEPHONE (OUTPATIENT)
Dept: FAMILY MEDICINE CLINIC | Facility: CLINIC | Age: 50
End: 2020-10-14

## 2020-10-14 NOTE — TELEPHONE ENCOUNTER
MARITZA FORM [employer] IS CALLING ABOUT THE PATIENT IS USING A NON PREFERRED MEDICATION esomeprazole (nexIUM) 40 MG capsule    [employer] WOULD LIKE TO TALK TO SOMEONE ABOUT SWITCHING THIS MEDICATION TO ONE THAT IS A PREFERRED MEDICATION    MARITZA FROM [employer]@[phone]

## 2020-10-20 ENCOUNTER — OFFICE VISIT (OUTPATIENT)
Dept: FAMILY MEDICINE CLINIC | Facility: CLINIC | Age: 50
End: 2020-10-20

## 2020-10-20 VITALS
OXYGEN SATURATION: 93 % | HEIGHT: 70 IN | BODY MASS INDEX: 39.08 KG/M2 | TEMPERATURE: 96.9 F | SYSTOLIC BLOOD PRESSURE: 130 MMHG | DIASTOLIC BLOOD PRESSURE: 82 MMHG | WEIGHT: 273 LBS | RESPIRATION RATE: 16 BRPM | HEART RATE: 88 BPM

## 2020-10-20 DIAGNOSIS — K80.20 CALCULUS OF GALLBLADDER WITHOUT CHOLECYSTITIS WITHOUT OBSTRUCTION: Primary | ICD-10-CM

## 2020-10-20 DIAGNOSIS — Z23 NEED FOR VACCINATION: ICD-10-CM

## 2020-10-20 DIAGNOSIS — Z79.4 TYPE 2 DIABETES MELLITUS WITH HYPERGLYCEMIA, WITH LONG-TERM CURRENT USE OF INSULIN (HCC): ICD-10-CM

## 2020-10-20 DIAGNOSIS — M06.09 RHEUMATOID ARTHRITIS OF MULTIPLE SITES WITH NEGATIVE RHEUMATOID FACTOR (HCC): ICD-10-CM

## 2020-10-20 DIAGNOSIS — E11.65 TYPE 2 DIABETES MELLITUS WITH HYPERGLYCEMIA, WITH LONG-TERM CURRENT USE OF INSULIN (HCC): ICD-10-CM

## 2020-10-20 DIAGNOSIS — J01.00 ACUTE NON-RECURRENT MAXILLARY SINUSITIS: ICD-10-CM

## 2020-10-20 PROCEDURE — 90686 IIV4 VACC NO PRSV 0.5 ML IM: CPT | Performed by: INTERNAL MEDICINE

## 2020-10-20 PROCEDURE — 90471 IMMUNIZATION ADMIN: CPT | Performed by: INTERNAL MEDICINE

## 2020-10-20 PROCEDURE — 99214 OFFICE O/P EST MOD 30 MIN: CPT | Performed by: INTERNAL MEDICINE

## 2020-10-20 RX ORDER — GUAIFENESIN 600 MG/1
1200 TABLET, EXTENDED RELEASE ORAL 2 TIMES DAILY
COMMUNITY

## 2020-10-20 RX ORDER — CEFUROXIME AXETIL 250 MG/1
250 TABLET ORAL 2 TIMES DAILY
Qty: 20 TABLET | Refills: 0 | Status: SHIPPED | OUTPATIENT
Start: 2020-10-20 | End: 2021-01-27

## 2020-11-10 DIAGNOSIS — K70.30 ALCOHOLIC CIRRHOSIS OF LIVER WITHOUT ASCITES (HCC): Primary | Chronic | ICD-10-CM

## 2020-11-10 RX ORDER — ALBUMIN (HUMAN) 12.5 G/50ML
25 SOLUTION INTRAVENOUS DAILY PRN
Status: CANCELLED | OUTPATIENT
Start: 2020-11-11

## 2020-11-10 RX ORDER — ALBUMIN (HUMAN) 12.5 G/50ML
12.5 SOLUTION INTRAVENOUS DAILY PRN
Status: CANCELLED | OUTPATIENT
Start: 2020-11-11

## 2020-11-11 ENCOUNTER — HOSPITAL ENCOUNTER (OUTPATIENT)
Dept: INFUSION THERAPY | Facility: HOSPITAL | Age: 50
Discharge: HOME | End: 2020-11-11
Admitting: INTERNAL MEDICINE

## 2020-11-11 VITALS
HEART RATE: 71 BPM | SYSTOLIC BLOOD PRESSURE: 113 MMHG | DIASTOLIC BLOOD PRESSURE: 79 MMHG | TEMPERATURE: 98 F | OXYGEN SATURATION: 95 % | RESPIRATION RATE: 16 BRPM

## 2020-11-11 DIAGNOSIS — K70.30 ALCOHOLIC CIRRHOSIS OF LIVER WITHOUT ASCITES (HCC): Chronic | ICD-10-CM

## 2020-11-11 LAB
DEPRECATED RDW RBC AUTO: 45.9 FL (ref 37–54)
ERYTHROCYTE [DISTWIDTH] IN BLOOD BY AUTOMATED COUNT: 14.2 % (ref 12.3–15.4)
HCT VFR BLD AUTO: 47.5 % (ref 37.5–51)
HGB BLD-MCNC: 16.3 G/DL (ref 13–17.7)
INR PPP: 1.09 (ref 0.93–1.1)
MCH RBC QN AUTO: 32.2 PG (ref 26.6–33)
MCHC RBC AUTO-ENTMCNC: 34.4 G/DL (ref 31.5–35.7)
MCV RBC AUTO: 93.6 FL (ref 79–97)
PLATELET # BLD AUTO: 222 10*3/MM3 (ref 140–450)
PMV BLD AUTO: 8.5 FL (ref 6–12)
PROTHROMBIN TIME: 11.9 SECONDS (ref 9.6–11.7)
RBC # BLD AUTO: 5.07 10*6/MM3 (ref 4.14–5.8)
WBC # BLD AUTO: 10.2 10*3/MM3 (ref 3.4–10.8)

## 2020-11-11 PROCEDURE — 85610 PROTHROMBIN TIME: CPT | Performed by: INTERNAL MEDICINE

## 2020-11-11 PROCEDURE — G0463 HOSPITAL OUTPT CLINIC VISIT: HCPCS

## 2020-11-11 PROCEDURE — 76700 US EXAM ABDOM COMPLETE: CPT

## 2020-11-11 PROCEDURE — 36415 COLL VENOUS BLD VENIPUNCTURE: CPT

## 2020-11-11 PROCEDURE — 85027 COMPLETE CBC AUTOMATED: CPT | Performed by: INTERNAL MEDICINE

## 2020-11-11 NOTE — CODE DOCUMENTATION
After Dr Hernandez performed an [facility] and called Dr Georges to come and evaluate they  determined patient's liver was too  large and patient  didn't have enough fluid to do para safely.

## 2020-11-13 ENCOUNTER — TELEPHONE (OUTPATIENT)
Dept: FAMILY MEDICINE CLINIC | Facility: CLINIC | Age: 50
End: 2020-11-13

## 2020-11-13 ENCOUNTER — OFFICE VISIT (OUTPATIENT)
Dept: FAMILY MEDICINE CLINIC | Facility: CLINIC | Age: 50
End: 2020-11-13

## 2020-11-13 VITALS
RESPIRATION RATE: 16 BRPM | HEART RATE: 124 BPM | DIASTOLIC BLOOD PRESSURE: 62 MMHG | OXYGEN SATURATION: 97 % | SYSTOLIC BLOOD PRESSURE: 126 MMHG | WEIGHT: 270.8 LBS | TEMPERATURE: 97.1 F | BODY MASS INDEX: 38.86 KG/M2

## 2020-11-13 DIAGNOSIS — E55.9 VITAMIN D DEFICIENCY: ICD-10-CM

## 2020-11-13 DIAGNOSIS — K70.31 ALCOHOLIC CIRRHOSIS OF LIVER WITH ASCITES (HCC): ICD-10-CM

## 2020-11-13 DIAGNOSIS — E11.65 TYPE 2 DIABETES MELLITUS WITH HYPERGLYCEMIA, WITH LONG-TERM CURRENT USE OF INSULIN (HCC): ICD-10-CM

## 2020-11-13 DIAGNOSIS — N18.9 CHRONIC KIDNEY DISEASE, UNSPECIFIED CKD STAGE: ICD-10-CM

## 2020-11-13 DIAGNOSIS — K80.20 CALCULUS OF GALLBLADDER WITHOUT CHOLECYSTITIS WITHOUT OBSTRUCTION: Primary | ICD-10-CM

## 2020-11-13 DIAGNOSIS — M06.00 SERONEGATIVE RHEUMATOID ARTHRITIS (HCC): ICD-10-CM

## 2020-11-13 DIAGNOSIS — Z79.4 TYPE 2 DIABETES MELLITUS WITH HYPERGLYCEMIA, WITH LONG-TERM CURRENT USE OF INSULIN (HCC): ICD-10-CM

## 2020-11-13 PROCEDURE — 99214 OFFICE O/P EST MOD 30 MIN: CPT | Performed by: INTERNAL MEDICINE

## 2020-11-13 RX ORDER — BLOOD SUGAR DIAGNOSTIC
STRIP MISCELLANEOUS
COMMUNITY
Start: 2020-10-22 | End: 2021-01-26 | Stop reason: SDUPTHER

## 2020-11-13 RX ORDER — PEN NEEDLE, DIABETIC 30 GX3/16"
1 NEEDLE, DISPOSABLE MISCELLANEOUS
Qty: 200 EACH | Refills: 5 | Status: SHIPPED | OUTPATIENT
Start: 2020-11-13 | End: 2021-08-17

## 2020-11-13 RX ORDER — ERGOCALCIFEROL 1.25 MG/1
50000 CAPSULE ORAL
Qty: 5 CAPSULE | Refills: 2 | Status: SHIPPED | OUTPATIENT
Start: 2020-11-13 | End: 2023-02-22

## 2020-11-13 RX ORDER — HYDROCODONE BITARTRATE AND ACETAMINOPHEN 7.5; 325 MG/1; MG/1
TABLET ORAL
COMMUNITY
Start: 2020-11-06 | End: 2021-01-27

## 2020-11-13 NOTE — PROGRESS NOTES
Subjective   Mc Selby is a 50 y.o. male.     Pt is here to follow up planned cholecystectomy  Actually both lap and open cholecystectomy failed  Apparently cirrhotic liver is engulfing gallbladder  So every incision caused bleeding that was difficult to stop  So they decided to stop the procedure    He currently has staples closing the incision  He has some pain from it and soreness around his chest  Pain from gallstones actually seemed to be better prior to attempted cholecystectomy  He has follow up with surgery on Tuesday    He was told that he had some ascites when they were attempting alin  But then when he went in for US guided paracentesis, no significant fluid was found    Since then, he's having a hard time keeping his glucoses up  He was taking levemir at first  And in the last couple of days, he has stopped levemir as well           The following portions of the patient's history were reviewed and updated as appropriate: allergies, current medications, past family history, past medical history, past social history, past surgical history and problem list.    Review of Systems   Constitutional: Positive for fatigue. Negative for fever.   HENT: Negative for congestion, ear pain, rhinorrhea and sore throat.    Eyes: Negative for blurred vision and itching.   Respiratory: Negative for cough and shortness of breath.    Cardiovascular: Negative for chest pain and palpitations.   Gastrointestinal: Positive for abdominal pain. Negative for diarrhea and vomiting.   Endocrine: Negative for polydipsia and polyuria.   Genitourinary: Negative for dysuria, frequency, hematuria and urgency.   Musculoskeletal: Negative for joint swelling and myalgias.   Skin: Negative for rash and skin lesions.   Neurological: Negative for dizziness, numbness and headache.   Psychiatric/Behavioral: Positive for depressed mood and stress. The patient is not nervous/anxious.          Current Outpatient Medications:   •  albuterol  sulfate  (90 Base) MCG/ACT inhaler, Inhale 2 puffs Every 4 (Four) Hours As Needed for Wheezing., Disp: , Rfl:   •  azelastine (ASTELIN) 0.1 % nasal spray, 2 sprays into the nostril(s) as directed by provider 2 (Two) Times a Day., Disp: , Rfl:   •  bumetanide (BUMEX) 2 MG tablet, Take 2 mg by mouth 2 (Two) Times a Day., Disp: , Rfl:   •  cefuroxime (Ceftin) 250 MG tablet, Take 1 tablet by mouth 2 (Two) Times a Day., Disp: 20 tablet, Rfl: 0  •  esomeprazole (nexIUM) 40 MG capsule, Take 1 capsule by mouth Every Morning Before Breakfast., Disp: 90 capsule, Rfl: 1  •  Etanercept (Enbrel Mini) 50 MG/ML solution cartridge, Inject 50 mg under the skin into the appropriate area as directed 1 (One) Time Per Week., Disp: 12 Cartridge, Rfl: 0  •  famotidine (PEPCID) 20 MG tablet, TAKE 1 TABLET BY MOUTH EVERY DAY, Disp: 90 tablet, Rfl: 0  •  guaiFENesin (MUCINEX) 600 MG 12 hr tablet, Take 1,200 mg by mouth 2 (Two) Times a Day., Disp: , Rfl:   •  insulin detemir (LEVEMIR) 100 UNIT/ML injection, Inject 15 Units under the skin into the appropriate area as directed Every Night., Disp: , Rfl: 12  •  lactulose (CHRONULAC) 10 GM/15ML solution, Take 30 mL by mouth 2 (Two) Times a Day., Disp: 150 mL, Rfl: 0  •  magnesium oxide (MAG-OX) 400 MG tablet, Take 2 tablets by mouth 2 (Two) Times a Day., Disp: , Rfl: 6  •  meclizine 25 MG chewable tablet chewable tablet, Chew 25 mg 3 (Three) Times a Day As Needed., Disp: , Rfl:   •  montelukast (SINGULAIR) 10 MG tablet, Take 10 mg by mouth Every Night., Disp: , Rfl:   •  NOVOLOG FLEXPEN 100 UNIT/ML solution pen-injector sc pen, Inject 2 Units under the skin into the appropriate area as directed 3 (Three) Times a Day With Meals., Disp: 3 pen, Rfl: 3  •  ondansetron (ZOFRAN) 8 MG tablet, Take 8 mg by mouth Every 8 (Eight) Hours As Needed for Nausea or Vomiting., Disp: , Rfl:   •  potassium chloride (K-DUR,KLOR-CON) 20 MEQ CR tablet, Take 40 mEq by mouth 3 (Three) Times a Day., Disp: , Rfl:    •  predniSONE (DELTASONE) 10 MG tablet, Take 1 tablet by mouth Daily., Disp: 90 tablet, Rfl: 0  •  promethazine (PHENERGAN) 12.5 MG tablet, Take 12.5 mg by mouth Every 6 (Six) Hours As Needed for Nausea or Vomiting., Disp: , Rfl:   •  riFAXIMin (Xifaxan) 550 MG tablet, Take 1 tablet by mouth Every 12 (Twelve) Hours., Disp: 180 tablet, Rfl: 1  •  senna (SENOKOT) 8.6 MG tablet, Take 1 tablet by mouth 2 (Two) Times a Day As Needed for Constipation., Disp: 30 tablet, Rfl: 0  •  spironolactone (ALDACTONE) 25 MG tablet, Take 25 mg by mouth Daily., Disp: , Rfl:   •  tamsulosin (FLOMAX) 0.4 MG capsule 24 hr capsule, Take 1 capsule by mouth 2 (Two) Times a Day., Disp: , Rfl:   •  Triamcinolone Acetonide (NASACORT) 55 MCG/ACT nasal inhaler, 1 spray into the nostril(s) as directed by provider Daily., Disp: , Rfl:   •  vitamin D (ERGOCALCIFEROL) 23742 units capsule capsule, Take 50,000 Units by mouth Every 14 (Fourteen) Days. Every other Sunday, Disp: , Rfl:   •  zinc sulfate (ZINCATE) 220 (50 Zn) MG capsule, Take 220 mg by mouth Daily., Disp: , Rfl: 3    Objective   /62 (BP Location: Left arm, Patient Position: Sitting, Cuff Size: Adult)   Pulse (!) 124   Temp 97.1 °F (36.2 °C) (Skin)   Resp 16   Wt 123 kg (270 lb 12.8 oz)   SpO2 97%   BMI 38.86 kg/m²   Physical Exam  Vitals signs reviewed.   Constitutional:       General: He is not in acute distress.     Appearance: He is well-developed. He is not diaphoretic.   HENT:      Head: Normocephalic and atraumatic.      Right Ear: External ear normal.      Left Ear: External ear normal.      Mouth/Throat:      Pharynx: No oropharyngeal exudate.   Eyes:      General: No scleral icterus.        Right eye: No discharge.         Left eye: No discharge.      Conjunctiva/sclera: Conjunctivae normal.   Neck:      Musculoskeletal: Normal range of motion and neck supple.      Thyroid: No thyromegaly.   Cardiovascular:      Rate and Rhythm: Normal rate and regular rhythm.       Heart sounds: Normal heart sounds. No murmur. No friction rub. No gallop.    Pulmonary:      Effort: Pulmonary effort is normal. No respiratory distress.      Breath sounds: Normal breath sounds. No wheezing or rales.   Musculoskeletal: Normal range of motion.         General: No deformity.   Lymphadenopathy:      Cervical: No cervical adenopathy.   Skin:     General: Skin is warm and dry.      Findings: No erythema or rash.   Neurological:      Mental Status: He is alert and oriented to person, place, and time.      Cranial Nerves: No cranial nerve deficit.   Psychiatric:         Behavior: Behavior normal.         Thought Content: Thought content normal.           Assessment/Plan   Problems Addressed this Visit        Digestive    Alcoholic cirrhosis (CMS/HCC) (Chronic)    Vitamin D deficiency (Chronic)    Cholelithiasis - Primary (Chronic)       Endocrine    Type 2 diabetes mellitus with hyperglycemia (CMS/HCC) (Chronic)      Other Visit Diagnoses     Chronic kidney disease, unspecified CKD stage        Seronegative rheumatoid arthritis (CMS/[facility])          Diagnoses       Codes Comments    Calculus of gallbladder without cholecystitis without obstruction    -  Primary ICD-10-CM: K80.20  ICD-9-CM: 574.20     Type 2 diabetes mellitus with hyperglycemia, with long-term current use of insulin (CMS/HCC)     ICD-10-CM: E11.65, Z79.4  ICD-9-CM: 250.00, 790.29, V58.67     Chronic kidney disease, unspecified CKD stage     ICD-10-CM: N18.9  ICD-9-CM: 585.9     Vitamin D deficiency     ICD-10-CM: E55.9  ICD-9-CM: 268.9     Seronegative rheumatoid arthritis (CMS/HCC)     ICD-10-CM: M06.00  ICD-9-CM: 714.0     Alcoholic cirrhosis of liver with ascites (CMS/HCC)     ICD-10-CM: K70.31  ICD-9-CM: 571.2           Refill vit d and insulin pen needles  Post alin attempt, despite bleeding issues, hgb was still acceptable  Incision is c/d/i  Follow up with endo  Follow up with rheum (appt in dec) - cont prednisone for now  enbrel  worked well for him in the past (rheum stopped practicing so had to fine new rheum)  Follow up with surgery and GI  He still has some pain meds - will call back if he needs a refill  Is currently only taking qhs to help with pain so he can sleep  He has started drinking a beer here and there  He is struggling with quality of life vs length of time he has left  Unfortunately, by being compliant with treatment and stopping etoh,  He has become healthy enough not to qualify for transplant  But still ill enough that he has multiple medications, fatigue, and other sxs       Procedures

## 2020-11-13 NOTE — TELEPHONE ENCOUNTER
I talked with Dr. Godwin. He was agreeable to take over care.  After some further thought, I think he would be better seeing Dr. Godwin instead. Please let pt know and see if that's ok. If it is, then change to Dr. Kim's schedule please

## 2020-11-16 NOTE — TELEPHONE ENCOUNTER
Gave message to patient at 10:44am and he was happy to hear of the change.  I cancelled the appt with Jens and scheduled an appt with MEB on 5/12/2021 at 2pm - 30 minutes.

## 2020-11-19 ENCOUNTER — TELEPHONE (OUTPATIENT)
Dept: ORTHOPEDIC SURGERY | Facility: CLINIC | Age: 50
End: 2020-11-19

## 2020-11-19 DIAGNOSIS — M16.11 PRIMARY OSTEOARTHRITIS OF RIGHT HIP: ICD-10-CM

## 2020-11-19 DIAGNOSIS — M16.0 PRIMARY OSTEOARTHRITIS OF BOTH HIPS: ICD-10-CM

## 2020-11-19 DIAGNOSIS — M25.551 RIGHT HIP PAIN: Primary | ICD-10-CM

## 2020-11-19 NOTE — TELEPHONE ENCOUNTER
----- Message from Mc Selby sent at 11/19/2020 11:35 AM EST -----  Regarding: Prescription Question  Contact: [phone]  I need another shot in my right hip and you told me to call you when I need another prescription for the hospital thank you please let me know when I can call the Hospital it's for my right hip

## 2020-11-19 NOTE — TELEPHONE ENCOUNTER
Can you please send in an order for an intra-articular injection in radiology under fluoroscopy control with a steroid at [facility]?  Please send it to me electronically and I will sign off on it for this patient thank you

## 2020-11-19 NOTE — TELEPHONE ENCOUNTER
----- Message from Mc Selby sent at 11/19/2020 11:35 AM EST -----  Regarding: Prescription Question  Contact: [phone]  MY CHART MESSAGE:    I need another shot in my right hip and you told me to call you when I need another prescription for the hospital thank you please let me know when I can call the Hospital it's for my right hip

## 2020-12-03 ENCOUNTER — HOSPITAL ENCOUNTER (OUTPATIENT)
Dept: INTERVENTIONAL RADIOLOGY/VASCULAR | Facility: HOSPITAL | Age: 50
Discharge: HOME | End: 2020-12-03
Admitting: RADIOLOGY

## 2020-12-03 DIAGNOSIS — M16.0 PRIMARY OSTEOARTHRITIS OF BOTH HIPS: ICD-10-CM

## 2020-12-03 DIAGNOSIS — M16.11 PRIMARY OSTEOARTHRITIS OF RIGHT HIP: ICD-10-CM

## 2020-12-03 DIAGNOSIS — M25.551 RIGHT HIP PAIN: ICD-10-CM

## 2020-12-03 PROCEDURE — 77002 NEEDLE LOCALIZATION BY XRAY: CPT

## 2020-12-03 PROCEDURE — 25010000002 METHYLPREDNISOLONE PER 80 MG: Performed by: ORTHOPAEDIC SURGERY

## 2020-12-03 PROCEDURE — 25010000003 LIDOCAINE 1 % SOLUTION: Performed by: ORTHOPAEDIC SURGERY

## 2020-12-03 RX ORDER — BUPIVACAINE HYDROCHLORIDE 2.5 MG/ML
10 INJECTION, SOLUTION EPIDURAL; INFILTRATION; INTRACAUDAL ONCE
Status: COMPLETED | OUTPATIENT
Start: 2020-12-03 | End: 2020-12-03

## 2020-12-03 RX ORDER — METHYLPREDNISOLONE ACETATE 80 MG/ML
80 INJECTION, SUSPENSION INTRA-ARTICULAR; INTRALESIONAL; INTRAMUSCULAR; SOFT TISSUE ONCE
Status: COMPLETED | OUTPATIENT
Start: 2020-12-03 | End: 2020-12-03

## 2020-12-03 RX ORDER — LIDOCAINE HYDROCHLORIDE 10 MG/ML
20 INJECTION, SOLUTION INFILTRATION; PERINEURAL ONCE
Status: COMPLETED | OUTPATIENT
Start: 2020-12-03 | End: 2020-12-03

## 2020-12-03 RX ADMIN — BUPIVACAINE HYDROCHLORIDE 3 ML: 2.5 INJECTION, SOLUTION EPIDURAL; INFILTRATION; INTRACAUDAL; PERINEURAL at 13:47

## 2020-12-03 RX ADMIN — METHYLPREDNISOLONE ACETATE 80 MG: 80 INJECTION, SUSPENSION INTRA-ARTICULAR; INTRALESIONAL; INTRAMUSCULAR; SOFT TISSUE at 13:47

## 2020-12-03 RX ADMIN — LIDOCAINE HYDROCHLORIDE 7 ML: 10 INJECTION, SOLUTION INFILTRATION; PERINEURAL at 13:30

## 2020-12-06 RX ORDER — PREDNISONE 10 MG/1
TABLET ORAL
Qty: 90 TABLET | Refills: 0 | Status: SHIPPED | OUTPATIENT
Start: 2020-12-06 | End: 2021-03-03

## 2020-12-08 ENCOUNTER — OFFICE (AMBULATORY)
Dept: URBAN - METROPOLITAN AREA CLINIC 64 | Facility: CLINIC | Age: 50
End: 2020-12-08

## 2020-12-08 VITALS
DIASTOLIC BLOOD PRESSURE: 81 MMHG | WEIGHT: 267 LBS | SYSTOLIC BLOOD PRESSURE: 117 MMHG | HEART RATE: 76 BPM | HEIGHT: 70 IN

## 2020-12-08 DIAGNOSIS — K80.20 CALCULUS OF GALLBLADDER WITHOUT CHOLECYSTITIS WITHOUT OBSTRU: ICD-10-CM

## 2020-12-08 DIAGNOSIS — K70.30 ALCOHOLIC CIRRHOSIS OF LIVER WITHOUT ASCITES: ICD-10-CM

## 2020-12-08 PROCEDURE — 99214 OFFICE O/P EST MOD 30 MIN: CPT | Performed by: INTERNAL MEDICINE

## 2020-12-15 ENCOUNTER — TELEPHONE (OUTPATIENT)
Dept: PEDIATRICS | Facility: OTHER | Age: 50
End: 2020-12-15

## 2020-12-15 NOTE — TELEPHONE ENCOUNTER
Caller: Mc Selby    Relationship to patient: Self    Best call back number: [phone]     Patient is needing: BLOOD WORK SCHEDULED

## 2020-12-16 NOTE — TELEPHONE ENCOUNTER
Spoke with patient and scheduled a [facility] appt for 12/17 at 11am.  He will bring order from rheumatology.

## 2020-12-17 ENCOUNTER — HOSPITAL ENCOUNTER (OUTPATIENT)
Dept: GENERAL RADIOLOGY | Facility: HOSPITAL | Age: 50
Discharge: HOME | End: 2020-12-17

## 2020-12-17 ENCOUNTER — LAB (OUTPATIENT)
Dept: FAMILY MEDICINE CLINIC | Facility: CLINIC | Age: 50
End: 2020-12-17

## 2020-12-17 ENCOUNTER — TRANSCRIBE ORDERS (OUTPATIENT)
Dept: LAB | Facility: HOSPITAL | Age: 50
End: 2020-12-17

## 2020-12-17 DIAGNOSIS — M06.09 RHEUMATOID ARTHRITIS OF MULTIPLE SITES WITHOUT RHEUMATOID FACTOR (HCC): Primary | ICD-10-CM

## 2020-12-17 DIAGNOSIS — M06.09 RHEUMATOID ARTHRITIS OF MULTIPLE SITES WITHOUT RHEUMATOID FACTOR (HCC): ICD-10-CM

## 2020-12-17 DIAGNOSIS — E11.65 TYPE 2 DIABETES MELLITUS WITH HYPERGLYCEMIA, WITH LONG-TERM CURRENT USE OF INSULIN (HCC): ICD-10-CM

## 2020-12-17 DIAGNOSIS — Z79.4 TYPE 2 DIABETES MELLITUS WITH HYPERGLYCEMIA, WITH LONG-TERM CURRENT USE OF INSULIN (HCC): ICD-10-CM

## 2020-12-17 DIAGNOSIS — N18.9 CHRONIC KIDNEY DISEASE, UNSPECIFIED CKD STAGE: ICD-10-CM

## 2020-12-17 DIAGNOSIS — M06.00 SERONEGATIVE RHEUMATOID ARTHRITIS (HCC): ICD-10-CM

## 2020-12-17 DIAGNOSIS — K80.20 CALCULUS OF GALLBLADDER WITHOUT CHOLECYSTITIS WITHOUT OBSTRUCTION: Primary | ICD-10-CM

## 2020-12-17 LAB
CHROMATIN AB SERPL-ACNC: <10 IU/ML (ref 0–14)
HCV AB SER DONR QL: NORMAL

## 2020-12-17 PROCEDURE — 86200 CCP ANTIBODY: CPT | Performed by: INTERNAL MEDICINE

## 2020-12-17 PROCEDURE — 36415 COLL VENOUS BLD VENIPUNCTURE: CPT

## 2020-12-17 PROCEDURE — 73630 X-RAY EXAM OF FOOT: CPT

## 2020-12-17 PROCEDURE — 86431 RHEUMATOID FACTOR QUANT: CPT | Performed by: INTERNAL MEDICINE

## 2020-12-17 PROCEDURE — 86481 TB AG RESPONSE T-CELL SUSP: CPT | Performed by: INTERNAL MEDICINE

## 2020-12-17 PROCEDURE — 86803 HEPATITIS C AB TEST: CPT | Performed by: INTERNAL MEDICINE

## 2020-12-17 PROCEDURE — 86704 HEP B CORE ANTIBODY TOTAL: CPT | Performed by: INTERNAL MEDICINE

## 2020-12-17 PROCEDURE — 73130 X-RAY EXAM OF HAND: CPT

## 2020-12-18 LAB
CCP IGA+IGG SERPL IA-ACNC: 6 UNITS (ref 0–19)
HBV CORE AB SERPL QL IA: NEGATIVE

## 2020-12-20 LAB
TSPOT INTERPRETATION: NEGATIVE
TSPOT NIL CONTROL INTERPRETATION: NORMAL
TSPOT PANEL A: 0
TSPOT PANEL B: 1
TSPOT POS CONTROL INTERPRETATION: NORMAL

## 2020-12-22 RX ORDER — MONTELUKAST SODIUM 10 MG/1
TABLET ORAL
Qty: 90 TABLET | Refills: 1 | Status: SHIPPED | OUTPATIENT
Start: 2020-12-22 | End: 2021-01-31 | Stop reason: SDUPTHER

## 2021-01-11 RX ORDER — RIFAXIMIN 550 MG/1
TABLET ORAL
Qty: 180 TABLET | Refills: 1 | Status: SHIPPED | OUTPATIENT
Start: 2021-01-11 | End: 2021-01-27

## 2021-01-13 ENCOUNTER — TELEPHONE (OUTPATIENT)
Dept: FAMILY MEDICINE CLINIC | Facility: CLINIC | Age: 51
End: 2021-01-13

## 2021-01-13 NOTE — TELEPHONE ENCOUNTER
BALJINDER FROM [facility] [employer] CALLED REQUESTING A CALL BACK TO DISCUSS MEDICATIONS THAT PATIENT IS PRESCRIBED THAT ARE NO LONGER COVERED BY INSURANCE PLAN    PLEASE CALL BACK AND ADVISE  CB#: [phone]  REF# : [id number]

## 2021-01-26 ENCOUNTER — TELEPHONE (OUTPATIENT)
Dept: FAMILY MEDICINE CLINIC | Facility: CLINIC | Age: 51
End: 2021-01-26

## 2021-01-26 RX ORDER — BLOOD SUGAR DIAGNOSTIC
STRIP MISCELLANEOUS
Qty: 600 EACH | Refills: 3 | Status: SHIPPED | OUTPATIENT
Start: 2021-01-26 | End: 2022-08-12 | Stop reason: SDUPTHER

## 2021-01-27 ENCOUNTER — OFFICE VISIT (OUTPATIENT)
Dept: FAMILY MEDICINE CLINIC | Facility: CLINIC | Age: 51
End: 2021-01-27

## 2021-01-27 VITALS
BODY MASS INDEX: 38.37 KG/M2 | OXYGEN SATURATION: 94 % | RESPIRATION RATE: 16 BRPM | SYSTOLIC BLOOD PRESSURE: 122 MMHG | DIASTOLIC BLOOD PRESSURE: 72 MMHG | TEMPERATURE: 97.1 F | WEIGHT: 268 LBS | HEIGHT: 70 IN | HEART RATE: 89 BPM

## 2021-01-27 DIAGNOSIS — J32.9 CHRONIC SINUSITIS, UNSPECIFIED LOCATION: ICD-10-CM

## 2021-01-27 DIAGNOSIS — H65.493 CHRONIC OTITIS MEDIA OF BOTH EARS WITH EFFUSION: Primary | ICD-10-CM

## 2021-01-27 PROCEDURE — 99213 OFFICE O/P EST LOW 20 MIN: CPT | Performed by: FAMILY MEDICINE

## 2021-01-27 RX ORDER — OXYMETAZOLINE HYDROCHLORIDE 0.05 G/100ML
2 SPRAY NASAL 2 TIMES DAILY
Qty: 15 ML | Refills: 0 | Status: SHIPPED | OUTPATIENT
Start: 2021-01-27 | End: 2021-01-30

## 2021-01-27 RX ORDER — CETIRIZINE HYDROCHLORIDE, PSEUDOEPHEDRINE HYDROCHLORIDE 5; 120 MG/1; MG/1
1 TABLET, FILM COATED, EXTENDED RELEASE ORAL 2 TIMES DAILY
Qty: 60 TABLET | Refills: 1 | Status: SHIPPED | OUTPATIENT
Start: 2021-01-27 | End: 2021-08-17

## 2021-01-27 RX ORDER — AMOXICILLIN AND CLAVULANATE POTASSIUM 875; 125 MG/1; MG/1
1 TABLET, FILM COATED ORAL 2 TIMES DAILY
Qty: 20 TABLET | Refills: 0 | Status: SHIPPED | OUTPATIENT
Start: 2021-01-27 | End: 2021-02-06

## 2021-01-27 NOTE — PROGRESS NOTES
Chief Complaint   Patient presents with   • Earache     HPI  Mc Selby is a 50 y.o. male that presents for earache and headache    Earache/Headache: he reports chronic ear discomfort. He reports hearing crackles and wind sensation daily. Taking azelastine, nasacort, Mucinex BID, and Singulair regularly as well as nasal lavage 3x/day. Following w/ ENT (Dr MAYO) and had performed sinuplasty w/ limited benefits. He reports this is chronic for year    Review of Systems   Constitutional: Negative for chills, fever and unexpected weight loss.   HENT: Positive for ear discharge and hearing loss. Negative for rhinorrhea and sore throat.    Respiratory: Negative for cough.    Gastrointestinal: Negative for abdominal pain, diarrhea and vomiting.   Skin: Negative for rash.   Neurological: Positive for headache.     The following portions of the patient's history were reviewed and updated as appropriate: problem list, past medical history, past surgical history, allergies, current medications    Problem List Tab  Patient History Tab  Immunizations Tab  Medications Tab  Chart Review Tab  Care Everywhere Tab  Synopsis Tab    PE  Vitals:    01/27/21 0905   BP: 122/72   Pulse: 89   Resp: 16   Temp: 97.1 °F (36.2 °C)   SpO2: 94%     Body mass index is 38.45 kg/m².  General: Obese, NAD  Head: AT/NC  Eyes: EOMI, anicteric sclera  ENT: MMM w/o erythema.  Right TM with fluid.  Erythematous nasal turbinates without significant edema.  Mallampati 4 without notable erythema or exudate  Neck: Supple, no thyromegaly or LAD  Resp: CTAB, SCR, BS equal  CV: RRR w/o m/r/g; 2+ pulses  GI: Soft, NT/ND, +BS  MSK: FROM, no deformity, no edema  Skin: Warm, dry, intact  Neuro: Alert and oriented. No focal deficits  Psych: Appropriate mood and affect    Imaging  Xr Foot 3+ View Bilateral    Result Date: 12/17/2020  1. Periarticular osteopenia, without radiographic evidence of acute fracture or dislocation of either foot. 2. Stable postoperative changes  at the right foot without evidence of hardware complications. 3. Severe erosive arthropathic changes at the right second MTP joint. 4. Lucencies at the metatarsal heads of each foot, which could represent erosions or degenerative cysts. 5. Moderate to severe multifocal DJD at the right midfoot. Mild to moderate multifocal DJD at the left midfoot. 6. Advanced DJD at the right great toe MTP joint and mild to moderate DJD at the other MTP and IP joints of the right foot. 7. Moderate left hallux valgus and bunion formation with moderate DJD at the left great toe MTP joint. Mild to moderate arthropathic changes at the other MTP and IP joints of the left foot. 8. Moderate bilateral tibiotalar joint DJD with anterior osteophyte formation and could result in anterior ankle impingement.  Electronically Signed By-Sampson Quiroz MD On:12/17/2020 1:29 PM This report was finalized on [id number] by  Sampson Quiroz MD.    Xr Hand 3+ View Bilateral    Result Date: 12/17/2020   1. No radiographic evidence of acute fracture or dislocation. 2. Periarticular osteopenia and multifocal arthropathic changes, most severe and progressed at each thumb CMC joint, likely atypical or erosive osteoarthritis.  Electronically Signed By-Sampson Quiroz MD On:12/17/2020 1:19 PM This report was finalized on [id number] by  Sampson Quiroz MD.    Ir Inject/asp Large Joint Or Bursa    Result Date: 12/3/2020  Fluoroscopy guided steroid and anesthetic injection of right hip.  Electronically Signed By-Arvind Chau MD On:12/3/2020 2:10 PM This report was finalized on [id number] by  Arvind Chau MD.      Assessment/Plan   Mc Selby is a 50 y.o. male that presents for   Chief Complaint   Patient presents with   • Earache     Diagnoses and all orders for this visit:    1. Chronic otitis media of both ears with effusion (Primary)  -     amoxicillin-clavulanate (Augmentin) 875-125 MG per tablet; Take 1 tablet by mouth 2 (Two) Times a Day for 10 days.   Dispense: 20 tablet; Refill: 0  -     oxymetazoline (AFRIN) 0.05 % nasal spray; 2 sprays into the nostril(s) as directed by provider 2 (Two) Times a Day for 3 days.  Dispense: 15 mL; Refill: 0  -     cetirizine-pseudoephedrine (ZyrTEC-D) 5-120 MG per 12 hr tablet; Take 1 tablet by mouth 2 (Two) Times a Day.  Dispense: 60 tablet; Refill: 1    2. Chronic sinusitis, unspecified location  -     amoxicillin-clavulanate (Augmentin) 875-125 MG per tablet; Take 1 tablet by mouth 2 (Two) Times a Day for 10 days.  Dispense: 20 tablet; Refill: 0  -     oxymetazoline (AFRIN) 0.05 % nasal spray; 2 sprays into the nostril(s) as directed by provider 2 (Two) Times a Day for 3 days.  Dispense: 15 mL; Refill: 0  -     cetirizine-pseudoephedrine (ZyrTEC-D) 5-120 MG per 12 hr tablet; Take 1 tablet by mouth 2 (Two) Times a Day.  Dispense: 60 tablet; Refill: 1  - Continue home Astelin, Nasacort, Mucinex, and Singulair     Answers for HPI/ROS submitted by the patient on 1/25/2021   Ear pain  What is the primary reason for your visit?: Ear Pain  Affected ear: both  Chronicity: recurrent  Onset: more than 1 month ago  Progression since onset: waxing and waning  Frequency: constantly  Fever: no fever  Fever duration: less than 1 day  Pain - numeric: 9/10  headaches: Yes    Patient has follow-up scheduled in 3 months

## 2021-02-01 RX ORDER — MONTELUKAST SODIUM 10 MG/1
10 TABLET ORAL NIGHTLY
Qty: 90 TABLET | Refills: 1 | Status: SHIPPED | OUTPATIENT
Start: 2021-02-01 | End: 2021-09-09

## 2021-03-03 RX ORDER — PREDNISONE 10 MG/1
TABLET ORAL
Qty: 90 TABLET | Refills: 0 | Status: SHIPPED | OUTPATIENT
Start: 2021-03-03 | End: 2021-05-12

## 2021-03-10 RX ORDER — ESOMEPRAZOLE MAGNESIUM 40 MG/1
40 CAPSULE, DELAYED RELEASE ORAL
Qty: 90 CAPSULE | Refills: 1 | Status: SHIPPED | OUTPATIENT
Start: 2021-03-10 | End: 2021-08-12 | Stop reason: CLARIF

## 2021-03-15 ENCOUNTER — TELEPHONE (OUTPATIENT)
Dept: ONCOLOGY | Facility: CLINIC | Age: 51
End: 2021-03-15

## 2021-03-15 NOTE — TELEPHONE ENCOUNTER
Contacted patient regarding provider change for his appt on 06/09. He states he came to the office last week to get his appointment switched. I did not see any information relaying this information. I changed the provider to Dr. Hernandez and patient confirmed.

## 2021-05-12 ENCOUNTER — LAB (OUTPATIENT)
Dept: FAMILY MEDICINE CLINIC | Facility: CLINIC | Age: 51
End: 2021-05-12

## 2021-05-12 ENCOUNTER — OFFICE VISIT (OUTPATIENT)
Dept: FAMILY MEDICINE CLINIC | Facility: CLINIC | Age: 51
End: 2021-05-12

## 2021-05-12 VITALS
HEIGHT: 70 IN | TEMPERATURE: 96.8 F | OXYGEN SATURATION: 97 % | RESPIRATION RATE: 16 BRPM | BODY MASS INDEX: 38.15 KG/M2 | SYSTOLIC BLOOD PRESSURE: 128 MMHG | WEIGHT: 266.5 LBS | HEART RATE: 78 BPM | DIASTOLIC BLOOD PRESSURE: 82 MMHG

## 2021-05-12 DIAGNOSIS — Z79.4 TYPE 2 DIABETES MELLITUS WITH HYPERGLYCEMIA, WITH LONG-TERM CURRENT USE OF INSULIN (HCC): Primary | Chronic | ICD-10-CM

## 2021-05-12 DIAGNOSIS — K21.9 GERD WITHOUT ESOPHAGITIS: Chronic | ICD-10-CM

## 2021-05-12 DIAGNOSIS — E11.65 TYPE 2 DIABETES MELLITUS WITH HYPERGLYCEMIA, WITH LONG-TERM CURRENT USE OF INSULIN (HCC): Primary | Chronic | ICD-10-CM

## 2021-05-12 DIAGNOSIS — K70.30 ALCOHOLIC CIRRHOSIS OF LIVER WITHOUT ASCITES (HCC): Chronic | ICD-10-CM

## 2021-05-12 DIAGNOSIS — M06.09 RHEUMATOID ARTHRITIS OF MULTIPLE SITES WITH NEGATIVE RHEUMATOID FACTOR (HCC): ICD-10-CM

## 2021-05-12 LAB
HBA1C MFR BLD: 6.4 % (ref 3.5–5.6)
INR PPP: 1.18 (ref 0.93–1.1)
PROTHROMBIN TIME: 12.9 SECONDS (ref 9.6–11.7)

## 2021-05-12 PROCEDURE — 85027 COMPLETE CBC AUTOMATED: CPT | Performed by: FAMILY MEDICINE

## 2021-05-12 PROCEDURE — 85652 RBC SED RATE AUTOMATED: CPT | Performed by: FAMILY MEDICINE

## 2021-05-12 PROCEDURE — 36415 COLL VENOUS BLD VENIPUNCTURE: CPT | Performed by: INTERNAL MEDICINE

## 2021-05-12 PROCEDURE — 86481 TB AG RESPONSE T-CELL SUSP: CPT | Performed by: INTERNAL MEDICINE

## 2021-05-12 PROCEDURE — 99214 OFFICE O/P EST MOD 30 MIN: CPT | Performed by: FAMILY MEDICINE

## 2021-05-12 PROCEDURE — 80053 COMPREHEN METABOLIC PANEL: CPT | Performed by: FAMILY MEDICINE

## 2021-05-12 PROCEDURE — 85610 PROTHROMBIN TIME: CPT | Performed by: FAMILY MEDICINE

## 2021-05-12 PROCEDURE — 86140 C-REACTIVE PROTEIN: CPT | Performed by: FAMILY MEDICINE

## 2021-05-12 PROCEDURE — 82043 UR ALBUMIN QUANTITATIVE: CPT | Performed by: FAMILY MEDICINE

## 2021-05-12 PROCEDURE — 82570 ASSAY OF URINE CREATININE: CPT | Performed by: FAMILY MEDICINE

## 2021-05-12 PROCEDURE — 83036 HEMOGLOBIN GLYCOSYLATED A1C: CPT | Performed by: FAMILY MEDICINE

## 2021-05-12 NOTE — PROGRESS NOTES
Chief Complaint   Patient presents with   • Diabetes     6 month f/u     HPI  Mc Selby is a 51 y.o. male that presents for   Chief Complaint   Patient presents with   • Diabetes     6 month f/u     DM: last a1c 6.0. Blood sugar generally runs 120-140. He is no longer taking levemir 25 units nightly and only rarely takes the Novolog 2 units TID. He reports sugars are all diet controlled. Weight has been stable. No numbness in feet. No recent microalb/Cr. Follows w/ ophtho every 6 months.     Alcoholic cirrhosis: patient reports he is drinking again- 4-5 drinks every other day. Follows w/ GI- Cinda. He is interested in having his protime checked today. He has a history of HE but is now only taking lactulose once weekly at best. Patient is maintained on Bumex 2mg BID and spironolactone 25 daily.     RA: maintained on Enbrel 50 weekly since Jan 2021. Hips and feet bother him the most but neck, shoulders, elbows, hands, knees, ankles also involved. Follows w/ rheumatology- Jorge A ([facility] in [place], [place])    GERD: maintained on esomeprazole 40 daily. No heartburn/reflux on medication. He reports some dysphagia w/ potassium pills and large bites of steak/meat.    Review of Systems   Constitutional: Negative for unexpected weight loss.   HENT: Positive for trouble swallowing.    Eyes: Negative for visual disturbance.   Respiratory: Negative for cough and shortness of breath.    Cardiovascular: Negative for chest pain and palpitations.   Gastrointestinal: Positive for abdominal distention. Negative for GERD and indigestion.   Musculoskeletal: Positive for arthralgias.   Neurological: Negative for numbness.     The following portions of the patient's history were reviewed and updated as appropriate: problem list, past medical history, past surgical history, allergies, current medication    Problem List Tab  Patient History Tab  Immunizations Tab  Medications Tab  Chart Review Tab  Care Everywhere Tab  Synopsis  Tab    PE  Vitals:    05/12/21 1401   BP: 128/82   Pulse: 78   Resp: 16   Temp: 96.8 °F (36 °C)   SpO2: 97%     Body mass index is 38.24 kg/m².  General: Obese, NAD  Head: AT/NC  Eyes: EOMI, anicteric sclera  Resp: CTAB, SCR, BS equal  CV: RRR w/o m/r/g; 2+ pulses  GI: Soft, NT/ND, +BS  MSK: FROM, no deformity, no edema  Skin: Warm, dry, intact  Neuro: Alert and oriented. No focal deficits  Psych: Appropriate mood and affect    Imaging  No Images in the past 120 days found..    Assessment/Plan   Mc Selby is a 51 y.o. male that presents for   Chief Complaint   Patient presents with   • Diabetes     6 month f/u     Diagnoses and all orders for this visit:    1. Type 2 diabetes mellitus with hyperglycemia, with long-term current use of insulin (CMS/HCC) (Primary): Last A1c 6.0.  No recent microalbumin/creatinine ratio.  Following with ophthalmology every 6 months.  -     Microalbumin / Creatinine Urine Ratio - Urine, Clean Catch  -     Comprehensive Metabolic Panel  -     Hemoglobin A1c  - Continue off medication at this time pending labs above  - Continue regular ophthalmology follow-up    2. Alcoholic cirrhosis of liver without ascites (CMS/HCC): Patient continues to drink.  He was strongly counseled to discontinue all alcohol use but patient is rather opposed.  Labs below have returned and indicate a meld score of 8.  -     CBC (No Diff)  -     Comprehensive Metabolic Panel  -     Protime-INR  - Continue home Bumex 2 mg twice daily and spironolactone 25 daily  - Continue lactulose as needed  - Continue GI [id number]    3. Rheumatoid arthritis of multiple sites with negative rheumatoid factor (CMS/HCC)  -     C-reactive Protein  -     Sedimentation Rate  - Continue home Enbrel  - Continue rheumatology follow-up    4. GERD without esophagitis: We will obtain EGD given reports of dysphagia with large pills and meats  -     Ambulatory referral for Screening EGD  - Continue home esomeprazole 40 mg daily      Return in about 3 months (around 8/12/2021) for Annual physical- 45 min or end of AM/PM.

## 2021-05-13 LAB
ALBUMIN SERPL-MCNC: 4 G/DL (ref 3.5–5.2)
ALBUMIN UR-MCNC: 1.9 MG/DL
ALBUMIN/GLOB SERPL: 1 G/DL
ALP SERPL-CCNC: 78 U/L (ref 39–117)
ALT SERPL W P-5'-P-CCNC: 10 U/L (ref 1–41)
ANION GAP SERPL CALCULATED.3IONS-SCNC: 11.1 MMOL/L (ref 5–15)
AST SERPL-CCNC: 19 U/L (ref 1–40)
BILIRUB SERPL-MCNC: 0.9 MG/DL (ref 0–1.2)
BUN SERPL-MCNC: 10 MG/DL (ref 6–20)
BUN/CREAT SERPL: 10.5 (ref 7–25)
CALCIUM SPEC-SCNC: 9.4 MG/DL (ref 8.6–10.5)
CHLORIDE SERPL-SCNC: 101 MMOL/L (ref 98–107)
CO2 SERPL-SCNC: 23.9 MMOL/L (ref 22–29)
CREAT SERPL-MCNC: 0.95 MG/DL (ref 0.76–1.27)
CREAT UR-MCNC: 189.1 MG/DL
CRP SERPL-MCNC: 0.31 MG/DL (ref 0–0.5)
DEPRECATED RDW RBC AUTO: 42.3 FL (ref 37–54)
ERYTHROCYTE [DISTWIDTH] IN BLOOD BY AUTOMATED COUNT: 12.4 % (ref 12.3–15.4)
ERYTHROCYTE [SEDIMENTATION RATE] IN BLOOD: 17 MM/HR (ref 0–20)
GFR SERPL CREATININE-BSD FRML MDRD: 83 ML/MIN/1.73
GLOBULIN UR ELPH-MCNC: 4 GM/DL
GLUCOSE SERPL-MCNC: 119 MG/DL (ref 65–99)
HCT VFR BLD AUTO: 49.5 % (ref 37.5–51)
HGB BLD-MCNC: 16.6 G/DL (ref 13–17.7)
MCH RBC QN AUTO: 30.9 PG (ref 26.6–33)
MCHC RBC AUTO-ENTMCNC: 33.5 G/DL (ref 31.5–35.7)
MCV RBC AUTO: 92.2 FL (ref 79–97)
MICROALBUMIN/CREAT UR: 10 MG/G
PLATELET # BLD AUTO: 184 10*3/MM3 (ref 140–450)
PMV BLD AUTO: 11.4 FL (ref 6–12)
POTASSIUM SERPL-SCNC: 4 MMOL/L (ref 3.5–5.2)
PROT SERPL-MCNC: 8 G/DL (ref 6–8.5)
RBC # BLD AUTO: 5.37 10*6/MM3 (ref 4.14–5.8)
SODIUM SERPL-SCNC: 136 MMOL/L (ref 136–145)
WBC # BLD AUTO: 5.84 10*3/MM3 (ref 3.4–10.8)

## 2021-05-14 LAB
TSPOT INTERPRETATION: NEGATIVE
TSPOT NIL CONTROL INTERPRETATION: NORMAL
TSPOT PANEL A: 1
TSPOT PANEL B: 0
TSPOT POS CONTROL INTERPRETATION: NORMAL

## 2021-06-07 ENCOUNTER — OFFICE (AMBULATORY)
Dept: URBAN - METROPOLITAN AREA CLINIC 64 | Facility: CLINIC | Age: 51
End: 2021-06-07
Payer: COMMERCIAL

## 2021-06-07 VITALS
DIASTOLIC BLOOD PRESSURE: 77 MMHG | SYSTOLIC BLOOD PRESSURE: 116 MMHG | HEIGHT: 70 IN | WEIGHT: 271 LBS | HEART RATE: 79 BPM

## 2021-06-07 DIAGNOSIS — K21.9 GASTRO-ESOPHAGEAL REFLUX DISEASE WITHOUT ESOPHAGITIS: ICD-10-CM

## 2021-06-07 DIAGNOSIS — K70.30 ALCOHOLIC CIRRHOSIS OF LIVER WITHOUT ASCITES: ICD-10-CM

## 2021-06-07 DIAGNOSIS — R13.10 DYSPHAGIA, UNSPECIFIED: ICD-10-CM

## 2021-06-07 PROCEDURE — 99213 OFFICE O/P EST LOW 20 MIN: CPT | Performed by: INTERNAL MEDICINE

## 2021-06-07 RX ORDER — LACTULOSE 10 G/15ML
SOLUTION ORAL
Qty: 2365 | Refills: 0 | Status: COMPLETED
End: 2021-06-07

## 2021-06-09 ENCOUNTER — OFFICE VISIT (OUTPATIENT)
Dept: ONCOLOGY | Facility: CLINIC | Age: 51
End: 2021-06-09

## 2021-06-09 ENCOUNTER — APPOINTMENT (OUTPATIENT)
Dept: LAB | Facility: HOSPITAL | Age: 51
End: 2021-06-09

## 2021-06-09 VITALS
RESPIRATION RATE: 18 BRPM | BODY MASS INDEX: 37.82 KG/M2 | HEIGHT: 70 IN | TEMPERATURE: 97.8 F | DIASTOLIC BLOOD PRESSURE: 78 MMHG | HEART RATE: 82 BPM | WEIGHT: 264.2 LBS | SYSTOLIC BLOOD PRESSURE: 120 MMHG

## 2021-06-09 DIAGNOSIS — C90.00 MULTIPLE MYELOMA, REMISSION STATUS UNSPECIFIED (HCC): ICD-10-CM

## 2021-06-09 DIAGNOSIS — D47.2 MGUS (MONOCLONAL GAMMOPATHY OF UNKNOWN SIGNIFICANCE): Primary | ICD-10-CM

## 2021-06-09 LAB
ALBUMIN SERPL-MCNC: 4.1 G/DL (ref 3.5–5.2)
ALBUMIN/GLOB SERPL: 1 G/DL
ALP SERPL-CCNC: 106 U/L (ref 39–117)
ALT SERPL W P-5'-P-CCNC: 10 U/L (ref 1–41)
ANION GAP SERPL CALCULATED.3IONS-SCNC: 13 MMOL/L (ref 5–15)
AST SERPL-CCNC: 18 U/L (ref 1–40)
B2 MICROGLOB SERPL-MCNC: 3.1 MG/L (ref 0.8–2.2)
BASOPHILS # BLD AUTO: 0.06 10*3/MM3 (ref 0–0.2)
BASOPHILS NFR BLD AUTO: 1 % (ref 0–1.5)
BILIRUB SERPL-MCNC: 1.2 MG/DL (ref 0–1.2)
BUN SERPL-MCNC: 9 MG/DL (ref 6–20)
BUN/CREAT SERPL: 9.4 (ref 7–25)
CALCIUM SPEC-SCNC: 9.5 MG/DL (ref 8.6–10.5)
CHLORIDE SERPL-SCNC: 101 MMOL/L (ref 98–107)
CO2 SERPL-SCNC: 23 MMOL/L (ref 22–29)
CREAT SERPL-MCNC: 0.96 MG/DL (ref 0.76–1.27)
DEPRECATED RDW RBC AUTO: 44.9 FL (ref 37–54)
EOSINOPHIL # BLD AUTO: 0.4 10*3/MM3 (ref 0–0.4)
EOSINOPHIL NFR BLD AUTO: 6.8 % (ref 0.3–6.2)
ERYTHROCYTE [DISTWIDTH] IN BLOOD BY AUTOMATED COUNT: 13.7 % (ref 12.3–15.4)
GFR SERPL CREATININE-BSD FRML MDRD: 82 ML/MIN/1.73
GLOBULIN UR ELPH-MCNC: 4.3 GM/DL
GLUCOSE SERPL-MCNC: 150 MG/DL (ref 65–99)
HCT VFR BLD AUTO: 47.7 % (ref 37.5–51)
HGB BLD-MCNC: 16.3 G/DL (ref 13–17.7)
LYMPHOCYTES # BLD AUTO: 1.56 10*3/MM3 (ref 0.7–3.1)
LYMPHOCYTES NFR BLD AUTO: 26.4 % (ref 19.6–45.3)
MCH RBC QN AUTO: 31 PG (ref 26.6–33)
MCHC RBC AUTO-ENTMCNC: 34.2 G/DL (ref 31.5–35.7)
MCV RBC AUTO: 90.7 FL (ref 79–97)
MONOCYTES # BLD AUTO: 0.94 10*3/MM3 (ref 0.1–0.9)
MONOCYTES NFR BLD AUTO: 15.9 % (ref 5–12)
NEUTROPHILS NFR BLD AUTO: 2.94 10*3/MM3 (ref 1.7–7)
NEUTROPHILS NFR BLD AUTO: 49.9 % (ref 42.7–76)
PLATELET # BLD AUTO: 185 10*3/MM3 (ref 140–450)
PMV BLD AUTO: 10.8 FL (ref 6–12)
POTASSIUM SERPL-SCNC: 4.3 MMOL/L (ref 3.5–5.2)
PROT SERPL-MCNC: 8.4 G/DL (ref 6–8.5)
RBC # BLD AUTO: 5.26 10*6/MM3 (ref 4.14–5.8)
SODIUM SERPL-SCNC: 137 MMOL/L (ref 136–145)
WBC # BLD AUTO: 5.9 10*3/MM3 (ref 3.4–10.8)

## 2021-06-09 PROCEDURE — 36415 COLL VENOUS BLD VENIPUNCTURE: CPT | Performed by: INTERNAL MEDICINE

## 2021-06-09 PROCEDURE — 80053 COMPREHEN METABOLIC PANEL: CPT | Performed by: INTERNAL MEDICINE

## 2021-06-09 PROCEDURE — 99213 OFFICE O/P EST LOW 20 MIN: CPT | Performed by: INTERNAL MEDICINE

## 2021-06-09 PROCEDURE — 85025 COMPLETE CBC W/AUTO DIFF WBC: CPT | Performed by: INTERNAL MEDICINE

## 2021-06-09 PROCEDURE — 82232 ASSAY OF BETA-2 PROTEIN: CPT | Performed by: INTERNAL MEDICINE

## 2021-06-09 NOTE — PROGRESS NOTES
Hematology/Oncology Outpatient Follow Up    Mc Selby  1970    Primary Care Physician: Berny Godwin MD  Referring Physician: Berny Godwin, *  Chief Complaint:  Monoclonal gammopathy/IgG kappa monoclonal gammopathy of unknown significance  History of Present Illness:   · Mr. Selby has a long history of rheumatoid arthritis on and off disease modifying agents.  Patient sees Dr. Rosas and laboratory work-up was initiated secondary to renal failure which revealed monoclonal gammopathy patient was sent to the cancer [facility] center and seen initially on 1/7/2020.  · Patient has cirrhosis related to alcohol abuse had episodes of hepatic encephalopathy.    · Patient has renal failure stage II at one point he was on hemodialysis briefly.  · Had a perforated gastric ulcer with hemorrhage  · 12/12/2019 serum immunofixation shows IgG monoclonal protein with kappa light chain specificity.  IgG 2111, IgA 703, IgM 754  · 1/21/2020 bone marrow biopsy and aspirate-right iliac crest bone marrow biopsy with aspirate smears and cell block preparation shows normocellular bone marrow with 50% cellularity.  Absent iron stores.  Negative for involvement by malignant lymphoma and metastatic malignancy.  Cytogenetics normal 46 XY flow cytometry normal  · 1/23/2020 whole-body skeletal survey geographic lucency within the right distal tibial diaphysis could relate to underlying lytic lesion, consider additional imaging or bone scan. 2. Small area of lucency with endosteal scalloping involving the distal left fibular diaphysis could relate to lytic lesion, dedicated AP and lateral views of the left fibula may be helpful. 2. Incidental findings above. 3. Carotid atherosclerotic disease. Consider follow-up carotid ultrasound if not previously performed.  · 1/30/2020 urine electrophoresis shows IgG monoclonal protein with kappa light chain specificity.  Small quantity of monoclonal protein unable to quantitate M spike.   Beta-2 microglobulin 4.0.  Serum kappa lambda free light chain ratio 1.83 creatinine 1.05 alk phos 242 total bilirubin 1.9  · 3/10/2020- PET- 1. No convincing evidence of osseous metastatic disease in this patient with history of multiple myeloma. Uptake surrounding each hip and around the elbow, wrist and finger joints, is favored to represent benign inflammatory/osteoarthritic change. 2. Few mildly prominent bilateral axillary lymph nodes demonstrate intermediate FDG accumulation. Both benign and malignant etiologies are in the differential. Consider short-term CT chest follow-up. 3. Hypermetabolic activity in a symmetric fashion the bilateral tonsillar pillars without discrete mass on CT. Correlate for tonsillar inflammation. 4. Additional CT findings as described above, including: Cirrhosis, hepatosplenomegaly, ascites, cholelithiasis, right renal cyst, dense coronary artery calcification, gynecomastia, maxillary sinusitis.  · 6/8/2020- CT chest W - 1. No evidence of mediastinal, hilar or axillary lymphadenopathy 2. Bilateral subcentimeter nodules in the lower lobes which appear increased in number from CT of 08/08/2018 and were also poorly seen on PET/CT of 03/10/2020, these are probably benign however should be followed in one year due to their very small size. 3. upper abdomen demonstrates changes of hepatosplenomegaly, cholelithiasis and upper pole right renal cyst      SUBJECTIVE:      Patient comes in for follow-up.  He denies any significant symptoms since his last visit.  No constitutional symptoms.  No weight loss, fevers, night sweats.      Past Medical History:   Diagnosis Date   • Acute perforated gastric ulcer with hemorrhage (CMS/HCC) 07/16/2018   • Alcoholism (CMS/HCC)    • Allergic rhinitis    • Arthritis     rheumatoid (diagnosed at age 16)   • Cirrhosis (CMS/HCC)     etoh cirrhosis-liver failure. Follows w/ GI- [facility]   • CKD (chronic kidney disease)    • COPD (chronic obstructive pulmonary  disease) (CMS/HCC)    • Depression    • Duodenal ulcer      perforated duodenal s/p patch   • GERD without esophagitis 9/2/2020   • History of transfusion    • Infectious viral hepatitis    • MRSA (methicillin resistant Staphylococcus aureus) carrier 06/12/2019    nasal swab   • Tumors      benign essential   • Type 2 diabetes mellitus with hyperglycemia (CMS/HCC) 1/8/2020   • Umbilical hernia u   • Wound, open     nonhealing sore       Past Surgical History:   Procedure Laterality Date   • CYST REMOVAL Left     forarm   • ENDOSCOPY      ascities/paracentesis   • EXPLORATORY LAPAROTOMY  07/16/2018    Over-sew Gastric Ulcer With Gastrostomy and Jejunostomy Tube   • FOOT SURGERY Right     right bunion removal   • FRACTURE SURGERY Right 2005    bunionectomy/ broke toe and put in rods   • MASS EXCISION Right 6/18/2019    Procedure: EXCISION OF SEBACEOUS CYST OF THE RIGHT BACK;  Surgeon: Sapna Elizalde MD;  Location: [facility] MAIN OR;  Service: General   • MASS EXCISION Right 8/27/2019    Procedure: EXCISION LESION of right back;  Surgeon: Sapna Elizalde MD;  Location: [facility] MAIN OR;  Service: General   • UMBILICAL HERNIA REPAIR N/A 6/18/2019    Procedure: UMBILICAL HERNIA REPAIR LAPAROSCOPIC WITH MESH WITH EXTENSIVE LYSIS OF ADHESIONS;  Surgeon: Sapna Elizalde MD;  Location: [facility] MAIN OR;  Service: General         Current Outpatient Medications:   •  Accu-Chek Sol Plus test strip, Test 4 times daily DX: E11.9, Disp: 600 each, Rfl: 3  •  albuterol sulfate  (90 Base) MCG/ACT inhaler, Inhale 2 puffs Every 4 (Four) Hours As Needed for Wheezing., Disp: , Rfl:   •  azelastine (ASTELIN) 0.1 % nasal spray, 2 sprays into the nostril(s) as directed by provider 2 (Two) Times a Day., Disp: , Rfl:   •  bumetanide (BUMEX) 2 MG tablet, Take 2 mg by mouth 2 (Two) Times a Day., Disp: , Rfl:   •  cetirizine-pseudoephedrine (ZyrTEC-D) 5-120 MG per 12 hr tablet, Take 1 tablet by mouth 2 (Two) Times a Day., Disp: 60 tablet, Rfl: 1  •   esomeprazole (nexIUM) 40 MG capsule, Take 1 capsule by mouth Every Morning Before Breakfast., Disp: 90 capsule, Rfl: 1  •  Etanercept (Enbrel Mini) 50 MG/ML solution cartridge, Inject 50 mg under the skin into the appropriate area as directed 1 (One) Time Per Week., Disp: 12 Cartridge, Rfl: 0  •  guaiFENesin (MUCINEX) 600 MG 12 hr tablet, Take 1,200 mg by mouth 2 (Two) Times a Day., Disp: , Rfl:   •  lactulose (CHRONULAC) 10 GM/15ML solution, Take 30 mL by mouth 2 (Two) Times a Day. (Patient taking differently: Take 30 mL by mouth 2 (Two) Times a Day As Needed.), Disp: 150 mL, Rfl: 0  •  magnesium oxide (MAG-OX) 400 MG tablet, Take 2 tablets by mouth 2 (Two) Times a Day., Disp: , Rfl: 6  •  meclizine 25 MG chewable tablet chewable tablet, Chew 25 mg 3 (Three) Times a Day As Needed., Disp: , Rfl:   •  montelukast (SINGULAIR) 10 MG tablet, Take 1 tablet by mouth Every Night., Disp: 90 tablet, Rfl: 1  •  potassium chloride (K-DUR,KLOR-CON) 20 MEQ CR tablet, Take 40 mEq by mouth 3 (Three) Times a Day., Disp: , Rfl:   •  senna (SENOKOT) 8.6 MG tablet, Take 1 tablet by mouth 2 (Two) Times a Day As Needed for Constipation., Disp: 30 tablet, Rfl: 0  •  spironolactone (ALDACTONE) 25 MG tablet, Take 25 mg by mouth Daily., Disp: , Rfl:   •  tamsulosin (FLOMAX) 0.4 MG capsule 24 hr capsule, Take 1 capsule by mouth 2 (Two) Times a Day., Disp: , Rfl:   •  Triamcinolone Acetonide (NASACORT) 55 MCG/ACT nasal inhaler, 1 spray into the nostril(s) as directed by provider Daily., Disp: , Rfl:   •  vitamin D (ERGOCALCIFEROL) 1.25 MG (54461 UT) capsule capsule, Take 1 capsule by mouth Every 14 (Fourteen) Days. Every other Sunday, Disp: 5 capsule, Rfl: 2  •  zinc sulfate (ZINCATE) 220 (50 Zn) MG capsule, Take 220 mg by mouth Daily., Disp: , Rfl: 3  •  Insulin Pen Needle (Pen Needles) 32G X 4 MM misc, 1 each 4 (Four) Times a Day Before Meals & at Bedtime., Disp: 200 each, Rfl: 5    Allergies   Allergen Reactions   • Duloxetine Rash  "      Family History   Problem Relation Age of Onset   • Cancer Mother    • Lung disease Father    • Other Father         prostate problems   • Mental illness Brother        Cancer-related family history includes Cancer in his mother.    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      ROS:      Review of Systems   Constitutional: Negative for fever.   HENT: Negative for nosebleeds and trouble swallowing.    Eyes: Negative for visual disturbance.   Respiratory: Negative for cough, shortness of breath and wheezing.    Cardiovascular: Negative for chest pain.   Gastrointestinal: Negative for abdominal pain and blood in stool.   Endocrine: Negative for cold intolerance.   Genitourinary: Negative for dysuria and hematuria.   Musculoskeletal: Positive for arthralgias. Negative for joint swelling.   Skin: Negative for rash.   Allergic/Immunologic: Negative for environmental allergies.   Neurological: Negative for seizures.   Hematological: Does not bruise/bleed easily.   Psychiatric/Behavioral: The patient is not nervous/anxious.        MD performed ROS and are negative except as mentioned in Subjective.    Objective:       Vitals:    06/09/21 1351   BP: 120/78   Pulse: 82   Resp: 18   Temp: 97.8 °F (36.6 °C)   Weight: 120 kg (264 lb 3.2 oz)   Height: 177.8 cm (70\")   PainSc: 0-No pain       /78   Pulse 82   Temp 97.8 °F (36.6 °C)   " "Resp 18   Ht 177.8 cm (70\")   Wt 120 kg (264 lb 3.2 oz)   BMI 37.91 kg/m²     PHYSICAL EXAM:      Physical Exam   Constitutional: He is oriented to person, place, and time. No distress.   HENT:   Head: Normocephalic and atraumatic.   Eyes: Conjunctivae are normal. Right eye exhibits no discharge. Left eye exhibits no discharge. No scleral icterus.   Neck: No thyromegaly present.   Cardiovascular: Normal rate, regular rhythm and normal heart sounds. Exam reveals no gallop and no friction rub.   Pulmonary/Chest: Effort normal. No stridor. No respiratory distress. He has no wheezes.   Abdominal: Soft. Bowel sounds are normal. He exhibits no mass. There is no abdominal tenderness. There is no rebound and no guarding.   Musculoskeletal: Normal range of motion. No tenderness.   Lymphadenopathy:     He has no cervical adenopathy.   Neurological: He is alert and oriented to person, place, and time. He exhibits normal muscle tone.   Skin: Skin is warm. No rash noted. He is not diaphoretic. No erythema.   Psychiatric: His behavior is normal.        RECENT LABS:     Lab Results   Component Value Date    WBC 5.90 06/09/2021    WBC 5.84 05/12/2021    HGB 16.3 06/09/2021    HGB 16.6 05/12/2021    HCT 47.7 06/09/2021    HCT 49.5 05/12/2021    NEUTROABS 2.94 06/09/2021         Lab Results   Component Value Date    GLUCOSE 119 (H) 05/12/2021    BUN 10 05/12/2021    CREATININE 0.95 05/12/2021    EGFRIFNONA 83 05/12/2021    EGFRIFAFRI 114 03/15/2017    BCR 10.5 05/12/2021    K 4.0 05/12/2021    CO2 23.9 05/12/2021    CALCIUM 9.4 05/12/2021    PROTENTOTREF 8.0 06/10/2020    ALBUMIN 4.00 05/12/2021    LABIL2 0.7 06/10/2020    AST 19 05/12/2021    ALT 10 05/12/2021       Assessment/Plan      Patient is a 51-year-old male with history of compensated liver cirrhosis, chronic renal failure who is seen for IgG kappa monoclonal gammopathy of unknown significance.    Monoclonal gammopathy of undetermined significance  Patient's myeloma " work-up from today is pending I will follow up with results of free light chain ratio, serum protein electrophoresis.  Recent CMP without any significant hypercalcemia or renal dysfunction.  No significant anemia.  Hence no crab criteria to suggest progression to myeloma.  I discussed with him risk of progression associated with MGUS.    Lung nodules  Patient has had multiple small lung nodules.  CT was supposed to be ordered in June 2021 however patient has not had that yet I would get this ordered for him.      Plan  Follow-up on free light chain ratio, serum protein electrophoresis  CT chest to follow-up on lung nodules we will get a noncontrast study.  Follow-up in 1 year with labs prior    I have reviewed labs results, imaging, vitals, and medications with the patient today.  Patient verbalized understanding and is in agreement of the above plan.    This report was compiled using Dragon voice recognition software. I have made every effort to proof read this document; however, typographical errors may persist.

## 2021-06-10 LAB
ALBUMIN SERPL ELPH-MCNC: 3.7 G/DL (ref 2.9–4.4)
ALBUMIN/GLOB SERPL: 0.8 {RATIO} (ref 0.7–1.7)
ALPHA1 GLOB SERPL ELPH-MCNC: 0.3 G/DL (ref 0–0.4)
ALPHA2 GLOB SERPL ELPH-MCNC: 0.8 G/DL (ref 0.4–1)
B-GLOBULIN SERPL ELPH-MCNC: 1.3 G/DL (ref 0.7–1.3)
GAMMA GLOB SERPL ELPH-MCNC: 2.4 G/DL (ref 0.4–1.8)
GLOBULIN SER-MCNC: 4.7 G/DL (ref 2.2–3.9)
IGA SERPL-MCNC: 408 MG/DL (ref 90–386)
IGG SERPL-MCNC: 1972 MG/DL (ref 603–1613)
IGM SERPL-MCNC: 461 MG/DL (ref 20–172)
INTERPRETATION SERPL IEP-IMP: ABNORMAL
KAPPA LC FREE SER-MCNC: 99.7 MG/L (ref 3.3–19.4)
KAPPA LC FREE/LAMBDA FREE SER: 3.23 {RATIO} (ref 0.26–1.65)
LABORATORY COMMENT REPORT: ABNORMAL
LAMBDA LC FREE SERPL-MCNC: 30.9 MG/L (ref 5.7–26.3)
M PROTEIN SERPL ELPH-MCNC: ABNORMAL G/DL
PROT SERPL-MCNC: 8.4 G/DL (ref 6–8.5)

## 2021-06-29 ENCOUNTER — HOSPITAL ENCOUNTER (OUTPATIENT)
Dept: PET IMAGING | Facility: HOSPITAL | Age: 51
Discharge: HOME | End: 2021-06-29
Admitting: INTERNAL MEDICINE

## 2021-06-29 DIAGNOSIS — C90.00 MULTIPLE MYELOMA, REMISSION STATUS UNSPECIFIED (HCC): ICD-10-CM

## 2021-06-29 DIAGNOSIS — D47.2 MGUS (MONOCLONAL GAMMOPATHY OF UNKNOWN SIGNIFICANCE): ICD-10-CM

## 2021-06-29 PROCEDURE — 71250 CT THORAX DX C-: CPT

## 2021-06-30 ENCOUNTER — LAB (OUTPATIENT)
Dept: FAMILY MEDICINE CLINIC | Facility: CLINIC | Age: 51
End: 2021-06-30

## 2021-06-30 DIAGNOSIS — I10 ESSENTIAL (PRIMARY) HYPERTENSION: ICD-10-CM

## 2021-06-30 DIAGNOSIS — E11.8 DIABETIC COMPLICATION (HCC): ICD-10-CM

## 2021-06-30 DIAGNOSIS — R80.9 PROTEINURIA, UNSPECIFIED TYPE: ICD-10-CM

## 2021-06-30 DIAGNOSIS — E55.9 VITAMIN D DEFICIENCY: Primary | ICD-10-CM

## 2021-06-30 DIAGNOSIS — N18.30 STAGE 3 CHRONIC KIDNEY DISEASE, UNSPECIFIED WHETHER STAGE 3A OR 3B CKD (HCC): ICD-10-CM

## 2021-07-01 ENCOUNTER — LAB (OUTPATIENT)
Dept: LAB | Facility: HOSPITAL | Age: 51
End: 2021-07-01

## 2021-07-01 ENCOUNTER — TRANSCRIBE ORDERS (OUTPATIENT)
Dept: ADMINISTRATIVE | Facility: HOSPITAL | Age: 51
End: 2021-07-01

## 2021-07-01 DIAGNOSIS — I10 ESSENTIAL HYPERTENSION, MALIGNANT: ICD-10-CM

## 2021-07-01 DIAGNOSIS — E55.9 VITAMIN D DEFICIENCY: ICD-10-CM

## 2021-07-01 DIAGNOSIS — N18.30 STAGE 3 CHRONIC KIDNEY DISEASE, UNSPECIFIED WHETHER STAGE 3A OR 3B CKD (HCC): ICD-10-CM

## 2021-07-01 DIAGNOSIS — N18.30 MALIGNANT HYPERTENSIVE HEART AND CHRONIC KIDNEY DISEASE STAGE III (HCC): Primary | ICD-10-CM

## 2021-07-01 DIAGNOSIS — I13.10 MALIGNANT HYPERTENSIVE HEART AND CHRONIC KIDNEY DISEASE STAGE III (HCC): ICD-10-CM

## 2021-07-01 DIAGNOSIS — N18.30 MALIGNANT HYPERTENSIVE HEART AND CHRONIC KIDNEY DISEASE STAGE III (HCC): ICD-10-CM

## 2021-07-01 DIAGNOSIS — E11.8 DIABETIC COMPLICATION (HCC): ICD-10-CM

## 2021-07-01 DIAGNOSIS — I13.10 MALIGNANT HYPERTENSIVE HEART AND CHRONIC KIDNEY DISEASE STAGE III (HCC): Primary | ICD-10-CM

## 2021-07-01 DIAGNOSIS — I10 ESSENTIAL (PRIMARY) HYPERTENSION: ICD-10-CM

## 2021-07-01 DIAGNOSIS — R80.9 PROTEINURIA, UNSPECIFIED TYPE: ICD-10-CM

## 2021-07-01 LAB
25(OH)D3 SERPL-MCNC: 48.5 NG/ML (ref 30–100)
ALBUMIN UR-MCNC: 1.4 MG/DL
ANION GAP SERPL CALCULATED.3IONS-SCNC: 11.7 MMOL/L (ref 5–15)
BACTERIA UR QL AUTO: NORMAL /HPF
BASOPHILS # BLD AUTO: 0.07 10*3/MM3 (ref 0–0.2)
BASOPHILS NFR BLD AUTO: 1.1 % (ref 0–1.5)
BILIRUB UR QL STRIP: NEGATIVE
BUN SERPL-MCNC: 13 MG/DL (ref 6–20)
BUN/CREAT SERPL: 12.3 (ref 7–25)
CALCIUM SPEC-SCNC: 9.4 MG/DL (ref 8.6–10.5)
CHLORIDE SERPL-SCNC: 101 MMOL/L (ref 98–107)
CLARITY UR: CLEAR
CO2 SERPL-SCNC: 23.3 MMOL/L (ref 22–29)
COLOR UR: ABNORMAL
CREAT SERPL-MCNC: 1.06 MG/DL (ref 0.76–1.27)
CREAT UR-MCNC: 202.3 MG/DL
DEPRECATED RDW RBC AUTO: 43.4 FL (ref 37–54)
EOSINOPHIL # BLD AUTO: 0.43 10*3/MM3 (ref 0–0.4)
EOSINOPHIL NFR BLD AUTO: 7 % (ref 0.3–6.2)
ERYTHROCYTE [DISTWIDTH] IN BLOOD BY AUTOMATED COUNT: 13 % (ref 12.3–15.4)
GFR SERPL CREATININE-BSD FRML MDRD: 73 ML/MIN/1.73
GLUCOSE SERPL-MCNC: 157 MG/DL (ref 65–99)
GLUCOSE UR STRIP-MCNC: NEGATIVE MG/DL
HBA1C MFR BLD: 6.3 % (ref 3.5–5.6)
HCT VFR BLD AUTO: 51.1 % (ref 37.5–51)
HGB BLD-MCNC: 17.4 G/DL (ref 13–17.7)
HGB UR QL STRIP.AUTO: NEGATIVE
HYALINE CASTS UR QL AUTO: NORMAL /LPF
IMM GRANULOCYTES # BLD AUTO: 0.03 10*3/MM3 (ref 0–0.05)
IMM GRANULOCYTES NFR BLD AUTO: 0.5 % (ref 0–0.5)
KETONES UR QL STRIP: ABNORMAL
LEUKOCYTE ESTERASE UR QL STRIP.AUTO: ABNORMAL
LYMPHOCYTES # BLD AUTO: 1.59 10*3/MM3 (ref 0.7–3.1)
LYMPHOCYTES NFR BLD AUTO: 25.9 % (ref 19.6–45.3)
MCH RBC QN AUTO: 31.2 PG (ref 26.6–33)
MCHC RBC AUTO-ENTMCNC: 34.1 G/DL (ref 31.5–35.7)
MCV RBC AUTO: 91.6 FL (ref 79–97)
MICROALBUMIN/CREAT UR: 6.9 MG/G
MONOCYTES # BLD AUTO: 0.78 10*3/MM3 (ref 0.1–0.9)
MONOCYTES NFR BLD AUTO: 12.7 % (ref 5–12)
NEUTROPHILS NFR BLD AUTO: 3.24 10*3/MM3 (ref 1.7–7)
NEUTROPHILS NFR BLD AUTO: 52.8 % (ref 42.7–76)
NITRITE UR QL STRIP: NEGATIVE
NRBC BLD AUTO-RTO: 0 /100 WBC (ref 0–0.2)
PH UR STRIP.AUTO: 6 [PH] (ref 5–8)
PHOSPHATE SERPL-MCNC: 3.6 MG/DL (ref 2.5–4.5)
PLATELET # BLD AUTO: 214 10*3/MM3 (ref 140–450)
PMV BLD AUTO: 11.4 FL (ref 6–12)
POTASSIUM SERPL-SCNC: 4.2 MMOL/L (ref 3.5–5.2)
PROT UR QL STRIP: NEGATIVE
PTH-INTACT SERPL-MCNC: 49.4 PG/ML (ref 15–65)
RBC # BLD AUTO: 5.58 10*6/MM3 (ref 4.14–5.8)
RBC # UR: NORMAL /HPF
REF LAB TEST METHOD: NORMAL
SODIUM SERPL-SCNC: 136 MMOL/L (ref 136–145)
SP GR UR STRIP: 1.02 (ref 1–1.03)
SQUAMOUS #/AREA URNS HPF: NORMAL /HPF
UROBILINOGEN UR QL STRIP: ABNORMAL
WBC # BLD AUTO: 6.14 10*3/MM3 (ref 3.4–10.8)
WBC UR QL AUTO: NORMAL /HPF

## 2021-07-01 PROCEDURE — 85025 COMPLETE CBC W/AUTO DIFF WBC: CPT

## 2021-07-01 PROCEDURE — 84100 ASSAY OF PHOSPHORUS: CPT

## 2021-07-01 PROCEDURE — 82570 ASSAY OF URINE CREATININE: CPT

## 2021-07-01 PROCEDURE — 82306 VITAMIN D 25 HYDROXY: CPT

## 2021-07-01 PROCEDURE — 81001 URINALYSIS AUTO W/SCOPE: CPT

## 2021-07-01 PROCEDURE — 82043 UR ALBUMIN QUANTITATIVE: CPT

## 2021-07-01 PROCEDURE — 80048 BASIC METABOLIC PNL TOTAL CA: CPT

## 2021-07-01 PROCEDURE — 83036 HEMOGLOBIN GLYCOSYLATED A1C: CPT

## 2021-07-01 PROCEDURE — 83970 ASSAY OF PARATHORMONE: CPT

## 2021-07-01 PROCEDURE — 36415 COLL VENOUS BLD VENIPUNCTURE: CPT

## 2021-07-14 RX ORDER — RIFAXIMIN 550 MG/1
TABLET ORAL
Qty: 180 TABLET | Refills: 1 | Status: SHIPPED | OUTPATIENT
Start: 2021-07-14 | End: 2022-01-10

## 2021-07-15 NOTE — PRE-PROCEDURE INSTRUCTIONS
Preop instructions given.  \[facility]\"" has not received any info from [employer].  Advised to call- [facility] will do so now

## 2021-07-20 ENCOUNTER — LAB (OUTPATIENT)
Dept: LAB | Facility: HOSPITAL | Age: 51
End: 2021-07-20

## 2021-07-20 LAB — SARS-COV-2 ORF1AB RESP QL NAA+PROBE: NOT DETECTED

## 2021-07-20 PROCEDURE — U0004 COV-19 TEST NON-CDC HGH THRU: HCPCS

## 2021-07-20 PROCEDURE — C9803 HOPD COVID-19 SPEC COLLECT: HCPCS

## 2021-07-22 ENCOUNTER — HOSPITAL ENCOUNTER (OUTPATIENT)
Facility: HOSPITAL | Age: 51
Setting detail: HOSPITAL OUTPATIENT SURGERY
Discharge: HOME | End: 2021-07-22
Attending: INTERNAL MEDICINE | Admitting: INTERNAL MEDICINE

## 2021-07-22 ENCOUNTER — ON CAMPUS - OUTPATIENT (AMBULATORY)
Dept: URBAN - METROPOLITAN AREA HOSPITAL 85 | Facility: HOSPITAL | Age: 51
End: 2021-07-22
Payer: COMMERCIAL

## 2021-07-22 ENCOUNTER — ANESTHESIA (OUTPATIENT)
Dept: GASTROENTEROLOGY | Facility: HOSPITAL | Age: 51
End: 2021-07-22

## 2021-07-22 ENCOUNTER — ANESTHESIA EVENT (OUTPATIENT)
Dept: GASTROENTEROLOGY | Facility: HOSPITAL | Age: 51
End: 2021-07-22

## 2021-07-22 VITALS
BODY MASS INDEX: 39.54 KG/M2 | WEIGHT: 276.2 LBS | DIASTOLIC BLOOD PRESSURE: 89 MMHG | SYSTOLIC BLOOD PRESSURE: 144 MMHG | HEIGHT: 70 IN | OXYGEN SATURATION: 100 % | HEART RATE: 74 BPM | TEMPERATURE: 98.6 F | RESPIRATION RATE: 21 BRPM

## 2021-07-22 DIAGNOSIS — K21.9 GASTRO-ESOPHAGEAL REFLUX DISEASE WITHOUT ESOPHAGITIS: ICD-10-CM

## 2021-07-22 DIAGNOSIS — K22.2 ESOPHAGEAL OBSTRUCTION: ICD-10-CM

## 2021-07-22 DIAGNOSIS — Z87.19 PERSONAL HISTORY OF OTHER DISEASES OF THE DIGESTIVE SYSTEM: ICD-10-CM

## 2021-07-22 PROCEDURE — 43235 EGD DIAGNOSTIC BRUSH WASH: CPT | Performed by: INTERNAL MEDICINE

## 2021-07-22 PROCEDURE — 43450 DILATE ESOPHAGUS 1/MULT PASS: CPT | Performed by: INTERNAL MEDICINE

## 2021-07-22 PROCEDURE — 25010000002 PROPOFOL 10 MG/ML EMULSION: Performed by: ANESTHESIOLOGY

## 2021-07-22 RX ORDER — SODIUM CHLORIDE 0.9 % (FLUSH) 0.9 %
3 SYRINGE (ML) INJECTION EVERY 12 HOURS SCHEDULED
Status: DISCONTINUED | OUTPATIENT
Start: 2021-07-22 | End: 2021-07-22 | Stop reason: HOSPADM

## 2021-07-22 RX ORDER — SODIUM CHLORIDE 9 MG/ML
1000 INJECTION, SOLUTION INTRAVENOUS CONTINUOUS
Status: DISCONTINUED | OUTPATIENT
Start: 2021-07-22 | End: 2021-07-22 | Stop reason: HOSPADM

## 2021-07-22 RX ORDER — ONDANSETRON 2 MG/ML
4 INJECTION INTRAMUSCULAR; INTRAVENOUS ONCE AS NEEDED
Status: DISCONTINUED | OUTPATIENT
Start: 2021-07-22 | End: 2021-07-22 | Stop reason: HOSPADM

## 2021-07-22 RX ORDER — SODIUM CHLORIDE 0.9 % (FLUSH) 0.9 %
3-10 SYRINGE (ML) INJECTION AS NEEDED
Status: DISCONTINUED | OUTPATIENT
Start: 2021-07-22 | End: 2021-07-22 | Stop reason: HOSPADM

## 2021-07-22 RX ORDER — PROPOFOL 10 MG/ML
VIAL (ML) INTRAVENOUS AS NEEDED
Status: DISCONTINUED | OUTPATIENT
Start: 2021-07-22 | End: 2021-07-22 | Stop reason: SURG

## 2021-07-22 RX ADMIN — SODIUM CHLORIDE 1000 ML: 9 INJECTION, SOLUTION INTRAVENOUS at 07:53

## 2021-07-22 RX ADMIN — PROPOFOL 200 MG: 10 INJECTION, EMULSION INTRAVENOUS at 09:32

## 2021-07-22 NOTE — H&P
GI PREOPERATIVE HISTORY AND PHYSICAL:    Referring Provider:    Berny Godwin MD    Chief complaint: Esophageal varices    Subjective .     History of present illness:      Mc Selby is a 51 y.o. male who presents today for Procedure(s):  ESOPHAGOGASTRODUODENOSCOPY for the indications listed below.     Esophageal varices    The updated Patient Profile was reviewed prior to the procedure, in conjunction with the Physical Exam, including medical conditions, surgical procedures, medications, allergies, family history and social history.     Pre-operatively, I reviewed the indication(s) for the procedure, the risks of the procedure [including but not limited to: unexpected bleeding possibly requiring hospitalization and/or unplanned repeat procedures, perforation possibly requiring surgical treatment, missed lesions and complications of sedation/MAC (also explained by anesthesia staff)].     I have evaluated the patient for risks associated with the planned anesthesia and the procedure to be performed and find the patient an acceptable candidate for IV sedation.    Multiple opportunities were provided for any questions or concerns, and all questions were answered satisfactorily before any anesthesia was administered. We will proceed with the planned procedure.    Past Medical History:  Past Medical History:   Diagnosis Date   • Abdominal pain 07/2021   • Acute perforated gastric ulcer with hemorrhage (CMS/HCC) 07/16/2018   • Alcoholism (CMS/HCC)    • Allergic rhinitis    • Arthritis     rheumatoid (diagnosed at age 16)   • Cirrhosis (CMS/HCC)     etoh cirrhosis-liver failure. Follows w/ GI- [facility]   • CKD (chronic kidney disease)     dr. nelson.  Stage 2   • COPD (chronic obstructive pulmonary disease) (CMS/HCC)    • Depression    • Duodenal ulcer      perforated duodenal s/p patch   • Dysphagia    • Fatty liver    • GERD without esophagitis 9/2/2020   • History of transfusion    • Infectious viral  "hepatitis    • MRSA (methicillin resistant Staphylococcus aureus) carrier 06/12/2019    nasal swab   • Multiple myeloma (CMS/HCC)     Dr. Hernandez at [facility]   • Nausea     states \"gallbladder issues\"- had attempted cholecystectomy, but unable to complete d/t adhered to liver   • Tumors      benign essential   • Type 2 diabetes mellitus with hyperglycemia (CMS/HCC) 1/8/2020    off meds now   • Umbilical hernia u   • Wound, open     healed at this time       Past Surgical History:  Past Surgical History:   Procedure Laterality Date   • CYST REMOVAL Left     forarm   • ENDOSCOPY      ascities/paracentesis   • EXPLORATORY LAPAROTOMY  07/16/2018    Over-sew Gastric Ulcer With Gastrostomy and Jejunostomy Tube   • FOOT SURGERY Right     right bunion removal   • FRACTURE SURGERY Right 2005    bunionectomy/ broke toe and put in rods   • MASS EXCISION Right 6/18/2019    Procedure: EXCISION OF SEBACEOUS CYST OF THE RIGHT BACK;  Surgeon: Sapna Elizalde MD;  Location: [facility] OR;  Service: General   • MASS EXCISION Right 8/27/2019    Procedure: EXCISION LESION of right back;  Surgeon: Sapna Elizalde MD;  Location: [facility] OR;  Service: General   • UMBILICAL HERNIA REPAIR N/A 6/18/2019    Procedure: UMBILICAL HERNIA REPAIR LAPAROSCOPIC WITH MESH WITH EXTENSIVE LYSIS OF ADHESIONS;  Surgeon: Sapna Elizalde MD;  Location: [facility] OR;  Service: General       Social History:  Social History     Tobacco Use   • Smoking status: Former Smoker     Packs/day: 2.00     Years: 35.00     Pack years: 70.00     Types: Cigarettes     Quit date: 8/21/2017     Years since quitting: 3.9   • Smokeless tobacco: Former User   Vaping Use   • Vaping Use: Never used   • Passive vaping exposure 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       Scheduled Meds:  Continuous Infusions:No current facility-administered medications for this encounter.    PRN Meds:.    ALLERGIES:  Duloxetine    ROS:  The following systems were reviewed and negative;   Constitution:  No fevers, chills, no unintentional weight loss  Skin: no rash, no jaundice  Eyes:  No blurry vision, no eye pain  HENT:  No change in hearing or smell  Resp:  No dyspnea or cough  CV:  No chest pain or palpitations  :  No dysuria, hematuria  Musculoskeletal:  No leg cramps or arthralgias  Neuro:  No tremor, no numbness  Psych:  No depression or confsuion    Objective     Vital Signs:   Vitals:    07/15/21 1144   Weight: 120 kg (265 lb)   Height: 177.8 cm (70\")       Physical Exam:       General Appearance:    Awake and alert, in no acute distress   Head:    Normocephalic, without obvious abnormality, atraumatic   Throat:   No oral lesions, no thrush, oral mucosa moist   Lungs  Cardiac:  Abdomen:  Extremities:     Respirations regular, even and unlabored    Regular rate and rhythm, no murmur, gallop, rub    Non-distended, good bowel sounds, non tender, no masses     No edema, pulses 2+   Skin:   No rash, no jaundice       Results Review:  Lab Results (last 24 hours)     ** No results found for the last 24 hours. **          Imaging Results (Last 24 Hours)     ** No results found for the last 24 hours. **           I reviewed the patient's labs and imaging.    ASSESSMENT AND PLAN:  Esophageal varices    Active Problems:    * No active hospital problems. *       Procedure(s):  ESOPHAGOGASTRODUODENOSCOPY      I discussed the patients findings and my recommendations with the patient.    Glenn Loo MD  07/22/21  07:02 EDT  "

## 2021-07-22 NOTE — ANESTHESIA POSTPROCEDURE EVALUATION
Patient: Mc Selby    Procedure Summary     Date: 07/22/21 Room / Location: [facility] ENDOSCOPY 1 / [facility] ENDOSCOPY    Anesthesia Start: 0929 Anesthesia Stop: 0940    Procedure: ESOPHAGOGASTRODUODENOSCOPY WITH DILATION (#48 BOUGIE) (N/A ) Diagnosis:       History of esophageal varices      GERD (gastroesophageal reflux disease)      (History of esophageal varices [Z87.19])      (GERD (gastroesophageal reflux disease) [K21.9])    Surgeons: Glenn Loo MD Provider: Topher Reyes MD    Anesthesia Type: MAC ASA Status: 3          Anesthesia Type: MAC    Vitals  Vitals Value Taken Time   /89 07/22/21 0942   Temp     Pulse 74 07/22/21 0942   Resp 21 07/22/21 0942   SpO2 100 % 07/22/21 0942           Post Anesthesia Care and Evaluation    Patient location during evaluation: PACU  Patient participation: complete - patient participated  Level of consciousness: awake  Pain scale: See nurse's notes for pain score.  Pain management: adequate  Airway patency: patent  Anesthetic complications: No anesthetic complications  PONV Status: none  Cardiovascular status: acceptable  Respiratory status: acceptable  Hydration status: acceptable    Comments: Patient seen and examined postoperatively; vital signs stable; SpO2 greater than or equal to 90%; cardiopulmonary status stable; nausea/vomiting adequately controlled; pain adequately controlled; no apparent anesthesia complications; patient discharged from anesthesia care when discharge criteria were met

## 2021-07-22 NOTE — ANESTHESIA PREPROCEDURE EVALUATION
Anesthesia Evaluation     Patient summary reviewed and Nursing notes reviewed   NPO Solid Status: > 8 hours  NPO Liquid Status: > 8 hours           Airway   Mallampati: II  TM distance: >3 FB  Neck ROM: full  No difficulty expected  Dental - normal exam     Pulmonary    (+) COPD,   Cardiovascular         Neuro/Psych  GI/Hepatic/Renal/Endo    (+) morbid obesity, GERD, PUD, GI bleeding , hepatitis, liver disease, renal disease, diabetes mellitus,     Musculoskeletal     Abdominal    Substance History      OB/GYN          Other   arthritis,    history of cancer                    Anesthesia Plan    ASA 3     MAC     intravenous induction     Anesthetic plan, all risks, benefits, and alternatives have been provided, discussed and informed consent has been obtained with: patient.

## 2021-07-22 NOTE — DISCHARGE INSTRUCTIONS
A responsible adult should stay with you and you should rest quietly for the rest of the day.    Do not drink alcohol, drive, operate any heavy machinery or power tools or make any legal/important decisions for the next 24 hours.     Progress your diet as tolerated.  If you begin to experience severe pain, increased shortness of breath, racing heartbeat or a fever above 101 F, seek immediate medical attention.     Follow up with MD as instructed. Call office for results in 3 to 5 days if needed.  Recommendations:  1.  Diet as tolerated  2.  Follow-up in my office as scheduled

## 2021-07-22 NOTE — OP NOTE
ESOPHAGOGASTRODUODENOSCOPY Procedure Report    Patient Name:  Mc Selby  YOB: 1970    Date of Surgery:  7/22/2021     Pre-Op Diagnosis:  History of esophageal varices [Z87.19]  GERD (gastroesophageal reflux disease) [K21.9]    Post-Op Diagnosis:  1.  Esophageal stricture status post 48 Peruvian Tiwari dilation       Procedure/CPT® Codes:      Procedure(s):  ESOPHAGOGASTRODUODENOSCOPY WITH DILATION (#48 BOUGIE)    Staff:  Surgeon(s):  Glenn Loo MD      Anesthesia: Monitored Anesthesia Care    Description of Procedure:  A description of the procedure as well as risks, benefits and alternative methods were explained to the patient who voiced understanding and signed the corresponding consent form. A physical exam was performed and vital signs were monitored throughout the procedure.    After informed consent was obtained, the patient was placed in the left lateral decubitus position and sedated as above.  The Olympus video gastroscope was inserted into the oropharynx and the esophagus was intubated without difficulty.  Examination of the esophagus, stomach, pylorus, duodenal bulb, and second portion of the duodenum was performed without difficulty. The scope was then retroflexed and the fundus was visualized. The procedure was not difficult and there were no immediate complications.  There was no blood loss.    Findings:  Esophagus: Mild stricturing at the GE junction, dilated with 48 Peruvian Tiwari dilator and minimal mucosal trauma noted.  Stomach: Mild portal hypertensive gastropathy  Duodenum: Normal    Impression:  1.  Esophageal stricture status post 48 Peruvian Tiwari dilation    Recommendations:  1.  Diet as tolerated  2.  Follow-up in my office as scheduled      Glenn Loo MD     Date: 7/22/2021    Time: 09:39 EDT      .

## 2021-08-11 RX ORDER — AZELASTINE 1 MG/ML
2 SPRAY, METERED NASAL 2 TIMES DAILY
Qty: 1 EACH | Refills: 3 | Status: SHIPPED | OUTPATIENT
Start: 2021-08-11 | End: 2021-11-03

## 2021-08-12 ENCOUNTER — TELEPHONE (OUTPATIENT)
Dept: FAMILY MEDICINE CLINIC | Facility: CLINIC | Age: 51
End: 2021-08-12

## 2021-08-12 RX ORDER — OMEPRAZOLE 20 MG/1
40 TABLET, DELAYED RELEASE ORAL DAILY
Qty: 180 TABLET | Refills: 2 | Status: SHIPPED | OUTPATIENT
Start: 2021-08-12 | End: 2021-08-17

## 2021-08-12 NOTE — TELEPHONE ENCOUNTER
INSURANCE CALLED IN AND STATED THAT AFTER THIS MONTH THE PATIENTS NEXIUM 40 MG WILL NOT BE COVERED    THREE ALT     NEXIUM 24 HR OTC 20 MG  PRILOSEC OTC 20 MG  PREVACID 24 HR OTC 15 MG      ALL ARE OVER THE COUNTER, BUT IF PHYSICIAN WRITES A PRESCRIPTION, INSURANCE WILL PAY. CAN BE PRESCRIBED UP TO TWO TABLETS PER MEDICATION.        CALL BACK [phone]

## 2021-08-17 ENCOUNTER — LAB (OUTPATIENT)
Dept: FAMILY MEDICINE CLINIC | Facility: CLINIC | Age: 51
End: 2021-08-17

## 2021-08-17 ENCOUNTER — OFFICE VISIT (OUTPATIENT)
Dept: FAMILY MEDICINE CLINIC | Facility: CLINIC | Age: 51
End: 2021-08-17

## 2021-08-17 VITALS
HEART RATE: 91 BPM | SYSTOLIC BLOOD PRESSURE: 120 MMHG | TEMPERATURE: 98.3 F | DIASTOLIC BLOOD PRESSURE: 70 MMHG | HEIGHT: 70 IN | RESPIRATION RATE: 16 BRPM | WEIGHT: 276 LBS | BODY MASS INDEX: 39.51 KG/M2 | OXYGEN SATURATION: 96 %

## 2021-08-17 DIAGNOSIS — E11.65 TYPE 2 DIABETES MELLITUS WITH HYPERGLYCEMIA, WITH LONG-TERM CURRENT USE OF INSULIN (HCC): Chronic | ICD-10-CM

## 2021-08-17 DIAGNOSIS — Z12.5 PROSTATE CANCER SCREENING: ICD-10-CM

## 2021-08-17 DIAGNOSIS — D47.2 MGUS (MONOCLONAL GAMMOPATHY OF UNKNOWN SIGNIFICANCE): ICD-10-CM

## 2021-08-17 DIAGNOSIS — R35.1 BENIGN PROSTATIC HYPERPLASIA WITH NOCTURIA: ICD-10-CM

## 2021-08-17 DIAGNOSIS — H65.93 MIDDLE EAR EFFUSION, BILATERAL: ICD-10-CM

## 2021-08-17 DIAGNOSIS — N40.1 BENIGN PROSTATIC HYPERPLASIA WITH NOCTURIA: ICD-10-CM

## 2021-08-17 DIAGNOSIS — K21.9 GERD WITHOUT ESOPHAGITIS: Chronic | ICD-10-CM

## 2021-08-17 DIAGNOSIS — Z00.00 ANNUAL PHYSICAL EXAM: Primary | ICD-10-CM

## 2021-08-17 DIAGNOSIS — Z79.4 TYPE 2 DIABETES MELLITUS WITH HYPERGLYCEMIA, WITH LONG-TERM CURRENT USE OF INSULIN (HCC): Chronic | ICD-10-CM

## 2021-08-17 DIAGNOSIS — N18.9 CHRONIC KIDNEY DISEASE, UNSPECIFIED CKD STAGE: ICD-10-CM

## 2021-08-17 DIAGNOSIS — M06.09 RHEUMATOID ARTHRITIS OF MULTIPLE SITES WITH NEGATIVE RHEUMATOID FACTOR (HCC): ICD-10-CM

## 2021-08-17 DIAGNOSIS — K70.30 ALCOHOLIC CIRRHOSIS OF LIVER WITHOUT ASCITES (HCC): Chronic | ICD-10-CM

## 2021-08-17 PROBLEM — L72.3 SEBACEOUS CYST: Status: RESOLVED | Noted: 2019-08-22 | Resolved: 2021-08-17

## 2021-08-17 PROBLEM — K42.0 UMBILICAL HERNIA, INCARCERATED: Status: RESOLVED | Noted: 2019-06-18 | Resolved: 2021-08-17

## 2021-08-17 PROBLEM — M16.0 PRIMARY OSTEOARTHRITIS OF BOTH HIPS: Status: RESOLVED | Noted: 2020-06-30 | Resolved: 2021-08-17

## 2021-08-17 PROBLEM — R10.9 ABDOMINAL PAIN: Status: RESOLVED | Noted: 2020-09-02 | Resolved: 2021-08-17

## 2021-08-17 PROBLEM — K42.9 UMBILICAL HERNIA WITHOUT OBSTRUCTION OR GANGRENE: Status: RESOLVED | Noted: 2019-05-24 | Resolved: 2021-08-17

## 2021-08-17 PROBLEM — R53.1 WEAKNESS: Status: RESOLVED | Noted: 2020-01-07 | Resolved: 2021-08-17

## 2021-08-17 PROCEDURE — 84439 ASSAY OF FREE THYROXINE: CPT | Performed by: FAMILY MEDICINE

## 2021-08-17 PROCEDURE — 80053 COMPREHEN METABOLIC PANEL: CPT | Performed by: FAMILY MEDICINE

## 2021-08-17 PROCEDURE — 80061 LIPID PANEL: CPT | Performed by: FAMILY MEDICINE

## 2021-08-17 PROCEDURE — 82306 VITAMIN D 25 HYDROXY: CPT | Performed by: FAMILY MEDICINE

## 2021-08-17 PROCEDURE — 84443 ASSAY THYROID STIM HORMONE: CPT | Performed by: FAMILY MEDICINE

## 2021-08-17 PROCEDURE — 82607 VITAMIN B-12: CPT | Performed by: FAMILY MEDICINE

## 2021-08-17 PROCEDURE — 85025 COMPLETE CBC W/AUTO DIFF WBC: CPT | Performed by: FAMILY MEDICINE

## 2021-08-17 PROCEDURE — 90670 PCV13 VACCINE IM: CPT | Performed by: FAMILY MEDICINE

## 2021-08-17 PROCEDURE — 90471 IMMUNIZATION ADMIN: CPT | Performed by: FAMILY MEDICINE

## 2021-08-17 PROCEDURE — G0103 PSA SCREENING: HCPCS | Performed by: FAMILY MEDICINE

## 2021-08-17 PROCEDURE — 86140 C-REACTIVE PROTEIN: CPT | Performed by: FAMILY MEDICINE

## 2021-08-17 PROCEDURE — 99396 PREV VISIT EST AGE 40-64: CPT | Performed by: FAMILY MEDICINE

## 2021-08-17 PROCEDURE — 99213 OFFICE O/P EST LOW 20 MIN: CPT | Performed by: FAMILY MEDICINE

## 2021-08-17 PROCEDURE — 36415 COLL VENOUS BLD VENIPUNCTURE: CPT | Performed by: FAMILY MEDICINE

## 2021-08-17 PROCEDURE — 85652 RBC SED RATE AUTOMATED: CPT | Performed by: FAMILY MEDICINE

## 2021-08-17 PROCEDURE — 83036 HEMOGLOBIN GLYCOSYLATED A1C: CPT | Performed by: FAMILY MEDICINE

## 2021-08-17 RX ORDER — CETIRIZINE HYDROCHLORIDE 10 MG/1
10 TABLET ORAL DAILY
Qty: 90 TABLET | Refills: 3 | Status: SHIPPED | OUTPATIENT
Start: 2021-08-17 | End: 2022-08-22

## 2021-08-17 RX ORDER — ALBUTEROL SULFATE 90 UG/1
2 AEROSOL, METERED RESPIRATORY (INHALATION) EVERY 6 HOURS PRN
Qty: 18 G | Refills: 3 | Status: SHIPPED | OUTPATIENT
Start: 2021-08-17 | End: 2021-11-29

## 2021-08-17 RX ORDER — PANTOPRAZOLE SODIUM 40 MG/1
40 TABLET, DELAYED RELEASE ORAL 2 TIMES DAILY
Qty: 180 TABLET | Refills: 1 | Status: SHIPPED | OUTPATIENT
Start: 2021-08-17 | End: 2022-01-11

## 2021-08-17 NOTE — PROGRESS NOTES
Chief Complaint   Patient presents with   • Annual Exam     HPI  Mc Selby is a 51 y.o. male that presents for   Chief Complaint   Patient presents with   • Annual Exam     DM: last a1c 6.3. Not maintained on any medication at this time, previously on insuline. No numbness in feet. 5/2021 microalb/Cr 7. Follows w/ ophtho every 6 months.     RA: maintained on Enbrel 50mg weekly for a few years now. Diagnosed at 16. R hip and hands bother him most but reports diffuse joint pain. Enbrel controls pretty much everything except R hip. Too young for hip replacement per ortho. Reports 30-40 minutes of morning stiffness. Follows w/ rheumatology- Jorge A.     EtOH cirrhosis: patient reports drinking 2-5 beers daily. Follows w/ GI- C[facility]. Maintained on Bumex 2mg/1mg, spironolactone 25mg daily, rifaximin 550 BID, and zinc daily. Last HE unknown. Last EGD 7/2021 w/ mild PHG    GERD: maintained on omeprazole 40 daily. Last EGD 7/2021 w/ esophageal stricture and subsequent dilation. He reports daily heartburn and dysphagia w/ potassium pills.     BPH: maintained on tamsulosin 0.8mg daily. He reports getting up every hour at night to urinate. Patient reports this is because he takes his Bumex just before bed.     CKD: follows w/ Konijeti. Maintained on bumex 2mg qAM and 1mg qPM    MGUS: follows w/ hematology (Hernandez) annually. No fever, chills, weight loss. He reports chronic night sweats since childhood.     Review of Systems   Constitutional: Negative for chills, fever and unexpected weight loss.   HENT: Negative for congestion, rhinorrhea, sore throat and trouble swallowing.    Eyes: Negative for visual disturbance.   Respiratory: Negative for cough and shortness of breath.    Cardiovascular: Negative for chest pain and palpitations.   Gastrointestinal: Negative for abdominal pain, constipation, diarrhea, nausea and vomiting.   Genitourinary: Positive for nocturia. Negative for difficulty urinating and dysuria.    Musculoskeletal: Positive for arthralgias. Negative for joint swelling.   Skin: Negative for rash and skin lesions.   Neurological: Negative for weakness, numbness and headache.   Psychiatric/Behavioral: Negative for depressed mood. The patient is not nervous/anxious.      The following portions of the patient's history were reviewed and updated as appropriate: problem list, past medical history, past surgical history, allergies, current medications, past social history and past family history.    Problem List Tab  Patient History Tab  Immunizations Tab  Medications Tab  Chart Review Tab  Care Everywhere Tab  Synopsis Tab    PE  Vitals:    08/17/21 1506   BP: 120/70   Pulse: 91   Resp: 16   Temp: 98.3 °F (36.8 °C)   SpO2: 96%     Body mass index is 39.6 kg/m².  General: Well nourished, NAD  Head: AT/NC  Eyes: EOMI, anicteric sclera  ENT: MMM w/o erythema. TMs w/ effusion bilaterally  Neck: Supple, no thyromegaly or LAD. No carotid bruit  Resp: CTAB, SCR, BS equal  CV: RRR w/o m/r/g; 2+ pulses  GI: Soft, NT/ND, +BS  MSK: FROM, no deformity, no edema  Skin: Warm, dry, intact  Neuro: Alert and oriented. No focal deficits  Psych: Appropriate mood and affect    Imaging  CT Chest Without Contrast Diagnostic    Result Date: 6/29/2021  1.Stable lung nodules, measuring up to 5 mm, compared to 06/08/2020 CT. 2.Coronary artery calcifications. 3.Stable cirrhotic liver morphology, mild splenomegaly, small amount of upper abdominal ascites, bilateral gynecomastia.    Electronically Signed By-Mary Gaytan MD On:6/29/2021 4:34 PM This report was finalized on [id number] by  Mary Gaytan MD.    Assessment/Plan   Mc Selby is a 51 y.o. male that presents for   Chief Complaint   Patient presents with   • Annual Exam     Diagnoses and all orders for this visit:    1. Annual physical exam (Primary)  -     Pneumococcal Conjugate Vaccine 13-Valent All  -     PSA Screen  -     CBC & Differential  -     Comprehensive Metabolic  Panel  -     Hemoglobin A1c  -     Lipid Panel  -     TSH  -     T4, Free  -     Vitamin D 25 Hydroxy  -     Vitamin B12  -  Counseled regarding diet, exercise, weight loss, and preventative health maintenance items/immunizations below    2. Type 2 diabetes mellitus with hyperglycemia, with long-term current use of insulin (CMS/HCC): Last A1c 6.3. 5/2021 microalb/Cr 7. Follows w/ ophtho every 6 months.   -     Comprehensive Metabolic Panel  -     Hemoglobin A1c  - Monitor off diabetes medication  - Low threshold to initiate statin/ACE  - Continue regular ophthalmology follow-up    3. Rheumatoid arthritis of multiple sites with negative rheumatoid factor (CMS/HCC)  -     Pneumococcal Conjugate Vaccine 13-Valent All  -     C-reactive Protein  -     Sedimentation Rate  - Continue home Enbrel weekly  - Continue rheumatology follow-up    4. Alcoholic cirrhosis of liver without ascites (CMS/HCC): History of hepatic encephalopathy but nothing recently.  7/2021 EGD with mild portal hypertensive gastropathy  -     Comprehensive Metabolic Panel  - Counseled regarding importance of sobriety  - Continue GI [id number]  - Continue home rifaximin 550 twice daily, zinc, spironolactone    5. GERD without esophagitis: 7/2021 EGD with esophageal stricture  -     Start pantoprazole (PROTONIX) 40 MG EC tablet; Take 1 tablet by mouth 2 (Two) Times a Day.  Dispense: 180 tablet; Refill: 1  - Discontinue omeprazole 40 daily    6. Benign prostatic hyperplasia with nocturia   -Continue home Flomax 0.8 mg daily   -Recommend taking first dose of Bumex as soon as he wakes up in second dose 6 hours later rather than taking a dose of Bumex right before bedtime    7. Chronic kidney disease, unspecified CKD stage   -Continue home Bumex 2 mg / 1 mg   -Continue nephrology [id number]    8. MGUS (monoclonal gammopathy of unknown significance)   -Continue hematology [id number]    9. Middle ear effusion, bilateral   -Start Zyrtec  daily   -Continue Nasacort   -Consider adding Singulair    10. Prostate cancer screening  -     PSA Screen    Other orders  -     albuterol sulfate  (90 Base) MCG/ACT inhaler; Inhale 2 puffs Every 6 (Six) Hours As Needed for Wheezing or Shortness of Air.  Dispense: 18 g; Refill: 3  -     cetirizine (zyrTEC) 10 MG tablet; Take 1 tablet by mouth Daily.  Dispense: 90 tablet; Refill: 3    Preventative  Colonoscopy: 3/2020, due 3/2025  PSA: Ordered today  CT chest: due 2023  AAA: due 2033  Shingles: Completed 4/2019  Pneumonia: Completed Pneumovax 12/2018; Prevnar ordered today  Tdap: Completed 12/2018  Influenza: Completed 10/2020, recommended  COVID: Completed 4/2021 (Pfizer); booster recommended today     Return in about 4 years (around 8/17/2025) for Recheck- 30min- DM, GERD.

## 2021-08-18 LAB
25(OH)D3 SERPL-MCNC: 38.8 NG/ML (ref 30–100)
ALBUMIN SERPL-MCNC: 3.8 G/DL (ref 3.5–5.2)
ALBUMIN/GLOB SERPL: 0.8 G/DL
ALP SERPL-CCNC: 77 U/L (ref 39–117)
ALT SERPL W P-5'-P-CCNC: 11 U/L (ref 1–41)
ANION GAP SERPL CALCULATED.3IONS-SCNC: 10.9 MMOL/L (ref 5–15)
AST SERPL-CCNC: 18 U/L (ref 1–40)
BASOPHILS # BLD AUTO: 0.06 10*3/MM3 (ref 0–0.2)
BASOPHILS NFR BLD AUTO: 1 % (ref 0–1.5)
BILIRUB SERPL-MCNC: 1.5 MG/DL (ref 0–1.2)
BUN SERPL-MCNC: 11 MG/DL (ref 6–20)
BUN/CREAT SERPL: 11.6 (ref 7–25)
CALCIUM SPEC-SCNC: 9.2 MG/DL (ref 8.6–10.5)
CHLORIDE SERPL-SCNC: 105 MMOL/L (ref 98–107)
CHOLEST SERPL-MCNC: 130 MG/DL (ref 0–200)
CO2 SERPL-SCNC: 20.1 MMOL/L (ref 22–29)
CREAT SERPL-MCNC: 0.95 MG/DL (ref 0.76–1.27)
CRP SERPL-MCNC: 0.51 MG/DL (ref 0–0.5)
DEPRECATED RDW RBC AUTO: 44.5 FL (ref 37–54)
EOSINOPHIL # BLD AUTO: 0.43 10*3/MM3 (ref 0–0.4)
EOSINOPHIL NFR BLD AUTO: 7 % (ref 0.3–6.2)
ERYTHROCYTE [DISTWIDTH] IN BLOOD BY AUTOMATED COUNT: 12.7 % (ref 12.3–15.4)
ERYTHROCYTE [SEDIMENTATION RATE] IN BLOOD: 18 MM/HR (ref 0–20)
GFR SERPL CREATININE-BSD FRML MDRD: 83 ML/MIN/1.73
GLOBULIN UR ELPH-MCNC: 4.7 GM/DL
GLUCOSE SERPL-MCNC: 155 MG/DL (ref 65–99)
HBA1C MFR BLD: 6.1 % (ref 3.5–5.6)
HCT VFR BLD AUTO: 51.3 % (ref 37.5–51)
HDLC SERPL-MCNC: 28 MG/DL (ref 40–60)
HGB BLD-MCNC: 17.1 G/DL (ref 13–17.7)
IMM GRANULOCYTES # BLD AUTO: 0.02 10*3/MM3 (ref 0–0.05)
IMM GRANULOCYTES NFR BLD AUTO: 0.3 % (ref 0–0.5)
LDLC SERPL CALC-MCNC: 88 MG/DL (ref 0–100)
LDLC/HDLC SERPL: 3.16 {RATIO}
LYMPHOCYTES # BLD AUTO: 1.43 10*3/MM3 (ref 0.7–3.1)
LYMPHOCYTES NFR BLD AUTO: 23.4 % (ref 19.6–45.3)
MCH RBC QN AUTO: 31.5 PG (ref 26.6–33)
MCHC RBC AUTO-ENTMCNC: 33.3 G/DL (ref 31.5–35.7)
MCV RBC AUTO: 94.6 FL (ref 79–97)
MONOCYTES # BLD AUTO: 0.71 10*3/MM3 (ref 0.1–0.9)
MONOCYTES NFR BLD AUTO: 11.6 % (ref 5–12)
NEUTROPHILS NFR BLD AUTO: 3.46 10*3/MM3 (ref 1.7–7)
NEUTROPHILS NFR BLD AUTO: 56.7 % (ref 42.7–76)
NRBC BLD AUTO-RTO: 0 /100 WBC (ref 0–0.2)
PLATELET # BLD AUTO: 192 10*3/MM3 (ref 140–450)
PMV BLD AUTO: 11.5 FL (ref 6–12)
POTASSIUM SERPL-SCNC: 4.4 MMOL/L (ref 3.5–5.2)
PROT SERPL-MCNC: 8.5 G/DL (ref 6–8.5)
PSA SERPL-MCNC: 0.18 NG/ML (ref 0–4)
RBC # BLD AUTO: 5.42 10*6/MM3 (ref 4.14–5.8)
SODIUM SERPL-SCNC: 136 MMOL/L (ref 136–145)
T4 FREE SERPL-MCNC: 1.11 NG/DL (ref 0.93–1.7)
TRIGL SERPL-MCNC: 68 MG/DL (ref 0–150)
TSH SERPL DL<=0.05 MIU/L-ACNC: 3.29 UIU/ML (ref 0.27–4.2)
VIT B12 BLD-MCNC: 363 PG/ML (ref 211–946)
VLDLC SERPL-MCNC: 14 MG/DL (ref 5–40)
WBC # BLD AUTO: 6.11 10*3/MM3 (ref 3.4–10.8)

## 2021-09-09 RX ORDER — MONTELUKAST SODIUM 10 MG/1
TABLET ORAL
Qty: 90 TABLET | Refills: 1 | Status: SHIPPED | OUTPATIENT
Start: 2021-09-09 | End: 2022-03-10

## 2021-09-24 DIAGNOSIS — K70.30 ALCOHOLIC CIRRHOSIS OF LIVER WITHOUT ASCITES (HCC): Primary | ICD-10-CM

## 2021-09-24 RX ORDER — ALBUMIN (HUMAN) 12.5 G/50ML
25 SOLUTION INTRAVENOUS DAILY PRN
Status: CANCELLED | OUTPATIENT
Start: 2021-09-24

## 2021-09-24 RX ORDER — ALBUMIN (HUMAN) 12.5 G/50ML
12.5 SOLUTION INTRAVENOUS DAILY PRN
Status: CANCELLED | OUTPATIENT
Start: 2021-09-24

## 2021-09-27 ENCOUNTER — HOSPITAL ENCOUNTER (OUTPATIENT)
Dept: INFUSION THERAPY | Facility: HOSPITAL | Age: 51
Discharge: HOME | End: 2021-09-27
Admitting: INTERNAL MEDICINE

## 2021-09-27 VITALS
TEMPERATURE: 97.3 F | OXYGEN SATURATION: 96 % | SYSTOLIC BLOOD PRESSURE: 95 MMHG | RESPIRATION RATE: 18 BRPM | DIASTOLIC BLOOD PRESSURE: 58 MMHG | HEART RATE: 80 BPM

## 2021-09-27 DIAGNOSIS — K70.30 ALCOHOLIC CIRRHOSIS OF LIVER WITHOUT ASCITES (HCC): ICD-10-CM

## 2021-09-27 LAB
DEPRECATED RDW RBC AUTO: 44.2 FL (ref 37–54)
ERYTHROCYTE [DISTWIDTH] IN BLOOD BY AUTOMATED COUNT: 13.9 % (ref 12.3–15.4)
HCT VFR BLD AUTO: 47.6 % (ref 37.5–51)
HGB BLD-MCNC: 16.4 G/DL (ref 13–17.7)
INR PPP: 1.13 (ref 0.93–1.1)
MCH RBC QN AUTO: 31.8 PG (ref 26.6–33)
MCHC RBC AUTO-ENTMCNC: 34.4 G/DL (ref 31.5–35.7)
MCV RBC AUTO: 92.6 FL (ref 79–97)
PLATELET # BLD AUTO: 179 10*3/MM3 (ref 140–450)
PMV BLD AUTO: 8.6 FL (ref 6–12)
PROTHROMBIN TIME: 12.4 SECONDS (ref 9.6–11.7)
RBC # BLD AUTO: 5.14 10*6/MM3 (ref 4.14–5.8)
WBC # BLD AUTO: 5.3 10*3/MM3 (ref 3.4–10.8)

## 2021-09-27 PROCEDURE — 36415 COLL VENOUS BLD VENIPUNCTURE: CPT

## 2021-09-27 PROCEDURE — 85610 PROTHROMBIN TIME: CPT | Performed by: INTERNAL MEDICINE

## 2021-09-27 PROCEDURE — 85027 COMPLETE CBC AUTOMATED: CPT | Performed by: INTERNAL MEDICINE

## 2021-09-27 PROCEDURE — G0463 HOSPITAL OUTPT CLINIC VISIT: HCPCS

## 2021-10-14 RX ORDER — ESOMEPRAZOLE MAGNESIUM 40 MG/1
CAPSULE, DELAYED RELEASE ORAL
Qty: 90 CAPSULE | Refills: 1 | OUTPATIENT
Start: 2021-10-14

## 2021-11-03 RX ORDER — AZELASTINE 1 MG/ML
SPRAY, METERED NASAL
Qty: 90 EACH | Refills: 1 | Status: SHIPPED | OUTPATIENT
Start: 2021-11-03 | End: 2022-05-09

## 2021-11-11 ENCOUNTER — OFFICE VISIT (OUTPATIENT)
Dept: ORTHOPEDIC SURGERY | Facility: CLINIC | Age: 51
End: 2021-11-11

## 2021-11-11 VITALS — RESPIRATION RATE: 18 BRPM | BODY MASS INDEX: 40.09 KG/M2 | HEIGHT: 70 IN | WEIGHT: 280 LBS

## 2021-11-11 DIAGNOSIS — M16.11 PRIMARY OSTEOARTHRITIS OF RIGHT HIP: Primary | ICD-10-CM

## 2021-11-11 PROCEDURE — 99203 OFFICE O/P NEW LOW 30 MIN: CPT | Performed by: FAMILY MEDICINE

## 2021-11-11 PROCEDURE — 20611 DRAIN/INJ JOINT/BURSA W/US: CPT | Performed by: FAMILY MEDICINE

## 2021-11-11 RX ORDER — TRIAMCINOLONE ACETONIDE 40 MG/ML
80 INJECTION, SUSPENSION INTRA-ARTICULAR; INTRAMUSCULAR
Status: COMPLETED | OUTPATIENT
Start: 2021-11-11 | End: 2021-11-11

## 2021-11-11 RX ADMIN — TRIAMCINOLONE ACETONIDE 80 MG: 40 INJECTION, SUSPENSION INTRA-ARTICULAR; INTRAMUSCULAR at 14:25

## 2021-11-11 NOTE — PROGRESS NOTES
Procedure   Large Joint Arthrocentesis: R hip joint  Date/Time: 11/11/2021 2:25 PM  Consent given by: patient  Site marked: site marked  Timeout: Immediately prior to procedure a time out was called to verify the correct patient, procedure, equipment, support staff and site/side marked as required   Supporting Documentation  Indications: pain   Procedure Details  Location: hip - R hip joint  Preparation: Patient was prepped and draped in the usual sterile fashion  Needle size: 22 G (spinal)  Approach: anterior (Ultraosund guidance was used to visualize vascular structed, care was taken to avoid there structures. Please see US machine for saved images.)  Medications administered: 80 mg triamcinolone acetonide 40 MG/ML (2cc of 2% lidocaine without epinepherine and 2cc of 40mg kenalog)  Patient tolerance: patient tolerated the procedure well with no immediate complications

## 2021-11-11 NOTE — PROGRESS NOTES
"Primary Care Sports Medicine Office Visit Note     Patient ID: Mc Selby is a 51 y.o. male.    Chief Complaint:  Chief Complaint   Patient presents with   • Right Hip - Pain     HPI:    Mr. Mc Selby is a 51 y.o. male new patient to me, who presents to the clinic today for follow-up evaluation of right hip pain, and specifically for intra-articular corticosteroid injection to the right hip.  He previously has seen my partner Dr. Duran Donovan, and has had intra-articular corticosteroid injection under fluoroscopic guidance at the hospital, greater than a year ago.  He states he had mild to moderate relief of his pain for a few months, and after discussing with Dr. Donovan it was felt that repeat injection was warranted.    Past Medical History:   Diagnosis Date   • Acute perforated gastric ulcer with hemorrhage (HCC) 07/16/2018   • Alcoholism (HCC)    • Allergic rhinitis    • Arthritis     rheumatoid (diagnosed at age 16)   • Cirrhosis (HCC)     etoh cirrhosis-liver failure. Follows w/ GI- [facility]   • CKD (chronic kidney disease)     dr. nelson.  Stage 2   • COPD (chronic obstructive pulmonary disease) (HCC)    • Depression    • Duodenal ulcer      perforated duodenal s/p patch   • Fatty liver    • GERD without esophagitis 9/2/2020   • History of transfusion    • Infectious viral hepatitis    • MRSA (methicillin resistant Staphylococcus aureus) carrier 06/12/2019    nasal swab   • Multiple myeloma (HCC)     Dr. Hernandez at [facility]   • Nausea     states \"gallbladder issues\"- had attempted cholecystectomy, but unable to complete d/t adhered to liver   • Sleep apnea     unable to tolerate CPAP   • Tumors      benign essential   • Type 2 diabetes mellitus with hyperglycemia (HCC) 1/8/2020    off meds now   • Umbilical hernia u   • Wound, open     healed at this time       Past Surgical History:   Procedure Laterality Date   • CYST REMOVAL Left     forarm   • ENDOSCOPY      ascities/paracentesis   • ENDOSCOPY N/A 7/22/2021 "    Procedure: ESOPHAGOGASTRODUODENOSCOPY WITH DILATION (#48 BOUGIE);  Surgeon: Glenn Loo MD;  Location: [facility] ENDOSCOPY;  Service: Gastroenterology;  Laterality: N/A;  ESOPHAGEAL STRICTURE   • EXPLORATORY LAPAROTOMY  2018    Over-sew Gastric Ulcer With Gastrostomy and Jejunostomy Tube   • FOOT SURGERY Right     right bunion removal   • FRACTURE SURGERY Right 2005    bunionectomy/ broke toe and put in rods   • MASS EXCISION Right 2019    Procedure: EXCISION OF SEBACEOUS CYST OF THE RIGHT BACK;  Surgeon: Sapna Elizalde MD;  Location: [facility] MAIN OR;  Service: General   • MASS EXCISION Right 2019    Procedure: EXCISION LESION of right back;  Surgeon: Sapna Elizalde MD;  Location: [facility] MAIN OR;  Service: General   • UMBILICAL HERNIA REPAIR N/A 2019    Procedure: UMBILICAL HERNIA REPAIR LAPAROSCOPIC WITH MESH WITH EXTENSIVE LYSIS OF ADHESIONS;  Surgeon: Sapna Elizalde MD;  Location: [facility] MAIN OR;  Service: General       Family History   Problem Relation Age of Onset   • Cancer Mother    • Lung disease Father    • Other Father         prostate problems   • Mental illness Brother      Social History     Occupational History   • Not on file   Tobacco Use   • Smoking status: Former Smoker     Packs/day: 2.00     Years: 35.00     Pack years: 70.00     Types: Cigarettes     Quit date: 2017     Years since quittin.2   • Smokeless tobacco: Former User   Vaping Use   • Vaping Use: Never used   • Passive vaping exposure: Passive vaping exposure comment (previously worked in a bar)   Substance and Sexual Activity   • Alcohol use: Yes     Alcohol/week: 20.0 standard drinks     Types: 20 Cans of beer per week   • Drug use: No   • Sexual activity: Defer      Review of Systems   Constitutional: Negative for activity change, fatigue and fever.   Musculoskeletal: Positive for arthralgias.   Skin: Negative for color change and rash.   Neurological: Negative for numbness.     Objective:    Resp 18   " Ht 177.8 cm (70\")   Wt 127 kg (280 lb)   BMI 40.18 kg/m²     Physical Examination:  Physical Exam  Vitals and nursing note reviewed.   Constitutional:       General: He is not in acute distress.     Appearance: He is well-developed. He is not diaphoretic.   HENT:      Head: Normocephalic and atraumatic.   Eyes:      Conjunctiva/sclera: Conjunctivae normal.   Pulmonary:      Effort: Pulmonary effort is normal. No respiratory distress.   Musculoskeletal:      Lumbar back: Negative right straight leg raise test.   Skin:     General: Skin is warm.      Capillary Refill: Capillary refill takes less than 2 seconds.   Neurological:      Mental Status: He is alert.       Right Knee Exam     Range of Motion   The patient has normal right knee ROM.      Right Hip Exam     Tenderness   The patient is experiencing no tenderness.     Range of Motion   Abduction: normal   Adduction: normal   Flexion: normal Right hip flexion: Mild obligate external rotation.   External rotation: normal   Internal rotation: normal     Muscle Strength   Abduction: 5/5   Adduction: 5/5   Flexion: 5/5     Tests   EDGAR: negative  Fadir:  Positive FADIR test    Other   Erythema: absent  Sensation: normal  Pulse: present    Comments:  Positive log roll, equivocal stenchfield, strongly positive scour.      Back Exam     Tenderness   The patient is experiencing no tenderness.     Range of Motion   The patient has normal back ROM.    Muscle Strength   The patient has normal back strength.    Tests   Straight leg raise right: negative    Reflexes   Patellar: normal    Other   Sensation: normal  Erythema: no back redness        Imaging and other tests:  No new imaging today.    Assessment and Plan:    1. Primary osteoarthritis of right hip    After discussion of risks and benefits, the patient elected to proceed with corticosteroid injection to the intrarticular R hip with ultrasound guidance.  The patient tolerated this procedure well without any " "complaints or problems.  I recommended continuation of conservative management as previous, RTC in 3-6 months or sooner if symptoms recur.    Deandre LARSON \"Chance\" Angel IRVIN, DO, CAQSM  11/11/21  14:25 EST    Disclaimer: Please note that areas of this note were completed with computer voice recognition software.  Quite often unanticipated grammatical, syntax, homophones, and other interpretive errors are inadvertently transcribed by the computer software. Please excuse any errors that have escaped final proofreading.  "

## 2021-11-29 RX ORDER — ALBUTEROL SULFATE 90 UG/1
2 AEROSOL, METERED RESPIRATORY (INHALATION) EVERY 6 HOURS PRN
Qty: 18 G | Refills: 3 | Status: SHIPPED | OUTPATIENT
Start: 2021-11-29 | End: 2023-02-22

## 2022-01-10 RX ORDER — RIFAXIMIN 550 MG/1
TABLET ORAL
Qty: 60 TABLET | Refills: 5 | Status: SHIPPED | OUTPATIENT
Start: 2022-01-10 | End: 2022-09-21 | Stop reason: SDUPTHER

## 2022-01-11 ENCOUNTER — OFFICE VISIT (OUTPATIENT)
Dept: FAMILY MEDICINE CLINIC | Facility: CLINIC | Age: 52
End: 2022-01-11

## 2022-01-11 VITALS
SYSTOLIC BLOOD PRESSURE: 118 MMHG | RESPIRATION RATE: 12 BRPM | DIASTOLIC BLOOD PRESSURE: 77 MMHG | BODY MASS INDEX: 39.22 KG/M2 | WEIGHT: 274 LBS | HEIGHT: 70 IN | HEART RATE: 81 BPM | OXYGEN SATURATION: 96 %

## 2022-01-11 DIAGNOSIS — M25.562 ACUTE PAIN OF BOTH KNEES: ICD-10-CM

## 2022-01-11 DIAGNOSIS — M25.561 ACUTE PAIN OF BOTH KNEES: ICD-10-CM

## 2022-01-11 DIAGNOSIS — E11.65 TYPE 2 DIABETES MELLITUS WITH HYPERGLYCEMIA, WITH LONG-TERM CURRENT USE OF INSULIN: Primary | Chronic | ICD-10-CM

## 2022-01-11 DIAGNOSIS — K21.9 GERD WITHOUT ESOPHAGITIS: Chronic | ICD-10-CM

## 2022-01-11 DIAGNOSIS — R20.0 NUMBNESS: ICD-10-CM

## 2022-01-11 DIAGNOSIS — Z79.4 TYPE 2 DIABETES MELLITUS WITH HYPERGLYCEMIA, WITH LONG-TERM CURRENT USE OF INSULIN: Primary | Chronic | ICD-10-CM

## 2022-01-11 DIAGNOSIS — G47.00 INSOMNIA, UNSPECIFIED TYPE: ICD-10-CM

## 2022-01-11 PROCEDURE — 99214 OFFICE O/P EST MOD 30 MIN: CPT | Performed by: FAMILY MEDICINE

## 2022-01-11 RX ORDER — TRAZODONE HYDROCHLORIDE 50 MG/1
TABLET ORAL
Qty: 60 TABLET | Refills: 5 | Status: SHIPPED | OUTPATIENT
Start: 2022-01-11 | End: 2022-02-04

## 2022-01-11 RX ORDER — FAMOTIDINE 40 MG/1
40 TABLET, FILM COATED ORAL 2 TIMES DAILY
Qty: 120 TABLET | Refills: 5 | Status: SHIPPED | OUTPATIENT
Start: 2022-01-11 | End: 2022-02-04

## 2022-01-11 NOTE — PROGRESS NOTES
Chief Complaint   Patient presents with   • Diabetes     HPI  Mc Selby is a 51 y.o. male that presents for   Chief Complaint   Patient presents with   • Diabetes     DM: last a1c 6.1. Not maintained on any medication at this time, previously on insulin. BS generally runs 100-140 at home. No numbness in feet. 5/2021 microalb/Cr 7. Follows w/ ophtho every 6 months- last eye exam 1/11/2022.     GERD: prescribed pantoprazole 40 BID but unable to tolerate due to nausea. Using TUMS for nausea w/ good relief. Having heartburn at night when lying down. No dysphagia. Last EGD 7/2021 w/ esophageal stricture and subsequent dilation    Knee pain: patient reports bilateral dull, aching pain and numbness. This has been worse over the last few weeks. He does have hx of RA and is maintained on Enbrel by rheumatology. Not taking any other medications. Never had XR of knees or injection. Numbness starts in at head of fibula and radiates down over tibia and lateral LE bilaterally. Worse at night. Sleeps on back w/ legs externally rotated    Insomnia: patient reports difficulty falling asleep. May not get to sleep until 6-7AM. Lies in bed and frets over what other people are thinking. Not drinking     Review of Systems   HENT: Negative for trouble swallowing.    Gastrointestinal: Positive for nausea and GERD.   Musculoskeletal: Positive for arthralgias.   Neurological: Positive for numbness.   Psychiatric/Behavioral: Positive for sleep disturbance.     The following portions of the patient's history were reviewed and updated as appropriate: problem list, past medical history, past surgical history, allergies, current medication    Problem List Tab  Patient History Tab  Immunizations Tab  Medications Tab  Chart Review Tab  Care Everywhere Tab  Synopsis Tab    PE  Vitals:    01/11/22 1624   BP: 118/77   Pulse: 81   Resp: 12   SpO2: 96%     Body mass index is 39.31 kg/m².  General: Obese, NAD  Head: AT/NC  Eyes: EOMI, anicteric  sclera  Resp: CTAB, SCR, BS equal  CV: RRR w/o m/r/g; 2+ pulses  GI: Soft, ND, +BS. Epigastric TTP  MSK: FROM, no deformity, no edema. Minimal crepitus w/ ROM of knee bilaterally  Skin: Warm, dry, intact  Neuro: Alert and oriented. No focal deficits  Psych: Appropriate mood and affect    Imaging  No Images in the past 120 days found..    Assessment/Plan   Mc Selby is a 51 y.o. male that presents for   Chief Complaint   Patient presents with   • Diabetes     Diagnoses and all orders for this visit:    1. Type 2 diabetes mellitus with hyperglycemia, with long-term current use of insulin (HCC) (Primary): Last A1c 6.1. 5/2021 microalb/Cr 7. Follows w/ ophtho every 6 months- last eye exam 1/11/2022.  -     Comprehensive Metabolic Panel  -     Hemoglobin A1c  - Monitor off medication for now  - Recommend regular exercise and annual eye exam    2. GERD without esophagitis: Last EGD 7/2021 w/ esophageal stricture and subsequent dilation  -     Start famotidine (Pepcid) 40 MG tablet; Take 1 tablet by mouth 2 (Two) Times a Day.  Dispense: 120 tablet; Refill: 5  - Discontinue pantoprazole 40 given complaints of nausea    3. Acute pain of both knees: Patient reports aching pain in bilateral knees as well as numbness that begins at the lateral epicondyle/head of the fibula and descends anteriorly, concerning for common peroneal nerve inflammation/neuropathy  -     C-reactive Protein  -     Sedimentation Rate  -     XR Knee 3 View Bilateral; Future  - Recommend placing pillows under his knees when he sleeps on his back with his hips externally rotated  - Consider diclofenac gel, PT, EMG, knee injection    4. Numbness: See above  -     C-reactive Protein  -     Sedimentation Rate  -     Vitamin B12    5. Insomnia, unspecified type  -     Start traZODone (DESYREL) 50 MG tablet; Take 1-2 tablets nightly as needed for sleep  Dispense: 60 tablet; Refill: 5     Return in about 7 months (around 8/18/2022) for Medicare Wellness.

## 2022-01-13 ENCOUNTER — HOSPITAL ENCOUNTER (OUTPATIENT)
Dept: GENERAL RADIOLOGY | Facility: HOSPITAL | Age: 52
Discharge: HOME | End: 2022-01-13
Admitting: FAMILY MEDICINE

## 2022-01-13 ENCOUNTER — LAB (OUTPATIENT)
Dept: FAMILY MEDICINE CLINIC | Facility: CLINIC | Age: 52
End: 2022-01-13

## 2022-01-13 DIAGNOSIS — M25.561 ACUTE PAIN OF BOTH KNEES: ICD-10-CM

## 2022-01-13 DIAGNOSIS — M25.562 ACUTE PAIN OF BOTH KNEES: ICD-10-CM

## 2022-01-13 LAB
ALBUMIN SERPL-MCNC: 4.1 G/DL (ref 3.5–5.2)
ALBUMIN/GLOB SERPL: 1.1 G/DL
ALP SERPL-CCNC: 84 U/L (ref 39–117)
ALT SERPL W P-5'-P-CCNC: 19 U/L (ref 1–41)
ANION GAP SERPL CALCULATED.3IONS-SCNC: 7.5 MMOL/L (ref 5–15)
AST SERPL-CCNC: 21 U/L (ref 1–40)
BILIRUB SERPL-MCNC: 0.7 MG/DL (ref 0–1.2)
BUN SERPL-MCNC: 11 MG/DL (ref 6–20)
BUN/CREAT SERPL: 10.8 (ref 7–25)
CALCIUM SPEC-SCNC: 9.3 MG/DL (ref 8.6–10.5)
CHLORIDE SERPL-SCNC: 98 MMOL/L (ref 98–107)
CO2 SERPL-SCNC: 28.5 MMOL/L (ref 22–29)
CREAT SERPL-MCNC: 1.02 MG/DL (ref 0.76–1.27)
CRP SERPL-MCNC: <0.3 MG/DL (ref 0–0.5)
ERYTHROCYTE [SEDIMENTATION RATE] IN BLOOD: 3 MM/HR (ref 0–20)
GFR SERPL CREATININE-BSD FRML MDRD: 76 ML/MIN/1.73
GLOBULIN UR ELPH-MCNC: 3.7 GM/DL
GLUCOSE SERPL-MCNC: 174 MG/DL (ref 65–99)
HBA1C MFR BLD: 6.8 % (ref 3.5–5.6)
POTASSIUM SERPL-SCNC: 4 MMOL/L (ref 3.5–5.2)
PROT SERPL-MCNC: 7.8 G/DL (ref 6–8.5)
SODIUM SERPL-SCNC: 134 MMOL/L (ref 136–145)
VIT B12 BLD-MCNC: 344 PG/ML (ref 211–946)

## 2022-01-13 PROCEDURE — 86140 C-REACTIVE PROTEIN: CPT | Performed by: FAMILY MEDICINE

## 2022-01-13 PROCEDURE — 85652 RBC SED RATE AUTOMATED: CPT | Performed by: FAMILY MEDICINE

## 2022-01-13 PROCEDURE — 36415 COLL VENOUS BLD VENIPUNCTURE: CPT | Performed by: FAMILY MEDICINE

## 2022-01-13 PROCEDURE — 80053 COMPREHEN METABOLIC PANEL: CPT | Performed by: FAMILY MEDICINE

## 2022-01-13 PROCEDURE — 83036 HEMOGLOBIN GLYCOSYLATED A1C: CPT | Performed by: FAMILY MEDICINE

## 2022-01-13 PROCEDURE — 82607 VITAMIN B-12: CPT | Performed by: FAMILY MEDICINE

## 2022-01-13 PROCEDURE — 73562 X-RAY EXAM OF KNEE 3: CPT

## 2022-02-04 DIAGNOSIS — K21.9 GERD WITHOUT ESOPHAGITIS: Chronic | ICD-10-CM

## 2022-02-04 DIAGNOSIS — G47.00 INSOMNIA, UNSPECIFIED TYPE: ICD-10-CM

## 2022-02-04 RX ORDER — FAMOTIDINE 40 MG/1
TABLET, FILM COATED ORAL
Qty: 60 TABLET | Refills: 11 | Status: SHIPPED | OUTPATIENT
Start: 2022-02-04 | End: 2022-12-01

## 2022-02-04 RX ORDER — TRAZODONE HYDROCHLORIDE 50 MG/1
TABLET ORAL
Qty: 60 TABLET | Refills: 5 | Status: SHIPPED | OUTPATIENT
Start: 2022-02-04 | End: 2022-08-04

## 2022-03-10 RX ORDER — MONTELUKAST SODIUM 10 MG/1
TABLET ORAL
Qty: 90 TABLET | Refills: 1 | Status: SHIPPED | OUTPATIENT
Start: 2022-03-10 | End: 2022-09-06

## 2022-03-30 ENCOUNTER — HOSPITAL ENCOUNTER (OUTPATIENT)
Dept: BONE DENSITY | Facility: HOSPITAL | Age: 52
Discharge: HOME | End: 2022-03-30
Admitting: INTERNAL MEDICINE

## 2022-03-30 ENCOUNTER — TRANSCRIBE ORDERS (OUTPATIENT)
Dept: ADMINISTRATIVE | Facility: HOSPITAL | Age: 52
End: 2022-03-30

## 2022-03-30 DIAGNOSIS — M85.89 OSTEOPENIA OF MULTIPLE SITES: ICD-10-CM

## 2022-03-30 DIAGNOSIS — M85.89 OSTEOPENIA OF MULTIPLE SITES: Primary | ICD-10-CM

## 2022-03-30 PROCEDURE — 77080 DXA BONE DENSITY AXIAL: CPT

## 2022-04-12 ENCOUNTER — TELEPHONE (OUTPATIENT)
Dept: ORTHOPEDIC SURGERY | Facility: CLINIC | Age: 52
End: 2022-04-12

## 2022-04-12 NOTE — TELEPHONE ENCOUNTER
PATIENT HAD AN APPT AT 1:15PM TODAY, 4/12/2022. HE LEFT AT 2:02PM DUE TO NOT BEING SEEN BY DR. CHUNG. I NOTIFIED THE BACK THAT HE LEFT WITHOUT BEING SEEN AND CANCELED HIS APPT.

## 2022-05-04 ENCOUNTER — OFFICE (AMBULATORY)
Dept: URBAN - METROPOLITAN AREA CLINIC 64 | Facility: CLINIC | Age: 52
End: 2022-05-04

## 2022-05-04 DIAGNOSIS — K22.2 ESOPHAGEAL OBSTRUCTION: ICD-10-CM

## 2022-05-09 RX ORDER — AZELASTINE 1 MG/ML
SPRAY, METERED NASAL
Qty: 90 EACH | Refills: 1 | Status: SHIPPED | OUTPATIENT
Start: 2022-05-09 | End: 2022-10-31

## 2022-06-07 ENCOUNTER — LAB (OUTPATIENT)
Dept: LAB | Facility: HOSPITAL | Age: 52
End: 2022-06-07

## 2022-06-07 DIAGNOSIS — D47.2 MGUS (MONOCLONAL GAMMOPATHY OF UNKNOWN SIGNIFICANCE): ICD-10-CM

## 2022-06-07 DIAGNOSIS — C90.00 MULTIPLE MYELOMA, REMISSION STATUS UNSPECIFIED: ICD-10-CM

## 2022-06-07 LAB
ALBUMIN SERPL-MCNC: 3.5 G/DL (ref 3.5–5.2)
ALBUMIN/GLOB SERPL: 0.9 G/DL
ALP SERPL-CCNC: 82 U/L (ref 39–117)
ALT SERPL W P-5'-P-CCNC: 11 U/L (ref 1–41)
ANION GAP SERPL CALCULATED.3IONS-SCNC: 12 MMOL/L (ref 5–15)
AST SERPL-CCNC: 19 U/L (ref 1–40)
B2 MICROGLOB SERPL-MCNC: 2.3 MG/L (ref 0.8–2.2)
BASOPHILS # BLD AUTO: 0.04 10*3/MM3 (ref 0–0.2)
BASOPHILS NFR BLD AUTO: 0.8 % (ref 0–1.5)
BILIRUB SERPL-MCNC: 0.9 MG/DL (ref 0–1.2)
BUN SERPL-MCNC: 7 MG/DL (ref 6–20)
BUN/CREAT SERPL: 8.1 (ref 7–25)
CALCIUM SPEC-SCNC: 8.6 MG/DL (ref 8.6–10.5)
CHLORIDE SERPL-SCNC: 100 MMOL/L (ref 98–107)
CO2 SERPL-SCNC: 20 MMOL/L (ref 22–29)
CREAT SERPL-MCNC: 0.86 MG/DL (ref 0.76–1.27)
DEPRECATED RDW RBC AUTO: 48.8 FL (ref 37–54)
EGFRCR SERPLBLD CKD-EPI 2021: 102.9 ML/MIN/1.73
EOSINOPHIL # BLD AUTO: 0.25 10*3/MM3 (ref 0–0.4)
EOSINOPHIL NFR BLD AUTO: 4.9 % (ref 0.3–6.2)
ERYTHROCYTE [DISTWIDTH] IN BLOOD BY AUTOMATED COUNT: 14 % (ref 12.3–15.4)
GLOBULIN UR ELPH-MCNC: 4 GM/DL
GLUCOSE SERPL-MCNC: 175 MG/DL (ref 65–99)
HCT VFR BLD AUTO: 46.8 % (ref 37.5–51)
HGB BLD-MCNC: 16.3 G/DL (ref 13–17.7)
LYMPHOCYTES # BLD AUTO: 0.98 10*3/MM3 (ref 0.7–3.1)
LYMPHOCYTES NFR BLD AUTO: 19.2 % (ref 19.6–45.3)
MCH RBC QN AUTO: 33.7 PG (ref 26.6–33)
MCHC RBC AUTO-ENTMCNC: 34.8 G/DL (ref 31.5–35.7)
MCV RBC AUTO: 96.9 FL (ref 79–97)
MONOCYTES # BLD AUTO: 0.76 10*3/MM3 (ref 0.1–0.9)
MONOCYTES NFR BLD AUTO: 14.9 % (ref 5–12)
NEUTROPHILS NFR BLD AUTO: 3.08 10*3/MM3 (ref 1.7–7)
NEUTROPHILS NFR BLD AUTO: 60.2 % (ref 42.7–76)
PLATELET # BLD AUTO: 165 10*3/MM3 (ref 140–450)
PMV BLD AUTO: 10.3 FL (ref 6–12)
POTASSIUM SERPL-SCNC: 4.4 MMOL/L (ref 3.5–5.2)
PROT SERPL-MCNC: 7.5 G/DL (ref 6–8.5)
RBC # BLD AUTO: 4.83 10*6/MM3 (ref 4.14–5.8)
SODIUM SERPL-SCNC: 132 MMOL/L (ref 136–145)
WBC NRBC COR # BLD: 5.11 10*3/MM3 (ref 3.4–10.8)

## 2022-06-07 PROCEDURE — 82232 ASSAY OF BETA-2 PROTEIN: CPT

## 2022-06-07 PROCEDURE — 80053 COMPREHEN METABOLIC PANEL: CPT

## 2022-06-07 PROCEDURE — 85025 COMPLETE CBC W/AUTO DIFF WBC: CPT

## 2022-06-07 PROCEDURE — 36415 COLL VENOUS BLD VENIPUNCTURE: CPT

## 2022-06-08 LAB
ALBUMIN SERPL ELPH-MCNC: 3.5 G/DL (ref 2.9–4.4)
ALBUMIN/GLOB SERPL: 0.8 {RATIO} (ref 0.7–1.7)
ALPHA1 GLOB SERPL ELPH-MCNC: 0.3 G/DL (ref 0–0.4)
ALPHA2 GLOB SERPL ELPH-MCNC: 0.8 G/DL (ref 0.4–1)
B-GLOBULIN SERPL ELPH-MCNC: 1.2 G/DL (ref 0.7–1.3)
GAMMA GLOB SERPL ELPH-MCNC: 2.3 G/DL (ref 0.4–1.8)
GLOBULIN SER-MCNC: 4.6 G/DL (ref 2.2–3.9)
IGA SERPL-MCNC: 485 MG/DL (ref 90–386)
IGG SERPL-MCNC: 1997 MG/DL (ref 603–1613)
IGM SERPL-MCNC: 397 MG/DL (ref 20–172)
INTERPRETATION SERPL IEP-IMP: ABNORMAL
KAPPA LC FREE SER-MCNC: 96.4 MG/L (ref 3.3–19.4)
KAPPA LC FREE/LAMBDA FREE SER: 2.79 {RATIO} (ref 0.26–1.65)
LABORATORY COMMENT REPORT: ABNORMAL
LAMBDA LC FREE SERPL-MCNC: 34.5 MG/L (ref 5.7–26.3)
M PROTEIN SERPL ELPH-MCNC: 0.8 G/DL
PROT SERPL-MCNC: 8.1 G/DL (ref 6–8.5)

## 2022-06-15 NOTE — PROGRESS NOTES
Hematology/Oncology Outpatient Follow Up    Mc Selby  1970    Primary Care Physician: Berny Godwin MD  Referring Physician: Berny Godwin, *  Chief Complaint:  Monoclonal gammopathy/IgG kappa monoclonal gammopathy of unknown significance  History of Present Illness:   · Mr. Selby has a long history of rheumatoid arthritis on and off disease modifying agents.  Patient sees Dr. Rosas and laboratory work-up was initiated secondary to renal failure which revealed monoclonal gammopathy patient was sent to the cancer [facility] center and seen initially on 1/7/2020.  · Patient has cirrhosis related to alcohol abuse had episodes of hepatic encephalopathy.    · Patient has renal failure stage II at one point he was on hemodialysis briefly.  · Had a perforated gastric ulcer with hemorrhage  · 12/12/2019 serum immunofixation shows IgG monoclonal protein with kappa light chain specificity.  IgG 2111, IgA 703, IgM 754  · 1/21/2020 bone marrow biopsy and aspirate-right iliac crest bone marrow biopsy with aspirate smears and cell block preparation shows normocellular bone marrow with 50% cellularity.  Absent iron stores.  Negative for involvement by malignant lymphoma and metastatic malignancy.  Cytogenetics normal 46 XY flow cytometry normal  · 1/23/2020 whole-body skeletal survey geographic lucency within the right distal tibial diaphysis could relate to underlying lytic lesion, consider additional imaging or bone scan. 2. Small area of lucency with endosteal scalloping involving the distal left fibular diaphysis could relate to lytic lesion, dedicated AP and lateral views of the left fibula may be helpful. 2. Incidental findings above. 3. Carotid atherosclerotic disease. Consider follow-up carotid ultrasound if not previously performed.  · 1/30/2020 urine electrophoresis shows IgG monoclonal protein with kappa light chain specificity.  Small quantity of monoclonal protein unable to quantitate M spike.   Beta-2 microglobulin 4.0.  Serum kappa lambda free light chain ratio 1.83 creatinine 1.05 alk phos 242 total bilirubin 1.9  · 3/10/2020- PET- 1. No convincing evidence of osseous metastatic disease in this patient with history of multiple myeloma. Uptake surrounding each hip and around the elbow, wrist and finger joints, is favored to represent benign inflammatory/osteoarthritic change. 2. Few mildly prominent bilateral axillary lymph nodes demonstrate intermediate FDG accumulation. Both benign and malignant etiologies are in the differential. Consider short-term CT chest follow-up. 3. Hypermetabolic activity in a symmetric fashion the bilateral tonsillar pillars without discrete mass on CT. Correlate for tonsillar inflammation. 4. Additional CT findings as described above, including: Cirrhosis, hepatosplenomegaly, ascites, cholelithiasis, right renal cyst, dense coronary artery calcification, gynecomastia, maxillary sinusitis.  · 6/8/2020- CT chest W - 1. No evidence of mediastinal, hilar or axillary lymphadenopathy 2. Bilateral subcentimeter nodules in the lower lobes which appear increased in number from CT of 08/08/2018 and were also poorly seen on PET/CT of 03/10/2020, these are probably benign however should be followed in one year due to their very small size. 3. upper abdomen demonstrates changes of hepatosplenomegaly, cholelithiasis and upper pole right renal cyst  · 3/30/2022 - osteopenia  On DEXA scan.   · 6/7/2022 - IgG 1997, M spike 0.8. free kappa light chain 96.4, k/l 2.79 cr 0.86      SUBJECTIVE:      Patient denies any new symtpoms, no weightloss, no increasing fatigue, night sweats.      Past Medical History:   Diagnosis Date   • Acute perforated gastric ulcer with hemorrhage (HCC) 07/16/2018   • Alcoholism (HCC)    • Allergic rhinitis    • Arthritis     rheumatoid (diagnosed at age 16)   • Cirrhosis (HCC)     etoh cirrhosis-liver failure. Follows w/ GI- [facility]   • CKD (chronic kidney disease)   "   dr. nelson.  Stage 2   • COPD (chronic obstructive pulmonary disease) (HCC)    • Depression    • Duodenal ulcer      perforated duodenal s/p patch   • Fatty liver    • GERD without esophagitis 9/2/2020   • History of transfusion    • Infectious viral hepatitis    • MRSA (methicillin resistant Staphylococcus aureus) carrier 06/12/2019    nasal swab   • Multiple myeloma (HCC)     Dr. Hernandez at [facility]   • Nausea     states \"gallbladder issues\"- had attempted cholecystectomy, but unable to complete d/t adhered to liver   • Sleep apnea     unable to tolerate CPAP   • Tumors      benign essential   • Type 2 diabetes mellitus with hyperglycemia (HCC) 1/8/2020    off meds now   • Umbilical hernia u   • Wound, open     healed at this time       Past Surgical History:   Procedure Laterality Date   • CYST REMOVAL Left     forarm   • ENDOSCOPY      ascities/paracentesis   • ENDOSCOPY N/A 7/22/2021    Procedure: ESOPHAGOGASTRODUODENOSCOPY WITH DILATION (#48 BOUGIE);  Surgeon: Glenn Loo MD;  Location: [facility] ENDOSCOPY;  Service: Gastroenterology;  Laterality: N/A;  ESOPHAGEAL STRICTURE   • EXPLORATORY LAPAROTOMY  07/16/2018    Over-sew Gastric Ulcer With Gastrostomy and Jejunostomy Tube   • FOOT SURGERY Right     right bunion removal   • FRACTURE SURGERY Right 2005    bunionectomy/ broke toe and put in rods   • MASS EXCISION Right 6/18/2019    Procedure: EXCISION OF SEBACEOUS CYST OF THE RIGHT BACK;  Surgeon: Sapna Elizalde MD;  Location: [facility] MAIN OR;  Service: General   • MASS EXCISION Right 8/27/2019    Procedure: EXCISION LESION of right back;  Surgeon: Sapna Elizalde MD;  Location: [facility] MAIN OR;  Service: General   • UMBILICAL HERNIA REPAIR N/A 6/18/2019    Procedure: UMBILICAL HERNIA REPAIR LAPAROSCOPIC WITH MESH WITH EXTENSIVE LYSIS OF ADHESIONS;  Surgeon: Sapna Elizalde MD;  Location: [facility] MAIN OR;  Service: General         Current Outpatient Medications:   •  albuterol sulfate  (90 Base) MCG/ACT " inhaler, INHALE 2 PUFFS EVERY 6 (SIX) HOURS AS NEEDED FOR WHEEZING OR SHORTNESS OF AIR., Disp: 18 g, Rfl: 3  •  azelastine (ASTELIN) 0.1 % nasal spray, INHALE 2 PUFFS INTO EACH NOSTRIL TWICE A DAY, Disp: 90 each, Rfl: 1  •  bumetanide (BUMEX) 2 MG tablet, Take 2 mg by mouth 2 (Two) Times a Day., Disp: , Rfl:   •  cetirizine (zyrTEC) 10 MG tablet, Take 1 tablet by mouth Daily., Disp: 90 tablet, Rfl: 3  •  Etanercept (Enbrel Mini) 50 MG/ML solution cartridge, Inject 50 mg under the skin into the appropriate area as directed 1 (One) Time Per Week. (Patient taking differently: Inject 50 mg under the skin into the appropriate area as directed 1 (One) Time Per Week. Monday), Disp: 12 Cartridge, Rfl: 0  •  famotidine (PEPCID) 40 MG tablet, TAKE 1 TABLET BY MOUTH TWICE A DAY, Disp: 60 tablet, Rfl: 11  •  guaiFENesin (MUCINEX) 600 MG 12 hr tablet, Take 1,200 mg by mouth 2 (Two) Times a Day., Disp: , Rfl:   •  magnesium oxide (MAG-OX) 400 MG tablet, Take 2 tablets by mouth 2 (Two) Times a Day., Disp: , Rfl: 6  •  montelukast (SINGULAIR) 10 MG tablet, TAKE 1 TABLET BY MOUTH EVERY DAY AT NIGHT, Disp: 90 tablet, Rfl: 1  •  potassium chloride (K-DUR,KLOR-CON) 20 MEQ CR tablet, Take 20 mEq by mouth 2 (Two) Times a Day., Disp: , Rfl:   •  senna (SENOKOT) 8.6 MG tablet, Take 1 tablet by mouth 2 (Two) Times a Day As Needed for Constipation., Disp: 30 tablet, Rfl: 0  •  spironolactone (ALDACTONE) 25 MG tablet, Take 25 mg by mouth Daily., Disp: , Rfl:   •  tamsulosin (FLOMAX) 0.4 MG capsule 24 hr capsule, Take 1 capsule by mouth 2 (Two) Times a Day., Disp: , Rfl:   •  traZODone (DESYREL) 50 MG tablet, TAKE 1-2 TABLETS NIGHTLY AS NEEDED FOR SLEEP, Disp: 60 tablet, Rfl: 5  •  Triamcinolone Acetonide (NASACORT) 55 MCG/ACT nasal inhaler, 1 spray into the nostril(s) as directed by provider Daily., Disp: , Rfl:   •  vitamin D (ERGOCALCIFEROL) 1.25 MG (09095 UT) capsule capsule, Take 1 capsule by mouth Every 14 (Fourteen) Days. Every other  ", Disp: 5 capsule, Rfl: 2  •  Xifaxan 550 MG tablet, TAKE 1 TABLET BY MOUTH EVERY 12 HOURS, Disp: 60 tablet, Rfl: 5  •  zinc sulfate (ZINCATE) 220 (50 Zn) MG capsule, Take 220 mg by mouth Daily., Disp: , Rfl: 3  •  Accu-Chek Sol Plus test strip, Test 4 times daily DX: E11.9, Disp: 600 each, Rfl: 3  •  meclizine 25 MG chewable tablet chewable tablet, Chew 25 mg 3 (Three) Times a Day As Needed., Disp: , Rfl:     Allergies   Allergen Reactions   • Duloxetine Rash       Family History   Problem Relation Age of Onset   • Cancer Mother    • Lung disease Father    • Other Father         prostate problems   • Mental illness Brother        Cancer-related family history includes Cancer in his mother.    Social History     Tobacco Use   • Smoking status: Former Smoker     Packs/day: 2.00     Years: 35.00     Pack years: 70.00     Types: Cigarettes     Quit date: 2017     Years since quittin.8   • Smokeless tobacco: Former User   Vaping Use   • Vaping Use: Never used   • Passive vaping exposure: Passive vaping exposure comment (previously worked in a bar)   Substance Use Topics   • Alcohol use: Yes     Alcohol/week: 20.0 standard drinks     Types: 20 Cans of beer per week   • Drug use: No       I have reviewed the history of present illness, past medical history, family history, social history, lab results, all notes and other records since the patient was last seen at the cancer care center.        MD performed ROS and are negative except as mentioned in Subjective.    Objective:       Vitals:    22 1247   BP: 130/85   Pulse: 70   Resp: 18   Temp: 96.9 °F (36.1 °C)   SpO2: 96%   Weight: 127 kg (280 lb)  Comment: verbal   Height: 177.8 cm (70\")   PainSc: 0-No pain       /85   Pulse 70   Temp 96.9 °F (36.1 °C)   Resp 18   Ht 177.8 cm (70\")   Wt 127 kg (280 lb) Comment: verbal  SpO2 96%   BMI 40.18 kg/m²     PHYSICAL EXAM:      Physical Exam   Constitutional: He is oriented to person, place, " and time. No distress.   HENT:   Head: Normocephalic and atraumatic.   Eyes: Conjunctivae are normal. Right eye exhibits no discharge. Left eye exhibits no discharge. No scleral icterus.   Neck: No thyromegaly present.   Cardiovascular: Normal rate, regular rhythm, normal heart sounds and normal pulses. Exam reveals no gallop and no friction rub.   Pulmonary/Chest: Effort normal. No stridor. No respiratory distress. He has no wheezes.   Abdominal: Soft. Bowel sounds are normal. He exhibits no mass. There is no abdominal tenderness. There is no rebound and no guarding.   Musculoskeletal: Normal range of motion. No tenderness.   Lymphadenopathy:     He has no cervical adenopathy.   Neurological: He is alert and oriented to person, place, and time. He exhibits normal muscle tone.   Skin: Skin is warm. No rash noted. He is not diaphoretic. No erythema.   Psychiatric: His behavior is normal.        RECENT LABS:     Lab Results   Component Value Date    WBC 5.11 06/07/2022    WBC 5.30 09/27/2021    HGB 16.3 06/07/2022    HGB 16.4 09/27/2021    HCT 46.8 06/07/2022    HCT 47.6 09/27/2021    NEUTROABS 3.08 06/07/2022         Lab Results   Component Value Date    GLUCOSE 175 (H) 06/07/2022    BUN 7 06/07/2022    CREATININE 0.86 06/07/2022    EGFRIFNONA 76 01/13/2022    EGFRIFAFRI 114 03/15/2017    BCR 8.1 06/07/2022    K 4.4 06/07/2022    CO2 20.0 (L) 06/07/2022    CALCIUM 8.6 06/07/2022    PROTENTOTREF 8.1 06/07/2022    ALBUMIN 3.50 06/07/2022    ALBUMIN 3.5 06/07/2022    LABIL2 0.8 06/07/2022    AST 19 06/07/2022    ALT 11 06/07/2022       Assessment & Plan      Patient is a 52-year-old male with history of compensated liver cirrhosis, chronic renal failure who is seen for IgG kappa monoclonal gammopathy of unknown significance.    Monoclonal gammopathy of undetermined significance  M protein at 0.8 is stable, KL ratio is stable, IgG stable, no evidence of progression  Will repeat in a year.    Lung nodules  Patient has had  multiple small lung nodules.  Repeat CT in 6/2021 with stable 5 mm nodules.      I have reviewed labs results, imaging, vitals, and medications with the patient today.  Patient verbalized understanding and is in agreement of the above plan.    This report was compiled using Dragon voice recognition software. I have made every effort to proof read this document; however, typographical errors may persist.

## 2022-06-20 ENCOUNTER — APPOINTMENT (OUTPATIENT)
Dept: LAB | Facility: HOSPITAL | Age: 52
End: 2022-06-20

## 2022-06-20 ENCOUNTER — OFFICE VISIT (OUTPATIENT)
Dept: ONCOLOGY | Facility: CLINIC | Age: 52
End: 2022-06-20

## 2022-06-20 VITALS
TEMPERATURE: 96.9 F | HEART RATE: 70 BPM | WEIGHT: 280 LBS | DIASTOLIC BLOOD PRESSURE: 85 MMHG | SYSTOLIC BLOOD PRESSURE: 130 MMHG | OXYGEN SATURATION: 96 % | HEIGHT: 70 IN | RESPIRATION RATE: 18 BRPM | BODY MASS INDEX: 40.09 KG/M2

## 2022-06-20 DIAGNOSIS — D47.2 MGUS (MONOCLONAL GAMMOPATHY OF UNKNOWN SIGNIFICANCE): Primary | ICD-10-CM

## 2022-06-20 PROCEDURE — 99213 OFFICE O/P EST LOW 20 MIN: CPT | Performed by: INTERNAL MEDICINE

## 2022-07-06 ENCOUNTER — LAB (OUTPATIENT)
Dept: FAMILY MEDICINE CLINIC | Facility: CLINIC | Age: 52
End: 2022-07-06

## 2022-07-06 DIAGNOSIS — E11.8 DIABETIC COMPLICATION: ICD-10-CM

## 2022-07-06 DIAGNOSIS — E55.9 AVITAMINOSIS D: ICD-10-CM

## 2022-07-06 DIAGNOSIS — R80.9 PROTEINURIA, UNSPECIFIED TYPE: ICD-10-CM

## 2022-07-06 DIAGNOSIS — N18.2 CHRONIC KIDNEY DISEASE, STAGE II (MILD): Primary | ICD-10-CM

## 2022-07-06 LAB
BILIRUB UR QL STRIP: NEGATIVE
CLARITY UR: CLEAR
COLOR UR: YELLOW
GLUCOSE UR STRIP-MCNC: NEGATIVE MG/DL
HGB UR QL STRIP.AUTO: NEGATIVE
KETONES UR QL STRIP: NEGATIVE
LEUKOCYTE ESTERASE UR QL STRIP.AUTO: NEGATIVE
NITRITE UR QL STRIP: NEGATIVE
PH UR STRIP.AUTO: 7 [PH] (ref 5–8)
PROT UR QL STRIP: NEGATIVE
SP GR UR STRIP: 1.01 (ref 1–1.03)
UROBILINOGEN UR QL STRIP: NORMAL

## 2022-07-06 PROCEDURE — 84156 ASSAY OF PROTEIN URINE: CPT | Performed by: INTERNAL MEDICINE

## 2022-07-06 PROCEDURE — 83036 HEMOGLOBIN GLYCOSYLATED A1C: CPT | Performed by: INTERNAL MEDICINE

## 2022-07-06 PROCEDURE — 85025 COMPLETE CBC W/AUTO DIFF WBC: CPT | Performed by: INTERNAL MEDICINE

## 2022-07-06 PROCEDURE — 82306 VITAMIN D 25 HYDROXY: CPT | Performed by: INTERNAL MEDICINE

## 2022-07-06 PROCEDURE — 36415 COLL VENOUS BLD VENIPUNCTURE: CPT

## 2022-07-06 PROCEDURE — 82570 ASSAY OF URINE CREATININE: CPT | Performed by: INTERNAL MEDICINE

## 2022-07-06 PROCEDURE — 81003 URINALYSIS AUTO W/O SCOPE: CPT | Performed by: INTERNAL MEDICINE

## 2022-07-06 PROCEDURE — 80048 BASIC METABOLIC PNL TOTAL CA: CPT | Performed by: INTERNAL MEDICINE

## 2022-07-07 LAB
25(OH)D3 SERPL-MCNC: 51.7 NG/ML (ref 30–100)
ANION GAP SERPL CALCULATED.3IONS-SCNC: 14 MMOL/L (ref 5–15)
BASOPHILS # BLD AUTO: 0.05 10*3/MM3 (ref 0–0.2)
BASOPHILS NFR BLD AUTO: 1 % (ref 0–1.5)
BUN SERPL-MCNC: 8 MG/DL (ref 6–20)
BUN/CREAT SERPL: 7.8 (ref 7–25)
CALCIUM SPEC-SCNC: 9.2 MG/DL (ref 8.6–10.5)
CHLORIDE SERPL-SCNC: 95 MMOL/L (ref 98–107)
CO2 SERPL-SCNC: 25 MMOL/L (ref 22–29)
CREAT SERPL-MCNC: 1.03 MG/DL (ref 0.76–1.27)
CREAT UR-MCNC: 27.4 MG/DL
DEPRECATED RDW RBC AUTO: 45.5 FL (ref 37–54)
EGFRCR SERPLBLD CKD-EPI 2021: 86.3 ML/MIN/1.73
EOSINOPHIL # BLD AUTO: 0.31 10*3/MM3 (ref 0–0.4)
EOSINOPHIL NFR BLD AUTO: 6 % (ref 0.3–6.2)
ERYTHROCYTE [DISTWIDTH] IN BLOOD BY AUTOMATED COUNT: 12.7 % (ref 12.3–15.4)
GLUCOSE SERPL-MCNC: 193 MG/DL (ref 65–99)
HBA1C MFR BLD: 6.2 % (ref 3.5–5.6)
HCT VFR BLD AUTO: 53.4 % (ref 37.5–51)
HGB BLD-MCNC: 18 G/DL (ref 13–17.7)
IMM GRANULOCYTES # BLD AUTO: 0.01 10*3/MM3 (ref 0–0.05)
IMM GRANULOCYTES NFR BLD AUTO: 0.2 % (ref 0–0.5)
LYMPHOCYTES # BLD AUTO: 1.11 10*3/MM3 (ref 0.7–3.1)
LYMPHOCYTES NFR BLD AUTO: 21.4 % (ref 19.6–45.3)
MCH RBC QN AUTO: 32.7 PG (ref 26.6–33)
MCHC RBC AUTO-ENTMCNC: 33.7 G/DL (ref 31.5–35.7)
MCV RBC AUTO: 96.9 FL (ref 79–97)
MONOCYTES # BLD AUTO: 0.55 10*3/MM3 (ref 0.1–0.9)
MONOCYTES NFR BLD AUTO: 10.6 % (ref 5–12)
NEUTROPHILS NFR BLD AUTO: 3.15 10*3/MM3 (ref 1.7–7)
NEUTROPHILS NFR BLD AUTO: 60.8 % (ref 42.7–76)
NRBC BLD AUTO-RTO: 0 /100 WBC (ref 0–0.2)
PLATELET # BLD AUTO: 177 10*3/MM3 (ref 140–450)
PMV BLD AUTO: 11.2 FL (ref 6–12)
POTASSIUM SERPL-SCNC: 4.2 MMOL/L (ref 3.5–5.2)
PROT ?TM UR-MCNC: 5.1 MG/DL
PROT/CREAT UR: 186.1 MG/G CREA (ref 0–200)
RBC # BLD AUTO: 5.51 10*6/MM3 (ref 4.14–5.8)
SODIUM SERPL-SCNC: 134 MMOL/L (ref 136–145)
WBC NRBC COR # BLD: 5.18 10*3/MM3 (ref 3.4–10.8)

## 2022-07-12 ENCOUNTER — OFFICE (AMBULATORY)
Dept: URBAN - METROPOLITAN AREA CLINIC 64 | Facility: CLINIC | Age: 52
End: 2022-07-12

## 2022-07-12 VITALS
HEART RATE: 70 BPM | DIASTOLIC BLOOD PRESSURE: 90 MMHG | SYSTOLIC BLOOD PRESSURE: 114 MMHG | HEIGHT: 70 IN | WEIGHT: 290 LBS | OXYGEN SATURATION: 95 %

## 2022-07-12 DIAGNOSIS — K21.9 GASTRO-ESOPHAGEAL REFLUX DISEASE WITHOUT ESOPHAGITIS: ICD-10-CM

## 2022-07-12 DIAGNOSIS — R13.10 DYSPHAGIA, UNSPECIFIED: ICD-10-CM

## 2022-07-12 PROCEDURE — 99214 OFFICE O/P EST MOD 30 MIN: CPT | Performed by: INTERNAL MEDICINE

## 2022-07-14 ENCOUNTER — TRANSCRIBE ORDERS (OUTPATIENT)
Dept: ADMINISTRATIVE | Facility: HOSPITAL | Age: 52
End: 2022-07-14

## 2022-07-14 DIAGNOSIS — K70.30 ALCOHOLIC CIRRHOSIS, UNSPECIFIED WHETHER ASCITES PRESENT: Primary | ICD-10-CM

## 2022-07-18 ENCOUNTER — TRANSCRIBE ORDERS (OUTPATIENT)
Dept: ADMINISTRATIVE | Facility: HOSPITAL | Age: 52
End: 2022-07-18

## 2022-07-18 DIAGNOSIS — K70.30 CIRRHOSIS WITH ALCOHOLISM: Primary | ICD-10-CM

## 2022-07-21 ENCOUNTER — HOSPITAL ENCOUNTER (OUTPATIENT)
Dept: ULTRASOUND IMAGING | Facility: HOSPITAL | Age: 52
Discharge: HOME | End: 2022-07-21
Admitting: INTERNAL MEDICINE

## 2022-07-21 DIAGNOSIS — K70.30 CIRRHOSIS WITH ALCOHOLISM: ICD-10-CM

## 2022-07-21 PROCEDURE — 76705 ECHO EXAM OF ABDOMEN: CPT

## 2022-08-04 DIAGNOSIS — G47.00 INSOMNIA, UNSPECIFIED TYPE: ICD-10-CM

## 2022-08-04 RX ORDER — TRAZODONE HYDROCHLORIDE 50 MG/1
TABLET ORAL
Qty: 180 TABLET | Refills: 1 | Status: SHIPPED | OUTPATIENT
Start: 2022-08-04 | End: 2023-01-30

## 2022-08-12 ENCOUNTER — LAB (OUTPATIENT)
Dept: FAMILY MEDICINE CLINIC | Facility: CLINIC | Age: 52
End: 2022-08-12

## 2022-08-12 ENCOUNTER — OFFICE VISIT (OUTPATIENT)
Dept: FAMILY MEDICINE CLINIC | Facility: CLINIC | Age: 52
End: 2022-08-12

## 2022-08-12 VITALS
BODY MASS INDEX: 40.94 KG/M2 | RESPIRATION RATE: 18 BRPM | TEMPERATURE: 96.6 F | OXYGEN SATURATION: 94 % | HEIGHT: 70 IN | HEART RATE: 83 BPM | WEIGHT: 286 LBS | SYSTOLIC BLOOD PRESSURE: 114 MMHG | DIASTOLIC BLOOD PRESSURE: 74 MMHG

## 2022-08-12 DIAGNOSIS — Z87.891 PERSONAL HISTORY OF NICOTINE DEPENDENCE: ICD-10-CM

## 2022-08-12 DIAGNOSIS — K21.9 GERD WITHOUT ESOPHAGITIS: Chronic | ICD-10-CM

## 2022-08-12 DIAGNOSIS — R35.1 BENIGN PROSTATIC HYPERPLASIA WITH NOCTURIA: ICD-10-CM

## 2022-08-12 DIAGNOSIS — N18.9 CHRONIC KIDNEY DISEASE, UNSPECIFIED CKD STAGE: ICD-10-CM

## 2022-08-12 DIAGNOSIS — M16.11 PRIMARY OSTEOARTHRITIS OF RIGHT HIP: ICD-10-CM

## 2022-08-12 DIAGNOSIS — N40.1 BENIGN PROSTATIC HYPERPLASIA WITH NOCTURIA: ICD-10-CM

## 2022-08-12 DIAGNOSIS — E11.65 TYPE 2 DIABETES MELLITUS WITH HYPERGLYCEMIA, WITH LONG-TERM CURRENT USE OF INSULIN: Chronic | ICD-10-CM

## 2022-08-12 DIAGNOSIS — J31.0 CHRONIC RHINITIS: ICD-10-CM

## 2022-08-12 DIAGNOSIS — Z79.4 TYPE 2 DIABETES MELLITUS WITH HYPERGLYCEMIA, WITH LONG-TERM CURRENT USE OF INSULIN: Chronic | ICD-10-CM

## 2022-08-12 DIAGNOSIS — K70.30 ALCOHOLIC CIRRHOSIS OF LIVER WITHOUT ASCITES: Chronic | ICD-10-CM

## 2022-08-12 DIAGNOSIS — E55.9 VITAMIN D DEFICIENCY, UNSPECIFIED: ICD-10-CM

## 2022-08-12 DIAGNOSIS — M06.09 RHEUMATOID ARTHRITIS OF MULTIPLE SITES WITH NEGATIVE RHEUMATOID FACTOR: ICD-10-CM

## 2022-08-12 DIAGNOSIS — D47.2 MGUS (MONOCLONAL GAMMOPATHY OF UNKNOWN SIGNIFICANCE): ICD-10-CM

## 2022-08-12 DIAGNOSIS — Z00.00 MEDICARE ANNUAL WELLNESS VISIT, SUBSEQUENT: Primary | ICD-10-CM

## 2022-08-12 DIAGNOSIS — Z12.5 ENCOUNTER FOR SCREENING FOR MALIGNANT NEOPLASM OF PROSTATE: ICD-10-CM

## 2022-08-12 DIAGNOSIS — R42 VERTIGO: ICD-10-CM

## 2022-08-12 LAB
BASOPHILS # BLD AUTO: 0.06 10*3/MM3 (ref 0–0.2)
BASOPHILS NFR BLD AUTO: 1.2 % (ref 0–1.5)
DEPRECATED RDW RBC AUTO: 41.8 FL (ref 37–54)
EOSINOPHIL # BLD AUTO: 0.24 10*3/MM3 (ref 0–0.4)
EOSINOPHIL NFR BLD AUTO: 4.9 % (ref 0.3–6.2)
ERYTHROCYTE [DISTWIDTH] IN BLOOD BY AUTOMATED COUNT: 12.3 % (ref 12.3–15.4)
HBA1C MFR BLD: 6.4 % (ref 3.5–5.6)
HCT VFR BLD AUTO: 51.2 % (ref 37.5–51)
HGB BLD-MCNC: 18.3 G/DL (ref 13–17.7)
IMM GRANULOCYTES # BLD AUTO: 0.01 10*3/MM3 (ref 0–0.05)
IMM GRANULOCYTES NFR BLD AUTO: 0.2 % (ref 0–0.5)
LYMPHOCYTES # BLD AUTO: 1.19 10*3/MM3 (ref 0.7–3.1)
LYMPHOCYTES NFR BLD AUTO: 24.4 % (ref 19.6–45.3)
MCH RBC QN AUTO: 33.6 PG (ref 26.6–33)
MCHC RBC AUTO-ENTMCNC: 35.7 G/DL (ref 31.5–35.7)
MCV RBC AUTO: 93.9 FL (ref 79–97)
MONOCYTES # BLD AUTO: 0.64 10*3/MM3 (ref 0.1–0.9)
MONOCYTES NFR BLD AUTO: 13.1 % (ref 5–12)
NEUTROPHILS NFR BLD AUTO: 2.73 10*3/MM3 (ref 1.7–7)
NEUTROPHILS NFR BLD AUTO: 56.2 % (ref 42.7–76)
NRBC BLD AUTO-RTO: 0 /100 WBC (ref 0–0.2)
PLATELET # BLD AUTO: 181 10*3/MM3 (ref 140–450)
PMV BLD AUTO: 11 FL (ref 6–12)
RBC # BLD AUTO: 5.45 10*6/MM3 (ref 4.14–5.8)
WBC NRBC COR # BLD: 4.87 10*3/MM3 (ref 3.4–10.8)

## 2022-08-12 PROCEDURE — 99214 OFFICE O/P EST MOD 30 MIN: CPT | Performed by: FAMILY MEDICINE

## 2022-08-12 PROCEDURE — 83036 HEMOGLOBIN GLYCOSYLATED A1C: CPT | Performed by: FAMILY MEDICINE

## 2022-08-12 PROCEDURE — 85652 RBC SED RATE AUTOMATED: CPT | Performed by: FAMILY MEDICINE

## 2022-08-12 PROCEDURE — 36415 COLL VENOUS BLD VENIPUNCTURE: CPT | Performed by: FAMILY MEDICINE

## 2022-08-12 PROCEDURE — 1159F MED LIST DOCD IN RCRD: CPT | Performed by: FAMILY MEDICINE

## 2022-08-12 PROCEDURE — 82306 VITAMIN D 25 HYDROXY: CPT | Performed by: FAMILY MEDICINE

## 2022-08-12 PROCEDURE — G0439 PPPS, SUBSEQ VISIT: HCPCS | Performed by: FAMILY MEDICINE

## 2022-08-12 PROCEDURE — 82607 VITAMIN B-12: CPT | Performed by: FAMILY MEDICINE

## 2022-08-12 PROCEDURE — 80053 COMPREHEN METABOLIC PANEL: CPT | Performed by: FAMILY MEDICINE

## 2022-08-12 PROCEDURE — 86140 C-REACTIVE PROTEIN: CPT | Performed by: FAMILY MEDICINE

## 2022-08-12 PROCEDURE — 84439 ASSAY OF FREE THYROXINE: CPT | Performed by: FAMILY MEDICINE

## 2022-08-12 PROCEDURE — G0103 PSA SCREENING: HCPCS | Performed by: FAMILY MEDICINE

## 2022-08-12 PROCEDURE — 1170F FXNL STATUS ASSESSED: CPT | Performed by: FAMILY MEDICINE

## 2022-08-12 PROCEDURE — 84443 ASSAY THYROID STIM HORMONE: CPT | Performed by: FAMILY MEDICINE

## 2022-08-12 PROCEDURE — 85025 COMPLETE CBC W/AUTO DIFF WBC: CPT | Performed by: FAMILY MEDICINE

## 2022-08-12 PROCEDURE — 80061 LIPID PANEL: CPT | Performed by: FAMILY MEDICINE

## 2022-08-12 RX ORDER — IPRATROPIUM BROMIDE 42 UG/1
2 SPRAY, METERED NASAL 4 TIMES DAILY
Qty: 15 ML | Refills: 5 | Status: SHIPPED | OUTPATIENT
Start: 2022-08-12 | End: 2022-09-06

## 2022-08-12 RX ORDER — ZINC SULFATE 50(220)MG
220 CAPSULE ORAL DAILY
Qty: 90 CAPSULE | Refills: 3 | Status: SHIPPED | OUTPATIENT
Start: 2022-08-12 | End: 2023-02-22 | Stop reason: SDUPTHER

## 2022-08-12 RX ORDER — CHLORHEXIDINE GLUCONATE 0.12 MG/ML
RINSE ORAL
COMMUNITY
Start: 2022-08-05

## 2022-08-12 NOTE — PROGRESS NOTES
The ABCs of the Annual Wellness Visit  Subsequent Medicare Wellness Visit    Chief Complaint   Patient presents with   • Medicare Wellness-subsequent     Subjective    History of Present Illness:  Mc Selby is a 52 y.o. male who presents for a Subsequent Medicare Wellness Visit.  Patient is concerned about social security trying to revoke his disability. He feels disabled because of his arthritis    Allergic rhinitis: patient follows w/ ENT for persistent rhinorrhea. He is currently using nasal rinses TID, Mucinex BID, azelastine daily, Nasonex daily. Patient reports continuous rhinorrhea that is especially bad overnight, causing him to vomit each morning. He denies specific exacerbating factors, stating that it always runs. Drainage is clear. He is considering procedure next week.     Vertigo: patient reports hx vertigo w/ unsteady gait and multiple falls in the last year.     DM: last a1c 6.2. Not maintained on any medication at this time, previously on insulin. No numbness in feet. 7/2022 Pro/Cr 7. Follows w/ ophtho every 6 months.      RA: maintained on Enbrel 50mg weekly for a few years now. Reportedly diagnosed at 16. R hip (OA) and hands bother him most but reports diffuse joint pain. Enbrel controls pretty much everything except R hip. Too young for hip replacement per ortho. Reports 30-40 minutes of morning stiffness. Follows w/ rheumatology- Jorge A.     R hip pain: 6/2020 MRI w/ severe degenerative changes of bilateral hips. Donovan does not want to perform hip replacement because of age. Has had steroid injections in the past w/ minimal benefit.     EtOH cirrhosis: patient reports drinking 5 beers, 4 days/week. Follows w/ GI- Cinda. Maintained on Bumex 2mg BID, spironolactone 25mg daily, rifaximin 550 BID, and zinc daily (out). Last HE unknown. Last EGD 7/2021 w/ mild PHG     GERD: maintained on famotidine 40 daily. Last EGD 7/2021 w/ esophageal stricture and subsequent dilation.      BPH: maintained on  tamsulosin 0.8mg daily. He reports getting up every hour at night to urinate. Patient reports this is because he takes his Bumex just before bed.      CKD: follows w/ Konijhaja. Maintained on bumex 2mg BID     MGUS: follows w/ hematology (Hernandez) annually. No fever, chills, weight loss. He reports chronic night sweats since childhood.     The following portions of the patient's history were reviewed and updated as appropriate: allergies, current medications, past family history, past medical history, past social history, past surgical history and problem list.    Compared to one year ago, the patient feels his physical   health is the same.    Compared to one year ago, the patient feels his mental   health is the same.    Recent Hospitalizations:  He was not admitted to the hospital during the last year.     Current Medical Providers:  Patient Care Team:  Berny Godwin MD as PCP - General (Internal Medicine)    Outpatient Medications Prior to Visit   Medication Sig Dispense Refill   • albuterol sulfate  (90 Base) MCG/ACT inhaler INHALE 2 PUFFS EVERY 6 (SIX) HOURS AS NEEDED FOR WHEEZING OR SHORTNESS OF AIR. 18 g 3   • azelastine (ASTELIN) 0.1 % nasal spray INHALE 2 PUFFS INTO EACH NOSTRIL TWICE A DAY 90 each 1   • bumetanide (BUMEX) 2 MG tablet Take 2 mg by mouth 2 (Two) Times a Day.     • cetirizine (zyrTEC) 10 MG tablet Take 1 tablet by mouth Daily. 90 tablet 3   • Etanercept (Enbrel Mini) 50 MG/ML solution cartridge Inject 50 mg under the skin into the appropriate area as directed 1 (One) Time Per Week. (Patient taking differently: Inject 50 mg under the skin into the appropriate area as directed 1 (One) Time Per Week. Monday) 12 Cartridge 0   • famotidine (PEPCID) 40 MG tablet TAKE 1 TABLET BY MOUTH TWICE A DAY 60 tablet 11   • guaiFENesin (MUCINEX) 600 MG 12 hr tablet Take 1,200 mg by mouth 2 (Two) Times a Day.     • magnesium oxide (MAG-OX) 400 MG tablet Take 2 tablets by mouth 2 (Two) Times a Day.   6   • montelukast (SINGULAIR) 10 MG tablet TAKE 1 TABLET BY MOUTH EVERY DAY AT NIGHT 90 tablet 1   • potassium chloride (K-DUR,KLOR-CON) 20 MEQ CR tablet Take 20 mEq by mouth 2 (Two) Times a Day.     • spironolactone (ALDACTONE) 25 MG tablet Take 25 mg by mouth Daily.     • tamsulosin (FLOMAX) 0.4 MG capsule 24 hr capsule Take 1 capsule by mouth 2 (Two) Times a Day.     • traZODone (DESYREL) 50 MG tablet TAKE 1-2 TABLETS NIGHTLY AS NEEDED FOR SLEEP 180 tablet 1   • Triamcinolone Acetonide (NASACORT) 55 MCG/ACT nasal inhaler 1 spray into the nostril(s) as directed by provider Daily.     • vitamin D (ERGOCALCIFEROL) 1.25 MG (19781 UT) capsule capsule Take 1 capsule by mouth Every 14 (Fourteen) Days. Every other Sunday 5 capsule 2   • Xifaxan 550 MG tablet TAKE 1 TABLET BY MOUTH EVERY 12 HOURS 60 tablet 5   • zinc sulfate (ZINCATE) 220 (50 Zn) MG capsule Take 220 mg by mouth Daily.  3   • chlorhexidine (PERIDEX) 0.12 % solution RINSE WITH 15 A SMALL AMOUNT BY MOUTH THREE TIMES A DAY-SPIT DO NOT SWALLOW     • Accu-Chek Sol Plus test strip Test 4 times daily DX: E11.9 600 each 3   • meclizine 25 MG chewable tablet chewable tablet Chew 25 mg 3 (Three) Times a Day As Needed.     • senna (SENOKOT) 8.6 MG tablet Take 1 tablet by mouth 2 (Two) Times a Day As Needed for Constipation. 30 tablet 0     No facility-administered medications prior to visit.     No opioid medication identified on active medication list. I have reviewed chart for other potential  high risk medication/s and harmful drug interactions in the elderly.        Aspirin is not on active medication list.  Aspirin use is not indicated based on review of current medical condition/s. Risk of harm outweighs potential benefits.  .    Patient Active Problem List   Diagnosis   • Alcoholic cirrhosis (HCC)   • Umbilical hernia   • Type 2 diabetes mellitus with hyperglycemia (HCC)   • Hyperammonemia (HCC)   • Renal injury   • Encephalopathy, hepatic (HCC)   • Primary  "osteoarthritis of right hip   • Obesity (BMI 30-39.9)   • GERD without esophagitis   • Vitamin D deficiency   • COPD (chronic obstructive pulmonary disease) ([facility])   • Hypomagnesemia   • Rheumatoid arthritis involving multiple sites ([facility])   • Seasonal allergies   • Cholelithiasis   • Chronic kidney disease   • Benign prostatic hyperplasia with nocturia   • MGUS (monoclonal gammopathy of unknown significance)   • Polycythemia     Advance Care Planning  Advance Directive is not on file.  ACP discussion was held with the patient during this visit. Patient does not have an advance directive, information provided.  Wife  Review of Systems   Constitutional: Negative for chills, diaphoresis and fever.   HENT: Positive for congestion and rhinorrhea.    Eyes: Negative for visual disturbance.   Respiratory: Negative for cough and shortness of breath.    Cardiovascular: Negative for chest pain and palpitations.   Gastrointestinal: Positive for nausea and vomiting. Negative for abdominal pain.   Genitourinary: Negative for difficulty urinating and dysuria.        +nocturia   Musculoskeletal: Positive for arthralgias and gait problem. Negative for back pain.   Skin: Negative for rash.   Neurological: Positive for dizziness. Negative for light-headedness and numbness.   Psychiatric/Behavioral: Negative for dysphoric mood and sleep disturbance. The patient is not nervous/anxious.         Objective    Vitals:    08/12/22 1323   BP: 114/74   Pulse: 83   Resp: 18   Temp: 96.6 °F (35.9 °C)   SpO2: 94%   Weight: 130 kg (286 lb)   Height: 177.8 cm (70\")     Estimated body mass index is 41.04 kg/m² as calculated from the following:    Height as of this encounter: 177.8 cm (70\").    Weight as of this encounter: 130 kg (286 lb).    Class 3 Severe Obesity (BMI >=40). Obesity-related health conditions include the following: diabetes mellitus, GERD and osteoarthritis. Obesity is unchanged. BMI is is above average; BMI management plan is " completed. We discussed portion control and increasing exercise.    Does the patient have evidence of cognitive impairment? No    Physical Exam  Constitutional:       General: He is not in acute distress.     Appearance: He is well-developed. He is obese.   HENT:      Head: Normocephalic and atraumatic.      Right Ear: Tympanic membrane and external ear normal. There is no impacted cerumen.      Left Ear: Tympanic membrane and external ear normal. There is no impacted cerumen.      Nose: Nose normal.      Mouth/Throat:      Mouth: Mucous membranes are moist.      Pharynx: No oropharyngeal exudate or posterior oropharyngeal erythema.      Comments: Mallampati III  Eyes:      General: No scleral icterus.        Right eye: No discharge.         Left eye: No discharge.      Extraocular Movements: Extraocular movements intact.      Conjunctiva/sclera: Conjunctivae normal.      Pupils: Pupils are equal, round, and reactive to light.   Neck:      Thyroid: No thyromegaly.      Vascular: No carotid bruit.   Cardiovascular:      Rate and Rhythm: Normal rate and regular rhythm.      Heart sounds: Normal heart sounds. No murmur heard.  Pulmonary:      Effort: Pulmonary effort is normal. No respiratory distress.      Breath sounds: Normal breath sounds. No wheezing or rales.   Abdominal:      General: Bowel sounds are normal. There is no distension.      Palpations: Abdomen is soft.      Tenderness: There is abdominal tenderness (diffusely). There is no guarding or rebound.   Musculoskeletal:         General: No deformity. Normal range of motion.      Cervical back: Normal range of motion and neck supple.   Lymphadenopathy:      Cervical: No cervical adenopathy.   Skin:     General: Skin is warm and dry.      Findings: No rash.   Neurological:      General: No focal deficit present.      Mental Status: He is alert and oriented to person, place, and time. Mental status is at baseline.      Cranial Nerves: No cranial nerve  deficit.      Sensory: No sensory deficit.   Psychiatric:         Behavior: Behavior normal.         Thought Content: Thought content normal.          Lab Results   Component Value Date    TRIG 88 2022    HDL 26 (L) 2022     (H) 2022    VLDL 17 2022    HGBA1C 6.4 (H) 2022        HEALTH RISK ASSESSMENT    Smoking Status:  Social History     Tobacco Use   Smoking Status Former Smoker   • Packs/day: 2.00   • Years: 35.00   • Pack years: 70.00   • Types: Cigarettes   • Quit date: 2017   • Years since quittin.0   Smokeless Tobacco Former User     Alcohol Consumption:  Social History     Substance and Sexual Activity   Alcohol Use Yes   • Alcohol/week: 20.0 standard drinks   • Types: 20 Cans of beer per week   Drinking 5 beers daily, x4 days/week    Fall Risk Screen:  [place] Fall Risk Assessment has not been completed.  Reports multiple falls down the stairs. He states he is unsteady on his feet. Reports vertigo- dizziness turning head side to side    Depression Screening:  PHQ-2/PHQ-9 Depression Screening 2022   Retired PHQ-9 Total Score -   Retired Total Score -   Little Interest or Pleasure in Doing Things 0-->not at all   Feeling Down, Depressed or Hopeless 0-->not at all   PHQ-9: Brief Depression Severity Measure Score 0     Health Habits and Functional and Cognitive Screening:  Functional & Cognitive Status 2022   Do you have difficulty preparing food and eating? Yes   Do you have difficulty bathing yourself, getting dressed or grooming yourself? Yes   Do you have difficulty using the toilet? No   Do you have difficulty moving around from place to place? Yes   Do you have trouble with steps or getting out of a bed or a chair? Yes   Current Diet Other   Dental Exam Up to date   Eye Exam Up to date   Exercise (times per week) 0 times per week   Current Exercises Include No Regular Exercise   Do you need help using the phone?  No   Are you deaf or do you have  serious difficulty hearing?  Yes   Do you need help with transportation? No   Do you need help shopping? No   Do you need help preparing meals?  No   Do you need help with housework?  No   Do you need help with laundry? No   Do you need help taking your medications? No   Do you need help managing money? No   Do you ever drive or ride in a car without wearing a seat belt? Yes   Have you felt unusual stress, anger or loneliness in the last month? No   Who do you live with? Spouse   If you need help, do you have trouble finding someone available to you? No   Have you been bothered in the last four weeks by sexual problems? No   Do you have difficulty concentrating, remembering or making decisions? Yes   Unable to tie shoes or button shirt. Otherwise independent    Age-appropriate Screening Schedule:  Refer to the list below for future screening recommendations based on patient's age, sex and/or medical conditions. Orders for these recommended tests are listed in the plan section. The patient has been provided with a written plan.    Health Maintenance   Topic Date Due   • DIABETIC EYE EXAM  07/13/2022   • INFLUENZA VACCINE  10/01/2022   • DIABETIC FOOT EXAM  01/11/2023   • HEMOGLOBIN A1C  02/12/2023   • URINE MICROALBUMIN  07/06/2023   • TDAP/TD VACCINES (2 - Td or Tdap) 12/03/2028   • ZOSTER VACCINE  Completed        Assessment & Plan   CMS Preventative Services Quick Reference  Risk Factors Identified During Encounter  Alcohol Misuse  Chronic Pain   Immunizations Discussed/Encouraged (specific Immunizations; Influenza  Obesity/Overweight   The above risks/problems have been discussed with the patient.  Follow up actions/plans if indicated are seen below in the Assessment/Plan Section.  Pertinent information has been shared with the patient in the After Visit Summary.    Diagnoses and all orders for this visit:    1. Medicare annual wellness visit, subsequent (Primary)  -     CBC & Differential  -     Comprehensive  Metabolic Panel  -     Hemoglobin A1c  -     Lipid Panel  -     TSH  -     T4, Free  -     Vitamin D 25 Hydroxy  -     Vitamin B12  -     PSA Screen  -      CT Chest Low Dose Cancer Screening WO; Future  -  Counseled regarding diet, exercise, weight loss, and preventative health maintenance items/immunizations below    2. Chronic rhinitis: Patient has exhausted allergy options.  He reports that clear rhinorrhea is continuous.  We will try ipratropium nasal spray.  Otherwise, patient is considering surgical procedure through ENT  -     Start ipratropium (ATROVENT) 0.06 % nasal spray; 2 sprays into the nostril(s) as directed by provider 4 (Four) Times a Day.  Dispense: 15 mL; Refill: 5  - Continue ENT follow-up    3. Vertigo: Chronic.  Will refer for vestibular rehab  -     Ambulatory Referral to Physical Therapy Evaluate and treat    4. Type 2 diabetes mellitus with hyperglycemia, with long-term current use of insulin (HCC): Last A1c 6.2. 7/2022 Pro/Cr 7. Follows w/ ophtho every 6 months  -     Comprehensive Metabolic Panel  -     Hemoglobin A1c  - Monitor off medication  - Low threshold to add statin and ACE inhibitor  - Recommend regular exercise and annual eye exam    5. Rheumatoid arthritis of multiple sites with negative rheumatoid factor (HCC)  -     C-reactive Protein  -     Sedimentation Rate  - Continue home Enbrel per rheumatology  - Continue rheumatology follow-up    6. Primary osteoarthritis of right hip: 6/2020 MRI w/ severe degenerative changes of bilateral hips. Ortho previously indicated that he should wait for hip replacement due to his age.  Given quality life and comorbidities, I would like the patient to obtain a second opinion as patient reports notable limitations and inadequate response to steroid injections  -     Ambulatory Referral to Orthopedic Surgery    7. Alcoholic cirrhosis of liver without ascites (HCC)  -     Continue zinc sulfate (ZINCATE) 220 (50 Zn) MG capsule; Take 1 capsule by  mouth Daily.  Dispense: 90 capsule; Refill: 3  - Continue home spironolactone 25 daily and Bumex 2 mg twice daily  - Counseled regarding alcohol cessation  - Continue GI follow-up    8. GERD without esophagitis: Last EGD 7/2021 w/ esophageal stricture and subsequent dilation   -Continue home famotidine 40 daily    9. Benign prostatic hyperplasia with nocturia   -Continue home tamsulosin 0.4 mg twice daily    10. Chronic kidney disease, unspecified CKD stage   -Continue home Bumex 2 mg twice daily   -Continue nephrology follow-up    11. MGUS (monoclonal gammopathy of unknown significance)   -Continue hematology follow-up    12. Vitamin D deficiency, unspecified   -     Vitamin D 25 Hydroxy    13. Encounter for screening for malignant neoplasm of prostate   -     PSA Screen    14. Personal history of nicotine dependence   -      CT Chest Low Dose Cancer Screening WO; Future    Preventative  Colonoscopy: 3/2020, due 3/2025  PSA: 0.178 (8/2021), Ordered today  CT chest: Ordered today  AAA: due 2033  Shingles: Shingrix 4/2019  Pneumonia: Pneumovax 12/2018, Prevnar 8/2021  Tdap: Completed 12/2018  Influenza: 1/2022, recommended  COVID: Completed 3 shots Pfizer    Follow Up:   Return in about 3 months (around 11/12/2022) for Recheck- 30min.     An After Visit Summary and PPPS were made available to the patient.

## 2022-08-13 LAB
25(OH)D3 SERPL-MCNC: 40.3 NG/ML (ref 30–100)
ALBUMIN SERPL-MCNC: 4.2 G/DL (ref 3.5–5.2)
ALBUMIN/GLOB SERPL: 1 G/DL
ALP SERPL-CCNC: 64 U/L (ref 39–117)
ALT SERPL W P-5'-P-CCNC: 17 U/L (ref 1–41)
ANION GAP SERPL CALCULATED.3IONS-SCNC: 13.5 MMOL/L (ref 5–15)
AST SERPL-CCNC: 30 U/L (ref 1–40)
BILIRUB SERPL-MCNC: 1.2 MG/DL (ref 0–1.2)
BUN SERPL-MCNC: 8 MG/DL (ref 6–20)
BUN/CREAT SERPL: 7 (ref 7–25)
CALCIUM SPEC-SCNC: 9 MG/DL (ref 8.6–10.5)
CHLORIDE SERPL-SCNC: 94 MMOL/L (ref 98–107)
CHOLEST SERPL-MCNC: 149 MG/DL (ref 0–200)
CO2 SERPL-SCNC: 26.5 MMOL/L (ref 22–29)
CREAT SERPL-MCNC: 1.15 MG/DL (ref 0.76–1.27)
CRP SERPL-MCNC: <0.3 MG/DL (ref 0–0.5)
EGFRCR SERPLBLD CKD-EPI 2021: 75.6 ML/MIN/1.73
ERYTHROCYTE [SEDIMENTATION RATE] IN BLOOD: 29 MM/HR (ref 0–20)
GLOBULIN UR ELPH-MCNC: 4.2 GM/DL
GLUCOSE SERPL-MCNC: 171 MG/DL (ref 65–99)
HDLC SERPL-MCNC: 26 MG/DL (ref 40–60)
LDLC SERPL CALC-MCNC: 106 MG/DL (ref 0–100)
LDLC/HDLC SERPL: 4.05 {RATIO}
POTASSIUM SERPL-SCNC: 4.2 MMOL/L (ref 3.5–5.2)
PROT SERPL-MCNC: 8.4 G/DL (ref 6–8.5)
PSA SERPL-MCNC: 0.26 NG/ML (ref 0–4)
SODIUM SERPL-SCNC: 134 MMOL/L (ref 136–145)
T4 FREE SERPL-MCNC: 0.93 NG/DL (ref 0.93–1.7)
TRIGL SERPL-MCNC: 88 MG/DL (ref 0–150)
TSH SERPL DL<=0.05 MIU/L-ACNC: 2.75 UIU/ML (ref 0.27–4.2)
VIT B12 BLD-MCNC: 362 PG/ML (ref 211–946)
VLDLC SERPL-MCNC: 17 MG/DL (ref 5–40)

## 2022-08-15 ENCOUNTER — TELEPHONE (OUTPATIENT)
Dept: FAMILY MEDICINE CLINIC | Facility: CLINIC | Age: 52
End: 2022-08-15

## 2022-08-15 NOTE — TELEPHONE ENCOUNTER
Elevated blood counts can be caused by a sense of oxygen deprivation.  I am concerned about the potential for underlying COPD and obstructive sleep apnea.  I do recommend looking to donate 1 unit of blood.  If he does not want to donate, then we can schedule a phlebotomy at the hospital.  However, I would recommend that we move forward with testing for sleep apnea if he is agreeable

## 2022-08-15 NOTE — TELEPHONE ENCOUNTER
Patient already knows he has COPD and cant wear a CPAP. He says he has constant draining that goes down his throat and he wakes up and throws up in the mask. He said that if you want him to go to the hospital to have the blood taken he would do that. He said he's just confused on why this is happening all of a sudden.

## 2022-08-15 NOTE — PROGRESS NOTES
LVM FOR PATIENT WITH RESULTS. ASKED HIM TO CALL THE OFFICE OR TO MESSAGE DR. TALLEY WITH WHAT HE IS TAKING OR IF USING CPAP

## 2022-08-15 NOTE — TELEPHONE ENCOUNTER
"LVM FOR PATIENT WITH RESULTS. ASKED HIM TO CALL THE OFFICE OR TO MESSAGE DR. GODWIN WITH WHAT HE IS TAKING OR IF USING CPAP    ---- Message from Caryl Michael MA sent at 8/15/2022  8:41 AM EDT -----    ----- Message -----  From: Berny Godwin MD  Sent: 8/15/2022   6:32 AM EDT  To: [facility]    FOR HUB TO SAY:  \"Labs reveal elevated blood counts.  The reason for this is unclear.  Are you taking any testosterone supplement or are you using a CPAP?  We need to address the underlying cause of the elevated blood counts but would recommend you donate a unit of blood to bring your blood counts down.  Blood sugars remain reasonably controlled with A1c 6.4.  No other changes at this time\"      "

## 2022-08-15 NOTE — TELEPHONE ENCOUNTER
"  Caller: Pamela Selby    Relationship: Self    Best call back number: [phone]    Do you know the name of the person who called: Jose Maria was the call regarding: LABS    Do you require a callback: YES    PATIENT RETURNED CALL AND HUB RELAYED:    \"Labs reveal elevated blood counts.  The reason for this is unclear.  Are you taking any testosterone supplement or are you using a CPAP?  We need to address the underlying cause of the elevated blood counts but would recommend you donate a unit of blood to bring your blood counts down.  Blood sugars remain reasonably controlled with A1c 6.4.  No other changes at this time\"    PAMELA STATES HE IS NOT ON TESTOSTERONE OR A CPAP MACHINE AND WOULD LIKE A CALL TO DISCUSS WHAT CAUSED HIS HIGH BLOOD COUNT      "

## 2022-08-16 ENCOUNTER — TREATMENT (OUTPATIENT)
Dept: PHYSICAL THERAPY | Facility: CLINIC | Age: 52
End: 2022-08-16

## 2022-08-16 DIAGNOSIS — Z91.81 HISTORY OF FALL: Primary | ICD-10-CM

## 2022-08-16 DIAGNOSIS — D75.1 POLYCYTHEMIA: Primary | ICD-10-CM

## 2022-08-16 DIAGNOSIS — Z98.890 HISTORY OF OPEN REDUCTION AND INTERNAL FIXATION (ORIF) PROCEDURE: ICD-10-CM

## 2022-08-16 DIAGNOSIS — Z87.898 HISTORY OF DIZZINESS: ICD-10-CM

## 2022-08-16 DIAGNOSIS — R42 EPISODIC LIGHTHEADEDNESS: ICD-10-CM

## 2022-08-16 DIAGNOSIS — R53.1 GENERALIZED WEAKNESS: ICD-10-CM

## 2022-08-16 PROCEDURE — 97163 PT EVAL HIGH COMPLEX 45 MIN: CPT | Performed by: PHYSICAL THERAPIST

## 2022-08-16 PROCEDURE — 97535 SELF CARE MNGMENT TRAINING: CPT | Performed by: PHYSICAL THERAPIST
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
## 2022-08-16 NOTE — TELEPHONE ENCOUNTER
I have been unable to call him the last 2 days.  I did place an order for phlebotomy in the outpatient infusion center will be calling him to make this happen.    In regards to the cause of his elevated blood counts, this is likely due to his underlying COPD and uncontrolled PAM.  The best treatment would be to treat the underlying cause but I understand we are limited by his drainage and vomiting.  The phlebotomy will help normalize blood counts and we will repeat at his follow-up appointment

## 2022-08-18 NOTE — TELEPHONE ENCOUNTER
Patient is scheduled to see the infusion center next Tuesday and the x-rays have already been set up. He said everything should be done next week

## 2022-08-19 PROBLEM — D75.1 POLYCYTHEMIA: Status: ACTIVE | Noted: 2022-08-19

## 2022-08-22 RX ORDER — CETIRIZINE HYDROCHLORIDE 10 MG/1
TABLET ORAL
Qty: 90 TABLET | Refills: 3 | Status: SHIPPED | OUTPATIENT
Start: 2022-08-22

## 2022-08-23 ENCOUNTER — HOSPITAL ENCOUNTER (OUTPATIENT)
Dept: INFUSION THERAPY | Facility: HOSPITAL | Age: 52
Setting detail: INFUSION SERIES
Discharge: HOME | End: 2022-08-23

## 2022-08-23 VITALS
DIASTOLIC BLOOD PRESSURE: 79 MMHG | RESPIRATION RATE: 16 BRPM | OXYGEN SATURATION: 94 % | HEART RATE: 85 BPM | TEMPERATURE: 98.4 F | SYSTOLIC BLOOD PRESSURE: 116 MMHG

## 2022-08-23 DIAGNOSIS — D75.1 POLYCYTHEMIA: ICD-10-CM

## 2022-08-23 PROCEDURE — 99195 PHLEBOTOMY: CPT

## 2022-08-23 NOTE — PROGRESS NOTES
PHLEBOTOMY    Start Time: 1411     End Time: 1416    Hemaglobin Value: N/A    Hematocrit Value: N/A    Ferritin Value: N/A      Permit Signed: Self Signed    Prep with Chlorhexidine: yes    Needle Size: 16 Gauge    Number of Attempts: 1      Scale Calibration: yes    Amount Drawn: 500ml = 529grams    Phlebotomy Completed: yes    Phlebotomy Site: Right Antecubital    Dressing: Applied and Pressure Dressing Applied    Anchored: Held    Blood Discarded Per Protocol: yes    200+/- 5 grams: yes  500+/- 5 grams: yes    Self Care Assistance: Unassisted and Steady            
(4) rarely moist

## 2022-08-24 ENCOUNTER — OFFICE VISIT (OUTPATIENT)
Dept: ORTHOPEDIC SURGERY | Facility: CLINIC | Age: 52
End: 2022-08-24

## 2022-08-24 VITALS — WEIGHT: 299.8 LBS | BODY MASS INDEX: 42.92 KG/M2 | HEIGHT: 70 IN

## 2022-08-24 DIAGNOSIS — M16.11 PRIMARY OSTEOARTHRITIS OF RIGHT HIP: Primary | ICD-10-CM

## 2022-08-24 PROCEDURE — 99214 OFFICE O/P EST MOD 30 MIN: CPT | Performed by: ORTHOPAEDIC SURGERY

## 2022-08-26 ENCOUNTER — HOSPITAL ENCOUNTER (OUTPATIENT)
Dept: CT IMAGING | Facility: HOSPITAL | Age: 52
Discharge: HOME | End: 2022-08-26
Admitting: FAMILY MEDICINE

## 2022-08-26 DIAGNOSIS — Z87.891 PERSONAL HISTORY OF NICOTINE DEPENDENCE: ICD-10-CM

## 2022-08-26 DIAGNOSIS — Z00.00 MEDICARE ANNUAL WELLNESS VISIT, SUBSEQUENT: ICD-10-CM

## 2022-08-26 PROCEDURE — 71271 CT THORAX LUNG CANCER SCR C-: CPT

## 2022-09-01 ENCOUNTER — TELEPHONE (OUTPATIENT)
Dept: FAMILY MEDICINE CLINIC | Facility: CLINIC | Age: 52
End: 2022-09-01

## 2022-09-06 DIAGNOSIS — J31.0 CHRONIC RHINITIS: ICD-10-CM

## 2022-09-06 RX ORDER — IPRATROPIUM BROMIDE 42 UG/1
SPRAY, METERED NASAL
Qty: 15 EACH | Refills: 5 | Status: SHIPPED | OUTPATIENT
Start: 2022-09-06 | End: 2023-03-12

## 2022-09-06 RX ORDER — MONTELUKAST SODIUM 10 MG/1
TABLET ORAL
Qty: 90 TABLET | Refills: 1 | Status: SHIPPED | OUTPATIENT
Start: 2022-09-06 | End: 2023-03-04

## 2022-09-08 ENCOUNTER — TREATMENT (OUTPATIENT)
Dept: PHYSICAL THERAPY | Facility: CLINIC | Age: 52
End: 2022-09-08

## 2022-09-08 DIAGNOSIS — Z87.898 HISTORY OF DIZZINESS: ICD-10-CM

## 2022-09-08 DIAGNOSIS — Z91.81 HISTORY OF FALL: Primary | ICD-10-CM

## 2022-09-08 DIAGNOSIS — Z98.890 HISTORY OF OPEN REDUCTION AND INTERNAL FIXATION (ORIF) PROCEDURE: ICD-10-CM

## 2022-09-08 DIAGNOSIS — R53.1 GENERALIZED WEAKNESS: ICD-10-CM

## 2022-09-08 DIAGNOSIS — R42 EPISODIC LIGHTHEADEDNESS: ICD-10-CM

## 2022-09-08 PROCEDURE — 97110 THERAPEUTIC EXERCISES: CPT | Performed by: PHYSICAL THERAPIST

## 2022-09-08 PROCEDURE — 97112 NEUROMUSCULAR REEDUCATION: CPT | Performed by: PHYSICAL THERAPIST

## 2022-09-08 NOTE — PROGRESS NOTES
Physical Therapy Daily Progress Note      Patient: Mc Selby   : 1970  Diagnosis/ICD-10 Code:  History of fall [Z91.81]   Problems Addressed this Visit    None     Visit Diagnoses     History of fall    -  Primary    Generalized weakness        Episodic lightheadedness        History of dizziness        History of open reduction and internal fixation (ORIF) procedure          Diagnoses       Codes Comments    History of fall    -  Primary ICD-10-CM: Z91.81  ICD-9-CM: V15.88     Generalized weakness     ICD-10-CM: R53.1  ICD-9-CM: 780.79     Episodic lightheadedness     ICD-10-CM: R42  ICD-9-CM: 780.4     History of dizziness     ICD-10-CM: Z87.898  ICD-9-CM: V13.89     History of open reduction and internal fixation (ORIF) procedure     ICD-10-CM: Z98.890  ICD-9-CM: V15.29         Referring practitioner: Berny Godwin MD  Date of Initial Visit: Type: THERAPY  Noted: 2022  Today's Date: 2022    VISIT#: 2    Subjective   Budman reports he has been working on the balance HEP and some days it's easier than others. His hip has been bothersome lately.     Objective     See Exercise, Manual, and Modality Logs for complete treatment.     Assessment/Plan  Pt was able to tolerate all NMR & TYRONE today without LOB. Gait belt was donned throughout. He notes his legs will likely be sore after today's session. PT encouraged pt to cont with HEP and stretching. Will cont to work on dynamic & static balance without severe exacerbation of hip pain.   Progress strengthening /stabilization /functional activity         Timed:         Manual Therapy:         mins  75432;     Therapeutic Exercise:    18     mins  85842;     Neuromuscular Elen:    22    mins  14420;    Therapeutic Activity:          mins  02927;     Gait Training:           mins  75897;     Ultrasound:          mins  08874;    Ionto                                   mins   87270  Self Care                            mins   89963  [facility]                    mins  35491  Tests & Measures              mins   13688      Un-Timed:  Electrical Stimulation:         mins  07597 ( );  Dry Needling         mins 00636/64216  Traction          mins 10790  Low Eval          Mins  14810  Mod Eval          Mins  58797  High Eval                            Mins  85048  Re-Eval                               mins  09225    Timed Treatment:   40   mins   Total Treatment:     40   mins    Marcelina Kwon, PT, DPT, cert. DN  Physical Therapist  [place] Lic # [id number]

## 2022-09-15 ENCOUNTER — TREATMENT (OUTPATIENT)
Dept: PHYSICAL THERAPY | Facility: CLINIC | Age: 52
End: 2022-09-15

## 2022-09-15 DIAGNOSIS — R42 EPISODIC LIGHTHEADEDNESS: ICD-10-CM

## 2022-09-15 DIAGNOSIS — Z98.890 HISTORY OF OPEN REDUCTION AND INTERNAL FIXATION (ORIF) PROCEDURE: ICD-10-CM

## 2022-09-15 DIAGNOSIS — Z87.898 HISTORY OF DIZZINESS: ICD-10-CM

## 2022-09-15 DIAGNOSIS — R53.1 GENERALIZED WEAKNESS: ICD-10-CM

## 2022-09-15 DIAGNOSIS — Z91.81 HISTORY OF FALL: Primary | ICD-10-CM

## 2022-09-15 PROCEDURE — 97112 NEUROMUSCULAR REEDUCATION: CPT | Performed by: PHYSICAL THERAPIST

## 2022-09-15 PROCEDURE — 97110 THERAPEUTIC EXERCISES: CPT | Performed by: PHYSICAL THERAPIST

## 2022-09-15 NOTE — PROGRESS NOTES
Physical Therapy Daily Progress Note      Patient: Mc Selby   : 1970  Diagnosis/ICD-10 Code:  History of fall [Z91.81]   Problems Addressed this Visit    None     Visit Diagnoses     History of fall    -  Primary    Generalized weakness        Episodic lightheadedness        History of dizziness        History of open reduction and internal fixation (ORIF) procedure          Diagnoses       Codes Comments    History of fall    -  Primary ICD-10-CM: Z91.81  ICD-9-CM: V15.88     Generalized weakness     ICD-10-CM: R53.1  ICD-9-CM: 780.79     Episodic lightheadedness     ICD-10-CM: R42  ICD-9-CM: 780.4     History of dizziness     ICD-10-CM: Z87.898  ICD-9-CM: V13.89     History of open reduction and internal fixation (ORIF) procedure     ICD-10-CM: Z98.890  ICD-9-CM: V15.29         Referring practitioner: Berny Godwin MD  Date of Initial Visit: Type: THERAPY  Noted: 2022  Today's Date: 9/15/2022    VISIT#: 3    Subjective   Pt reports his R hip was sore after last PT session and he's been exhausted from [place] [employer] weekend. He notes his body is ju tsprobably not used to that level of activity anymore.     Objective     See Exercise, Manual, and Modality Logs for complete treatment.     Assessment/Plan  Budman reported B ant hip pain after seated elliptical, may try w/seat further back next session. Pt has most difficulty with eyes closed balance challenges. He will benefit from continued general strengthening, balance, and mobility.   Progress strengthening /stabilization /functional activity         Timed:         Manual Therapy:         mins  72281;     Therapeutic Exercise:    30     mins  74006;     Neuromuscular Elen:    8    mins  72593;    Therapeutic Activity:          mins  84942;     Gait Training:           mins  14349;     Ultrasound:          mins  07737;    Ionto                                   mins   60774  Self Care                            mins   61875  Felisha  Repos                   mins  55510  Tests & Measures              mins   08448      Un-Timed:  Electrical Stimulation:         mins  31799 ( );  Dry Needling         mins 06182/70670  Traction          mins 34438  Low Eval          Mins  88929  Mod Eval          Mins  72438  High Eval                            Mins  59738  Re-Eval                               mins  46349    Timed Treatment:   38   mins   Total Treatment:     38   mins    Marcelina Kwon, PT, DPT, cert. DN  Physical Therapist  [place] Lic # [id number]

## 2022-09-21 ENCOUNTER — TELEPHONE (OUTPATIENT)
Dept: FAMILY MEDICINE CLINIC | Facility: CLINIC | Age: 52
End: 2022-09-21

## 2022-09-21 RX ORDER — TAMSULOSIN HYDROCHLORIDE 0.4 MG/1
1 CAPSULE ORAL 2 TIMES DAILY
Qty: 90 CAPSULE | Refills: 1 | Status: SHIPPED | OUTPATIENT
Start: 2022-09-21 | End: 2022-12-23

## 2022-09-21 NOTE — TELEPHONE ENCOUNTER
----- Message from Berny Godwin MD sent at 9/21/2022  1:33 PM EDT -----  Regarding: FW: Siva    ----- Message -----  From: Mc Selby  Sent: 9/21/2022  12:56 PM EDT  To: Berny Godwin MD  Subject: Siva                                        I need a refill on my  Tamaloson please

## 2022-09-22 ENCOUNTER — TREATMENT (OUTPATIENT)
Dept: PHYSICAL THERAPY | Facility: CLINIC | Age: 52
End: 2022-09-22

## 2022-09-22 DIAGNOSIS — R53.1 GENERALIZED WEAKNESS: ICD-10-CM

## 2022-09-22 DIAGNOSIS — R42 EPISODIC LIGHTHEADEDNESS: ICD-10-CM

## 2022-09-22 DIAGNOSIS — Z91.81 HISTORY OF FALL: Primary | ICD-10-CM

## 2022-09-22 DIAGNOSIS — Z98.890 HISTORY OF OPEN REDUCTION AND INTERNAL FIXATION (ORIF) PROCEDURE: ICD-10-CM

## 2022-09-22 DIAGNOSIS — Z87.898 HISTORY OF DIZZINESS: ICD-10-CM

## 2022-09-22 PROCEDURE — 97110 THERAPEUTIC EXERCISES: CPT | Performed by: PHYSICAL THERAPIST

## 2022-09-22 PROCEDURE — 97112 NEUROMUSCULAR REEDUCATION: CPT | Performed by: PHYSICAL THERAPIST

## 2022-09-22 NOTE — PROGRESS NOTES
Physical Therapy Daily Progress Note      Patient: Mc Selby   : 1970  Diagnosis/ICD-10 Code:  History of fall [Z91.81]   Problems Addressed this Visit    None     Visit Diagnoses     History of fall    -  Primary    Generalized weakness        Episodic lightheadedness        History of dizziness        History of open reduction and internal fixation (ORIF) procedure          Diagnoses       Codes Comments    History of fall    -  Primary ICD-10-CM: Z91.81  ICD-9-CM: V15.88     Generalized weakness     ICD-10-CM: R53.1  ICD-9-CM: 780.79     Episodic lightheadedness     ICD-10-CM: R42  ICD-9-CM: 780.4     History of dizziness     ICD-10-CM: Z87.898  ICD-9-CM: V13.89     History of open reduction and internal fixation (ORIF) procedure     ICD-10-CM: Z98.890  ICD-9-CM: V15.29         Referring practitioner: Berny Godwin MD  Date of Initial Visit: Type: THERAPY  Noted: 2022  Today's Date: 2022    VISIT#: 4    Subjective   Garcia reports he has been working on balance at home. He notes limited tolerance to standing & walking, abt 20 min tolerance. He reports his doc has suggested he have bariatric surgery but Garcia notes he does not wish to have another surgery and plans to contact a dietician.     Objective     See Exercise, Manual, and Modality Logs for complete treatment.     Assessment/Plan  Bud reported burning in feet during & after Airex pad NMR so we transitioned to seated dynamic balance & ab activation.   Progress strengthening /stabilization /functional activity         Timed:         Manual Therapy:         mins  32386;     Therapeutic Exercise:    12     mins  64765;     Neuromuscular Elen:    30    mins  04597;    Therapeutic Activity:          mins  03628;     Gait Training:           mins  30480;     Ultrasound:          mins  63047;    Ionto                                   mins   96563  Self Care                            mins   44661  [facility]                    mins  14594  Tests & Measures              mins   21078      Un-Timed:  Electrical Stimulation:         mins  88205 ( );  Dry Needling          mins 11719/37783  Traction          mins 95755  Low Eval          Mins  34091  Mod Eval          Mins  25076  High Eval                            Mins  68907  Re-Eval                               mins  10818    Timed Treatment:   42   mins   Total Treatment:     42   mins    Marcelina Kwon, PT, DPT, cert. DN  Physical Therapist  [place] Lic # [id number]

## 2022-09-29 ENCOUNTER — TREATMENT (OUTPATIENT)
Dept: PHYSICAL THERAPY | Facility: CLINIC | Age: 52
End: 2022-09-29

## 2022-09-29 DIAGNOSIS — Z91.81 HISTORY OF FALL: Primary | ICD-10-CM

## 2022-09-29 DIAGNOSIS — R53.1 GENERALIZED WEAKNESS: ICD-10-CM

## 2022-09-29 DIAGNOSIS — Z87.898 HISTORY OF DIZZINESS: ICD-10-CM

## 2022-09-29 DIAGNOSIS — R42 EPISODIC LIGHTHEADEDNESS: ICD-10-CM

## 2022-09-29 DIAGNOSIS — Z98.890 HISTORY OF OPEN REDUCTION AND INTERNAL FIXATION (ORIF) PROCEDURE: ICD-10-CM

## 2022-09-29 PROCEDURE — 97110 THERAPEUTIC EXERCISES: CPT | Performed by: PHYSICAL THERAPIST

## 2022-09-29 PROCEDURE — 97112 NEUROMUSCULAR REEDUCATION: CPT | Performed by: PHYSICAL THERAPIST

## 2022-09-29 NOTE — PROGRESS NOTES
Physical Therapy Daily Progress Note      Patient: Mc Selby   : 1970  Diagnosis/ICD-10 Code:  History of fall [Z91.81]   Problems Addressed this Visit    None     Visit Diagnoses     History of fall    -  Primary    Generalized weakness        Episodic lightheadedness        History of dizziness        History of open reduction and internal fixation (ORIF) procedure          Diagnoses       Codes Comments    History of fall    -  Primary ICD-10-CM: Z91.81  ICD-9-CM: V15.88     Generalized weakness     ICD-10-CM: R53.1  ICD-9-CM: 780.79     Episodic lightheadedness     ICD-10-CM: R42  ICD-9-CM: 780.4     History of dizziness     ICD-10-CM: Z87.898  ICD-9-CM: V13.89     History of open reduction and internal fixation (ORIF) procedure     ICD-10-CM: Z98.890  ICD-9-CM: V15.29         Referring practitioner: Berny Godwin MD  Date of Initial Visit: Type: THERAPY  Noted: 2022  Today's Date: 2022    VISIT#: 5    Subjective   Budman reports he has been exhausted. He's not sleeping more than an hr at a time, his legs have been very restless and they don't seem to settle until abt 2 am. He's also been trying to walk in the evenings but his R>L hips have been hurting all the way to his foot. Taking some acetaminophen but it isn't very effective.    Objective     See Exercise, Manual, and Modality Logs for complete treatment.     Assessment/Plan  Budman reported B hip pain after extended time on elliptical bike. Verbal cues for form during seated hs stretch to avoid undue lumbar compression. Cont w/static & dynamic balance, lumbopelvic mm strength and NM recruitment to reduce chronic hip pain & generalized weakness from prolonged illness history.          Timed:         Manual Therapy:         mins  76510;     Therapeutic Exercise:    20     mins  69970;     Neuromuscular Elen:    23    mins  01211;    Therapeutic Activity:          mins  04929;     Gait Training:           mins  73740;      Ultrasound:          mins  00284;    Ionto                                   mins   26503  Self Care                            mins   84914  Canalith Repos                   mins  77148  Tests & Measures              mins   81221      Un-Timed:  Electrical Stimulation:         mins  23583 ( );  Dry Needling          mins 66677/94168  Traction          mins 05473  Low Eval          Mins  96483  Mod Eval          Mins  41383  High Eval                            Mins  05916  Re-Eval                               mins  97512    Timed Treatment:   43   mins   Total Treatment:     43   mins    Marcelina Kwon, PT, DPT, cert. DN  Physical Therapist  [place] Lic # [id number]

## 2022-10-06 ENCOUNTER — TREATMENT (OUTPATIENT)
Dept: PHYSICAL THERAPY | Facility: CLINIC | Age: 52
End: 2022-10-06

## 2022-10-06 DIAGNOSIS — Z98.890 HISTORY OF OPEN REDUCTION AND INTERNAL FIXATION (ORIF) PROCEDURE: ICD-10-CM

## 2022-10-06 DIAGNOSIS — R42 EPISODIC LIGHTHEADEDNESS: ICD-10-CM

## 2022-10-06 DIAGNOSIS — Z91.81 HISTORY OF FALL: Primary | ICD-10-CM

## 2022-10-06 DIAGNOSIS — R53.1 GENERALIZED WEAKNESS: ICD-10-CM

## 2022-10-06 DIAGNOSIS — Z87.898 HISTORY OF DIZZINESS: ICD-10-CM

## 2022-10-06 PROCEDURE — 97110 THERAPEUTIC EXERCISES: CPT | Performed by: PHYSICAL THERAPIST

## 2022-10-06 PROCEDURE — 97112 NEUROMUSCULAR REEDUCATION: CPT | Performed by: PHYSICAL THERAPIST

## 2022-10-06 NOTE — PROGRESS NOTES
Physical Therapy Daily Progress Note      Patient: Mc Selby   : 1970  Diagnosis/ICD-10 Code:  History of fall [Z91.81]   Problems Addressed this Visit    None     Visit Diagnoses     History of fall    -  Primary    Generalized weakness        Episodic lightheadedness        History of dizziness        History of open reduction and internal fixation (ORIF) procedure          Diagnoses       Codes Comments    History of fall    -  Primary ICD-10-CM: Z91.81  ICD-9-CM: V15.88     Generalized weakness     ICD-10-CM: R53.1  ICD-9-CM: 780.79     Episodic lightheadedness     ICD-10-CM: R42  ICD-9-CM: 780.4     History of dizziness     ICD-10-CM: Z87.898  ICD-9-CM: V13.89     History of open reduction and internal fixation (ORIF) procedure     ICD-10-CM: Z98.890  ICD-9-CM: V15.29         Referring practitioner: Berny Godwin MD  Date of Initial Visit: Type: THERAPY  Noted: 2022  Today's Date: 10/6/2022    VISIT#: 6    Subjective   Garcia reports he was sore after last tx session, had to reduce his walking at home d/t soreness. He reports improved balance and confidence overall since beginning therapy.     Objective     See Exercise, Manual, and Modality Logs for complete treatment.     Assessment/Plan  Garcia exhibits improved static & dynamic balance overall. He was encouraged to continue with home balance challenges.   Progress strengthening /stabilization /functional activity         Timed:         Manual Therapy:         mins  33138;     Therapeutic Exercise:    12     mins  75203;     Neuromuscular Elen:    35    mins  96804;    Therapeutic Activity:          mins  32820;     Gait Training:           mins  22027;     Ultrasound:          mins  04770;    Ionto                                   mins   83583  Self Care                            mins   18294  Canalith Repos                   mins  82015  Tests & Measures              mins   36808      Un-Timed:  Electrical Stimulation:          mins  36646 ( );  Dry Needling          mins 99360/92530  Traction          mins 89765  Low Eval         Mins  41820  Mod Eval          Mins  42103  High Eval                            Mins  16791  Re-Eval                               mins  62868    Timed Treatment:   47   mins   Total Treatment:     47   mins    Marcelina Kwon, PT, DPT, cert. DN  Physical Therapist  [place] Lic # [id number]

## 2022-10-27 ENCOUNTER — TELEPHONE (OUTPATIENT)
Dept: FAMILY MEDICINE CLINIC | Facility: CLINIC | Age: 52
End: 2022-10-27

## 2022-10-27 ENCOUNTER — TREATMENT (OUTPATIENT)
Dept: PHYSICAL THERAPY | Facility: CLINIC | Age: 52
End: 2022-10-27

## 2022-10-27 DIAGNOSIS — R53.1 GENERALIZED WEAKNESS: ICD-10-CM

## 2022-10-27 DIAGNOSIS — Z87.898 HISTORY OF DIZZINESS: ICD-10-CM

## 2022-10-27 DIAGNOSIS — Z91.81 HISTORY OF FALL: Primary | ICD-10-CM

## 2022-10-27 DIAGNOSIS — R42 EPISODIC LIGHTHEADEDNESS: ICD-10-CM

## 2022-10-27 DIAGNOSIS — Z98.890 HISTORY OF OPEN REDUCTION AND INTERNAL FIXATION (ORIF) PROCEDURE: ICD-10-CM

## 2022-10-27 PROCEDURE — 97110 THERAPEUTIC EXERCISES: CPT | Performed by: PHYSICAL THERAPIST

## 2022-10-27 PROCEDURE — 97112 NEUROMUSCULAR REEDUCATION: CPT | Performed by: PHYSICAL THERAPIST

## 2022-10-27 NOTE — TELEPHONE ENCOUNTER
ON 9/21 WE SENT XIFAXAN 550 MG TO [facility] AND ON 9/26 MONTRELL LET THE PATIENT KNOW THAT HE PA HAD BEEN APPROVED BUT HE SAID HE STILL HAS NOT BEEN ABLE TO GET THE MEDICATION.

## 2022-10-27 NOTE — PROGRESS NOTES
Physical Therapy Daily Treatment Note  [address] [place] [address] [address], [address], [place]  [address]    Patient: Mc Selby   : 1970  Diagnosis/ICD-10 Code:  History of fall [Z91.81]   Problems Addressed this Visit    None  Visit Diagnoses     History of fall    -  Primary    Generalized weakness        Episodic lightheadedness        History of dizziness        History of open reduction and internal fixation (ORIF) procedure          Diagnoses       Codes Comments    History of fall    -  Primary ICD-10-CM: Z91.81  ICD-9-CM: V15.88     Generalized weakness     ICD-10-CM: R53.1  ICD-9-CM: 780.79     Episodic lightheadedness     ICD-10-CM: R42  ICD-9-CM: 780.4     History of dizziness     ICD-10-CM: Z87.898  ICD-9-CM: V13.89     History of open reduction and internal fixation (ORIF) procedure     ICD-10-CM: Z98.890  ICD-9-CM: V15.29         Referring practitioner: Berny Godwin MD  Date of Initial Visit: Type: THERAPY  Noted: 2022  Today's Date: 10/27/2022    VISIT#: 7    Subjective   Garcia reports he feels like his balance has improved, he has more confidence. However, his hips cont to be sore with activity. He notes he used to be a member at the [employer] but got tired of the commute back and forth. He would like to cont with exercise and start a gym program but is having difficulty finding the motivation.     Objective     See Exercise, Manual, and Modality Logs for complete treatment.     Assessment/Plan  Garcia is exhibiting improved static & dynamic balance, can tolerate eyes closed and head mvmts without LOB. He will need recert to extend POC next session if he decides to continue with clinical PT.   Progress strengthening /stabilization /functional activity         Timed:         Manual Therapy:         mins  86175;     Therapeutic Exercise:    33     mins  74669;     Neuromuscular Elen:    10    mins  08125;    Therapeutic Activity:          mins  13734;     Gait Training:           mins  55692;      Ultrasound:          mins  90074;    Ionto                                   mins   25678  Self Care                            mins   87446  Canalith Repos                   mins  85986  Tests & Measures              mins   02812      Un-Timed:  Electrical Stimulation:         mins  02259 ( );  Dry Needling          mins 83166/30976  Traction          mins 90581  Low Eval          Mins  86212  Mod Eval          Mins  28535  High Eval                            Mins  14280  Re-Eval                               mins  50357    Timed Treatment:   43   mins   Total Treatment:     43   mins    Marcelina Kwon, PT, DPT, cert. DN  Physical Therapist  [place] Lic # [id number]

## 2022-10-31 RX ORDER — AZELASTINE HYDROCHLORIDE 137 UG/1
SPRAY, METERED NASAL
Qty: 30 ML | Refills: 3 | Status: SHIPPED | OUTPATIENT
Start: 2022-10-31 | End: 2023-01-30

## 2022-11-04 ENCOUNTER — TELEPHONE (OUTPATIENT)
Dept: FAMILY MEDICINE CLINIC | Facility: CLINIC | Age: 52
End: 2022-11-04

## 2022-11-04 ENCOUNTER — PATIENT MESSAGE (OUTPATIENT)
Dept: FAMILY MEDICINE CLINIC | Facility: CLINIC | Age: 52
End: 2022-11-04

## 2022-11-04 NOTE — TELEPHONE ENCOUNTER
Caller: Mc Selby    Relationship: Self    Best call back number: [phone] AS SOON AS POSSIBLE    What was the call regarding: PATIENT IS CALLING REGARDING THE MEDICATION riFAXIMin (Xifaxan) 550 MG tablet.     PATIENT STATED THE PHARMACY STILL DOES NOT HAVE THE APPROVAL TO GET THE MEDICATION.     PATIENT STATED HE IS NEEDING TO SPEAK TO SOMEONE TO GET THIS FIGURED OUT.     PATIENT STATED HE NEEDS HIS MEDICATION TODAY 11.04.22.     PLEASE CALL TO LET PATIENT KNOW AS SOON AS POSSIBLE.

## 2022-11-04 NOTE — TELEPHONE ENCOUNTER
Hub to read   I called number no answer    The mis communication was in his ins info   He has a rx ins that we do not have on file  That we were not getting approved by ins that [facility] was runnning it thru   It has now been submitted  And is pending with his  [employer]  Ins   I spoke to [facility]  To let them know this

## 2022-11-05 ENCOUNTER — PATIENT MESSAGE (OUTPATIENT)
Dept: FAMILY MEDICINE CLINIC | Facility: CLINIC | Age: 52
End: 2022-11-05

## 2022-11-06 NOTE — TELEPHONE ENCOUNTER
From: Mc Selby  To: Berny Godwin MD  Sent: 11/4/2022 1:34 PM EDT  Subject: Xifaxin    I still need my prescriptions sent for xifaxin I don't have another week's left This has been going on for 3 weeks

## 2022-11-07 ENCOUNTER — TELEPHONE (OUTPATIENT)
Dept: FAMILY MEDICINE CLINIC | Facility: CLINIC | Age: 52
End: 2022-11-07

## 2022-11-07 NOTE — TELEPHONE ENCOUNTER
Dr. Godwin   After finally getting the correct the insurance to send his PA for Xifaxan to they denied it is there special restrictions on this med?   Is there something special requirements that I need to print and send appeal? His [place] medicaid has always approved this med  But this is new ins

## 2022-11-08 NOTE — TELEPHONE ENCOUNTER
Hub to read      Per Dr. Godwin  [facility]

## 2022-11-10 ENCOUNTER — TREATMENT (OUTPATIENT)
Dept: PHYSICAL THERAPY | Facility: CLINIC | Age: 52
End: 2022-11-10

## 2022-11-10 DIAGNOSIS — Z91.81 HISTORY OF FALL: Primary | ICD-10-CM

## 2022-11-10 DIAGNOSIS — R53.1 GENERALIZED WEAKNESS: ICD-10-CM

## 2022-11-10 DIAGNOSIS — Z87.898 HISTORY OF DIZZINESS: ICD-10-CM

## 2022-11-10 DIAGNOSIS — R42 EPISODIC LIGHTHEADEDNESS: ICD-10-CM

## 2022-11-10 DIAGNOSIS — Z98.890 HISTORY OF OPEN REDUCTION AND INTERNAL FIXATION (ORIF) PROCEDURE: ICD-10-CM

## 2022-11-10 PROCEDURE — 97110 THERAPEUTIC EXERCISES: CPT | Performed by: PHYSICAL THERAPIST

## 2022-11-10 PROCEDURE — 97112 NEUROMUSCULAR REEDUCATION: CPT | Performed by: PHYSICAL THERAPIST

## 2022-11-10 NOTE — PROGRESS NOTES
Physical Therapy Daily Treatment Note  [address] [facility] [address], [address], [place]  [address]    Patient: Mc Selby   : 1970  Diagnosis/ICD-10 Code:  History of fall [Z91.81]   Problems Addressed this Visit    None  Visit Diagnoses     History of fall    -  Primary    Generalized weakness        Episodic lightheadedness        History of dizziness        History of open reduction and internal fixation (ORIF) procedure          Diagnoses       Codes Comments    History of fall    -  Primary ICD-10-CM: Z91.81  ICD-9-CM: V15.88     Generalized weakness     ICD-10-CM: R53.1  ICD-9-CM: 780.79     Episodic lightheadedness     ICD-10-CM: R42  ICD-9-CM: 780.4     History of dizziness     ICD-10-CM: Z87.898  ICD-9-CM: V13.89     History of open reduction and internal fixation (ORIF) procedure     ICD-10-CM: Z98.890  ICD-9-CM: V15.29         Referring practitioner: No ref. provider found  Date of Initial Visit: Type: THERAPY  Noted: 2022  Today's Date: 11/10/2022    VISIT#: 8    Subjective   Budman reports he did well overall on vacation in terms of balance, had no close calls. However, after about a day and a half he had significant B foot, leg, and low back pain.     Objective     See Exercise, Manual, and Modality Logs for complete treatment.     Assessment/Plan  Budpankaj is making progress in terms of NMR challenge tolerance and performance. He cont to have LE pain with prolonged activity. He is noting improved tolerance to upright activity compared to when he began therapy. He has one appt remaining on schedule, if pt wishes to cont, will need to recert at that time to extend POC.   Other         Timed:         Manual Therapy:         mins  14146;     Therapeutic Exercise:    18     mins  29969;     Neuromuscular Elen:    25    mins  37750;    Therapeutic Activity:          mins  01004;     Gait Training:           mins  96690;     Ultrasound:          mins  61357;    Ionto                                   mins    04166  Self Care                            mins   13401  Canalith Repos                   mins  22140  Tests & Measures              mins   24090      Un-Timed:  Electrical Stimulation:         mins  30144 ( );  Dry Needling          mins 52121/89139  Traction          mins 08065  Low Eval          Mins  95059  Mod Eval          Mins  68457  High Eval                            Mins  31849  Re-Eval                               mins  44843    Timed Treatment:   43   mins   Total Treatment:     43   mins    Marcelina Kwon, PT, DPT, cert. DN  Physical Therapist  [place] Lic # [id number]

## 2022-11-17 ENCOUNTER — TREATMENT (OUTPATIENT)
Dept: PHYSICAL THERAPY | Facility: CLINIC | Age: 52
End: 2022-11-17

## 2022-11-17 DIAGNOSIS — R29.898 MUSCULAR DECONDITIONING: ICD-10-CM

## 2022-11-17 DIAGNOSIS — Z98.890 HISTORY OF OPEN REDUCTION AND INTERNAL FIXATION (ORIF) PROCEDURE: ICD-10-CM

## 2022-11-17 DIAGNOSIS — Z87.898 HISTORY OF DIZZINESS: ICD-10-CM

## 2022-11-17 DIAGNOSIS — R42 EPISODIC LIGHTHEADEDNESS: ICD-10-CM

## 2022-11-17 DIAGNOSIS — R29.898 IMPAIRED FLEXIBILITY OF LOWER EXTREMITY: ICD-10-CM

## 2022-11-17 DIAGNOSIS — Z91.81 HISTORY OF FALL: Primary | ICD-10-CM

## 2022-11-17 DIAGNOSIS — R53.1 GENERALIZED WEAKNESS: ICD-10-CM

## 2022-11-17 PROCEDURE — 97110 THERAPEUTIC EXERCISES: CPT | Performed by: PHYSICAL THERAPIST

## 2022-11-17 PROCEDURE — 97164 PT RE-EVAL EST PLAN CARE: CPT | Performed by: PHYSICAL THERAPIST

## 2022-11-17 PROCEDURE — 97112 NEUROMUSCULAR REEDUCATION: CPT | Performed by: PHYSICAL THERAPIST

## 2022-11-17 NOTE — PROGRESS NOTES
Re-Assessment / Re-Certification  [address] [place] [address] [address], [address], [place]  [address]      Patient: Mc Selby   : 1970  Diagnosis/ICD-10 Code:  History of fall [Z91.81]  Referring practitioner: Berny Godwin MD  Date of Initial Visit: Type: THERAPY  Noted: 2022  Today's Date: 2022  Patient seen for 9 sessions      Subjective:   Mc Selby reports: he'd like to continue with PT. He notes feeling more stable. He cont to have B hip/foot pain with prolonged activity. He also notes inability to retrieve items from floor while standing and would like to work on that. He notes his dizziness has improved, he can get up & down escalators, though he still doesn't do stairs. He notes that despite his DHI score worsening today, he is improved. When asked about discrepancy he notes that when he began therapy he was likely trying to look at things in things in the most favorable light, rather than the day to day reality.   Subjective Questionnaire: PT Functional Test: DHI = 78; LEFS:  = 98% impairment  Clinical Progress: improved  Home Program Compliance: Yes  Treatment has included: therapeutic exercise and neuromuscular re-education    Subjective   Objective          Right Hip Flexibility Comments:   When seated & R LE outstretched, 0 deg flexion at R knee; pt lackin 30 deg from 90 deg at hip;   Left is same    Functional Assessment     Single Leg Stance   Left: 5 seconds  Right: 0 seconds    Comments  30s STS: 7.5  Attempting to retrieve item from floor: fingertips 2cm from item, effortful & SOB after        Assessment & Plan     Assessment    Assessment details: Garcia has made improvements in balance & functional strength. Despite worse score on DHI, pt is reporting reduced dizziness with function. He exhibits difficulty/limitation w/bending to  items from floor d/t soft tissue stiffness/shortness. Added relevant ICD 10 codes to dx list. Pt exhibits deconditioning from 2 year bout of  severe health issues. He is making progress toward goals and will benefit from continued skilled PT.   Prognosis: good      Progress toward previous goals: Partially Met  Plan Goals: STG to be achieved in 3 wks  1. Pt will achieve rising from standard chair without UE support to reflect improved LE strength and general stability.- MET  2. Pt will improve SLS to at least 5s bilat for reduced injurious falls risk.  - PROGRESSING, 5s on L 0s on R on 11/17/22  3. Pt will improve DHI from 66 to no greater than 56 to for improved QOL. - NOT MET, see subjective (78 on 11/17/22)    LTG to be achieved in 12 wks  1. Pt will improve 30s STS to at least 10 to reflect progress toward age norm (15) in terms of LE strength and stability - NOT MET, 7x on 11/17/22  2. Pt will demonstrate SLS at least 10s bilat to reduce fall risk. - PROGRESSING, 5s on L 0s on R on 11/17/22  3. Pt will improve DHI from 66 to no greater than 40 to for improved QOL.-NOT MET, see subjective (78 on 11/17/22)    New Goals  Long-term goals (LTG):   4. Pt will demonstrate donning shoe/sock on R independently.   5. Pt will demonstrate item retrieval from floor without assistance or SOB.   6. Pt will demonstrate 90 deg hip flexion in upright sitting w/knee fully extended to reflect improved HS flexibility.       Recommendations: Continue as planned  Timeframe: 3 months  Frequency: 2x/wk  Prognosis to achieve goals: good    PT Signature: Marcelina Kwon PT, DPT, cert DN  Physical Therapist  [place] Lic # [id number]  Electronically signed by Marcelina Kwon PT, 11/17/22, 10:22 AM EST    Certification Period: 11/13/22 thru 2/10/23  I certify that the therapy services are furnished while this patient is under my care. The services outlined above are required by this patient and will be reviewed every 90 days.    PHYSICIAN: Berny Godwin MD _____________________________________________________________  NPI: [id number]                                       DATE:    ___________________________________________      Timed:         Manual Therapy:         mins  35295;     Therapeutic Exercise:    15     mins  22097;     Neuromuscular Elen:    8    mins  23718;    Therapeutic Activity:          mins  11058;     Gait Training:           mins  89581;     Ultrasound:          mins  14253;    Ionto                                   mins   61579  Self Care                            mins   37303  Tests & Measures             mins   03419    Un-Timed:  Electrical Stimulation:         mins  65809 ( );  Dry Needling          mins 59696/69996  Traction          mins 78133  Can Repos          mins 15172  Low Eval          Mins  35139  Mod Eval          Mins  98467  High Eval                            Mins  93733  Re-Eval                           25    mins  99767      Timed Treatment:  23    mins   Total Treatment:     48   mins

## 2022-11-18 ENCOUNTER — OFFICE VISIT (OUTPATIENT)
Dept: FAMILY MEDICINE CLINIC | Facility: CLINIC | Age: 52
End: 2022-11-18

## 2022-11-18 ENCOUNTER — LAB (OUTPATIENT)
Dept: FAMILY MEDICINE CLINIC | Facility: CLINIC | Age: 52
End: 2022-11-18

## 2022-11-18 VITALS
WEIGHT: 287 LBS | HEIGHT: 70 IN | RESPIRATION RATE: 15 BRPM | OXYGEN SATURATION: 97 % | DIASTOLIC BLOOD PRESSURE: 82 MMHG | SYSTOLIC BLOOD PRESSURE: 122 MMHG | HEART RATE: 71 BPM | TEMPERATURE: 96.9 F | BODY MASS INDEX: 41.09 KG/M2

## 2022-11-18 DIAGNOSIS — R42 VERTIGO: ICD-10-CM

## 2022-11-18 DIAGNOSIS — J34.89 RHINORRHEA: ICD-10-CM

## 2022-11-18 DIAGNOSIS — R05.8 PRODUCTIVE COUGH: ICD-10-CM

## 2022-11-18 DIAGNOSIS — K76.82 ENCEPHALOPATHY, HEPATIC: Primary | ICD-10-CM

## 2022-11-18 DIAGNOSIS — E11.65 TYPE 2 DIABETES MELLITUS WITH HYPERGLYCEMIA, WITH LONG-TERM CURRENT USE OF INSULIN: Chronic | ICD-10-CM

## 2022-11-18 DIAGNOSIS — Z79.4 TYPE 2 DIABETES MELLITUS WITH HYPERGLYCEMIA, WITH LONG-TERM CURRENT USE OF INSULIN: Chronic | ICD-10-CM

## 2022-11-18 DIAGNOSIS — M16.11 PRIMARY OSTEOARTHRITIS OF RIGHT HIP: ICD-10-CM

## 2022-11-18 DIAGNOSIS — E66.9 OBESITY (BMI 30-39.9): Chronic | ICD-10-CM

## 2022-11-18 DIAGNOSIS — K70.30 ALCOHOLIC CIRRHOSIS OF LIVER WITHOUT ASCITES: Chronic | ICD-10-CM

## 2022-11-18 LAB
ALBUMIN SERPL-MCNC: 4 G/DL (ref 3.5–5.2)
ALBUMIN/GLOB SERPL: 0.9 G/DL
ALP SERPL-CCNC: 66 U/L (ref 39–117)
ALT SERPL W P-5'-P-CCNC: 10 U/L (ref 1–41)
ANION GAP SERPL CALCULATED.3IONS-SCNC: 10.8 MMOL/L (ref 5–15)
AST SERPL-CCNC: 24 U/L (ref 1–40)
BILIRUB SERPL-MCNC: 0.9 MG/DL (ref 0–1.2)
BUN SERPL-MCNC: 11 MG/DL (ref 6–20)
BUN/CREAT SERPL: 10.7 (ref 7–25)
CALCIUM SPEC-SCNC: 9.2 MG/DL (ref 8.6–10.5)
CHLORIDE SERPL-SCNC: 97 MMOL/L (ref 98–107)
CO2 SERPL-SCNC: 26.2 MMOL/L (ref 22–29)
CREAT SERPL-MCNC: 1.03 MG/DL (ref 0.76–1.27)
EGFRCR SERPLBLD CKD-EPI 2021: 86.3 ML/MIN/1.73
GLOBULIN UR ELPH-MCNC: 4.3 GM/DL
GLUCOSE SERPL-MCNC: 154 MG/DL (ref 65–99)
HBA1C MFR BLD: 6.3 % (ref 3.5–5.6)
POTASSIUM SERPL-SCNC: 4 MMOL/L (ref 3.5–5.2)
PROT SERPL-MCNC: 8.3 G/DL (ref 6–8.5)
SODIUM SERPL-SCNC: 134 MMOL/L (ref 136–145)

## 2022-11-18 PROCEDURE — 80053 COMPREHEN METABOLIC PANEL: CPT | Performed by: FAMILY MEDICINE

## 2022-11-18 PROCEDURE — 83036 HEMOGLOBIN GLYCOSYLATED A1C: CPT | Performed by: FAMILY MEDICINE

## 2022-11-18 PROCEDURE — 36415 COLL VENOUS BLD VENIPUNCTURE: CPT | Performed by: FAMILY MEDICINE

## 2022-11-18 PROCEDURE — 99214 OFFICE O/P EST MOD 30 MIN: CPT | Performed by: FAMILY MEDICINE

## 2022-11-18 RX ORDER — SPIRONOLACTONE 50 MG/1
50 TABLET, FILM COATED ORAL DAILY
Qty: 30 TABLET | Refills: 2 | Status: SHIPPED | OUTPATIENT
Start: 2022-11-18 | End: 2022-12-11

## 2022-11-18 RX ORDER — SEMAGLUTIDE 1.34 MG/ML
INJECTION, SOLUTION SUBCUTANEOUS
Qty: 1.5 ML | Refills: 3 | Status: SHIPPED | OUTPATIENT
Start: 2022-11-18 | End: 2023-02-22

## 2022-11-18 RX ORDER — GABAPENTIN 300 MG/1
CAPSULE ORAL
Qty: 90 CAPSULE | Refills: 2 | Status: SHIPPED | OUTPATIENT
Start: 2022-11-18

## 2022-11-30 DIAGNOSIS — K21.9 GERD WITHOUT ESOPHAGITIS: Chronic | ICD-10-CM

## 2022-12-01 RX ORDER — FAMOTIDINE 40 MG/1
TABLET, FILM COATED ORAL
Qty: 180 TABLET | Refills: 3 | Status: SHIPPED | OUTPATIENT
Start: 2022-12-01

## 2022-12-07 ENCOUNTER — TREATMENT (OUTPATIENT)
Dept: PHYSICAL THERAPY | Facility: CLINIC | Age: 52
End: 2022-12-07

## 2022-12-07 DIAGNOSIS — R29.898 MUSCULAR DECONDITIONING: ICD-10-CM

## 2022-12-07 DIAGNOSIS — R53.1 GENERALIZED WEAKNESS: ICD-10-CM

## 2022-12-07 DIAGNOSIS — Z87.898 HISTORY OF DIZZINESS: ICD-10-CM

## 2022-12-07 DIAGNOSIS — R29.898 IMPAIRED FLEXIBILITY OF LOWER EXTREMITY: ICD-10-CM

## 2022-12-07 DIAGNOSIS — R42 EPISODIC LIGHTHEADEDNESS: ICD-10-CM

## 2022-12-07 DIAGNOSIS — Z98.890 HISTORY OF OPEN REDUCTION AND INTERNAL FIXATION (ORIF) PROCEDURE: ICD-10-CM

## 2022-12-07 DIAGNOSIS — Z91.81 HISTORY OF FALL: Primary | ICD-10-CM

## 2022-12-07 PROCEDURE — 97112 NEUROMUSCULAR REEDUCATION: CPT | Performed by: PHYSICAL THERAPIST

## 2022-12-07 PROCEDURE — 97140 MANUAL THERAPY 1/> REGIONS: CPT | Performed by: PHYSICAL THERAPIST

## 2022-12-07 PROCEDURE — 97110 THERAPEUTIC EXERCISES: CPT | Performed by: PHYSICAL THERAPIST

## 2022-12-07 NOTE — PROGRESS NOTES
Physical Therapy Daily Treatment Note  [address] [place] [address] [address], [address], [place]  [address]    Patient: Mc Selby   : 1970  Diagnosis/ICD-10 Code:  History of fall [Z91.81]   Problems Addressed this Visit    None  Visit Diagnoses     History of fall    -  Primary    Generalized weakness        Episodic lightheadedness        History of dizziness        History of open reduction and internal fixation (ORIF) procedure        Muscular deconditioning        Impaired flexibility of lower extremity          Diagnoses       Codes Comments    History of fall    -  Primary ICD-10-CM: Z91.81  ICD-9-CM: V15.88     Generalized weakness     ICD-10-CM: R53.1  ICD-9-CM: 780.79     Episodic lightheadedness     ICD-10-CM: R42  ICD-9-CM: 780.4     History of dizziness     ICD-10-CM: Z87.898  ICD-9-CM: V13.89     History of open reduction and internal fixation (ORIF) procedure     ICD-10-CM: Z98.890  ICD-9-CM: V15.29     Muscular deconditioning     ICD-10-CM: R29.898  ICD-9-CM: 781.99     Impaired flexibility of lower extremity     ICD-10-CM: R29.898  ICD-9-CM: 781.99         Referring practitioner: Berny Godwin MD  Date of Initial Visit: Type: THERAPY  Noted: 2022  Today's Date: 2022    VISIT#: 10    Subjective   Budman reports he is having a difficult day today, everything hurts. He denies any specific precipitating event and the weather doesn't seem to affect his arthritis. He notes anytime he stops moving for any length of time his whole body stiffens up. He remarks he is having difficulty getting into vehicles d/t hip & knee stiffness & difficulty flexing hips. Does not matter which side of vehicle he's entering.     Objective     See Exercise, Manual, and Modality Logs for complete treatment.     Assessment/Plan  Budpankaj reported intermittent groin pain during hip jt mobilizations, though was able to tolerate MT with slight repositioning of belt & direction of force from PT. Focusing on hip pain &  mobility.   Progress per Plan of Care         Timed:         Manual Therapy:    10     mins  32223;     Therapeutic Exercise:    18     mins  94446;     Neuromuscular Elen:    12    mins  68899;    Therapeutic Activity:          mins  34401;     Gait Training:           mins  47591;     Ultrasound:          mins  69493;    Ionto                                   mins   42692  Self Care                            mins   12402  Canalith Repos                   mins  23176  Tests & Measures              mins   03602      Un-Timed:  Electrical Stimulation:         mins  20062 ( );  Dry Needling          mins 42416/08419  Traction          mins 58179  Low Eval          Mins  96763  Mod Eval          Mins  63610  High Eval                            Mins  64606  Re-Eval                               mins  85457    Timed Treatment:   40   mins   Total Treatment:     40   mins    Marcelina Kwon, PT, DPT, cert. DN  Physical Therapist  [place] Lic # [id number]

## 2022-12-11 DIAGNOSIS — K70.30 ALCOHOLIC CIRRHOSIS OF LIVER WITHOUT ASCITES: Chronic | ICD-10-CM

## 2022-12-11 DIAGNOSIS — K76.82 ENCEPHALOPATHY, HEPATIC: ICD-10-CM

## 2022-12-11 RX ORDER — SPIRONOLACTONE 50 MG/1
TABLET, FILM COATED ORAL
Qty: 30 TABLET | Refills: 2 | Status: SHIPPED | OUTPATIENT
Start: 2022-12-11

## 2022-12-13 ENCOUNTER — APPOINTMENT (OUTPATIENT)
Dept: RESPIRATORY THERAPY | Facility: HOSPITAL | Age: 52
End: 2022-12-13

## 2022-12-14 ENCOUNTER — TELEPHONE (OUTPATIENT)
Dept: PHYSICAL THERAPY | Facility: CLINIC | Age: 52
End: 2022-12-14

## 2022-12-14 NOTE — TELEPHONE ENCOUNTER
Caller: Mc Selby    Relationship: Self       What was the call regarding: NOT FEELING WELL TODAY

## 2022-12-22 ENCOUNTER — TREATMENT (OUTPATIENT)
Dept: PHYSICAL THERAPY | Facility: CLINIC | Age: 52
End: 2022-12-22

## 2022-12-22 DIAGNOSIS — Z87.898 HISTORY OF DIZZINESS: ICD-10-CM

## 2022-12-22 DIAGNOSIS — R42 EPISODIC LIGHTHEADEDNESS: ICD-10-CM

## 2022-12-22 DIAGNOSIS — R29.898 IMPAIRED FLEXIBILITY OF LOWER EXTREMITY: ICD-10-CM

## 2022-12-22 DIAGNOSIS — R53.1 GENERALIZED WEAKNESS: ICD-10-CM

## 2022-12-22 DIAGNOSIS — Z91.81 HISTORY OF FALL: Primary | ICD-10-CM

## 2022-12-22 DIAGNOSIS — Z98.890 HISTORY OF OPEN REDUCTION AND INTERNAL FIXATION (ORIF) PROCEDURE: ICD-10-CM

## 2022-12-22 DIAGNOSIS — R29.898 MUSCULAR DECONDITIONING: ICD-10-CM

## 2022-12-22 PROCEDURE — 97112 NEUROMUSCULAR REEDUCATION: CPT | Performed by: PHYSICAL THERAPIST

## 2022-12-22 PROCEDURE — 97110 THERAPEUTIC EXERCISES: CPT | Performed by: PHYSICAL THERAPIST

## 2022-12-22 NOTE — PROGRESS NOTES
Physical Therapy Daily Treatment Note  [address] [facility] [address], [address], [place]  [address]    Patient: Mc Selby   : 1970  Diagnosis/ICD-10 Code:  History of fall [Z91.81]   Problems Addressed this Visit    None  Visit Diagnoses     History of fall    -  Primary    Generalized weakness        Episodic lightheadedness        History of dizziness        History of open reduction and internal fixation (ORIF) procedure        Muscular deconditioning        Impaired flexibility of lower extremity          Diagnoses       Codes Comments    History of fall    -  Primary ICD-10-CM: Z91.81  ICD-9-CM: V15.88     Generalized weakness     ICD-10-CM: R53.1  ICD-9-CM: 780.79     Episodic lightheadedness     ICD-10-CM: R42  ICD-9-CM: 780.4     History of dizziness     ICD-10-CM: Z87.898  ICD-9-CM: V13.89     History of open reduction and internal fixation (ORIF) procedure     ICD-10-CM: Z98.890  ICD-9-CM: V15.29     Muscular deconditioning     ICD-10-CM: R29.898  ICD-9-CM: 781.99     Impaired flexibility of lower extremity     ICD-10-CM: R29.898  ICD-9-CM: 781.99         Referring practitioner: Berny Godwin MD  Date of Initial Visit: Type: THERAPY  Noted: 2022  Today's Date: 2022    VISIT#: 11    Subjective   Budman reports he was sore for a few days after last tx session, attributes that to the joint mobilizations. He was prescribed gabapentin which made him extremely fatigued so he wasn't able to attend last tx session. He has since quit taking it bc of the negative side effects.     Objective     See Exercise, Manual, and Modality Logs for complete treatment.     Assessment/Plan  Budman tolerated today's exercises very well. Held on MT and standing exercises as they are provocative and he has been working on balance at home.   Progress per Plan of Care         Timed:         Manual Therapy:         mins  00312;     Therapeutic Exercise:    25     mins  95327;     Neuromuscular Elen:    30    mins   78250;    Therapeutic Activity:          mins  93961;     Gait Training:           mins  90244;     Ultrasound:          mins  95887;    Ionto                                   mins   08345  Self Care                            mins   35025  Canalith Repos                   mins  11926  Tests & Measures              mins   19669      Un-Timed:  Electrical Stimulation:         mins  67015 ( );  Dry Needling          mins 52967/50467  Traction          mins 59242  Low Eval          Mins  19202  Mod Eval          Mins  06109  High Eval                            Mins  59671  Re-Eval                               mins  33279    Timed Treatment:   55   mins   Total Treatment:     55   mins    Marcelina Kwon, PT, DPT, cert. DN  Physical Therapist  [place] Lic # [id number]

## 2022-12-23 RX ORDER — TAMSULOSIN HYDROCHLORIDE 0.4 MG/1
CAPSULE ORAL
Qty: 180 CAPSULE | Refills: 2 | Status: SHIPPED | OUTPATIENT
Start: 2022-12-23

## 2022-12-30 ENCOUNTER — HOSPITAL ENCOUNTER (OUTPATIENT)
Dept: RESPIRATORY THERAPY | Facility: HOSPITAL | Age: 52
Discharge: HOME | End: 2022-12-30
Admitting: FAMILY MEDICINE

## 2022-12-30 DIAGNOSIS — R05.8 PRODUCTIVE COUGH: ICD-10-CM

## 2022-12-30 PROCEDURE — 94729 DIFFUSING CAPACITY: CPT

## 2022-12-30 PROCEDURE — 94727 GAS DIL/WSHOT DETER LNG VOL: CPT

## 2022-12-30 PROCEDURE — 94060 EVALUATION OF WHEEZING: CPT

## 2022-12-30 RX ORDER — ALBUTEROL SULFATE 90 UG/1
2 AEROSOL, METERED RESPIRATORY (INHALATION) ONCE
Status: COMPLETED | OUTPATIENT
Start: 2022-12-30 | End: 2022-12-30

## 2022-12-30 RX ADMIN — ALBUTEROL SULFATE 2 PUFF: 108 INHALANT RESPIRATORY (INHALATION) at 13:06

## 2023-01-05 ENCOUNTER — TREATMENT (OUTPATIENT)
Dept: PHYSICAL THERAPY | Facility: CLINIC | Age: 53
End: 2023-01-05
Payer: MEDICARE

## 2023-01-05 DIAGNOSIS — R53.1 GENERALIZED WEAKNESS: ICD-10-CM

## 2023-01-05 DIAGNOSIS — R29.898 IMPAIRED FLEXIBILITY OF LOWER EXTREMITY: ICD-10-CM

## 2023-01-05 DIAGNOSIS — Z98.890 HISTORY OF OPEN REDUCTION AND INTERNAL FIXATION (ORIF) PROCEDURE: ICD-10-CM

## 2023-01-05 DIAGNOSIS — Z91.81 HISTORY OF FALL: Primary | ICD-10-CM

## 2023-01-05 DIAGNOSIS — R29.898 MUSCULAR DECONDITIONING: ICD-10-CM

## 2023-01-05 DIAGNOSIS — Z87.898 HISTORY OF DIZZINESS: ICD-10-CM

## 2023-01-05 DIAGNOSIS — R42 EPISODIC LIGHTHEADEDNESS: ICD-10-CM

## 2023-01-05 PROCEDURE — 97110 THERAPEUTIC EXERCISES: CPT | Performed by: PHYSICAL THERAPIST

## 2023-01-05 PROCEDURE — 97112 NEUROMUSCULAR REEDUCATION: CPT | Performed by: PHYSICAL THERAPIST

## 2023-01-05 NOTE — PROGRESS NOTES
Physical Therapy Daily Treatment Note  [address] [facility] [address], [address], [place]  [address]    Patient: Mc Selby   : 1970  Diagnosis/ICD-10 Code:  History of fall [Z91.81]   Problems Addressed this Visit    None  Visit Diagnoses     History of fall    -  Primary    Generalized weakness        Episodic lightheadedness        History of dizziness        History of open reduction and internal fixation (ORIF) procedure        Muscular deconditioning        Impaired flexibility of lower extremity          Diagnoses       Codes Comments    History of fall    -  Primary ICD-10-CM: Z91.81  ICD-9-CM: V15.88     Generalized weakness     ICD-10-CM: R53.1  ICD-9-CM: 780.79     Episodic lightheadedness     ICD-10-CM: R42  ICD-9-CM: 780.4     History of dizziness     ICD-10-CM: Z87.898  ICD-9-CM: V13.89     History of open reduction and internal fixation (ORIF) procedure     ICD-10-CM: Z98.890  ICD-9-CM: V15.29     Muscular deconditioning     ICD-10-CM: R29.898  ICD-9-CM: 781.99     Impaired flexibility of lower extremity     ICD-10-CM: R29.898  ICD-9-CM: 781.99         Referring practitioner: Berny Godwin MD  Date of Initial Visit: Type: THERAPY  Noted: 2022  Today's Date: 2023    VISIT#: 12    Subjective   Garcia reports he has been doing well lately. He's not been doing as much balance lately.     Objective     See Exercise, Manual, and Modality Logs for complete treatment.     Assessment/Plan  Garcia is tolerating tx well despite having 2 wks off PT d/t therapist illness.  Progress strengthening /stabilization /functional activity         Timed:         Manual Therapy:         mins  71625;     Therapeutic Exercise:    15     mins  17615;     Neuromuscular Elen:    25    mins  25045;    Therapeutic Activity:          mins  36562;     Gait Training:           mins  83915;     Ultrasound:          mins  30107;    Ionto                                   mins   32637  Self Care                             mins   85047  Canalith Repos                   mins  55984  Tests & Measures              mins   64683      Un-Timed:  Electrical Stimulation:         mins  36835 ( );  Dry Needling          mins 27907/39985  Traction          mins 99094  Low Eval          Mins  80959  Mod Eval          Mins  42447  High Eval                            Mins  25207  Re-Eval                               mins  01713    Timed Treatment:   40   mins   Total Treatment:     40   mins    Marcelina Kwon, PT, DPT, cert. DN  Physical Therapist  [place] Lic # [id number]

## 2023-01-12 ENCOUNTER — TREATMENT (OUTPATIENT)
Dept: PHYSICAL THERAPY | Facility: CLINIC | Age: 53
End: 2023-01-12
Payer: COMMERCIAL

## 2023-01-12 DIAGNOSIS — Z98.890 HISTORY OF OPEN REDUCTION AND INTERNAL FIXATION (ORIF) PROCEDURE: ICD-10-CM

## 2023-01-12 DIAGNOSIS — Z91.81 HISTORY OF FALL: Primary | ICD-10-CM

## 2023-01-12 DIAGNOSIS — R29.898 MUSCULAR DECONDITIONING: ICD-10-CM

## 2023-01-12 DIAGNOSIS — R29.898 IMPAIRED FLEXIBILITY OF LOWER EXTREMITY: ICD-10-CM

## 2023-01-12 DIAGNOSIS — R42 EPISODIC LIGHTHEADEDNESS: ICD-10-CM

## 2023-01-12 DIAGNOSIS — R53.1 GENERALIZED WEAKNESS: ICD-10-CM

## 2023-01-12 DIAGNOSIS — Z87.898 HISTORY OF DIZZINESS: ICD-10-CM

## 2023-01-12 PROCEDURE — 97112 NEUROMUSCULAR REEDUCATION: CPT | Performed by: PHYSICAL THERAPIST

## 2023-01-12 PROCEDURE — 97110 THERAPEUTIC EXERCISES: CPT | Performed by: PHYSICAL THERAPIST

## 2023-01-12 NOTE — PROGRESS NOTES
Physical Therapy Daily Treatment Note  [address] [place] [address] [address], [address], [place]  [address]    Patient: Mc Selby   : 1970  Diagnosis/ICD-10 Code:  History of fall [Z91.81]   Problems Addressed this Visit    None  Visit Diagnoses     History of fall    -  Primary    Generalized weakness        Episodic lightheadedness        History of dizziness        History of open reduction and internal fixation (ORIF) procedure        Muscular deconditioning        Impaired flexibility of lower extremity          Diagnoses       Codes Comments    History of fall    -  Primary ICD-10-CM: Z91.81  ICD-9-CM: V15.88     Generalized weakness     ICD-10-CM: R53.1  ICD-9-CM: 780.79     Episodic lightheadedness     ICD-10-CM: R42  ICD-9-CM: 780.4     History of dizziness     ICD-10-CM: Z87.898  ICD-9-CM: V13.89     History of open reduction and internal fixation (ORIF) procedure     ICD-10-CM: Z98.890  ICD-9-CM: V15.29     Muscular deconditioning     ICD-10-CM: R29.898  ICD-9-CM: 781.99     Impaired flexibility of lower extremity     ICD-10-CM: R29.898  ICD-9-CM: 781.99         Referring practitioner: Berny Godwin MD  Date of Initial Visit: Type: THERAPY  Noted: 2022  Today's Date: 2023    VISIT#: 13    Subjective   Garcia reports his feet cont to be sore, particularly at night. The gabapentin didn't help RLS at all and kept him awake so he hasn't been taking it. He's been working on balance a bit.     Objective     See Exercise, Manual, and Modality Logs for complete treatment.     Assessment/Plan  Garcia is noting improved strength in upper body. He has gotten a physioball for home use. Garcia would like to work more on LE strength in subsequent sessions. He'd like to work on being able to get down to the floor.   Progress strengthening /stabilization /functional activity         Timed:         Manual Therapy:         mins  73203;     Therapeutic Exercise:    20     mins  80556;     Neuromuscular Elen:    24     mins  32725;    Therapeutic Activity:          mins  14882;     Gait Training:           mins  76456;     Ultrasound:          mins  45999;    Ionto                                   mins   15131  Self Care                            mins   14678  Canalith Repos                   mins  26829  Tests & Measures              mins   60429      Un-Timed:  Electrical Stimulation:         mins  68844 ( );  Dry Needling          mins 09014/18079  Traction          mins 48141  Low Eval          Mins  36548  Mod Eval          Mins  41574  High Eval                            Mins  56331  Re-Eval                               mins  05560    Timed Treatment:   44   mins   Total Treatment:     44   mins    Marcelina Kwon, PT, DPT, cert. DN  Physical Therapist  [place] Lic # [id number]

## 2023-01-19 ENCOUNTER — TREATMENT (OUTPATIENT)
Dept: PHYSICAL THERAPY | Facility: CLINIC | Age: 53
End: 2023-01-19
Payer: COMMERCIAL

## 2023-01-19 DIAGNOSIS — Z98.890 HISTORY OF OPEN REDUCTION AND INTERNAL FIXATION (ORIF) PROCEDURE: ICD-10-CM

## 2023-01-19 DIAGNOSIS — Z91.81 HISTORY OF FALL: Primary | ICD-10-CM

## 2023-01-19 DIAGNOSIS — R42 EPISODIC LIGHTHEADEDNESS: ICD-10-CM

## 2023-01-19 DIAGNOSIS — R53.1 GENERALIZED WEAKNESS: ICD-10-CM

## 2023-01-19 DIAGNOSIS — Z87.898 HISTORY OF DIZZINESS: ICD-10-CM

## 2023-01-19 DIAGNOSIS — R29.898 MUSCULAR DECONDITIONING: ICD-10-CM

## 2023-01-19 DIAGNOSIS — R29.898 IMPAIRED FLEXIBILITY OF LOWER EXTREMITY: ICD-10-CM

## 2023-01-19 PROCEDURE — 97530 THERAPEUTIC ACTIVITIES: CPT | Performed by: PHYSICAL THERAPIST

## 2023-01-19 PROCEDURE — 97112 NEUROMUSCULAR REEDUCATION: CPT | Performed by: PHYSICAL THERAPIST

## 2023-01-19 PROCEDURE — 97110 THERAPEUTIC EXERCISES: CPT | Performed by: PHYSICAL THERAPIST

## 2023-01-19 NOTE — PROGRESS NOTES
Physical Therapy Daily Treatment Note  [address] [facility] [address], [address], [place]  [address]    Patient: Mc Selby   : 1970  Diagnosis/ICD-10 Code:  History of fall [Z91.81]   Problems Addressed this Visit    None  Visit Diagnoses     History of fall    -  Primary    Generalized weakness        Episodic lightheadedness        History of dizziness        History of open reduction and internal fixation (ORIF) procedure        Muscular deconditioning        Impaired flexibility of lower extremity          Diagnoses       Codes Comments    History of fall    -  Primary ICD-10-CM: Z91.81  ICD-9-CM: V15.88     Generalized weakness     ICD-10-CM: R53.1  ICD-9-CM: 780.79     Episodic lightheadedness     ICD-10-CM: R42  ICD-9-CM: 780.4     History of dizziness     ICD-10-CM: Z87.898  ICD-9-CM: V13.89     History of open reduction and internal fixation (ORIF) procedure     ICD-10-CM: Z98.890  ICD-9-CM: V15.29     Muscular deconditioning     ICD-10-CM: R29.898  ICD-9-CM: 781.99     Impaired flexibility of lower extremity     ICD-10-CM: R29.898  ICD-9-CM: 781.99         Referring practitioner: Berny Godwin MD  Date of Initial Visit: Type: THERAPY  Noted: 2022  Today's Date: 2023    VISIT#: 14    Subjective   Budman reports he is trying his gabapentin again and not having the adverse reaction he'd had last time. However, it doesn't seem to be helping with RLS.     Objective     See Exercise, Manual, and Modality Logs for complete treatment.     Assessment/Plan  Budman tolerated upright LE exercises well overall today. Prompted pt to take 3 brief seated rest breaks throughout upright challenges. Will con tto promote LE strength & functional strength in general.   Progress strengthening /stabilization /functional activity         Timed:         Manual Therapy:         mins  10087;     Therapeutic Exercise:    18     mins  66828;     Neuromuscular Elen:    10    mins  11558;    Therapeutic Activity:     15      mins  37245;     Gait Training:           mins  98922;     Ultrasound:          mins  55829;    Ionto                                   mins   70185  Self Care                            mins   41518  Canalith Repos                   mins  45397  Tests & Measures              mins   42117      Un-Timed:  Electrical Stimulation:         mins  53680 ( );  Dry Needling          mins 53433/57505  Traction          mins 29798  Low Eval          Mins  62388  Mod Eval          Mins  46426  High Eval                            Mins  82788  Re-Eval                               mins  68327    Timed Treatment:   43   mins   Total Treatment:     43   mins    Marcelina Kwon, PT, DPT, cert. DN  Physical Therapist  [place] Lic # [id number]

## 2023-01-26 ENCOUNTER — TREATMENT (OUTPATIENT)
Dept: PHYSICAL THERAPY | Facility: CLINIC | Age: 53
End: 2023-01-26
Payer: COMMERCIAL

## 2023-01-26 DIAGNOSIS — R29.898 MUSCULAR DECONDITIONING: ICD-10-CM

## 2023-01-26 DIAGNOSIS — Z87.898 HISTORY OF DIZZINESS: ICD-10-CM

## 2023-01-26 DIAGNOSIS — R29.898 IMPAIRED FLEXIBILITY OF LOWER EXTREMITY: ICD-10-CM

## 2023-01-26 DIAGNOSIS — Z98.890 HISTORY OF OPEN REDUCTION AND INTERNAL FIXATION (ORIF) PROCEDURE: ICD-10-CM

## 2023-01-26 DIAGNOSIS — R42 EPISODIC LIGHTHEADEDNESS: ICD-10-CM

## 2023-01-26 DIAGNOSIS — R53.1 GENERALIZED WEAKNESS: ICD-10-CM

## 2023-01-26 DIAGNOSIS — Z91.81 HISTORY OF FALL: Primary | ICD-10-CM

## 2023-01-26 PROCEDURE — 97112 NEUROMUSCULAR REEDUCATION: CPT | Performed by: PHYSICAL THERAPIST

## 2023-01-26 PROCEDURE — 97110 THERAPEUTIC EXERCISES: CPT | Performed by: PHYSICAL THERAPIST

## 2023-01-26 NOTE — PROGRESS NOTES
Physical Therapy Daily Treatment Note  [address] [place] [address] [address], [address], [place]  [address]    Patient: Mc Selby   : 1970  Diagnosis/ICD-10 Code:  History of fall [Z91.81]   Problems Addressed this Visit    None  Visit Diagnoses     History of fall    -  Primary    Generalized weakness        Episodic lightheadedness        History of dizziness        History of open reduction and internal fixation (ORIF) procedure        Muscular deconditioning        Impaired flexibility of lower extremity          Diagnoses       Codes Comments    History of fall    -  Primary ICD-10-CM: Z91.81  ICD-9-CM: V15.88     Generalized weakness     ICD-10-CM: R53.1  ICD-9-CM: 780.79     Episodic lightheadedness     ICD-10-CM: R42  ICD-9-CM: 780.4     History of dizziness     ICD-10-CM: Z87.898  ICD-9-CM: V13.89     History of open reduction and internal fixation (ORIF) procedure     ICD-10-CM: Z98.890  ICD-9-CM: V15.29     Muscular deconditioning     ICD-10-CM: R29.898  ICD-9-CM: 781.99     Impaired flexibility of lower extremity     ICD-10-CM: R29.898  ICD-9-CM: 781.99         Referring practitioner: Berny Godwin MD  Date of Initial Visit: Type: THERAPY  Noted: 2022  Today's Date: 2023    VISIT#: 15    Subjective   Garcia reports he was doing step ups yesterday, at first onto a Wii board then a taller step so his L hip is sore today. He also notes feet continue to be sore. However, he's not falling down anymore. He notes he has been doing a lot of the ab strengthening activities as well.     Objective     See Exercise, Manual, and Modality Logs for complete treatment.     Assessment/Plan  Garcia is exhibiting improved tolerance to lower ab recruitment. He cont to not L hip pain and B foot burning, though balance has improved. His goal moving forward is to get up his staircase to return to sleeping in bed instead of downstairs.     Progress per Plan of Care         Timed:         Manual Therapy:         mins   46482;     Therapeutic Exercise:    20     mins  68040;     Neuromuscular Elen:    25    mins  72099;    Therapeutic Activity:          mins  26568;     Gait Training:           mins  09224;     Ultrasound:          mins  50270;    Ionto                                   mins   67841  Self Care                            mins   67912  Canalith Repos                   mins  61688  Tests & Measures              mins   00260      Un-Timed:  Electrical Stimulation:         mins  73101 ( );  Dry Needling          mins 11226/98011  Traction          mins 29168  Low Eval          Mins  70370  Mod Eval          Mins  41071  High Eval                            Mins  20397  Re-Eval                               mins  70083    Timed Treatment:   45   mins   Total Treatment:     45   mins    Marcelina Kwon, PT, DPT, cert. DN  Physical Therapist  [place] Lic # [id number]

## 2023-01-29 DIAGNOSIS — G47.00 INSOMNIA, UNSPECIFIED TYPE: ICD-10-CM

## 2023-01-30 RX ORDER — TRAZODONE HYDROCHLORIDE 50 MG/1
TABLET ORAL
Qty: 180 TABLET | Refills: 1 | Status: SHIPPED | OUTPATIENT
Start: 2023-01-30

## 2023-01-30 RX ORDER — AZELASTINE HYDROCHLORIDE 137 UG/1
SPRAY, METERED NASAL
Qty: 30 ML | Refills: 1 | Status: SHIPPED | OUTPATIENT
Start: 2023-01-30 | End: 2023-02-23

## 2023-02-02 ENCOUNTER — TREATMENT (OUTPATIENT)
Dept: PHYSICAL THERAPY | Facility: CLINIC | Age: 53
End: 2023-02-02
Payer: COMMERCIAL

## 2023-02-02 DIAGNOSIS — R29.898 MUSCULAR DECONDITIONING: ICD-10-CM

## 2023-02-02 DIAGNOSIS — Z91.81 HISTORY OF FALL: Primary | ICD-10-CM

## 2023-02-02 DIAGNOSIS — R42 EPISODIC LIGHTHEADEDNESS: ICD-10-CM

## 2023-02-02 DIAGNOSIS — R29.898 IMPAIRED FLEXIBILITY OF LOWER EXTREMITY: ICD-10-CM

## 2023-02-02 DIAGNOSIS — Z87.898 HISTORY OF DIZZINESS: ICD-10-CM

## 2023-02-02 DIAGNOSIS — Z98.890 HISTORY OF OPEN REDUCTION AND INTERNAL FIXATION (ORIF) PROCEDURE: ICD-10-CM

## 2023-02-02 DIAGNOSIS — R53.1 GENERALIZED WEAKNESS: ICD-10-CM

## 2023-02-02 PROCEDURE — 97112 NEUROMUSCULAR REEDUCATION: CPT | Performed by: PHYSICAL THERAPIST

## 2023-02-02 PROCEDURE — 97110 THERAPEUTIC EXERCISES: CPT | Performed by: PHYSICAL THERAPIST

## 2023-02-02 NOTE — PROGRESS NOTES
Physical Therapy Daily Treatment Note  [address] [facility] [address], [address], [place]  [address]    Patient: Mc Selby   : 1970  Diagnosis/ICD-10 Code:  History of fall [Z91.81]   Problems Addressed this Visit    None  Visit Diagnoses     History of fall    -  Primary    Generalized weakness        Episodic lightheadedness        History of dizziness        History of open reduction and internal fixation (ORIF) procedure        Muscular deconditioning        Impaired flexibility of lower extremity          Diagnoses       Codes Comments    History of fall    -  Primary ICD-10-CM: Z91.81  ICD-9-CM: V15.88     Generalized weakness     ICD-10-CM: R53.1  ICD-9-CM: 780.79     Episodic lightheadedness     ICD-10-CM: R42  ICD-9-CM: 780.4     History of dizziness     ICD-10-CM: Z87.898  ICD-9-CM: V13.89     History of open reduction and internal fixation (ORIF) procedure     ICD-10-CM: Z98.890  ICD-9-CM: V15.29     Muscular deconditioning     ICD-10-CM: R29.898  ICD-9-CM: 781.99     Impaired flexibility of lower extremity     ICD-10-CM: R29.898  ICD-9-CM: 781.99         Referring practitioner: Berny Godwin MD  Date of Initial Visit: Type: THERAPY  Noted: 2022  Today's Date: 2023    VISIT#: 16    Subjective   Budman reports his workouts are going well; he's able to tolerate about an hour now. He is still getting a lot of pain in his feet and hips, particularly from the step ups.     Objective     See Exercise, Manual, and Modality Logs for complete treatment.     Assessment/Plan  Progressed exercises today from blue or green band to black (most difficult). He has been consistent with HEP and feels like he has improved understanding of how to maintain/progress at home, though will benefit from continued skilled PT d/t chronic low back, B hip, & B foot pain  Progress strengthening /stabilization /functional activity          Timed:         Manual Therapy:         mins  58077;     Therapeutic Exercise:     25     mins  81923;     Neuromuscular Elen:    30    mins  64472;    Therapeutic Activity:          mins  99694;     Gait Training:           mins  38312;     Ultrasound:          mins  04176;    Ionto                                   mins   05226  Self Care                            mins   44049  Canalith Repos                   mins  49966  Tests & Measures              mins   93197      Un-Timed:  Electrical Stimulation:         mins  81920 ( );  Dry Needling          mins 06841/72812  Traction          mins 55312  Low Eval          Mins  52874  Mod Eval          Mins  27979  High Eval                            Mins  68172  Re-Eval                               mins  07549    Timed Treatment:  55    mins   Total Treatment:     55   mins    Marcelina Kwon, PT, DPT, cert. DN  Physical Therapist  [place] Lic # [id number]

## 2023-02-16 ENCOUNTER — TREATMENT (OUTPATIENT)
Dept: PHYSICAL THERAPY | Facility: CLINIC | Age: 53
End: 2023-02-16
Payer: COMMERCIAL

## 2023-02-16 DIAGNOSIS — Z91.81 HISTORY OF FALL: Primary | ICD-10-CM

## 2023-02-16 DIAGNOSIS — R42 EPISODIC LIGHTHEADEDNESS: ICD-10-CM

## 2023-02-16 DIAGNOSIS — R29.898 IMPAIRED FLEXIBILITY OF LOWER EXTREMITY: ICD-10-CM

## 2023-02-16 DIAGNOSIS — R29.898 MUSCULAR DECONDITIONING: ICD-10-CM

## 2023-02-16 DIAGNOSIS — R53.1 GENERALIZED WEAKNESS: ICD-10-CM

## 2023-02-16 DIAGNOSIS — Z98.890 HISTORY OF OPEN REDUCTION AND INTERNAL FIXATION (ORIF) PROCEDURE: ICD-10-CM

## 2023-02-16 DIAGNOSIS — Z87.898 HISTORY OF DIZZINESS: ICD-10-CM

## 2023-02-16 PROCEDURE — 97110 THERAPEUTIC EXERCISES: CPT | Performed by: PHYSICAL THERAPIST

## 2023-02-16 PROCEDURE — 97112 NEUROMUSCULAR REEDUCATION: CPT | Performed by: PHYSICAL THERAPIST

## 2023-02-16 NOTE — PROGRESS NOTES
Physical Therapy Daily Treatment Note  [address] [facility] [address] [address], [address], [place]  [address]    Patient: Mc Selby   : 1970  Diagnosis/ICD-10 Code:  History of fall [Z91.81]   Problems Addressed this Visit    None  Visit Diagnoses     History of fall    -  Primary    Generalized weakness        Episodic lightheadedness        History of dizziness        History of open reduction and internal fixation (ORIF) procedure        Muscular deconditioning        Impaired flexibility of lower extremity          Diagnoses       Codes Comments    History of fall    -  Primary ICD-10-CM: Z91.81  ICD-9-CM: V15.88     Generalized weakness     ICD-10-CM: R53.1  ICD-9-CM: 780.79     Episodic lightheadedness     ICD-10-CM: R42  ICD-9-CM: 780.4     History of dizziness     ICD-10-CM: Z87.898  ICD-9-CM: V13.89     History of open reduction and internal fixation (ORIF) procedure     ICD-10-CM: Z98.890  ICD-9-CM: V15.29     Muscular deconditioning     ICD-10-CM: R29.898  ICD-9-CM: 781.99     Impaired flexibility of lower extremity     ICD-10-CM: R29.898  ICD-9-CM: 781.99         Referring practitioner: Berny Godwin MD  Date of Initial Visit: Type: THERAPY  Noted: 2022  Today's Date: 2023    VISIT#: 17    Subjective   Garcia reports his feet and hips are really sore today. He did some work outdoors yesterday and is having a lot of pain as a result. He notes he still has more weight to lose before he can have hip surgery. He sees PCP Dr. Godwin next Wednesday and will decide whether to continue with clinical PT beyond 2 remaining scheduled appts.     Objective     See Exercise, Manual, and Modality Logs for complete treatment.   dec'd time on stance leg bilat, trendelenburg B indicative of hip weakness.    Assessment/Plan  Johnson lira is exhibiting improved abdominal recruitment since starting PT. He cont to exhibit gait abnormalities consistent w/hip weakness and B foot OA/PF. He will cont to benefit from  generalized strengthening.   Progress strengthening /stabilization /functional activity         Timed:         Manual Therapy:         mins  30057;     Therapeutic Exercise:    30     mins  69975;     Neuromuscular Elen:    30    mins  31906;    Therapeutic Activity:          mins  46686;     Gait Training:           mins  94058;     Ultrasound:          mins  44727;    Ionto                                   mins   02527  Self Care                            mins   61593  Canalith Repos                   mins  58639  Tests & Measures             mins   05619      Un-Timed:  Electrical Stimulation:         mins  32841 ( );  Dry Needling          mins 52612/80733  Traction          mins 97256  Low Eval          Mins  11361  Mod Eval          Mins  82813  High Eval                            Mins  31972  Re-Eval                               mins  49656    Timed Treatment:   60   mins   Total Treatment:     60   mins    Marcelina Kwon, PT, DPT, cert. DN  Physical Therapist  [place] Lic # [id number]

## 2023-02-22 ENCOUNTER — OFFICE VISIT (OUTPATIENT)
Dept: FAMILY MEDICINE CLINIC | Facility: CLINIC | Age: 53
End: 2023-02-22
Payer: COMMERCIAL

## 2023-02-22 ENCOUNTER — TELEPHONE (OUTPATIENT)
Dept: FAMILY MEDICINE CLINIC | Facility: CLINIC | Age: 53
End: 2023-02-22

## 2023-02-22 ENCOUNTER — LAB (OUTPATIENT)
Dept: FAMILY MEDICINE CLINIC | Facility: CLINIC | Age: 53
End: 2023-02-22
Payer: MEDICARE

## 2023-02-22 VITALS
HEART RATE: 76 BPM | OXYGEN SATURATION: 96 % | WEIGHT: 272.4 LBS | SYSTOLIC BLOOD PRESSURE: 112 MMHG | DIASTOLIC BLOOD PRESSURE: 76 MMHG | HEIGHT: 70 IN | BODY MASS INDEX: 39 KG/M2 | RESPIRATION RATE: 16 BRPM

## 2023-02-22 DIAGNOSIS — J44.9 CHRONIC OBSTRUCTIVE PULMONARY DISEASE, UNSPECIFIED COPD TYPE: Chronic | ICD-10-CM

## 2023-02-22 DIAGNOSIS — D75.1 POLYCYTHEMIA: ICD-10-CM

## 2023-02-22 DIAGNOSIS — K70.30 ALCOHOLIC CIRRHOSIS OF LIVER WITHOUT ASCITES: Chronic | ICD-10-CM

## 2023-02-22 DIAGNOSIS — M16.11 PRIMARY OSTEOARTHRITIS OF RIGHT HIP: ICD-10-CM

## 2023-02-22 DIAGNOSIS — E11.65 TYPE 2 DIABETES MELLITUS WITH HYPERGLYCEMIA, WITH LONG-TERM CURRENT USE OF INSULIN: Primary | Chronic | ICD-10-CM

## 2023-02-22 DIAGNOSIS — R45.1 AGITATION: ICD-10-CM

## 2023-02-22 DIAGNOSIS — Z79.4 TYPE 2 DIABETES MELLITUS WITH HYPERGLYCEMIA, WITH LONG-TERM CURRENT USE OF INSULIN: Primary | Chronic | ICD-10-CM

## 2023-02-22 DIAGNOSIS — E66.9 OBESITY (BMI 30-39.9): Chronic | ICD-10-CM

## 2023-02-22 LAB
INR PPP: 1.14 (ref 0.93–1.1)
PROTHROMBIN TIME: 11.7 SECONDS (ref 9.6–11.7)

## 2023-02-22 PROCEDURE — 83036 HEMOGLOBIN GLYCOSYLATED A1C: CPT | Performed by: FAMILY MEDICINE

## 2023-02-22 PROCEDURE — 80053 COMPREHEN METABOLIC PANEL: CPT | Performed by: FAMILY MEDICINE

## 2023-02-22 PROCEDURE — 85610 PROTHROMBIN TIME: CPT | Performed by: FAMILY MEDICINE

## 2023-02-22 PROCEDURE — 99214 OFFICE O/P EST MOD 30 MIN: CPT | Performed by: FAMILY MEDICINE

## 2023-02-22 PROCEDURE — 36415 COLL VENOUS BLD VENIPUNCTURE: CPT | Performed by: FAMILY MEDICINE

## 2023-02-22 PROCEDURE — 85027 COMPLETE CBC AUTOMATED: CPT | Performed by: FAMILY MEDICINE

## 2023-02-22 RX ORDER — BUDESONIDE, GLYCOPYRROLATE, AND FORMOTEROL FUMARATE 160; 9; 4.8 UG/1; UG/1; UG/1
2 AEROSOL, METERED RESPIRATORY (INHALATION) 2 TIMES DAILY
Qty: 10.7 G | Refills: 5 | Status: SHIPPED | OUTPATIENT
Start: 2023-02-22

## 2023-02-22 RX ORDER — SEMAGLUTIDE 1.34 MG/ML
1 INJECTION, SOLUTION SUBCUTANEOUS WEEKLY
Qty: 3 ML | Refills: 5 | Status: SHIPPED | OUTPATIENT
Start: 2023-02-22

## 2023-02-22 RX ORDER — ZINC SULFATE 50(220)MG
220 CAPSULE ORAL DAILY
Qty: 90 CAPSULE | Refills: 3 | Status: SHIPPED | OUTPATIENT
Start: 2023-02-22

## 2023-02-22 RX ORDER — ALBUTEROL SULFATE 90 UG/1
2 AEROSOL, METERED RESPIRATORY (INHALATION) EVERY 6 HOURS PRN
Qty: 18 G | Refills: 2 | Status: SHIPPED | OUTPATIENT
Start: 2023-02-22

## 2023-02-22 NOTE — TELEPHONE ENCOUNTER
PT was seen in office today, was wanting to know if Dr. Godwin wanted him to continue therapy. Says he only has 2 more sessions left, and forgot to ask during appointment.    Best phone number to reach pt at is: [phone]

## 2023-02-23 ENCOUNTER — TREATMENT (OUTPATIENT)
Dept: PHYSICAL THERAPY | Facility: CLINIC | Age: 53
End: 2023-02-23
Payer: COMMERCIAL

## 2023-02-23 DIAGNOSIS — R42 EPISODIC LIGHTHEADEDNESS: ICD-10-CM

## 2023-02-23 DIAGNOSIS — Z98.890 HISTORY OF OPEN REDUCTION AND INTERNAL FIXATION (ORIF) PROCEDURE: ICD-10-CM

## 2023-02-23 DIAGNOSIS — Z87.898 HISTORY OF DIZZINESS: ICD-10-CM

## 2023-02-23 DIAGNOSIS — R53.1 GENERALIZED WEAKNESS: ICD-10-CM

## 2023-02-23 DIAGNOSIS — R29.898 MUSCULAR DECONDITIONING: ICD-10-CM

## 2023-02-23 DIAGNOSIS — Z91.81 HISTORY OF FALL: Primary | ICD-10-CM

## 2023-02-23 DIAGNOSIS — R29.898 IMPAIRED FLEXIBILITY OF LOWER EXTREMITY: ICD-10-CM

## 2023-02-23 LAB
ALBUMIN SERPL-MCNC: 4.2 G/DL (ref 3.5–5.2)
ALBUMIN/GLOB SERPL: 1 G/DL
ALP SERPL-CCNC: 56 U/L (ref 39–117)
ALT SERPL W P-5'-P-CCNC: 23 U/L (ref 1–41)
ANION GAP SERPL CALCULATED.3IONS-SCNC: 11 MMOL/L (ref 5–15)
AST SERPL-CCNC: 35 U/L (ref 1–40)
BILIRUB SERPL-MCNC: 1.5 MG/DL (ref 0–1.2)
BUN SERPL-MCNC: 8 MG/DL (ref 6–20)
BUN/CREAT SERPL: 6.5 (ref 7–25)
CALCIUM SPEC-SCNC: 9.5 MG/DL (ref 8.6–10.5)
CHLORIDE SERPL-SCNC: 95 MMOL/L (ref 98–107)
CO2 SERPL-SCNC: 28 MMOL/L (ref 22–29)
CREAT SERPL-MCNC: 1.23 MG/DL (ref 0.76–1.27)
DEPRECATED RDW RBC AUTO: 44.7 FL (ref 37–54)
EGFRCR SERPLBLD CKD-EPI 2021: 69.3 ML/MIN/1.73
ERYTHROCYTE [DISTWIDTH] IN BLOOD BY AUTOMATED COUNT: 13 % (ref 12.3–15.4)
GLOBULIN UR ELPH-MCNC: 4.1 GM/DL
GLUCOSE SERPL-MCNC: 133 MG/DL (ref 65–99)
HBA1C MFR BLD: 5.7 % (ref 3.5–5.6)
HCT VFR BLD AUTO: 51.3 % (ref 37.5–51)
HGB BLD-MCNC: 17.9 G/DL (ref 13–17.7)
MCH RBC QN AUTO: 32.9 PG (ref 26.6–33)
MCHC RBC AUTO-ENTMCNC: 34.9 G/DL (ref 31.5–35.7)
MCV RBC AUTO: 94.3 FL (ref 79–97)
PLATELET # BLD AUTO: 173 10*3/MM3 (ref 140–450)
PMV BLD AUTO: 11.1 FL (ref 6–12)
POTASSIUM SERPL-SCNC: 3.7 MMOL/L (ref 3.5–5.2)
PROT SERPL-MCNC: 8.3 G/DL (ref 6–8.5)
RBC # BLD AUTO: 5.44 10*6/MM3 (ref 4.14–5.8)
SODIUM SERPL-SCNC: 134 MMOL/L (ref 136–145)
WBC NRBC COR # BLD: 3.88 10*3/MM3 (ref 3.4–10.8)

## 2023-02-23 PROCEDURE — 97750 PHYSICAL PERFORMANCE TEST: CPT | Performed by: PHYSICAL THERAPIST

## 2023-02-23 PROCEDURE — 97112 NEUROMUSCULAR REEDUCATION: CPT | Performed by: PHYSICAL THERAPIST

## 2023-02-23 PROCEDURE — 97110 THERAPEUTIC EXERCISES: CPT | Performed by: PHYSICAL THERAPIST

## 2023-02-23 RX ORDER — AZELASTINE HYDROCHLORIDE 137 UG/1
SPRAY, METERED NASAL
Qty: 30 ML | Refills: 1 | Status: SHIPPED | OUTPATIENT
Start: 2023-02-23

## 2023-02-23 NOTE — TELEPHONE ENCOUNTER
I CALLED PATIENT AND HE SAID HE ASKED MIA TO FIND OUT IF HE SHOULD SCHEDULE MORE THERAPY SESSIONS AFTER HE FINISHES THESE LAST 2?

## 2023-02-23 NOTE — PROGRESS NOTES
Re-Assessment / Re-Certification  [address] [place] [address] [address], [address], [place]  [address]      Patient: Mc Selby   : 1970  Diagnosis/ICD-10 Code:  History of fall [Z91.81]  Referring practitioner: Berny Godwin MD  Date of Initial Visit: Type: THERAPY  Noted: 2022  Today's Date: 2023  Patient seen for 18 sessions      Subjective:   Mc Selby reports: he cont to have burning pain in B hips and feet. He is compliant with HEP. He notes balance improvement since starting PT, though pain is still limiting. He has not begun climbing stairs to sleep in bed upstairs, but part of limitation is shortness of breath.    Clinical Progress: improved  Home Program Compliance: Yes  Treatment has included: therapeutic exercise and neuromuscular re-education    Subjective   Objective   SLR R 2s, L 3s  30s STS: 14 improved from 7  Hip flexion seated SLR: 80 deg    Assessment/Plan  Progress toward previous goals: Partially Met  Plan Goals: STG to be achieved in 3 wks  1. Pt will achieve rising from standard chair without UE support to reflect improved LE strength and general stability.- MET  2. Pt will improve SLS to at least 5s bilat for reduced injurious falls risk.  - PROGRESSING, 5s on L 0s on R on 22; 2sR, 3s L on 23  3. Pt will improve DHI from 66 to no greater than 56 to for improved QOL. - NOT MET, see subjective (78 on 22)    LTG to be achieved in 12 wks  1. Pt will improve 30s STS to at least 10 to reflect progress toward age norm (15) in terms of LE strength and stability - NOT MET, 7x on 22; MET 14 on 23  2. Pt will demonstrate SLS at least 10s bilat to reduce fall risk. - PROGRESSING, 5s on L 0s on R on 22; NOT MET  3. Pt will improve DHI from 66 to no greater than 40 to for improved QOL.-NOT MET, see subjective (78 on 22)  Long-term goals (LTG):   4. Pt will demonstrate donning shoe/sock on R independently. - NOT MET, wife is donning shoe & sock d/t  limited R hip mobility & central obesity  5. Pt will demonstrate item retrieval from floor without assistance or SOB. - NOT MET  6. Pt will demonstrate 90 deg hip flexion in upright sitting w/knee fully extended to reflect improved HS flexibility. - Progressing 80 deg on 2/23/23     Garcia is increasing overall activity at home, doing HEP consistently. He still has unchanged burning pain in B hips and feet. He is limited in stair climbing, from hip pain and SOB. He is still limited in balance and would benefit from continued skilled PT to address remaining impairments.      Recommendations: Continue as planned  Timeframe: 3 months  Frequency: 1x/wk  Prognosis to achieve goals: fair    PT Signature: Marcelina Kwon PT, DPT, cert DN  Physical Therapist  [place] Lic # [id number]  Electronically signed by Marcelina Kwon PT, 02/23/23, 10:32 AM EST    Certification Period: 2/14/23 thru 5/14/23  I certify that the therapy services are furnished while this patient is under my care. The services outlined above are required by this patient and will be reviewed every 90 days.    PHYSICIAN: Berny Godwin MD _____________________________________________________________  NPI: [id number]                                      DATE:    ___________________________________________      Timed:         Manual Therapy:         mins  69481;     Therapeutic Exercise:    15     mins  82121;     Neuromuscular Elen:    20    mins  12535;    Therapeutic Activity:          mins  97688;     Gait Training:           mins  50296;     Ultrasound:          mins  17735;    Ionto                                   mins   00989  Self Care                            mins   49713  Tests & Measures        10     mins   92702    Un-Timed:  Electrical Stimulation:         mins  94448 ( );  Dry Needling          mins 19326/34854  Traction          mins 46151  Can Repos          mins 19540  Low Eval          Mins  51140  Mod Eval           Mins  41658  High Eval                            Mins  52425  Re-Eval                               mins  15157      Timed Treatment:   45   mins   Total Treatment:     45   mins

## 2023-02-24 ENCOUNTER — OFFICE (AMBULATORY)
Dept: URBAN - METROPOLITAN AREA CLINIC 64 | Facility: CLINIC | Age: 53
End: 2023-02-24

## 2023-02-24 ENCOUNTER — TELEPHONE (OUTPATIENT)
Dept: FAMILY MEDICINE CLINIC | Facility: CLINIC | Age: 53
End: 2023-02-24
Payer: COMMERCIAL

## 2023-02-24 DIAGNOSIS — K21.9 GASTRO-ESOPHAGEAL REFLUX DISEASE WITHOUT ESOPHAGITIS: ICD-10-CM

## 2023-03-02 ENCOUNTER — TREATMENT (OUTPATIENT)
Dept: PHYSICAL THERAPY | Facility: CLINIC | Age: 53
End: 2023-03-02
Payer: COMMERCIAL

## 2023-03-02 DIAGNOSIS — Z87.898 HISTORY OF DIZZINESS: ICD-10-CM

## 2023-03-02 DIAGNOSIS — R29.898 IMPAIRED FLEXIBILITY OF LOWER EXTREMITY: ICD-10-CM

## 2023-03-02 DIAGNOSIS — Z91.81 HISTORY OF FALL: Primary | ICD-10-CM

## 2023-03-02 DIAGNOSIS — Z98.890 HISTORY OF OPEN REDUCTION AND INTERNAL FIXATION (ORIF) PROCEDURE: ICD-10-CM

## 2023-03-02 DIAGNOSIS — R42 EPISODIC LIGHTHEADEDNESS: ICD-10-CM

## 2023-03-02 DIAGNOSIS — R53.1 GENERALIZED WEAKNESS: ICD-10-CM

## 2023-03-02 DIAGNOSIS — R29.898 MUSCULAR DECONDITIONING: ICD-10-CM

## 2023-03-02 PROCEDURE — 97112 NEUROMUSCULAR REEDUCATION: CPT | Performed by: PHYSICAL THERAPIST

## 2023-03-02 PROCEDURE — 97110 THERAPEUTIC EXERCISES: CPT | Performed by: PHYSICAL THERAPIST

## 2023-03-02 NOTE — PROGRESS NOTES
Physical Therapy Daily Treatment Note  [address] [place] [address] [address], [address], [place]  [address]    Patient: Mc Selby   : 1970  Diagnosis/ICD-10 Code:  History of fall [Z91.81]   Problems Addressed this Visit    None  Visit Diagnoses     History of fall    -  Primary    Generalized weakness        Episodic lightheadedness        History of dizziness        History of open reduction and internal fixation (ORIF) procedure        Muscular deconditioning        Impaired flexibility of lower extremity          Diagnoses       Codes Comments    History of fall    -  Primary ICD-10-CM: Z91.81  ICD-9-CM: V15.88     Generalized weakness     ICD-10-CM: R53.1  ICD-9-CM: 780.79     Episodic lightheadedness     ICD-10-CM: R42  ICD-9-CM: 780.4     History of dizziness     ICD-10-CM: Z87.898  ICD-9-CM: V13.89     History of open reduction and internal fixation (ORIF) procedure     ICD-10-CM: Z98.890  ICD-9-CM: V15.29     Muscular deconditioning     ICD-10-CM: R29.898  ICD-9-CM: 781.99     Impaired flexibility of lower extremity     ICD-10-CM: R29.898  ICD-9-CM: 781.99         Referring practitioner: Berny Godwin MD  Date of Initial Visit: Type: THERAPY  Noted: 2022  Today's Date: 3/2/2023    VISIT#: 19    Subjective   Garcia notes inc'd leg pain today. His feet are still really painful and feel like burning.     Objective     See Exercise, Manual, and Modality Logs for complete treatment.     Assessment/Plan  Garcia was able to tolerate NMR upright on wobble board today without PT assist. He required light fingertip touch for staggered stance but performed romberg without significant difficulty. He benefits from intermittent upright exercise w/brief seated breaks (doing other exercises).   Progress strengthening /stabilization /functional activity         Timed:         Manual Therapy:         mins  15564;     Therapeutic Exercise:    15     mins  39518;     Neuromuscular Elen:    30    mins  80286;     Therapeutic Activity:          mins  39704;     Gait Training:           mins  83280;     Ultrasound:          mins  15196;    Ionto                                   mins   80355  Self Care                            mins   73288  Canalith Repos                   mins  76228  Tests & Measures              mins   55495      Un-Timed:  Electrical Stimulation:         mins  00001 ( );  Dry Needling          mins 77037/47303  Traction          mins 74909  Low Eval          Mins  31963  Mod Eval          Mins  11048  High Eval                            Mins  40093  Re-Eval                               mins  74519    Timed Treatment:   45   mins   Total Treatment:     45   mins    Marcelina Kwon, PT, DPT, cert. DN  Physical Therapist  [place] Lic # [id number]

## 2023-03-04 RX ORDER — MONTELUKAST SODIUM 10 MG/1
TABLET ORAL
Qty: 90 TABLET | Refills: 1 | Status: SHIPPED | OUTPATIENT
Start: 2023-03-04

## 2023-03-10 ENCOUNTER — TELEPHONE (OUTPATIENT)
Dept: PHYSICAL THERAPY | Facility: CLINIC | Age: 53
End: 2023-03-10

## 2023-03-11 DIAGNOSIS — J31.0 CHRONIC RHINITIS: ICD-10-CM

## 2023-03-12 RX ORDER — IPRATROPIUM BROMIDE 42 UG/1
SPRAY, METERED NASAL
Qty: 45 EACH | Refills: 1 | Status: SHIPPED | OUTPATIENT
Start: 2023-03-12 | End: 2023-04-04

## 2023-03-16 ENCOUNTER — TREATMENT (OUTPATIENT)
Dept: PHYSICAL THERAPY | Facility: CLINIC | Age: 53
End: 2023-03-16
Payer: COMMERCIAL

## 2023-03-16 DIAGNOSIS — R29.898 MUSCULAR DECONDITIONING: ICD-10-CM

## 2023-03-16 DIAGNOSIS — Z87.898 HISTORY OF DIZZINESS: ICD-10-CM

## 2023-03-16 DIAGNOSIS — R42 EPISODIC LIGHTHEADEDNESS: ICD-10-CM

## 2023-03-16 DIAGNOSIS — R29.898 IMPAIRED FLEXIBILITY OF LOWER EXTREMITY: ICD-10-CM

## 2023-03-16 DIAGNOSIS — Z98.890 HISTORY OF OPEN REDUCTION AND INTERNAL FIXATION (ORIF) PROCEDURE: ICD-10-CM

## 2023-03-16 DIAGNOSIS — R53.1 GENERALIZED WEAKNESS: ICD-10-CM

## 2023-03-16 DIAGNOSIS — Z91.81 HISTORY OF FALL: Primary | ICD-10-CM

## 2023-03-16 PROCEDURE — 97112 NEUROMUSCULAR REEDUCATION: CPT | Performed by: PHYSICAL THERAPIST

## 2023-03-16 PROCEDURE — 97110 THERAPEUTIC EXERCISES: CPT | Performed by: PHYSICAL THERAPIST

## 2023-03-16 NOTE — PROGRESS NOTES
Physical Therapy Daily Treatment Note  [address] [place] [address] [address], [address], [place]  [address]    Patient: Mc Selby   : 1970  Diagnosis/ICD-10 Code:  History of fall [Z91.81]   Problems Addressed this Visit    None  Visit Diagnoses     History of fall    -  Primary    Generalized weakness        Episodic lightheadedness        History of dizziness        History of open reduction and internal fixation (ORIF) procedure        Muscular deconditioning        Impaired flexibility of lower extremity          Diagnoses       Codes Comments    History of fall    -  Primary ICD-10-CM: Z91.81  ICD-9-CM: V15.88     Generalized weakness     ICD-10-CM: R53.1  ICD-9-CM: 780.79     Episodic lightheadedness     ICD-10-CM: R42  ICD-9-CM: 780.4     History of dizziness     ICD-10-CM: Z87.898  ICD-9-CM: V13.89     History of open reduction and internal fixation (ORIF) procedure     ICD-10-CM: Z98.890  ICD-9-CM: V15.29     Muscular deconditioning     ICD-10-CM: R29.898  ICD-9-CM: 781.99     Impaired flexibility of lower extremity     ICD-10-CM: R29.898  ICD-9-CM: 781.99         Referring practitioner: Berny Godwin MD  Date of Initial Visit: Type: THERAPY  Noted: 2022  Today's Date: 3/16/2023    VISIT#: 20    Subjective   Garcia reports he hasn't been feeling well in general which is why he had to cancel last PT session. He has continued his HEP to his tolerance but hasn't really felt like doing much.     Objective     See Exercise, Manual, and Modality Logs for complete treatment.     Assessment/Plan  Garcia returned to PT after feeling unwell last week. He notes he has been trying to get up/down his stairs but going down is scary bc he feels like he may fall. He is losing weight and notes his stomach feels smaller.   Progress strengthening /stabilization /functional activity         Timed:         Manual Therapy:         mins  36239;     Therapeutic Exercise:    20     mins  17349;     Neuromuscular Elen:    25     mins  66412;    Therapeutic Activity:          mins  21234;     Gait Training:           mins  19652;     Ultrasound:          mins  27405;    Ionto                                   mins   41724  Self Care                            mins   02806  Canalith Repos                   mins  69951  Tests & Measures              mins   48625      Un-Timed:  Electrical Stimulation:         mins  87013 ( );  Dry Needling          mins 48505/73060  Traction          mins 05545  Low Eval          Mins  67144  Mod Eval          Mins  20944  High Eval                            Mins  24789  Re-Eval                               mins  82669    Timed Treatment:   45   mins   Total Treatment:     45   mins    Marcelina Kwon, PT, DPT, cert. DN  Physical Therapist  [place] Lic # [id number]

## 2023-03-23 ENCOUNTER — TRANSCRIBE ORDERS (OUTPATIENT)
Dept: LAB | Facility: HOSPITAL | Age: 53
End: 2023-03-23
Payer: COMMERCIAL

## 2023-03-23 ENCOUNTER — LAB (OUTPATIENT)
Dept: LAB | Facility: HOSPITAL | Age: 53
End: 2023-03-23
Payer: COMMERCIAL

## 2023-03-23 DIAGNOSIS — D84.9 IMMUNOSUPPRESSIVE DISEASE: ICD-10-CM

## 2023-03-23 DIAGNOSIS — D84.9 IMMUNOSUPPRESSIVE DISEASE: Primary | ICD-10-CM

## 2023-03-23 LAB — HBV SURFACE AG SERPL QL IA: NORMAL

## 2023-03-23 PROCEDURE — 86480 TB TEST CELL IMMUN MEASURE: CPT

## 2023-03-23 PROCEDURE — 86803 HEPATITIS C AB TEST: CPT

## 2023-03-23 PROCEDURE — 87340 HEPATITIS B SURFACE AG IA: CPT

## 2023-03-23 PROCEDURE — 86704 HEP B CORE ANTIBODY TOTAL: CPT

## 2023-03-23 PROCEDURE — 36415 COLL VENOUS BLD VENIPUNCTURE: CPT

## 2023-03-24 ENCOUNTER — TREATMENT (OUTPATIENT)
Dept: PHYSICAL THERAPY | Facility: CLINIC | Age: 53
End: 2023-03-24
Payer: MEDICARE

## 2023-03-24 DIAGNOSIS — Z91.81 HISTORY OF FALL: Primary | ICD-10-CM

## 2023-03-24 DIAGNOSIS — Z87.898 HISTORY OF DIZZINESS: ICD-10-CM

## 2023-03-24 DIAGNOSIS — Z98.890 HISTORY OF OPEN REDUCTION AND INTERNAL FIXATION (ORIF) PROCEDURE: ICD-10-CM

## 2023-03-24 DIAGNOSIS — R42 EPISODIC LIGHTHEADEDNESS: ICD-10-CM

## 2023-03-24 DIAGNOSIS — R29.898 MUSCULAR DECONDITIONING: ICD-10-CM

## 2023-03-24 DIAGNOSIS — R53.1 GENERALIZED WEAKNESS: ICD-10-CM

## 2023-03-24 DIAGNOSIS — R29.898 IMPAIRED FLEXIBILITY OF LOWER EXTREMITY: ICD-10-CM

## 2023-03-24 LAB
HBV CORE AB SERPL QL IA: NEGATIVE
HCV AB SERPL QL IA: NORMAL
HCV IGG SERPL QL IA: NON REACTIVE

## 2023-03-24 PROCEDURE — 97112 NEUROMUSCULAR REEDUCATION: CPT | Performed by: PHYSICAL THERAPIST

## 2023-03-24 PROCEDURE — 97110 THERAPEUTIC EXERCISES: CPT | Performed by: PHYSICAL THERAPIST

## 2023-03-24 NOTE — PROGRESS NOTES
Physical Therapy Daily Treatment Note  [address] [facility] [address], [address], [place]  [address]    Patient: Mc Selby   : 1970  Diagnosis/ICD-10 Code:  History of fall [Z91.81]   Problems Addressed this Visit    None  Visit Diagnoses     History of fall    -  Primary    Generalized weakness        Episodic lightheadedness        History of dizziness        History of open reduction and internal fixation (ORIF) procedure        Muscular deconditioning        Impaired flexibility of lower extremity          Diagnoses       Codes Comments    History of fall    -  Primary ICD-10-CM: Z91.81  ICD-9-CM: V15.88     Generalized weakness     ICD-10-CM: R53.1  ICD-9-CM: 780.79     Episodic lightheadedness     ICD-10-CM: R42  ICD-9-CM: 780.4     History of dizziness     ICD-10-CM: Z87.898  ICD-9-CM: V13.89     History of open reduction and internal fixation (ORIF) procedure     ICD-10-CM: Z98.890  ICD-9-CM: V15.29     Muscular deconditioning     ICD-10-CM: R29.898  ICD-9-CM: 781.99     Impaired flexibility of lower extremity     ICD-10-CM: R29.898  ICD-9-CM: 781.99         Referring practitioner: Berny Godwin MD  Date of Initial Visit: Type: THERAPY  Noted: 2022  Today's Date: 3/24/2023    VISIT#: 21    Subjective   Garcia reports his rheumatologist is changing his meds bc his feet and hips continue to be very painful.     Objective     See Exercise, Manual, and Modality Logs for complete treatment.     Assessment/Plan  Garcia remains compliant with HEP. He'd like to DC next session and may resume PT at some point in the future when he gets his meds sorted out.   Anticipate DC next Visit         Timed:         Manual Therapy:         mins  10227;     Therapeutic Exercise:    25     mins  63236;     Neuromuscular Elen:    30    mins  03033;    Therapeutic Activity:          mins  91822;     Gait Training:           mins  02357;     Ultrasound:          mins  53925;    Ionto                                    mins   30929  Self Care                            mins   59049  Canalith Repos                   mins  31950  Tests & Measures              mins   20076      Un-Timed:  Electrical Stimulation:         mins  64574 ( );  Dry Needling          mins 30450/61975  Traction          mins 15482  Low Eval          Mins  84748  Mod Eval          Mins  78705  High Eval                            Mins  62087  Re-Eval                               mins  73937    Timed Treatment:   55   mins   Total Treatment:     55   mins    Marcelina Kwon, PT, DPT, cert. DN  Physical Therapist  [place] Lic # [id number]

## 2023-03-25 LAB
GAMMA INTERFERON BACKGROUND BLD IA-ACNC: 0.03 IU/ML
M TB IFN-G BLD-IMP: NEGATIVE
M TB IFN-G CD4+ T-CELLS BLD-ACNC: 0.03 IU/ML
M TBIFN-G CD4+ CD8+T-CELLS BLD-ACNC: 0.02 IU/ML
MITOGEN IGNF BLD-ACNC: 9.56 IU/ML
QUANTIFERON INCUBATION: NORMAL
SERVICE CMNT-IMP: NORMAL

## 2023-03-29 ENCOUNTER — TREATMENT (OUTPATIENT)
Dept: PHYSICAL THERAPY | Facility: CLINIC | Age: 53
End: 2023-03-29
Payer: MEDICARE

## 2023-03-29 DIAGNOSIS — Z98.890 HISTORY OF OPEN REDUCTION AND INTERNAL FIXATION (ORIF) PROCEDURE: ICD-10-CM

## 2023-03-29 DIAGNOSIS — R42 EPISODIC LIGHTHEADEDNESS: ICD-10-CM

## 2023-03-29 DIAGNOSIS — Z91.81 HISTORY OF FALL: Primary | ICD-10-CM

## 2023-03-29 DIAGNOSIS — Z87.898 HISTORY OF DIZZINESS: ICD-10-CM

## 2023-03-29 DIAGNOSIS — R29.898 IMPAIRED FLEXIBILITY OF LOWER EXTREMITY: ICD-10-CM

## 2023-03-29 DIAGNOSIS — R53.1 GENERALIZED WEAKNESS: ICD-10-CM

## 2023-03-29 DIAGNOSIS — R29.898 MUSCULAR DECONDITIONING: ICD-10-CM

## 2023-03-29 PROCEDURE — 97112 NEUROMUSCULAR REEDUCATION: CPT | Performed by: PHYSICAL THERAPIST

## 2023-03-29 PROCEDURE — 97750 PHYSICAL PERFORMANCE TEST: CPT | Performed by: PHYSICAL THERAPIST

## 2023-03-29 PROCEDURE — 97110 THERAPEUTIC EXERCISES: CPT | Performed by: PHYSICAL THERAPIST

## 2023-03-29 NOTE — PROGRESS NOTES
Physical Therapy Daily Treatment Note  [address], [address], [place]  [address]    Patient: Mc Selby   : 1970  Diagnosis/ICD-10 Code:  History of fall [Z91.81]   Problems Addressed this Visit    None  Visit Diagnoses     History of fall    -  Primary    Generalized weakness        Episodic lightheadedness        History of dizziness        History of open reduction and internal fixation (ORIF) procedure        Muscular deconditioning        Impaired flexibility of lower extremity          Diagnoses       Codes Comments    History of fall    -  Primary ICD-10-CM: Z91.81  ICD-9-CM: V15.88     Generalized weakness     ICD-10-CM: R53.1  ICD-9-CM: 780.79     Episodic lightheadedness     ICD-10-CM: R42  ICD-9-CM: 780.4     History of dizziness     ICD-10-CM: Z87.898  ICD-9-CM: V13.89     History of open reduction and internal fixation (ORIF) procedure     ICD-10-CM: Z98.890  ICD-9-CM: V15.29     Muscular deconditioning     ICD-10-CM: R29.898  ICD-9-CM: 781.99     Impaired flexibility of lower extremity     ICD-10-CM: R29.898  ICD-9-CM: 781.99         Referring practitioner: Berny Godwin MD  Date of Initial Visit: Type: THERAPY  Noted: 2022  Today's Date: 3/29/2023    VISIT#: 22    Subjective   Garcia reports he still has pain in his hips and feet that limit his ability to climb stairs & spend prolonged time walking or standing.     Objective     See Exercise, Manual, and Modality Logs for complete treatment.     Assessment/Plan   Garcia has made excellent progress throughout PT. He is compliant and consistent w/HEP. He cont to be limited by B foot/LE pain but is doing as much as he can. He will DC at this time & will likely resume PT in future.     1. Pt will achieve rising from standard chair without UE support to reflect improved LE strength and general stability.- MET  2. Pt will improve SLS to at least 5s bilat for reduced injurious falls risk.  - PROGRESSING, 5s on L 0s on R on  11/17/22; 2sR, 3s L on 2/23/23; 4s R & 4s L on 3/29/23  3. Pt will improve DHI from 66 to no greater than 56 to for improved QOL. - NOT MET, see subjective (78 on 11/17/22)    LTG to be achieved in 12 wks  1. Pt will improve 30s STS to at least 10 to reflect progress toward age norm (15) in terms of LE strength and stability - NOT MET, 7x on 11/17/22; MET 14 on 2/23/23  2. Pt will demonstrate SLS at least 10s bilat to reduce fall risk. - PROGRESSING, 5s on L 0s on R on 11/17/22; NOT MET; 4s R & L on 3/29/23  3. Pt will improve DHI from 66 to no greater than 40 to for improved QOL.-NOT MET, see subjective (78 on 11/17/22)  Long-term goals (LTG):   4. Pt will demonstrate donning shoe/sock on R independently. - NOT MET, wife is donning shoe & sock d/t limited R hip mobility & central obesity, able to get L on but can't get R LE up  5. Pt will demonstrate item retrieval from floor without assistance or SOB. - NOT MET  6. Pt will demonstrate 90 deg hip flexion in upright sitting w/knee fully extended to reflect improved HS flexibility. - Progressing 80 deg on 2/23/23; MET 3/29/23  Plan to DC with HEP         Timed:         Manual Therapy:         mins  51339;     Therapeutic Exercise:    30     mins  25394;     Neuromuscular Elen:    20    mins  31739;    Therapeutic Activity:          mins  33861;     Gait Training:           mins  24128;     Ultrasound:          mins  33940;    Ionto                                   mins   16349  Self Care                            mins   48005  Canalith Repos                   mins  26996  Tests & Measures           15   mins   13574      Un-Timed:  Electrical Stimulation:         mins  68417 ( );  Dry Needling          mins 76039/20560  Traction          mins 10881  Low Eval          Mins  52282  Mod Eval          Mins  58188  High Eval                            Mins  20341  Re-Eval                               mins  92651    Timed Treatment:   55   mins   Total Treatment:      55   mins    Marcelina Kwon, PT, DPT, cert. DN  Physical Therapist  [place] Lic # [id number]

## 2023-04-04 DIAGNOSIS — J31.0 CHRONIC RHINITIS: ICD-10-CM

## 2023-04-04 RX ORDER — IPRATROPIUM BROMIDE 42 UG/1
SPRAY, METERED NASAL
Qty: 45 EACH | Refills: 1 | Status: SHIPPED | OUTPATIENT
Start: 2023-04-04

## 2023-04-27 NOTE — TELEPHONE ENCOUNTER
----- Message from JEFFY Stephen sent at 1/24/2020 11:21 AM EST -----  Please let patient know that I will order stool heme.   Need for prophylactic measure Need for prophylactic measure Need for prophylactic measure

## 2023-05-31 ENCOUNTER — LAB (OUTPATIENT)
Dept: FAMILY MEDICINE CLINIC | Facility: CLINIC | Age: 53
End: 2023-05-31

## 2023-05-31 ENCOUNTER — OFFICE VISIT (OUTPATIENT)
Dept: FAMILY MEDICINE CLINIC | Facility: CLINIC | Age: 53
End: 2023-05-31

## 2023-05-31 VITALS
DIASTOLIC BLOOD PRESSURE: 82 MMHG | BODY MASS INDEX: 39.46 KG/M2 | OXYGEN SATURATION: 97 % | SYSTOLIC BLOOD PRESSURE: 110 MMHG | RESPIRATION RATE: 18 BRPM | WEIGHT: 275.6 LBS | HEIGHT: 70 IN | HEART RATE: 95 BPM

## 2023-05-31 DIAGNOSIS — J44.9 CHRONIC OBSTRUCTIVE PULMONARY DISEASE, UNSPECIFIED COPD TYPE: Chronic | ICD-10-CM

## 2023-05-31 DIAGNOSIS — E11.65 TYPE 2 DIABETES MELLITUS WITH HYPERGLYCEMIA, WITH LONG-TERM CURRENT USE OF INSULIN: Primary | Chronic | ICD-10-CM

## 2023-05-31 DIAGNOSIS — D75.1 POLYCYTHEMIA: ICD-10-CM

## 2023-05-31 DIAGNOSIS — K70.30 ALCOHOLIC CIRRHOSIS OF LIVER WITHOUT ASCITES: Chronic | ICD-10-CM

## 2023-05-31 DIAGNOSIS — E66.9 OBESITY (BMI 30-39.9): Chronic | ICD-10-CM

## 2023-05-31 DIAGNOSIS — Z79.4 TYPE 2 DIABETES MELLITUS WITH HYPERGLYCEMIA, WITH LONG-TERM CURRENT USE OF INSULIN: Primary | Chronic | ICD-10-CM

## 2023-05-31 PROCEDURE — 83036 HEMOGLOBIN GLYCOSYLATED A1C: CPT | Performed by: FAMILY MEDICINE

## 2023-05-31 PROCEDURE — 85027 COMPLETE CBC AUTOMATED: CPT | Performed by: FAMILY MEDICINE

## 2023-05-31 PROCEDURE — 80053 COMPREHEN METABOLIC PANEL: CPT | Performed by: FAMILY MEDICINE

## 2023-05-31 PROCEDURE — 36415 COLL VENOUS BLD VENIPUNCTURE: CPT | Performed by: FAMILY MEDICINE

## 2023-05-31 RX ORDER — CELECOXIB 200 MG/1
200 CAPSULE ORAL DAILY
COMMUNITY

## 2023-05-31 RX ORDER — SEMAGLUTIDE 2.68 MG/ML
2 INJECTION, SOLUTION SUBCUTANEOUS WEEKLY
Qty: 3 ML | Refills: 5 | Status: SHIPPED | OUTPATIENT
Start: 2023-05-31

## 2023-05-31 NOTE — PROGRESS NOTES
"Chief Complaint  No chief complaint on file.    Subjective    {Problem List  Visit Diagnosis   Encounters  Notes  Medications  Labs  Result Review Imaging  Media :23}    Mc Selby presents to [facility]  History of Present Illness  Pt here for follow up      Objective   Vital Signs:  There were no vitals taken for this visit.  Estimated body mass index is 39.09 kg/m² as calculated from the following:    Height as of 2/22/23: 177.8 cm (70\").    Weight as of 2/22/23: 124 kg (272 lb 6.4 oz).       {Class 2 Severe Obesity (BMI >=35 and <=39.9. (Optional):98252}      Physical Exam   Result Review :{Labs  Result Review  Imaging  Med Tab  Media  Procedures  :23}  {The following data was reviewed by (Optional):52632}  {Ambulatory Labs (Optional):52290}  {Data reviewed (Optional):67876:::1}             Assessment and Plan {CC Problem List  Visit Diagnosis   ROS  Review (Popup)  Health Maintenance  Quality  BestPractice  Medications  SmartSets  SnapShot Encounters  Media :23}  There are no diagnoses linked to this encounter.       {Time Spent (Optional):90937}  Follow Up {Instructions Charge Capture  Follow-up Communications :23}  No follow-ups on file.  Patient was given instructions and counseling regarding his condition or for health maintenance advice. Please see specific information pulled into the AVS if appropriate.       "

## 2023-05-31 NOTE — PROGRESS NOTES
Chief Complaint   Patient presents with   • Follow-up     Med ck, f/u     HPI  Mc Selby is a 53 y.o. male that presents for   Chief Complaint   Patient presents with   • Follow-up     Med ck, f/u     Diabetes: last a1c 5.7. Maintained on Ozempic 1mg weekly. Reports no reduction in appetite- feels very hungry. This is unchanged from 3 months ago. Significant numbness to feet. 7/2022 Pro/Cr 7. Follows w/ ophtho every 6 months.      Obesity: patient reports he has really been working at his weight. Has made significant diet change and was exercising 1 hour daily. Had to stop exercising recently due to RA flare. Ozempic was also added for diabetes. Weight has been stable for the past 3 months. Needs additional weight loss for hip replacement.     Cirrhosis: continues to drink beer most every day but seems to be drinking less on Ozempic. Has switched to light beer and reduced beer consumption to drinking 4-5 beers/day. Maintained on Bumex 2mg daily, spironolactone 50 daily, and zinc daily. He is maintained on rifaximin 550mg BID due to hx of encephalopathy. Follows w/ GI     COPD: started on Breztri at last visit but this was discontinued as patient reports this made him hoarse and airway swell. 12/2022 PFTs w/ moderate COPD, including FEV1/FVC 70 and FEV1 55%. No longer smoking     Polycythemia: Last hgb 17.9. Felt to be 2/2 COPD and uncontrolled PAM. States he cannot tolerate CPAP    Review of Systems   Constitutional: Positive for fatigue and unexpected weight gain.   Respiratory: Positive for cough and shortness of breath.    Gastrointestinal: Positive for abdominal distention.   Neurological: Positive for numbness.     The following portions of the patient's history were reviewed and updated as appropriate: problem list, past medical history, past surgical history, allergies, current medication    Problem List Tab  Patient History Tab  Immunizations Tab  Medications Tab  Chart Review Tab  Care Everywhere  Tab  Synopsis Tab    PE  Vitals:    05/31/23 1304   BP: 110/82   Pulse: 95   Resp: 18   SpO2: 97%     Body mass index is 39.54 kg/m².  General: Obese, NAD  Head: AT/NC  Eyes: EOMI, anicteric sclera  Resp: CTAB, SCR, BS equal  CV: RRR w/o m/r/g; 2+ pulses  GI: Soft, +BS. Distended, RUQ TTP  MSK: FROM, no deformity, no edema  Skin: Warm, dry, intact  Neuro: Alert and oriented. No focal deficits  Psych: Appropriate mood and affect    Imaging  No Images in the past 120 days found..    Assessment & Plan   Mc Selby is a 53 y.o. male that presents for   Chief Complaint   Patient presents with   • Follow-up     Med ck, f/u     Diagnoses and all orders for this visit:    1. Type 2 diabetes mellitus with hyperglycemia, with long-term current use of insulin (Primary): last a1c 5.7. 7/2022 Pro/Cr 7. Follows w/ ophtho every 6 months  -     Comprehensive Metabolic Panel  -     Hemoglobin A1c  -     glucose blood test strip; Use as instructed to check blood sugar twice daily as needed. E11.9  Dispense: 100 each; Refill: 12  -     Increase Semaglutide, 2 MG/DOSE, (Ozempic, 2 MG/DOSE,) 8 MG/3ML solution pen-injector; Inject 2 mg under the skin into the appropriate area as directed 1 (One) Time Per Week.  Dispense: 3 mL; Refill: 5  - Low threshold to add ACE/ARB and statin  - Recommend regular exercise and eye exams    2. Obesity (BMI 30-39.9)  -     Increase Semaglutide, 2 MG/DOSE, (Ozempic, 2 MG/DOSE,) 8 MG/3ML solution pen-injector; Inject 2 mg under the skin into the appropriate area as directed 1 (One) Time Per Week.  Dispense: 3 mL; Refill: 5  - Counseled regarding diet, exercise, weight loss    3. Alcoholic cirrhosis of liver without ascites   - Counseled regarding need for sobriety and low sodium diet   - Con mario rifaximin 550 BID and zinc daily    4. Chronic obstructive pulmonary disease, unspecified COPD type: 12/2022 PFTs w/ moderate COPD, including FEV1/FVC 70 and FEV1 55%   - Trelegy sample given today. Will  prescribe pending results    5. Polycythemia: felt to be secondary to COPD and poorly controlled PAM  -     CBC (No Diff)  - Phlebotomy PRN       Return in about 3 months (around 8/31/2023) for Medicare Wellness.

## 2023-06-01 DIAGNOSIS — D75.1 POLYCYTHEMIA: Primary | ICD-10-CM

## 2023-06-01 LAB
ALBUMIN SERPL-MCNC: 4 G/DL (ref 3.5–5.2)
ALBUMIN/GLOB SERPL: 1 G/DL
ALP SERPL-CCNC: 62 U/L (ref 39–117)
ALT SERPL W P-5'-P-CCNC: 17 U/L (ref 1–41)
ANION GAP SERPL CALCULATED.3IONS-SCNC: 11 MMOL/L (ref 5–15)
AST SERPL-CCNC: 27 U/L (ref 1–40)
BILIRUB SERPL-MCNC: 1.6 MG/DL (ref 0–1.2)
BUN SERPL-MCNC: 9 MG/DL (ref 6–20)
BUN/CREAT SERPL: 8 (ref 7–25)
CALCIUM SPEC-SCNC: 9 MG/DL (ref 8.6–10.5)
CHLORIDE SERPL-SCNC: 99 MMOL/L (ref 98–107)
CO2 SERPL-SCNC: 24 MMOL/L (ref 22–29)
CREAT SERPL-MCNC: 1.13 MG/DL (ref 0.76–1.27)
DEPRECATED RDW RBC AUTO: 47.3 FL (ref 37–54)
EGFRCR SERPLBLD CKD-EPI 2021: 76.8 ML/MIN/1.73
ERYTHROCYTE [DISTWIDTH] IN BLOOD BY AUTOMATED COUNT: 13.5 % (ref 12.3–15.4)
GLOBULIN UR ELPH-MCNC: 4 GM/DL
GLUCOSE SERPL-MCNC: 161 MG/DL (ref 65–99)
HBA1C MFR BLD: 6 % (ref 4.8–5.6)
HCT VFR BLD AUTO: 53.9 % (ref 37.5–51)
HGB BLD-MCNC: 18.6 G/DL (ref 13–17.7)
MCH RBC QN AUTO: 33 PG (ref 26.6–33)
MCHC RBC AUTO-ENTMCNC: 34.5 G/DL (ref 31.5–35.7)
MCV RBC AUTO: 95.6 FL (ref 79–97)
PLATELET # BLD AUTO: 164 10*3/MM3 (ref 140–450)
PMV BLD AUTO: 10.9 FL (ref 6–12)
POTASSIUM SERPL-SCNC: 3.9 MMOL/L (ref 3.5–5.2)
PROT SERPL-MCNC: 8 G/DL (ref 6–8.5)
RBC # BLD AUTO: 5.64 10*6/MM3 (ref 4.14–5.8)
SODIUM SERPL-SCNC: 134 MMOL/L (ref 136–145)
WBC NRBC COR # BLD: 5.53 10*3/MM3 (ref 3.4–10.8)

## 2023-06-01 RX ORDER — SODIUM CHLORIDE 9 MG/ML
250 INJECTION, SOLUTION INTRAVENOUS ONCE
OUTPATIENT
Start: 2023-06-01

## 2023-06-07 ENCOUNTER — HOSPITAL ENCOUNTER (OUTPATIENT)
Dept: INFUSION THERAPY | Facility: HOSPITAL | Age: 53
Discharge: HOME | End: 2023-06-07
Admitting: FAMILY MEDICINE
Payer: COMMERCIAL

## 2023-06-07 VITALS
SYSTOLIC BLOOD PRESSURE: 134 MMHG | OXYGEN SATURATION: 95 % | HEART RATE: 70 BPM | DIASTOLIC BLOOD PRESSURE: 88 MMHG | RESPIRATION RATE: 16 BRPM

## 2023-06-07 DIAGNOSIS — D75.1 POLYCYTHEMIA: Primary | ICD-10-CM

## 2023-06-07 PROCEDURE — 99195 PHLEBOTOMY: CPT

## 2023-06-07 NOTE — PROGRESS NOTES
PHLEBOTOMY    Start Time: 1322    End Time: 1330    Hemaglobin Value: 18.6    Hematocrit Value: 53.9    Ferritin Value: n/a      Permit Signed: Self Signed    Prep with Chlorhexidine: yes    Needle Size: 16 Gauge    Number of Attempts: 1      Scale Calibration: yes    Amount Drawn: 500ml = 529grams    Phlebotomy Completed: yes    Phlebotomy Site: Right Antecubital    Dressing: Pressure Dressing Applied    Anchored: Held    Blood Discarded Per Protocol: yes    200+/- 5 grams: yes  500+/- 5 grams: yes    Self Care Assistance: Unassisted and Steady

## 2023-07-31 ENCOUNTER — OFFICE (AMBULATORY)
Dept: URBAN - METROPOLITAN AREA CLINIC 64 | Facility: CLINIC | Age: 53
End: 2023-07-31
Payer: MEDICARE

## 2023-07-31 VITALS
HEIGHT: 70 IN | DIASTOLIC BLOOD PRESSURE: 71 MMHG | WEIGHT: 260 LBS | SYSTOLIC BLOOD PRESSURE: 109 MMHG | HEART RATE: 80 BPM

## 2023-07-31 DIAGNOSIS — K70.30 ALCOHOLIC CIRRHOSIS OF LIVER WITHOUT ASCITES: ICD-10-CM

## 2023-07-31 DIAGNOSIS — R13.10 DYSPHAGIA, UNSPECIFIED: ICD-10-CM

## 2023-07-31 DIAGNOSIS — R74.01 ELEVATION OF LEVELS OF LIVER TRANSAMINASE LEVELS: ICD-10-CM

## 2023-07-31 PROCEDURE — 99214 OFFICE O/P EST MOD 30 MIN: CPT | Performed by: INTERNAL MEDICINE

## 2023-07-31 RX ORDER — MONTELUKAST SODIUM 10 MG/1
TABLET ORAL
Qty: 90 TABLET | Refills: 1 | Status: SHIPPED | OUTPATIENT
Start: 2023-07-31

## 2023-07-31 RX ORDER — MAGNESIUM OXIDE 400 MG/1
800 TABLET ORAL
Qty: 60 | Refills: 11 | Status: ACTIVE

## 2023-07-31 RX ORDER — SPIRONOLACTONE 25 MG/1
25 TABLET ORAL
Qty: 30 | Refills: 11 | Status: COMPLETED
End: 2023-07-31

## 2023-07-31 RX ORDER — RIFAXIMIN 550 MG/1
TABLET ORAL
Qty: 0 | Refills: 0 | Status: COMPLETED
End: 2023-07-31

## 2023-09-12 ENCOUNTER — OFFICE VISIT (OUTPATIENT)
Dept: FAMILY MEDICINE CLINIC | Facility: CLINIC | Age: 53
End: 2023-09-12
Payer: COMMERCIAL

## 2023-09-12 VITALS
OXYGEN SATURATION: 96 % | DIASTOLIC BLOOD PRESSURE: 80 MMHG | WEIGHT: 263 LBS | SYSTOLIC BLOOD PRESSURE: 126 MMHG | BODY MASS INDEX: 37.65 KG/M2 | HEIGHT: 70 IN | RESPIRATION RATE: 20 BRPM | HEART RATE: 89 BPM

## 2023-09-12 DIAGNOSIS — E66.01 CLASS 2 SEVERE OBESITY WITH SERIOUS COMORBIDITY AND BODY MASS INDEX (BMI) OF 37.0 TO 37.9 IN ADULT, UNSPECIFIED OBESITY TYPE: ICD-10-CM

## 2023-09-12 DIAGNOSIS — K70.30 ALCOHOLIC CIRRHOSIS OF LIVER WITHOUT ASCITES: Chronic | ICD-10-CM

## 2023-09-12 DIAGNOSIS — R35.1 BENIGN PROSTATIC HYPERPLASIA WITH NOCTURIA: ICD-10-CM

## 2023-09-12 DIAGNOSIS — N18.9 CHRONIC KIDNEY DISEASE, UNSPECIFIED CKD STAGE: ICD-10-CM

## 2023-09-12 DIAGNOSIS — K21.9 GERD WITHOUT ESOPHAGITIS: Chronic | ICD-10-CM

## 2023-09-12 DIAGNOSIS — Z12.5 SCREENING PSA (PROSTATE SPECIFIC ANTIGEN): ICD-10-CM

## 2023-09-12 DIAGNOSIS — N40.1 BENIGN PROSTATIC HYPERPLASIA WITH NOCTURIA: ICD-10-CM

## 2023-09-12 DIAGNOSIS — E11.65 TYPE 2 DIABETES MELLITUS WITH HYPERGLYCEMIA, WITH LONG-TERM CURRENT USE OF INSULIN: Chronic | ICD-10-CM

## 2023-09-12 DIAGNOSIS — M06.09 RHEUMATOID ARTHRITIS OF MULTIPLE SITES WITH NEGATIVE RHEUMATOID FACTOR: ICD-10-CM

## 2023-09-12 DIAGNOSIS — Z00.00 MEDICARE ANNUAL WELLNESS VISIT, SUBSEQUENT: Primary | ICD-10-CM

## 2023-09-12 DIAGNOSIS — M16.11 PRIMARY OSTEOARTHRITIS OF RIGHT HIP: ICD-10-CM

## 2023-09-12 DIAGNOSIS — Z79.4 TYPE 2 DIABETES MELLITUS WITH HYPERGLYCEMIA, WITH LONG-TERM CURRENT USE OF INSULIN: Chronic | ICD-10-CM

## 2023-09-12 DIAGNOSIS — M17.11 PRIMARY OSTEOARTHRITIS OF RIGHT KNEE: ICD-10-CM

## 2023-09-12 DIAGNOSIS — D47.2 MGUS (MONOCLONAL GAMMOPATHY OF UNKNOWN SIGNIFICANCE): ICD-10-CM

## 2023-09-12 PROBLEM — S37.009A RENAL INJURY: Status: RESOLVED | Noted: 2020-01-08 | Resolved: 2023-09-12

## 2023-09-12 RX ORDER — LANSOPRAZOLE 30 MG/1
30 CAPSULE, DELAYED RELEASE ORAL DAILY
Qty: 90 CAPSULE | Refills: 1 | Status: SHIPPED | OUTPATIENT
Start: 2023-09-12

## 2023-09-12 RX ORDER — FAMOTIDINE 40 MG/1
40 TABLET, FILM COATED ORAL 2 TIMES DAILY
Qty: 180 TABLET | Refills: 3 | Status: SHIPPED | OUTPATIENT
Start: 2023-09-12 | End: 2023-09-12

## 2023-09-12 RX ORDER — BUPIVACAINE HYDROCHLORIDE 5 MG/ML
2 INJECTION, SOLUTION PERINEURAL
Status: COMPLETED | OUTPATIENT
Start: 2023-09-12 | End: 2023-09-12

## 2023-09-12 RX ORDER — TRIAMCINOLONE ACETONIDE 40 MG/ML
40 INJECTION, SUSPENSION INTRA-ARTICULAR; INTRAMUSCULAR
Status: COMPLETED | OUTPATIENT
Start: 2023-09-12 | End: 2023-09-12

## 2023-09-12 RX ORDER — PREGABALIN 75 MG/1
75 CAPSULE ORAL 2 TIMES DAILY
Qty: 60 CAPSULE | Refills: 5 | Status: SHIPPED | OUTPATIENT
Start: 2023-09-12

## 2023-09-12 RX ORDER — FAMOTIDINE 40 MG/1
40 TABLET, FILM COATED ORAL DAILY
Qty: 90 TABLET | Refills: 1 | Status: SHIPPED | OUTPATIENT
Start: 2023-09-12

## 2023-09-12 RX ORDER — SPIRONOLACTONE 50 MG/1
50 TABLET, FILM COATED ORAL DAILY
Qty: 90 TABLET | Refills: 1 | Status: SHIPPED | OUTPATIENT
Start: 2023-09-12

## 2023-09-12 RX ORDER — LIDOCAINE HYDROCHLORIDE 10 MG/ML
2 INJECTION, SOLUTION INFILTRATION; PERINEURAL
Status: COMPLETED | OUTPATIENT
Start: 2023-09-12 | End: 2023-09-12

## 2023-09-12 RX ORDER — CELECOXIB 200 MG/1
200 CAPSULE ORAL DAILY
Status: SHIPPED | COMMUNITY
Start: 2023-09-12

## 2023-09-12 RX ADMIN — LIDOCAINE HYDROCHLORIDE 2 ML: 10 INJECTION, SOLUTION INFILTRATION; PERINEURAL at 16:29

## 2023-09-12 RX ADMIN — TRIAMCINOLONE ACETONIDE 40 MG: 40 INJECTION, SUSPENSION INTRA-ARTICULAR; INTRAMUSCULAR at 16:29

## 2023-09-12 RX ADMIN — BUPIVACAINE HYDROCHLORIDE 2 ML: 5 INJECTION, SOLUTION PERINEURAL at 16:29

## 2023-09-12 NOTE — PROGRESS NOTES
The ABCs of the Annual Wellness Visit  Subsequent Medicare Wellness Visit    Subjective    Mc Selby is a 53 y.o. male who presents for a Subsequent Medicare Wellness Visit.    The following portions of the patient's history were reviewed and   updated as appropriate: allergies, current medications, past family history, past medical history, past social history, past surgical history, and problem list.  Compared to one year ago, the patient feels his physical   health is worse.    Compared to one year ago, the patient feels his mental   health is the same.    Recent Hospitalizations:  He was not admitted to the hospital during the last year.     Current Medical Providers:  Patient Care Team:  Berny Godwin MD as PCP - General (Internal Medicine)  Lacey Mack APRN as Nurse Practitioner (Hematology)    Outpatient Medications Prior to Visit   Medication Sig Dispense Refill   • Abatacept 125 MG/ML solution auto-injector Inject 1 mL under the skin into the appropriate area as directed 1 (One) Time Per Week.     • Azelastine HCl 137 MCG/SPRAY solution INHALE 2 PUFFS INTO EACH NOSTRIL TWICE A DAY 30 mL 1   • bumetanide (BUMEX) 2 MG tablet Take 1 tablet by mouth 2 (Two) Times a Day.     • celecoxib (CeleBREX) 200 MG capsule Take 1 capsule by mouth Daily.     • cetirizine (zyrTEC) 10 MG tablet TAKE 1 TABLET BY MOUTH EVERY DAY 90 tablet 3   • glucose blood test strip Use as instructed to check blood sugar twice daily as needed. E11.9 100 each 12   • guaiFENesin (MUCINEX) 600 MG 12 hr tablet Take 2 tablets by mouth 2 (Two) Times a Day.     • ipratropium (ATROVENT) 0.06 % nasal spray INSTILL 2 SPRAYS INTO THE NOSTRIL(S) AS DIRECTED BY PROVIDER 4 (FOUR) TIMES A DAY. 45 each 1   • magnesium oxide (MAG-OX) 400 MG tablet Take 2 tablets by mouth 2 (Two) Times a Day.  6   • montelukast (SINGULAIR) 10 MG tablet TAKE 1 TABLET BY MOUTH EVERY DAY AT NIGHT 90 tablet 1   • potassium chloride (K-DUR,KLOR-CON) 20 MEQ CR  tablet Take 1 tablet by mouth 2 (Two) Times a Day.     • Semaglutide, 2 MG/DOSE, (Ozempic, 2 MG/DOSE,) 8 MG/3ML solution pen-injector Inject 2 mg under the skin into the appropriate area as directed 1 (One) Time Per Week. 3 mL 5   • tamsulosin (FLOMAX) 0.4 MG capsule 24 hr capsule TAKE 1 CAPSULE BY MOUTH TWICE A  capsule 2   • traZODone (DESYREL) 50 MG tablet TAKE 1-2 TABLETS NIGHTLY AS NEEDED FOR SLEEP 180 tablet 1   • Triamcinolone Acetonide (NASACORT) 55 MCG/ACT nasal inhaler 1 spray into the nostril(s) as directed by provider Daily.     • celecoxib (CeleBREX) 200 MG capsule Take 1 capsule by mouth 2 (Two) Times a Day.     • famotidine (PEPCID) 40 MG tablet TAKE 1 TABLET BY MOUTH TWICE A  tablet 3   • riFAXIMin (Xifaxan) 550 MG tablet Take 1 tablet by mouth Every 12 (Twelve) Hours. 180 tablet 5   • spironolactone (ALDACTONE) 50 MG tablet TAKE 1 TABLET BY MOUTH EVERY DAY 30 tablet 2   • zinc sulfate (ZINCATE) 220 (50 Zn) MG capsule Take 1 capsule by mouth Daily. 90 capsule 3   • celecoxib (CeleBREX) 200 MG capsule Take 1 capsule by mouth Daily.     • chlorhexidine (PERIDEX) 0.12 % solution RINSE WITH 15 A SMALL AMOUNT BY MOUTH THREE TIMES A DAY-SPIT DO NOT SWALLOW     • Fluticasone-Umeclidin-Vilant (TRELEGY) 100-62.5-25 MCG/ACT inhaler Inhale 1 puff Daily. 60 each 5   • gabapentin (NEURONTIN) 300 MG capsule Take 1 capsule in the morning and 2 capsules at night. Start with 1 capsule at night and uptitrate to prescribed dose 90 capsule 2     No facility-administered medications prior to visit.     No opioid medication identified on active medication list. I have reviewed chart for other potential  high risk medication/s and harmful drug interactions in the elderly.      Aspirin is not on active medication list.  Aspirin use is not indicated based on review of current medical condition/s. Risk of harm outweighs potential benefits.  .    Patient Active Problem List   Diagnosis   • Alcoholic cirrhosis   •  "Umbilical hernia   • Type 2 diabetes mellitus with hyperglycemia   • Hyperammonemia   • Encephalopathy, hepatic   • Primary osteoarthritis of right hip   • Obesity (BMI 30-39.9)   • GERD without esophagitis   • Vitamin D deficiency   • COPD (chronic obstructive pulmonary disease)   • Hypomagnesemia   • Rheumatoid arthritis involving multiple sites   • Seasonal allergies   • Cholelithiasis   • Chronic kidney disease   • Benign prostatic hyperplasia with nocturia   • MGUS (monoclonal gammopathy of unknown significance)   • Polycythemia   • Agitation     Advance Care Planning   Advance Care Planning     Advance Directive is not on file.  ACP discussion was declined by the patient. Patient does not have an advance directive, declines further assistance.  Wife      Objective    Vitals:    23 1519   BP: 126/80   BP Location: Right arm   Pulse: 89   Resp: 20   SpO2: 96%   Weight: 119 kg (263 lb)   Height: 177.8 cm (70\")     Estimated body mass index is 37.74 kg/m² as calculated from the following:    Height as of this encounter: 177.8 cm (70\").    Weight as of this encounter: 119 kg (263 lb).    Class 2 Severe Obesity (BMI >=35 and <=39.9). Obesity-related health conditions include the following: diabetes mellitus, GERD, and osteoarthritis. Obesity is improving with treatment. BMI is is above average; BMI management plan is completed. We discussed portion control and increasing exercise.  Weight trending down nearly 40lbs from 1 year ago. Maintained on Ozempic. Reports eating 1 meal per day and exercising as best he can given hx OA/RA    Does the patient have evidence of cognitive impairment? No        HEALTH RISK ASSESSMENT    Smoking Status:  Social History     Tobacco Use   Smoking Status Former   • Packs/day: 2.00   • Years: 30.00   • Pack years: 60.00   • Types: Cigarettes   • Start date: 2020   • Quit date: 2018   • Years since quittin.3   Smokeless Tobacco Former     Alcohol Consumption:  Social " History     Substance and Sexual Activity   Alcohol Use Not Currently   6+ beers daily    Fall Risk Screen:  [place] Fall Risk Assessment has not been completed.  No falls in the last year    Depression Screenin/12/2023     3:19 PM   PHQ-2/PHQ-9 Depression Screening   Little Interest or Pleasure in Doing Things 1-->several days   Feeling Down, Depressed or Hopeless 1-->several days   PHQ-9: Brief Depression Severity Measure Score 2   Related to health and poor quality of life    Health Habits and Functional and Cognitive Screenin/12/2023     3:17 PM   Functional & Cognitive Status   Do you have difficulty preparing food and eating? No   Do you have difficulty bathing yourself, getting dressed or grooming yourself? No   Do you have difficulty using the toilet? No   Do you have difficulty moving around from place to place? No   Do you have trouble with steps or getting out of a bed or a chair? No   Current Diet Well Balanced Diet   Dental Exam Up to date   Eye Exam Up to date   Exercise (times per week) 7 times per week   Current Exercises Include Light Weights   Do you need help using the phone?  No   Are you deaf or do you have serious difficulty hearing?  No   Do you need help to go to places out of walking distance? No   Do you need help shopping? No   Do you need help preparing meals?  No   Do you need help with housework?  No   Do you need help with laundry? No   Do you need help taking your medications? No   Do you need help managing money? No   Do you ever drive or ride in a car without wearing a seat belt? No   Have you felt unusual stress, anger or loneliness in the last month? No   Who do you live with? Spouse   If you need help, do you have trouble finding someone available to you? No   Have you been bothered in the last four weeks by sexual problems? No   Do you have difficulty concentrating, remembering or making decisions? No     Age-appropriate Screening Schedule:  Refer to the list  below for future screening recommendations based on patient's age, sex and/or medical conditions. Orders for these recommended tests are listed in the plan section. The patient has been provided with a written plan.    Health Maintenance   Topic Date Due   • DIABETIC EYE EXAM  07/13/2022   • DIABETIC FOOT EXAM  01/11/2023   • URINE MICROALBUMIN  07/06/2023   • LUNG CANCER SCREENING  08/26/2023   • COVID-19 Vaccine (4 - Pfizer series) 09/14/2023 (Originally 11/10/2021)   • INFLUENZA VACCINE  10/01/2023   • HEMOGLOBIN A1C  11/30/2023   • DXA SCAN  03/30/2024   • ANNUAL WELLNESS VISIT  09/12/2024   • BMI FOLLOWUP  09/12/2024   • TDAP/TD VACCINES (2 - Td or Tdap) 12/03/2028   • COLORECTAL CANCER SCREENING  03/09/2030   • Pneumococcal Vaccine 0-64 (3 - PPSV23 or PCV20) 02/11/2033   • HEPATITIS C SCREENING  Completed   • Hepatitis B  Completed   • ZOSTER VACCINE  Completed        CMS Preventative Services Quick Reference  Risk Factors Identified During Encounter  Alcohol Misuse: Patient encouraged to stop all alcohol use   Immunizations Discussed/Encouraged: Influenza  The above risks/problems have been discussed with the patient.  Pertinent information has been shared with the patient in the After Visit Summary.  An After Visit Summary and PPPS were made available to the patient.    Preventative  Colonoscopy: 3/2020, due 3/2025  PSA: 0.262 (8/2022), Ordered today  CT chest: Negative 8/2022 Ordered today  AAA: due 2033  Shingles: Shingrix 4/2019  Pneumonia: Pneumovax 12/2018, Prevnar 8/2021  Tdap: 12/2018  Influenza: 1/2022, recommended  COVID: Completed 3 shots Pfizer (9/2021)    Follow Up:   Next Medicare Wellness visit to be scheduled in 1 year.       Additional E&M Note during same encounter follows:  Patient has multiple medical problems which are significant and separately identifiable that require additional work above and beyond the Medicare Wellness Visit.      Chief Complaint  Medicare  Wellness-subsequent    Subjective        HPI  Mc Selby is also being seen today for multiple medical problems    DM: last a1c 5.7. Maintained on Ozempic 2mg weekly. Reports chronic numbness in feet. Unable to tolerate gabapentin- made him sick. 7/2022 Pro/Cr 7. Follows w/ ophtho annually.      RA: maintained on abatacept 125mg weekly. Switched from Enbrel 50mg weekly a few months ago. Reports poor control on this med. Essentially all joints hurt. Has f/u w/ rheum next month. Too young for hip replacement per ortho. Reports 30-40 minutes of morning stiffness. Follows w/ rheumatology- Eylan.      OA: 6/2020 MRI w/ severe degenerative changes of bilateral hips. R hip previously evaluated for replacement but L beginning to hurt more. Donovan nor Garcia want to perform hip replacement because of age, weight and comorbidities. Has had steroid injections in the past w/ minimal benefit. Also w/ bilateral knee pain, R>L. 1/2022 XR w/ mild degenerative changes of bilateral knees.     EtOH cirrhosis: patient reports drinking 6+ beers daily. Maintained on Bumex 2mg BID. Previously on spironolactone 50mg daily but insurance won't pay for it. Last HE unknown. Last EGD 7/2021 w/ mild PHG. Follows w/ GI (Cinda) annually and obtaining liver US at that time. Has repeat EGD scheduled for 10/2023     GERD: Maintained on famotidine 40mg daily. Unable to tolerate pantoprazole. Reports daily heartburn and difficulty swallowing. Last EGD 7/2021 w/ esophageal stricture and subsequent dilation. Has repeat EGD 10/2023     BPH: maintained on tamsulosin 0.8mg daily. He reports getting up every hour at night to urinate. Patient reports this is because he takes his Bumex just before bed.      CKD2: follows w/ Konijeti. Maintained on bumex 2mg BID. Has been instructed not to take NSAIDs     MGUS: follows w/ hematology (Mary) annually. No fever, chills, weight loss. He reports chronic night sweats since childhood.     Review of Systems  "  Constitutional:  Negative for chills and fever.   HENT:  Positive for trouble swallowing. Negative for congestion and rhinorrhea.    Eyes:  Negative for visual disturbance.   Respiratory:  Negative for cough and shortness of breath.    Cardiovascular:  Negative for chest pain and palpitations.   Gastrointestinal:  Positive for abdominal distention and abdominal pain. Negative for vomiting.   Genitourinary:  Negative for difficulty urinating and dysuria.   Musculoskeletal:  Positive for arthralgias and gait problem. Negative for back pain.   Skin:  Negative for rash.   Neurological:  Positive for numbness. Negative for dizziness and light-headedness.   Psychiatric/Behavioral:  Negative for dysphoric mood and sleep disturbance. The patient is not nervous/anxious.      Objective   Vital Signs:  /80 (BP Location: Right arm)   Pulse 89   Resp 20   Ht 177.8 cm (70\")   Wt 119 kg (263 lb)   SpO2 96%   BMI 37.74 kg/m²     Physical Exam  Constitutional:       General: He is not in acute distress.     Appearance: He is well-developed. He is obese.   HENT:      Head: Normocephalic and atraumatic.      Right Ear: Tympanic membrane and external ear normal. There is no impacted cerumen.      Left Ear: Tympanic membrane and external ear normal. There is no impacted cerumen.      Nose: Nose normal.      Mouth/Throat:      Mouth: Mucous membranes are moist.      Pharynx: No oropharyngeal exudate or posterior oropharyngeal erythema.   Eyes:      General: No scleral icterus.        Right eye: No discharge.         Left eye: No discharge.      Extraocular Movements: Extraocular movements intact.      Conjunctiva/sclera: Conjunctivae normal.      Pupils: Pupils are equal, round, and reactive to light.   Neck:      Thyroid: No thyromegaly.      Vascular: No carotid bruit.   Cardiovascular:      Rate and Rhythm: Normal rate and regular rhythm.      Heart sounds: Normal heart sounds. No murmur heard.  Pulmonary:      Effort: " Pulmonary effort is normal. No respiratory distress.      Breath sounds: Normal breath sounds. No wheezing or rales.   Abdominal:      General: Bowel sounds are normal. There is no distension.      Palpations: Abdomen is soft.      Tenderness: There is abdominal tenderness (RUQ).   Musculoskeletal:         General: No deformity. Normal range of motion.      Cervical back: Normal range of motion and neck supple.   Lymphadenopathy:      Cervical: No cervical adenopathy.   Skin:     General: Skin is warm and dry.      Findings: No rash.   Neurological:      General: No focal deficit present.      Mental Status: He is alert and oriented to person, place, and time. Mental status is at baseline.      Cranial Nerves: No cranial nerve deficit.      Sensory: No sensory deficit.   Psychiatric:         Behavior: Behavior normal.         Thought Content: Thought content normal.           Assessment and Plan   Diagnoses and all orders for this visit:    1. Medicare annual wellness visit, subsequent (Primary)  -     CBC & Differential  -     Comprehensive Metabolic Panel  -     Hemoglobin A1c  -     Lipid Panel  -     TSH  -     T4, Free  -     Vitamin D,25-Hydroxy  -     Vitamin B12  -     PSA Screen  -      CT Chest Low Dose Cancer Screening WO; Future  -  Counseled regarding diet, exercise, weight loss, sobriety and preventative health maintenance items/immunizations below  -  Patient I had a discussion today about patient's current health situation.  He reports being unwilling to change his lifestyle but is unhappy with his current quality of life.  I discussed that in order to have a marked change in his quality of life and overall health, it would require a marked change in his lifestyle.  We specifically discussed sobriety, tracking all calories by writing everything down that he eats, exercise as able and focus on weight loss.  Patient is unsure if he wants to make all these changes but will entertain.  We also discussed  using Celebrex once daily and lieu of alcohol which he used to self medicate his pain.  We discussed that it is not ideal to use Celebrex due to CKD and cirrhosis but it is preferable to self-medicating with alcohol.    2. Type 2 diabetes mellitus with hyperglycemia, with long-term current use of insulin: last A1c 5.7. 7/2022 Pro/Cr 7. Follows w/ ophtho annually.   -     Comprehensive Metabolic Panel  -     Hemoglobin A1c  -     Microalbumin / Creatinine Urine Ratio - Urine, Clean Catch  - Continue home Ozempic 2 mg weekly  - Low threshold to add statin and ACE/ARB  - Recommend regular exercise and annual eye exam    3. Rheumatoid arthritis of multiple sites with negative rheumatoid factor  -     C-reactive Protein  -     Sedimentation Rate  - Continue home abatacept 125 mg weekly per rheumatology  - Continue rheumatology follow-up    4. Primary osteoarthritis of right knee: 1/2022 XR w/ mild degenerative changes of bilateral knees.  -     Start pregabalin (Lyrica) 75 MG capsule; Take 1 capsule by mouth 2 (Two) Times a Day.  Dispense: 60 capsule; Refill: 5  -     Arthrocentesis  - Start Celebrex 200 daily for pain in place of daily alcohol use    5. Alcoholic cirrhosis of liver without ascites  -     Start spironolactone (ALDACTONE) 50 MG tablet; Take 1 tablet by mouth Daily.  Dispense: 90 tablet; Refill: 1  - Continue home Bumex 2 mg twice daily  - Continue GI [id number]  - Counseled regarding need for sobriety    6. GERD without esophagitis  -     Continue famotidine (PEPCID) 40 MG tablet; Take 1 tablet by mouth Daily.  Dispense: 90 tablet; Refill: 1  -     Start lansoprazole (PREVACID) 30 MG capsule; Take 1 capsule by mouth Daily.  Dispense: 90 capsule; Refill: 1  - Plan for EGD in October 2023 with GI    7. Benign prostatic hyperplasia with nocturia   -Continue home tamsulosin 0.4 mg twice daily    8. Chronic kidney disease, unspecified CKD stage: Stage II  -     Comprehensive Metabolic Panel    9. MGUS  (monoclonal gammopathy of unknown significance)   -Continue hematology [id number]    10. Class 2 severe obesity with serious comorbidity and body mass index (BMI) of 37.0 to 37.9 in adult, unspecified obesity type   -Counseled regarding diet, exercise, weight loss    -- Instructed to record all calories consumed   -Continue home semaglutide    11. Primary osteoarthritis of right hip: 6/2020 MRI w/ severe degenerative changes of bilateral hips. R hip previously evaluated for replacement but L beginning to hurt more.  Previously declined for surgery secondary to age, weight and comorbidities.  Will refer for another opinion  -     Ambulatory Referral to Orthopedic Surgery  -     Start pregabalin (Lyrica) 75 MG capsule; Take 1 capsule by mouth 2 (Two) Times a Day.  Dispense: 60 capsule; Refill: 5  - Celebrex as above    12. Screening PSA (prostate specific antigen)  -     PSA Screen     I spent 75 minutes caring for Mc on this date of service. This time includes time spent by me in the following activities:reviewing tests, performing a medically appropriate examination and/or evaluation , counseling and educating the patient/family/caregiver, ordering medications, tests, or procedures, and documenting information in the medical record  Follow Up   Return in about 2 months (around 11/12/2023).  Patient was given instructions and counseling regarding his condition or for health maintenance advice. Please see specific information pulled into the AVS if appropriate.

## 2023-09-12 NOTE — PROGRESS NOTES
Procedure   Arthrocentesis    Date/Time: 9/12/2023 4:29 PM  Performed by: Berny Godwin MD  Authorized by: Berny Godwin MD   Indications: pain   Body area: knee  Joint: right knee  Local anesthesia used: no    Anesthesia:  Local anesthesia used: no    Sedation:  Patient sedated: no    Preparation: Patient was prepped and draped in the usual sterile fashion.  Needle size: 22 G  Ultrasound guidance: no  Approach: anterior  Meds administered: 2 mL lidocaine 1 %; 2 mL bupivacaine 0.5 %; 40 mg triamcinolone acetonide 40 MG/ML  Patient tolerance: patient tolerated the procedure well with no immediate complications

## 2023-09-15 ENCOUNTER — TELEPHONE (OUTPATIENT)
Dept: FAMILY MEDICINE CLINIC | Facility: CLINIC | Age: 53
End: 2023-09-15
Payer: COMMERCIAL

## 2023-09-15 ENCOUNTER — LAB (OUTPATIENT)
Dept: FAMILY MEDICINE CLINIC | Facility: CLINIC | Age: 53
End: 2023-09-15
Payer: COMMERCIAL

## 2023-09-15 DIAGNOSIS — D75.1 POLYCYTHEMIA: ICD-10-CM

## 2023-09-15 LAB
25(OH)D3 SERPL-MCNC: 38.8 NG/ML (ref 30–100)
ALBUMIN SERPL-MCNC: 3.6 G/DL (ref 3.5–5.2)
ALBUMIN UR-MCNC: <1.2 MG/DL
ALBUMIN/GLOB SERPL: 0.9 G/DL
ALP SERPL-CCNC: 83 U/L (ref 39–117)
ALT SERPL W P-5'-P-CCNC: 10 U/L (ref 1–41)
ANION GAP SERPL CALCULATED.3IONS-SCNC: 15 MMOL/L (ref 5–15)
AST SERPL-CCNC: 21 U/L (ref 1–40)
BASOPHILS # BLD AUTO: 0.05 10*3/MM3 (ref 0–0.2)
BASOPHILS NFR BLD AUTO: 0.8 % (ref 0–1.5)
BILIRUB SERPL-MCNC: 0.9 MG/DL (ref 0–1.2)
BUN SERPL-MCNC: 8 MG/DL (ref 6–20)
BUN/CREAT SERPL: 7.9 (ref 7–25)
CALCIUM SPEC-SCNC: 9 MG/DL (ref 8.6–10.5)
CHLORIDE SERPL-SCNC: 98 MMOL/L (ref 98–107)
CHOLEST SERPL-MCNC: 120 MG/DL (ref 0–200)
CO2 SERPL-SCNC: 25 MMOL/L (ref 22–29)
CREAT SERPL-MCNC: 1.01 MG/DL (ref 0.76–1.27)
CREAT UR-MCNC: 17.3 MG/DL
CRP SERPL-MCNC: <0.3 MG/DL (ref 0–0.5)
DEPRECATED RDW RBC AUTO: 43.5 FL (ref 37–54)
EGFRCR SERPLBLD CKD-EPI 2021: 87.8 ML/MIN/1.73
EOSINOPHIL # BLD AUTO: 0.11 10*3/MM3 (ref 0–0.4)
EOSINOPHIL NFR BLD AUTO: 1.8 % (ref 0.3–6.2)
ERYTHROCYTE [DISTWIDTH] IN BLOOD BY AUTOMATED COUNT: 12.9 % (ref 12.3–15.4)
ERYTHROCYTE [SEDIMENTATION RATE] IN BLOOD: 4 MM/HR (ref 0–20)
GLOBULIN UR ELPH-MCNC: 4.1 GM/DL
GLUCOSE SERPL-MCNC: 142 MG/DL (ref 65–99)
HBA1C MFR BLD: 6 % (ref 4.8–5.6)
HCT VFR BLD AUTO: 50.8 % (ref 37.5–51)
HDLC SERPL-MCNC: 32 MG/DL (ref 40–60)
HGB BLD-MCNC: 17.7 G/DL (ref 13–17.7)
IMM GRANULOCYTES # BLD AUTO: 0.02 10*3/MM3 (ref 0–0.05)
IMM GRANULOCYTES NFR BLD AUTO: 0.3 % (ref 0–0.5)
LDLC SERPL CALC-MCNC: 76 MG/DL (ref 0–100)
LDLC/HDLC SERPL: 2.43 {RATIO}
LYMPHOCYTES # BLD AUTO: 1.31 10*3/MM3 (ref 0.7–3.1)
LYMPHOCYTES NFR BLD AUTO: 21.5 % (ref 19.6–45.3)
MCH RBC QN AUTO: 32.8 PG (ref 26.6–33)
MCHC RBC AUTO-ENTMCNC: 34.8 G/DL (ref 31.5–35.7)
MCV RBC AUTO: 94.1 FL (ref 79–97)
MICROALBUMIN/CREAT UR: NORMAL MG/G{CREAT}
MONOCYTES # BLD AUTO: 0.53 10*3/MM3 (ref 0.1–0.9)
MONOCYTES NFR BLD AUTO: 8.7 % (ref 5–12)
NEUTROPHILS NFR BLD AUTO: 4.07 10*3/MM3 (ref 1.7–7)
NEUTROPHILS NFR BLD AUTO: 66.9 % (ref 42.7–76)
NRBC BLD AUTO-RTO: 0 /100 WBC (ref 0–0.2)
PLATELET # BLD AUTO: 197 10*3/MM3 (ref 140–450)
PMV BLD AUTO: 10.1 FL (ref 6–12)
POTASSIUM SERPL-SCNC: 3.7 MMOL/L (ref 3.5–5.2)
PROT SERPL-MCNC: 7.7 G/DL (ref 6–8.5)
PSA SERPL-MCNC: 0.52 NG/ML (ref 0–4)
RBC # BLD AUTO: 5.4 10*6/MM3 (ref 4.14–5.8)
SODIUM SERPL-SCNC: 138 MMOL/L (ref 136–145)
T4 FREE SERPL-MCNC: 0.94 NG/DL (ref 0.93–1.7)
TRIGL SERPL-MCNC: 52 MG/DL (ref 0–150)
TSH SERPL DL<=0.05 MIU/L-ACNC: 3.56 UIU/ML (ref 0.27–4.2)
VIT B12 BLD-MCNC: 209 PG/ML (ref 211–946)
VLDLC SERPL-MCNC: 12 MG/DL (ref 5–40)
WBC NRBC COR # BLD: 6.09 10*3/MM3 (ref 3.4–10.8)

## 2023-09-15 PROCEDURE — 83036 HEMOGLOBIN GLYCOSYLATED A1C: CPT | Performed by: FAMILY MEDICINE

## 2023-09-15 PROCEDURE — 82607 VITAMIN B-12: CPT | Performed by: FAMILY MEDICINE

## 2023-09-15 PROCEDURE — G0103 PSA SCREENING: HCPCS | Performed by: FAMILY MEDICINE

## 2023-09-15 PROCEDURE — 86140 C-REACTIVE PROTEIN: CPT | Performed by: FAMILY MEDICINE

## 2023-09-15 PROCEDURE — 80061 LIPID PANEL: CPT | Performed by: FAMILY MEDICINE

## 2023-09-15 PROCEDURE — 85652 RBC SED RATE AUTOMATED: CPT | Performed by: FAMILY MEDICINE

## 2023-09-15 PROCEDURE — 82043 UR ALBUMIN QUANTITATIVE: CPT | Performed by: FAMILY MEDICINE

## 2023-09-15 PROCEDURE — 80053 COMPREHEN METABOLIC PANEL: CPT | Performed by: FAMILY MEDICINE

## 2023-09-15 PROCEDURE — 82306 VITAMIN D 25 HYDROXY: CPT | Performed by: FAMILY MEDICINE

## 2023-09-15 PROCEDURE — 84439 ASSAY OF FREE THYROXINE: CPT | Performed by: FAMILY MEDICINE

## 2023-09-15 PROCEDURE — 85025 COMPLETE CBC W/AUTO DIFF WBC: CPT | Performed by: FAMILY MEDICINE

## 2023-09-15 PROCEDURE — 36415 COLL VENOUS BLD VENIPUNCTURE: CPT | Performed by: FAMILY MEDICINE

## 2023-09-15 PROCEDURE — 82570 ASSAY OF URINE CREATININE: CPT | Performed by: FAMILY MEDICINE

## 2023-09-15 PROCEDURE — 84443 ASSAY THYROID STIM HORMONE: CPT | Performed by: FAMILY MEDICINE

## 2023-09-15 NOTE — TELEPHONE ENCOUNTER
HUB TO SHARE: SENT FOLLOWING [employer] MESSAGE. We do not see any recent xrays in your chart. I see that Dr Godwin ordered a chest CT a few days ago but it looks like it has not been performed yet.   actual/standing

## 2023-09-15 NOTE — TELEPHONE ENCOUNTER
I do not see any recent xray reports, Dr. Kim's ordered a CT chest, so maybe that what's he talking about. This looks like it still needs to be scheduled.

## 2023-09-15 NOTE — TELEPHONE ENCOUNTER
PATIENT HAS NOT HEARD FROM [facility] [place] ABOUT HIS CHEST X-RAYS. HE WOULD LIKE TO KNOW WHAT THE RESULTS WERE

## 2023-10-13 ENCOUNTER — HOSPITAL ENCOUNTER (OUTPATIENT)
Dept: CT IMAGING | Facility: HOSPITAL | Age: 53
Discharge: HOME | End: 2023-10-13
Admitting: FAMILY MEDICINE
Payer: COMMERCIAL

## 2023-10-13 DIAGNOSIS — Z00.00 MEDICARE ANNUAL WELLNESS VISIT, SUBSEQUENT: ICD-10-CM

## 2023-10-13 PROCEDURE — 71271 CT THORAX LUNG CANCER SCR C-: CPT

## 2023-10-19 ENCOUNTER — ON CAMPUS - OUTPATIENT (AMBULATORY)
Dept: URBAN - METROPOLITAN AREA HOSPITAL 2 | Facility: HOSPITAL | Age: 53
End: 2023-10-19
Payer: MEDICARE

## 2023-10-19 VITALS
HEIGHT: 70 IN | OXYGEN SATURATION: 100 % | SYSTOLIC BLOOD PRESSURE: 141 MMHG | HEART RATE: 91 BPM | DIASTOLIC BLOOD PRESSURE: 93 MMHG | SYSTOLIC BLOOD PRESSURE: 139 MMHG | DIASTOLIC BLOOD PRESSURE: 86 MMHG | DIASTOLIC BLOOD PRESSURE: 74 MMHG | RESPIRATION RATE: 18 BRPM | RESPIRATION RATE: 12 BRPM | HEART RATE: 63 BPM | OXYGEN SATURATION: 97 % | DIASTOLIC BLOOD PRESSURE: 84 MMHG | SYSTOLIC BLOOD PRESSURE: 116 MMHG | SYSTOLIC BLOOD PRESSURE: 127 MMHG | HEART RATE: 83 BPM | SYSTOLIC BLOOD PRESSURE: 147 MMHG | SYSTOLIC BLOOD PRESSURE: 151 MMHG | OXYGEN SATURATION: 99 % | HEART RATE: 88 BPM | OXYGEN SATURATION: 96 % | TEMPERATURE: 98.2 F | DIASTOLIC BLOOD PRESSURE: 109 MMHG | RESPIRATION RATE: 16 BRPM | OXYGEN SATURATION: 98 % | DIASTOLIC BLOOD PRESSURE: 100 MMHG | HEART RATE: 74 BPM | WEIGHT: 264 LBS

## 2023-10-19 DIAGNOSIS — R13.10 DYSPHAGIA, UNSPECIFIED: ICD-10-CM

## 2023-10-19 PROCEDURE — 43235 EGD DIAGNOSTIC BRUSH WASH: CPT | Performed by: INTERNAL MEDICINE

## 2023-10-19 PROCEDURE — 43450 DILATE ESOPHAGUS 1/MULT PASS: CPT | Performed by: INTERNAL MEDICINE

## 2023-11-14 ENCOUNTER — OFFICE VISIT (OUTPATIENT)
Dept: FAMILY MEDICINE CLINIC | Facility: CLINIC | Age: 53
End: 2023-11-14
Payer: COMMERCIAL

## 2023-11-14 VITALS
DIASTOLIC BLOOD PRESSURE: 78 MMHG | SYSTOLIC BLOOD PRESSURE: 134 MMHG | RESPIRATION RATE: 18 BRPM | WEIGHT: 273.2 LBS | BODY MASS INDEX: 39.11 KG/M2 | HEIGHT: 70 IN | OXYGEN SATURATION: 96 % | HEART RATE: 96 BPM

## 2023-11-14 DIAGNOSIS — K21.9 GERD WITHOUT ESOPHAGITIS: Chronic | ICD-10-CM

## 2023-11-14 DIAGNOSIS — M16.11 PRIMARY OSTEOARTHRITIS OF RIGHT HIP: ICD-10-CM

## 2023-11-14 DIAGNOSIS — K70.30 ALCOHOLIC CIRRHOSIS OF LIVER WITHOUT ASCITES: Chronic | ICD-10-CM

## 2023-11-14 DIAGNOSIS — E11.65 TYPE 2 DIABETES MELLITUS WITH HYPERGLYCEMIA, WITHOUT LONG-TERM CURRENT USE OF INSULIN: Primary | Chronic | ICD-10-CM

## 2023-11-14 PROCEDURE — 99214 OFFICE O/P EST MOD 30 MIN: CPT | Performed by: FAMILY MEDICINE

## 2023-11-14 RX ORDER — TIRZEPATIDE 15 MG/.5ML
15 INJECTION, SOLUTION SUBCUTANEOUS WEEKLY
Qty: 2 ML | Refills: 5 | Status: SHIPPED | OUTPATIENT
Start: 2023-11-14 | End: 2023-11-29

## 2023-11-14 RX ORDER — BUMETANIDE 2 MG/1
2 TABLET ORAL DAILY
Status: SHIPPED | COMMUNITY
Start: 2023-11-14

## 2023-11-14 NOTE — PROGRESS NOTES
Chief Complaint   Patient presents with    Follow-up    Diabetes    Weight Loss     HPI  Mc Selby is a 53 y.o. male that presents for   Chief Complaint   Patient presents with    Follow-up    Diabetes    Weight Loss     DM: last a1c 6.0. Maintained on Ozempic 2mg weekly but has been w/o for the last 6 weeks due to availability. Reports chronic numbness in feet. Unable to tolerate gabapentin- made him sick. 9/2023 microalb/Cr undetectable. Last eye appt 11/2023    OA: patient recently established w/ new ortho- Daryl. He recommend R hip injection and discussed need for 40lb weight loss prior to pursuing hip replacement. He saw sports med- Dr Dennis on 9/20/23. He had steroid injection of the R hip under US guidance 2 months ago w/ good results. Also had R knee injection 9/2023. This is now starting to wear off. Has sports med f/u next month w/ consideration of repeat steroid injection. Nephrology is not ok w/ him taking Celebrex despite last eGFR 87.8. Started on Lyrica 75 BID but unclear if this is helpful    GERD: started on lansoprazole 30mg daily at last visit. He had EGD w/ dilation on 10/19/23. No further heartburn or dysphagia at this time    Alcoholic cirrhosis: maintained on Bumex 2mg daily. Drinking 3-4 beers daily. Follows w/ GI- Cinda    Review of Systems   HENT:  Negative for trouble swallowing.    Gastrointestinal:  Negative for GERD.   Musculoskeletal:  Positive for arthralgias and gait problem.   Neurological:  Positive for numbness.     The following portions of the patient's history were reviewed and updated as appropriate: problem list, past medical history, past surgical history, allergies, current medication    Problem List Tab  Patient History Tab  Immunizations Tab  Medications Tab  Chart Review Tab  Care Everywhere Tab  Synopsis Tab    PE  Vitals:    11/14/23 1540   BP: 134/78   Pulse: 96   Resp: 18   SpO2: 96%     Body mass index is 39.2 kg/m².  General: Obese, NAD  Head: AT/NC  Eyes:  EOMI, anicteric sclera  Resp: CTAB, SCR, BS equal  CV: RRR w/o m/r/g; 2+ pulses  GI: Soft, NT/ND, +BS  MSK: FROM, no deformity, no edema  Skin: Warm, dry, intact  Neuro: Alert and oriented. No focal deficits  Psych: Appropriate mood and affect    Imaging  CT Chest Low Dose Cancer Screening WO    Result Date: 10/13/2023  Impression: 1. No acute process. 2. Stable small pulmonary nodule. Recommend continued low-dose screening CT in 1 year 3. Cirrhosis with sequela of portal hypertension including splenomegaly and small upper abdominal ascites. 4. Coronary artery calcifications. 5. Cholelithiasis. Recommendation: Continue annual screening with LDCT Lung Rads Assessment: Lung-RADS L2 - Benign appearance or <1% chance of malignancy. Electronically Signed: Juliocesar Evans MD  10/13/2023 3:44 PM EDT  Workstation ID: [id number]    XR Knee 4+ View Left    Result Date: 9/25/2023  Impression: Acute fractures of the proximal phalanges of the right third through fifth toes. Other chronic findings as detailed. Electronically Signed: Jemima Vergara MD  9/25/2023 2:17 PM EDT  Workstation ID: [id number]    XR Tibia Fibula 2 View Left    Result Date: 9/25/2023  Impression: Acute fractures of the proximal phalanges of the right third through fifth toes. Other chronic findings as detailed. Electronically Signed: Jemima Vergara MD  9/25/2023 2:17 PM EDT  Workstation ID: [id number]    XR Foot 3+ View Left    Result Date: 9/25/2023  Impression: Acute fractures of the proximal phalanges of the right third through fifth toes. Other chronic findings as detailed. Electronically Signed: Jemima Vergara MD  9/25/2023 2:17 PM EDT  Workstation ID: [id number]    XR Knee 4+ View Right    Result Date: 9/25/2023  Impression: Multiple views of the right knee, tib-fib, ankle, and foot were obtained. There are mildly displaced fractures involving the proximal phalanges of the third, fourth, and fifth toes. Chronic fracture deformity of the hallux proximal  phalanx. Scattered osteoarthritic changes noted. Arthrodesis hardware spans the hallux tarsometatarsal joint. Bones are demineralized. Soft tissues demonstrate no acute abnormality. Electronically Signed: Devon Duvall MD  9/25/2023 2:14 PM EDT  Workstation ID: [id number]    XR Tibia Fibula 2 View Right    Result Date: 9/25/2023  Impression: Multiple views of the right knee, tib-fib, ankle, and foot were obtained. There are mildly displaced fractures involving the proximal phalanges of the third, fourth, and fifth toes. Chronic fracture deformity of the hallux proximal phalanx. Scattered osteoarthritic changes noted. Arthrodesis hardware spans the hallux tarsometatarsal joint. Bones are demineralized. Soft tissues demonstrate no acute abnormality. Electronically Signed: Devon Duvall MD  9/25/2023 2:14 PM EDT  Workstation ID: [id number]    XR Ankle 3+ View Right    Result Date: 9/25/2023  Impression: Multiple views of the right knee, tib-fib, ankle, and foot were obtained. There are mildly displaced fractures involving the proximal phalanges of the third, fourth, and fifth toes. Chronic fracture deformity of the hallux proximal phalanx. Scattered osteoarthritic changes noted. Arthrodesis hardware spans the hallux tarsometatarsal joint. Bones are demineralized. Soft tissues demonstrate no acute abnormality. Electronically Signed: Devon Duvall MD  9/25/2023 2:14 PM EDT  Workstation ID: [id number]    XR Foot 3+ View Right    Result Date: 9/25/2023  Impression: Multiple views of the right knee, tib-fib, ankle, and foot were obtained. There are mildly displaced fractures involving the proximal phalanges of the third, fourth, and fifth toes. Chronic fracture deformity of the hallux proximal phalanx. Scattered osteoarthritic changes noted. Arthrodesis hardware spans the hallux tarsometatarsal joint. Bones are demineralized. Soft tissues demonstrate no acute abnormality. Electronically Signed: Devon Duvall MD  9/25/2023  2:14 PM EDT  Workstation ID: [id number]    XR Ankle 3+ View Left    Result Date: 9/25/2023  impression: 1. Left ankle soft tissue swelling is suggested. 2. No acute osseous abnormality of the left ankle. Electronically Signed: Marylin Scherer MD  9/25/2023 2:14 PM EDT  Workstation ID: [id number]    Assessment & Plan   Mc Selby is a 53 y.o. male that presents for   Chief Complaint   Patient presents with    Follow-up    Diabetes    Weight Loss     Diagnoses and all orders for this visit:    1. Type 2 diabetes mellitus with hyperglycemia, without long-term current use of insulin (Primary): last A1c 6.0. 9/2023 microalb/Cr undetectable. Last eye appt 11/2023  -     Start Tirzepatide (Mounjaro) 15 MG/0.5ML solution pen-injector; Inject 15 mg under the skin into the appropriate area as directed 1 (One) Time Per Week.  Dispense: 2 mL; Refill: 5  - D/C Ozempic 2mg weekly due to availability  -     Comprehensive Metabolic Panel  -     Hemoglobin A1c  - Low threshold to add statin and ACE/ARB  - Recommend regular exercise and annual eye exam    2. Primary osteoarthritis of right hip: needs replacement but has been declined by multiple orthopedic surgeons, stating that he needs to lose approx 40lbs before being considered. Reports that he drinks alcohol to manage his pain. Previously attempted to trade alcohol for Celebrex but patient not interested in renal not ok w/ Celebrex due to hx of marked JON   - Counseled regarding weight loss   - Mounjaro as above   - Cont ortho f/u   - Cont Lyrica 75 BID   - Cont sports med f/u for hip injection PRN    3. GERD without esophagitis: EGD w/ dilation on 10/19/23   - Cont home lansoprazole 30mg daily   - D/C famotidine    4. Alcoholic cirrhosis of liver without ascites:    - Counseled regarding need for sobriety   - Cont home Bumex 2mg daily   - Cont GI f/u- Cinda     Return in about 4 months (around 3/14/2024) for Recheck- 30min.

## 2023-11-18 DIAGNOSIS — G47.00 INSOMNIA, UNSPECIFIED TYPE: ICD-10-CM

## 2023-11-19 RX ORDER — TRAZODONE HYDROCHLORIDE 50 MG/1
TABLET ORAL
Qty: 180 TABLET | Refills: 1 | Status: SHIPPED | OUTPATIENT
Start: 2023-11-19

## 2023-11-28 ENCOUNTER — APPOINTMENT (OUTPATIENT)
Dept: GENERAL RADIOLOGY | Facility: HOSPITAL | Age: 53
End: 2023-11-28
Payer: MEDICARE

## 2023-11-28 ENCOUNTER — APPOINTMENT (OUTPATIENT)
Dept: CT IMAGING | Facility: HOSPITAL | Age: 53
End: 2023-11-28
Payer: MEDICARE

## 2023-11-28 ENCOUNTER — HOSPITAL ENCOUNTER (OUTPATIENT)
Facility: HOSPITAL | Age: 53
Setting detail: OBSERVATION
Discharge: HOME | End: 2023-11-30
Attending: EMERGENCY MEDICINE | Admitting: HOSPITALIST
Payer: MEDICARE

## 2023-11-28 DIAGNOSIS — R06.00 DYSPNEA, UNSPECIFIED TYPE: ICD-10-CM

## 2023-11-28 DIAGNOSIS — J44.1 COPD EXACERBATION: Primary | ICD-10-CM

## 2023-11-28 LAB
ALBUMIN SERPL-MCNC: 3.9 G/DL (ref 3.5–5.2)
ALBUMIN/GLOB SERPL: 0.9 G/DL
ALP SERPL-CCNC: 91 U/L (ref 39–117)
ALT SERPL W P-5'-P-CCNC: 9 U/L (ref 1–41)
ANION GAP SERPL CALCULATED.3IONS-SCNC: 14 MMOL/L (ref 5–15)
ARTERIAL PATENCY WRIST A: POSITIVE
AST SERPL-CCNC: 22 U/L (ref 1–40)
ATMOSPHERIC PRESS: ABNORMAL MM[HG]
B PARAPERT DNA SPEC QL NAA+PROBE: NOT DETECTED
B PERT DNA SPEC QL NAA+PROBE: NOT DETECTED
BASE EXCESS BLDA CALC-SCNC: 1.1 MMOL/L (ref 0–3)
BASOPHILS # BLD AUTO: 0 10*3/MM3 (ref 0–0.2)
BASOPHILS NFR BLD AUTO: 0.3 % (ref 0–1.5)
BDY SITE: ABNORMAL
BILIRUB SERPL-MCNC: 1.4 MG/DL (ref 0–1.2)
BUN SERPL-MCNC: 7 MG/DL (ref 6–20)
BUN/CREAT SERPL: 7.1 (ref 7–25)
C PNEUM DNA NPH QL NAA+NON-PROBE: NOT DETECTED
CALCIUM SPEC-SCNC: 9.3 MG/DL (ref 8.6–10.5)
CHLORIDE SERPL-SCNC: 94 MMOL/L (ref 98–107)
CO2 BLDA-SCNC: 23.2 MMOL/L (ref 22–29)
CO2 SERPL-SCNC: 24 MMOL/L (ref 22–29)
CREAT SERPL-MCNC: 0.98 MG/DL (ref 0.76–1.27)
D DIMER PPP FEU-MCNC: 3.45 MG/L (FEU) (ref 0–0.55)
D-LACTATE SERPL-SCNC: 2.4 MMOL/L (ref 0.3–2)
DEPRECATED RDW RBC AUTO: 46.8 FL (ref 37–54)
EGFRCR SERPLBLD CKD-EPI 2021: 91.1 ML/MIN/1.73
EOSINOPHIL # BLD AUTO: 0 10*3/MM3 (ref 0–0.4)
EOSINOPHIL NFR BLD AUTO: 0.3 % (ref 0.3–6.2)
ERYTHROCYTE [DISTWIDTH] IN BLOOD BY AUTOMATED COUNT: 13.5 % (ref 12.3–15.4)
FLUAV SUBTYP SPEC NAA+PROBE: NOT DETECTED
FLUBV RNA ISLT QL NAA+PROBE: NOT DETECTED
GEN 5 2HR TROPONIN T REFLEX: 14 NG/L
GLOBULIN UR ELPH-MCNC: 4.5 GM/DL
GLUCOSE SERPL-MCNC: 139 MG/DL (ref 65–99)
HADV DNA SPEC NAA+PROBE: NOT DETECTED
HCO3 BLDA-SCNC: 22.4 MMOL/L (ref 21–28)
HCOV 229E RNA SPEC QL NAA+PROBE: NOT DETECTED
HCOV HKU1 RNA SPEC QL NAA+PROBE: NOT DETECTED
HCOV NL63 RNA SPEC QL NAA+PROBE: NOT DETECTED
HCOV OC43 RNA SPEC QL NAA+PROBE: NOT DETECTED
HCT VFR BLD AUTO: 50.9 % (ref 37.5–51)
HEMODILUTION: NO
HGB BLD-MCNC: 17.7 G/DL (ref 13–17.7)
HMPV RNA NPH QL NAA+NON-PROBE: NOT DETECTED
HOLD SPECIMEN: NORMAL
HOLD SPECIMEN: NORMAL
HPIV1 RNA ISLT QL NAA+PROBE: NOT DETECTED
HPIV2 RNA SPEC QL NAA+PROBE: NOT DETECTED
HPIV3 RNA NPH QL NAA+PROBE: NOT DETECTED
HPIV4 P GENE NPH QL NAA+PROBE: NOT DETECTED
INHALED O2 CONCENTRATION: 28 %
LYMPHOCYTES # BLD AUTO: 0.5 10*3/MM3 (ref 0.7–3.1)
LYMPHOCYTES NFR BLD AUTO: 4.9 % (ref 19.6–45.3)
M PNEUMO IGG SER IA-ACNC: NOT DETECTED
MCH RBC QN AUTO: 32.9 PG (ref 26.6–33)
MCHC RBC AUTO-ENTMCNC: 34.8 G/DL (ref 31.5–35.7)
MCV RBC AUTO: 94.6 FL (ref 79–97)
MODALITY: ABNORMAL
MONOCYTES # BLD AUTO: 0.6 10*3/MM3 (ref 0.1–0.9)
MONOCYTES NFR BLD AUTO: 6.4 % (ref 5–12)
NEUTROPHILS NFR BLD AUTO: 8.3 10*3/MM3 (ref 1.7–7)
NEUTROPHILS NFR BLD AUTO: 88.1 % (ref 42.7–76)
NRBC BLD AUTO-RTO: 0.3 /100 WBC (ref 0–0.2)
NT-PROBNP SERPL-MCNC: 180.2 PG/ML (ref 0–900)
PCO2 BLDA: 27.7 MM HG (ref 35–48)
PH BLDA: 7.51 PH UNITS (ref 7.35–7.45)
PLATELET # BLD AUTO: 219 10*3/MM3 (ref 140–450)
PMV BLD AUTO: 8.1 FL (ref 6–12)
PO2 BLDA: 62.2 MM HG (ref 83–108)
POTASSIUM SERPL-SCNC: 4 MMOL/L (ref 3.5–5.2)
PROT SERPL-MCNC: 8.4 G/DL (ref 6–8.5)
RBC # BLD AUTO: 5.38 10*6/MM3 (ref 4.14–5.8)
RHINOVIRUS RNA SPEC NAA+PROBE: NOT DETECTED
RSV RNA NPH QL NAA+NON-PROBE: NOT DETECTED
SAO2 % BLDCOA: 94.1 % (ref 94–98)
SARS-COV-2 RNA NPH QL NAA+NON-PROBE: NOT DETECTED
SODIUM SERPL-SCNC: 132 MMOL/L (ref 136–145)
TROPONIN T DELTA: 5 NG/L
TROPONIN T SERPL HS-MCNC: 9 NG/L
WBC NRBC COR # BLD AUTO: 9.4 10*3/MM3 (ref 3.4–10.8)
WHOLE BLOOD HOLD COAG: NORMAL
WHOLE BLOOD HOLD SPECIMEN: NORMAL

## 2023-11-28 PROCEDURE — 36600 WITHDRAWAL OF ARTERIAL BLOOD: CPT

## 2023-11-28 PROCEDURE — 83605 ASSAY OF LACTIC ACID: CPT

## 2023-11-28 PROCEDURE — 93005 ELECTROCARDIOGRAM TRACING: CPT

## 2023-11-28 PROCEDURE — 71045 X-RAY EXAM CHEST 1 VIEW: CPT

## 2023-11-28 PROCEDURE — 99285 EMERGENCY DEPT VISIT HI MDM: CPT

## 2023-11-28 PROCEDURE — 80053 COMPREHEN METABOLIC PANEL: CPT | Performed by: EMERGENCY MEDICINE

## 2023-11-28 PROCEDURE — 25510000001 IOPAMIDOL PER 1 ML: Performed by: EMERGENCY MEDICINE

## 2023-11-28 PROCEDURE — 87040 BLOOD CULTURE FOR BACTERIA: CPT | Performed by: EMERGENCY MEDICINE

## 2023-11-28 PROCEDURE — 85379 FIBRIN DEGRADATION QUANT: CPT | Performed by: PHYSICIAN ASSISTANT

## 2023-11-28 PROCEDURE — 82803 BLOOD GASES ANY COMBINATION: CPT

## 2023-11-28 PROCEDURE — 71275 CT ANGIOGRAPHY CHEST: CPT

## 2023-11-28 PROCEDURE — 0202U NFCT DS 22 TRGT SARS-COV-2: CPT | Performed by: PHYSICIAN ASSISTANT

## 2023-11-28 PROCEDURE — 83880 ASSAY OF NATRIURETIC PEPTIDE: CPT | Performed by: PHYSICIAN ASSISTANT

## 2023-11-28 PROCEDURE — 94799 UNLISTED PULMONARY SVC/PX: CPT

## 2023-11-28 PROCEDURE — 36415 COLL VENOUS BLD VENIPUNCTURE: CPT

## 2023-11-28 PROCEDURE — 85025 COMPLETE CBC W/AUTO DIFF WBC: CPT | Performed by: EMERGENCY MEDICINE

## 2023-11-28 PROCEDURE — 93005 ELECTROCARDIOGRAM TRACING: CPT | Performed by: EMERGENCY MEDICINE

## 2023-11-28 PROCEDURE — 84484 ASSAY OF TROPONIN QUANT: CPT | Performed by: EMERGENCY MEDICINE

## 2023-11-28 RX ORDER — ALBUTEROL SULFATE 2.5 MG/3ML
2.5 SOLUTION RESPIRATORY (INHALATION) ONCE
Status: COMPLETED | OUTPATIENT
Start: 2023-11-28 | End: 2023-11-28

## 2023-11-28 RX ORDER — ASPIRIN 325 MG
325 TABLET ORAL ONCE
Status: DISCONTINUED | OUTPATIENT
Start: 2023-11-28 | End: 2023-11-28

## 2023-11-28 RX ORDER — BENZONATATE 100 MG/1
200 CAPSULE ORAL ONCE
Status: COMPLETED | OUTPATIENT
Start: 2023-11-28 | End: 2023-11-28

## 2023-11-28 RX ORDER — SODIUM CHLORIDE 0.9 % (FLUSH) 0.9 %
10 SYRINGE (ML) INJECTION AS NEEDED
Status: DISCONTINUED | OUTPATIENT
Start: 2023-11-28 | End: 2023-11-30 | Stop reason: HOSPADM

## 2023-11-28 RX ORDER — ACETAMINOPHEN 500 MG
1000 TABLET ORAL ONCE
Status: COMPLETED | OUTPATIENT
Start: 2023-11-28 | End: 2023-11-28

## 2023-11-28 RX ORDER — MONTELUKAST SODIUM 10 MG/1
TABLET ORAL
Qty: 90 TABLET | Refills: 1 | Status: SHIPPED | OUTPATIENT
Start: 2023-11-28

## 2023-11-28 RX ADMIN — IOPAMIDOL 100 ML: 755 INJECTION, SOLUTION INTRAVENOUS at 21:54

## 2023-11-28 RX ADMIN — ACETAMINOPHEN 1000 MG: 500 TABLET, FILM COATED ORAL at 23:59

## 2023-11-28 RX ADMIN — BENZONATATE 200 MG: 100 CAPSULE ORAL at 21:07

## 2023-11-28 RX ADMIN — ALBUTEROL SULFATE 2.5 MG: 2.5 SOLUTION RESPIRATORY (INHALATION) at 21:18

## 2023-11-28 NOTE — Clinical Note
Level of Care: Telemetry [5]   Diagnosis: COPD exacerbation [650130]   Admitting Physician: EDISON CROSS [[id number]]   Attending Physician: GUSTAVO WOOD [[id number]]

## 2023-11-29 PROBLEM — J44.1 COPD EXACERBATION: Status: ACTIVE | Noted: 2023-11-29

## 2023-11-29 LAB
ALBUMIN SERPL-MCNC: 3.8 G/DL (ref 3.5–5.2)
ALBUMIN/GLOB SERPL: 0.9 G/DL
ALP SERPL-CCNC: 70 U/L (ref 39–117)
ALT SERPL W P-5'-P-CCNC: 13 U/L (ref 1–41)
ANION GAP SERPL CALCULATED.3IONS-SCNC: 15 MMOL/L (ref 5–15)
AST SERPL-CCNC: 21 U/L (ref 1–40)
BILIRUB SERPL-MCNC: 0.8 MG/DL (ref 0–1.2)
BUN SERPL-MCNC: 13 MG/DL (ref 6–20)
BUN/CREAT SERPL: 10.4 (ref 7–25)
CALCIUM SPEC-SCNC: 9.6 MG/DL (ref 8.6–10.5)
CHLORIDE SERPL-SCNC: 99 MMOL/L (ref 98–107)
CO2 SERPL-SCNC: 19 MMOL/L (ref 22–29)
CREAT SERPL-MCNC: 1.25 MG/DL (ref 0.76–1.27)
D-LACTATE SERPL-SCNC: 1.8 MMOL/L (ref 0.5–2)
DEPRECATED RDW RBC AUTO: 45.9 FL (ref 37–54)
EGFRCR SERPLBLD CKD-EPI 2021: 68 ML/MIN/1.73
ERYTHROCYTE [DISTWIDTH] IN BLOOD BY AUTOMATED COUNT: 13.7 % (ref 12.3–15.4)
GLOBULIN UR ELPH-MCNC: 4.1 GM/DL
GLUCOSE BLDC GLUCOMTR-MCNC: 254 MG/DL (ref 70–105)
GLUCOSE BLDC GLUCOMTR-MCNC: 259 MG/DL (ref 70–105)
GLUCOSE BLDC GLUCOMTR-MCNC: 264 MG/DL (ref 70–105)
GLUCOSE BLDC GLUCOMTR-MCNC: 321 MG/DL (ref 70–105)
GLUCOSE SERPL-MCNC: 320 MG/DL (ref 65–99)
HCT VFR BLD AUTO: 49.1 % (ref 37.5–51)
HGB BLD-MCNC: 16.5 G/DL (ref 13–17.7)
MCH RBC QN AUTO: 33 PG (ref 26.6–33)
MCHC RBC AUTO-ENTMCNC: 33.6 G/DL (ref 31.5–35.7)
MCV RBC AUTO: 98.2 FL (ref 79–97)
PLATELET # BLD AUTO: 184 10*3/MM3 (ref 140–450)
PMV BLD AUTO: 8.8 FL (ref 6–12)
POTASSIUM SERPL-SCNC: 3.9 MMOL/L (ref 3.5–5.2)
PROT SERPL-MCNC: 7.9 G/DL (ref 6–8.5)
QT INTERVAL: 324 MS
QTC INTERVAL: 460 MS
RBC # BLD AUTO: 5 10*6/MM3 (ref 4.14–5.8)
SODIUM SERPL-SCNC: 133 MMOL/L (ref 136–145)
WBC NRBC COR # BLD AUTO: 7.9 10*3/MM3 (ref 3.4–10.8)

## 2023-11-29 PROCEDURE — 83605 ASSAY OF LACTIC ACID: CPT

## 2023-11-29 PROCEDURE — 63710000001 INSULIN LISPRO (HUMAN) PER 5 UNITS: Performed by: INTERNAL MEDICINE

## 2023-11-29 PROCEDURE — 82948 REAGENT STRIP/BLOOD GLUCOSE: CPT

## 2023-11-29 PROCEDURE — 96365 THER/PROPH/DIAG IV INF INIT: CPT

## 2023-11-29 PROCEDURE — 36415 COLL VENOUS BLD VENIPUNCTURE: CPT | Performed by: HOSPITALIST

## 2023-11-29 PROCEDURE — 63710000001 INSULIN LISPRO (HUMAN) PER 5 UNITS: Performed by: HOSPITALIST

## 2023-11-29 PROCEDURE — 94761 N-INVAS EAR/PLS OXIMETRY MLT: CPT

## 2023-11-29 PROCEDURE — G0378 HOSPITAL OBSERVATION PER HR: HCPCS

## 2023-11-29 PROCEDURE — 25010000002 METHYLPREDNISOLONE PER 40 MG: Performed by: INTERNAL MEDICINE

## 2023-11-29 PROCEDURE — 96368 THER/DIAG CONCURRENT INF: CPT

## 2023-11-29 PROCEDURE — 25010000002 METHYLPREDNISOLONE PER 125 MG: Performed by: HOSPITALIST

## 2023-11-29 PROCEDURE — 96376 TX/PRO/DX INJ SAME DRUG ADON: CPT

## 2023-11-29 PROCEDURE — 80053 COMPREHEN METABOLIC PANEL: CPT | Performed by: HOSPITALIST

## 2023-11-29 PROCEDURE — 94799 UNLISTED PULMONARY SVC/PX: CPT

## 2023-11-29 PROCEDURE — 94640 AIRWAY INHALATION TREATMENT: CPT

## 2023-11-29 PROCEDURE — 85027 COMPLETE CBC AUTOMATED: CPT | Performed by: HOSPITALIST

## 2023-11-29 PROCEDURE — 25010000002 CEFTRIAXONE PER 250 MG: Performed by: PHYSICIAN ASSISTANT

## 2023-11-29 PROCEDURE — 96375 TX/PRO/DX INJ NEW DRUG ADDON: CPT

## 2023-11-29 PROCEDURE — 25010000002 ENOXAPARIN PER 10 MG: Performed by: HOSPITALIST

## 2023-11-29 PROCEDURE — 96372 THER/PROPH/DIAG INJ SC/IM: CPT

## 2023-11-29 RX ORDER — SODIUM CHLORIDE 0.9 % (FLUSH) 0.9 %
10 SYRINGE (ML) INJECTION AS NEEDED
Status: DISCONTINUED | OUTPATIENT
Start: 2023-11-29 | End: 2023-11-30 | Stop reason: HOSPADM

## 2023-11-29 RX ORDER — MONTELUKAST SODIUM 10 MG/1
10 TABLET ORAL DAILY
Status: DISCONTINUED | OUTPATIENT
Start: 2023-11-29 | End: 2023-11-30 | Stop reason: HOSPADM

## 2023-11-29 RX ORDER — BISACODYL 10 MG
10 SUPPOSITORY, RECTAL RECTAL DAILY PRN
Status: DISCONTINUED | OUTPATIENT
Start: 2023-11-29 | End: 2023-11-30 | Stop reason: HOSPADM

## 2023-11-29 RX ORDER — POLYETHYLENE GLYCOL 3350 17 G/17G
17 POWDER, FOR SOLUTION ORAL DAILY PRN
Status: DISCONTINUED | OUTPATIENT
Start: 2023-11-29 | End: 2023-11-30 | Stop reason: HOSPADM

## 2023-11-29 RX ORDER — ALBUTEROL SULFATE 0.63 MG/3ML
0.63 SOLUTION RESPIRATORY (INHALATION) EVERY 6 HOURS PRN
Status: DISCONTINUED | OUTPATIENT
Start: 2023-11-29 | End: 2023-11-30 | Stop reason: HOSPADM

## 2023-11-29 RX ORDER — BUDESONIDE AND FORMOTEROL FUMARATE DIHYDRATE 160; 4.5 UG/1; UG/1
2 AEROSOL RESPIRATORY (INHALATION)
Status: DISCONTINUED | OUTPATIENT
Start: 2023-11-29 | End: 2023-11-30 | Stop reason: HOSPADM

## 2023-11-29 RX ORDER — AMOXICILLIN 250 MG
2 CAPSULE ORAL 2 TIMES DAILY
Status: DISCONTINUED | OUTPATIENT
Start: 2023-11-29 | End: 2023-11-30 | Stop reason: HOSPADM

## 2023-11-29 RX ORDER — IPRATROPIUM BROMIDE AND ALBUTEROL SULFATE 2.5; .5 MG/3ML; MG/3ML
3 SOLUTION RESPIRATORY (INHALATION)
Status: DISCONTINUED | OUTPATIENT
Start: 2023-11-29 | End: 2023-11-30

## 2023-11-29 RX ORDER — INSULIN LISPRO 100 [IU]/ML
2-7 INJECTION, SOLUTION INTRAVENOUS; SUBCUTANEOUS
Status: DISCONTINUED | OUTPATIENT
Start: 2023-11-29 | End: 2023-11-29

## 2023-11-29 RX ORDER — SPIRONOLACTONE 25 MG/1
50 TABLET ORAL DAILY
Status: DISCONTINUED | OUTPATIENT
Start: 2023-11-29 | End: 2023-11-29

## 2023-11-29 RX ORDER — BUMETANIDE 1 MG/1
2 TABLET ORAL DAILY
Status: DISCONTINUED | OUTPATIENT
Start: 2023-11-29 | End: 2023-11-30 | Stop reason: HOSPADM

## 2023-11-29 RX ORDER — TRAZODONE HYDROCHLORIDE 50 MG/1
100 TABLET ORAL NIGHTLY
COMMUNITY

## 2023-11-29 RX ORDER — BISACODYL 5 MG/1
5 TABLET, DELAYED RELEASE ORAL DAILY PRN
Status: DISCONTINUED | OUTPATIENT
Start: 2023-11-29 | End: 2023-11-30 | Stop reason: HOSPADM

## 2023-11-29 RX ORDER — ONDANSETRON 2 MG/ML
4 INJECTION INTRAMUSCULAR; INTRAVENOUS EVERY 6 HOURS PRN
Status: DISCONTINUED | OUTPATIENT
Start: 2023-11-29 | End: 2023-11-30 | Stop reason: HOSPADM

## 2023-11-29 RX ORDER — METHYLPREDNISOLONE SODIUM SUCCINATE 40 MG/ML
40 INJECTION, POWDER, LYOPHILIZED, FOR SOLUTION INTRAMUSCULAR; INTRAVENOUS EVERY 8 HOURS
Status: DISCONTINUED | OUTPATIENT
Start: 2023-11-29 | End: 2023-11-30 | Stop reason: HOSPADM

## 2023-11-29 RX ORDER — SODIUM CHLORIDE 0.9 % (FLUSH) 0.9 %
10 SYRINGE (ML) INJECTION EVERY 12 HOURS SCHEDULED
Status: DISCONTINUED | OUTPATIENT
Start: 2023-11-29 | End: 2023-11-30 | Stop reason: HOSPADM

## 2023-11-29 RX ORDER — IPRATROPIUM BROMIDE AND ALBUTEROL SULFATE 2.5; .5 MG/3ML; MG/3ML
3 SOLUTION RESPIRATORY (INHALATION)
Status: DISCONTINUED | OUTPATIENT
Start: 2023-11-29 | End: 2023-11-29 | Stop reason: SDUPTHER

## 2023-11-29 RX ORDER — ENOXAPARIN SODIUM 100 MG/ML
40 INJECTION SUBCUTANEOUS DAILY
Status: DISCONTINUED | OUTPATIENT
Start: 2023-11-29 | End: 2023-11-30 | Stop reason: HOSPADM

## 2023-11-29 RX ORDER — NICOTINE POLACRILEX 4 MG
15 LOZENGE BUCCAL
Status: DISCONTINUED | OUTPATIENT
Start: 2023-11-29 | End: 2023-11-30 | Stop reason: HOSPADM

## 2023-11-29 RX ORDER — INSULIN LISPRO 100 [IU]/ML
4-24 INJECTION, SOLUTION INTRAVENOUS; SUBCUTANEOUS
Status: DISCONTINUED | OUTPATIENT
Start: 2023-11-29 | End: 2023-11-30 | Stop reason: HOSPADM

## 2023-11-29 RX ORDER — CETIRIZINE HYDROCHLORIDE 10 MG/1
10 TABLET ORAL DAILY
Status: DISCONTINUED | OUTPATIENT
Start: 2023-11-29 | End: 2023-11-30 | Stop reason: HOSPADM

## 2023-11-29 RX ORDER — PANTOPRAZOLE SODIUM 40 MG/1
40 TABLET, DELAYED RELEASE ORAL
Status: DISCONTINUED | OUTPATIENT
Start: 2023-11-30 | End: 2023-11-30 | Stop reason: HOSPADM

## 2023-11-29 RX ORDER — DEXTROSE MONOHYDRATE 25 G/50ML
25 INJECTION, SOLUTION INTRAVENOUS
Status: DISCONTINUED | OUTPATIENT
Start: 2023-11-29 | End: 2023-11-30 | Stop reason: HOSPADM

## 2023-11-29 RX ORDER — IBUPROFEN 600 MG/1
1 TABLET ORAL
Status: DISCONTINUED | OUTPATIENT
Start: 2023-11-29 | End: 2023-11-30 | Stop reason: HOSPADM

## 2023-11-29 RX ORDER — MONTELUKAST SODIUM 10 MG/1
10 TABLET ORAL DAILY
Status: DISCONTINUED | OUTPATIENT
Start: 2023-11-29 | End: 2023-11-29 | Stop reason: SDUPTHER

## 2023-11-29 RX ORDER — SPIRONOLACTONE 50 MG/1
50 TABLET, FILM COATED ORAL DAILY
COMMUNITY

## 2023-11-29 RX ORDER — GUAIFENESIN 600 MG/1
1200 TABLET, EXTENDED RELEASE ORAL EVERY 12 HOURS SCHEDULED
Status: DISCONTINUED | OUTPATIENT
Start: 2023-11-29 | End: 2023-11-29 | Stop reason: SDUPTHER

## 2023-11-29 RX ORDER — TRAZODONE HYDROCHLORIDE 50 MG/1
100 TABLET ORAL NIGHTLY
Status: DISCONTINUED | OUTPATIENT
Start: 2023-11-29 | End: 2023-11-30 | Stop reason: HOSPADM

## 2023-11-29 RX ORDER — GUAIFENESIN 600 MG/1
1200 TABLET, EXTENDED RELEASE ORAL EVERY 12 HOURS SCHEDULED
Status: DISCONTINUED | OUTPATIENT
Start: 2023-11-29 | End: 2023-11-30 | Stop reason: HOSPADM

## 2023-11-29 RX ORDER — SODIUM CHLORIDE 9 MG/ML
40 INJECTION, SOLUTION INTRAVENOUS AS NEEDED
Status: DISCONTINUED | OUTPATIENT
Start: 2023-11-29 | End: 2023-11-30 | Stop reason: HOSPADM

## 2023-11-29 RX ORDER — PREGABALIN 75 MG/1
75 CAPSULE ORAL 2 TIMES DAILY
Status: DISCONTINUED | OUTPATIENT
Start: 2023-11-29 | End: 2023-11-30 | Stop reason: HOSPADM

## 2023-11-29 RX ORDER — METHYLPREDNISOLONE SODIUM SUCCINATE 125 MG/2ML
80 INJECTION, POWDER, LYOPHILIZED, FOR SOLUTION INTRAMUSCULAR; INTRAVENOUS EVERY 8 HOURS
Status: DISCONTINUED | OUTPATIENT
Start: 2023-11-29 | End: 2023-11-29

## 2023-11-29 RX ADMIN — GUAIFENESIN 1200 MG: 600 TABLET, MULTILAYER, EXTENDED RELEASE ORAL at 08:27

## 2023-11-29 RX ADMIN — BUDESONIDE AND FORMOTEROL FUMARATE DIHYDRATE 2 PUFF: 160; 4.5 AEROSOL RESPIRATORY (INHALATION) at 19:45

## 2023-11-29 RX ADMIN — IPRATROPIUM BROMIDE AND ALBUTEROL SULFATE 3 ML: .5; 3 SOLUTION RESPIRATORY (INHALATION) at 06:42

## 2023-11-29 RX ADMIN — INSULIN LISPRO 5 UNITS: 100 INJECTION, SOLUTION INTRAVENOUS; SUBCUTANEOUS at 11:35

## 2023-11-29 RX ADMIN — IPRATROPIUM BROMIDE AND ALBUTEROL SULFATE 3 ML: .5; 3 SOLUTION RESPIRATORY (INHALATION) at 12:25

## 2023-11-29 RX ADMIN — METHYLPREDNISOLONE SODIUM SUCCINATE 80 MG: 125 INJECTION, POWDER, FOR SOLUTION INTRAMUSCULAR; INTRAVENOUS at 02:49

## 2023-11-29 RX ADMIN — BENZOCAINE 6 MG-MENTHOL 10 MG LOZENGES 1 LOZENGE: at 23:21

## 2023-11-29 RX ADMIN — INSULIN LISPRO 12 UNITS: 100 INJECTION, SOLUTION INTRAVENOUS; SUBCUTANEOUS at 16:48

## 2023-11-29 RX ADMIN — IPRATROPIUM BROMIDE AND ALBUTEROL SULFATE 3 ML: .5; 3 SOLUTION RESPIRATORY (INHALATION) at 19:38

## 2023-11-29 RX ADMIN — BENZOCAINE 6 MG-MENTHOL 10 MG LOZENGES 1 LOZENGE: at 04:09

## 2023-11-29 RX ADMIN — MONTELUKAST 10 MG: 10 TABLET, FILM COATED ORAL at 03:53

## 2023-11-29 RX ADMIN — METHYLPREDNISOLONE SODIUM SUCCINATE 80 MG: 125 INJECTION, POWDER, FOR SOLUTION INTRAMUSCULAR; INTRAVENOUS at 09:17

## 2023-11-29 RX ADMIN — ENOXAPARIN SODIUM 40 MG: 100 INJECTION SUBCUTANEOUS at 02:49

## 2023-11-29 RX ADMIN — BENZOCAINE 6 MG-MENTHOL 10 MG LOZENGES 1 LOZENGE: at 16:50

## 2023-11-29 RX ADMIN — INSULIN LISPRO 12 UNITS: 100 INJECTION, SOLUTION INTRAVENOUS; SUBCUTANEOUS at 21:45

## 2023-11-29 RX ADMIN — Medication 10 ML: at 02:50

## 2023-11-29 RX ADMIN — METHYLPREDNISOLONE SODIUM SUCCINATE 40 MG: 40 INJECTION, POWDER, FOR SOLUTION INTRAMUSCULAR; INTRAVENOUS at 17:14

## 2023-11-29 RX ADMIN — INSULIN LISPRO 4 UNITS: 100 INJECTION, SOLUTION INTRAVENOUS; SUBCUTANEOUS at 08:20

## 2023-11-29 RX ADMIN — Medication 10 ML: at 21:38

## 2023-11-29 RX ADMIN — CEFTRIAXONE 2000 MG: 2 INJECTION, POWDER, FOR SOLUTION INTRAMUSCULAR; INTRAVENOUS at 01:48

## 2023-11-29 RX ADMIN — Medication 10 ML: at 08:20

## 2023-11-29 RX ADMIN — DOXYCYCLINE 100 MG: 100 INJECTION, POWDER, LYOPHILIZED, FOR SOLUTION INTRAVENOUS at 01:48

## 2023-11-29 RX ADMIN — TRAZODONE HYDROCHLORIDE 100 MG: 50 TABLET ORAL at 21:37

## 2023-11-29 RX ADMIN — GUAIFENESIN 1200 MG: 600 TABLET, MULTILAYER, EXTENDED RELEASE ORAL at 21:37

## 2023-11-29 RX ADMIN — PREGABALIN 75 MG: 75 CAPSULE ORAL at 21:37

## 2023-11-29 RX ADMIN — GUAIFENESIN 1200 MG: 600 TABLET, MULTILAYER, EXTENDED RELEASE ORAL at 02:49

## 2023-11-29 RX ADMIN — BENZOCAINE 6 MG-MENTHOL 10 MG LOZENGES 1 LOZENGE: at 11:35

## 2023-11-29 RX ADMIN — ENOXAPARIN SODIUM 40 MG: 100 INJECTION SUBCUTANEOUS at 16:48

## 2023-11-29 NOTE — H&P
[facility] Medicine Services  History & Physical    Patient Name: Mc Selby  : 1970  MRN: [id number]  Primary Care Physician:  Berny Godwin MD  Date of admission: 2023  Date and Time of Service: 2023 at 01:51 EST      Subjective      Chief Complaint: Shortness of breath    History of Present Illness: Mc Selby is a 53 y.o. male with a PMH of COPD, sleep apnea, liver cirrhosis, GERD, multiple myeloma, rheumatoid arthritis, diabetes, depression and anxiety who presented to [facility] on 2023 with complaints of shortness of breath for the past two days.  Patient is also complaining of cough and nasal congestion.  He also complains of sore throat from all the coughing. Patient denies any fever or chills, nausea, vomiting, abdominal pain, chest pain, diarrhea or constipation.  Denies any recent sick contacts or travel.  Patient states that he does not wear oxygen at home.     In the ED, patient was requiring 5 L nasal cannula.  He was tachycardic in the 120s.  Labs remarkable for lactate 2.4, T. bili 1.4.  Chest x-ray showed mild vascular congestion. Patient was given Solu-Medrol, antibiotics and breathing treatments in the ED.      Review of Systems  As stated above  Personal History     Past Medical History:   Diagnosis Date    Acute perforated gastric ulcer with hemorrhage 2018    Alcoholism     Allergic     Allergic rhinitis     Arthritis     rheumatoid (diagnosed at age 16)    Arthritis of back     Arthritis of neck     Cirrhosis     etoh cirrhosis-liver failure. Follows w/ GI- [facility]    CKD (chronic kidney disease)     dr. nelson.  Stage 2    COPD (chronic obstructive pulmonary disease)     Depression     Dislocation of finger     Duodenal ulcer      perforated duodenal s/p patch    Erectile dysfunction     Fatty liver     Fracture, femur     Fracture, foot     GERD without esophagitis 2020    Glaucoma     Hip arthrosis     History of  "transfusion     HL (hearing loss)     Infectious viral hepatitis     Knee swelling     MRSA (methicillin resistant Staphylococcus aureus) carrier 06/12/2019    nasal swab    Multiple myeloma     Dr. Hernandez at [facility]    Nausea     states \"gallbladder issues\"- had attempted cholecystectomy, but unable to complete d/t adhered to liver    Osteopenia     Periarthritis of shoulder     Renal insufficiency     Sleep apnea     unable to tolerate CPAP    Tumors      benign essential    Type 2 diabetes mellitus with hyperglycemia 01/08/2020    off meds now    Umbilical hernia u    Wound, open     healed at this time       Past Surgical History:   Procedure Laterality Date    COLONOSCOPY      CYST REMOVAL Left     forarm    ENDOSCOPY      ascities/paracentesis    ENDOSCOPY N/A 07/22/2021    Procedure: ESOPHAGOGASTRODUODENOSCOPY WITH DILATION (#48 BOUGIE);  Surgeon: Glenn Loo MD;  Location: [facility] ENDOSCOPY;  Service: Gastroenterology;  Laterality: N/A;  ESOPHAGEAL STRICTURE    EXPLORATORY LAPAROTOMY  07/16/2018    Over-sew Gastric Ulcer With Gastrostomy and Jejunostomy Tube    FOOT SURGERY Right     right bunion removal    FRACTURE SURGERY Right 2005    bunionectomy/ broke toe and put in rods    HERNIA REPAIR  6/18/2019    MASS EXCISION Right 06/18/2019    Procedure: EXCISION OF SEBACEOUS CYST OF THE RIGHT BACK;  Surgeon: Sapna Elizalde MD;  Location: [facility] MAIN OR;  Service: General    MASS EXCISION Right 08/27/2019    Procedure: EXCISION LESION of right back;  Surgeon: Sapna Elizalde MD;  Location: [facility] MAIN OR;  Service: General    MOUTH SURGERY Left     UMBILICAL HERNIA REPAIR N/A 06/18/2019    Procedure: UMBILICAL HERNIA REPAIR LAPAROSCOPIC WITH MESH WITH EXTENSIVE LYSIS OF ADHESIONS;  Surgeon: Sapna Elizalde MD;  Location: [facility] MAIN OR;  Service: General       Family History: family history includes Cancer in his mother; Early death in his brother; Lung disease in his father; Mental illness in his brother; Other in " his father; Rheumatologic disease in his brother. Otherwise pertinent FHx was reviewed and not pertinent to current issue.    Social History:  reports that he quit smoking about 5 years ago. His smoking use included cigarettes. He started smoking about 3 years ago. He has a 60.00 pack-year smoking history. He has quit using smokeless tobacco. He reports that he does not currently use alcohol. He reports that he does not use drugs.    Home Medications:  Prior to Admission Medications       Prescriptions Last Dose Informant Patient Reported? Taking?    Abatacept 125 MG/ML solution auto-injector   Yes No    Inject 1 mL under the skin into the appropriate area as directed Every 7 (Seven) Days.    Azelastine HCl 137 MCG/SPRAY solution   No No    INHALE 2 PUFFS INTO EACH NOSTRIL TWICE A DAY    bumetanide (BUMEX) 2 MG tablet   Yes No    Take 1 tablet by mouth Daily.    cetirizine (zyrTEC) 10 MG tablet   No No    TAKE 1 TABLET BY MOUTH EVERY DAY    Cyanocobalamin 1000 MCG capsule   No No    Take 1 capsule by mouth Daily.    glucose blood test strip   No No    Use as instructed to check blood sugar twice daily as needed. E11.9    guaiFENesin (MUCINEX) 600 MG 12 hr tablet   Yes No    Take 2 tablets by mouth 2 (Two) Times a Day.    lansoprazole (PREVACID) 30 MG capsule   No No    Take 1 capsule by mouth Daily.    magnesium oxide (MAG-OX) 400 MG tablet   Yes No    Take 2 tablets by mouth 2 (Two) Times a Day.    montelukast (SINGULAIR) 10 MG tablet   No No    TAKE 1 TABLET BY MOUTH EVERY DAY AT NIGHT    potassium chloride (K-DUR,KLOR-CON) 20 MEQ CR tablet  Self Yes No    Take 1 tablet by mouth 2 (Two) Times a Day.    pregabalin (Lyrica) 75 MG capsule   No No    Take 1 capsule by mouth 2 (Two) Times a Day.    tamsulosin (FLOMAX) 0.4 MG capsule 24 hr capsule   No No    TAKE 1 CAPSULE BY MOUTH TWICE A DAY    Tirzepatide (Mounjaro) 15 MG/0.5ML solution pen-injector   No No    Inject 15 mg under the skin into the appropriate area as  directed 1 (One) Time Per Week.    traZODone (DESYREL) 50 MG tablet   No No    TAKE 1-2 TABLETS NIGHTLY AS NEEDED FOR SLEEP    Triamcinolone Acetonide (NASACORT) 55 MCG/ACT nasal inhaler   Yes No    1 spray into the nostril(s) as directed by provider Daily.              Allergies:  Allergies   Allergen Reactions    Duloxetine Rash       Objective      Vitals:   Temp:  [98.7 °F (37.1 °C)-99.1 °F (37.3 °C)] 98.7 °F (37.1 °C)  Heart Rate:  [110-123] 110  Resp:  [20-30] 20  BP: (112-179)/(74-89) 112/74  Body mass index is 39.79 kg/m².  Physical Exam  General Appearance: AOO x 4, cooperative, no distress, appropriate for age  Head:  Normocephalic, without obvious abnormality  Eyes:  PERRL, EOM's intact, conjunctivae and cornea clear  Nose:  Nares symmetrical, septum midline, mucosa pink  Throat:  Lips, tongue, and mucosa are moist, pink, and intact  Neck:  Supple; symmetrical, trachea midline, no adenopathy  Back:  Symmetrical, ROM normal, no CVA tenderness  Lungs: Respirations unlabored, no audible wheeze  Heart: Regular rate & rhythm, S1 and S2 normal  Abdomen:  Soft, nontender, bowel sounds active all four quadrants  Musculoskeletal: Tone and strength strong and symmetrical, all extremities; no joint pain or edema         Skin/Hair/Nails:  Skin warm, dry and intact, no rashes or abnormal dyspigmentation       Diagnostic Data:  Lab Results (last 24 hours)       Procedure Component Value Units Date/Time    STAT Lactic Acid, Reflex [[id number]]  (Normal) Collected: 11/29/23 0002    Specimen: Blood Updated: 11/29/23 0032     Lactate 1.8 mmol/L     Respiratory Panel PCR w/COVID-19(SARS-CoV-2) MARTIN/YASMINE/MARCELINO/PAD/COR/WALLY In-House, NP Swab in UTM/VTM, 2 HR TAT - Swab, Nasopharynx [[id number]]  (Normal) Collected: 11/28/23 2235    Specimen: Swab from Nasopharynx Updated: 11/28/23 2341     ADENOVIRUS, PCR Not Detected     Coronavirus 229E Not Detected     Coronavirus HKU1 Not Detected     Coronavirus NL63 Not Detected      Coronavirus OC43 Not Detected     COVID19 Not Detected     Human Metapneumovirus Not Detected     Human Rhinovirus/Enterovirus Not Detected     Influenza A PCR Not Detected     Influenza B PCR Not Detected     Parainfluenza Virus 1 Not Detected     Parainfluenza Virus 2 Not Detected     Parainfluenza Virus 3 Not Detected     Parainfluenza Virus 4 Not Detected     RSV, PCR Not Detected     Bordetella pertussis pcr Not Detected     Bordetella parapertussis PCR Not Detected     Chlamydophila pneumoniae PCR Not Detected     Mycoplasma pneumo by PCR Not Detected    Narrative:      In the setting of a positive respiratory panel with a viral infection PLUS a negative procalcitonin without other underlying concern for bacterial infection, consider observing off antibiotics or discontinuation of antibiotics and continue supportive care. If the respiratory panel is positive for atypical bacterial infection (Bordetella pertussis, Chlamydophila pneumoniae, or Mycoplasma pneumoniae), consider antibiotic de-escalation to target atypical bacterial infection.    High Sensitivity Troponin T 2Hr [[id number]]  (Abnormal) Collected: 11/28/23 2252    Specimen: Blood Updated: 11/28/23 2319     HS Troponin T 14 ng/L      Troponin T Delta 5 ng/L     Narrative:      High Sensitive Troponin T Reference Range:  <14.0 ng/L- Negative Female for AMI  <22.0 ng/L- Negative Male for AMI  >=14 - Abnormal Female indicating possible myocardial injury.  >=22 - Abnormal Male indicating possible myocardial injury.   Clinicians would have to utilize clinical acumen, EKG, Troponin, and serial changes to determine if it is an Acute Myocardial Infarction or myocardial injury due to an underlying chronic condition.         Protection Draw [[id number]] Collected: 11/28/23 2046    Specimen: Blood Updated: 11/28/23 2147    Narrative:      The following orders were created for panel order Protection Draw.  Procedure                               Abnormality          Status                     ---------                               -----------         ------                     Green Top (Gel)[[id number]]                                  Final result               Lavender Top[[id number]]                                     Final result               Gold Top - SST[[id number]]                                   Final result               Light Blue Top[[id number]]                                   Final result                 Please view results for these tests on the individual orders.    Green Top (Gel) [[id number]] Collected: 11/28/23 2046    Specimen: Blood Updated: 11/28/23 2147     Extra Tube Hold for add-ons.     Comment: Auto resulted.       Light Blue Top [[id number]] Collected: 11/28/23 2046    Specimen: Blood Updated: 11/28/23 2147     Extra Tube Hold for add-ons.     Comment: Auto resulted       Lavender Top [[id number]] Collected: 11/28/23 2046    Specimen: Blood Updated: 11/28/23 2147     Extra Tube hold for add-on     Comment: Auto resulted       Gold Top - SST [[id number]] Collected: 11/28/23 2046    Specimen: Blood Updated: 11/28/23 2147     Extra Tube Hold for add-ons.     Comment: Auto resulted.       Comprehensive Metabolic Panel [[id number]]  (Abnormal) Collected: 11/28/23 2046    Specimen: Blood Updated: 11/28/23 2122     Glucose 139 mg/dL      BUN 7 mg/dL      Creatinine 0.98 mg/dL      Sodium 132 mmol/L      Potassium 4.0 mmol/L      Chloride 94 mmol/L      CO2 24.0 mmol/L      Calcium 9.3 mg/dL      Total Protein 8.4 g/dL      Albumin 3.9 g/dL      ALT (SGPT) 9 U/L      AST (SGOT) 22 U/L      Alkaline Phosphatase 91 U/L      Total Bilirubin 1.4 mg/dL      Globulin 4.5 gm/dL      A/G Ratio 0.9 g/dL      BUN/Creatinine Ratio 7.1     Anion Gap 14.0 mmol/L      eGFR 91.1 mL/min/1.73     Narrative:      GFR Normal >60  Chronic Kidney Disease <60  Kidney Failure <15      BNP [[id number]]  (Normal) Collected: 11/28/23 2046    Specimen: Blood Updated: 11/28/23 2122      proBNP 180.2 pg/mL     Narrative:      This assay is used as an aid in the diagnosis of individuals suspected of having heart failure. It can be used as an aid in the diagnosis of acute decompensated heart failure (ADHF) in patients presenting with signs and symptoms of ADHF to the emergency department (ED). In addition, NT-proBNP of <300 pg/mL indicates ADHF is not likely.    Age Range Result Interpretation  NT-proBNP Concentration (pg/mL:      <50             Positive            >450                   Gray                 300-450                    Negative             <300    50-75           Positive            >900                  Gray                300-900                  Negative            <300      >75             Positive            >1800                  Gray                300-1800                  Negative            <300    High Sensitivity Troponin T [[id number]]  (Normal) Collected: 11/28/23 2046    Specimen: Blood Updated: 11/28/23 2122     HS Troponin T 9 ng/L     Narrative:      High Sensitive Troponin T Reference Range:  <14.0 ng/L- Negative Female for AMI  <22.0 ng/L- Negative Male for AMI  >=14 - Abnormal Female indicating possible myocardial injury.  >=22 - Abnormal Male indicating possible myocardial injury.   Clinicians would have to utilize clinical acumen, EKG, Troponin, and serial changes to determine if it is an Acute Myocardial Infarction or myocardial injury due to an underlying chronic condition.         Blood Gas, Arterial - [[id number]]  (Abnormal) Collected: 11/28/23 2114    Specimen: Arterial Blood Updated: 11/28/23 2119     Site Right Radial     Elvis's Test Positive     pH, Arterial 7.515 pH units      pCO2, Arterial 27.7 mm Hg      pO2, Arterial 62.2 mm Hg      HCO3, Arterial 22.4 mmol/L      Base Excess, Arterial 1.1 mmol/L      Comment: Serial Number: [id number]:  [id number]        O2 Saturation, Arterial 94.1 %      CO2 Content 23.2 mmol/L      Barometric Pressure for Blood  "Gas --     Comment: N/A        Modality Cannula     FIO2 28 %      Hemodilution No    D-dimer, Quantitative [[id number]]  (Abnormal) Collected: 11/28/23 2046    Specimen: Blood Updated: 11/28/23 2119     D-Dimer, Quantitative 3.45 mg/L (FEU)     Narrative:      According to the assay 's published package insert, a normal (<0.50 mg/L (FEU)) D-dimer result in conjunction with a non-high clinical probability assessment, excludes deep vein thrombosis (DVT) and pulmonary embolism (PE) with high sensitivity.    D-dimer values increase with age and this can make VTE exclusion of an older population difficult. To address this, the [employer], based on best available evidence and recent guidelines, recommends that clinicians use age-adjusted D-dimer thresholds in patients greater than 50 years of age with: a) a low probability of PE who do not meet all Pulmonary Embolism Rule Out Criteria, or b) in those with intermediate probability of PE.   The formula for an age-adjusted D-dimer cut-off is \"age/100\".  For example, a 60 year old patient would have an age-adjusted cut-off of 0.60 mg/L (FEU) and an 80 year old 0.80 mg/L (FEU).    Blood Culture - Blood, Arm, Left [[id number]] Collected: 11/28/23 2106    Specimen: Blood from Arm, Left Updated: 11/28/23 2111    CBC & Differential [[id number]]  (Abnormal) Collected: 11/28/23 2046    Specimen: Blood Updated: 11/28/23 2057    Narrative:      The following orders were created for panel order CBC & Differential.  Procedure                               Abnormality         Status                     ---------                               -----------         ------                     CBC Auto Differential[[id number]]        Abnormal            Final result                 Please view results for these tests on the individual orders.    CBC Auto Differential [[id number]]  (Abnormal) Collected: 11/28/23 2046    Specimen: Blood Updated: 11/28/23 2057     WBC " 9.40 10*3/mm3      RBC 5.38 10*6/mm3      Hemoglobin 17.7 g/dL      Hematocrit 50.9 %      MCV 94.6 fL      MCH 32.9 pg      MCHC 34.8 g/dL      RDW 13.5 %      RDW-SD 46.8 fl      MPV 8.1 fL      Platelets 219 10*3/mm3      Neutrophil % 88.1 %      Lymphocyte % 4.9 %      Monocyte % 6.4 %      Eosinophil % 0.3 %      Basophil % 0.3 %      Neutrophils, Absolute 8.30 10*3/mm3      Lymphocytes, Absolute 0.50 10*3/mm3      Monocytes, Absolute 0.60 10*3/mm3      Eosinophils, Absolute 0.00 10*3/mm3      Basophils, Absolute 0.00 10*3/mm3      nRBC 0.3 /100 WBC     POC Lactate [[id number]]  (Abnormal) Collected: 11/28/23 2053    Specimen: Blood Updated: 11/28/23 2055     Lactate 2.4 mmol/L      Comment: Serial Number: [id number]:  [id number]       Blood Culture - Blood, Arm, Right [[id number]] Collected: 11/28/23 2046    Specimen: Blood from Arm, Right Updated: 11/28/23 2054             Imaging Results (Last 24 Hours)       Procedure Component Value Units Date/Time    CT Angiogram Chest Pulmonary Embolism [[id number]] Collected: 11/28/23 2155     Updated: 11/28/23 2201    Narrative:      CT ANGIOGRAM CHEST PULMONARY EMBOLISM    Date of Exam: 11/28/2023 8:42 PM CST    Indication: Pulmonary embolism (PE) suspected, positive D-dimer.    Comparison: Noncontrast chest CT 10/13/2023    Technique: Axial CT images were obtained of the chest after the uneventful intravenous administration of 100 cc of Isovue-370 utilizing pulmonary embolism protocol.  Sagittal and coronal reconstructions were performed.  Automated exposure control and   iterative reconstruction methods were used.      Findings:  PULMONARY VASCULATURE: Pulmonary arteries are widely patent without evidence of embolus.Main pulmonary artery is normal in size. No evidence of right heart strain.    MEDIASTINUM:Unremarkable. Aortic and heart size are normal. No aortic dissection identified. No mass nor pericardial effusion.  CORONARY ARTERIES: Nocalcified  atherosclerotic disease.  LUNGS: Minimal basilar groundglass opacities, likely atelectasis without dense consolidation. Few scattered subcentimeter pulmonary nodules are unchanged. See most recent noncontrast CT chest report for additional details.  PLEURAL SPACE: No effusion, mass, nor pneumothorax.  LYMPH NODES: There are no pathologically enlarged lymph nodes.    UPPER ABDOMEN: Cirrhosis and ascites    OSSEOUS STRUCTURES: Unchanged without acute process identified.      Impression:      Impression:  1.No evidence of pulmonary embolism.  2.Basilar groundglass opacities, likely atelectasis.  3.Other stable findings.        Electronically Signed: Arjun Reyes MD    11/28/2023 8:59 PM CST    Workstation ID: [id number]    XR Chest 1 View [[id number]] Collected: 11/28/23 2051     Updated: 11/28/23 2054    Narrative:      XR CHEST 1 VW    Date of Exam: 11/28/2023 7:46 PM CST    Indication: Chest Pain Triage Protocol    Comparison: 12/14/2018    Findings:  Cardiomediastinal silhouette is unremarkable. Mild pulmonary vascular congestion. No airspace disease, pneumothorax, nor pleural effusion. No acute osseous abnormality identified.      Impression:      Impression:  Mild pulmonary vascular congestion.      Electronically Signed: Arjun Reyes MD    11/28/2023 7:52 PM CST    Workstation ID: [id number]              Assessment & Plan        This is a 53 y.o. male with PMH of    Active and Resolved Problems  Active Hospital Problems    Diagnosis  POA    **COPD exacerbation [J44.1]  Yes      Resolved Hospital Problems   No resolved problems to display.     COPD exacerbation  Acute respiratory failure  Patient currently on 5 L nasal cannula  Continue oxygen supplementation and wean as tolerated  Patient does not use oxygen at home  Continue with Rocephin  Add Solu-Medrol 80 mg every 8 hours  Add guaifenesin and lozenges for cough and sore throat  Add breathing treatments scheduled and as needed    Sleep apnea  Patient states he  cannot tolerate CPAP    Diabetes type 2  Hold semaglutide  Obtain Hemoglobin A1c  Add sliding scale insulin and Accu-Cheks    Liver cirrhosis  Multiple myeloma  Rheumatoid arthritis  GERD  Anxiety and depression  Resume home meds    DVT prophylaxis:  Medical DVT prophylaxis orders are present.      CODE STATUS: Full code         Admission Status:  I believe this patient meets Inpatient status.      I discussed the patient's findings and my recommendations with patient.      Signature:     This document has been electronically signed by Yodit Cid MD on November 29, 2023 01:50 [facility]   [facility] Hospitalist Team

## 2023-11-29 NOTE — CONSULTS
Group: [facility]         CONSULT NOTE    Patient Identification:  Mc Selby  53 y.o.  male  1970  [id number]            Requesting physician: Attending physician    Reason for Consultation: COPD acute exacerbation      History of Present Illness:  53 y.o. male with a PMH of COPD, sleep apnea, liver cirrhosis, GERD, multiple myeloma, rheumatoid arthritis, diabetes, depression and anxiety who presented to [facility] on 11/28/2023 with complaints of shortness of breath for the past two days.  Patient is also complaining of cough and nasal congestion.  He also complains of sore throat from all the coughing. Patient denies any fever or chills, nausea, vomiting, abdominal pain, chest pain, diarrhea or constipation.  Denies any recent sick contacts or travel.  Patient states that he does not wear oxygen at home.      In the ED, patient was requiring 5 L nasal cannula.  He was tachycardic in the 120s.  Labs remarkable for lactate 2.4, T. bili 1.4.  Chest x-ray showed mild vascular congestion.   After the treatment the tachycardia improved, he is currently requiring 1 L of oxygen.  CT scan of the chest showed no PE, bibasilar groundglass opacities    Assessment:  COPD exacerbation  Hypoxemia: ABGs 7 point 5/27/62  Elevated D-dimer, negative CT scan for PE  Acute bronchitis  PAM, patient could not tolerate CPAP  DM2  Liver cirrhosis  Multiple myeloma regimen  Rheumatoid arthritis  GERD  Former smoker who quit 2018    Recommendations:  Antibiotics: Rocephin  Oxygen supplement and titration to maintain saturation 90 to 95%: Currently requiring 1 L per nasal cannula  Bronchodilators  Inhaled corticosteroids  Mucinex  IV steroids  DVT/GI prophylaxis  Check daily labs and correct electrolytes as needed  I personally reviewed the radiological studies      Review of Sytems:  Constitutional: Negative for chills, and fever and positive for malaise/fatigue.   HENT: Negative.    Eyes: Negative.   "  Cardiovascular: Negative.    Respiratory: Positive for cough and shortness of breath.    Skin: Negative.    Musculoskeletal: Negative.    Gastrointestinal: Negative.    Genitourinary: Negative.    Neurological: Negative.    Psychiatric/Behavioral: Negative.    Past Medical History:  Past Medical History:   Diagnosis Date    Acute perforated gastric ulcer with hemorrhage 07/16/2018    Alcoholism     Allergic     Allergic rhinitis     Arthritis     rheumatoid (diagnosed at age 16)    Arthritis of back     Arthritis of neck     Cirrhosis     etoh cirrhosis-liver failure. Follows w/ GI- [facility]    CKD (chronic kidney disease)     dr. nelson.  Stage 2    COPD (chronic obstructive pulmonary disease)     Depression     Dislocation of finger     Duodenal ulcer      perforated duodenal s/p patch    Erectile dysfunction     Fatty liver     Fracture, femur     Fracture, foot     GERD without esophagitis 09/02/2020    Glaucoma     Hip arthrosis     History of transfusion     HL (hearing loss)     Infectious viral hepatitis     Knee swelling     MRSA (methicillin resistant Staphylococcus aureus) carrier 06/12/2019    nasal swab    Multiple myeloma     Dr. Hernandez at [facility]    Nausea     states \"gallbladder issues\"- had attempted cholecystectomy, but unable to complete d/t adhered to liver    Osteopenia     Periarthritis of shoulder     Renal insufficiency     Sleep apnea     unable to tolerate CPAP    Tumors      benign essential    Type 2 diabetes mellitus with hyperglycemia 01/08/2020    off meds now    Umbilical hernia u    Wound, open     healed at this time       Past Surgical History:  Past Surgical History:   Procedure Laterality Date    COLONOSCOPY      CYST REMOVAL Left     forarm    ENDOSCOPY      ascities/paracentesis    ENDOSCOPY N/A 07/22/2021    Procedure: ESOPHAGOGASTRODUODENOSCOPY WITH DILATION (#[id number] BOUGIE);  Surgeon: Glenn Loo MD;  Location: [facility] ENDOSCOPY;  Service: Gastroenterology;  Laterality: " N/A;  ESOPHAGEAL STRICTURE    EXPLORATORY LAPAROTOMY  2018    Over-sew Gastric Ulcer With Gastrostomy and Jejunostomy Tube    FOOT SURGERY Right     right bunion removal    FRACTURE SURGERY Right 2005    bunionectomy/ broke toe and put in rods    HERNIA REPAIR  2019    MASS EXCISION Right 2019    Procedure: EXCISION OF SEBACEOUS CYST OF THE RIGHT BACK;  Surgeon: Sapna Elizalde MD;  Location: [facility] MAIN OR;  Service: General    MASS EXCISION Right 2019    Procedure: EXCISION LESION of right back;  Surgeon: Sapna Elizalde MD;  Location: [facility] MAIN OR;  Service: General    MOUTH SURGERY Left     UMBILICAL HERNIA REPAIR N/A 2019    Procedure: UMBILICAL HERNIA REPAIR LAPAROSCOPIC WITH MESH WITH EXTENSIVE LYSIS OF ADHESIONS;  Surgeon: Sapna Elizalde MD;  Location: [facility] MAIN OR;  Service: General        Home Meds:  (Not in a hospital admission)      Allergies:  Allergies   Allergen Reactions    Duloxetine Rash       Social History:   Social History     Socioeconomic History    Marital status:      Spouse name: Nicky Selby   Tobacco Use    Smoking status: Former     Packs/day: 2.00     Years: 30.00     Additional pack years: 0.00     Total pack years: 60.00     Types: Cigarettes     Start date: 2020     Quit date: 2018     Years since quittin.5    Smokeless tobacco: Former   Vaping Use    Vaping Use: Never used    Passive vaping exposure: Passive vaping exposure comment (previously worked in a bar)   Substance and Sexual Activity    Alcohol use: Not Currently    Drug use: No    Sexual activity: Not Currently       Family History:  Family History   Problem Relation Age of Onset    Cancer Mother     Lung disease Father     Other Father         prostate problems    Mental illness Brother     Rheumatologic disease Brother     Early death Brother        Physical Exam:  /71 (BP Location: Left arm, Patient Position: Sitting)   Pulse 85   Temp 97.6 °F (36.4 °C) (Oral)   Resp 20  "  Ht 177.8 cm (70\")   Wt 126 kg (277 lb 5.4 oz)   SpO2 96%   BMI 39.79 kg/m²  Body mass index is 39.79 kg/m². 96% 126 kg (277 lb 5.4 oz)  General Appearance:  Alert   HEENT:  Normocephalic, without obvious abnormality, Conjunctiva/corneas clear,.   Nares normal, no drainage     Neck:  Supple, symmetrical, trachea midline. No JVD.  Lungs /Chest wall: Wheezing with bilateral basal rhonchi, respirations unlabored, symmetrical wall movement.     Heart:  Regular rate and rhythm, S1 S2 normal  Abdomen: Soft, non-tender, no masses, no organomegaly.    Extremities: No edema, no clubbing or cyanosis    LABS:  Lab Results   Component Value Date    CALCIUM 9.3 11/28/2023    PHOS 3.6 07/01/2021     Results from last 7 days   Lab Units 11/28/23  2046   SODIUM mmol/L 132*   POTASSIUM mmol/L 4.0   CHLORIDE mmol/L 94*   CO2 mmol/L 24.0   BUN mg/dL 7   CREATININE mg/dL 0.98   GLUCOSE mg/dL 139*   CALCIUM mg/dL 9.3   WBC 10*3/mm3 9.40   HEMOGLOBIN g/dL 17.7   PLATELETS 10*3/mm3 219   ALT (SGPT) U/L 9   AST (SGOT) U/L 22   PROBNP pg/mL 180.2     Lab Results   Component Value Date    TROPONINT 14 11/28/2023     Results from last 7 days   Lab Units 11/28/23  2252 11/28/23  2046   HSTROP T ng/L 14 9         Results from last 7 days   Lab Units 11/29/23  0002 11/28/23 2053   LACTATE mmol/L 1.8 2.4*     Results from last 7 days   Lab Units 11/28/23 2114   PH, ARTERIAL pH units 7.515*   PCO2, ARTERIAL mm Hg 27.7*   PO2 ART mm Hg 62.2*   O2 SATURATION ART % 94.1   MODALITY  Cannula     Results from last 7 days   Lab Units 11/28/23 2235   ADENOVIRUS DETECTION BY PCR  Not Detected   CORONAVIRUS 229E  Not Detected   CORONAVIRUS HKU1  Not Detected   CORONAVIRUS NL63  Not Detected   CORONAVIRUS OC43  Not Detected   HUMAN METAPNEUMOVIRUS  Not Detected   HUMAN RHINOVIRUS/ENTEROVIRUS  Not Detected   INFLUENZA B PCR  Not Detected   PARAINFLUENZA 1  Not Detected   PARAINFLUENZA VIRUS 2  Not Detected   PARAINFLUENZA VIRUS 3  Not Detected "   PARAINFLUENZA VIRUS 4  Not Detected   BORDETELLA PERTUSSIS PCR  Not Detected   CHLAMYDOPHILA PNEUMONIAE PCR  Not Detected   MYCOPLAMA PNEUMO PCR  Not Detected   INFLUENZA A PCR  Not Detected   RSV, PCR  Not Detected             Lab Results   Component Value Date    TSH 3.560 09/15/2023     Estimated Creatinine Clearance: 113.5 mL/min (by C-G formula based on SCr of 0.98 mg/dL).         Imaging:  Imaging Results (Last 24 Hours)       Procedure Component Value Units Date/Time    CT Angiogram Chest Pulmonary Embolism [[id number]] Collected: 11/28/23 2155     Updated: 11/28/23 2201    Narrative:      CT ANGIOGRAM CHEST PULMONARY EMBOLISM    Date of Exam: 11/28/2023 8:42 PM CST    Indication: Pulmonary embolism (PE) suspected, positive D-dimer.    Comparison: Noncontrast chest CT 10/13/2023    Technique: Axial CT images were obtained of the chest after the uneventful intravenous administration of 100 cc of Isovue-370 utilizing pulmonary embolism protocol.  Sagittal and coronal reconstructions were performed.  Automated exposure control and   iterative reconstruction methods were used.      Findings:  PULMONARY VASCULATURE: Pulmonary arteries are widely patent without evidence of embolus.Main pulmonary artery is normal in size. No evidence of right heart strain.    MEDIASTINUM:Unremarkable. Aortic and heart size are normal. No aortic dissection identified. No mass nor pericardial effusion.  CORONARY ARTERIES: Nocalcified atherosclerotic disease.  LUNGS: Minimal basilar groundglass opacities, likely atelectasis without dense consolidation. Few scattered subcentimeter pulmonary nodules are unchanged. See most recent noncontrast CT chest report for additional details.  PLEURAL SPACE: No effusion, mass, nor pneumothorax.  LYMPH NODES: There are no pathologically enlarged lymph nodes.    UPPER ABDOMEN: Cirrhosis and ascites    OSSEOUS STRUCTURES: Unchanged without acute process identified.      Impression:       Impression:  1.No evidence of pulmonary embolism.  2.Basilar groundglass opacities, likely atelectasis.  3.Other stable findings.        Electronically Signed: Arjun Reyes MD    11/28/2023 8:59 PM CST    Workstation ID: [id number]    XR Chest 1 View [[id number]] Collected: 11/28/23 2051     Updated: 11/28/23 2054    Narrative:      XR CHEST 1 VW    Date of Exam: 11/28/2023 7:46 PM CST    Indication: Chest Pain Triage Protocol    Comparison: 12/14/2018    Findings:  Cardiomediastinal silhouette is unremarkable. Mild pulmonary vascular congestion. No airspace disease, pneumothorax, nor pleural effusion. No acute osseous abnormality identified.      Impression:      Impression:  Mild pulmonary vascular congestion.      Electronically Signed: Arjun Reyes MD    11/28/2023 7:52 PM CST    Workstation ID: [id number]              Current Meds:   SCHEDULE  [START ON 11/30/2023] cefTRIAXone, 2,000 mg, Intravenous, Daily  enoxaparin, 40 mg, Subcutaneous, Daily  guaiFENesin, 1,200 mg, Oral, Q12H  insulin lispro, 2-7 Units, Subcutaneous, 4x Daily AC & at Bedtime  ipratropium-albuterol, 3 mL, Nebulization, Q6H While Awake - RT  methylPREDNISolone sodium succinate, 80 mg, Intravenous, Q8H  montelukast, 10 mg, Oral, Daily  senna-docusate sodium, 2 tablet, Oral, BID  sodium chloride, 10 mL, Intravenous, Q12H      Infusions     PRNs    albuterol    benzocaine-menthol    senna-docusate sodium **AND** polyethylene glycol **AND** bisacodyl **AND** bisacodyl    Calcium Replacement - Follow Nurse / BPA Driven Protocol    dextrose    dextrose    glucagon (human recombinant)    Magnesium Standard Dose Replacement - Follow Nurse / BPA Driven Protocol    ondansetron    Phosphorus Replacement - Follow Nurse / BPA Driven Protocol    Potassium Replacement - Follow Nurse / BPA Driven Protocol    sodium chloride    sodium chloride    sodium chloride    sodium chloride        Drew Car MD  11/29/2023  12:36 EST      Much of this encounter note  is an electronic transcription/translation of spoken language to printed text using Dragon Software.

## 2023-11-29 NOTE — ED PROVIDER NOTES
Subjective   History of Present Illness  Chief Complaint: Shortness of breath    Patient is a 53-year-old obese male with history of previous alcoholism, cirrhosis, COPD presents today with complaints of cough, shortness of breath for the last few days.  He reports rhinorrhea and a lot of congestion that seems to run down the back of his throat.  He states that it causes him to cough and feels like he cannot breathe.  Denies any chest pain.  He states that his cough is productive but clear.  No hemoptysis.  No abdominal pain nausea vomiting diarrhea.  No body aches.  Mild headache.  Subjective fever chills.  No lower extremity swelling.  No recent travel or surgery.  No recent COVID exposure that he is aware of.  Patient states he does not wear CPAP machine.    PCP: Berny Godwin    History provided by:  Patient      Review of Systems   Constitutional:  Negative for chills and fever.   HENT:  Positive for congestion, postnasal drip and rhinorrhea. Negative for sore throat and trouble swallowing.    Eyes: Negative.    Respiratory:  Positive for cough, chest tightness and shortness of breath. Negative for wheezing.    Cardiovascular:  Negative for chest pain.   Gastrointestinal:  Negative for abdominal pain, nausea and vomiting.   Endocrine: Negative.    Genitourinary:  Negative for dysuria.   Musculoskeletal:  Negative for myalgias.   Skin:  Negative for rash.   Allergic/Immunologic: Negative.    Neurological:  Negative for headaches.   Psychiatric/Behavioral:  Negative for behavioral problems.    All other systems reviewed and are negative.      Past Medical History:   Diagnosis Date    Acute perforated gastric ulcer with hemorrhage 07/16/2018    Alcoholism     Allergic     Allergic rhinitis     Arthritis     rheumatoid (diagnosed at age 16)    Arthritis of back     Arthritis of neck     Cirrhosis     etoh cirrhosis-liver failure. Follows w/ GI- [facility]    CKD (chronic kidney disease)     dr. nelson.  Stage 2     "COPD (chronic obstructive pulmonary disease)     Depression     Dislocation of finger     Duodenal ulcer      perforated duodenal s/p patch    Erectile dysfunction     Fatty liver     Fracture, femur     Fracture, foot     GERD without esophagitis 09/02/2020    Glaucoma     Hip arthrosis     History of transfusion     HL (hearing loss)     Infectious viral hepatitis     Knee swelling     MRSA (methicillin resistant Staphylococcus aureus) carrier 06/12/2019    nasal swab    Multiple myeloma     Dr. Hernandez at [facility]    Nausea     states \"gallbladder issues\"- had attempted cholecystectomy, but unable to complete d/t adhered to liver    Osteopenia     Periarthritis of shoulder     Renal insufficiency     Sleep apnea     unable to tolerate CPAP    Tumors      benign essential    Type 2 diabetes mellitus with hyperglycemia 01/08/2020    off meds now    Umbilical hernia u    Wound, open     healed at this time       Allergies   Allergen Reactions    Duloxetine Rash       Past Surgical History:   Procedure Laterality Date    COLONOSCOPY      CYST REMOVAL Left     forarm    ENDOSCOPY      ascities/paracentesis    ENDOSCOPY N/A 07/22/2021    Procedure: ESOPHAGOGASTRODUODENOSCOPY WITH DILATION (#48 BOUGIE);  Surgeon: Glenn Loo MD;  Location: [facility] ENDOSCOPY;  Service: Gastroenterology;  Laterality: N/A;  ESOPHAGEAL STRICTURE    EXPLORATORY LAPAROTOMY  07/16/2018    Over-sew Gastric Ulcer With Gastrostomy and Jejunostomy Tube    FOOT SURGERY Right     right bunion removal    FRACTURE SURGERY Right 2005    bunionectomy/ broke toe and put in rods    HERNIA REPAIR  6/18/2019    MASS EXCISION Right 06/18/2019    Procedure: EXCISION OF SEBACEOUS CYST OF THE RIGHT BACK;  Surgeon: Sapna Elizalde MD;  Location: [facility] MAIN OR;  Service: General    MASS EXCISION Right 08/27/2019    Procedure: EXCISION LESION of right back;  Surgeon: Sapna Elizalde MD;  Location: [facility] MAIN OR;  Service: General    MOUTH SURGERY Left     UMBILICAL " HERNIA REPAIR N/A 2019    Procedure: UMBILICAL HERNIA REPAIR LAPAROSCOPIC WITH MESH WITH EXTENSIVE LYSIS OF ADHESIONS;  Surgeon: Sapna Elizalde MD;  Location: [facility] MAIN OR;  Service: General       Family History   Problem Relation Age of Onset    Cancer Mother     Lung disease Father     Other Father         prostate problems    Mental illness Brother     Rheumatologic disease Brother     Early death Brother        Social History     Socioeconomic History    Marital status:      Spouse name: Nicky Selby   Tobacco Use    Smoking status: Former     Packs/day: 2.00     Years: 30.00     Additional pack years: 0.00     Total pack years: 60.00     Types: Cigarettes     Start date: 2020     Quit date: 2018     Years since quittin.5    Smokeless tobacco: Former   Vaping Use    Vaping Use: Never used    Passive vaping exposure: Passive vaping exposure comment (previously worked in a bar)   Substance and Sexual Activity    Alcohol use: Not Currently    Drug use: No    Sexual activity: Not Currently           Objective   Physical Exam  Vitals and nursing note reviewed.   Constitutional:       General: He is not in acute distress.     Appearance: He is well-developed. He is obese. He is ill-appearing. He is not diaphoretic.   HENT:      Head: Normocephalic and atraumatic.   Eyes:      Extraocular Movements: Extraocular movements intact.      Pupils: Pupils are equal, round, and reactive to light.   Cardiovascular:      Rate and Rhythm: Regular rhythm. Tachycardia present.      Heart sounds: No murmur heard.  Pulmonary:      Effort: Pulmonary effort is normal. Tachypnea present.      Breath sounds: Examination of the right-lower field reveals decreased breath sounds. Examination of the left-lower field reveals decreased breath sounds. Decreased breath sounds present.   Abdominal:      General: Bowel sounds are normal.      Palpations: Abdomen is soft.   Musculoskeletal:      Cervical back: Normal range  "of motion.      Right lower leg: No edema.      Left lower leg: No edema.   Skin:     General: Skin is warm.      Capillary Refill: Capillary refill takes less than 2 seconds.   Neurological:      General: No focal deficit present.      Mental Status: He is alert and oriented to person, place, and time.   Psychiatric:         Mood and Affect: Mood normal.         Behavior: Behavior normal.         ECG 12 Lead      Date/Time: 11/28/2023 8:56 PM    Performed by: Shital Angeles PA  Authorized by: Yon Whittington MD  Interpreted by physician  Comparison: compared with previous ECG   Comparison to previous ECG: Sinus rhythm rate of 82 with no acute ST changes.  This was on 6/12/2019  Rhythm: sinus tachycardia  Rate: tachycardic  BPM: 121  QRS axis: normal  Conduction: conduction normal  Clinical impression: abnormal ECG               ED Course  ED Course as of 11/29/23 0555   Tue Nov 28, 2023 2122 D-Dimer, Quant(!): 3.45  CT PE ordered []   Wed Nov 29, 2023   0130 Patient reevaluated, reports that he is feeling much better.  Vital signs improved.  Patient agreeable with plan for admission. []   0147 I spoke with Dr. Cid regarding admission []      ED Course User Index  [] Shital Angeles PA    /77 (BP Location: Left arm, Patient Position: Sitting)   Pulse 95   Temp 98.8 °F (37.1 °C) (Oral)   Resp 20   Ht 177.8 cm (70\")   Wt 126 kg (277 lb 5.4 oz)   SpO2 92%   BMI 39.79 kg/m²   Labs Reviewed   COMPREHENSIVE METABOLIC PANEL - Abnormal; Notable for the following components:       Result Value    Glucose 139 (*)     Sodium 132 (*)     Chloride 94 (*)     Total Bilirubin 1.4 (*)     All other components within normal limits    Narrative:     GFR Normal >60  Chronic Kidney Disease <60  Kidney Failure <15     CBC WITH AUTO DIFFERENTIAL - Abnormal; Notable for the following components:    Neutrophil % 88.1 (*)     Lymphocyte % 4.9 (*)     Neutrophils, Absolute 8.30 (*)     Lymphocytes, Absolute " "0.50 (*)     nRBC 0.3 (*)     All other components within normal limits   D-DIMER, QUANTITATIVE - Abnormal; Notable for the following components:    D-Dimer, Quantitative 3.45 (*)     All other components within normal limits    Narrative:     According to the assay 's published package insert, a normal (<0.50 mg/L (FEU)) D-dimer result in conjunction with a non-high clinical probability assessment, excludes deep vein thrombosis (DVT) and pulmonary embolism (PE) with high sensitivity.    D-dimer values increase with age and this can make VTE exclusion of an older population difficult. To address this, the [employer], based on best available evidence and recent guidelines, recommends that clinicians use age-adjusted D-dimer thresholds in patients greater than 50 years of age with: a) a low probability of PE who do not meet all Pulmonary Embolism Rule Out Criteria, or b) in those with intermediate probability of PE.   The formula for an age-adjusted D-dimer cut-off is \"age/100\".  For example, a 60 year old patient would have an age-adjusted cut-off of 0.60 mg/L (FEU) and an 80 year old 0.80 mg/L (FEU).   BLOOD GAS, ARTERIAL - Abnormal; Notable for the following components:    pH, Arterial 7.515 (*)     pCO2, Arterial 27.7 (*)     pO2, Arterial 62.2 (*)     All other components within normal limits   HIGH SENSITIVITIY TROPONIN T 2HR - Abnormal; Notable for the following components:    Troponin T Delta 5 (*)     All other components within normal limits    Narrative:     High Sensitive Troponin T Reference Range:  <14.0 ng/L- Negative Female for AMI  <22.0 ng/L- Negative Male for AMI  >=14 - Abnormal Female indicating possible myocardial injury.  >=22 - Abnormal Male indicating possible myocardial injury.   Clinicians would have to utilize clinical acumen, EKG, Troponin, and serial changes to determine if it is an Acute Myocardial Infarction or myocardial injury due to an underlying " chronic condition.        POC LACTATE - Abnormal; Notable for the following components:    Lactate 2.4 (*)     All other components within normal limits   RESPIRATORY PANEL PCR W/ COVID-19 (SARS-COV-2), NP SWAB IN UTM/VTP, 2 HR TAT - Normal    Narrative:     In the setting of a positive respiratory panel with a viral infection PLUS a negative procalcitonin without other underlying concern for bacterial infection, consider observing off antibiotics or discontinuation of antibiotics and continue supportive care. If the respiratory panel is positive for atypical bacterial infection (Bordetella pertussis, Chlamydophila pneumoniae, or Mycoplasma pneumoniae), consider antibiotic de-escalation to target atypical bacterial infection.   TROPONIN - Normal    Narrative:     High Sensitive Troponin T Reference Range:  <14.0 ng/L- Negative Female for AMI  <22.0 ng/L- Negative Male for AMI  >=14 - Abnormal Female indicating possible myocardial injury.  >=22 - Abnormal Male indicating possible myocardial injury.   Clinicians would have to utilize clinical acumen, EKG, Troponin, and serial changes to determine if it is an Acute Myocardial Infarction or myocardial injury due to an underlying chronic condition.        BNP (IN-HOUSE) - Normal    Narrative:     This assay is used as an aid in the diagnosis of individuals suspected of having heart failure. It can be used as an aid in the diagnosis of acute decompensated heart failure (ADHF) in patients presenting with signs and symptoms of ADHF to the emergency department (ED). In addition, NT-proBNP of <300 pg/mL indicates ADHF is not likely.    Age Range Result Interpretation  NT-proBNP Concentration (pg/mL:      <50             Positive            >450                   Gray                 300-450                    Negative             <300    50-75           Positive            >900                  Gray                300-900                  Negative            <300      >75              Positive            >1800                  Gray                300-1800                  Negative            <300   LACTIC ACID, REFLEX - Normal   BLOOD CULTURE   BLOOD CULTURE   RAINBOW DRAW    Narrative:     The following orders were created for panel order [place] Draw.  Procedure                               Abnormality         Status                     ---------                               -----------         ------                     Green Top (Gel)[[id number]]                                  Final result               Lavender Top[[id number]]                                     Final result               Gold Top - SST[[id number]]                                   Final result               Light Blue Top[[id number]]                                   Final result                 Please view results for these tests on the individual orders.   BLOOD GAS, ARTERIAL   COMPREHENSIVE METABOLIC PANEL   CBC (NO DIFF)   POC LACTATE   POCT GLUCOSE FINGERSTICK   POCT GLUCOSE FINGERSTICK   POCT GLUCOSE FINGERSTICK   POCT GLUCOSE FINGERSTICK   CBC AND DIFFERENTIAL    Narrative:     The following orders were created for panel order CBC & Differential.  Procedure                               Abnormality         Status                     ---------                               -----------         ------                     CBC Auto Differential[[id number]]        Abnormal            Final result                 Please view results for these tests on the individual orders.   GREEN TOP   LAVENDER TOP   GOLD TOP - SST   LIGHT BLUE TOP     Medications   sodium chloride 0.9 % flush 10 mL (has no administration in time range)   sodium chloride 0.9 % flush 10 mL (has no administration in time range)   sodium chloride 0.9 % flush 10 mL (10 mL Intravenous Given 11/29/23 0250)   sodium chloride 0.9 % flush 10 mL (has no administration in time range)   sodium chloride 0.9 % infusion 40 mL (has no administration in time range)    sennosides-docusate (PERICOLACE) 8.6-50 MG per tablet 2 tablet (has no administration in time range)     And   polyethylene glycol (MIRALAX) packet 17 g (has no administration in time range)     And   bisacodyl (DULCOLAX) EC tablet 5 mg (has no administration in time range)     And   bisacodyl (DULCOLAX) suppository 10 mg (has no administration in time range)   Enoxaparin Sodium (LOVENOX) syringe 40 mg (40 mg Subcutaneous Given 11/29/23 0249)   Potassium Replacement - Follow Nurse / BPA Driven Protocol (has no administration in time range)   Magnesium Standard Dose Replacement - Follow Nurse / BPA Driven Protocol (has no administration in time range)   Phosphorus Replacement - Follow Nurse / BPA Driven Protocol (has no administration in time range)   Calcium Replacement - Follow Nurse / BPA Driven Protocol (has no administration in time range)   ondansetron (ZOFRAN) injection 4 mg (has no administration in time range)   methylPREDNISolone sodium succinate (SOLU-Medrol) injection 80 mg (80 mg Intravenous Given 11/29/23 0249)   ipratropium-albuterol (DUO-NEB) nebulizer solution 3 mL (has no administration in time range)   albuterol (ACCUNEB) nebulizer solution 0.63 mg (has no administration in time range)   guaiFENesin (MUCINEX) 12 hr tablet 1,200 mg (1,200 mg Oral Given 11/29/23 0249)   cefTRIAXone (ROCEPHIN) 2,000 mg in sodium chloride 0.9 % 100 mL IVPB (has no administration in time range)   dextrose (GLUTOSE) oral gel 15 g (has no administration in time range)   dextrose (D50W) (25 g/50 mL) IV injection 25 g (has no administration in time range)   glucagon (GLUCAGEN) injection 1 mg (has no administration in time range)   insulin lispro (HUMALOG/ADMELOG) injection 2-7 Units (has no administration in time range)   montelukast (SINGULAIR) tablet 10 mg (10 mg Oral Given 11/29/23 1143)   benzocaine-menthol (CHLORASEPTIC) lozenge 1 lozenge (1 lozenge Mouth/Throat Given 11/29/23 5170)   benzonatate (TESSALON) capsule  200 mg (200 mg Oral Given 11/28/23 2107)   albuterol (PROVENTIL) nebulizer solution 0.083% 2.5 mg/3mL (2.5 mg Nebulization Given 11/28/23 2118)   iopamidol (ISOVUE-370) 76 % injection 100 mL (100 mL Intravenous Given 11/28/23 2154)   acetaminophen (TYLENOL) tablet 1,000 mg (1,000 mg Oral Given 11/28/23 2359)   cefTRIAXone (ROCEPHIN) 2,000 mg in sodium chloride 0.9 % 100 mL IVPB (0 mg Intravenous Stopped 11/29/23 0218)   doxycycline (VIBRAMYCIN) 100 mg in sodium chloride 0.9 % 100 mL IVPB (100 mg Intravenous New Bag 11/29/23 0148)     CT Angiogram Chest Pulmonary Embolism    Result Date: 11/28/2023  Impression: 1.No evidence of pulmonary embolism. 2.Basilar groundglass opacities, likely atelectasis. 3.Other stable findings. Electronically Signed: Arjun eRyes MD  11/28/2023 8:59 PM CST  Workstation ID: [id number]    XR Chest 1 View    Result Date: 11/28/2023  Impression: Mild pulmonary vascular congestion. Electronically Signed: Arjun Reyes MD  11/28/2023 7:52 PM CST  Workstation ID: [id number]                                            Medical Decision Making  Differential Dx (Includes but not limited to): COPD exacerbation, respiratory illness, pneumonia, PE, pleural effusion  Medical no recent admissions.  Records Reviewed: Patient seen by PCP 11/14/2023 for follow-up regarding diabetes and weight loss.  Patient noted to of started Ozempic  Labs: On my interpretation, lactate 2.4.  Respiratory panel negative.  ABG pH 7.515, PCO2 27.  CBC no leukocytosis.  CMP glucose 139 sodium 132.  Chloride 94.  Troponin 9 followed by 14.  Dimer 3.45  Imaging: On my interpretation, chest x-ray shows no obvious pneumonia.  CT PE protocol negative for PE  Telemetry: EKG interpretation: Reviewed by myself interpreted by ER attending, sinus tachycardia rate of 121 with no acute ST elevation or depression  Testing considered but not ordered: CT head patient denies headache or head injury  Nature of Complaint: Acute  Admission vs  Discharge: Admission  Discussion: While in the ED IV was placed and labs were obtained appropriate PPE was worn during exam and throughout all encounters with the patient.  Patient had the blood evaluation.  Patient tachypneic, tachycardic and in acute distress upon ER arrival, he presented with 3 L per nasal cannula, does not normally wear oxygen at home.  Sepsis protocol initiated.  POC lactate 2.4.  Lab work significant for elevated D-dimer, subsequent PE protocol negative for pulmonary embolus but does show some infiltrates likely atelectasis.  Patient is no leukocytosis, no fever.  Patient's cough resolved with Tessalon Perles and reports improvement in his breathing after DuoNeb treatment.  Patient's symptoms likely related to severe COPD exacerbation.  He was started on Rocephin and doxycycline and will be admitted for further breathing treatments and other treatments for COPD exacerbation.  Spoke with Dr. Cross regarding admission.    Problems Addressed:  COPD exacerbation: acute illness or injury  Dyspnea, unspecified type: acute illness or injury    Amount and/or Complexity of Data Reviewed  External Data Reviewed: notes.  Labs: ordered. Decision-making details documented in ED Course.  Radiology: ordered. Decision-making details documented in ED Course.  ECG/medicine tests: ordered. Decision-making details documented in ED Course.    Risk  OTC drugs.  Prescription drug management.  Decision regarding hospitalization.        Final diagnoses:   COPD exacerbation   Dyspnea, unspecified type       ED Disposition  ED Disposition       ED Disposition   Decision to Admit    Condition   --    Comment   Level of Care: Telemetry [5]   Diagnosis: COPD exacerbation [268225]   Admitting Physician: EDISON CROSS [[id number]]   Attending Physician: EDISON CROSS [[id number]]   Certification: I Certify That Inpatient Hospital Services Are Medically Necessary For Greater Than 2 Midnights                 No follow-up  provider specified.       Medication List      No changes were made to your prescriptions during this visit.            Shital Angeles PA  11/29/23 0549

## 2023-11-29 NOTE — CASE MANAGEMENT/SOCIAL WORK
Discharge Planning Assessment   [place]     Patient Name: Mc Selby  MRN: [id number]  Today's Date: 11/29/2023    Admit Date: 11/28/2023    Plan: Home, watch for O2   Discharge Needs Assessment       Row Name 11/29/23 1402       Living Environment    People in Home spouse    Current Living Arrangements home    Potentially Unsafe Housing Conditions none    In the past 12 months has the electric, gas, oil, or water company threatened to shut off services in your home? No    Primary Care Provided by self    Provides Primary Care For no one    Able to Return to Prior Arrangements yes       Resource/Environmental Concerns    Resource/Environmental Concerns none    Transportation Concerns none       Food Insecurity    Within the past 12 months, you worried that your food would run out before you got the money to buy more. Never true    Within the past 12 months, the food you bought just didn't last and you didn't have money to get more. Never true       Transition Planning    Patient/Family Anticipates Transition to home    Patient/Family Anticipated Services at Transition none    Transportation Anticipated family or friend will provide       Discharge Needs Assessment    Equipment Currently Used at Home glucometer    Concerns to be Addressed denies needs/concerns at this time    Anticipated Changes Related to Illness none    Equipment Needed After Discharge none                   Discharge Plan       Row Name 11/29/23 1403       Plan    Plan Home, watch for O2    Plan Comments met with patient at bedside Lives at home with wife. IADL's PCP and Pharmacy verified, able to afford medications. d/c barriers: Wean Oxygen, Neb [place], pulm consult                  Continued Care and Services - Admitted Since 11/28/2023    Coordination has not been started for this encounter.       Expected Discharge Date and Time       Expected Discharge Date Expected Discharge Time    Nov 30, 2023            Demographic Summary       Row Name  11/29/23 1400       General Information    Admission Type inpatient    Arrived From emergency department    Required Notices Provided Important Message from Medicare    Referral Source admission list    Reason for Consult discharge planning    Preferred Language English                   Functional Status       Row Name 11/29/23 1401       Functional Status    Usual Activity Tolerance good    Current Activity Tolerance good       Functional Status, IADL    Medications independent    Meal Preparation independent    Housekeeping independent    Laundry independent    Shopping independent       Mental Status    General Appearance WDL WDL       Mental Status Summary    Recent Changes in Mental Status/Cognitive Functioning no changes                            Patient Forms       Row Name 11/29/23 1401       Patient Forms    Important Message from Medicare ([facility]) --  11/29 per ford Bonilla, RN

## 2023-11-30 ENCOUNTER — APPOINTMENT (OUTPATIENT)
Dept: CARDIOLOGY | Facility: HOSPITAL | Age: 53
End: 2023-11-30
Payer: MEDICARE

## 2023-11-30 ENCOUNTER — READMISSION MANAGEMENT (OUTPATIENT)
Dept: CALL CENTER | Facility: HOSPITAL | Age: 53
End: 2023-11-30
Payer: MEDICARE

## 2023-11-30 VITALS
SYSTOLIC BLOOD PRESSURE: 128 MMHG | OXYGEN SATURATION: 94 % | WEIGHT: 277.34 LBS | HEIGHT: 70 IN | RESPIRATION RATE: 18 BRPM | DIASTOLIC BLOOD PRESSURE: 81 MMHG | BODY MASS INDEX: 39.7 KG/M2 | TEMPERATURE: 97.7 F | HEART RATE: 70 BPM

## 2023-11-30 LAB
BH CV LOWER VASCULAR LEFT COMMON FEMORAL AUGMENT: NORMAL
BH CV LOWER VASCULAR LEFT COMMON FEMORAL COMPETENT: NORMAL
BH CV LOWER VASCULAR LEFT COMMON FEMORAL COMPRESS: NORMAL
BH CV LOWER VASCULAR LEFT COMMON FEMORAL PHASIC: NORMAL
BH CV LOWER VASCULAR LEFT COMMON FEMORAL SPONT: NORMAL
BH CV LOWER VASCULAR LEFT DISTAL FEMORAL COMPRESS: NORMAL
BH CV LOWER VASCULAR LEFT GASTRONEMIUS COMPRESS: NORMAL
BH CV LOWER VASCULAR LEFT GREATER SAPH AK COMPRESS: NORMAL
BH CV LOWER VASCULAR LEFT GREATER SAPH BK COMPRESS: NORMAL
BH CV LOWER VASCULAR LEFT MID FEMORAL AUGMENT: NORMAL
BH CV LOWER VASCULAR LEFT MID FEMORAL COMPETENT: NORMAL
BH CV LOWER VASCULAR LEFT MID FEMORAL COMPRESS: NORMAL
BH CV LOWER VASCULAR LEFT MID FEMORAL PHASIC: NORMAL
BH CV LOWER VASCULAR LEFT MID FEMORAL SPONT: NORMAL
BH CV LOWER VASCULAR LEFT POPLITEAL AUGMENT: NORMAL
BH CV LOWER VASCULAR LEFT POPLITEAL COMPETENT: NORMAL
BH CV LOWER VASCULAR LEFT POPLITEAL COMPRESS: NORMAL
BH CV LOWER VASCULAR LEFT POPLITEAL PHASIC: NORMAL
BH CV LOWER VASCULAR LEFT POPLITEAL SPONT: NORMAL
BH CV LOWER VASCULAR LEFT POSTERIOR TIBIAL COMPRESS: NORMAL
BH CV LOWER VASCULAR LEFT PROXIMAL FEMORAL COMPRESS: NORMAL
BH CV LOWER VASCULAR LEFT SAPHENOFEMORAL JUNCTION COMPRESS: NORMAL
BH CV LOWER VASCULAR RIGHT COMMON FEMORAL AUGMENT: NORMAL
BH CV LOWER VASCULAR RIGHT COMMON FEMORAL COMPETENT: NORMAL
BH CV LOWER VASCULAR RIGHT COMMON FEMORAL COMPRESS: NORMAL
BH CV LOWER VASCULAR RIGHT COMMON FEMORAL PHASIC: NORMAL
BH CV LOWER VASCULAR RIGHT COMMON FEMORAL SPONT: NORMAL
BH CV LOWER VASCULAR RIGHT DISTAL FEMORAL COMPRESS: NORMAL
BH CV LOWER VASCULAR RIGHT GASTRONEMIUS COMPRESS: NORMAL
BH CV LOWER VASCULAR RIGHT GREATER SAPH AK COMPRESS: NORMAL
BH CV LOWER VASCULAR RIGHT GREATER SAPH BK COMPRESS: NORMAL
BH CV LOWER VASCULAR RIGHT MID FEMORAL AUGMENT: NORMAL
BH CV LOWER VASCULAR RIGHT MID FEMORAL COMPETENT: NORMAL
BH CV LOWER VASCULAR RIGHT MID FEMORAL COMPRESS: NORMAL
BH CV LOWER VASCULAR RIGHT MID FEMORAL PHASIC: NORMAL
BH CV LOWER VASCULAR RIGHT MID FEMORAL SPONT: NORMAL
BH CV LOWER VASCULAR RIGHT POPLITEAL AUGMENT: NORMAL
BH CV LOWER VASCULAR RIGHT POPLITEAL COMPETENT: NORMAL
BH CV LOWER VASCULAR RIGHT POPLITEAL COMPRESS: NORMAL
BH CV LOWER VASCULAR RIGHT POPLITEAL PHASIC: NORMAL
BH CV LOWER VASCULAR RIGHT POPLITEAL SPONT: NORMAL
BH CV LOWER VASCULAR RIGHT POSTERIOR TIBIAL COMPRESS: NORMAL
BH CV LOWER VASCULAR RIGHT PROXIMAL FEMORAL COMPRESS: NORMAL
BH CV LOWER VASCULAR RIGHT SAPHENOFEMORAL JUNCTION COMPRESS: NORMAL
GLUCOSE BLDC GLUCOMTR-MCNC: 202 MG/DL (ref 70–105)
GLUCOSE BLDC GLUCOMTR-MCNC: 300 MG/DL (ref 70–105)

## 2023-11-30 PROCEDURE — 96372 THER/PROPH/DIAG INJ SC/IM: CPT

## 2023-11-30 PROCEDURE — 94761 N-INVAS EAR/PLS OXIMETRY MLT: CPT

## 2023-11-30 PROCEDURE — 97165 OT EVAL LOW COMPLEX 30 MIN: CPT

## 2023-11-30 PROCEDURE — 94799 UNLISTED PULMONARY SVC/PX: CPT

## 2023-11-30 PROCEDURE — 97162 PT EVAL MOD COMPLEX 30 MIN: CPT

## 2023-11-30 PROCEDURE — 82948 REAGENT STRIP/BLOOD GLUCOSE: CPT

## 2023-11-30 PROCEDURE — 63710000001 INSULIN LISPRO (HUMAN) PER 5 UNITS: Performed by: INTERNAL MEDICINE

## 2023-11-30 PROCEDURE — 25010000002 CEFTRIAXONE PER 250 MG: Performed by: HOSPITALIST

## 2023-11-30 PROCEDURE — 94618 PULMONARY STRESS TESTING: CPT

## 2023-11-30 PROCEDURE — G0378 HOSPITAL OBSERVATION PER HR: HCPCS

## 2023-11-30 PROCEDURE — 25010000002 ENOXAPARIN PER 10 MG: Performed by: HOSPITALIST

## 2023-11-30 PROCEDURE — 63710000001 INSULIN GLARGINE PER 5 UNITS: Performed by: INTERNAL MEDICINE

## 2023-11-30 PROCEDURE — 94664 DEMO&/EVAL PT USE INHALER: CPT

## 2023-11-30 PROCEDURE — 93970 EXTREMITY STUDY: CPT

## 2023-11-30 PROCEDURE — 25010000002 METHYLPREDNISOLONE PER 40 MG: Performed by: INTERNAL MEDICINE

## 2023-11-30 PROCEDURE — 96376 TX/PRO/DX INJ SAME DRUG ADON: CPT

## 2023-11-30 RX ORDER — BUDESONIDE AND FORMOTEROL FUMARATE DIHYDRATE 80; 4.5 UG/1; UG/1
2 AEROSOL RESPIRATORY (INHALATION)
Qty: 1 EACH | Refills: 12 | Status: SHIPPED | OUTPATIENT
Start: 2023-11-30

## 2023-11-30 RX ORDER — IPRATROPIUM BROMIDE AND ALBUTEROL SULFATE 2.5; .5 MG/3ML; MG/3ML
3 SOLUTION RESPIRATORY (INHALATION) EVERY 6 HOURS PRN
Status: DISCONTINUED | OUTPATIENT
Start: 2023-11-30 | End: 2023-11-30 | Stop reason: HOSPADM

## 2023-11-30 RX ORDER — PREDNISONE 10 MG/1
10 TABLET ORAL DAILY
Qty: 10 TABLET | Refills: 0 | Status: SHIPPED | OUTPATIENT
Start: 2023-11-30

## 2023-11-30 RX ADMIN — PREGABALIN 75 MG: 75 CAPSULE ORAL at 08:59

## 2023-11-30 RX ADMIN — BENZOCAINE 6 MG-MENTHOL 10 MG LOZENGES 1 LOZENGE: at 12:21

## 2023-11-30 RX ADMIN — Medication 10 ML: at 09:01

## 2023-11-30 RX ADMIN — METHYLPREDNISOLONE SODIUM SUCCINATE 40 MG: 40 INJECTION, POWDER, FOR SOLUTION INTRAMUSCULAR; INTRAVENOUS at 02:21

## 2023-11-30 RX ADMIN — INSULIN LISPRO 8 UNITS: 100 INJECTION, SOLUTION INTRAVENOUS; SUBCUTANEOUS at 08:59

## 2023-11-30 RX ADMIN — BUDESONIDE AND FORMOTEROL FUMARATE DIHYDRATE 2 PUFF: 160; 4.5 AEROSOL RESPIRATORY (INHALATION) at 08:09

## 2023-11-30 RX ADMIN — INSULIN GLARGINE 10 UNITS: 100 INJECTION, SOLUTION SUBCUTANEOUS at 14:20

## 2023-11-30 RX ADMIN — IPRATROPIUM BROMIDE AND ALBUTEROL SULFATE 3 ML: .5; 3 SOLUTION RESPIRATORY (INHALATION) at 08:05

## 2023-11-30 RX ADMIN — CETIRIZINE HYDROCHLORIDE 10 MG: 10 TABLET, FILM COATED ORAL at 08:59

## 2023-11-30 RX ADMIN — MONTELUKAST 10 MG: 10 TABLET, FILM COATED ORAL at 08:59

## 2023-11-30 RX ADMIN — METHYLPREDNISOLONE SODIUM SUCCINATE 40 MG: 40 INJECTION, POWDER, FOR SOLUTION INTRAMUSCULAR; INTRAVENOUS at 09:02

## 2023-11-30 RX ADMIN — GUAIFENESIN 1200 MG: 600 TABLET, MULTILAYER, EXTENDED RELEASE ORAL at 08:59

## 2023-11-30 RX ADMIN — BUMETANIDE 2 MG: 1 TABLET ORAL at 08:59

## 2023-11-30 RX ADMIN — PANTOPRAZOLE SODIUM 40 MG: 40 TABLET, DELAYED RELEASE ORAL at 05:29

## 2023-11-30 RX ADMIN — ENOXAPARIN SODIUM 40 MG: 100 INJECTION SUBCUTANEOUS at 17:03

## 2023-11-30 RX ADMIN — INSULIN LISPRO 16 UNITS: 100 INJECTION, SOLUTION INTRAVENOUS; SUBCUTANEOUS at 12:04

## 2023-11-30 RX ADMIN — CEFTRIAXONE 2000 MG: 2 INJECTION, POWDER, FOR SOLUTION INTRAMUSCULAR; INTRAVENOUS at 00:44

## 2023-11-30 NOTE — PLAN OF CARE
Goal Outcome Evaluation:  Plan of Care Reviewed With: patient        Progress: no change   Pt is a 52 y/o M admitted to [facility] 11/28/23 with complaints of SOA for the past couple days. He has also noted a productive cough, but clear sputum. CTA Chest (-) and XR Chest (+) for pulmonary vascular congestion. Respiratory panel (-) and pt diagnosed with COPD exacerbation. PMH includes COPD, liver cirrhosis, GERD, multiple myeloma, RA, and DM. He is currently living at home with spouse in multi-level home with 3-step entry. Sleeps and can complete all ADLs on the main floor. At baseline, he is IND with all household mobility, community ambulation, and ADLs with no AD and no home O2. This date, he is AAOx4, sitting EOB and reading 96% on 2L O2. He completes transfers SBA and amb 100' SBA with no AD this date. No balance or safety concerns were noted during activity this date, while O2 sats dropped to 92% on room air, returning back to 95% on room air when sitting EOB. PT seems to be functioning at his baseline in terms of strength, balance, and endurance, but still exhibits mild>moderate SOA following short bout of activity. He will be safe to d/c back home, but PT recommending OP pulmonary rehab at d/c to increase respiratory endurance and decrease re-admission rate for similar issues. Pt educated on this topic and agreeable to recommendations made by PT. PT completing orders at this time.     Anticipated Discharge Disposition (PT): home with outpatient therapy services

## 2023-11-30 NOTE — THERAPY EVALUATION
"Patient Name: Mc Selby  : 1970    MRN: [id number]                              Today's Date: 2023       Admit Date: 2023    Visit Dx:     ICD-10-CM ICD-9-CM   1. COPD exacerbation  J44.1 491.21   2. Dyspnea, unspecified type  R06.00 786.09     Patient Active Problem List   Diagnosis    Alcoholic cirrhosis    Umbilical hernia    Type 2 diabetes mellitus with hyperglycemia    Hyperammonemia    Encephalopathy, hepatic    Primary osteoarthritis of right hip    Obesity (BMI 30-39.9)    GERD without esophagitis    Vitamin D deficiency    COPD (chronic obstructive pulmonary disease)    Hypomagnesemia    Rheumatoid arthritis involving multiple sites    Seasonal allergies    Cholelithiasis    Chronic kidney disease    Benign prostatic hyperplasia with nocturia    MGUS (monoclonal gammopathy of unknown significance)    Polycythemia    Agitation    COPD exacerbation     Past Medical History:   Diagnosis Date    Acute perforated gastric ulcer with hemorrhage 2018    Alcoholism     Allergic     Allergic rhinitis     Arthritis     rheumatoid (diagnosed at age 16)    Arthritis of back     Arthritis of neck     Cirrhosis     etoh cirrhosis-liver failure. Follows w/ GI- [facility]    CKD (chronic kidney disease)     dr. nelson.  Stage 2    COPD (chronic obstructive pulmonary disease)     Depression     Dislocation of finger     Duodenal ulcer      perforated duodenal s/p patch    Erectile dysfunction     Fatty liver     Fracture, femur     Fracture, foot     GERD without esophagitis 2020    Glaucoma     Hip arthrosis     History of transfusion     HL (hearing loss)     Infectious viral hepatitis     Knee swelling     MRSA (methicillin resistant Staphylococcus aureus) carrier 2019    nasal swab    Multiple myeloma     Dr. Hernandez at [facility]    Nausea     states \"gallbladder issues\"- had attempted cholecystectomy, but unable to complete d/t adhered to liver    Osteopenia     Periarthritis of shoulder  "    Renal insufficiency     Sleep apnea     unable to tolerate CPAP    Tumors      benign essential    Type 2 diabetes mellitus with hyperglycemia 01/08/2020    off meds now    Umbilical hernia u    Wound, open     healed at this time     Past Surgical History:   Procedure Laterality Date    COLONOSCOPY      CYST REMOVAL Left     forarm    ENDOSCOPY      ascities/paracentesis    ENDOSCOPY N/A 07/22/2021    Procedure: ESOPHAGOGASTRODUODENOSCOPY WITH DILATION (#48 BOUGIE);  Surgeon: Glenn Loo MD;  Location: [facility] ENDOSCOPY;  Service: Gastroenterology;  Laterality: N/A;  ESOPHAGEAL STRICTURE    EXPLORATORY LAPAROTOMY  07/16/2018    Over-sew Gastric Ulcer With Gastrostomy and Jejunostomy Tube    FOOT SURGERY Right     right bunion removal    FRACTURE SURGERY Right 2005    bunionectomy/ broke toe and put in rods    HERNIA REPAIR  6/18/2019    MASS EXCISION Right 06/18/2019    Procedure: EXCISION OF SEBACEOUS CYST OF THE RIGHT BACK;  Surgeon: Sapna Elizalde MD;  Location: [facility] MAIN OR;  Service: General    MASS EXCISION Right 08/27/2019    Procedure: EXCISION LESION of right back;  Surgeon: Sapna Elizalde MD;  Location: [facility] MAIN OR;  Service: General    MOUTH SURGERY Left     UMBILICAL HERNIA REPAIR N/A 06/18/2019    Procedure: UMBILICAL HERNIA REPAIR LAPAROSCOPIC WITH MESH WITH EXTENSIVE LYSIS OF ADHESIONS;  Surgeon: Sapna Elizalde MD;  Location: [facility] MAIN OR;  Service: General      General Information       Row Name 11/30/23 1409          OT Time and Intention    Document Type evaluation  -LS     Mode of Treatment occupational therapy  -LS       Row Name 11/30/23 1409          General Information    Patient Profile Reviewed yes  -LS     Prior Level of Function independent:;ADL's;all household mobility;community mobility;driving  -LS     Barriers to Rehab none identified  -LS       Row Name 11/30/23 1409          Living Environment    People in Home spouse  -LS       Row Name 11/30/23 1409          Home  Main Entrance    Number of Stairs, Main Entrance three  -LS       Row Name 11/30/23 1409          Stairs Within Home, Primary    Stairs, Within Home, Primary Pt functions from main level of home  -       Row Name 11/30/23 1409          Cognition    Orientation Status (Cognition) oriented x 4  -LS       Row Name 11/30/23 1409          Safety Issues, Functional Mobility    Impairments Affecting Function (Mobility) shortness of breath;endurance/activity tolerance  -               User Key  (r) = Recorded By, (t) = Taken By, (c) = Cosigned By      Initials Name Provider Type    Mckay Ayala OT Occupational Therapist                     Mobility/ADL's       Row Name 11/30/23 1414          Bed Mobility    Comment, (Bed Mobility) Sitting EOB on arrival  -       Row Name 11/30/23 1414          Transfers    Transfers sit-stand transfer  -       Row Name 11/30/23 1414          Sit-Stand Transfer    Sit-Stand Adamant (Transfers) independent  -       Row Name 11/30/23 1414          Functional Mobility    Functional Mobility- Ind. Level independent  -       Row Name 11/30/23 1414          Activities of Daily Living    BADL Assessment/Intervention other (see comments)  Pt requires no assistance with ADLs  -               User Key  (r) = Recorded By, (t) = Taken By, (c) = Cosigned By      Initials Name Provider Type    Mckay Ayala OT Occupational Therapist                   Obj/Interventions       Row Name 11/30/23 1414          Vision Assessment/Intervention    Visual Impairment/Limitations corrective lenses full-time  -       Row Name 11/30/23 1414          Range of Motion Comprehensive    General Range of Motion bilateral upper extremity ROM WFL  -       Row Name 11/30/23 1414          Strength Comprehensive (MMT)    General Manual Muscle Testing (MMT) Assessment no strength deficits identified  -       Row Name 11/30/23 1414          Balance    Balance Assessment sitting static  balance;sitting dynamic balance;standing static balance;standing dynamic balance  -LS     Static Sitting Balance independent  -LS     Dynamic Sitting Balance independent  -LS     Static Standing Balance independent  -LS     Dynamic Standing Balance independent  -LS               User Key  (r) = Recorded By, (t) = Taken By, (c) = Cosigned By      Initials Name Provider Type    Mckay Ayala OT Occupational Therapist                   Goals/Plan    No documentation.                  Clinical Impression       Row Name 11/30/23 1421          Pain Assessment    Pretreatment Pain Rating 0/10 - no pain  -LS     Posttreatment Pain Rating 2/10  -LS     Pain Location - Side/Orientation Right  -LS     Pain Location - hip  -LS       Row Name 11/30/23 1421          Plan of Care Review    Plan of Care Reviewed With patient  -LS     Outcome Evaluation Mc Selby is a 53 y.o. male with a PMH of COPD, sleep apnea, liver cirrhosis, GERD, multiple myeloma, rheumatoid arthritis, diabetes, depression and anxiety who presented to [facility] on 11/28/2023 with complaints of shortness of breath for the past two days. Pt dx with COPD exacerbation and acute respiratory failure. At baseline, pt lives with his spouse christine [facility] home with 3 SHANTEL. He functions on the main level of the home, sleepnig on the couch per his reports. He is IND with ADLs, IADLs, and an active  at baseline. He has a RW, though doesn't typically use an assistive device. He does not use home O2. This date, on 2L of O2 at 96%. Pt taken off of O2 and remained at 95% at rest. He is IND with ADLs and mobility today, with O2 dropping to 92% with ambulation of approx 100 feet. He does have poor activity tolerance, but is safe to [facility] home. OT rrecommends pulmonary rehab at [place].  -LS       Row Name 11/30/23 1421          Therapy Assessment/Plan (OT)    Criteria for Skilled Therapeutic Interventions Met (OT) no;no problems identified which require skilled  intervention  -LS     Therapy Frequency (OT) evaluation only  -LS     Predicted Duration of Therapy Intervention (OT) eval only  -LS       Row Name 11/30/23 1421          Therapy Plan Review/Discharge Plan (OT)    Anticipated Discharge Disposition (OT) home;other (see comments)  Outpatient pulmonary rehab  -LS       Row Name 11/30/23 1421          Vital Signs    Pre SpO2 (%) 96  2L  -LS     O2 Delivery Pre Treatment nasal cannula  -LS     Intra SpO2 (%) 92  -LS     O2 Delivery Intra Treatment room air  -LS     Post SpO2 (%) 95  -LS     O2 Delivery Post Treatment room air  -LS     Pre Patient Position Sitting  -LS     Intra Patient Position Standing  -LS     Post Patient Position Sitting  -LS       Row Name 11/30/23 1421          Positioning and Restraints    Pre-Treatment Position in bed  -LS     Post Treatment Position bed  -LS     In Bed sitting EOB;notified nsg;call light within reach;encouraged to call for assist  -LS               User Key  (r) = Recorded By, (t) = Taken By, (c) = Cosigned By      Initials Name Provider Type    LS Mckay Bravo, ALFREDO Occupational Therapist                   Outcome Measures       Row Name 11/30/23 1438          How much help from another is currently needed...    Putting on and taking off regular lower body clothing? 4  -LS     Bathing (including washing, rinsing, and drying) 4  -LS     Toileting (which includes using toilet bed pan or urinal) 4  -LS     Putting on and taking off regular upper body clothing 4  -LS     Taking care of personal grooming (such as brushing teeth) 4  -LS     Eating meals 4  -LS     AM-PAC 6 Clicks Score (OT) 24  -LS       Row Name 11/30/23 1424 11/30/23 0810       How much help from another person do you currently need...    Turning from your back to your side while in flat bed without using bedrails? 4  -MB 4  -BL    Moving from lying on back to sitting on the side of a flat bed without bedrails? 4  -MB 4  -BL    Moving to and from a bed to a chair  (including a wheelchair)? 4  -MB 4  -BL    Standing up from a chair using your arms (e.g., wheelchair, bedside chair)? 4  -MB 4  -BL    Climbing 3-5 steps with a railing? 4  -MB 4  -BL    To walk in hospital room? 4  -MB 4  -BL    AM-PAC 6 Clicks Score (PT) 24  -MB 24  -BL    Highest Level of Mobility Goal 8 --> Walked 250 feet or more  -MB 8 --> Walked 250 feet or more  -BL      Row Name 11/30/23 1438          Functional Assessment    Outcome Measure Options AM-PAC 6 Clicks Daily Activity (OT)  -               User Key  (r) = Recorded By, (t) = Taken By, (c) = Cosigned By      Initials Name Provider Type    BL Medina Sepulveda, MIRNA Registered Nurse    Mckay Ayala OT Occupational Therapist    Eduar Read, PT Physical Therapist                    Occupational Therapy Education       Title: PT OT SLP Therapies (Done)       Topic: Occupational Therapy (Done)       Point: ADL training (Done)       Description:   Instruct learner(s) on proper safety adaptation and remediation techniques during self care or transfers.   Instruct in proper use of assistive devices.                  Learning Progress Summary             Patient Acceptance, E,TB, VU by  at 11/30/2023 1438                                         User Key       Initials Effective Dates Name Provider Type Discipline     09/22/22 -  Mckay Bravo OT Occupational Therapist OT                  OT Recommendation and Plan  Therapy Frequency (OT): evaluation only  Plan of Care Review  Plan of Care Reviewed With: patient  Outcome Evaluation: Mc Selby is a 53 y.o. male with a PMH of COPD, sleep apnea, liver cirrhosis, GERD, multiple myeloma, rheumatoid arthritis, diabetes, depression and anxiety who presented to [facility] on 11/28/2023 with complaints of shortness of breath for the past two days. Pt dx with COPD exacerbation and acute respiratory failure. At baseline, pt lives with his spouse christine multilevel home with 3 SHANTEL. He functions on  the main level of the home, sleepnig on the couch per his reports. He is IND with ADLs, IADLs, and an active  at baseline. He has a RW, though doesn't typically use an assistive device. He does not use home O2. This date, on 2L of O2 at 96%. Pt taken off of O2 and remained at 95% at rest. He is IND with ADLs and mobility today, with O2 dropping to 92% with ambulation of approx 100 feet. He does have poor activity tolerance, but is safe to dc home. OT rrecommends pulmonary rehab at [place].     Time Calculation:         Time Calculation- OT       Row Name 11/30/23 1438             Time Calculation- OT    OT Start Time 1256  -LS      OT Stop Time 1318  -LS      OT Time Calculation (min) 22 min  -LS      OT Received On 11/30/23  -LS         Untimed Charges    OT Eval/Re-eval Minutes 22  -LS         Total Minutes    Untimed Charges Total Minutes 22  -LS       Total Minutes 22  -LS                User Key  (r) = Recorded By, (t) = Taken By, (c) = Cosigned By      Initials Name Provider Type    Mckay Ayala OT Occupational Therapist                  Therapy Charges for Today       Code Description Service Date Service Provider Modifiers Qty    [id number] HC OT EVAL LOW COMPLEXITY 4 11/30/2023 Mckay Bravo OT GO 1                 Mckay Bravo OT  11/30/2023

## 2023-11-30 NOTE — PROGRESS NOTES
RT-Kaley Linares Checked in on patient, he is moving to a room. Please notify RT when discharge is scheduled to complete walk.  -Kaley COOL

## 2023-11-30 NOTE — PROGRESS NOTES
Exercise Oximetry    Patient Name:Mc Selby   MRN: [id number]   Date: 11/30/23             ROOM AIR BASELINE   SpO2% 96   Heart Rate    Blood Pressure      EXERCISE ON ROOM AIR SpO2% EXERCISE ON O2 @  LPM SpO2%   1 MINUTE 96 1 MINUTE    2 MINUTES 95 2 MINUTES    3 MINUTES 94 3 MINUTES    4 MINUTES 92 4 MINUTES    5 MINUTES 90 5 MINUTES    6 MINUTES 90 6 MINUTES               Distance Walked   Distance Walked   Dyspnea (Nick Scale)   Dyspnea (Nick Scale)   Fatigue (Nick Scale)   Fatigue (Nick Scale)   SpO2% Post Exercise   SpO2% Post Exercise   HR Post Exercise   HR Post Exercise   Time to Recovery   Time to Recovery     Comments: pts SpO2 did not drop below 90% during room air ambulation.  Pt had no complaints during walk.    Linnea Kaufman, CRT\  11/30/2023  13:43 EST

## 2023-11-30 NOTE — PLAN OF CARE
Problem: Adult Inpatient Plan of Care  Goal: Plan of Care Review  Outcome: Ongoing, Progressing  Flowsheets (Taken 11/30/2023 0103)  Progress: no change  Plan of Care Reviewed With: patient  Goal: Patient-Specific Goal (Individualized)  Outcome: Ongoing, Progressing  Goal: Absence of Hospital-Acquired Illness or Injury  Outcome: Ongoing, Progressing  Intervention: Identify and Manage Fall Risk  Recent Flowsheet Documentation  Taken 11/30/2023 0000 by Katiana Porter RN  Safety Promotion/Fall Prevention:   activity supervised   assistive device/personal items within reach   clutter free environment maintained   fall prevention program maintained   nonskid shoes/slippers when out of bed   room organization consistent   safety round/check completed  Taken 11/29/2023 2200 by Katiana Porter RN  Safety Promotion/Fall Prevention:   activity supervised   assistive device/personal items within reach   clutter free environment maintained   fall prevention program maintained   nonskid shoes/slippers when out of bed   room organization consistent   safety round/check completed  Taken 11/29/2023 1927 by Katiana Porter RN  Safety Promotion/Fall Prevention:   activity supervised   assistive device/personal items within reach   clutter free environment maintained   fall prevention program maintained   nonskid shoes/slippers when out of bed   room organization consistent   safety round/check completed  Intervention: Prevent Skin Injury  Recent Flowsheet Documentation  Taken 11/29/2023 1927 by Katiana Porter RN  Body Position: position changed independently  Intervention: Prevent and Manage VTE (Venous Thromboembolism) Risk  Recent Flowsheet Documentation  Taken 11/30/2023 0000 by Katiana Porter, RN  Range of Motion: active ROM (range of motion) encouraged  Taken 11/29/2023 1927 by Katiana Porter, RN  Activity Management: up in chair  VTE Prevention/Management:   bilateral   sequential compression devices off   patient refused intervention  Range of  Motion: active ROM (range of motion) encouraged  Intervention: Prevent Infection  Recent Flowsheet Documentation  Taken 11/30/2023 0000 by Katiana Porter RN  Infection Prevention:   hand hygiene promoted   personal protective equipment utilized   rest/sleep promoted   single patient room provided  Taken 11/29/2023 2200 by Katiana Porter RN  Infection Prevention:   hand hygiene promoted   personal protective equipment utilized   rest/sleep promoted   single patient room provided  Taken 11/29/2023 1927 by Katiana Porter RN  Infection Prevention:   hand hygiene promoted   personal protective equipment utilized   rest/sleep promoted   single patient room provided  Goal: Optimal Comfort and Wellbeing  Outcome: Ongoing, Progressing  Intervention: Provide Person-Centered Care  Recent Flowsheet Documentation  Taken 11/30/2023 0000 by Katiana Porter RN  Trust Relationship/Rapport:   care explained   thoughts/feelings acknowledged  Taken 11/29/2023 1927 by Katiana Porter RN  Trust Relationship/Rapport:   care explained   thoughts/feelings acknowledged  Goal: Readiness for Transition of Care  Outcome: Ongoing, Progressing     Problem: Obstructive Sleep Apnea Risk or Actual Comorbidity Management  Goal: Unobstructed Breathing During Sleep  Outcome: Ongoing, Progressing     Problem: COPD (Chronic Obstructive Pulmonary Disease) Comorbidity  Goal: Maintenance of COPD Symptom Control  Outcome: Ongoing, Progressing  Intervention: Maintain COPD-Symptom Control  Recent Flowsheet Documentation  Taken 11/30/2023 0000 by Katiana Porter RN  Medication Review/Management: medications reviewed  Taken 11/29/2023 2200 by Katiana Porter RN  Medication Review/Management: medications reviewed  Taken 11/29/2023 1927 by Katiana Porter RN  Medication Review/Management: medications reviewed     Problem: Adjustment to Illness (Sepsis/Septic Shock)  Goal: Optimal Coping  Outcome: Ongoing, Progressing     Problem: Bleeding (Sepsis/Septic Shock)  Goal: Absence of  Bleeding  Outcome: Ongoing, Progressing     Problem: Glycemic Control Impaired (Sepsis/Septic Shock)  Goal: Blood Glucose Level Within Desired Range  Outcome: Ongoing, Progressing     Problem: Infection Progression (Sepsis/Septic Shock)  Goal: Absence of Infection Signs and Symptoms  Outcome: Ongoing, Progressing  Intervention: Initiate Sepsis Management  Recent Flowsheet Documentation  Taken 11/30/2023 0000 by Katiana Porter, RN  Infection Prevention:   hand hygiene promoted   personal protective equipment utilized   rest/sleep promoted   single patient room provided  Taken 11/29/2023 2200 by Katiana Porter RN  Infection Prevention:   hand hygiene promoted   personal protective equipment utilized   rest/sleep promoted   single patient room provided  Taken 11/29/2023 1927 by Katiana Porter, RN  Infection Prevention:   hand hygiene promoted   personal protective equipment utilized   rest/sleep promoted   single patient room provided  Intervention: Promote Recovery  Recent Flowsheet Documentation  Taken 11/29/2023 1927 by Katiana Porter, RN  Activity Management: up in chair     Problem: Nutrition Impaired (Sepsis/Septic Shock)  Goal: Optimal Nutrition Intake  Outcome: Ongoing, Progressing     Problem: Breathing Pattern Ineffective  Goal: Effective Breathing Pattern  Outcome: Ongoing, Progressing   Goal Outcome Evaluation:  Plan of Care Reviewed With: patient        Progress: no change

## 2023-11-30 NOTE — PROGRESS NOTES
Steroid insulin protocol    Mc Selby is a 53 y.o.male admitted with acute COPD exacerbation.     Home DM meds: none on med list, pt appears to have been on Ozempic in past & carries diagnosis of T2DM    A1C 6 (9/15/23)    Assessment/Plan:    Patient is currently receiving methylprednisolone 40 mg IV every 8 hours with high dose correction scale insulin lispro started yesterday. Patient received 48 units of SSI since the regiment was increased to high dose. Will add Lantus 10 units SQ daily per ok from Dr Garcia.     Continue to monitor BG and adjust as needed.       Lab Results   Component Value Date    HGBA1C 6.00 (H) 09/15/2023     Lab Results   Component Value Date    POCGLU 300 (H) 11/30/2023    POCGLU 202 (H) 11/30/2023    POCGLU 259 (H) 11/29/2023    POCGLU 264 (H) 11/29/2023    POCGLU 321 (H) 11/29/2023    POCGLU 254 (H) 11/29/2023     Eulalia Ambrose, PharmD  11/30/23 13:24 EST

## 2023-11-30 NOTE — PLAN OF CARE
Goal Outcome Evaluation:  Plan of Care Reviewed With: patient           Outcome Evaluation: Mc Selby is a 53 y.o. male with a PMH of COPD, sleep apnea, liver cirrhosis, GERD, multiple myeloma, rheumatoid arthritis, diabetes, depression and anxiety who presented to [facility] on 11/28/2023 with complaints of shortness of breath for the past two days. Pt dx with COPD exacerbation and acute respiratory failure. At baseline, pt lives with his spouse christine multilevel home with 3 SHANTEL. He functions on the main level of the home, sleepnig on the couch per his reports. He is IND with ADLs, IADLs, and an active  at baseline. He has a RW, though doesn't typically use an assistive device. He does not use home O2. This date, on 2L of O2 at 96%. Pt taken off of O2 and remained at 95% at rest. He is IND with ADLs and mobility today, with O2 dropping to 92% with ambulation of approx 100 feet. He does have poor activity tolerance, but is safe to dc home. OT rrecommends pulmonary rehab at [place].      Anticipated Discharge Disposition (OT): home, other (see comments) (Outpatient pulmonary rehab)

## 2023-11-30 NOTE — THERAPY EVALUATION
"Patient Name: Mc Selby  : 1970    MRN: [id number]                              Today's Date: 2023       Admit Date: 2023    Visit Dx:     ICD-10-CM ICD-9-CM   1. COPD exacerbation  J44.1 491.21   2. Dyspnea, unspecified type  R06.00 786.09     Patient Active Problem List   Diagnosis    Alcoholic cirrhosis    Umbilical hernia    Type 2 diabetes mellitus with hyperglycemia    Hyperammonemia    Encephalopathy, hepatic    Primary osteoarthritis of right hip    Obesity (BMI 30-39.9)    GERD without esophagitis    Vitamin D deficiency    COPD (chronic obstructive pulmonary disease)    Hypomagnesemia    Rheumatoid arthritis involving multiple sites    Seasonal allergies    Cholelithiasis    Chronic kidney disease    Benign prostatic hyperplasia with nocturia    MGUS (monoclonal gammopathy of unknown significance)    Polycythemia    Agitation    COPD exacerbation     Past Medical History:   Diagnosis Date    Acute perforated gastric ulcer with hemorrhage 2018    Alcoholism     Allergic     Allergic rhinitis     Arthritis     rheumatoid (diagnosed at age 16)    Arthritis of back     Arthritis of neck     Cirrhosis     etoh cirrhosis-liver failure. Follows w/ GI- [facility]    CKD (chronic kidney disease)     dr. nelson.  Stage 2    COPD (chronic obstructive pulmonary disease)     Depression     Dislocation of finger     Duodenal ulcer      perforated duodenal s/p patch    Erectile dysfunction     Fatty liver     Fracture, femur     Fracture, foot     GERD without esophagitis 2020    Glaucoma     Hip arthrosis     History of transfusion     HL (hearing loss)     Infectious viral hepatitis     Knee swelling     MRSA (methicillin resistant Staphylococcus aureus) carrier 2019    nasal swab    Multiple myeloma     Dr. Hernandez at [facility]    Nausea     states \"gallbladder issues\"- had attempted cholecystectomy, but unable to complete d/t adhered to liver    Osteopenia     Periarthritis of shoulder  "    Renal insufficiency     Sleep apnea     unable to tolerate CPAP    Tumors      benign essential    Type 2 diabetes mellitus with hyperglycemia 01/08/2020    off meds now    Umbilical hernia u    Wound, open     healed at this time     Past Surgical History:   Procedure Laterality Date    COLONOSCOPY      CYST REMOVAL Left     forarm    ENDOSCOPY      ascities/paracentesis    ENDOSCOPY N/A 07/22/2021    Procedure: ESOPHAGOGASTRODUODENOSCOPY WITH DILATION (#48 BOUGIE);  Surgeon: Glenn Loo MD;  Location: [facility] ENDOSCOPY;  Service: Gastroenterology;  Laterality: N/A;  ESOPHAGEAL STRICTURE    EXPLORATORY LAPAROTOMY  07/16/2018    Over-sew Gastric Ulcer With Gastrostomy and Jejunostomy Tube    FOOT SURGERY Right     right bunion removal    FRACTURE SURGERY Right 2005    bunionectomy/ broke toe and put in rods    HERNIA REPAIR  6/18/2019    MASS EXCISION Right 06/18/2019    Procedure: EXCISION OF SEBACEOUS CYST OF THE RIGHT BACK;  Surgeon: Sapna Elizalde MD;  Location: [facility] MAIN OR;  Service: General    MASS EXCISION Right 08/27/2019    Procedure: EXCISION LESION of right back;  Surgeon: Sapna Elizalde MD;  Location: [facility] MAIN OR;  Service: General    MOUTH SURGERY Left     UMBILICAL HERNIA REPAIR N/A 06/18/2019    Procedure: UMBILICAL HERNIA REPAIR LAPAROSCOPIC WITH MESH WITH EXTENSIVE LYSIS OF ADHESIONS;  Surgeon: Sapna Elizalde MD;  Location: [facility] MAIN OR;  Service: General      General Information       Row Name 11/30/23 1355          Physical Therapy Time and Intention    Document Type evaluation  -MB     Mode of Treatment physical therapy  -MB       Row Name 11/30/23 1359          General Information    Patient Profile Reviewed yes  -MB     Prior Level of Function independent:;all household mobility;community mobility;gait;transfer;ADL's;home management;driving  -MB     Existing Precautions/Restrictions oxygen therapy device and L/min  2L O2 for current use  -MB     Barriers to Rehab none identified   -MB       Row Name 11/30/23 1355          Living Environment    People in Home spouse  -MB       Row Name 11/30/23 1355          Home Main Entrance    Number of Stairs, Main Entrance three  -MB       Row Name 11/30/23 1355          Stairs Within Home, Primary    Stairs, Within Home, Primary flight to upstairs and flight to basement, but pt mainly remains on the main floor  -MB       Row Name 11/30/23 1355          Cognition    Orientation Status (Cognition) oriented x 4  -MB       Row Name 11/30/23 1355          Safety Issues, Functional Mobility    Impairments Affecting Function (Mobility) shortness of breath;endurance/activity tolerance  -MB               User Key  (r) = Recorded By, (t) = Taken By, (c) = Cosigned By      Initials Name Provider Type    Eduar Read PT Physical Therapist                   Mobility       Row Name 11/30/23 1421          Bed Mobility    Bed Mobility bed mobility (all) activities  -MB     All Activities, [place] (Bed Mobility) not tested  -MB     Comment, (Bed Mobility) Pt began and ended session sitting EOB  -MB       Row Name 11/30/23 1421          Bed-Chair Transfer    Bed-Chair [place] (Transfers) standby assist  -MB     Comment, (Bed-Chair Transfer) no AD  -MB       Row Name 11/30/23 1421          Sit-Stand Transfer    Sit-Stand [place] (Transfers) standby assist  -MB     Comment, (Sit-Stand Transfer) no AD  -MB       Row Name 11/30/23 1421          Gait/Stairs (Locomotion)    [place] Level (Gait) standby assist  -MB     Patient was able to Ambulate yes  -MB     Distance in Feet (Gait) 100  -MB     Deviations/Abnormal Patterns (Gait) weight shifting decreased  -MB     Comment, (Gait/Stairs) 100' SBA with no AD  -MB               User Key  (r) = Recorded By, (t) = Taken By, (c) = Cosigned By      Initials Name Provider Type    Eduar Read PT Physical Therapist                   Obj/Interventions       Row Name 11/30/23 1422          Range of Motion  Comprehensive    General Range of Motion no range of motion deficits identified  -MB       Row Name 11/30/23 1422          Strength Comprehensive (MMT)    General Manual Muscle Testing (MMT) Assessment no strength deficits identified  -MB       Row Name 11/30/23 1422          Balance    Balance Assessment sitting static balance;sitting dynamic balance;sit to stand dynamic balance;standing static balance;standing dynamic balance  -MB     Static Sitting Balance independent  -MB     Dynamic Sitting Balance independent  -MB     Position, Sitting Balance unsupported;sitting edge of bed  -MB     Sit to Stand Dynamic Balance independent  -MB     Static Standing Balance independent  -MB     Dynamic Standing Balance standby assist  -MB       Row Name 11/30/23 1422          Sensory Assessment (Somatosensory)    Sensory Assessment (Somatosensory) sensation intact  -MB               User Key  (r) = Recorded By, (t) = Taken By, (c) = Cosigned By      Initials Name Provider Type    Eduar Read, PT Physical Therapist                   Goals/Plan    No documentation.                  Clinical Impression       Row Name 11/30/23 1423          Pain    Pretreatment Pain Rating 0/10 - no pain  -MB     Posttreatment Pain Rating 2/10  -MB     Pain Location - Side/Orientation Right  -MB     Pain Location - hip  -MB       Row Name 11/30/23 1431 11/30/23 1423       Plan of Care Review    Plan of Care Reviewed With -- patient  -MB    Progress -- no change  -MB    Outcome Evaluation Pt is a 52 y/o M admitted to [facility] 11/28/23 with complaints of SOA for the past couple days. He has also noted a productive cough, but clear sputum. CTA Chest (-) and XR Chest (+) for pulmonary vascular congestion. Respiratory panel (-) and pt diagnosed with COPD exacerbation. PMH includes COPD, liver cirrhosis, GERD, multiple myeloma, RA, and DM. He is currently living at home with spouse in multi-level home with 3-step entry. Sleeps and can complete all  ADLs on the main floor. At baseline, he is IND with all household mobility, community ambulation, and ADLs with no AD and no home O2. This date, he is AAOx4, sitting EOB and reading 96% on 2L O2. He completes transfers SBA and amb 100' SBA with no AD this date. No balance or safety concerns were noted during activity this date, while O2 sats dropped to 92% on room air, returning back to 95% on room air when sitting EOB. PT seems to be functioning at his baseline in terms of strength, balance, and endurance, but still exhibits mild>moderate SOA following short bout of activity. He will be safe to d/c back home, but PT recommending OP pulmonary rehab at d/c to increase respiratory endurance and decrease re-admission rate for similar issues. Pt educated on this topic and agreeable to recommendations made by PT. PT completing orders at this time.  -MB --      Row Name 11/30/23 1426          Therapy Assessment/Plan (PT)    Criteria for Skilled Interventions Met (PT) no  -MB     Therapy Frequency (PT) evaluation only  -MB     Predicted Duration of Therapy Intervention (PT) eval only  -MB       Row Name 11/30/23 1423          Vital Signs    Pretreatment Heart Rate (beats/min) 90  -MB     Pre SpO2 (%) 96  -MB     O2 Delivery Pre Treatment supplemental O2  2L  -MB     Intra SpO2 (%) 92  -MB     Post SpO2 (%) 95  -MB     Pre Patient Position Sitting  -MB     Intra Patient Position Standing  -MB     Post Patient Position Sitting  -MB       Row Name 11/30/23 1424          Positioning and Restraints    Pre-Treatment Position in bed  -MB     Post Treatment Position bed  -MB     In Bed sitting EOB;sitting;call light within reach;encouraged to call for assist;with other staff  -MB               User Key  (r) = Recorded By, (t) = Taken By, (c) = Cosigned By      Initials Name Provider Type    Eduar Read, PT Physical Therapist                   Outcome Measures       Row Name 11/30/23 142 11/30/23 0889       How much help from  another person do you currently need...    Turning from your back to your side while in flat bed without using bedrails? 4  -MB 4  -BL    Moving from lying on back to sitting on the side of a flat bed without bedrails? 4  -MB 4  -BL    Moving to and from a bed to a chair (including a wheelchair)? 4  -MB 4  -BL    Standing up from a chair using your arms (e.g., wheelchair, bedside chair)? 4  -MB 4  -BL    Climbing 3-5 steps with a railing? 4  -MB 4  -BL    To walk in hospital room? 4  -MB 4  -BL    AM-PAC 6 Clicks Score (PT) 24  -MB 24  -BL    Highest Level of Mobility Goal 8 --> Walked 250 feet or more  -MB 8 --> Walked 250 feet or more  -BL              User Key  (r) = Recorded By, (t) = Taken By, (c) = Cosigned By      Initials Name Provider Type    BL Medina Sepulveda, RN Registered Nurse    Eduar Read, PT Physical Therapist                                 Physical Therapy Education       Title: PT OT SLP Therapies (Done)       Topic: Physical Therapy (Done)       Point: Mobility training (Done)       Learning Progress Summary             Patient Acceptance, E,TB, VU by MB at 11/30/2023 1424                         Point: Home exercise program (Done)       Learning Progress Summary             Patient Acceptance, E,TB, VU by MB at 11/30/2023 1424                         Point: Body mechanics (Done)       Learning Progress Summary             Patient Acceptance, E,TB, VU by MB at 11/30/2023 1424                         Point: Precautions (Done)       Learning Progress Summary             Patient Acceptance, E,TB, VU by MB at 11/30/2023 1424                                         User Key       Initials Effective Dates Name Provider Type Discipline    MB 06/06/23 -  Eduar Chaudhary, LORENZO Physical Therapist PT                  PT Recommendation and Plan     Plan of Care Reviewed With: patient  Progress: no change  Outcome Evaluation: Pt is a 52 y/o M admitted to [facility] 11/28/23 with complaints of SOA for the past  couple days. He has also noted a productive cough, but clear sputum. CTA Chest (-) and XR Chest (+) for pulmonary vascular congestion. Respiratory panel (-) and pt diagnosed with COPD exacerbation. PMH includes COPD, liver cirrhosis, GERD, multiple myeloma, RA, and DM. He is currently living at home with spouse in multi-level home with 3-step entry. Sleeps and can complete all ADLs on the main floor. At baseline, he is IND with all household mobility, community ambulation, and ADLs with no AD and no home O2. This date, he is AAOx4, sitting EOB and reading 96% on 2L O2. He completes transfers SBA and amb 100' SBA with no AD this date. No balance or safety concerns were noted during activity this date, while O2 sats dropped to 92% on room air, returning back to 95% on room air when sitting EOB. PT seems to be functioning at his baseline in terms of strength, balance, and endurance, but still exhibits mild>moderate SOA following short bout of activity. He will be safe to d/c back home, but PT recommending OP pulmonary rehab at d/c to increase respiratory endurance and decrease re-admission rate for similar issues. Pt educated on this topic and agreeable to recommendations made by PT. PT completing orders at this time.     Time Calculation:   PT Evaluation Complexity  History, PT Evaluation Complexity: 1-2 personal factors and/or comorbidities  Examination of Body Systems (PT Eval Complexity): total of 3 or more elements  Clinical Presentation (PT Evaluation Complexity): evolving  Clinical Decision Making (PT Evaluation Complexity): moderate complexity  Overall Complexity (PT Evaluation Complexity): moderate complexity     PT Charges       Row Name 11/30/23 5572             Time Calculation    Start Time 1256  -MB      Stop Time 1320  -MB      Time Calculation (min) 24 min  -MB      PT Received On 11/30/23  -MB         Time Calculation- PT    Total Timed Code Minutes- PT 0 minute(s)  -MB                User Key  (r) =  Recorded By, (t) = Taken By, (c) = Cosigned By      Initials Name Provider Type    Eduar Read, PT Physical Therapist                  Therapy Charges for Today       Code Description Service Date Service Provider Modifiers Qty    [id number] HC PT EVAL MOD COMPLEXITY 4 11/30/2023 Eduar Chaudhary, PT GP 1            PT G-Codes  AM-PAC 6 Clicks Score (PT): 24  PT Discharge Summary  Anticipated Discharge Disposition (PT): home with outpatient therapy services    Eduar Chaudhary PT  11/30/2023

## 2023-11-30 NOTE — PROGRESS NOTES
Daily Progress Note          Assessment    COPD exacerbation  Hypoxemia: ABGs 7 point 5/27/62  Elevated D-dimer, negative CT scan for PE  Acute bronchitis  PAM, patient could not tolerate CPAP  DM2  Liver cirrhosis  Multiple myeloma regimen  Rheumatoid arthritis  GERD  Former smoker who quit 2018     Recommendations:  Respiratory status has improved and patient can be discharged home with follow-up in the pulmonary clinic in 3 weeks  Antibiotics: Rocephin, switch to oral antibiotics for a total of 7 days  Oxygen supplement and titration to maintain saturation 90 to 95%: Currently oxygenating well on room air, 6-minute walk showed no hypoxemia  Bronchodilators  Inhaled corticosteroids  Mucinex  IV steroids, switch to tapering dose prednisone upon discharge  DVT/GI prophylaxis  Check daily labs and correct electrolytes as needed  I personally reviewed the radiological studies                LOS: 1 day     Subjective     Patient reports breathing better, less cough and shortness of breath    Objective     Vital signs for last 24 hours:  Vitals:    11/30/23 0809 11/30/23 0810 11/30/23 1011 11/30/23 1203   BP:   101/58 114/72   BP Location:   Left arm Left arm   Patient Position:   Sitting Lying   Pulse: 84 82 92 86   Resp: 16 16 20 18   Temp:   97.5 °F (36.4 °C)    TempSrc:   Oral    SpO2: 92% 93% 94% 97%   Weight:       Height:           Intake/Output last 3 shifts:  I/O last 3 completed shifts:  In: 960 [P.O.:960]  Out: 1450 [Urine:1450]  Intake/Output this shift:  I/O this shift:  In: 480 [P.O.:480]  Out: 1000 [Urine:1000]      Radiology  Imaging Results (Last 24 Hours)       ** No results found for the last 24 hours. **            Labs:  Results from last 7 days   Lab Units 11/29/23  1429   WBC 10*3/mm3 7.90   HEMOGLOBIN g/dL 16.5   HEMATOCRIT % 49.1   PLATELETS 10*3/mm3 184     Results from last 7 days   Lab Units 11/29/23  1429   SODIUM mmol/L 133*   POTASSIUM mmol/L 3.9   CHLORIDE mmol/L 99   CO2 mmol/L 19.0*    BUN mg/dL 13   CREATININE mg/dL 1.25   CALCIUM mg/dL 9.6   BILIRUBIN mg/dL 0.8   ALK PHOS U/L 70   ALT (SGPT) U/L 13   AST (SGOT) U/L 21   GLUCOSE mg/dL 320*     Results from last 7 days   Lab Units 11/28/23  2114   PH, ARTERIAL pH units 7.515*   PO2 ART mm Hg 62.2*   PCO2, ARTERIAL mm Hg 27.7*   HCO3 ART mmol/L 22.4     Results from last 7 days   Lab Units 11/29/23  1429 11/28/23  2046   ALBUMIN g/dL 3.8 3.9     Results from last 7 days   Lab Units 11/28/23  2252 11/28/23  2046   HSTROP T ng/L 14 9                           Meds:   SCHEDULE  budesonide-formoterol, 2 puff, Inhalation, BID - RT  bumetanide, 2 mg, Oral, Daily  cefTRIAXone, 2,000 mg, Intravenous, Daily  cetirizine, 10 mg, Oral, Daily  enoxaparin, 40 mg, Subcutaneous, Daily  guaiFENesin, 1,200 mg, Oral, Q12H  insulin glargine, 10 Units, Subcutaneous, Daily  insulin lispro, 4-24 Units, Subcutaneous, 4x Daily AC & at Bedtime  ipratropium-albuterol, 3 mL, Nebulization, Q6H While Awake - RT  methylPREDNISolone sodium succinate, 40 mg, Intravenous, Q8H  montelukast, 10 mg, Oral, Daily  pantoprazole, 40 mg, Oral, Q AM  pregabalin, 75 mg, Oral, BID  senna-docusate sodium, 2 tablet, Oral, BID  sodium chloride, 10 mL, Intravenous, Q12H  traZODone, 100 mg, Oral, Nightly      Infusions     PRNs    albuterol    benzocaine-menthol    senna-docusate sodium **AND** polyethylene glycol **AND** bisacodyl **AND** bisacodyl    Calcium Replacement - Follow Nurse / BPA Driven Protocol    dextrose    dextrose    glucagon (human recombinant)    Magnesium Standard Dose Replacement - Follow Nurse / BPA Driven Protocol    ondansetron    Phosphorus Replacement - Follow Nurse / BPA Driven Protocol    Potassium Replacement - Follow Nurse / BPA Driven Protocol    sodium chloride    sodium chloride    sodium chloride    sodium chloride    Physical Exam:  General Appearance:  Alert   HEENT:  Normocephalic, without obvious abnormality, Conjunctiva/corneas clear,.   Nares normal, no  drainage     Neck:  Supple, symmetrical, trachea midline.   Lungs /Chest wall: Improved air entry bilaterally, no wheezing, decreased in the bilateral basal rhonchi, respirations unlabored, symmetrical wall movement.     Heart:  Regular rate and rhythm, S1 S2 normal  Abdomen: Soft, non-tender, no masses, no organomegaly.    Extremities: No edema, no clubbing or cyanosis     ROS  Constitutional: Negative for chills, fever and malaise/fatigue.   HENT: Negative.    Eyes: Negative.    Cardiovascular: Negative.    Respiratory: Positive for improvement in the cough and shortness of breath.    Skin: Negative.    Musculoskeletal: Negative.    Gastrointestinal: Negative.    Genitourinary: Negative.    Neurological: Negative.    Psychiatric/Behavioral: Negative.      I reviewed the recent clinical results  I personally reviewed the latest radiological studies    Part of this note may be an electronic transcription/translation of spoken language to printed text using the Dragon Dictation System.

## 2023-11-30 NOTE — PROGRESS NOTES
[facility] Medicine Services       Patient Name: Mc Selby  : 1970  MRN: [id number]  Primary Care Physician:  Berny Godwin MD  Date of admission: 2023  Date and Time of Service: 2023 at 01:51 EST        Subjective       Chief Complaint: Shortness of breath     History of Present Illness: Mc Selby is a 53 y.o. male with a PMH of COPD, sleep apnea, liver cirrhosis, GERD, multiple myeloma, rheumatoid arthritis, diabetes, depression and anxiety who presented to [facility] on 2023 with complaints of shortness of breath for the past two days.  Patient is also complaining of cough and nasal congestion.  He also complains of sore throat from all the coughing. Patient denies any fever or chills, nausea, vomiting, abdominal pain, chest pain, diarrhea or constipation.  Denies any recent sick contacts or travel.  Patient states that he does not wear oxygen at home.      In the ED, patient was requiring 5 L nasal cannula.  He was tachycardic in the 120s.  Labs remarkable for lactate 2.4, T. bili 1.4.  Chest x-ray showed mild vascular congestion. Patient was given Solu-Medrol, antibiotics and breathing treatments in the ED.     Patient seen with RN at bedside  Still on oxygen  Patient upset about being in the ambulatory center in a small room and wants to be transferred to regular room otherwise he is requesting to be discharged home  Will check 6-minute walk test to assess if patient needs oxygen on discharge  Continue with Rocephin and steroids and DuoNebs  Seen per pulmonary  Patient with sleep apnea but cannot tolerate CPAP  Patient with history of diabetes and liver cirrhosis rheumatoid arthritis anxiety  Follow-up per pulmonary recommendation     Review of Systems  As stated above  Personal History      Medical History        Past Medical History:   Diagnosis Date    Acute perforated gastric ulcer with hemorrhage 2018    Alcoholism      Allergic       "Allergic rhinitis      Arthritis       rheumatoid (diagnosed at age 16)    Arthritis of back      Arthritis of neck      Cirrhosis       etoh cirrhosis-liver failure. Follows w/ GI- [facility]    CKD (chronic kidney disease)       dr. nelson.  Stage 2    COPD (chronic obstructive pulmonary disease)      Depression      Dislocation of finger      Duodenal ulcer        perforated duodenal s/p patch    Erectile dysfunction      Fatty liver      Fracture, femur      Fracture, foot      GERD without esophagitis 09/02/2020    Glaucoma      Hip arthrosis      History of transfusion      HL (hearing loss)      Infectious viral hepatitis      Knee swelling      MRSA (methicillin resistant Staphylococcus aureus) carrier 06/12/2019     nasal swab    Multiple myeloma       Dr. Hernandez at [facility]    Nausea       states \"gallbladder issues\"- had attempted cholecystectomy, but unable to complete d/t adhered to liver    Osteopenia      Periarthritis of shoulder      Renal insufficiency      Sleep apnea       unable to tolerate CPAP    Tumors        benign essential    Type 2 diabetes mellitus with hyperglycemia 01/08/2020     off meds now    Umbilical hernia u    Wound, open       healed at this time            Surgical History         Past Surgical History:   Procedure Laterality Date    COLONOSCOPY        CYST REMOVAL Left       forarm    ENDOSCOPY         ascities/paracentesis    ENDOSCOPY N/A 07/22/2021     Procedure: ESOPHAGOGASTRODUODENOSCOPY WITH DILATION (#48 BOUGIE);  Surgeon: Glenn Loo MD;  Location: [facility] ENDOSCOPY;  Service: Gastroenterology;  Laterality: N/A;  ESOPHAGEAL STRICTURE    EXPLORATORY LAPAROTOMY   07/16/2018     Over-sew Gastric Ulcer With Gastrostomy and Jejunostomy Tube    FOOT SURGERY Right       right bunion removal    FRACTURE SURGERY Right 2005     bunionectomy/ broke toe and put in rods    HERNIA REPAIR   6/18/2019    MASS EXCISION Right 06/18/2019     Procedure: EXCISION OF SEBACEOUS CYST OF " THE RIGHT BACK;  Surgeon: Sapna Elizalde MD;  Location: [facility] MAIN OR;  Service: General    MASS EXCISION Right 08/27/2019     Procedure: EXCISION LESION of right back;  Surgeon: Sapna Elizalde MD;  Location: [facility] MAIN OR;  Service: General    MOUTH SURGERY Left      UMBILICAL HERNIA REPAIR N/A 06/18/2019     Procedure: UMBILICAL HERNIA REPAIR LAPAROSCOPIC WITH MESH WITH EXTENSIVE LYSIS OF ADHESIONS;  Surgeon: Sapna Elizalde MD;  Location: [facility] MAIN OR;  Service: General            Family History: family history includes Cancer in his mother; Early death in his brother; Lung disease in his father; Mental illness in his brother; Other in his father; Rheumatologic disease in his brother. Otherwise pertinent FHx was reviewed and not pertinent to current issue.     Social History:  reports that he quit smoking about 5 years ago. His smoking use included cigarettes. He started smoking about 3 years ago. He has a 60.00 pack-year smoking history. He has quit using smokeless tobacco. He reports that he does not currently use alcohol. He reports that he does not use drugs.     Home Medications:  Prior to Admission Medications         Prescriptions Last Dose Informant Patient Reported? Taking?     Abatacept 125 MG/ML solution auto-injector     Yes No     Inject 1 mL under the skin into the appropriate area as directed Every 7 (Seven) Days.     Azelastine HCl 137 MCG/SPRAY solution     No No     INHALE 2 PUFFS INTO EACH NOSTRIL TWICE A DAY     bumetanide (BUMEX) 2 MG tablet     Yes No     Take 1 tablet by mouth Daily.     cetirizine (zyrTEC) 10 MG tablet     No No     TAKE 1 TABLET BY MOUTH EVERY DAY     Cyanocobalamin 1000 MCG capsule     No No     Take 1 capsule by mouth Daily.     glucose blood test strip     No No     Use as instructed to check blood sugar twice daily as needed. E11.9     guaiFENesin (MUCINEX) 600 MG 12 hr tablet     Yes No     Take 2 tablets by mouth 2 (Two) Times a Day.     lansoprazole (PREVACID) 30 MG  capsule     No No     Take 1 capsule by mouth Daily.     magnesium oxide (MAG-OX) 400 MG tablet     Yes No     Take 2 tablets by mouth 2 (Two) Times a Day.     montelukast (SINGULAIR) 10 MG tablet     No No     TAKE 1 TABLET BY MOUTH EVERY DAY AT NIGHT     potassium chloride (K-DUR,KLOR-CON) 20 MEQ CR tablet   Self Yes No     Take 1 tablet by mouth 2 (Two) Times a Day.     pregabalin (Lyrica) 75 MG capsule     No No     Take 1 capsule by mouth 2 (Two) Times a Day.     tamsulosin (FLOMAX) 0.4 MG capsule 24 hr capsule     No No     TAKE 1 CAPSULE BY MOUTH TWICE A DAY     Tirzepatide (Mounjaro) 15 MG/0.5ML solution pen-injector     No No     Inject 15 mg under the skin into the appropriate area as directed 1 (One) Time Per Week.     traZODone (DESYREL) 50 MG tablet     No No     TAKE 1-2 TABLETS NIGHTLY AS NEEDED FOR SLEEP     Triamcinolone Acetonide (NASACORT) 55 MCG/ACT nasal inhaler     Yes No     1 spray into the nostril(s) as directed by provider Daily.                   Allergies:       Allergies   Allergen Reactions    Duloxetine Rash         Objective       Vitals:   Temp:  [98.7 °F (37.1 °C)-99.1 °F (37.3 °C)] 98.7 °F (37.1 °C)  Heart Rate:  [110-123] 110  Resp:  [20-30] 20  BP: (112-179)/(74-89) 112/74  Body mass index is 39.79 kg/m².  Physical Exam  General Appearance: AOO x 4, cooperative, no distress, appropriate for age  Head:  Normocephalic, without obvious abnormality  Eyes:  PERRL, EOM's intact, conjunctivae and cornea clear  Nose:  Nares symmetrical, septum midline, mucosa pink  Throat:  Lips, tongue, and mucosa are moist, pink, and intact  Neck:  Supple; symmetrical, trachea midline, no adenopathy  Back:  Symmetrical, ROM normal, no CVA tenderness  Lungs: Respirations unlabored, no audible wheeze  Heart: Regular rate & rhythm, S1 and S2 normal  Abdomen:  Soft, nontender, bowel sounds active all four quadrants  Musculoskeletal: Tone and strength strong and symmetrical, all extremities; no joint pain  or edema         Skin/Hair/Nails:  Skin warm, dry and intact, no rashes or abnormal dyspigmentation        Diagnostic Data:  Lab Results (last 24 hours)         Procedure Component Value Units Date/Time     STAT Lactic Acid, Reflex [[id number]]  (Normal) Collected: 11/29/23 0002     Specimen: Blood Updated: 11/29/23 0032       Lactate 1.8 mmol/L       Respiratory Panel PCR w/COVID-19(SARS-CoV-2) MARTIN/YASMINE/MARCELINO/PAD/COR/WALLY In-House, NP Swab in UTM/VTM, 2 HR TAT - Swab, Nasopharynx [[id number]]  (Normal) Collected: 11/28/23 2235     Specimen: Swab from Nasopharynx Updated: 11/28/23 2341       ADENOVIRUS, PCR Not Detected       Coronavirus 229E Not Detected       Coronavirus HKU1 Not Detected       Coronavirus NL63 Not Detected       Coronavirus OC43 Not Detected       COVID19 Not Detected       Human Metapneumovirus Not Detected       Human Rhinovirus/Enterovirus Not Detected       Influenza A PCR Not Detected       Influenza B PCR Not Detected       Parainfluenza Virus 1 Not Detected       Parainfluenza Virus 2 Not Detected       Parainfluenza Virus 3 Not Detected       Parainfluenza Virus 4 Not Detected       RSV, PCR Not Detected       Bordetella pertussis pcr Not Detected       Bordetella parapertussis PCR Not Detected       Chlamydophila pneumoniae PCR Not Detected       Mycoplasma pneumo by PCR Not Detected     Narrative:       In the setting of a positive respiratory panel with a viral infection PLUS a negative procalcitonin without other underlying concern for bacterial infection, consider observing off antibiotics or discontinuation of antibiotics and continue supportive care. If the respiratory panel is positive for atypical bacterial infection (Bordetella pertussis, Chlamydophila pneumoniae, or Mycoplasma pneumoniae), consider antibiotic de-escalation to target atypical bacterial infection.     High Sensitivity Troponin T 2Hr [[id number]]  (Abnormal) Collected: 11/28/23 2252     Specimen: Blood Updated: 11/28/23  2319       HS Troponin T 14 ng/L         Troponin T Delta 5 ng/L       Narrative:       High Sensitive Troponin T Reference Range:  <14.0 ng/L- Negative Female for AMI  <22.0 ng/L- Negative Male for AMI  >=14 - Abnormal Female indicating possible myocardial injury.  >=22 - Abnormal Male indicating possible myocardial injury.   Clinicians would have to utilize clinical acumen, EKG, Troponin, and serial changes to determine if it is an Acute Myocardial Infarction or myocardial injury due to an underlying chronic condition.           S[place]cer Draw [[id number]] Collected: 11/28/23 2046     Specimen: Blood Updated: 11/28/23 2147     Narrative:       The following orders were created for panel order Spencer Draw.  Procedure                               Abnormality         Status                     ---------                               -----------         ------                     Green Top (Gel)[[id number]]                                  Final result               Lavender Top[[id number]]                                     Final result               Gold Top - SST[[id number]]                                   Final result               Light Blue Top[[id number]]                                   Final result                  Please view results for these tests on the individual orders.     Green Top (Gel) [[id number]] Collected: 11/28/23 2046     Specimen: Blood Updated: 11/28/23 2147       Extra Tube Hold for add-ons.       Comment: Auto resulted.        Light Blue Top [[id number]] Collected: 11/28/23 2046     Specimen: Blood Updated: 11/28/23 2147       Extra Tube Hold for add-ons.       Comment: Auto resulted        Lavender Top [[id number]] Collected: 11/28/23 2046     Specimen: Blood Updated: 11/28/23 2147       Extra Tube hold for add-on       Comment: Auto resulted        Gold Top - SST [[id number]] Collected: 11/28/23 2046     Specimen: Blood Updated: 11/28/23 2147       Extra Tube Hold for add-ons.       Comment:  Auto resulted.        Comprehensive Metabolic Panel [[id number]]  (Abnormal) Collected: 11/28/23 2046     Specimen: Blood Updated: 11/28/23 2122       Glucose 139 mg/dL         BUN 7 mg/dL         Creatinine 0.98 mg/dL         Sodium 132 mmol/L         Potassium 4.0 mmol/L         Chloride 94 mmol/L         CO2 24.0 mmol/L         Calcium 9.3 mg/dL         Total Protein 8.4 g/dL         Albumin 3.9 g/dL         ALT (SGPT) 9 U/L         AST (SGOT) 22 U/L         Alkaline Phosphatase 91 U/L         Total Bilirubin 1.4 mg/dL         Globulin 4.5 gm/dL         A/G Ratio 0.9 g/dL         BUN/Creatinine Ratio 7.1       Anion Gap 14.0 mmol/L         eGFR 91.1 mL/min/1.73       Narrative:       GFR Normal >60  Chronic Kidney Disease <60  Kidney Failure <15        BNP [[id number]]  (Normal) Collected: 11/28/23 2046     Specimen: Blood Updated: 11/28/23 2122       proBNP 180.2 pg/mL       Narrative:       This assay is used as an aid in the diagnosis of individuals suspected of having heart failure. It can be used as an aid in the diagnosis of acute decompensated heart failure (ADHF) in patients presenting with signs and symptoms of ADHF to the emergency department (ED). In addition, NT-proBNP of <300 pg/mL indicates ADHF is not likely.     Age Range        Result Interpretation  NT-proBNP Concentration (pg/mL:        <50             Positive            >450                        [place]                        300-450                          Negative                        <300     50-75           Positive            >900                  Gray                300-900                  Negative            <300        >75             Positive            >1800                  Gray                300-1800                  Negative            <300     High Sensitivity Troponin T [[id number]]  (Normal) Collected: 11/28/23 2046     Specimen: Blood Updated: 11/28/23 2122       HS Troponin T 9 ng/L       Narrative:       High Sensitive  Troponin T Reference Range:  <14.0 ng/L- Negative Female for AMI  <22.0 ng/L- Negative Male for AMI  >=14 - Abnormal Female indicating possible myocardial injury.  >=22 - Abnormal Male indicating possible myocardial injury.   Clinicians would have to utilize clinical acumen, EKG, Troponin, and serial changes to determine if it is an Acute Myocardial Infarction or myocardial injury due to an underlying chronic condition.           Blood Gas, Arterial - [[id number]]  (Abnormal) Collected: 11/28/23 2114     Specimen: Arterial Blood Updated: 11/28/23 2119       Site Right Radial       Elvis's Test Positive       pH, Arterial 7.515 pH units         pCO2, Arterial 27.7 mm Hg         pO2, Arterial 62.2 mm Hg         HCO3, Arterial 22.4 mmol/L         Base Excess, Arterial 1.1 mmol/L         Comment: Serial Number: [id number]:  [id number]          O2 Saturation, Arterial 94.1 %         CO2 Content 23.2 mmol/L         Barometric Pressure for Blood Gas --       Comment: N/A          Modality Cannula       FIO2 28 %         Hemodilution No     D-dimer, Quantitative [[id number]]  (Abnormal) Collected: 11/28/23 2046     Specimen: Blood Updated: 11/28/23 2119       D-Dimer, Quantitative 3.45 mg/L (FEU)       Narrative:       According to the assay 's published package insert, a normal (<0.50 mg/L (FEU)) D-dimer result in conjunction with a non-high clinical probability assessment, excludes deep vein thrombosis (DVT) and pulmonary embolism (PE) with high sensitivity.     D-dimer values increase with age and this can make VTE exclusion of an older population difficult. To address this, the [employer], based on best available evidence and recent guidelines, recommends that clinicians use age-adjusted D-dimer thresholds in patients greater than 50 years of age with: a) a low probability of PE who do not meet all Pulmonary Embolism Rule Out Criteria, or b) in those with intermediate probability of PE.  "  The formula for an age-adjusted D-dimer cut-off is \"age/100\".  For example, a 60 year old patient would have an age-adjusted cut-off of 0.60 mg/L (FEU) and an 80 year old 0.80 mg/L (FEU).     Blood Culture - Blood, Arm, Left [[id number]] Collected: 11/28/23 2106     Specimen: Blood from Arm, Left Updated: 11/28/23 2111     CBC & Differential [[id number]]  (Abnormal) Collected: 11/28/23 2046     Specimen: Blood Updated: 11/28/23 2057     Narrative:       The following orders were created for panel order CBC & Differential.  Procedure                               Abnormality         Status                     ---------                               -----------         ------                     CBC Auto Differential[[id number]]        Abnormal            Final result                  Please view results for these tests on the individual orders.     CBC Auto Differential [[id number]]  (Abnormal) Collected: 11/28/23 2046     Specimen: Blood Updated: 11/28/23 2057       WBC 9.40 10*3/mm3         RBC 5.38 10*6/mm3         Hemoglobin 17.7 g/dL         Hematocrit 50.9 %         MCV 94.6 fL         MCH 32.9 pg         MCHC 34.8 g/dL         RDW 13.5 %         RDW-SD 46.8 fl         MPV 8.1 fL         Platelets 219 10*3/mm3         Neutrophil % 88.1 %         Lymphocyte % 4.9 %         Monocyte % 6.4 %         Eosinophil % 0.3 %         Basophil % 0.3 %         Neutrophils, Absolute 8.30 10*3/mm3         Lymphocytes, Absolute 0.50 10*3/mm3         Monocytes, Absolute 0.60 10*3/mm3         Eosinophils, Absolute 0.00 10*3/mm3         Basophils, Absolute 0.00 10*3/mm3         nRBC 0.3 /100 WBC       POC Lactate [[id number]]  (Abnormal) Collected: 11/28/23 2053     Specimen: Blood Updated: 11/28/23 2055       Lactate 2.4 mmol/L         Comment: Serial Number: [id number]:  [id number]        Blood Culture - Blood, Arm, Right [[id number]] Collected: 11/28/23 2046     Specimen: Blood from Arm, Right Updated: 11/28/23 2054        "                    Imaging Results (Last 24 Hours)         Procedure Component Value Units Date/Time     CT Angiogram Chest Pulmonary Embolism [[id number]] Collected: 11/28/23 2155       Updated: 11/28/23 2201     Narrative:       CT ANGIOGRAM CHEST PULMONARY EMBOLISM     Date of Exam: 11/28/2023 8:42 PM CST     Indication: Pulmonary embolism (PE) suspected, positive D-dimer.     Comparison: Noncontrast chest CT 10/13/2023     Technique: Axial CT images were obtained of the chest after the uneventful intravenous administration of 100 cc of Isovue-370 utilizing pulmonary embolism protocol.  Sagittal and coronal reconstructions were performed.  Automated exposure control and   iterative reconstruction methods were used.        Findings:  PULMONARY VASCULATURE: Pulmonary arteries are widely patent without evidence of embolus.Main pulmonary artery is normal in size. No evidence of right heart strain.     MEDIASTINUM:Unremarkable. Aortic and heart size are normal. No aortic dissection identified. No mass nor pericardial effusion.  CORONARY ARTERIES: Nocalcified atherosclerotic disease.  LUNGS: Minimal basilar groundglass opacities, likely atelectasis without dense consolidation. Few scattered subcentimeter pulmonary nodules are unchanged. See most recent noncontrast CT chest report for additional details.  PLEURAL SPACE: No effusion, mass, nor pneumothorax.  LYMPH NODES: There are no pathologically enlarged lymph nodes.     UPPER ABDOMEN: Cirrhosis and ascites     OSSEOUS STRUCTURES: Unchanged without acute process identified.        Impression:       Impression:  1.No evidence of pulmonary embolism.  2.Basilar groundglass opacities, likely atelectasis.  3.Other stable findings.           Electronically Signed: Arjun Reyes MD    11/28/2023 8:59 PM CST    Workstation ID: [id number]     XR Chest 1 View [[id number]] Collected: 11/28/23 2051       Updated: 11/28/23 2054     Narrative:       XR CHEST 1 VW     Date of Exam:  11/28/2023 7:46 PM CST     Indication: Chest Pain Triage Protocol     Comparison: 12/14/2018     Findings:  Cardiomediastinal silhouette is unremarkable. Mild pulmonary vascular congestion. No airspace disease, pneumothorax, nor pleural effusion. No acute osseous abnormality identified.        Impression:       Impression:  Mild pulmonary vascular congestion.        Electronically Signed: Arjun Reyes MD    11/28/2023 7:52 PM CST    Workstation ID: [id number]                   Assessment & Plan          This is a 53 y.o. male with PMH of     Active and Resolved Problems        Active Hospital Problems     Diagnosis   POA    **COPD exacerbation [J44.1]   Yes       Resolved Hospital Problems   No resolved problems to display.      COPD exacerbation  Acute respiratory failure  Patient currently on 5 L nasal cannula  Continue oxygen supplementation and wean as tolerated  Patient does not use oxygen at home  Continue with Rocephin  Add Solu-Medrol 80 mg every 8 hours  Add guaifenesin and lozenges for cough and sore throat  Add breathing treatments scheduled and as needed     Sleep apnea  Patient states he cannot tolerate CPAP     Diabetes type 2  Hold semaglutide  Obtain Hemoglobin A1c  Add sliding scale insulin and Accu-Cheks     Liver cirrhosis  Multiple myeloma  Rheumatoid arthritis  GERD  Anxiety and depression  Resume home meds     DVT prophylaxis:  Medical DVT prophylaxis orders are present.        CODE STATUS: Full code        Admission Status:  I believe this patient meets Inpatient status.        I discussed the patient's findings and my recommendations with patient.        Signature:

## 2023-12-01 ENCOUNTER — TRANSITIONAL CARE MANAGEMENT TELEPHONE ENCOUNTER (OUTPATIENT)
Dept: CALL CENTER | Facility: HOSPITAL | Age: 53
End: 2023-12-01
Payer: MEDICARE

## 2023-12-01 NOTE — NURSING NOTE
Patient was asking if his duplex has resulted yet because that was all he was waiting on for discharge and he was ready to go. Duplex was normal. MD made aware. MD was okay for patient to discharge tonight. Discharge went over with patient and IV removed. Patient walked out with spouse.

## 2023-12-01 NOTE — CASE MANAGEMENT/SOCIAL WORK
Case Management Discharge Note      Final Note: Routine home.         Selected Continued Care - Discharged on 11/30/2023 Admission date: 11/28/2023 - Discharge disposition: Home or Self Care       Transportation Services  Private: Car    Final Discharge Disposition Code: 01 - home or self-care

## 2023-12-01 NOTE — OUTREACH NOTE
Call Center TCM Note      Flowsheet Row Responses   [facility] patient discharged from? [facility]   Does the patient have one of the following disease processes/diagnoses(primary or secondary)? COPD   TCM attempt successful? No   Unsuccessful attempts Attempt 1            Areln Urban LPN    12/1/2023, 10:44 EST

## 2023-12-01 NOTE — DISCHARGE SUMMARY
[facility] Medicine Services        Patient Name: Mc Selby  : 1970  MRN: [id number]  Primary Care Physician:  Berny Godwin MD  Date of admission: 2023  Date and Time of Service: 2023 at 01:51 EST        Subjective       Chief Complaint: Shortness of breath     History of Present Illness: Mc Selby is a 53 y.o. male with a PMH of COPD, sleep apnea, liver cirrhosis, GERD, multiple myeloma, rheumatoid arthritis, diabetes, depression and anxiety who presented to [facility] on 2023 with complaints of shortness of breath for the past two days.  Patient is also complaining of cough and nasal congestion.  He also complains of sore throat from all the coughing. Patient denies any fever or chills, nausea, vomiting, abdominal pain, chest pain, diarrhea or constipation.  Denies any recent sick contacts or travel.  Patient states that he does not wear oxygen at home.      In the ED, patient was requiring 5 L nasal cannula.  He was tachycardic in the 120s.  Labs remarkable for lactate 2.4, T. bili 1.4.  Chest x-ray showed mild vascular congestion. Patient was given Solu-Medrol, antibiotics and breathing treatments in the ED.     Patient seen with RN at bedside  Still on oxygen  Patient upset about being in the ambulatory center in a small room and wants to be transferred to regular room otherwise he is requesting to be discharged home  Will check 6-minute walk test to assess if patient needs oxygen on discharge  Continue with Rocephin and steroids and DuoNebs  Seen per pulmonary  Patient with sleep apnea but cannot tolerate CPAP  Patient with history of diabetes and liver cirrhosis rheumatoid arthritis anxiety  Follow-up per pulmonary recommendation   pt passed walk test  He will be dc on oral steroids and symbicort  Cleared per pulm for dc  Review of Systems  As stated above  Personal History      Medical History           Past Medical History:   Diagnosis Date     "Acute perforated gastric ulcer with hemorrhage 07/16/2018    Alcoholism      Allergic      Allergic rhinitis      Arthritis       rheumatoid (diagnosed at age 16)    Arthritis of back      Arthritis of neck      Cirrhosis       etoh cirrhosis-liver failure. Follows w/ GI- [facility]    CKD (chronic kidney disease)       dr. nelson.  Stage 2    COPD (chronic obstructive pulmonary disease)      Depression      Dislocation of finger      Duodenal ulcer        perforated duodenal s/p patch    Erectile dysfunction      Fatty liver      Fracture, femur      Fracture, foot      GERD without esophagitis 09/02/2020    Glaucoma      Hip arthrosis      History of transfusion      HL (hearing loss)      Infectious viral hepatitis      Knee swelling      MRSA (methicillin resistant Staphylococcus aureus) carrier 06/12/2019     nasal swab    Multiple myeloma       Dr. Hernandez at [facility]    Nausea       states \"gallbladder issues\"- had attempted cholecystectomy, but unable to complete d/t adhered to liver    Osteopenia      Periarthritis of shoulder      Renal insufficiency      Sleep apnea       unable to tolerate CPAP    Tumors        benign essential    Type 2 diabetes mellitus with hyperglycemia 01/08/2020     off meds now    Umbilical hernia u    Wound, open       healed at this time            Surgical History             Past Surgical History:   Procedure Laterality Date    COLONOSCOPY        CYST REMOVAL Left       forarm    ENDOSCOPY         ascities/paracentesis    ENDOSCOPY N/A 07/22/2021     Procedure: ESOPHAGOGASTRODUODENOSCOPY WITH DILATION (#48 BOUGIE);  Surgeon: Glenn Loo MD;  Location: [facility] ENDOSCOPY;  Service: Gastroenterology;  Laterality: N/A;  ESOPHAGEAL STRICTURE    EXPLORATORY LAPAROTOMY   07/16/2018     Over-sew Gastric Ulcer With Gastrostomy and Jejunostomy Tube    FOOT SURGERY Right       right bunion removal    FRACTURE SURGERY Right 2005     bunionectomy/ broke toe and put in rods    HERNIA " REPAIR   6/18/2019    MASS EXCISION Right 06/18/2019     Procedure: EXCISION OF SEBACEOUS CYST OF THE RIGHT BACK;  Surgeon: Sapna Elizalde MD;  Location: [facility] MAIN OR;  Service: General    MASS EXCISION Right 08/27/2019     Procedure: EXCISION LESION of right back;  Surgeon: Sapna Elizalde MD;  Location: [facility] MAIN OR;  Service: General    MOUTH SURGERY Left      UMBILICAL HERNIA REPAIR N/A 06/18/2019     Procedure: UMBILICAL HERNIA REPAIR LAPAROSCOPIC WITH MESH WITH EXTENSIVE LYSIS OF ADHESIONS;  Surgeon: Sapna Elizalde MD;  Location: [facility] MAIN OR;  Service: General            Family History: family history includes Cancer in his mother; Early death in his brother; Lung disease in his father; Mental illness in his brother; Other in his father; Rheumatologic disease in his brother. Otherwise pertinent FHx was reviewed and not pertinent to current issue.     Social History:  reports that he quit smoking about 5 years ago. His smoking use included cigarettes. He started smoking about 3 years ago. He has a 60.00 pack-year smoking history. He has quit using smokeless tobacco. He reports that he does not currently use alcohol. He reports that he does not use drugs.     Home Medications:  Prior to Admission Medications         Prescriptions Last Dose Informant Patient Reported? Taking?     Abatacept 125 MG/ML solution auto-injector     Yes No     Inject 1 mL under the skin into the appropriate area as directed Every 7 (Seven) Days.     Azelastine HCl 137 MCG/SPRAY solution     No No     INHALE 2 PUFFS INTO EACH NOSTRIL TWICE A DAY     bumetanide (BUMEX) 2 MG tablet     Yes No     Take 1 tablet by mouth Daily.     cetirizine (zyrTEC) 10 MG tablet     No No     TAKE 1 TABLET BY MOUTH EVERY DAY     Cyanocobalamin 1000 MCG capsule     No No     Take 1 capsule by mouth Daily.     glucose blood test strip     No No     Use as instructed to check blood sugar twice daily as needed. E11.9     guaiFENesin (MUCINEX) 600 MG 12 hr  tablet     Yes No     Take 2 tablets by mouth 2 (Two) Times a Day.     lansoprazole (PREVACID) 30 MG capsule     No No     Take 1 capsule by mouth Daily.     magnesium oxide (MAG-OX) 400 MG tablet     Yes No     Take 2 tablets by mouth 2 (Two) Times a Day.     montelukast (SINGULAIR) 10 MG tablet     No No     TAKE 1 TABLET BY MOUTH EVERY DAY AT NIGHT     potassium chloride (K-DUR,KLOR-CON) 20 MEQ CR tablet   Self Yes No     Take 1 tablet by mouth 2 (Two) Times a Day.     pregabalin (Lyrica) 75 MG capsule     No No     Take 1 capsule by mouth 2 (Two) Times a Day.     tamsulosin (FLOMAX) 0.4 MG capsule 24 hr capsule     No No     TAKE 1 CAPSULE BY MOUTH TWICE A DAY     Tirzepatide (Mounjaro) 15 MG/0.5ML solution pen-injector     No No     Inject 15 mg under the skin into the appropriate area as directed 1 (One) Time Per Week.     traZODone (DESYREL) 50 MG tablet     No No     TAKE 1-2 TABLETS NIGHTLY AS NEEDED FOR SLEEP     Triamcinolone Acetonide (NASACORT) 55 MCG/ACT nasal inhaler     Yes No     1 spray into the nostril(s) as directed by provider Daily.                   Allergies:          Allergies   Allergen Reactions    Duloxetine Rash         Objective       Vitals:   Temp:  [98.7 °F (37.1 °C)-99.1 °F (37.3 °C)] 98.7 °F (37.1 °C)  Heart Rate:  [110-123] 110  Resp:  [20-30] 20  BP: (112-179)/(74-89) 112/74  Body mass index is 39.79 kg/m².  Physical Exam  General Appearance: AOO x 4, cooperative, no distress, appropriate for age  Head:  Normocephalic, without obvious abnormality  Eyes:  PERRL, EOM's intact, conjunctivae and cornea clear  Nose:  Nares symmetrical, septum midline, mucosa pink  Throat:  Lips, tongue, and mucosa are moist, pink, and intact  Neck:  Supple; symmetrical, trachea midline, no adenopathy  Back:  Symmetrical, ROM normal, no CVA tenderness  Lungs: Respirations unlabored, no audible wheeze  Heart: Regular rate & rhythm, S1 and S2 normal  Abdomen:  Soft, nontender, bowel sounds active all  four quadrants  Musculoskeletal: Tone and strength strong and symmetrical, all extremities; no joint pain or edema         Skin/Hair/Nails:  Skin warm, dry and intact, no rashes or abnormal dyspigmentation        Diagnostic Data:  Lab Results (last 24 hours)                  Procedure Component Value Units Date/Time      STAT Lactic Acid, Reflex [[id number]]  (Normal) Collected: 11/29/23 0002      Specimen: Blood Updated: 11/29/23 0032        Lactate 1.8 mmol/L        Respiratory Panel PCR w/COVID-19(SARS-CoV-2) MARTIN/YASMINE/MARCELINO/PAD/COR/WALLY In-House, NP Swab in UTM/VTM, 2 HR TAT - Swab, Nasopharynx [[id number]]  (Normal) Collected: 11/28/23 2235      Specimen: Swab from Nasopharynx Updated: 11/28/23 2341        ADENOVIRUS, PCR Not Detected        Coronavirus 229E Not Detected        Coronavirus HKU1 Not Detected        Coronavirus NL63 Not Detected        Coronavirus OC43 Not Detected        COVID19 Not Detected        Human Metapneumovirus Not Detected        Human Rhinovirus/Enterovirus Not Detected        Influenza A PCR Not Detected        Influenza B PCR Not Detected        Parainfluenza Virus 1 Not Detected        Parainfluenza Virus 2 Not Detected        Parainfluenza Virus 3 Not Detected        Parainfluenza Virus 4 Not Detected        RSV, PCR Not Detected        Bordetella pertussis pcr Not Detected        Bordetella parapertussis PCR Not Detected        Chlamydophila pneumoniae PCR Not Detected        Mycoplasma pneumo by PCR Not Detected      Narrative:        In the setting of a positive respiratory panel with a viral infection PLUS a negative procalcitonin without other underlying concern for bacterial infection, consider observing off antibiotics or discontinuation of antibiotics and continue supportive care. If the respiratory panel is positive for atypical bacterial infection (Bordetella pertussis, Chlamydophila pneumoniae, or Mycoplasma pneumoniae), consider antibiotic de-escalation to target atypical  bacterial infection.      High Sensitivity Troponin T 2Hr [[id number]]  (Abnormal) Collected: 11/28/23 2252      Specimen: Blood Updated: 11/28/23 2319        HS Troponin T 14 ng/L          Troponin T Delta 5 ng/L        Narrative:        High Sensitive Troponin T Reference Range:  <14.0 ng/L- Negative Female for AMI  <22.0 ng/L- Negative Male for AMI  >=14 - Abnormal Female indicating possible myocardial injury.  >=22 - Abnormal Male indicating possible myocardial injury.   Clinicians would have to utilize clinical acumen, EKG, Troponin, and serial changes to determine if it is an Acute Myocardial Infarction or myocardial injury due to an underlying chronic condition.            [place] Draw [[id number]] Collected: 11/28/23 2046      Specimen: Blood Updated: 11/28/23 2147      Narrative:        The following orders were created for panel order [place] Draw.  Procedure                               Abnormality         Status                     ---------                               -----------         ------                     Green Top (Gel)[[id number]]                                  Final result               Lavender Top[[id number]]                                     Final result               Gold Top - SST[[id number]]                                   Final result               Light Blue Top[[id number]]                                   Final result                  Please view results for these tests on the individual orders.      Green Top (Gel) [[id number]] Collected: 11/28/23 2046      Specimen: Blood Updated: 11/28/23 2147        Extra Tube Hold for add-ons.        Comment: Auto resulted.        Light Blue Top [[id number]] Collected: 11/28/23 2046      Specimen: Blood Updated: 11/28/23 2147        Extra Tube Hold for add-ons.        Comment: Auto resulted        Lavender Top [[id number]] Collected: 11/28/23 2046      Specimen: Blood Updated: 11/28/23 2147        Extra Tube hold for add-on        Comment:  Auto resulted        Gold Top - [facility] [[id number]] Collected: 11/28/23 2046      Specimen: Blood Updated: 11/28/23 2147        Extra Tube Hold for add-ons.        Comment: Auto resulted.        Comprehensive Metabolic Panel [[id number]]  (Abnormal) Collected: 11/28/23 2046      Specimen: Blood Updated: 11/28/23 2122        Glucose 139 mg/dL          BUN 7 mg/dL          Creatinine 0.98 mg/dL          Sodium 132 mmol/L          Potassium 4.0 mmol/L          Chloride 94 mmol/L          CO2 24.0 mmol/L          Calcium 9.3 mg/dL          Total Protein 8.4 g/dL          Albumin 3.9 g/dL          ALT (SGPT) 9 U/L          AST (SGOT) 22 U/L          Alkaline Phosphatase 91 U/L          Total Bilirubin 1.4 mg/dL          Globulin 4.5 gm/dL          A/G Ratio 0.9 g/dL          BUN/Creatinine Ratio 7.1        Anion Gap 14.0 mmol/L          eGFR 91.1 mL/min/1.73        Narrative:        GFR Normal >60  Chronic Kidney Disease <60  Kidney Failure <15         BNP [[id number]]  (Normal) Collected: 11/28/23 2046      Specimen: Blood Updated: 11/28/23 2122        proBNP 180.2 pg/mL        Narrative:        This assay is used as an aid in the diagnosis of individuals suspected of having heart failure. It can be used as an aid in the diagnosis of acute decompensated heart failure (ADHF) in patients presenting with signs and symptoms of ADHF to the emergency department (ED). In addition, NT-proBNP of <300 pg/mL indicates ADHF is not likely.     Age Range        Result Interpretation  NT-proBNP Concentration (pg/mL:        <50             Positive            >450                        [place]                        300-450                          Negative                        <300     50-75           Positive            >900                  Gray                300-900                  Negative            <300        >75             Positive            >1800                  Gray                300-1800                  Negative             <300      High Sensitivity Troponin T [[id number]]  (Normal) Collected: 11/28/23 2046      Specimen: Blood Updated: 11/28/23 2122        HS Troponin T 9 ng/L        Narrative:        High Sensitive Troponin T Reference Range:  <14.0 ng/L- Negative Female for AMI  <22.0 ng/L- Negative Male for AMI  >=14 - Abnormal Female indicating possible myocardial injury.  >=22 - Abnormal Male indicating possible myocardial injury.   Clinicians would have to utilize clinical acumen, EKG, Troponin, and serial changes to determine if it is an Acute Myocardial Infarction or myocardial injury due to an underlying chronic condition.            Blood Gas, Arterial - [[id number]]  (Abnormal) Collected: 11/28/23 2114      Specimen: Arterial Blood Updated: 11/28/23 2119        Site Right Radial        Elvis's Test Positive        pH, Arterial 7.515 pH units          pCO2, Arterial 27.7 mm Hg          pO2, Arterial 62.2 mm Hg          HCO3, Arterial 22.4 mmol/L          Base Excess, Arterial 1.1 mmol/L          Comment: Serial Number: [id number]:  [id number]          O2 Saturation, Arterial 94.1 %          CO2 Content 23.2 mmol/L          Barometric Pressure for Blood Gas --        Comment: N/A          Modality Cannula        FIO2 28 %          Hemodilution No      D-dimer, Quantitative [[id number]]  (Abnormal) Collected: 11/28/23 2046      Specimen: Blood Updated: 11/28/23 2119        D-Dimer, Quantitative 3.45 mg/L (FEU)        Narrative:        According to the assay 's published package insert, a normal (<0.50 mg/L (FEU)) D-dimer result in conjunction with a non-high clinical probability assessment, excludes deep vein thrombosis (DVT) and pulmonary embolism (PE) with high sensitivity.     D-dimer values increase with age and this can make VTE exclusion of an older population difficult. To address this, the [employer], based on best available evidence and recent guidelines, recommends that clinicians use  "age-adjusted D-dimer thresholds in patients greater than 50 years of age with: a) a low probability of PE who do not meet all Pulmonary Embolism Rule Out Criteria, or b) in those with intermediate probability of PE.   The formula for an age-adjusted D-dimer cut-off is \"age/100\".  For example, a 60 year old patient would have an age-adjusted cut-off of 0.60 mg/L (FEU) and an 80 year old 0.80 mg/L (FEU).      Blood Culture - Blood, Arm, Left [[id number]] Collected: 11/28/23 2106      Specimen: Blood from Arm, Left Updated: 11/28/23 2111      CBC & Differential [[id number]]  (Abnormal) Collected: 11/28/23 2046      Specimen: Blood Updated: 11/28/23 2057      Narrative:        The following orders were created for panel order CBC & Differential.  Procedure                               Abnormality         Status                     ---------                               -----------         ------                     CBC Auto Differential[[id number]]        Abnormal            Final result                  Please view results for these tests on the individual orders.      CBC Auto Differential [[id number]]  (Abnormal) Collected: 11/28/23 2046      Specimen: Blood Updated: 11/28/23 2057        WBC 9.40 10*3/mm3          RBC 5.38 10*6/mm3          Hemoglobin 17.7 g/dL          Hematocrit 50.9 %          MCV 94.6 fL          MCH 32.9 pg          MCHC 34.8 g/dL          RDW 13.5 %          RDW-SD 46.8 fl          MPV 8.1 fL          Platelets 219 10*3/mm3          Neutrophil % 88.1 %          Lymphocyte % 4.9 %          Monocyte % 6.4 %          Eosinophil % 0.3 %          Basophil % 0.3 %          Neutrophils, Absolute 8.30 10*3/mm3          Lymphocytes, Absolute 0.50 10*3/mm3          Monocytes, Absolute 0.60 10*3/mm3          Eosinophils, Absolute 0.00 10*3/mm3          Basophils, Absolute 0.00 10*3/mm3          nRBC 0.3 /100 WBC        POC Lactate [[id number]]  (Abnormal) Collected: 11/28/23 2053      Specimen: Blood " Updated: 11/28/23 2055        Lactate 2.4 mmol/L          Comment: Serial Number: [id number]:  [id number]        Blood Culture - Blood, Arm, Right [[id number]] Collected: 11/28/23 2046      Specimen: Blood from Arm, Right Updated: 11/28/23 2054                            Imaging Results (Last 24 Hours)         Procedure Component Value Units Date/Time     CT Angiogram Chest Pulmonary Embolism [[id number]] Collected: 11/28/23 2155       Updated: 11/28/23 2201     Narrative:       CT ANGIOGRAM CHEST PULMONARY EMBOLISM     Date of Exam: 11/28/2023 8:42 PM CST     Indication: Pulmonary embolism (PE) suspected, positive D-dimer.     Comparison: Noncontrast chest CT 10/13/2023     Technique: Axial CT images were obtained of the chest after the uneventful intravenous administration of 100 cc of Isovue-370 utilizing pulmonary embolism protocol.  Sagittal and coronal reconstructions were performed.  Automated exposure control and   iterative reconstruction methods were used.        Findings:  PULMONARY VASCULATURE: Pulmonary arteries are widely patent without evidence of embolus.Main pulmonary artery is normal in size. No evidence of right heart strain.     MEDIASTINUM:Unremarkable. Aortic and heart size are normal. No aortic dissection identified. No mass nor pericardial effusion.  CORONARY ARTERIES: Nocalcified atherosclerotic disease.  LUNGS: Minimal basilar groundglass opacities, likely atelectasis without dense consolidation. Few scattered subcentimeter pulmonary nodules are unchanged. See most recent noncontrast CT chest report for additional details.  PLEURAL SPACE: No effusion, mass, nor pneumothorax.  LYMPH NODES: There are no pathologically enlarged lymph nodes.     UPPER ABDOMEN: Cirrhosis and ascites     OSSEOUS STRUCTURES: Unchanged without acute process identified.        Impression:       Impression:  1.No evidence of pulmonary embolism.  2.Basilar groundglass opacities, likely atelectasis.  3.Other stable  findings.           Electronically Signed: Arjun Reyes MD    11/28/2023 8:59 PM CST    Workstation ID: [id number]     XR Chest 1 View [[id number]] Collected: 11/28/23 2051       Updated: 11/28/23 2054     Narrative:       XR CHEST 1 VW     Date of Exam: 11/28/2023 7:46 PM CST     Indication: Chest Pain Triage Protocol     Comparison: 12/14/2018     Findings:  Cardiomediastinal silhouette is unremarkable. Mild pulmonary vascular congestion. No airspace disease, pneumothorax, nor pleural effusion. No acute osseous abnormality identified.        Impression:       Impression:  Mild pulmonary vascular congestion.        Electronically Signed: Arjun Reyes MD    11/28/2023 7:52 PM CST    Workstation ID: [id number]                   Assessment & Plan          This is a 53 y.o. male with PMH of     Active and Resolved Problems            Active Hospital Problems     Diagnosis   POA    **COPD exacerbation [J44.1]   Yes       Resolved Hospital Problems   No resolved problems to display.      COPD exacerbation  Acute respiratory failure  Patient currently on 5 L nasal cannula  Continue oxygen supplementation and wean as tolerated  Patient does not use oxygen at home  Continue with Rocephin  Add Solu-Medrol 80 mg every 8 hours  Add guaifenesin and lozenges for cough and sore throat  Add breathing treatments scheduled and as needed     Sleep apnea  Patient states he cannot tolerate CPAP     Diabetes type 2  Hold semaglutide  Obtain Hemoglobin A1c  Add sliding scale insulin and Accu-Cheks     Liver cirrhosis  Multiple myeloma  Rheumatoid arthritis  GERD  Anxiety and depression  Resume home meds     DVT prophylaxis:  Medical DVT prophylaxis orders are present.        CODE STATUS: Full code        Admission Status:  I believe this patient meets Inpatient status.        I discussed the patient's findings and my recommendations with patient.

## 2023-12-01 NOTE — OUTREACH NOTE
Call Center TCM Note      Flowsheet Row Responses   [facility] patient discharged from? [facility]   Does the patient have one of the following disease processes/diagnoses(primary or secondary)? COPD   TCM attempt successful? Yes   Call start time 1516   Call end time 1520   Discharge diagnosis COPD exacerbation   Meds reviewed with patient/caregiver? Yes   Is the patient having any side effects they believe may be caused by any medication additions or changes? No   Does the patient have all medications ordered at discharge? Yes   Is the patient taking all medications as directed (includes completed medication regime)? Yes   Does the patient have an appointment with their PCP within 7-14 days of discharge? No   Nursing Interventions Routed TCM call to PCP office  [Patient states he has already spoken with Dr. Godwin' office and was given the appointment for 1/2/23. Patient declined scheduling with a different provider.]   Has home health visited the patient within 72 hours of discharge? N/A   Pulse Ox monitoring None   Did the patient receive a copy of their discharge instructions? Yes   Nursing interventions Reviewed instructions with patient   What is the patient's perception of their health status since discharge? Improving   Nursing Interventions Nurse provided patient education   If the patient is a current smoker, are they able to teach back resources for cessation? Not a smoker   Is the patient/caregiver able to teach back the hierarchy of who to call/visit for symptoms/problems? PCP, Specialist, Home health nurse, Urgent Care, ED, 911 Yes   Is the patient able to teach back COPD zones? Yes   Nursing interventions Education provided on various zones   Patient reports what zone on this call? Green Zone   Green Zone Reports doing well, Breathing without shortness of breath, Usual activity and exercise level, Usual amounts of cough and phlegm/mucous, Slept well last night, Appetite is good   Green Zone  interventions: Take daily medications   TCM call completed? Yes   Call end time 1520   Would this patient benefit from a Referral to [facility] Social Work? No   Is the patient interested in additional calls from an ambulatory ? No            Arlen Urban LPN    12/1/2023, 15:23 EST

## 2023-12-01 NOTE — OUTREACH NOTE
Prep Survey      Flowsheet Row Responses   [facility]? [facility]   Is LACE score < 7 ? No   Eligibility [facility]   Date of Admission 11/28/23   Date of Discharge 11/30/23   Discharge Disposition Home or Self Care   Discharge diagnosis COPD exacerbation   Does the patient have one of the following disease processes/diagnoses(primary or secondary)? COPD   Does the patient have Home health ordered? No   Is there a DME ordered? No   Prep survey completed? Yes            DERRELL LEVIN - Registered Nurse

## 2023-12-03 LAB
BACTERIA SPEC AEROBE CULT: NORMAL
BACTERIA SPEC AEROBE CULT: NORMAL

## 2023-12-07 RX ORDER — MAGNESIUM OXIDE 400 MG/1
800 TABLET ORAL NIGHTLY
Qty: 90 TABLET | Refills: 2 | Status: SHIPPED | OUTPATIENT
Start: 2023-12-07

## 2023-12-27 RX ORDER — TAMSULOSIN HYDROCHLORIDE 0.4 MG/1
CAPSULE ORAL
Qty: 180 CAPSULE | Refills: 2 | Status: SHIPPED | OUTPATIENT
Start: 2023-12-27

## 2024-01-02 ENCOUNTER — OFFICE VISIT (OUTPATIENT)
Dept: FAMILY MEDICINE CLINIC | Facility: CLINIC | Age: 54
End: 2024-01-02
Payer: COMMERCIAL

## 2024-01-02 VITALS
HEART RATE: 84 BPM | SYSTOLIC BLOOD PRESSURE: 136 MMHG | OXYGEN SATURATION: 96 % | HEIGHT: 70 IN | BODY MASS INDEX: 41.89 KG/M2 | WEIGHT: 292.6 LBS | RESPIRATION RATE: 20 BRPM | DIASTOLIC BLOOD PRESSURE: 72 MMHG

## 2024-01-02 DIAGNOSIS — Z79.4 TYPE 2 DIABETES MELLITUS WITH HYPERGLYCEMIA, WITH LONG-TERM CURRENT USE OF INSULIN: Chronic | ICD-10-CM

## 2024-01-02 DIAGNOSIS — J44.9 CHRONIC OBSTRUCTIVE PULMONARY DISEASE, UNSPECIFIED COPD TYPE: Primary | Chronic | ICD-10-CM

## 2024-01-02 DIAGNOSIS — E11.65 TYPE 2 DIABETES MELLITUS WITH HYPERGLYCEMIA, WITH LONG-TERM CURRENT USE OF INSULIN: Chronic | ICD-10-CM

## 2024-01-02 PROCEDURE — 99214 OFFICE O/P EST MOD 30 MIN: CPT | Performed by: FAMILY MEDICINE

## 2024-01-02 RX ORDER — IPRATROPIUM BROMIDE 42 UG/1
2 SPRAY, METERED NASAL 3 TIMES DAILY
COMMUNITY
Start: 2023-12-13

## 2024-01-02 RX ORDER — SEMAGLUTIDE 2.68 MG/ML
2 INJECTION, SOLUTION SUBCUTANEOUS WEEKLY
Qty: 9 ML | Refills: 3 | Status: SHIPPED | OUTPATIENT
Start: 2024-01-02

## 2024-01-02 RX ORDER — IPRATROPIUM BROMIDE AND ALBUTEROL SULFATE 2.5; .5 MG/3ML; MG/3ML
SOLUTION RESPIRATORY (INHALATION)
COMMUNITY
Start: 2023-12-06

## 2024-01-02 RX ORDER — TIOTROPIUM BROMIDE INHALATION SPRAY 3.12 UG/1
2 SPRAY, METERED RESPIRATORY (INHALATION)
COMMUNITY
Start: 2023-12-06

## 2024-01-02 NOTE — PROGRESS NOTES
Chief Complaint   Patient presents with    Hospital Follow Up Visit     [facility]  Mc Selby is a 53 y.o. male that presents for   Chief Complaint   Patient presents with    Hospital Follow Up Visit   Hospital records reviewed    Patient presented to [facility] ER on 11/28/23 w/ complaint of cough, SOB. Work-up concerning for COPD exacerbation and patient was kept for observation due to 5L O2 requirement. Patient was treated w/ Solu-Medrol, antibiotics and Duonebs. Pulm was consulted. Patient would improved w/ treatment and was discharged home on 11/30/23 w/ oral steroids and Symbicort after passing 6-minute walk    COPD: patient reports being compliant w/ Spiriva daily and Symbicort BID. Prednisone has now finished. He was given Duo-nebs at discharge but it took 1 one month to obtain prior auth on nebulizer. He reports cough when using nebulizer. Cough has been unchanged over the last month. Also taking Mucinex and reports considerable dry mouth but also postnasal gtt. Denies sinus tenderness. Maintained on Zyrtec and Singulair. Following w/ pulm- Dairi    DM: patient has been unable to get Mounjaro or Ozempic from pharmacy.       Review of Systems   HENT:  Negative for sinus pressure.    Respiratory:  Positive for cough. Negative for shortness of breath.    Cardiovascular:  Negative for chest pain.     The following portions of the patient's history were reviewed and updated as appropriate: problem list, past medical history, past surgical history, allergies, current medication    Problem List Tab  Patient History Tab  Immunizations Tab  Medications Tab  Chart Review Tab  Care Everywhere Tab  Synopsis Tab    PE  Vitals:    01/02/24 1600   BP: 136/72   Pulse: 84   Resp: 20   SpO2: 96%     Body mass index is 41.98 kg/m².  General: Obese, NAD  Head: AT/NC  Eyes: EOMI, anicteric sclera  Resp: CTAB, SCR, BS equal  CV: RRR w/o m/r/g; 2+ pulses  GI: Soft, NT/ND, +BS  MSK: FROM, no deformity, no edema  Skin: Warm, dry,  intact  Neuro: Alert and oriented. No focal deficits  Psych: Appropriate mood and affect    Imaging  CT Angiogram Chest Pulmonary Embolism    Result Date: 11/28/2023  Impression: 1.No evidence of pulmonary embolism. 2.Basilar groundglass opacities, likely atelectasis. 3.Other stable findings. Electronically Signed: Arjun Reyes MD  11/28/2023 8:59 PM CST  Workstation ID: [id number]    XR Chest 1 View    Result Date: 11/28/2023  Impression: Mild pulmonary vascular congestion. Electronically Signed: Arjun Reyes MD  11/28/2023 7:52 PM CST  Workstation ID: [id number]    CT Chest Low Dose Cancer Screening WO    Result Date: 10/13/2023  Impression: 1. No acute process. 2. Stable small pulmonary nodule. Recommend continued low-dose screening CT in 1 year 3. Cirrhosis with sequela of portal hypertension including splenomegaly and small upper abdominal ascites. 4. Coronary artery calcifications. 5. Cholelithiasis. Recommendation: Continue annual screening with LDCT Lung Rads Assessment: Lung-RADS L2 - Benign appearance or <1% chance of malignancy. Electronically Signed: Juliocesar Evans MD  10/13/2023 3:44 PM EDT  Workstation ID: [id number]    XR Knee 4+ View Left    Result Date: 9/25/2023  Impression: Acute fractures of the proximal phalanges of the right third through fifth toes. Other chronic findings as detailed. Electronically Signed: Jemima Vergara MD  9/25/2023 2:17 PM EDT  Workstation ID: [id number]    XR Tibia Fibula 2 View Left    Result Date: 9/25/2023  Impression: Acute fractures of the proximal phalanges of the right third through fifth toes. Other chronic findings as detailed. Electronically Signed: Jemima Vergara MD  9/25/2023 2:17 PM EDT  Workstation ID: [id number]    XR Foot 3+ View Left    Result Date: 9/25/2023  Impression: Acute fractures of the proximal phalanges of the right third through fifth toes. Other chronic findings as detailed. Electronically Signed: Jemima Vergara MD  9/25/2023 2:17 PM EDT  Workstation  ID: [id number]    XR Knee 4+ View Right    Result Date: 9/25/2023  Impression: Multiple views of the right knee, tib-fib, ankle, and foot were obtained. There are mildly displaced fractures involving the proximal phalanges of the third, fourth, and fifth toes. Chronic fracture deformity of the hallux proximal phalanx. Scattered osteoarthritic changes noted. Arthrodesis hardware spans the hallux tarsometatarsal joint. Bones are demineralized. Soft tissues demonstrate no acute abnormality. Electronically Signed: Devon Duvall MD  9/25/2023 2:14 PM EDT  Workstation ID: [id number]    XR Tibia Fibula 2 View Right    Result Date: 9/25/2023  Impression: Multiple views of the right knee, tib-fib, ankle, and foot were obtained. There are mildly displaced fractures involving the proximal phalanges of the third, fourth, and fifth toes. Chronic fracture deformity of the hallux proximal phalanx. Scattered osteoarthritic changes noted. Arthrodesis hardware spans the hallux tarsometatarsal joint. Bones are demineralized. Soft tissues demonstrate no acute abnormality. Electronically Signed: Devon Duvall MD  9/25/2023 2:14 PM EDT  Workstation ID: [id number]    XR Ankle 3+ View Right    Result Date: 9/25/2023  Impression: Multiple views of the right knee, tib-fib, ankle, and foot were obtained. There are mildly displaced fractures involving the proximal phalanges of the third, fourth, and fifth toes. Chronic fracture deformity of the hallux proximal phalanx. Scattered osteoarthritic changes noted. Arthrodesis hardware spans the hallux tarsometatarsal joint. Bones are demineralized. Soft tissues demonstrate no acute abnormality. Electronically Signed: Devon Duvall MD  9/25/2023 2:14 PM EDT  Workstation ID: [id number]    XR Foot 3+ View Right    Result Date: 9/25/2023  Impression: Multiple views of the right knee, tib-fib, ankle, and foot were obtained. There are mildly displaced fractures involving the proximal phalanges of the third,  fourth, and fifth toes. Chronic fracture deformity of the hallux proximal phalanx. Scattered osteoarthritic changes noted. Arthrodesis hardware spans the hallux tarsometatarsal joint. Bones are demineralized. Soft tissues demonstrate no acute abnormality. Electronically Signed: Devon Duvall MD  9/25/2023 2:14 PM EDT  Workstation ID: [id number]    XR Ankle 3+ View Left    Result Date: 9/25/2023  impression: 1. Left ankle soft tissue swelling is suggested. 2. No acute osseous abnormality of the left ankle. Electronically Signed: Marylin Scherer MD  9/25/2023 2:14 PM EDT  Workstation ID: [id number]    Assessment & Plan   Mc Selby is a 53 y.o. male that presents for   Chief Complaint   Patient presents with    Hospital Follow Up Visit     Diagnoses and all orders for this visit:    1. Chronic obstructive pulmonary disease, unspecified COPD type (Primary): hospital records reviewed   - Cont Spiriva and Symbicort  - D/C Duonebs and azelastine as seems to be irritating  - Cont home Zyrtec and Singulair  - Taper off next Mucinex given dryness  - Cont pulm f/u- Dairi    2. Type 2 diabetes mellitus with hyperglycemia, with long-term current use of insulin: patient has been unable to obtain Ozempic or Mounjaro from local [facility]. Unable to go to other pharmacy due to insurance. Will attempt mail-in pharmacy  -     Semaglutide, 2 MG/DOSE, (Ozempic, 2 MG/DOSE,) 8 MG/3ML solution pen-injector; Inject 2 mg under the skin into the appropriate area as directed 1 (One) Time Per Week.  Dispense: 9 mL; Refill: 3       Return in about 12 weeks (around 3/26/2024).

## 2024-03-14 DIAGNOSIS — M16.11 PRIMARY OSTEOARTHRITIS OF RIGHT HIP: ICD-10-CM

## 2024-03-14 DIAGNOSIS — E11.65 TYPE 2 DIABETES MELLITUS WITH HYPERGLYCEMIA, WITH LONG-TERM CURRENT USE OF INSULIN: Chronic | ICD-10-CM

## 2024-03-14 DIAGNOSIS — Z79.4 TYPE 2 DIABETES MELLITUS WITH HYPERGLYCEMIA, WITH LONG-TERM CURRENT USE OF INSULIN: Chronic | ICD-10-CM

## 2024-03-14 DIAGNOSIS — M17.11 PRIMARY OSTEOARTHRITIS OF RIGHT KNEE: ICD-10-CM

## 2024-03-14 RX ORDER — SEMAGLUTIDE 2.68 MG/ML
2 INJECTION, SOLUTION SUBCUTANEOUS WEEKLY
Qty: 3 ML | Refills: 5 | Status: SHIPPED | OUTPATIENT
Start: 2024-03-14

## 2024-03-14 RX ORDER — PREGABALIN 75 MG/1
75 CAPSULE ORAL 2 TIMES DAILY
Qty: 60 CAPSULE | Refills: 5 | Status: SHIPPED | OUTPATIENT
Start: 2024-03-14

## 2024-03-14 NOTE — TELEPHONE ENCOUNTER
Caller: JOSELYN RODGERS    Relationship: Emergency Contact    Best call back number:[phone]    Requested Prescriptions:   Requested Prescriptions     Pending Prescriptions Disp Refills    Ozempic, 2 MG/DOSE, 8 MG/3ML solution pen-injector [Pharmacy Med Name: OZEMPIC 8 MG/3 ML (2 MG/DOSE)]  5     Sig: INJECT 2 MG UNDER THE SKIN INTO THE APPROPRIATE AREA AS DIRECTED 1 (ONE) TIME PER WEEK.        Pharmacy where request should be sent: [facility]/PHARMACY #[id number] - [address] [phone] [facility] [phone] FX     Last office visit with prescribing clinician: 1/2/2024   Last telemedicine visit with prescribing clinician: Visit date not found   Next office visit with prescribing clinician: 3/26/2024     Does the patient have less than a 3 day supply:  [x] Yes  [] No        Sena Espñaa Rep   03/14/24 12:10 EDT

## 2024-03-14 NOTE — TELEPHONE ENCOUNTER
Caller: JOSELYN RODGERS    Relationship: Emergency Contact    Best call back number: [phone]    Requested Prescriptions:   Requested Prescriptions     Pending Prescriptions Disp Refills    pregabalin (Lyrica) 75 MG capsule 60 capsule 5     Sig: Take 1 capsule by mouth 2 (Two) Times a Day.        Pharmacy where request should be sent: [facility]/PHARMACY #[id number] - [facility] [place] [address] [phone] [facility] [phone] FX     Last office visit with prescribing clinician: 1/2/2024   Last telemedicine visit with prescribing clinician: Visit date not found   Next office visit with prescribing clinician: 3/26/2024     Does the patient have less than a 3 day supply:  [x] Yes  [] No      Sena España Rep   03/14/24 12:12 EDT

## 2024-03-15 ENCOUNTER — TELEPHONE (OUTPATIENT)
Dept: FAMILY MEDICINE CLINIC | Facility: CLINIC | Age: 54
End: 2024-03-15

## 2024-03-15 NOTE — TELEPHONE ENCOUNTER
Caller: [facility]    Relationship: Other    Best call back number: [phone]     Phone/Fax           03/15/2024 09:14 AM EDT by Lola Manriquez RegSched Rep  Incoming [facility] (Other) [phone]        What orders are you requesting (i.e. lab or imaging): AIC    In what timeframe would the patient need to come in: NEXT VISIT PLEASE  est.”

## 2024-03-26 ENCOUNTER — OFFICE VISIT (OUTPATIENT)
Dept: FAMILY MEDICINE CLINIC | Facility: CLINIC | Age: 54
End: 2024-03-26
Payer: COMMERCIAL

## 2024-03-26 VITALS
OXYGEN SATURATION: 95 % | WEIGHT: 275 LBS | SYSTOLIC BLOOD PRESSURE: 128 MMHG | DIASTOLIC BLOOD PRESSURE: 86 MMHG | RESPIRATION RATE: 18 BRPM | HEART RATE: 82 BPM | BODY MASS INDEX: 39.37 KG/M2 | HEIGHT: 70 IN

## 2024-03-26 DIAGNOSIS — K21.9 GERD WITHOUT ESOPHAGITIS: Chronic | ICD-10-CM

## 2024-03-26 DIAGNOSIS — K59.00 CONSTIPATION, UNSPECIFIED CONSTIPATION TYPE: ICD-10-CM

## 2024-03-26 DIAGNOSIS — M17.0 OSTEOARTHRITIS OF BOTH KNEES, UNSPECIFIED OSTEOARTHRITIS TYPE: ICD-10-CM

## 2024-03-26 DIAGNOSIS — E11.65 TYPE 2 DIABETES MELLITUS WITH HYPERGLYCEMIA, WITHOUT LONG-TERM CURRENT USE OF INSULIN: Primary | ICD-10-CM

## 2024-03-26 PROCEDURE — 85018 HEMOGLOBIN: CPT | Performed by: FAMILY MEDICINE

## 2024-03-26 PROCEDURE — 85014 HEMATOCRIT: CPT | Performed by: FAMILY MEDICINE

## 2024-03-26 PROCEDURE — 80053 COMPREHEN METABOLIC PANEL: CPT | Performed by: FAMILY MEDICINE

## 2024-03-26 PROCEDURE — 83036 HEMOGLOBIN GLYCOSYLATED A1C: CPT | Performed by: FAMILY MEDICINE

## 2024-03-26 RX ORDER — TRIAMCINOLONE ACETONIDE 40 MG/ML
40 INJECTION, SUSPENSION INTRA-ARTICULAR; INTRAMUSCULAR
Status: COMPLETED | OUTPATIENT
Start: 2024-03-26 | End: 2024-03-26

## 2024-03-26 RX ORDER — BUPIVACAINE HYDROCHLORIDE 5 MG/ML
2 INJECTION, SOLUTION PERINEURAL
Status: COMPLETED | OUTPATIENT
Start: 2024-03-26 | End: 2024-03-26

## 2024-03-26 RX ORDER — LANSOPRAZOLE 30 MG/1
30 CAPSULE, DELAYED RELEASE ORAL DAILY
Qty: 90 CAPSULE | Refills: 1 | Status: SHIPPED | OUTPATIENT
Start: 2024-03-26

## 2024-03-26 RX ORDER — LIDOCAINE HYDROCHLORIDE 10 MG/ML
2 INJECTION, SOLUTION INFILTRATION; PERINEURAL
Status: COMPLETED | OUTPATIENT
Start: 2024-03-26 | End: 2024-03-26

## 2024-03-26 RX ADMIN — BUPIVACAINE HYDROCHLORIDE 2 ML: 5 INJECTION, SOLUTION PERINEURAL at 13:41

## 2024-03-26 RX ADMIN — LIDOCAINE HYDROCHLORIDE 2 ML: 10 INJECTION, SOLUTION INFILTRATION; PERINEURAL at 13:41

## 2024-03-26 RX ADMIN — TRIAMCINOLONE ACETONIDE 40 MG: 40 INJECTION, SUSPENSION INTRA-ARTICULAR; INTRAMUSCULAR at 13:41

## 2024-03-26 NOTE — PROGRESS NOTES
Procedure   Arthrocentesis    Date/Time: 3/26/2024 1:41 PM    Performed by: Berny Godwin MD  Authorized by: Berny Godwin MD  Indications: pain   Body area: knee  Joint: right knee  Local anesthesia used: no    Anesthesia:  Local anesthesia used: no    Sedation:  Patient sedated: no    Preparation: Patient was prepped and draped in the usual sterile fashion.  Needle size: 22 G  Ultrasound guidance: no  Approach: anterior  Meds administered: 2 mL lidocaine 1 %; 2 mL bupivacaine 0.5 %; 40 mg triamcinolone acetonide 40 MG/ML  Patient tolerance: patient tolerated the procedure well with no immediate complications      Arthrocentesis    Date/Time: 3/26/2024 1:41 PM    Performed by: Berny Godwin MD  Authorized by: Berny Godwin MD  Indications: pain   Body area: knee  Joint: left knee  Local anesthesia used: no    Anesthesia:  Local anesthesia used: no    Sedation:  Patient sedated: no    Preparation: Patient was prepped and draped in the usual sterile fashion.  Needle size: 22 G  Ultrasound guidance: no  Approach: anterior  Meds administered: 2 mL lidocaine 1 %; 2 mL bupivacaine 0.5 %; 40 mg triamcinolone acetonide 40 MG/ML  Patient tolerance: patient tolerated the procedure well with no immediate complications

## 2024-03-26 NOTE — PROGRESS NOTES
Chief Complaint   Patient presents with    Diabetes    COPD     HPI  Mc Selby is a 54 y.o. male that presents for   Chief Complaint   Patient presents with    Diabetes    COPD     DM: last a1c 6.0. Maintained on Ozempic 2mg weekly. Weight down 17lbs in the last 2 months. Reports chronic numbness in feet. Unable to tolerate gabapentin- made him sick. 9/2023 microalb/Cr undetectable. Last eye appt 11/2023     OA: patient reports pain to the medial aspect of bilateral knees. This has been ongoing for 6 months. Knee pain exacerbated by walking. 9/2023 XR w/ mild degenerative change of the medial and patellofemoral compartments. Knee was last injected 9/2023. Interested in repeat this.     GERD: previously maintained on lansoprazole 30mg daily but not any longer. Now experiencing heartburn and indigestion several days per week.     Constipation: patient reports hard BM every 3 days. Maintained on magnesium but not doing enough. Describes hard rocks of stool    Review of Systems  Pertinent positives of ROS documented in HPI    The following portions of the patient's history were reviewed and updated as appropriate: problem list, past medical history, past surgical history, allergies, current medication    Problem List Tab  Patient History Tab  Immunizations Tab  Medications Tab  Chart Review Tab  Care Everywhere Tab  Synopsis Tab    PE  Vitals:    03/26/24 1258   BP: 128/86   Pulse: 82   Resp: 18   SpO2: 95%     Body mass index is 39.46 kg/m².  General: Obese, NAD  Head: AT/NC  Eyes: EOMI, anicteric sclera  Resp: CTAB, SCR, BS equal  CV: RRR w/o m/r/g; 2+ pulses  GI: Soft, NT/ND, +BS  MSK: FROM, no deformity, no edema  Skin: Warm, dry, intact  Neuro: Alert and oriented. No focal deficits  Psych: Appropriate mood and affect    Imaging  CT Angiogram Chest Pulmonary Embolism    Result Date: 11/28/2023  Impression: 1.No evidence of pulmonary embolism. 2.Basilar groundglass opacities, likely atelectasis. 3.Other stable  findings. Electronically Signed: Arjun Reyes MD  11/28/2023 8:59 PM CST  Workstation ID: [id number]    XR Chest 1 View    Result Date: 11/28/2023  Impression: Mild pulmonary vascular congestion. Electronically Signed: Arjun Reyes MD  11/28/2023 7:52 PM CST  Workstation ID: [id number]    Assessment & Plan   Mc Selby is a 54 y.o. male that presents for   Chief Complaint   Patient presents with    Diabetes    COPD     Diagnoses and all orders for this visit:    1. Type 2 diabetes mellitus with hyperglycemia, without long-term current use of insulin (Primary): last A1c 6.0. 9/2023 microalb/Cr undetectable. Last eye appt 11/2023  -     Comprehensive Metabolic Panel  -     Hemoglobin A1c  - Cont home semaglutide 2mg weekly  - Low threshold to start statin and ACE/ARB  - Recommend regular exercise and annual eye exam    2. GERD without esophagitis  -     Start lansoprazole (PREVACID) 30 MG capsule; Take 1 capsule by mouth Daily.  Dispense: 90 capsule; Refill: 1    3. Osteoarthritis of both knees, unspecified osteoarthritis type: XR reviewed. Unable to tolerate NSAID per renal  -     Arthrocentesis  -     Arthrocentesis    4. Constipation, unspecified constipation type  -     Start linaclotide (Linzess) 145 MCG capsule capsule; Take 1 capsule by mouth Every Morning Before Breakfast.  Dispense: 30 capsule; Refill: 5     Return if symptoms worsen or fail to improve.

## 2024-03-27 LAB
ALBUMIN SERPL-MCNC: 3.9 G/DL (ref 3.5–5.2)
ALBUMIN/GLOB SERPL: 1 G/DL
ALP SERPL-CCNC: 86 U/L (ref 39–117)
ALT SERPL W P-5'-P-CCNC: 15 U/L (ref 1–41)
ANION GAP SERPL CALCULATED.3IONS-SCNC: 13.6 MMOL/L (ref 5–15)
AST SERPL-CCNC: 22 U/L (ref 1–40)
BILIRUB SERPL-MCNC: 0.8 MG/DL (ref 0–1.2)
BUN SERPL-MCNC: 9 MG/DL (ref 6–20)
BUN/CREAT SERPL: 8 (ref 7–25)
CALCIUM SPEC-SCNC: 9.3 MG/DL (ref 8.6–10.5)
CHLORIDE SERPL-SCNC: 96 MMOL/L (ref 98–107)
CO2 SERPL-SCNC: 27.4 MMOL/L (ref 22–29)
CREAT SERPL-MCNC: 1.13 MG/DL (ref 0.76–1.27)
EGFRCR SERPLBLD CKD-EPI 2021: 76.3 ML/MIN/1.73
GLOBULIN UR ELPH-MCNC: 3.9 GM/DL
GLUCOSE SERPL-MCNC: 124 MG/DL (ref 65–99)
HBA1C MFR BLD: 6.1 % (ref 4.8–5.6)
POTASSIUM SERPL-SCNC: 3.7 MMOL/L (ref 3.5–5.2)
PROT SERPL-MCNC: 7.8 G/DL (ref 6–8.5)
SODIUM SERPL-SCNC: 137 MMOL/L (ref 136–145)

## 2024-04-10 RX ORDER — TIOTROPIUM BROMIDE INHALATION SPRAY 3.12 UG/1
2 SPRAY, METERED RESPIRATORY (INHALATION)
Qty: 3 EACH | Refills: 2 | Status: SHIPPED | OUTPATIENT
Start: 2024-04-10

## 2024-05-05 DIAGNOSIS — G47.00 INSOMNIA, UNSPECIFIED: ICD-10-CM

## 2024-05-06 RX ORDER — TRAZODONE HYDROCHLORIDE 50 MG/1
TABLET ORAL
Qty: 180 TABLET | Refills: 1 | Status: SHIPPED | OUTPATIENT
Start: 2024-05-06

## 2024-06-03 DIAGNOSIS — J31.0 CHRONIC RHINITIS: ICD-10-CM

## 2024-06-04 RX ORDER — IPRATROPIUM BROMIDE 42 UG/1
SPRAY, METERED NASAL
Qty: 45 EACH | Refills: 1 | Status: SHIPPED | OUTPATIENT
Start: 2024-06-04

## 2024-07-26 ENCOUNTER — TRANSCRIBE ORDERS (OUTPATIENT)
Dept: ADMINISTRATIVE | Facility: HOSPITAL | Age: 54
End: 2024-07-26
Payer: MEDICARE

## 2024-07-26 DIAGNOSIS — M85.89 OSTEOPENIA OF MULTIPLE SITES: Primary | ICD-10-CM

## 2024-08-01 ENCOUNTER — TRANSCRIBE ORDERS (OUTPATIENT)
Dept: ADMINISTRATIVE | Facility: HOSPITAL | Age: 54
End: 2024-08-01
Payer: MEDICARE

## 2024-08-01 DIAGNOSIS — Z87.891 FORMER CIGARETTE SMOKER: Primary | ICD-10-CM

## 2024-08-02 ENCOUNTER — LAB (OUTPATIENT)
Dept: LAB | Facility: HOSPITAL | Age: 54
End: 2024-08-02
Payer: COMMERCIAL

## 2024-08-02 ENCOUNTER — HOSPITAL ENCOUNTER (OUTPATIENT)
Dept: BONE DENSITY | Facility: HOSPITAL | Age: 54
Discharge: HOME | End: 2024-08-02
Payer: COMMERCIAL

## 2024-08-02 ENCOUNTER — HOSPITAL ENCOUNTER (OUTPATIENT)
Dept: GENERAL RADIOLOGY | Facility: HOSPITAL | Age: 54
Discharge: HOME | End: 2024-08-02
Payer: COMMERCIAL

## 2024-08-02 ENCOUNTER — TRANSCRIBE ORDERS (OUTPATIENT)
Dept: ADMINISTRATIVE | Facility: HOSPITAL | Age: 54
End: 2024-08-02
Payer: MEDICARE

## 2024-08-02 DIAGNOSIS — M06.09 RHEUMATOID ARTHRITIS OF MULTIPLE SITES WITHOUT RHEUMATOID FACTOR: ICD-10-CM

## 2024-08-02 DIAGNOSIS — M06.9 RHEUMATOID ARTHRITIS, INVOLVING UNSPECIFIED SITE, UNSPECIFIED WHETHER RHEUMATOID FACTOR PRESENT: Primary | ICD-10-CM

## 2024-08-02 DIAGNOSIS — M06.09 RHEUMATOID ARTHRITIS OF MULTIPLE SITES WITHOUT RHEUMATOID FACTOR: Primary | ICD-10-CM

## 2024-08-02 DIAGNOSIS — M85.89 OSTEOPENIA OF MULTIPLE SITES: ICD-10-CM

## 2024-08-02 DIAGNOSIS — M06.9 RHEUMATOID ARTHRITIS, INVOLVING UNSPECIFIED SITE, UNSPECIFIED WHETHER RHEUMATOID FACTOR PRESENT: ICD-10-CM

## 2024-08-02 LAB
ALBUMIN SERPL-MCNC: 4.1 G/DL (ref 3.5–5.2)
ALBUMIN/GLOB SERPL: 1 G/DL
ALP SERPL-CCNC: 67 U/L (ref 39–117)
ALT SERPL W P-5'-P-CCNC: 9 U/L (ref 1–41)
ANION GAP SERPL CALCULATED.3IONS-SCNC: 15 MMOL/L (ref 5–15)
AST SERPL-CCNC: 24 U/L (ref 1–40)
BASOPHILS # BLD AUTO: 0.05 10*3/MM3 (ref 0–0.2)
BASOPHILS NFR BLD AUTO: 0.8 % (ref 0–1.5)
BILIRUB SERPL-MCNC: 3 MG/DL (ref 0–1.2)
BUN SERPL-MCNC: 12 MG/DL (ref 6–20)
BUN/CREAT SERPL: 10.2 (ref 7–25)
CALCIUM SPEC-SCNC: 9.7 MG/DL (ref 8.6–10.5)
CHLORIDE SERPL-SCNC: 93 MMOL/L (ref 98–107)
CHROMATIN AB SERPL-ACNC: <10 IU/ML (ref 0–14)
CO2 SERPL-SCNC: 26 MMOL/L (ref 22–29)
CREAT SERPL-MCNC: 1.18 MG/DL (ref 0.76–1.27)
DEPRECATED RDW RBC AUTO: 46 FL (ref 37–54)
EGFRCR SERPLBLD CKD-EPI 2021: 72.4 ML/MIN/1.73
EOSINOPHIL # BLD AUTO: 0.13 10*3/MM3 (ref 0–0.4)
EOSINOPHIL NFR BLD AUTO: 2 % (ref 0.3–6.2)
ERYTHROCYTE [DISTWIDTH] IN BLOOD BY AUTOMATED COUNT: 13.3 % (ref 12.3–15.4)
GLOBULIN UR ELPH-MCNC: 4.3 GM/DL
GLUCOSE SERPL-MCNC: 121 MG/DL (ref 65–99)
HCT VFR BLD AUTO: 52.1 % (ref 37.5–51)
HGB BLD-MCNC: 17.7 G/DL (ref 13–17.7)
IMM GRANULOCYTES # BLD AUTO: 0.03 10*3/MM3 (ref 0–0.05)
IMM GRANULOCYTES NFR BLD AUTO: 0.5 % (ref 0–0.5)
LYMPHOCYTES # BLD AUTO: 1.34 10*3/MM3 (ref 0.7–3.1)
LYMPHOCYTES NFR BLD AUTO: 20.7 % (ref 19.6–45.3)
MCH RBC QN AUTO: 31.9 PG (ref 26.6–33)
MCHC RBC AUTO-ENTMCNC: 34 G/DL (ref 31.5–35.7)
MCV RBC AUTO: 94 FL (ref 79–97)
MONOCYTES # BLD AUTO: 0.74 10*3/MM3 (ref 0.1–0.9)
MONOCYTES NFR BLD AUTO: 11.4 % (ref 5–12)
NEUTROPHILS NFR BLD AUTO: 4.18 10*3/MM3 (ref 1.7–7)
NEUTROPHILS NFR BLD AUTO: 64.6 % (ref 42.7–76)
NRBC BLD AUTO-RTO: 0 /100 WBC (ref 0–0.2)
PLATELET # BLD AUTO: 230 10*3/MM3 (ref 140–450)
PMV BLD AUTO: 10.4 FL (ref 6–12)
POTASSIUM SERPL-SCNC: 3.2 MMOL/L (ref 3.5–5.2)
PROT SERPL-MCNC: 8.4 G/DL (ref 6–8.5)
RBC # BLD AUTO: 5.54 10*6/MM3 (ref 4.14–5.8)
SODIUM SERPL-SCNC: 134 MMOL/L (ref 136–145)
WBC NRBC COR # BLD AUTO: 6.47 10*3/MM3 (ref 3.4–10.8)

## 2024-08-02 PROCEDURE — 85025 COMPLETE CBC W/AUTO DIFF WBC: CPT

## 2024-08-02 PROCEDURE — 77080 DXA BONE DENSITY AXIAL: CPT

## 2024-08-02 PROCEDURE — 86431 RHEUMATOID FACTOR QUANT: CPT

## 2024-08-02 PROCEDURE — 73130 X-RAY EXAM OF HAND: CPT

## 2024-08-02 PROCEDURE — 36415 COLL VENOUS BLD VENIPUNCTURE: CPT

## 2024-08-02 PROCEDURE — 80053 COMPREHEN METABOLIC PANEL: CPT

## 2024-08-26 DIAGNOSIS — E80.6 HYPERBILIRUBINEMIA: Primary | ICD-10-CM

## 2024-08-28 ENCOUNTER — LAB (OUTPATIENT)
Dept: FAMILY MEDICINE CLINIC | Facility: CLINIC | Age: 54
End: 2024-08-28
Payer: COMMERCIAL

## 2024-08-28 PROCEDURE — 36415 COLL VENOUS BLD VENIPUNCTURE: CPT | Performed by: FAMILY MEDICINE

## 2024-08-28 PROCEDURE — 80053 COMPREHEN METABOLIC PANEL: CPT | Performed by: FAMILY MEDICINE

## 2024-08-29 LAB
ALBUMIN SERPL-MCNC: 3.9 G/DL (ref 3.5–5.2)
ALBUMIN/GLOB SERPL: 1 G/DL
ALP SERPL-CCNC: 70 U/L (ref 39–117)
ALT SERPL W P-5'-P-CCNC: 24 U/L (ref 1–41)
ANION GAP SERPL CALCULATED.3IONS-SCNC: 12.9 MMOL/L (ref 5–15)
AST SERPL-CCNC: 32 U/L (ref 1–40)
BILIRUB SERPL-MCNC: 1.5 MG/DL (ref 0–1.2)
BUN SERPL-MCNC: 12 MG/DL (ref 6–20)
BUN/CREAT SERPL: 9.6 (ref 7–25)
CALCIUM SPEC-SCNC: 9.7 MG/DL (ref 8.6–10.5)
CHLORIDE SERPL-SCNC: 95 MMOL/L (ref 98–107)
CO2 SERPL-SCNC: 29.1 MMOL/L (ref 22–29)
CREAT SERPL-MCNC: 1.25 MG/DL (ref 0.76–1.27)
EGFRCR SERPLBLD CKD-EPI 2021: 67.6 ML/MIN/1.73
GLOBULIN UR ELPH-MCNC: 4.1 GM/DL
GLUCOSE SERPL-MCNC: 130 MG/DL (ref 65–99)
POTASSIUM SERPL-SCNC: 3.4 MMOL/L (ref 3.5–5.2)
PROT SERPL-MCNC: 8 G/DL (ref 6–8.5)
SODIUM SERPL-SCNC: 137 MMOL/L (ref 136–145)

## 2024-09-02 DIAGNOSIS — E11.65 TYPE 2 DIABETES MELLITUS WITH HYPERGLYCEMIA, WITH LONG-TERM CURRENT USE OF INSULIN: Chronic | ICD-10-CM

## 2024-09-02 DIAGNOSIS — Z79.4 TYPE 2 DIABETES MELLITUS WITH HYPERGLYCEMIA, WITH LONG-TERM CURRENT USE OF INSULIN: Chronic | ICD-10-CM

## 2024-09-02 RX ORDER — SEMAGLUTIDE 2.68 MG/ML
2 INJECTION, SOLUTION SUBCUTANEOUS WEEKLY
Qty: 3 ML | Refills: 5 | Status: SHIPPED | OUTPATIENT
Start: 2024-09-02

## 2024-09-19 DIAGNOSIS — K21.9 GERD WITHOUT ESOPHAGITIS: Chronic | ICD-10-CM

## 2024-09-19 RX ORDER — LANSOPRAZOLE 30 MG/1
30 CAPSULE, DELAYED RELEASE ORAL DAILY
Qty: 90 CAPSULE | Refills: 1 | Status: SHIPPED | OUTPATIENT
Start: 2024-09-19

## 2024-09-24 ENCOUNTER — OFFICE VISIT (OUTPATIENT)
Dept: FAMILY MEDICINE CLINIC | Facility: CLINIC | Age: 54
End: 2024-09-24
Payer: COMMERCIAL

## 2024-09-24 ENCOUNTER — LAB (OUTPATIENT)
Dept: FAMILY MEDICINE CLINIC | Facility: CLINIC | Age: 54
End: 2024-09-24
Payer: MEDICARE

## 2024-09-24 VITALS
WEIGHT: 264 LBS | BODY MASS INDEX: 37.8 KG/M2 | HEIGHT: 70 IN | OXYGEN SATURATION: 95 % | HEART RATE: 77 BPM | DIASTOLIC BLOOD PRESSURE: 68 MMHG | SYSTOLIC BLOOD PRESSURE: 116 MMHG | RESPIRATION RATE: 18 BRPM

## 2024-09-24 DIAGNOSIS — M06.09 RHEUMATOID ARTHRITIS OF MULTIPLE SITES WITH NEGATIVE RHEUMATOID FACTOR: ICD-10-CM

## 2024-09-24 DIAGNOSIS — M16.11 PRIMARY OSTEOARTHRITIS OF RIGHT HIP: ICD-10-CM

## 2024-09-24 DIAGNOSIS — N40.1 BENIGN PROSTATIC HYPERPLASIA WITH NOCTURIA: ICD-10-CM

## 2024-09-24 DIAGNOSIS — Z00.00 MEDICARE ANNUAL WELLNESS VISIT, SUBSEQUENT: Primary | ICD-10-CM

## 2024-09-24 DIAGNOSIS — E11.65 TYPE 2 DIABETES MELLITUS WITH HYPERGLYCEMIA, WITHOUT LONG-TERM CURRENT USE OF INSULIN: Chronic | ICD-10-CM

## 2024-09-24 DIAGNOSIS — Z12.5 SCREENING PSA (PROSTATE SPECIFIC ANTIGEN): ICD-10-CM

## 2024-09-24 DIAGNOSIS — K70.30 ALCOHOLIC CIRRHOSIS OF LIVER WITHOUT ASCITES: Chronic | ICD-10-CM

## 2024-09-24 DIAGNOSIS — N18.9 CHRONIC KIDNEY DISEASE, UNSPECIFIED CKD STAGE: ICD-10-CM

## 2024-09-24 DIAGNOSIS — K21.9 GERD WITHOUT ESOPHAGITIS: Chronic | ICD-10-CM

## 2024-09-24 DIAGNOSIS — G47.33 OSA ON CPAP: ICD-10-CM

## 2024-09-24 DIAGNOSIS — R35.1 BENIGN PROSTATIC HYPERPLASIA WITH NOCTURIA: ICD-10-CM

## 2024-09-24 DIAGNOSIS — J44.9 CHRONIC OBSTRUCTIVE PULMONARY DISEASE, UNSPECIFIED COPD TYPE: Chronic | ICD-10-CM

## 2024-09-24 DIAGNOSIS — D47.2 MGUS (MONOCLONAL GAMMOPATHY OF UNKNOWN SIGNIFICANCE): ICD-10-CM

## 2024-09-24 PROBLEM — J44.1 COPD EXACERBATION: Status: RESOLVED | Noted: 2023-11-29 | Resolved: 2024-09-24

## 2024-09-24 PROCEDURE — 85652 RBC SED RATE AUTOMATED: CPT | Performed by: FAMILY MEDICINE

## 2024-09-24 PROCEDURE — 99214 OFFICE O/P EST MOD 30 MIN: CPT | Performed by: FAMILY MEDICINE

## 2024-09-24 PROCEDURE — 83036 HEMOGLOBIN GLYCOSYLATED A1C: CPT | Performed by: FAMILY MEDICINE

## 2024-09-24 PROCEDURE — 85025 COMPLETE CBC W/AUTO DIFF WBC: CPT | Performed by: FAMILY MEDICINE

## 2024-09-24 PROCEDURE — G0439 PPPS, SUBSEQ VISIT: HCPCS | Performed by: FAMILY MEDICINE

## 2024-09-24 PROCEDURE — 82570 ASSAY OF URINE CREATININE: CPT | Performed by: FAMILY MEDICINE

## 2024-09-24 PROCEDURE — 90656 IIV3 VACC NO PRSV 0.5 ML IM: CPT | Performed by: FAMILY MEDICINE

## 2024-09-24 PROCEDURE — 83521 IG LIGHT CHAINS FREE EACH: CPT | Performed by: FAMILY MEDICINE

## 2024-09-24 PROCEDURE — 86140 C-REACTIVE PROTEIN: CPT | Performed by: FAMILY MEDICINE

## 2024-09-24 PROCEDURE — 80053 COMPREHEN METABOLIC PANEL: CPT | Performed by: FAMILY MEDICINE

## 2024-09-24 PROCEDURE — G0103 PSA SCREENING: HCPCS | Performed by: FAMILY MEDICINE

## 2024-09-24 PROCEDURE — 82306 VITAMIN D 25 HYDROXY: CPT | Performed by: FAMILY MEDICINE

## 2024-09-24 PROCEDURE — 82043 UR ALBUMIN QUANTITATIVE: CPT | Performed by: FAMILY MEDICINE

## 2024-09-24 PROCEDURE — 84165 PROTEIN E-PHORESIS SERUM: CPT | Performed by: FAMILY MEDICINE

## 2024-09-24 PROCEDURE — 36415 COLL VENOUS BLD VENIPUNCTURE: CPT | Performed by: FAMILY MEDICINE

## 2024-09-24 PROCEDURE — 80061 LIPID PANEL: CPT | Performed by: FAMILY MEDICINE

## 2024-09-24 PROCEDURE — 82607 VITAMIN B-12: CPT | Performed by: FAMILY MEDICINE

## 2024-09-24 PROCEDURE — 82784 ASSAY IGA/IGD/IGG/IGM EACH: CPT | Performed by: FAMILY MEDICINE

## 2024-09-24 PROCEDURE — 86334 IMMUNOFIX E-PHORESIS SERUM: CPT | Performed by: FAMILY MEDICINE

## 2024-09-24 PROCEDURE — 84439 ASSAY OF FREE THYROXINE: CPT | Performed by: FAMILY MEDICINE

## 2024-09-24 PROCEDURE — 90471 IMMUNIZATION ADMIN: CPT | Performed by: FAMILY MEDICINE

## 2024-09-24 PROCEDURE — 84443 ASSAY THYROID STIM HORMONE: CPT | Performed by: FAMILY MEDICINE

## 2024-09-24 RX ORDER — IPRATROPIUM BROMIDE AND ALBUTEROL SULFATE 2.5; .5 MG/3ML; MG/3ML
3 SOLUTION RESPIRATORY (INHALATION)
Qty: 360 ML | Refills: 5 | Status: SHIPPED | OUTPATIENT
Start: 2024-09-24

## 2024-09-24 RX ORDER — MONTELUKAST SODIUM 10 MG/1
10 TABLET ORAL NIGHTLY
Qty: 90 TABLET | Refills: 3 | Status: SHIPPED | OUTPATIENT
Start: 2024-09-24

## 2024-09-25 DIAGNOSIS — D75.1 POLYCYTHEMIA: Primary | ICD-10-CM

## 2024-09-25 LAB
25(OH)D3 SERPL-MCNC: 38.8 NG/ML (ref 30–100)
ALBUMIN SERPL-MCNC: 4 G/DL (ref 3.5–5.2)
ALBUMIN UR-MCNC: <1.2 MG/DL
ALBUMIN/GLOB SERPL: 0.9 G/DL
ALP SERPL-CCNC: 75 U/L (ref 39–117)
ALT SERPL W P-5'-P-CCNC: 10 U/L (ref 1–41)
ANION GAP SERPL CALCULATED.3IONS-SCNC: 15.5 MMOL/L (ref 5–15)
AST SERPL-CCNC: 27 U/L (ref 1–40)
BASOPHILS # BLD AUTO: 0.05 10*3/MM3 (ref 0–0.2)
BASOPHILS NFR BLD AUTO: 1.1 % (ref 0–1.5)
BILIRUB SERPL-MCNC: 1.5 MG/DL (ref 0–1.2)
BUN SERPL-MCNC: 9 MG/DL (ref 6–20)
BUN/CREAT SERPL: 7.6 (ref 7–25)
CALCIUM SPEC-SCNC: 9.3 MG/DL (ref 8.6–10.5)
CHLORIDE SERPL-SCNC: 94 MMOL/L (ref 98–107)
CHOLEST SERPL-MCNC: 150 MG/DL (ref 0–200)
CO2 SERPL-SCNC: 26.5 MMOL/L (ref 22–29)
CREAT SERPL-MCNC: 1.18 MG/DL (ref 0.76–1.27)
CREAT UR-MCNC: 27.8 MG/DL
CRP SERPL-MCNC: 1.55 MG/DL (ref 0–0.5)
DEPRECATED RDW RBC AUTO: 42.8 FL (ref 37–54)
EGFRCR SERPLBLD CKD-EPI 2021: 72.4 ML/MIN/1.73
EOSINOPHIL # BLD AUTO: 0.08 10*3/MM3 (ref 0–0.4)
EOSINOPHIL NFR BLD AUTO: 1.7 % (ref 0.3–6.2)
ERYTHROCYTE [DISTWIDTH] IN BLOOD BY AUTOMATED COUNT: 12.6 % (ref 12.3–15.4)
ERYTHROCYTE [SEDIMENTATION RATE] IN BLOOD: 35 MM/HR (ref 0–20)
GLOBULIN UR ELPH-MCNC: 4.4 GM/DL
GLUCOSE SERPL-MCNC: 97 MG/DL (ref 65–99)
HBA1C MFR BLD: 5.9 % (ref 4.8–5.6)
HCT VFR BLD AUTO: 51.7 % (ref 37.5–51)
HDLC SERPL-MCNC: 22 MG/DL (ref 40–60)
HGB BLD-MCNC: 18.1 G/DL (ref 13–17.7)
IMM GRANULOCYTES # BLD AUTO: 0.02 10*3/MM3 (ref 0–0.05)
IMM GRANULOCYTES NFR BLD AUTO: 0.4 % (ref 0–0.5)
LDLC SERPL CALC-MCNC: 107 MG/DL (ref 0–100)
LDLC/HDLC SERPL: 4.79 {RATIO}
LYMPHOCYTES # BLD AUTO: 1.11 10*3/MM3 (ref 0.7–3.1)
LYMPHOCYTES NFR BLD AUTO: 23.5 % (ref 19.6–45.3)
MCH RBC QN AUTO: 32.7 PG (ref 26.6–33)
MCHC RBC AUTO-ENTMCNC: 35 G/DL (ref 31.5–35.7)
MCV RBC AUTO: 93.3 FL (ref 79–97)
MICROALBUMIN/CREAT UR: NORMAL MG/G{CREAT}
MONOCYTES # BLD AUTO: 0.46 10*3/MM3 (ref 0.1–0.9)
MONOCYTES NFR BLD AUTO: 9.7 % (ref 5–12)
NEUTROPHILS NFR BLD AUTO: 3 10*3/MM3 (ref 1.7–7)
NEUTROPHILS NFR BLD AUTO: 63.6 % (ref 42.7–76)
NRBC BLD AUTO-RTO: 0 /100 WBC (ref 0–0.2)
PLATELET # BLD AUTO: 246 10*3/MM3 (ref 140–450)
PMV BLD AUTO: 10.9 FL (ref 6–12)
POTASSIUM SERPL-SCNC: 3.4 MMOL/L (ref 3.5–5.2)
PROT SERPL-MCNC: 8.4 G/DL (ref 6–8.5)
PSA SERPL-MCNC: 0.57 NG/ML (ref 0–4)
RBC # BLD AUTO: 5.54 10*6/MM3 (ref 4.14–5.8)
SODIUM SERPL-SCNC: 136 MMOL/L (ref 136–145)
T4 FREE SERPL-MCNC: 1.07 NG/DL (ref 0.92–1.68)
TRIGL SERPL-MCNC: 113 MG/DL (ref 0–150)
TSH SERPL DL<=0.05 MIU/L-ACNC: 3.88 UIU/ML (ref 0.27–4.2)
VIT B12 BLD-MCNC: 1266 PG/ML (ref 211–946)
VLDLC SERPL-MCNC: 21 MG/DL (ref 5–40)
WBC NRBC COR # BLD AUTO: 4.72 10*3/MM3 (ref 3.4–10.8)

## 2024-09-25 RX ORDER — POTASSIUM CHLORIDE 1500 MG/1
20 TABLET, EXTENDED RELEASE ORAL DAILY
Qty: 90 TABLET | Refills: 1 | Status: SHIPPED | OUTPATIENT
Start: 2024-09-25

## 2024-09-26 LAB
ALBUMIN SERPL ELPH-MCNC: 3.1 G/DL (ref 2.9–4.4)
ALBUMIN/GLOB SERPL: 0.7 {RATIO} (ref 0.7–1.7)
ALPHA1 GLOB SERPL ELPH-MCNC: 0.4 G/DL (ref 0–0.4)
ALPHA2 GLOB SERPL ELPH-MCNC: 0.9 G/DL (ref 0.4–1)
B-GLOBULIN SERPL ELPH-MCNC: 1.3 G/DL (ref 0.7–1.3)
GAMMA GLOB SERPL ELPH-MCNC: 2.5 G/DL (ref 0.4–1.8)
GLOBULIN SER-MCNC: 5.1 G/DL (ref 2.2–3.9)
IGA SERPL-MCNC: 485 MG/DL (ref 90–386)
IGG SERPL-MCNC: 2059 MG/DL (ref 603–1613)
IGM SERPL-MCNC: 474 MG/DL (ref 20–172)
INTERPRETATION SERPL IEP-IMP: ABNORMAL
KAPPA LC FREE SER-MCNC: 119.6 MG/L (ref 3.3–19.4)
KAPPA LC FREE/LAMBDA FREE SER: 1.97 {RATIO} (ref 0.26–1.65)
LABORATORY COMMENT REPORT: ABNORMAL
LAMBDA LC FREE SERPL-MCNC: 60.8 MG/L (ref 5.7–26.3)
M PROTEIN SERPL ELPH-MCNC: ABNORMAL G/DL
PROT SERPL-MCNC: 8.2 G/DL (ref 6–8.5)

## 2024-10-03 ENCOUNTER — OFFICE (AMBULATORY)
Age: 54
End: 2024-10-03
Payer: MEDICARE

## 2024-10-03 ENCOUNTER — OFFICE (AMBULATORY)
Dept: URBAN - METROPOLITAN AREA CLINIC 64 | Facility: CLINIC | Age: 54
End: 2024-10-03
Payer: MEDICARE

## 2024-10-03 VITALS
DIASTOLIC BLOOD PRESSURE: 79 MMHG | HEIGHT: 70 IN | WEIGHT: 259 LBS | HEART RATE: 74 BPM | SYSTOLIC BLOOD PRESSURE: 113 MMHG | WEIGHT: 259 LBS | SYSTOLIC BLOOD PRESSURE: 113 MMHG | DIASTOLIC BLOOD PRESSURE: 79 MMHG | DIASTOLIC BLOOD PRESSURE: 79 MMHG | HEIGHT: 70 IN | HEART RATE: 74 BPM | HEART RATE: 74 BPM | SYSTOLIC BLOOD PRESSURE: 113 MMHG | WEIGHT: 259 LBS | SYSTOLIC BLOOD PRESSURE: 113 MMHG | DIASTOLIC BLOOD PRESSURE: 79 MMHG | SYSTOLIC BLOOD PRESSURE: 113 MMHG | DIASTOLIC BLOOD PRESSURE: 79 MMHG | WEIGHT: 259 LBS | SYSTOLIC BLOOD PRESSURE: 113 MMHG | HEART RATE: 74 BPM | HEART RATE: 74 BPM | WEIGHT: 259 LBS | HEIGHT: 70 IN | HEIGHT: 70 IN | SYSTOLIC BLOOD PRESSURE: 113 MMHG | WEIGHT: 259 LBS | DIASTOLIC BLOOD PRESSURE: 79 MMHG | HEART RATE: 74 BPM | DIASTOLIC BLOOD PRESSURE: 79 MMHG | HEIGHT: 70 IN | HEIGHT: 70 IN | WEIGHT: 259 LBS | HEIGHT: 70 IN | HEART RATE: 74 BPM

## 2024-10-03 DIAGNOSIS — K70.30 ALCOHOLIC CIRRHOSIS OF LIVER WITHOUT ASCITES: ICD-10-CM

## 2024-10-03 PROCEDURE — 99213 OFFICE O/P EST LOW 20 MIN: CPT | Performed by: NURSE PRACTITIONER

## 2024-10-10 ENCOUNTER — HOSPITAL ENCOUNTER (OUTPATIENT)
Dept: ULTRASOUND IMAGING | Facility: HOSPITAL | Age: 54
Discharge: HOME | End: 2024-10-10
Admitting: FAMILY MEDICINE
Payer: COMMERCIAL

## 2024-10-10 DIAGNOSIS — K70.30 ALCOHOLIC CIRRHOSIS OF LIVER WITHOUT ASCITES: Chronic | ICD-10-CM

## 2024-10-10 PROCEDURE — 76705 ECHO EXAM OF ABDOMEN: CPT

## 2024-10-13 DIAGNOSIS — M17.11 PRIMARY OSTEOARTHRITIS OF RIGHT KNEE: ICD-10-CM

## 2024-10-13 DIAGNOSIS — M16.11 PRIMARY OSTEOARTHRITIS OF RIGHT HIP: ICD-10-CM

## 2024-10-14 RX ORDER — PREGABALIN 75 MG/1
75 CAPSULE ORAL 2 TIMES DAILY
Qty: 60 CAPSULE | Refills: 2 | Status: SHIPPED | OUTPATIENT
Start: 2024-10-14

## 2024-10-15 ENCOUNTER — HOSPITAL ENCOUNTER (OUTPATIENT)
Dept: CT IMAGING | Facility: HOSPITAL | Age: 54
Discharge: HOME | End: 2024-10-15
Admitting: INTERNAL MEDICINE
Payer: COMMERCIAL

## 2024-10-15 DIAGNOSIS — Z87.891 FORMER CIGARETTE SMOKER: ICD-10-CM

## 2024-10-15 PROCEDURE — 71271 CT THORAX LUNG CANCER SCR C-: CPT

## 2024-11-06 DIAGNOSIS — J31.0 CHRONIC RHINITIS: ICD-10-CM

## 2024-11-06 RX ORDER — IPRATROPIUM BROMIDE 42 UG/1
SPRAY, METERED NASAL
Qty: 45 EACH | Refills: 1 | Status: SHIPPED | OUTPATIENT
Start: 2024-11-06

## 2024-11-11 NOTE — PROGRESS NOTES
Hematology/Oncology Outpatient Follow Up    Mc Selby  1970    Primary Care Physician: Berny Godwin MD  Referring Physician: Berny Godwin, *  Chief Complaint:  Monoclonal gammopathy/IgG kappa monoclonal gammopathy of unknown significance  History of Present Illness:   Mr. Selby has a long history of rheumatoid arthritis on and off disease modifying agents.  Patient sees Dr. Rosas and laboratory work-up was initiated secondary to renal failure which revealed monoclonal gammopathy patient was sent to the cancer [facility] and seen initially on 1/7/2020.  Patient has cirrhosis related to alcohol abuse had episodes of hepatic encephalopathy.    Patient has renal failure stage II at one point he was on hemodialysis briefly.  Had a perforated gastric ulcer with hemorrhage  12/12/2019 serum immunofixation shows IgG monoclonal protein with kappa light chain specificity.  IgG 2111, IgA 703, IgM 754  1/21/2020 bone marrow biopsy and aspirate-right iliac crest bone marrow biopsy with aspirate smears and cell block preparation shows normocellular bone marrow with 50% cellularity.  Absent iron stores.  Negative for involvement by malignant lymphoma and metastatic malignancy.  Cytogenetics normal 46 XY flow cytometry normal  1/23/2020 whole-body skeletal survey geographic lucency within the right distal tibial diaphysis could relate to underlying lytic lesion, consider additional imaging or bone scan. 2. Small area of lucency with endosteal scalloping involving the distal left fibular diaphysis could relate to lytic lesion, dedicated AP and lateral views of the left fibula may be helpful. 2. Incidental findings above. 3. Carotid atherosclerotic disease. Consider follow-up carotid ultrasound if not previously performed.  1/30/2020 urine electrophoresis shows IgG monoclonal protein with kappa light chain specificity.  Small quantity of monoclonal protein unable to quantitate M spike.  Beta-2  microglobulin 4.0.  Serum kappa lambda free light chain ratio 1.83 creatinine 1.05 alk phos 242 total bilirubin 1.9  3/10/2020- PET- 1. No convincing evidence of osseous metastatic disease in this patient with history of multiple myeloma. Uptake surrounding each hip and around the elbow, wrist and finger joints, is favored to represent benign inflammatory/osteoarthritic change. 2. Few mildly prominent bilateral axillary lymph nodes demonstrate intermediate FDG accumulation. Both benign and malignant etiologies are in the differential. Consider short-term CT chest follow-up. 3. Hypermetabolic activity in a symmetric fashion the bilateral tonsillar pillars without discrete mass on CT. Correlate for tonsillar inflammation. 4. Additional CT findings as described above, including: Cirrhosis, hepatosplenomegaly, ascites, cholelithiasis, right renal cyst, dense coronary artery calcification, gynecomastia, maxillary sinusitis.  6/8/2020- CT chest W - 1. No evidence of mediastinal, hilar or axillary lymphadenopathy 2. Bilateral subcentimeter nodules in the lower lobes which appear increased in number from CT of 08/08/2018 and were also poorly seen on PET/CT of 03/10/2020, these are probably benign however should be followed in one year due to their very small size. 3. upper abdomen demonstrates changes of hepatosplenomegaly, cholelithiasis and upper pole right renal cyst  3/30/2022 - osteopenia  On DEXA scan.   6/7/2022 - IgG 1997, M spike 0.8. free kappa light chain 96.4, k/l 2.79 cr 0.86  8/26/2022: Stable lung nodules measuring up to 5 mm.  No new or enlarging lung nodules are seen.  7/5/2023: Hemoglobin 17.5 g/dL, hematocrit 49.4, platelets 186,000.  Creatinine 1.07, calcium 9.1.  Free light chain ratio 3.25, IgG 1918, M spike 0.8 g/dL.  9/24/2024: WBC 4.70, hemoglobin 18.1, hematocrit 51.7, platelets 246,000, creatinine 1.18, potassium 3.4, IgG 2059, kappa lambda ratio 1.97, M spike IgG kappa equal 0.6 g/dL, IgG lambda  "equals 0.3 g/dL      SUBJECTIVE:      Garcia is here today for his routine follow-up for MGUS.  He notes that he had some elevated red blood cell counts that his primary care physician recommended he follow-up in office on along with his normal surveillance.  He does not smoke.  He does have a history of COPD and also sleep apnea for which he uses a CPAP.  He is compliant with his CPAP but notes that he does have periodic hypoxia with O2 saturations into the 80s.  He has a chronic history of night sweats but denies any fever, frequent infections, swollen lymph nodes, unexplained weight loss.      Past Medical History:   Diagnosis Date    Acute perforated gastric ulcer with hemorrhage 07/16/2018    Alcoholism     Allergic     Allergic rhinitis     Arthritis     rheumatoid (diagnosed at age 16)    Arthritis of back     Arthritis of neck     Cirrhosis     etoh cirrhosis-liver failure. Follows w/ GI- [facility]    CKD (chronic kidney disease)     dr. nelson.  Stage 2    COPD (chronic obstructive pulmonary disease)     Depression     Dislocation of finger     Duodenal ulcer      perforated duodenal s/p patch    Erectile dysfunction     Fatty liver     Fracture, femur     Fracture, foot     GERD without esophagitis 09/02/2020    Glaucoma     Hip arthrosis     History of transfusion     HL (hearing loss)     Infectious viral hepatitis     Knee swelling     MRSA (methicillin resistant Staphylococcus aureus) carrier 06/12/2019    nasal swab    Multiple myeloma     Dr. Hernandez at [facility]    Nausea     states \"gallbladder issues\"- had attempted cholecystectomy, but unable to complete d/t adhered to liver    Osteopenia     Periarthritis of shoulder     Renal insufficiency     Sleep apnea     unable to tolerate CPAP    Tumors      benign essential    Type 2 diabetes mellitus with hyperglycemia 01/08/2020    off meds now    Umbilical hernia u    Wound, open     healed at this time       Past Surgical History:   Procedure Laterality Date "    COLONOSCOPY      CYST REMOVAL Left     forarm    ENDOSCOPY      ascities/paracentesis    ENDOSCOPY N/A 07/22/2021    Procedure: ESOPHAGOGASTRODUODENOSCOPY WITH DILATION (#[id number]);  Surgeon: Glenn Loo MD;  Location: [facility] ENDOSCOPY;  Service: Gastroenterology;  Laterality: N/A;  ESOPHAGEAL STRICTURE    EXPLORATORY LAPAROTOMY  07/16/2018    Over-sew Gastric Ulcer With Gastrostomy and Jejunostomy Tube    FOOT SURGERY Right     right bunion removal    FRACTURE SURGERY Right 2005    bunionectomy/ broke toe and put in rods    HERNIA REPAIR  6/18/2019    MASS EXCISION Right 06/18/2019    Procedure: EXCISION OF SEBACEOUS CYST OF THE RIGHT BACK;  Surgeon: Sapna Elizalde MD;  Location: [facility] MAIN OR;  Service: General    MASS EXCISION Right 08/27/2019    Procedure: EXCISION LESION of right back;  Surgeon: Sapna Elizalde MD;  Location: [facility] MAIN OR;  Service: General    MOUTH SURGERY Left     UMBILICAL HERNIA REPAIR N/A 06/18/2019    Procedure: UMBILICAL HERNIA REPAIR LAPAROSCOPIC WITH MESH WITH EXTENSIVE LYSIS OF ADHESIONS;  Surgeon: Sapna Elizalde MD;  Location: [facility] MAIN OR;  Service: General         Current Outpatient Medications:     Abatacept 125 MG/ML solution auto-injector, Inject 1 mL under the skin into the appropriate area as directed Every 7 (Seven) Days., Disp: , Rfl:     budesonide-formoterol (Symbicort) 80-4.5 MCG/ACT inhaler, Inhale 2 puffs 2 (Two) Times a Day., Disp: 1 each, Rfl: 12    bumetanide (BUMEX) 2 MG tablet, Take 1 tablet by mouth Daily., Disp: , Rfl:     cetirizine (zyrTEC) 10 MG tablet, TAKE 1 TABLET BY MOUTH EVERY DAY, Disp: 90 tablet, Rfl: 3    EPINEPHrine (EPIPEN) 0.3 MG/0.3ML solution auto-injector injection, 2, Disp: , Rfl:     ipratropium (ATROVENT) 0.06 % nasal spray, INSTILL 2 SPRAYS INTO THE NOSTRIL(S) AS DIRECTED BY PROVIDER 4 (FOUR) TIMES A DAY., Disp: 45 each, Rfl: 1    ipratropium-albuterol (DUO-NEB) 0.5-2.5 mg/3 ml nebulizer, Take 3 mL by nebulization 4 (Four) Times  a Day., Disp: 360 mL, Rfl: 5    lansoprazole (PREVACID) 30 MG capsule, TAKE 1 CAPSULE BY MOUTH EVERY DAY, Disp: 90 capsule, Rfl: 1    magnesium oxide (MAG-OX) 400 MG tablet, Take 2 tablets by mouth Every Night., Disp: 90 tablet, Rfl: 2    montelukast (Singulair) 10 MG tablet, Take 1 tablet by mouth Every Night., Disp: 90 tablet, Rfl: 3    potassium chloride (K-DUR,KLOR-CON) 20 MEQ CR tablet, Take 1 tablet by mouth Daily., Disp: , Rfl:     potassium chloride (K-TAB) 20 MEQ tablet controlled-release ER tablet, Take 1 tablet by mouth Daily., Disp: 90 tablet, Rfl: 1    pregabalin (LYRICA) 75 MG capsule, TAKE 1 CAPSULE BY MOUTH TWICE A DAY, Disp: 60 capsule, Rfl: 2    Semaglutide, 2 MG/DOSE, (Ozempic, 2 MG/DOSE,) 8 MG/3ML solution pen-injector, INJECT 2 MG UNDER THE SKIN INTO THE APPROPRIATE AREA AS DIRECTED 1 (ONE) TIME PER WEEK., Disp: 3 mL, Rfl: 5    Spiriva Respimat 2.5 MCG/ACT aerosol solution inhaler, Inhale 2 puffs Daily., Disp: 3 each, Rfl: 2    tamsulosin (FLOMAX) 0.4 MG capsule 24 hr capsule, TAKE 1 CAPSULE BY MOUTH TWICE A DAY, Disp: 180 capsule, Rfl: 2    Allergies   Allergen Reactions    Duloxetine Rash       Family History   Problem Relation Age of Onset    Cancer Mother     Lung disease Father     Other Father         prostate problems    Mental illness Brother     Rheumatologic disease Brother     Early death Brother        Cancer-related family history includes Cancer in his mother.    Social History     Tobacco Use    Smoking status: Former     Current packs/day: 0.00     Average packs/day: 1 pack/day for 37.0 years (37.0 ttl pk-yrs)     Types: Cigarettes     Start date:      Quit date: 2018     Years since quittin.8    Smokeless tobacco: Former   Vaping Use    Vaping status: Never Used    Passive vaping exposure: Passive Vaping Exposure Comment (previously worked in a bar)   Substance Use Topics    Alcohol use: Yes     Alcohol/week: 42.0 standard drinks of alcohol     Types: 42 Cans of beer per  "week    Drug use: No       I have reviewed the history of present illness, past medical history, family history, social history, lab results, all notes and other records since the patient was last seen at the cancer care center.      ROS: Per ejective    Objective:       Vitals:    11/12/24 1351   BP: 116/77   Pulse: 84   Temp: 98.7 °F (37.1 °C)   SpO2: 96%   Weight: 122 kg (268 lb)   Height: 177.8 cm (70\")   PainSc: 0-No pain         /77   Pulse 84   Temp 98.7 °F (37.1 °C)   Ht 177.8 cm (70\")   Wt 122 kg (268 lb)   SpO2 96%   BMI 38.45 kg/m²     PHYSICAL EXAM:      Physical Exam   Constitutional: He is oriented to person, place, and time. No distress.   HENT:   Head: Normocephalic and atraumatic.   Eyes: Conjunctivae are normal. Right eye exhibits no discharge. Left eye exhibits no discharge. No scleral icterus.   Neck: No thyromegaly present.   Cardiovascular: Normal rate, regular rhythm, normal heart sounds and normal pulses. Exam reveals no gallop and no friction rub.   Pulmonary/Chest: Effort normal. No stridor. No respiratory distress. He has no wheezes.   Abdominal: Soft. Bowel sounds are normal. He exhibits no mass. There is no abdominal tenderness. There is no rebound and no guarding.   Musculoskeletal: Normal range of motion. No tenderness.   Lymphadenopathy:     He has no cervical adenopathy.   Neurological: He is alert and oriented to person, place, and time. He exhibits normal muscle tone.   Skin: Skin is warm. No rash noted. He is not diaphoretic. No erythema.   Psychiatric: His behavior is normal.        RECENT LABS:     Lab Results   Component Value Date    WBC 5.74 11/12/2024    WBC 4.72 09/24/2024    HGB 16.5 11/12/2024    HGB 18.1 (H) 09/24/2024    HCT 47.8 11/12/2024    HCT 51.7 (H) 09/24/2024    NEUTROABS 4.30 11/12/2024         Lab Results   Component Value Date    GLUCOSE 221 (H) 11/12/2024    BUN 10 11/12/2024    CREATININE 1.19 11/12/2024    EGFRIFNONA 76 01/13/2022    ALEX " 114 03/15/2017    BCR 8.4 11/12/2024    K 3.4 (L) 11/12/2024    CO2 29.4 (H) 11/12/2024    CALCIUM 9.5 11/12/2024    PROTENTOTREF 8.2 09/24/2024    ALBUMIN 3.8 11/12/2024    LABIL2 0.7 09/24/2024    AST 19 11/12/2024    ALT 7 11/12/2024       Assessment & Plan      Patient is a 52-year-old male with history of compensated liver cirrhosis, chronic renal failure who is seen for IgG kappa monoclonal gammopathy of unknown significance.    Monoclonal gammopathy of undetermined significance  M protein at 0.6 is stable, KL ratio is stable, IgG stable, no evidence of progression, no hypercalcemia or kidney dysfunction  Continue follow MGUS labs yearly  Reviewed symptoms to report between appointments: Bone pain, fatigue, unintentional weight loss, fever, night sweats, signs symptoms of bleeding, swollen lymph nodes.    Lung nodules  Patient has had multiple small lung nodules.  Repeat CT in 8/20/2022 with stable 5 mm nodules.  Followed by his PCP    Polycythemia  Managed per his primary physician with phlebotomy every 3 months  Secondary to COPD  Patient reports he has no longer been donating blood and that his PCP recommended further evaluation in our office  Repeat CBC today, CMP, further evaluate polycythemia with JAK2, erythropoietin, BCR-ABL, LDH and uric acid levels  Counseled continue compliance with CPAP and to stay well-hydrated  Follow-up in 6 weeks with Dr. Taylor to review, sooner if needed

## 2024-11-12 ENCOUNTER — OFFICE VISIT (OUTPATIENT)
Dept: ONCOLOGY | Facility: CLINIC | Age: 54
End: 2024-11-12
Payer: COMMERCIAL

## 2024-11-12 ENCOUNTER — LAB (OUTPATIENT)
Dept: LAB | Facility: HOSPITAL | Age: 54
End: 2024-11-12
Payer: COMMERCIAL

## 2024-11-12 VITALS
HEIGHT: 70 IN | BODY MASS INDEX: 38.37 KG/M2 | OXYGEN SATURATION: 96 % | SYSTOLIC BLOOD PRESSURE: 116 MMHG | DIASTOLIC BLOOD PRESSURE: 77 MMHG | TEMPERATURE: 98.7 F | WEIGHT: 268 LBS | HEART RATE: 84 BPM

## 2024-11-12 DIAGNOSIS — M06.09 RHEUMATOID ARTHRITIS OF MULTIPLE SITES WITHOUT RHEUMATOID FACTOR: Primary | ICD-10-CM

## 2024-11-12 DIAGNOSIS — D47.2 MGUS (MONOCLONAL GAMMOPATHY OF UNKNOWN SIGNIFICANCE): Primary | ICD-10-CM

## 2024-11-12 DIAGNOSIS — D75.1 POLYCYTHEMIA: ICD-10-CM

## 2024-11-12 DIAGNOSIS — D47.2 MGUS (MONOCLONAL GAMMOPATHY OF UNKNOWN SIGNIFICANCE): ICD-10-CM

## 2024-11-12 LAB
ALBUMIN SERPL-MCNC: 3.8 G/DL (ref 3.5–5.2)
ALBUMIN/GLOB SERPL: 1.1 G/DL
ALP SERPL-CCNC: 73 U/L (ref 39–117)
ALT SERPL W P-5'-P-CCNC: 7 U/L (ref 1–41)
ANION GAP SERPL CALCULATED.3IONS-SCNC: 9.6 MMOL/L (ref 5–15)
AST SERPL-CCNC: 19 U/L (ref 1–40)
BASOPHILS # BLD AUTO: 0.04 10*3/MM3 (ref 0–0.2)
BASOPHILS NFR BLD AUTO: 0.7 % (ref 0–1.5)
BILIRUB SERPL-MCNC: 1 MG/DL (ref 0–1.2)
BUN SERPL-MCNC: 10 MG/DL (ref 6–20)
BUN/CREAT SERPL: 8.4 (ref 7–25)
CALCIUM SPEC-SCNC: 9.5 MG/DL (ref 8.6–10.5)
CHLORIDE SERPL-SCNC: 96 MMOL/L (ref 98–107)
CO2 SERPL-SCNC: 29.4 MMOL/L (ref 22–29)
CREAT SERPL-MCNC: 1.19 MG/DL (ref 0.76–1.27)
DEPRECATED RDW RBC AUTO: 48.4 FL (ref 37–54)
EGFRCR SERPLBLD CKD-EPI 2021: 71.7 ML/MIN/1.73
EOSINOPHIL # BLD AUTO: 0.05 10*3/MM3 (ref 0–0.4)
EOSINOPHIL NFR BLD AUTO: 0.9 % (ref 0.3–6.2)
ERYTHROCYTE [DISTWIDTH] IN BLOOD BY AUTOMATED COUNT: 14.7 % (ref 12.3–15.4)
GLOBULIN UR ELPH-MCNC: 3.6 GM/DL
GLUCOSE SERPL-MCNC: 221 MG/DL (ref 65–99)
HCT VFR BLD AUTO: 47.8 % (ref 37.5–51)
HGB BLD-MCNC: 16.5 G/DL (ref 13–17.7)
HOLD SPECIMEN: NORMAL
LDH SERPL-CCNC: 101 U/L (ref 135–225)
LYMPHOCYTES # BLD AUTO: 0.85 10*3/MM3 (ref 0.7–3.1)
LYMPHOCYTES NFR BLD AUTO: 14.8 % (ref 19.6–45.3)
MCH RBC QN AUTO: 31.5 PG (ref 26.6–33)
MCHC RBC AUTO-ENTMCNC: 34.5 G/DL (ref 31.5–35.7)
MCV RBC AUTO: 91.4 FL (ref 79–97)
MONOCYTES # BLD AUTO: 0.5 10*3/MM3 (ref 0.1–0.9)
MONOCYTES NFR BLD AUTO: 8.7 % (ref 5–12)
NEUTROPHILS NFR BLD AUTO: 4.3 10*3/MM3 (ref 1.7–7)
NEUTROPHILS NFR BLD AUTO: 74.9 % (ref 42.7–76)
PLATELET # BLD AUTO: 206 10*3/MM3 (ref 140–450)
PMV BLD AUTO: 9.8 FL (ref 6–12)
POTASSIUM SERPL-SCNC: 3.4 MMOL/L (ref 3.5–5.2)
PROT SERPL-MCNC: 7.4 G/DL (ref 6–8.5)
RBC # BLD AUTO: 5.23 10*6/MM3 (ref 4.14–5.8)
SODIUM SERPL-SCNC: 135 MMOL/L (ref 136–145)
URATE SERPL-MCNC: 7 MG/DL (ref 3.4–7)
WBC NRBC COR # BLD AUTO: 5.74 10*3/MM3 (ref 3.4–10.8)

## 2024-11-12 PROCEDURE — 80053 COMPREHEN METABOLIC PANEL: CPT | Performed by: NURSE PRACTITIONER

## 2024-11-12 PROCEDURE — 99214 OFFICE O/P EST MOD 30 MIN: CPT | Performed by: NURSE PRACTITIONER

## 2024-11-12 PROCEDURE — 36415 COLL VENOUS BLD VENIPUNCTURE: CPT

## 2024-11-12 PROCEDURE — 83615 LACTATE (LD) (LDH) ENZYME: CPT | Performed by: NURSE PRACTITIONER

## 2024-11-12 PROCEDURE — 85025 COMPLETE CBC W/AUTO DIFF WBC: CPT

## 2024-11-12 PROCEDURE — 84550 ASSAY OF BLOOD/URIC ACID: CPT | Performed by: NURSE PRACTITIONER

## 2024-11-12 RX ORDER — EPINEPHRINE 0.3 MG/.3ML
INJECTION SUBCUTANEOUS
COMMUNITY

## 2024-11-13 LAB — EPO SERPL-ACNC: 13.7 MIU/ML (ref 2.6–18.5)

## 2024-11-17 LAB
JAK2 P.V617F BLD/T QL: NORMAL
LAB DIRECTOR NAME PROVIDER: NORMAL
LABORATORY COMMENT REPORT: NORMAL

## 2025-01-15 NOTE — PROGRESS NOTES
Hematology/Oncology Outpatient Follow Up    Mc Selby  1970    Primary Care Physician: Berny Godwin MD  Referring Physician: Berny Godwin, *  Chief Complaint:  Monoclonal gammopathy/IgG kappa monoclonal gammopathy of unknown significance  History of Present Illness:   Mr. Selby has a long history of rheumatoid arthritis on and off disease modifying agents.  Patient sees Dr. Rosas and laboratory work-up was initiated secondary to renal failure which revealed monoclonal gammopathy patient was sent to the cancer [facility] and seen initially on 1/7/2020.  Patient has cirrhosis related to alcohol abuse had episodes of hepatic encephalopathy.    Patient has renal failure stage II at one point he was on hemodialysis briefly.  Had a perforated gastric ulcer with hemorrhage  12/12/2019 serum immunofixation shows IgG monoclonal protein with kappa light chain specificity.  IgG 2111, IgA 703, IgM 754  1/21/2020 bone marrow biopsy and aspirate-right iliac crest bone marrow biopsy with aspirate smears and cell block preparation shows normocellular bone marrow with 50% cellularity.  Absent iron stores.  Negative for involvement by malignant lymphoma and metastatic malignancy.  Cytogenetics normal 46 XY flow cytometry normal  1/23/2020 whole-body skeletal survey geographic lucency within the right distal tibial diaphysis could relate to underlying lytic lesion, consider additional imaging or bone scan. 2. Small area of lucency with endosteal scalloping involving the distal left fibular diaphysis could relate to lytic lesion, dedicated AP and lateral views of the left fibula may be helpful. 2. Incidental findings above. 3. Carotid atherosclerotic disease. Consider follow-up carotid ultrasound if not previously performed.  1/30/2020 urine electrophoresis shows IgG monoclonal protein with kappa light chain specificity.  Small quantity of monoclonal protein unable to quantitate M spike.  Beta-2  microglobulin 4.0.  Serum kappa lambda free light chain ratio 1.83 creatinine 1.05 alk phos 242 total bilirubin 1.9  3/10/2020- PET- 1. No convincing evidence of osseous metastatic disease in this patient with history of multiple myeloma. Uptake surrounding each hip and around the elbow, wrist and finger joints, is favored to represent benign inflammatory/osteoarthritic change. 2. Few mildly prominent bilateral axillary lymph nodes demonstrate intermediate FDG accumulation. Both benign and malignant etiologies are in the differential. Consider short-term CT chest follow-up. 3. Hypermetabolic activity in a symmetric fashion the bilateral tonsillar pillars without discrete mass on CT. Correlate for tonsillar inflammation. 4. Additional CT findings as described above, including: Cirrhosis, hepatosplenomegaly, ascites, cholelithiasis, right renal cyst, dense coronary artery calcification, gynecomastia, maxillary sinusitis.  6/8/2020- CT chest W - 1. No evidence of mediastinal, hilar or axillary lymphadenopathy 2. Bilateral subcentimeter nodules in the lower lobes which appear increased in number from CT of 08/08/2018 and were also poorly seen on PET/CT of 03/10/2020, these are probably benign however should be followed in one year due to their very small size. 3. upper abdomen demonstrates changes of hepatosplenomegaly, cholelithiasis and upper pole right renal cyst  3/30/2022 - osteopenia  On DEXA scan.   6/7/2022 - IgG 1997, M spike 0.8. free kappa light chain 96.4, k/l 2.79 cr 0.86  8/26/2022: Stable lung nodules measuring up to 5 mm.  No new or enlarging lung nodules are seen.  7/5/2023: Hemoglobin 17.5 g/dL, hematocrit 49.4, platelets 186,000.  Creatinine 1.07, calcium 9.1.  Free light chain ratio 3.25, IgG 1918, M spike 0.8 g/dL.  9/24/2024: WBC 4.70, hemoglobin 18.1, hematocrit 51.7, platelets 246,000, creatinine 1.18, potassium 3.4, IgG 2059, kappa lambda ratio 1.97, M spike IgG kappa equal 0.6 g/dL, IgG lambda  "equals 0.3 g/dL      I have reviewed and confirmed the accuracy of the patient's history: Chief complaint, HPI, ROS, and Subjective as entered by the MA/LPN/RN. Fariba Taylor MD 01/16/25        SUBJECTIVE:          Patient is here today for routine follow-up and denies any new issues      Past Medical History:   Diagnosis Date    Acute perforated gastric ulcer with hemorrhage 07/16/2018    Alcoholism     Allergic     Allergic rhinitis     Arthritis     rheumatoid (diagnosed at age 16)    Arthritis of back     Arthritis of neck     Cirrhosis     etoh cirrhosis-liver failure. Follows w/ GI- [facility]    CKD (chronic kidney disease)     dr. nelson.  Stage 2    COPD (chronic obstructive pulmonary disease)     Depression     Dislocation of finger     Duodenal ulcer      perforated duodenal s/p patch    Erectile dysfunction     Fatty liver     Fracture, femur     Fracture, foot     GERD without esophagitis 09/02/2020    Glaucoma     Hip arthrosis     History of transfusion     HL (hearing loss)     Infectious viral hepatitis     Knee swelling     MRSA (methicillin resistant Staphylococcus aureus) carrier 06/12/2019    nasal swab    Multiple myeloma     Dr. Hernandez at [facility]    Nausea     states \"gallbladder issues\"- had attempted cholecystectomy, but unable to complete d/t adhered to liver    Osteopenia     Periarthritis of shoulder     Renal insufficiency     Sleep apnea     unable to tolerate CPAP    Tumors      benign essential    Type 2 diabetes mellitus with hyperglycemia 01/08/2020    off meds now    Umbilical hernia u    Wound, open     healed at this time       Past Surgical History:   Procedure Laterality Date    COLONOSCOPY      CYST REMOVAL Left     forarm    ENDOSCOPY      ascities/paracentesis    ENDOSCOPY N/A 07/22/2021    Procedure: ESOPHAGOGASTRODUODENOSCOPY WITH DILATION (#[id number] BOUGIE);  Surgeon: Glenn Loo MD;  Location: [facility] ENDOSCOPY;  Service: Gastroenterology;  Laterality: N/A;  " ESOPHAGEAL STRICTURE    EXPLORATORY LAPAROTOMY  07/16/2018    Over-sew Gastric Ulcer With Gastrostomy and Jejunostomy Tube    FOOT SURGERY Right     right bunion removal    FRACTURE SURGERY Right 2005    bunionectomy/ broke toe and put in rods    HERNIA REPAIR  6/18/2019    MASS EXCISION Right 06/18/2019    Procedure: EXCISION OF SEBACEOUS CYST OF THE RIGHT BACK;  Surgeon: Sapna Elizalde MD;  Location: [facility] MAIN OR;  Service: General    MASS EXCISION Right 08/27/2019    Procedure: EXCISION LESION of right back;  Surgeon: Sapna Elizalde MD;  Location: [facility] MAIN OR;  Service: General    MOUTH SURGERY Left     UMBILICAL HERNIA REPAIR N/A 06/18/2019    Procedure: UMBILICAL HERNIA REPAIR LAPAROSCOPIC WITH MESH WITH EXTENSIVE LYSIS OF ADHESIONS;  Surgeon: Sapna Elizalde MD;  Location: [facility] MAIN OR;  Service: General         Current Outpatient Medications:     aspirin 81 MG chewable tablet, Chew 1 tablet., Disp: , Rfl:     cefadroxil (DURICEF) 500 MG capsule, Take 1 capsule by mouth Every 12 (Twelve) Hours., Disp: , Rfl:     gabapentin (NEURONTIN) 100 MG capsule, TAKE 1 CAPSULE BY MOUTH 3 (THREE) TIMES DAILY FOR 14 DAYS. MAX DAILY AMOUNT: 300 MG, Disp: , Rfl:     ondansetron (ZOFRAN) 4 MG tablet, Take 1 tablet by mouth., Disp: , Rfl:     oxyCODONE (ROXICODONE) 5 MG immediate release tablet, Take 1 tablet by mouth., Disp: , Rfl:     Abatacept 125 MG/ML solution auto-injector, Inject 1 mL under the skin into the appropriate area as directed Every 7 (Seven) Days., Disp: , Rfl:     budesonide-formoterol (Symbicort) 80-4.5 MCG/ACT inhaler, Inhale 2 puffs 2 (Two) Times a Day., Disp: 1 each, Rfl: 12    bumetanide (BUMEX) 2 MG tablet, Take 1 tablet by mouth Daily., Disp: , Rfl:     cetirizine (zyrTEC) 10 MG tablet, TAKE 1 TABLET BY MOUTH EVERY DAY, Disp: 90 tablet, Rfl: 3    EPINEPHrine (EPIPEN) 0.3 MG/0.3ML solution auto-injector injection, 2, Disp: , Rfl:     ipratropium (ATROVENT) 0.06 % nasal spray, INSTILL 2 SPRAYS INTO THE  NOSTRIL(S) AS DIRECTED BY PROVIDER 4 (FOUR) TIMES A DAY., Disp: 45 each, Rfl: 1    ipratropium-albuterol (DUO-NEB) 0.5-2.5 mg/3 ml nebulizer, Take 3 mL by nebulization 4 (Four) Times a Day., Disp: 360 mL, Rfl: 5    lansoprazole (PREVACID) 30 MG capsule, TAKE 1 CAPSULE BY MOUTH EVERY DAY, Disp: 90 capsule, Rfl: 1    magnesium oxide (MAG-OX) 400 MG tablet, Take 2 tablets by mouth Every Night., Disp: 90 tablet, Rfl: 2    montelukast (Singulair) 10 MG tablet, Take 1 tablet by mouth Every Night., Disp: 90 tablet, Rfl: 3    potassium chloride (K-DUR,KLOR-CON) 20 MEQ CR tablet, Take 1 tablet by mouth Daily., Disp: , Rfl:     potassium chloride (K-TAB) 20 MEQ tablet controlled-release ER tablet, Take 1 tablet by mouth Daily., Disp: 90 tablet, Rfl: 1    pregabalin (LYRICA) 75 MG capsule, TAKE 1 CAPSULE BY MOUTH TWICE A DAY, Disp: 60 capsule, Rfl: 2    Semaglutide, 2 MG/DOSE, (Ozempic, 2 MG/DOSE,) 8 MG/3ML solution pen-injector, INJECT 2 MG UNDER THE SKIN INTO THE APPROPRIATE AREA AS DIRECTED 1 (ONE) TIME PER WEEK., Disp: 3 mL, Rfl: 5    Spiriva Respimat 2.5 MCG/ACT aerosol solution inhaler, Inhale 2 puffs Daily., Disp: 3 each, Rfl: 2    tamsulosin (FLOMAX) 0.4 MG capsule 24 hr capsule, TAKE 1 CAPSULE BY MOUTH TWICE A DAY, Disp: 180 capsule, Rfl: 2    Allergies   Allergen Reactions    Duloxetine Rash    Acetaminophen Other (See Comments)     Liver cirrhosis       Family History   Problem Relation Age of Onset    Cancer Mother     Lung disease Father     Other Father         prostate problems    Mental illness Brother     Rheumatologic disease Brother     Early death Brother        Cancer-related family history includes Cancer in his mother.    Social History     Tobacco Use    Smoking status: Former     Current packs/day: 0.00     Average packs/day: 1 pack/day for 37.0 years (37.0 ttl pk-yrs)     Types: Cigarettes     Start date:      Quit date: 2018     Years since quittin.0    Smokeless tobacco: Former   Vaping Use  "   Vaping status: Never Used    Passive vaping exposure: Passive Vaping Exposure Comment (previously worked in a bar)   Substance Use Topics    Alcohol use: Yes     Alcohol/week: 42.0 standard drinks of alcohol     Types: 42 Cans of beer per week    Drug use: No       I have reviewed and confirmed the accuracy of the patient's history: Chief complaint, HPI, ROS, and Subjective as entered by the MA/DANIELLEN/RN. Fariba Taylor MD 01/16/25      ROS: Per ejective    Objective:       Vitals:    01/16/25 1348   BP: 124/81   Pulse: 94   Resp: 20   Temp: 98 °F (36.7 °C)   TempSrc: Infrared   SpO2: 96%   Weight: 112 kg (248 lb)   Height: 177.8 cm (70\")   PainSc:   1   PainLoc: Hip           /81   Pulse 94   Temp 98 °F (36.7 °C) (Infrared)   Resp 20   Ht 177.8 cm (70\")   Wt 112 kg (248 lb)   SpO2 96%   BMI 35.58 kg/m²     PHYSICAL EXAM:      Physical Exam   Constitutional: He is oriented to person, place, and time. No distress.   HENT:   Head: Normocephalic and atraumatic.   Eyes: Conjunctivae are normal. Right eye exhibits no discharge. Left eye exhibits no discharge. No scleral icterus.   Neck: No thyromegaly present.   Cardiovascular: Normal rate, regular rhythm, normal heart sounds and normal pulses. Exam reveals no gallop and no friction rub.   Pulmonary/Chest: Effort normal. No stridor. No respiratory distress. He has no wheezes.   Abdominal: Soft. Bowel sounds are normal. He exhibits no mass. There is no abdominal tenderness. There is no rebound and no guarding.   Musculoskeletal: Normal range of motion. No tenderness.   Lymphadenopathy:     He has no cervical adenopathy.   Neurological: He is alert and oriented to person, place, and time. He exhibits normal muscle tone.   Skin: Skin is warm. No rash noted. He is not diaphoretic. No erythema.   Psychiatric: His behavior is normal.        I have reexamined the patient and the results are consistent with the previously documented exam. Fariba Parnell" MD Claudia      RECENT LABS:         Lab Results   Component Value Date    WBC 5.38 01/16/2025    WBC 5.74 11/12/2024    HGB 16.5 01/16/2025    HGB 16.5 11/12/2024    HCT 48.3 01/16/2025    HCT 47.8 11/12/2024    NEUTROABS 3.35 01/16/2025         Lab Results   Component Value Date    GLUCOSE 221 (H) 11/12/2024    BUN 10 11/12/2024    CREATININE 1.19 11/12/2024    EGFRIFNONA 76 01/13/2022    EGFRIFAFRI 114 03/15/2017    BCR 8.4 11/12/2024    K 3.4 (L) 11/12/2024    CO2 29.4 (H) 11/12/2024    CALCIUM 9.5 11/12/2024    PROTENTOTREF 8.2 09/24/2024    ALBUMIN 3.8 11/12/2024    LABIL2 0.7 09/24/2024    AST 19 11/12/2024    ALT 7 11/12/2024       Assessment & Plan      Patient is a 52-year-old male with history of compensated liver cirrhosis, chronic renal failure who is seen for IgG kappa monoclonal gammopathy of unknown significance.    Monoclonal gammopathy of undetermined significance    M protein at 0.6 is stable, KL ratio is stable, IgG stable, no evidence of progression, no hypercalcemia or kidney dysfunction  Continue follow MGUS labs yearly  Reviewed symptoms to report between appointments: Bone pain, fatigue, unintentional weight loss, fever, night sweats, signs symptoms of bleeding, swollen lymph nodes.    Check monoclonal labs today.  Follow-up in 3 months to review results    Lung nodules    Patient has had multiple small lung nodules.  Repeat CT in 8/20/2022 with stable 5 mm nodules.  Followed by his PCP      Polycythemia    Managed per his primary physician with phlebotomy every 3 months  Secondary to COPD  Patient reports he has no longer been donating blood and that his PCP recommended further evaluation in our office  Repeat CBC today, CMP, further evaluate polycythemia with JAK2, erythropoietin, BCR-ABL, LDH and uric acid levels  Counseled continue compliance with CPAP and to stay well-hydrated  Hemoglobin remains  stable at 16.5 g per DL      Fu in 3 months    Review labs when  available        Electronically signed by Fariba Taylor MD, 01/16/25, 4:43 PM EST.

## 2025-01-16 ENCOUNTER — OFFICE VISIT (OUTPATIENT)
Dept: ONCOLOGY | Facility: CLINIC | Age: 55
End: 2025-01-16
Payer: MEDICARE

## 2025-01-16 ENCOUNTER — LAB (OUTPATIENT)
Dept: LAB | Facility: HOSPITAL | Age: 55
End: 2025-01-16
Payer: COMMERCIAL

## 2025-01-16 VITALS
HEART RATE: 94 BPM | HEIGHT: 70 IN | WEIGHT: 248 LBS | TEMPERATURE: 98 F | BODY MASS INDEX: 35.5 KG/M2 | RESPIRATION RATE: 20 BRPM | SYSTOLIC BLOOD PRESSURE: 124 MMHG | DIASTOLIC BLOOD PRESSURE: 81 MMHG | OXYGEN SATURATION: 96 %

## 2025-01-16 DIAGNOSIS — D47.2 MGUS (MONOCLONAL GAMMOPATHY OF UNKNOWN SIGNIFICANCE): Primary | ICD-10-CM

## 2025-01-16 DIAGNOSIS — D75.1 POLYCYTHEMIA: Primary | ICD-10-CM

## 2025-01-16 DIAGNOSIS — D47.2 MGUS (MONOCLONAL GAMMOPATHY OF UNKNOWN SIGNIFICANCE): ICD-10-CM

## 2025-01-16 DIAGNOSIS — D75.1 POLYCYTHEMIA: ICD-10-CM

## 2025-01-16 LAB
ALBUMIN SERPL-MCNC: 4.3 G/DL (ref 3.5–5.2)
ALBUMIN/GLOB SERPL: 1 G/DL
ALP SERPL-CCNC: 96 U/L (ref 39–117)
ALT SERPL W P-5'-P-CCNC: <5 U/L (ref 1–41)
ANION GAP SERPL CALCULATED.3IONS-SCNC: 13.2 MMOL/L (ref 5–15)
AST SERPL-CCNC: 21 U/L (ref 1–40)
BASOPHILS # BLD AUTO: 0.08 10*3/MM3 (ref 0–0.2)
BASOPHILS NFR BLD AUTO: 1.5 % (ref 0–1.5)
BILIRUB SERPL-MCNC: 1.4 MG/DL (ref 0–1.2)
BUN SERPL-MCNC: 11 MG/DL (ref 6–20)
BUN/CREAT SERPL: 8.8 (ref 7–25)
CALCIUM SPEC-SCNC: 10.2 MG/DL (ref 8.6–10.5)
CHLORIDE SERPL-SCNC: 96 MMOL/L (ref 98–107)
CO2 SERPL-SCNC: 26.8 MMOL/L (ref 22–29)
CREAT SERPL-MCNC: 1.25 MG/DL (ref 0.76–1.27)
DEPRECATED RDW RBC AUTO: 45.5 FL (ref 37–54)
EGFRCR SERPLBLD CKD-EPI 2021: 67.6 ML/MIN/1.73
EOSINOPHIL # BLD AUTO: 0.11 10*3/MM3 (ref 0–0.4)
EOSINOPHIL NFR BLD AUTO: 2 % (ref 0.3–6.2)
ERYTHROCYTE [DISTWIDTH] IN BLOOD BY AUTOMATED COUNT: 13.9 % (ref 12.3–15.4)
GLOBULIN UR ELPH-MCNC: 4.5 GM/DL
GLUCOSE SERPL-MCNC: 119 MG/DL (ref 65–99)
HCT VFR BLD AUTO: 48.3 % (ref 37.5–51)
HGB BLD-MCNC: 16.5 G/DL (ref 13–17.7)
HOLD SPECIMEN: NORMAL
IGA1 MFR SER: 447 MG/DL (ref 70–400)
IGG1 SER-MCNC: 1957 MG/DL (ref 700–1600)
IGM SERPL-MCNC: 501 MG/DL (ref 40–230)
LYMPHOCYTES # BLD AUTO: 1.27 10*3/MM3 (ref 0.7–3.1)
LYMPHOCYTES NFR BLD AUTO: 23.6 % (ref 19.6–45.3)
MCH RBC QN AUTO: 30.6 PG (ref 26.6–33)
MCHC RBC AUTO-ENTMCNC: 34.2 G/DL (ref 31.5–35.7)
MCV RBC AUTO: 89.4 FL (ref 79–97)
MONOCYTES # BLD AUTO: 0.57 10*3/MM3 (ref 0.1–0.9)
MONOCYTES NFR BLD AUTO: 10.6 % (ref 5–12)
NEUTROPHILS NFR BLD AUTO: 3.35 10*3/MM3 (ref 1.7–7)
NEUTROPHILS NFR BLD AUTO: 62.3 % (ref 42.7–76)
PLATELET # BLD AUTO: 262 10*3/MM3 (ref 140–450)
PMV BLD AUTO: 10.1 FL (ref 6–12)
POTASSIUM SERPL-SCNC: 3.7 MMOL/L (ref 3.5–5.2)
PROT SERPL-MCNC: 8.8 G/DL (ref 6–8.5)
RBC # BLD AUTO: 5.4 10*6/MM3 (ref 4.14–5.8)
SODIUM SERPL-SCNC: 136 MMOL/L (ref 136–145)
WBC NRBC COR # BLD AUTO: 5.38 10*3/MM3 (ref 3.4–10.8)

## 2025-01-16 PROCEDURE — 36415 COLL VENOUS BLD VENIPUNCTURE: CPT

## 2025-01-16 PROCEDURE — 80053 COMPREHEN METABOLIC PANEL: CPT | Performed by: INTERNAL MEDICINE

## 2025-01-16 PROCEDURE — 85025 COMPLETE CBC W/AUTO DIFF WBC: CPT

## 2025-01-16 RX ORDER — ASPIRIN 81 MG/1
81 TABLET, CHEWABLE ORAL
COMMUNITY
Start: 2024-12-30 | End: 2025-02-13

## 2025-01-16 RX ORDER — CEFADROXIL 500 MG/1
1 CAPSULE ORAL EVERY 12 HOURS SCHEDULED
COMMUNITY
Start: 2024-12-30

## 2025-01-16 RX ORDER — GABAPENTIN 100 MG/1
CAPSULE ORAL
COMMUNITY
Start: 2024-12-30

## 2025-01-16 RX ORDER — ONDANSETRON 4 MG/1
4 TABLET, FILM COATED ORAL
COMMUNITY
Start: 2024-12-30

## 2025-01-16 RX ORDER — OXYCODONE HYDROCHLORIDE 5 MG/1
5 TABLET ORAL
COMMUNITY
Start: 2024-12-30

## 2025-01-17 LAB
ALBUMIN SERPL ELPH-MCNC: 3.7 G/DL (ref 2.9–4.4)
ALBUMIN/GLOB SERPL: 0.8 {RATIO} (ref 0.7–1.7)
ALPHA1 GLOB SERPL ELPH-MCNC: 0.3 G/DL (ref 0–0.4)
ALPHA2 GLOB SERPL ELPH-MCNC: 0.8 G/DL (ref 0.4–1)
B-GLOBULIN SERPL ELPH-MCNC: 1.2 G/DL (ref 0.7–1.3)
GAMMA GLOB SERPL ELPH-MCNC: 2.2 G/DL (ref 0.4–1.8)
GLOBULIN SER CALC-MCNC: 4.5 G/DL (ref 2.2–3.9)
KAPPA LC FREE SER-MCNC: 129.7 MG/L (ref 3.3–19.4)
KAPPA LC FREE/LAMBDA FREE SER: 2.5 {RATIO} (ref 0.26–1.65)
LABORATORY COMMENT REPORT: ABNORMAL
LAMBDA LC FREE SERPL-MCNC: 51.9 MG/L (ref 5.7–26.3)
M PROTEIN SERPL ELPH-MCNC: ABNORMAL G/DL
PROT PATTERN SERPL ELPH-IMP: ABNORMAL
PROT SERPL-MCNC: 8.2 G/DL (ref 6–8.5)

## 2025-01-21 LAB
IGA SERPL-MCNC: 399 MG/DL (ref 90–386)
IGG SERPL-MCNC: 1770 MG/DL (ref 603–1613)
IGM SERPL-MCNC: 476 MG/DL (ref 20–172)
PROT PATTERN SERPL IFE-IMP: ABNORMAL

## 2025-01-23 LAB
INTERPRETATION: NEGATIVE
LAB DIRECTOR NAME PROVIDER: NORMAL
LABORATORY COMMENT REPORT: NORMAL
Lab: NORMAL
REF LAB TEST METHOD: NORMAL
T(ABL1,BCR)B2A2/CONTROL BLD/T: NORMAL %
T(ABL1,BCR)B3A2/CONTROL BLD/T: NORMAL %
T(ABL1,BCR)E1A2/CONTROL BLD/T: NORMAL %

## 2025-01-30 RX ORDER — BUMETANIDE 2 MG/1
2 TABLET ORAL DAILY
Qty: 90 TABLET | Refills: 1 | Status: SHIPPED | OUTPATIENT
Start: 2025-01-30 | End: 2025-01-31 | Stop reason: SDUPTHER

## 2025-01-31 RX ORDER — BUMETANIDE 2 MG/1
2 TABLET ORAL DAILY
Qty: 90 TABLET | Refills: 1 | Status: SHIPPED | OUTPATIENT
Start: 2025-01-31

## 2025-02-05 DIAGNOSIS — E11.65 TYPE 2 DIABETES MELLITUS WITH HYPERGLYCEMIA, WITH LONG-TERM CURRENT USE OF INSULIN: Chronic | ICD-10-CM

## 2025-02-05 DIAGNOSIS — Z79.4 TYPE 2 DIABETES MELLITUS WITH HYPERGLYCEMIA, WITH LONG-TERM CURRENT USE OF INSULIN: Chronic | ICD-10-CM

## 2025-02-05 RX ORDER — SEMAGLUTIDE 2.68 MG/ML
2 INJECTION, SOLUTION SUBCUTANEOUS WEEKLY
Qty: 9 ML | Refills: 3 | Status: SHIPPED | OUTPATIENT
Start: 2025-02-05

## 2025-02-25 DIAGNOSIS — M16.11 PRIMARY OSTEOARTHRITIS OF RIGHT HIP: ICD-10-CM

## 2025-02-25 DIAGNOSIS — M17.11 PRIMARY OSTEOARTHRITIS OF RIGHT KNEE: ICD-10-CM

## 2025-02-25 RX ORDER — PREGABALIN 75 MG/1
75 CAPSULE ORAL 2 TIMES DAILY
Qty: 60 CAPSULE | Refills: 2 | Status: SHIPPED | OUTPATIENT
Start: 2025-02-25

## 2025-02-27 ENCOUNTER — TELEPHONE (OUTPATIENT)
Dept: ONCOLOGY | Facility: CLINIC | Age: 55
End: 2025-02-27

## 2025-02-27 NOTE — TELEPHONE ENCOUNTER
Caller: JOSELYN RODGERS (KOJO)    Relationship: Emergency Contact    Best call back number:  [phone]    What is the best time to reach you: ANYTIME AFTER 10AM OR 11AM        Who are you requesting to speak with (clinical staff, provider,  specific staff member): CLINICAL         What was the call regarding     PAMELA HAD LAB WORK COMPLETED 01/16/25 AND THE CLAIM WITH [facility] IS BEING DENIED  BECAUSE THE CODES BEING USED WITH DX CODE IS NOT CORRECT FOR THE CLINICAL INSURANCE POLICY WITH [employer]      CPT CODES USED WAS 15728 AND 59883     [employer] IS SAYING NEEDED TO BE USED WITH CLINICAL POLICY BULLETIN  NUMBER [id number]    HAS BEEN BACK AND FORTH WITH THE INSURANCE AND THIS IS WHAT THEY HAVE SAID IS NEEDED FOR THE CLAIM     NEEDING SOME ASSISTANCE TO HELP WITH WHAT NEXT STEPS ARE TO GET THIS TAKEN CARE OF . AS OF NOW  HAS BILL FOR THIS OF $1224.30 AND CANNOT PAY THIS    HAS [employer] AS PRIMARY AND [employer] SECONDARY

## 2025-03-15 DIAGNOSIS — K21.9 GERD WITHOUT ESOPHAGITIS: Chronic | ICD-10-CM

## 2025-03-17 RX ORDER — LANSOPRAZOLE 30 MG/1
30 CAPSULE, DELAYED RELEASE ORAL DAILY
Qty: 90 CAPSULE | Refills: 1 | Status: SHIPPED | OUTPATIENT
Start: 2025-03-17

## 2025-04-15 NOTE — TELEPHONE ENCOUNTER
CALLED DR. VERDUZCO OFFICE AND LEFT MESSAGE TO PLEASE FAX ME PROGRESS NOTE FROM APPT IN MARCH 2020 AND ANY RELATED RECORDS TO THAT VISIT   Previously Declined (within the last year)

## 2025-04-17 ENCOUNTER — OFFICE VISIT (OUTPATIENT)
Dept: ONCOLOGY | Facility: CLINIC | Age: 55
End: 2025-04-17
Payer: COMMERCIAL

## 2025-04-17 ENCOUNTER — LAB (OUTPATIENT)
Dept: LAB | Facility: HOSPITAL | Age: 55
End: 2025-04-17
Payer: COMMERCIAL

## 2025-04-17 VITALS
OXYGEN SATURATION: 95 % | RESPIRATION RATE: 20 BRPM | BODY MASS INDEX: 34.36 KG/M2 | TEMPERATURE: 97.4 F | DIASTOLIC BLOOD PRESSURE: 82 MMHG | HEIGHT: 70 IN | SYSTOLIC BLOOD PRESSURE: 122 MMHG | HEART RATE: 71 BPM | WEIGHT: 240 LBS

## 2025-04-17 DIAGNOSIS — D75.1 POLYCYTHEMIA: Primary | ICD-10-CM

## 2025-04-17 DIAGNOSIS — D47.2 MGUS (MONOCLONAL GAMMOPATHY OF UNKNOWN SIGNIFICANCE): ICD-10-CM

## 2025-04-17 DIAGNOSIS — D75.1 POLYCYTHEMIA: ICD-10-CM

## 2025-04-17 DIAGNOSIS — D47.2 MGUS (MONOCLONAL GAMMOPATHY OF UNKNOWN SIGNIFICANCE): Primary | ICD-10-CM

## 2025-04-17 PROBLEM — K63.1 BOWEL PERFORATION: Status: ACTIVE | Noted: 2018-08-02

## 2025-04-17 PROBLEM — D84.9 IMMUNOSUPPRESSIVE DISEASE: Chronic | Status: ACTIVE | Noted: 2023-07-24

## 2025-04-17 PROBLEM — K72.90 HEPATIC FAILURE, UNSPECIFIED WITHOUT COMA: Status: ACTIVE | Noted: 2023-12-27

## 2025-04-17 PROBLEM — F10.20 ALCOHOL DEPENDENCE: Status: ACTIVE | Noted: 2018-08-03

## 2025-04-17 PROBLEM — Z80.0 FAMILY HISTORY OF COLON CANCER: Status: ACTIVE | Noted: 2020-03-09

## 2025-04-17 PROBLEM — M62.81 MUSCLE WEAKNESS: Status: ACTIVE | Noted: 2018-08-02

## 2025-04-17 PROBLEM — Z43.1 ENCOUNTER FOR ATTENTION TO GASTROSTOMY: Status: ACTIVE | Noted: 2018-08-02

## 2025-04-17 PROBLEM — R74.02 ELEVATION OF LEVEL OF TRANSAMINASE AND LACTIC ACID DEHYDROGENASE (LDH): Status: ACTIVE | Noted: 2023-12-27

## 2025-04-17 PROBLEM — N17.9 ACUTE KIDNEY INJURY: Status: ACTIVE | Noted: 2018-08-03

## 2025-04-17 PROBLEM — R74.01 ELEVATION OF LEVEL OF TRANSAMINASE AND LACTIC ACID DEHYDROGENASE (LDH): Status: ACTIVE | Noted: 2023-12-27

## 2025-04-17 PROBLEM — R27.9 COORDINATION PROBLEM: Status: ACTIVE | Noted: 2018-08-02

## 2025-04-17 PROBLEM — K59.00 CONSTIPATION: Status: ACTIVE | Noted: 2018-08-02

## 2025-04-17 PROBLEM — R13.12 OROPHARYNGEAL DYSPHAGIA: Status: ACTIVE | Noted: 2018-08-02

## 2025-04-17 PROBLEM — R26.2 DIFFICULTY WALKING: Status: ACTIVE | Noted: 2018-08-02

## 2025-04-17 PROBLEM — Z79.4 TYPE 2 DIABETES MELLITUS WITHOUT COMPLICATION, WITH LONG-TERM CURRENT USE OF INSULIN: Chronic | Status: ACTIVE | Noted: 2023-07-24

## 2025-04-17 PROBLEM — B19.10 VIRAL HEPATITIS B WITHOUT HEPATIC COMA: Status: ACTIVE | Noted: 2018-08-03

## 2025-04-17 PROBLEM — Z93.1 GASTROSTOMY PRESENT: Status: ACTIVE | Noted: 2018-08-02

## 2025-04-17 PROBLEM — R41.841 COGNITIVE COMMUNICATION DISORDER: Status: ACTIVE | Noted: 2018-08-02

## 2025-04-17 PROBLEM — K70.9 ALCOHOLIC LIVER DISEASE, UNSPECIFIED: Status: ACTIVE | Noted: 2018-08-03

## 2025-04-17 PROBLEM — K25.1 ACUTE GASTRIC ULCER WITH PERFORATION: Status: ACTIVE | Noted: 2018-08-03

## 2025-04-17 PROBLEM — E11.9 TYPE 2 DIABETES MELLITUS WITHOUT COMPLICATION, WITH LONG-TERM CURRENT USE OF INSULIN: Chronic | Status: ACTIVE | Noted: 2023-07-24

## 2025-04-17 PROBLEM — R76.8 HEPATITIS B SURFACE ANTIGEN POSITIVE: Status: ACTIVE | Noted: 2023-12-27

## 2025-04-17 LAB
ALBUMIN SERPL-MCNC: 4 G/DL (ref 3.5–5.2)
ALBUMIN/GLOB SERPL: 0.9 G/DL
ALP SERPL-CCNC: 78 U/L (ref 39–117)
ALT SERPL W P-5'-P-CCNC: 8 U/L (ref 1–41)
ANION GAP SERPL CALCULATED.3IONS-SCNC: 10.7 MMOL/L (ref 5–15)
AST SERPL-CCNC: 18 U/L (ref 1–40)
BASOPHILS # BLD AUTO: 0.04 10*3/MM3 (ref 0–0.2)
BASOPHILS NFR BLD AUTO: 0.6 % (ref 0–1.5)
BILIRUB SERPL-MCNC: 1.4 MG/DL (ref 0–1.2)
BUN SERPL-MCNC: 10 MG/DL (ref 6–20)
BUN/CREAT SERPL: 9.9 (ref 7–25)
CALCIUM SPEC-SCNC: 9.9 MG/DL (ref 8.6–10.5)
CHLORIDE SERPL-SCNC: 99 MMOL/L (ref 98–107)
CO2 SERPL-SCNC: 27.3 MMOL/L (ref 22–29)
CREAT SERPL-MCNC: 1.01 MG/DL (ref 0.76–1.27)
DEPRECATED RDW RBC AUTO: 49.7 FL (ref 37–54)
EGFRCR SERPLBLD CKD-EPI 2021: 86.7 ML/MIN/1.73
EOSINOPHIL # BLD AUTO: 0.07 10*3/MM3 (ref 0–0.4)
EOSINOPHIL NFR BLD AUTO: 1.1 % (ref 0.3–6.2)
ERYTHROCYTE [DISTWIDTH] IN BLOOD BY AUTOMATED COUNT: 15.4 % (ref 12.3–15.4)
GLOBULIN UR ELPH-MCNC: 4.3 GM/DL
GLUCOSE SERPL-MCNC: 126 MG/DL (ref 65–99)
HCT VFR BLD AUTO: 49.5 % (ref 37.5–51)
HGB BLD-MCNC: 16.7 G/DL (ref 13–17.7)
HOLD SPECIMEN: NORMAL
IGA1 MFR SER: 420 MG/DL (ref 70–400)
IGG1 SER-MCNC: 1848 MG/DL (ref 700–1600)
IGM SERPL-MCNC: 529 MG/DL (ref 40–230)
LYMPHOCYTES # BLD AUTO: 1.06 10*3/MM3 (ref 0.7–3.1)
LYMPHOCYTES NFR BLD AUTO: 16.7 % (ref 19.6–45.3)
MCH RBC QN AUTO: 30.4 PG (ref 26.6–33)
MCHC RBC AUTO-ENTMCNC: 33.7 G/DL (ref 31.5–35.7)
MCV RBC AUTO: 90.2 FL (ref 79–97)
MONOCYTES # BLD AUTO: 0.7 10*3/MM3 (ref 0.1–0.9)
MONOCYTES NFR BLD AUTO: 11 % (ref 5–12)
NEUTROPHILS NFR BLD AUTO: 4.48 10*3/MM3 (ref 1.7–7)
NEUTROPHILS NFR BLD AUTO: 70.6 % (ref 42.7–76)
PLATELET # BLD AUTO: 249 10*3/MM3 (ref 140–450)
PMV BLD AUTO: 10 FL (ref 6–12)
POTASSIUM SERPL-SCNC: 3.4 MMOL/L (ref 3.5–5.2)
PROT SERPL-MCNC: 8.3 G/DL (ref 6–8.5)
RBC # BLD AUTO: 5.49 10*6/MM3 (ref 4.14–5.8)
SODIUM SERPL-SCNC: 137 MMOL/L (ref 136–145)
WBC NRBC COR # BLD AUTO: 6.35 10*3/MM3 (ref 3.4–10.8)

## 2025-04-17 PROCEDURE — 36415 COLL VENOUS BLD VENIPUNCTURE: CPT

## 2025-04-17 PROCEDURE — 85025 COMPLETE CBC W/AUTO DIFF WBC: CPT

## 2025-04-17 PROCEDURE — 80053 COMPREHEN METABOLIC PANEL: CPT | Performed by: INTERNAL MEDICINE

## 2025-04-17 NOTE — PROGRESS NOTES
Hematology/Oncology Outpatient Follow Up    Mc Selby  1970    Primary Care Physician: Berny Godwin MD  Referring Physician: No ref. provider found  Chief Complaint:  Monoclonal gammopathy/IgG kappa monoclonal gammopathy of unknown significance  History of Present Illness:   Mr. Selby has a long history of rheumatoid arthritis on and off disease modifying agents.  Patient sees Dr. Rsoas and laboratory work-up was initiated secondary to renal failure which revealed monoclonal gammopathy patient was sent to the cancer [facility] and seen initially on 1/7/2020.  Patient has cirrhosis related to alcohol abuse had episodes of hepatic encephalopathy.    Patient has renal failure stage II at one point he was on hemodialysis briefly.  Had a perforated gastric ulcer with hemorrhage  12/12/2019 serum immunofixation shows IgG monoclonal protein with kappa light chain specificity.  IgG 2111, IgA 703, IgM 754  1/21/2020 bone marrow biopsy and aspirate-right iliac crest bone marrow biopsy with aspirate smears and cell block preparation shows normocellular bone marrow with 50% cellularity.  Absent iron stores.  Negative for involvement by malignant lymphoma and metastatic malignancy.  Cytogenetics normal 46 XY flow cytometry normal  1/23/2020 whole-body skeletal survey geographic lucency within the right distal tibial diaphysis could relate to underlying lytic lesion, consider additional imaging or bone scan. 2. Small area of lucency with endosteal scalloping involving the distal left fibular diaphysis could relate to lytic lesion, dedicated AP and lateral views of the left fibula may be helpful. 2. Incidental findings above. 3. Carotid atherosclerotic disease. Consider follow-up carotid ultrasound if not previously performed.  1/30/2020 urine electrophoresis shows IgG monoclonal protein with kappa light chain specificity.  Small quantity of monoclonal protein unable to quantitate M spike.  Beta-2  microglobulin 4.0.  Serum kappa lambda free light chain ratio 1.83 creatinine 1.05 alk phos 242 total bilirubin 1.9  3/10/2020- PET- 1. No convincing evidence of osseous metastatic disease in this patient with history of multiple myeloma. Uptake surrounding each hip and around the elbow, wrist and finger joints, is favored to represent benign inflammatory/osteoarthritic change. 2. Few mildly prominent bilateral axillary lymph nodes demonstrate intermediate FDG accumulation. Both benign and malignant etiologies are in the differential. Consider short-term CT chest follow-up. 3. Hypermetabolic activity in a symmetric fashion the bilateral tonsillar pillars without discrete mass on CT. Correlate for tonsillar inflammation. 4. Additional CT findings as described above, including: Cirrhosis, hepatosplenomegaly, ascites, cholelithiasis, right renal cyst, dense coronary artery calcification, gynecomastia, maxillary sinusitis.  6/8/2020- CT chest W - 1. No evidence of mediastinal, hilar or axillary lymphadenopathy 2. Bilateral subcentimeter nodules in the lower lobes which appear increased in number from CT of 08/08/2018 and were also poorly seen on PET/CT of 03/10/2020, these are probably benign however should be followed in one year due to their very small size. 3. upper abdomen demonstrates changes of hepatosplenomegaly, cholelithiasis and upper pole right renal cyst  3/30/2022 - osteopenia  On DEXA scan.   6/7/2022 - IgG 1997, M spike 0.8. free kappa light chain 96.4, k/l 2.79 cr 0.86  8/26/2022: Stable lung nodules measuring up to 5 mm.  No new or enlarging lung nodules are seen.  7/5/2023: Hemoglobin 17.5 g/dL, hematocrit 49.4, platelets 186,000.  Creatinine 1.07, calcium 9.1.  Free light chain ratio 3.25, IgG 1918, M spike 0.8 g/dL.  9/24/2024: WBC 4.70, hemoglobin 18.1, hematocrit 51.7, platelets 246,000, creatinine 1.18, potassium 3.4, IgG 2059, kappa lambda ratio 1.97, M spike IgG kappa equal 0.6 g/dL, IgG lambda  "equals 0.3 g/dL    I have reviewed and confirmed the accuracy of the patient's history: Chief complaint, HPI, ROS, and Subjective as entered by the MA/LPN/RN. Fariba Taylor MD 04/17/25        SUBJECTIVE:          Patient is here today for follow-up denies any new issues.      Past Medical History:   Diagnosis Date    Acute perforated gastric ulcer with hemorrhage 07/16/2018    Alcoholism     Allergic     Allergic rhinitis     Arthritis     rheumatoid (diagnosed at age 16)    Arthritis of back     Arthritis of neck     Cirrhosis     etoh cirrhosis-liver failure. Follows w/ GI- [facility]    CKD (chronic kidney disease)     dr. nelson.  Stage 2    COPD (chronic obstructive pulmonary disease)     Depression     Dislocation of finger     Duodenal ulcer      perforated duodenal s/p patch    Erectile dysfunction     Fatty liver     Fracture, femur     Fracture, foot     GERD without esophagitis 09/02/2020    Glaucoma     Hip arthrosis     History of transfusion     HL (hearing loss)     Infectious viral hepatitis     Knee swelling     MRSA (methicillin resistant Staphylococcus aureus) carrier 06/12/2019    nasal swab    Multiple myeloma     Dr. Hernandez at [facility]    Nausea     states \"gallbladder issues\"- had attempted cholecystectomy, but unable to complete d/t adhered to liver    Osteopenia     Periarthritis of shoulder     Renal insufficiency     Sleep apnea     unable to tolerate CPAP    Tumors      benign essential    Type 2 diabetes mellitus with hyperglycemia 01/08/2020    off meds now    Umbilical hernia u    Wound, open     healed at this time       Past Surgical History:   Procedure Laterality Date    COLONOSCOPY      CYST REMOVAL Left     forarm    ENDOSCOPY      ascities/paracentesis    ENDOSCOPY N/A 07/22/2021    Procedure: ESOPHAGOGASTRODUODENOSCOPY WITH DILATION (#[id number] BOUGIE);  Surgeon: Glenn Loo MD;  Location: [facility] ENDOSCOPY;  Service: Gastroenterology;  Laterality: N/A;  ESOPHAGEAL " STRICTURE    EXPLORATORY LAPAROTOMY  07/16/2018    Over-sew Gastric Ulcer With Gastrostomy and Jejunostomy Tube    FOOT SURGERY Right     right bunion removal    FRACTURE SURGERY Right 2005    bunionectomy/ broke toe and put in rods    HERNIA REPAIR  6/18/2019    MASS EXCISION Right 06/18/2019    Procedure: EXCISION OF SEBACEOUS CYST OF THE RIGHT BACK;  Surgeon: Sapna Elizalde MD;  Location: [facility] MAIN OR;  Service: General    MASS EXCISION Right 08/27/2019    Procedure: EXCISION LESION of right back;  Surgeon: Sapna Elizalde MD;  Location: [facility] MAIN OR;  Service: General    MOUTH SURGERY Left     UMBILICAL HERNIA REPAIR N/A 06/18/2019    Procedure: UMBILICAL HERNIA REPAIR LAPAROSCOPIC WITH MESH WITH EXTENSIVE LYSIS OF ADHESIONS;  Surgeon: Sapna Elizalde MD;  Location: [facility] MAIN OR;  Service: General         Current Outpatient Medications:     Abatacept 125 MG/ML solution auto-injector, Inject 1 mL under the skin into the appropriate area as directed Every 7 (Seven) Days., Disp: , Rfl:     budesonide-formoterol (Symbicort) 80-4.5 MCG/ACT inhaler, Inhale 2 puffs 2 (Two) Times a Day., Disp: 1 each, Rfl: 12    bumetanide (BUMEX) 2 MG tablet, Take 1 tablet by mouth Daily., Disp: 90 tablet, Rfl: 1    cetirizine (zyrTEC) 10 MG tablet, TAKE 1 TABLET BY MOUTH EVERY DAY, Disp: 90 tablet, Rfl: 3    EPINEPHrine (EPIPEN) 0.3 MG/0.3ML solution auto-injector injection, 2, Disp: , Rfl:     ipratropium (ATROVENT) 0.06 % nasal spray, INSTILL 2 SPRAYS INTO THE NOSTRIL(S) AS DIRECTED BY PROVIDER 4 (FOUR) TIMES A DAY., Disp: 45 each, Rfl: 1    ipratropium-albuterol (DUO-NEB) 0.5-2.5 mg/3 ml nebulizer, Take 3 mL by nebulization 4 (Four) Times a Day., Disp: 360 mL, Rfl: 5    lansoprazole (PREVACID) 30 MG capsule, TAKE 1 CAPSULE BY MOUTH EVERY DAY, Disp: 90 capsule, Rfl: 1    magnesium oxide (MAG-OX) 400 MG tablet, Take 2 tablets by mouth Every Night., Disp: 90 tablet, Rfl: 2    montelukast (Singulair) 10 MG tablet, Take 1 tablet by mouth  Every Night., Disp: 90 tablet, Rfl: 3    potassium chloride (K-DUR,KLOR-CON) 20 MEQ CR tablet, Take 1 tablet by mouth Daily., Disp: , Rfl:     potassium chloride (K-TAB) 20 MEQ tablet controlled-release ER tablet, Take 1 tablet by mouth Daily., Disp: 90 tablet, Rfl: 1    pregabalin (LYRICA) 75 MG capsule, Take 1 capsule by mouth 2 (Two) Times a Day., Disp: 60 capsule, Rfl: 2    Semaglutide, 2 MG/DOSE, (Ozempic, 2 MG/DOSE,) 8 MG/3ML solution pen-injector, INJECT 2 MG UNDER THE SKIN INTO THE APPROPRIATE AREA AS DIRECTED 1 (ONE) TIME PER WEEK., Disp: 9 mL, Rfl: 3    Spiriva Respimat 2.5 MCG/ACT aerosol solution inhaler, Inhale 2 puffs Daily., Disp: 3 each, Rfl: 2    tamsulosin (FLOMAX) 0.4 MG capsule 24 hr capsule, TAKE 1 CAPSULE BY MOUTH TWICE A DAY, Disp: 180 capsule, Rfl: 2    Allergies   Allergen Reactions    Duloxetine Rash    Acetaminophen Other (See Comments)     Liver cirrhosis       Family History   Problem Relation Age of Onset    Cancer Mother     Lung disease Father     Other Father         prostate problems    Mental illness Brother     Rheumatologic disease Brother     Early death Brother        Cancer-related family history includes Cancer in his mother.    Social History     Tobacco Use    Smoking status: Former     Current packs/day: 0.00     Average packs/day: 1 pack/day for 37.0 years (37.0 ttl pk-yrs)     Types: Cigarettes     Start date:      Quit date:      Years since quittin.2    Smokeless tobacco: Former   Vaping Use    Vaping status: Never Used    Passive vaping exposure: Passive Vaping Exposure Comment (previously worked in a bar)   Substance Use Topics    Alcohol use: Yes     Alcohol/week: 42.0 standard drinks of alcohol     Types: 42 Cans of beer per week    Drug use: No       ROS: Per ejective    Objective:       Vitals:    25 1351   BP: 122/82   Pulse: 71   Resp: 20   Temp: 97.4 °F (36.3 °C)   TempSrc: Temporal   SpO2: 95%   Weight: 109 kg (240 lb)   Height: 177.8 cm  "(70\")   PainSc: 0-No pain             /82   Pulse 71   Temp 97.4 °F (36.3 °C) (Temporal)   Resp 20   Ht 177.8 cm (70\")   Wt 109 kg (240 lb)   SpO2 95%   BMI 34.44 kg/m²     PHYSICAL EXAM:      Physical Exam   Constitutional: He is oriented to person, place, and time. No distress.   HENT:   Head: Normocephalic and atraumatic.   Eyes: Conjunctivae are normal. Right eye exhibits no discharge. Left eye exhibits no discharge. No scleral icterus.   Neck: No thyromegaly present.   Cardiovascular: Normal rate, regular rhythm, normal heart sounds and normal pulses. Exam reveals no gallop and no friction rub.   Pulmonary/Chest: Effort normal. No stridor. No respiratory distress. He has no wheezes.   Abdominal: Soft. Bowel sounds are normal. He exhibits no mass. There is no abdominal tenderness. There is no rebound and no guarding.   Musculoskeletal: Normal range of motion. No tenderness.   Lymphadenopathy:     He has no cervical adenopathy.   Neurological: He is alert and oriented to person, place, and time. He exhibits normal muscle tone.   Skin: Skin is warm. No rash noted. He is not diaphoretic. No erythema.   Psychiatric: His behavior is normal.      I have reexamined the patient and the results are consistent with the previously documented exam. Fariba Tayolr MD      RECENT LABS:         Lab Results   Component Value Date    WBC 6.35 04/17/2025    WBC 5.38 01/16/2025    HGB 16.7 04/17/2025    HGB 16.5 01/16/2025    HCT 49.5 04/17/2025    HCT 48.3 01/16/2025    NEUTROABS 4.48 04/17/2025         Lab Results   Component Value Date    GLUCOSE 119 (H) 01/16/2025    BUN 11 01/16/2025    CREATININE 1.25 01/16/2025    EGFRIFNONA 76 01/13/2022    EGFRIFAFRI 114 03/15/2017    BCR 8.8 01/16/2025    K 3.7 01/16/2025    CO2 26.8 01/16/2025    CALCIUM 10.2 01/16/2025    ALBUMIN 3.7 01/16/2025    ALBUMIN 4.3 01/16/2025    AST 21 01/16/2025    ALT <5 01/16/2025       Assessment & Plan      Patient is a 52-year-old " male with history of compensated liver cirrhosis, chronic renal failure who is seen for IgG kappa monoclonal gammopathy of unknown significance.    Monoclonal gammopathy of undetermined significance    M protein at 0.6 is stable, KL ratio is stable, IgG stable, no evidence of progression, no hypercalcemia or kidney dysfunction  Continue follow [facility] labs yearly  Reviewed symptoms to report between appointments: Bone pain, fatigue, unintentional weight loss, fever, night sweats, signs symptoms of bleeding, swollen lymph nodes.    I recommended to continue to check on monoclonal labs every 3 months.  At this point in time patient does not have any good insurance coverage for the costs and therefore he does not want to come every 3 months.  He understands my concerns.  He states his PCP according to the patient every 3 months therefore we will see him every 6 months.    Lung nodules    Patient has had multiple small lung nodules.  Repeat CT in 8/20/2022 with stable 5 mm nodules.  This is followed by his pulmonologist      Polycythemia    Managed per his primary physician with phlebotomy every 3 months  Secondary to COPD  Patient reports he has no longer been donating blood and that his PCP recommended further evaluation in our office  Repeat CBC today, CMP, further evaluate polycythemia with JAK2, erythropoietin, BCR-ABL, LDH and uric acid levels  Counseled continue compliance with CPAP and to stay well-hydrated    Hemoglobin is 16.7 g per DL     FU in 3 months      Electronically signed by Fariba Taylor MD, 04/17/25, 3:20 PM EDT.

## 2025-04-18 ENCOUNTER — RESULTS FOLLOW-UP (OUTPATIENT)
Dept: LAB | Facility: HOSPITAL | Age: 55
End: 2025-04-18
Payer: COMMERCIAL

## 2025-04-18 LAB
ALBUMIN SERPL ELPH-MCNC: 3.3 G/DL (ref 2.9–4.4)
ALBUMIN/GLOB SERPL: 0.8 {RATIO} (ref 0.7–1.7)
ALPHA1 GLOB SERPL ELPH-MCNC: 0.3 G/DL (ref 0–0.4)
ALPHA2 GLOB SERPL ELPH-MCNC: 0.9 G/DL (ref 0.4–1)
B-GLOBULIN SERPL ELPH-MCNC: 1.1 G/DL (ref 0.7–1.3)
GAMMA GLOB SERPL ELPH-MCNC: 2.2 G/DL (ref 0.4–1.8)
GLOBULIN SER CALC-MCNC: 4.4 G/DL (ref 2.2–3.9)
KAPPA LC FREE SER-MCNC: 102.7 MG/L (ref 3.3–19.4)
KAPPA LC FREE/LAMBDA FREE SER: 2.26 {RATIO} (ref 0.26–1.65)
LABORATORY COMMENT REPORT: ABNORMAL
LAMBDA LC FREE SERPL-MCNC: 45.4 MG/L (ref 5.7–26.3)
M PROTEIN SERPL ELPH-MCNC: ABNORMAL G/DL
PROT PATTERN SERPL ELPH-IMP: ABNORMAL
PROT SERPL-MCNC: 7.7 G/DL (ref 6–8.5)

## 2025-04-18 NOTE — TELEPHONE ENCOUNTER
Attempted to call pt and let him know that his potassium was low and that Dr. Taylor would like for him to take a one time dose of potassium 40 meq.  Script sent to pt's pharmacy.  Voicemail left for pt with the above information.  Requested that pt call me back to let me know that he received my message.

## 2025-04-18 NOTE — TELEPHONE ENCOUNTER
"  Caller: Mc Selby \"BEATRIZ\"    Relationship: Self    Best call back number: [phone]      Who are you requesting to speak with (clinical staff, provider,  specific staff member): CLINICAL      What was the call regarding: ATTEMPTED TO WT. PATIENT STATES HE HAS POTASSIUM MEDICATION AT HOME AND DOES NOT NEED NEW RX  "

## 2025-04-21 ENCOUNTER — TELEPHONE (OUTPATIENT)
Dept: ONCOLOGY | Facility: CLINIC | Age: 55
End: 2025-04-21

## 2025-04-21 LAB
IGA SERPL-MCNC: 412 MG/DL (ref 90–386)
IGG SERPL-MCNC: 1886 MG/DL (ref 603–1613)
IGM SERPL-MCNC: 524 MG/DL (ref 20–172)
PROT PATTERN SERPL IFE-IMP: ABNORMAL

## 2025-04-21 NOTE — TELEPHONE ENCOUNTER
"  Caller: Mc Selby \"BEATRIZ\"    Relationship: Self    Best call back number: [phone]    Who are you requesting to speak with (clinical staff, provider,  specific staff member): CLINICAL    What was the call regarding: PT NEEDING TO KNOW HIS MULTIPLE MYELOMA DIAGNOSIS DATE.    PLEASE ADVISE    "

## 2025-05-19 DIAGNOSIS — K70.31 ALCOHOLIC CIRRHOSIS OF LIVER WITH ASCITES: Primary | ICD-10-CM

## 2025-05-19 DIAGNOSIS — K70.31 ALCOHOLIC CIRRHOSIS OF LIVER WITH ASCITES: Primary | Chronic | ICD-10-CM

## 2025-05-19 RX ORDER — ALBUMIN (HUMAN) 12.5 G/50ML
25 SOLUTION INTRAVENOUS DAILY PRN
Status: CANCELLED | OUTPATIENT
Start: 2025-05-19

## 2025-05-19 RX ORDER — ALBUMIN (HUMAN) 12.5 G/50ML
12.5 SOLUTION INTRAVENOUS DAILY PRN
Status: CANCELLED | OUTPATIENT
Start: 2025-05-19

## 2025-05-19 RX ORDER — LIDOCAINE HYDROCHLORIDE 10 MG/ML
10 INJECTION, SOLUTION INFILTRATION; PERINEURAL AS NEEDED
Status: CANCELLED | OUTPATIENT
Start: 2025-05-19

## 2025-05-20 ENCOUNTER — HOSPITAL ENCOUNTER (OUTPATIENT)
Dept: INFUSION THERAPY | Facility: HOSPITAL | Age: 55
Discharge: HOME | End: 2025-05-20
Payer: MEDICARE

## 2025-05-20 VITALS
TEMPERATURE: 98.2 F | SYSTOLIC BLOOD PRESSURE: 111 MMHG | RESPIRATION RATE: 10 BRPM | DIASTOLIC BLOOD PRESSURE: 65 MMHG | OXYGEN SATURATION: 95 % | HEART RATE: 64 BPM

## 2025-05-20 DIAGNOSIS — K70.31 ALCOHOLIC CIRRHOSIS OF LIVER WITH ASCITES: ICD-10-CM

## 2025-05-20 LAB
ALBUMIN FLD-MCNC: 2.3 G/DL
APPEARANCE FLD: ABNORMAL
COLOR FLD: YELLOW
DEPRECATED RDW RBC AUTO: 44.3 FL (ref 37–54)
EOSINOPHIL NFR FLD MANUAL: 1 %
ERYTHROCYTE [DISTWIDTH] IN BLOOD BY AUTOMATED COUNT: 13.8 % (ref 12.3–15.4)
HCT VFR BLD AUTO: 44.4 % (ref 37.5–51)
HGB BLD-MCNC: 15.6 G/DL (ref 13–17.7)
INR PPP: 1.16 (ref 0.9–1.1)
LYMPHOCYTES NFR FLD MANUAL: 79 %
MACROPHAGE FLUID %: 1 %
MCH RBC QN AUTO: 30.8 PG (ref 26.6–33)
MCHC RBC AUTO-ENTMCNC: 35.1 G/DL (ref 31.5–35.7)
MCV RBC AUTO: 87.6 FL (ref 79–97)
MESOTHL CELL NFR FLD MANUAL: 5 %
MONOCYTES NFR FLD: 3 %
NEUTROPHILS NFR FLD MANUAL: 11 %
NUC CELL # FLD: 1754 /MM3
PLATELET # BLD AUTO: 222 10*3/MM3 (ref 140–450)
PMV BLD AUTO: 10.5 FL (ref 6–12)
PROT FLD-MCNC: 4.5 G/DL
PROTHROMBIN TIME: 14.7 SECONDS (ref 11.7–14.2)
RBC # BLD AUTO: 5.07 10*6/MM3 (ref 4.14–5.8)
WBC NRBC COR # BLD AUTO: 5.51 10*3/MM3 (ref 3.4–10.8)

## 2025-05-20 PROCEDURE — 85027 COMPLETE CBC AUTOMATED: CPT | Performed by: NURSE PRACTITIONER

## 2025-05-20 PROCEDURE — 36415 COLL VENOUS BLD VENIPUNCTURE: CPT

## 2025-05-20 PROCEDURE — 25010000002 LIDOCAINE 1 % SOLUTION: Performed by: NURSE PRACTITIONER

## 2025-05-20 PROCEDURE — 83690 ASSAY OF LIPASE: CPT | Performed by: NURSE PRACTITIONER

## 2025-05-20 PROCEDURE — 89051 BODY FLUID CELL COUNT: CPT | Performed by: NURSE PRACTITIONER

## 2025-05-20 PROCEDURE — 87205 SMEAR GRAM STAIN: CPT | Performed by: NURSE PRACTITIONER

## 2025-05-20 PROCEDURE — 84157 ASSAY OF PROTEIN OTHER: CPT | Performed by: NURSE PRACTITIONER

## 2025-05-20 PROCEDURE — 82042 OTHER SOURCE ALBUMIN QUAN EA: CPT | Performed by: NURSE PRACTITIONER

## 2025-05-20 PROCEDURE — 76942 ECHO GUIDE FOR BIOPSY: CPT

## 2025-05-20 PROCEDURE — 85610 PROTHROMBIN TIME: CPT | Performed by: NURSE PRACTITIONER

## 2025-05-20 PROCEDURE — 87070 CULTURE OTHR SPECIMN AEROBIC: CPT | Performed by: NURSE PRACTITIONER

## 2025-05-20 RX ORDER — LIDOCAINE HYDROCHLORIDE 10 MG/ML
10 INJECTION, SOLUTION INFILTRATION; PERINEURAL AS NEEDED
Status: DISCONTINUED | OUTPATIENT
Start: 2025-05-20 | End: 2025-05-22 | Stop reason: HOSPADM

## 2025-05-20 RX ORDER — ALBUMIN (HUMAN) 12.5 G/50ML
12.5 SOLUTION INTRAVENOUS DAILY PRN
Status: DISCONTINUED | OUTPATIENT
Start: 2025-05-20 | End: 2025-05-22 | Stop reason: HOSPADM

## 2025-05-20 RX ORDER — ALBUMIN (HUMAN) 12.5 G/50ML
25 SOLUTION INTRAVENOUS DAILY PRN
Status: DISCONTINUED | OUTPATIENT
Start: 2025-05-20 | End: 2025-05-22 | Stop reason: HOSPADM

## 2025-05-20 RX ADMIN — LIDOCAINE HYDROCHLORIDE 10 ML: 10 INJECTION, SOLUTION INFILTRATION; PERINEURAL at 12:10

## 2025-05-23 LAB — LIPASE FLD-CCNC: 8 U/L

## 2025-05-25 LAB
BACTERIA FLD CULT: NORMAL
GRAM STN SPEC: NORMAL
GRAM STN SPEC: NORMAL

## 2025-06-10 ENCOUNTER — TELEPHONE (OUTPATIENT)
Dept: FAMILY MEDICINE CLINIC | Facility: CLINIC | Age: 55
End: 2025-06-10
Payer: MEDICARE

## 2025-06-10 NOTE — TELEPHONE ENCOUNTER
Caller: [facility]  JYOTI     Relationship to patient:       Best call back number: [phone]     Provider: DR TALLEY     Medication PA needed: [id number]    Reason for call/Prior Auth:    COVER MY MEDS KEY [id number]    DIAGNOSIS CRITERIA TYPE 2 DIABETES AND A1C AT 6.5 OR GREATER, AT ANY TIME

## 2025-06-16 ENCOUNTER — SPECIALTY PHARMACY (OUTPATIENT)
Dept: PHARMACY | Facility: TELEHEALTH | Age: 55
End: 2025-06-16
Payer: MEDICARE

## 2025-06-16 NOTE — PROGRESS NOTES
"   Specialty Pharmacy Patient Management Program  Refill Outreach     Mc \"BEATRIZ\" was contacted today regarding refills of their medication(s).    Refill Questions      Flowsheet Row Most Recent Value   Changes to allergies? No   Changes to medications? No   New conditions or infections since last clinic visit No   Unplanned office visit, urgent care, ED, or hospital admission in the last 4 weeks  No   How does patient/caregiver feel medication is working? Very good   Financial problems or insurance changes  No   Since the previous refill, were any specialty medication doses or scheduled injections missed or delayed?  Yes    If yes, please provide the amount 1 dose    Why were doses missed? Patient switched from [facility] to [facility] due to insurance change, patient was supposed to take a dose today but didn't have any left.    Does this patient require a clinical escalation to a pharmacist? Yes   [Patient switched from [facility] to [facility] due to insurance change, patient was supposed to take a dose today but didn't have any left.]            Delivery Questions      Flowsheet Row Most Recent Value   Delivery method UPS   Delivery address verified with patient/caregiver? Yes   Delivery address Home   Other address preferred N/a   Number of medications in delivery 1   Medication(s) being filled and delivered Abatacept ([facility] [employer])   Doses left of specialty medications 0 doses left, patient missed dose due today, will take when it arrives tomorrow 06/17   Copay verified? Yes   Copay amount $99.10 ([facility])    Copay form of payment Credit/debit on file   Delivery Date Selection 06/17/25   Signature Required No                 Follow-up: 23 day(s)     Perla Arboleda, Pharmacy Technician  6/16/2025  14:01 EDT    "

## 2025-06-16 NOTE — PROGRESS NOTES
Specialty Pharmacy Patient Management Program  Initial Assessment     Mc Selby is a 55 y.o. male with RA and enrolled in the Patient Management program offered by [facility]. An initial outreach was conducted, including assessment of therapy appropriateness and specialty medication education for orencia. The patient was introduced to services offered by [facility], including: regular assessments, refill coordination, curbside pick-up or mail order delivery options, prior authorization maintenance, and financial assistance programs as applicable. The patient was also provided with contact information for the pharmacy team.     Insurance Coverage & Financial Support  Covered under affirmed rx plus orencia copay assistance     Relevant Past Medical History and Comorbidities  Relevant medical history and concomitant health conditions were discussed with the patient. The patient's chart has been reviewed for relevant past medical history and comorbid health conditions and updated as necessary.   Past Medical History:   Diagnosis Date    Acute perforated gastric ulcer with hemorrhage 07/16/2018    Alcoholism     Allergic     Allergic rhinitis     Arthritis     rheumatoid (diagnosed at age 16)    Arthritis of back     Arthritis of neck     Cirrhosis     etoh cirrhosis-liver failure. Follows w/ GI- [facility]    CKD (chronic kidney disease)     dr. nelson.  Stage 2    COPD (chronic obstructive pulmonary disease)     Depression     Dislocation of finger     Duodenal ulcer      perforated duodenal s/p patch    Erectile dysfunction     Fatty liver     Fracture, femur     Fracture, foot     GERD without esophagitis 09/02/2020    Glaucoma     Hip arthrosis     History of transfusion     HL (hearing loss)     Infectious viral hepatitis     Knee swelling     MRSA (methicillin resistant Staphylococcus aureus) carrier 06/12/2019    nasal swab    Multiple myeloma     Dr. Hernandez at [facility]  "   Nausea     states \"gallbladder issues\"- had attempted cholecystectomy, but unable to complete d/t adhered to liver    Osteopenia     Periarthritis of shoulder     Renal insufficiency     Sleep apnea     unable to tolerate CPAP    Tumors      benign essential    Type 2 diabetes mellitus with hyperglycemia 2020    off meds now    Umbilical hernia u    Wound, open     healed at this time     Social History     Socioeconomic History    Marital status:      Spouse name: Nicky Selby   Tobacco Use    Smoking status: Former     Current packs/day: 0.00     Average packs/day: 1 pack/day for 37.0 years (37.0 ttl pk-yrs)     Types: Cigarettes     Start date:      Quit date: 2018     Years since quittin.4    Smokeless tobacco: Former   Vaping Use    Vaping status: Never Used    Passive vaping exposure: Passive Vaping Exposure Comment (previously worked in a bar)   Substance and Sexual Activity    Alcohol use: Yes     Alcohol/week: 42.0 standard drinks of alcohol     Types: 42 Cans of beer per week    Drug use: No    Sexual activity: Not Currently     Problem list reviewed by Emerson Sharp RPH on 2025 at  2:02 PM    Allergies  Known allergies and reactions were discussed with the patient. The patient's chart has been reviewed for allergy information and updated as necessary.   Duloxetine and Acetaminophen  Allergies reviewed by Emerson Sharp RPH on 2025 at  2:02 PM    Current Medication List  This medication list has been reviewed with the patient and evaluated for any interactions or necessary modifications/recommendations, and updated to include all prescription medications, OTC medications, and supplements the patient is currently taking. This list reflects what is contained in the patient's profile, which has also been marked as reviewed to communicate to other providers it is the most up to date version of the patient's current medication therapy.     Current Outpatient Medications:    "  Abatacept (Orencia ClickJect) 125 MG/ML solution auto-injector, Inject 1 mL under the skin into the appropriate area as directed Every 7 (Seven) Days., Disp: 4 mL, Rfl: 2    Abatacept 125 MG/ML solution auto-injector, Inject 1 mL under the skin into the appropriate area as directed Every 7 (Seven) Days., Disp: , Rfl:     amoxicillin (AMOXIL) 500 MG capsule, Take 4 capsules by mouth as directed 1 hour prior to dental appointment, Disp: 4 capsule, Rfl: 4    budesonide-formoterol (SYMBICORT) 160-4.5 MCG/ACT inhaler, Inhale 2 puffs 2 (Two) Times a Day., Disp: 10.2 g, Rfl: 0    budesonide-formoterol (Symbicort) 80-4.5 MCG/ACT inhaler, Inhale 2 puffs 2 (Two) Times a Day., Disp: 1 each, Rfl: 12    bumetanide (BUMEX) 2 MG tablet, Take 1 tablet by mouth Daily., Disp: 90 tablet, Rfl: 1    bumetanide (BUMEX) 2 MG tablet, Take 1 tablet by mouth Daily., Disp: 90 tablet, Rfl: 1    cetirizine (zyrTEC) 10 MG tablet, TAKE 1 TABLET BY MOUTH EVERY DAY, Disp: 90 tablet, Rfl: 3    EPINEPHrine (EPIPEN) 0.3 MG/0.3ML solution auto-injector injection, 2, Disp: , Rfl:     ipratropium (ATROVENT) 0.06 % nasal spray, INSTILL 2 SPRAYS INTO THE NOSTRIL(S) AS DIRECTED BY PROVIDER 4 (FOUR) TIMES A DAY., Disp: 45 each, Rfl: 1    ipratropium (ATROVENT) 0.06 % nasal spray, Administer 2 sprays into the nostril(s) as directed by provider 4 (Four) Times a Day., Disp: 45 mL, Rfl: 1    ipratropium-albuterol (DUO-NEB) 0.5-2.5 mg/3 ml nebulizer, Take 3 mL by nebulization 4 (Four) Times a Day., Disp: 360 mL, Rfl: 5    lansoprazole (PREVACID) 30 MG capsule, TAKE 1 CAPSULE BY MOUTH EVERY DAY, Disp: 90 capsule, Rfl: 1    lansoprazole (PREVACID) 30 MG capsule, Take 1 capsule by mouth Daily., Disp: 90 capsule, Rfl: 1    magnesium oxide (MAG-OX) 400 MG tablet, Take 2 tablets by mouth Every Night., Disp: 90 tablet, Rfl: 2    montelukast (Singulair) 10 MG tablet, Take 1 tablet by mouth Every Night., Disp: 90 tablet, Rfl: 3    montelukast (SINGULAIR) 10 MG tablet,  Take 1 tablet by mouth Every Night., Disp: 90 tablet, Rfl: 3    potassium chloride (K-DUR,KLOR-CON) 20 MEQ CR tablet, Take 1 tablet by mouth Daily., Disp: , Rfl:     potassium chloride (K-TAB) 20 MEQ tablet controlled-release ER tablet, Take 1 tablet by mouth Daily., Disp: 90 tablet, Rfl: 1    potassium chloride ER (K-TAB) 20 MEQ tablet controlled-release ER tablet, Take 1 tablet by mouth Daily., Disp: 90 tablet, Rfl: 1    pregabalin (LYRICA) 75 MG capsule, Take 1 capsule by mouth 2 (Two) Times a Day., Disp: 60 capsule, Rfl: 2    pregabalin (LYRICA) 75 MG capsule, Take 1 capsule by mouth 2 (Two) Times a Day., Disp: 60 capsule, Rfl: 2    Semaglutide, 2 MG/DOSE, (OZEMPIC) 8 MG/3ML solution pen-injector, Inject 2 mg under the skin into the appropriate area as directed 1 (One) Time Per Week., Disp: 9 mL, Rfl: 3    Semaglutide, 2 MG/DOSE, (Ozempic, 2 MG/DOSE,) 8 MG/3ML solution pen-injector, INJECT 2 MG UNDER THE SKIN INTO THE APPROPRIATE AREA AS DIRECTED 1 (ONE) TIME PER WEEK., Disp: 9 mL, Rfl: 3    sennosides-docusate (PERICOLACE) 8.6-50 MG per tablet, Take 1 tablet by mouth Daily., Disp: 30 tablet, Rfl: 0    Spiriva Respimat 2.5 MCG/ACT aerosol solution inhaler, Inhale 2 puffs Daily., Disp: 3 each, Rfl: 2    tamsulosin (FLOMAX) 0.4 MG capsule 24 hr capsule, TAKE 1 CAPSULE BY MOUTH TWICE A DAY, Disp: 180 capsule, Rfl: 2    tamsulosin (FLOMAX) 0.4 MG capsule 24 hr capsule, Take 1 capsule by mouth 2 (Two) Times a Day., Disp: 180 capsule, Rfl: 3    tiotropium bromide monohydrate (Spiriva Respimat) 2.5 MCG/ACT aerosol solution inhaler, Inhale 2 puffs Daily., Disp: 4 g, Rfl: 4  No current facility-administered medications for this visit.  Medicines reviewed by Emerson Sharp RPH on 6/16/2025 at  2:02 PM    Drug Interactions  none     Relevant Laboratory Values  Lab Results   Component Value Date    GLUCOSE 126 (H) 04/17/2025    CALCIUM 9.9 04/17/2025     04/17/2025    K 3.4 (L) 04/17/2025    CO2 27.3 04/17/2025     CL 99 04/17/2025    BUN 10 04/17/2025    CREATININE 1.01 04/17/2025    BCR 9.9 04/17/2025    ANIONGAP 10.7 04/17/2025     Lab Results   Component Value Date    WBC 5.51 05/20/2025    HGB 15.6 05/20/2025    HCT 44.4 05/20/2025    MCV 87.6 05/20/2025     05/20/2025    INR 1.16 (H) 05/20/2025     Lab Value Review  The above lab values have been reviewed; the following specialty medication(s) dose adjustment(s) are recommended: none.    Initial Education Provided for Specialty Medication  The patient has been provided with the following education and any applicable administration techniques (i.e. self-injection) have been demonstrated for the therapies indicated. All questions and concerns have been addressed prior to the patient receiving the medication, and the patient has verbalized understanding of the education and any materials provided. Additional patient education shall be provided and documented upon request by the patient, provider or payer.         Orencia (abatacept)         Medication Expectations   Why am I taking this medication? You are taking this medication for treatment of psoriatic arthritis or rheumatoid arthritis.    What should I expect while on this medication? You should expect a decrease in the frequency and severity of symptoms.   How does the medication work? Abatacept is a selective costimulation modulator; it inhibits T-cell (T-lymphocyte) activation by binding to CD80 and CD86 on antigen presenting cells (APC), thus blocking the required CD28 interaction between APCs and T cells.    How long will I be on this medication for? The amount of time you will be on this medication will be determined by your doctor and your response to the medication.    How do I take this medication? Take as directed on your prescription label.  This medication is a subcutaneous injection administered in the top of the thigh, outer area of upper arms or stomach area. May leave at room temperature for 30-60  minutes prior to injection.   What are some possible side effects? Injection site reactions and hypersensitivity reactions, dizziness, headache, signs of a common cold, nose or throat irritation, upset stomach, stomach pain or diarrhea or back pain.    What happens if I miss a dose? If you miss a dose, take it as soon as you remember. If it is close to the time for your next dose, skip the missed dose and go back to your normal time.               Medication Safety   What are things I should warn my doctor immediately about?  Infection like fever, chills, very bad sore throat, ear or sinus pain, cough, more sputum or change in color of sputum, pain with passing urine, mouth sores, or wound that will not heal     High or low blood pressure like very bad headache or dizziness, passing out, or change in eyesight     Feeling very tired or weak     Flu-like signs     Warm, red, or painful skin or sores on the body     A skin lump or growth     Change in color or size of a mole     Swollen gland, night sweats, shortness of breath, or weight loss without trying     Signs of an allergic reaction, like rash; hives; itching; red, swollen, blistered, or peeling skin with or without fever; wheezing; tightness in the chest or throat; trouble breathing, swallowing, or talking; unusual hoarseness; or swelling of the mouth, face, lips, tongue, or throat.   What are things that I should be cautious of? Injection site reaction, back pain, and headache. You may have more chance of getting an infection.  Wash your hands often and stay away from people with infections, colds, or flu.   What are some medications that can interact with this one? Immunosuppressants and vaccines.            Medication Storage/Handling   How should I handle this medication? Keep this medication our of reach of pets/children in original container.  Store in the original container to protect from light. Do not inject where the skin is tender, bruised, red,  hard, or affected by psoriasis.  Rotate injection sites.   How does this medication need to be stored? Store in refrigerator and do not freeze.    How should I dispose of this medication? You can dispose of the empty syringe in a sharps container, and if this is not available you may use an empty hard plastic container such as a milk jug or tide container.            Resources/Support   How can I remind myself to take this medication? You can download a reminder marizol on your phone or use a calandar  to help with your injection.   Is financial support available?  Yes, [employer] can provide co-pay cards if you have commercial insurance or patient assistance if you have Medicare or no insurance.    Which vaccines are recommended for me? Talk to your doctor about these vaccines: Flu, Coronavirus (COVID-19), Pneumococcal (pneumonia), Tdap, Hepatitis B, Zoster (shingles).                Adherence, Self-Administration, and Current Therapy Problems  Adherence related to the patient's specialty therapy was discussed with the patient. The Adherence segment of this outreach has been reviewed and updated.          Additional Barriers to Patient Self-Administration: none  Methods for Supporting Patient Self-Administration: none    Open Medication Therapy Problems  No medication therapy recommendations to display    Goals of Therapy   Goals Addressed Today        Specialty Pharmacy General Goal      A reduction of flares in joints and on the skin by 50%  Increase in mobility and quality of life, with a decrease in body stiffness                Reassessment Plan & Follow-Up  Medication Therapy Changes: not [facility] to therapy, [facility]  Additional Plans, Therapy Recommendations, or Therapy Problems to Be Addressed: none   Pharmacist to perform regular reassessments no more than (6) months from the previous assessment.  Welcome information and patient satisfaction survey to be sent by retail team with patient's initial fill.  Care  Coordinator to set up future refill outreaches, coordinate prescription delivery, and escalate clinical questions to pharmacist.     Attestation  I attest the patient was actively involved in and has agreed to the above plan of care. I attest that the initiated specialty medication(s) are appropriate for the patient based on my assessment. If the prescribed therapy is at any point deemed not appropriate based on the current or future assessments, a consultation will be initiated with the patient's specialty care provider to determine the best course of action. The revised plan of therapy will be documented along with any reassessments and/or additional patient education provided.     Electronically signed by Emerson Sharp RPH, 06/16/25, 2:03 PM EDT.

## 2025-06-17 RX ORDER — TIOTROPIUM BROMIDE INHALATION SPRAY 3.12 UG/1
2 SPRAY, METERED RESPIRATORY (INHALATION)
Qty: 12 G | Refills: 2 | Status: SHIPPED | OUTPATIENT
Start: 2025-06-17

## 2025-06-18 DIAGNOSIS — K70.31 ALCOHOLIC CIRRHOSIS OF LIVER WITH ASCITES: Primary | ICD-10-CM

## 2025-06-18 RX ORDER — ALBUMIN (HUMAN) 12.5 G/50ML
25 SOLUTION INTRAVENOUS DAILY PRN
Status: CANCELLED | OUTPATIENT
Start: 2025-06-18

## 2025-06-18 RX ORDER — ALBUMIN (HUMAN) 12.5 G/50ML
12.5 SOLUTION INTRAVENOUS DAILY PRN
Status: CANCELLED | OUTPATIENT
Start: 2025-06-18

## 2025-06-18 RX ORDER — LIDOCAINE HYDROCHLORIDE 10 MG/ML
10 INJECTION, SOLUTION INFILTRATION; PERINEURAL AS NEEDED
Status: CANCELLED | OUTPATIENT
Start: 2025-06-18

## 2025-06-20 ENCOUNTER — HOSPITAL ENCOUNTER (OUTPATIENT)
Dept: INFUSION THERAPY | Facility: HOSPITAL | Age: 55
Discharge: HOME | End: 2025-06-20
Payer: MEDICARE

## 2025-06-20 VITALS
RESPIRATION RATE: 15 BRPM | SYSTOLIC BLOOD PRESSURE: 131 MMHG | DIASTOLIC BLOOD PRESSURE: 58 MMHG | TEMPERATURE: 98.4 F | HEART RATE: 69 BPM | OXYGEN SATURATION: 98 %

## 2025-06-20 DIAGNOSIS — K70.31 ALCOHOLIC CIRRHOSIS OF LIVER WITH ASCITES: ICD-10-CM

## 2025-06-20 LAB
ALBUMIN FLD-MCNC: 1.8 G/DL
APPEARANCE FLD: CLEAR
COLOR FLD: YELLOW
LYMPHOCYTES NFR FLD MANUAL: 85 %
MESOTHL CELL NFR FLD MANUAL: 9 %
METHOD: NORMAL
NEUTROPHILS NFR FLD MANUAL: 6 %
NUC CELL # FLD: 789 /MM3
PROT FLD-MCNC: 3.3 G/DL

## 2025-06-20 PROCEDURE — 87070 CULTURE OTHR SPECIMN AEROBIC: CPT | Performed by: NURSE PRACTITIONER

## 2025-06-20 PROCEDURE — 36415 COLL VENOUS BLD VENIPUNCTURE: CPT

## 2025-06-20 PROCEDURE — 76942 ECHO GUIDE FOR BIOPSY: CPT

## 2025-06-20 PROCEDURE — 25010000002 LIDOCAINE 1 % SOLUTION: Performed by: NURSE PRACTITIONER

## 2025-06-20 PROCEDURE — 87205 SMEAR GRAM STAIN: CPT | Performed by: NURSE PRACTITIONER

## 2025-06-20 PROCEDURE — 25010000002 ALBUMIN HUMAN 25% PER 50 ML: Performed by: NURSE PRACTITIONER

## 2025-06-20 PROCEDURE — 89051 BODY FLUID CELL COUNT: CPT | Performed by: NURSE PRACTITIONER

## 2025-06-20 PROCEDURE — P9047 ALBUMIN (HUMAN), 25%, 50ML: HCPCS | Performed by: NURSE PRACTITIONER

## 2025-06-20 PROCEDURE — 82042 OTHER SOURCE ALBUMIN QUAN EA: CPT | Performed by: NURSE PRACTITIONER

## 2025-06-20 PROCEDURE — 83690 ASSAY OF LIPASE: CPT | Performed by: NURSE PRACTITIONER

## 2025-06-20 PROCEDURE — 76700 US EXAM ABDOM COMPLETE: CPT

## 2025-06-20 PROCEDURE — 84157 ASSAY OF PROTEIN OTHER: CPT | Performed by: NURSE PRACTITIONER

## 2025-06-20 PROCEDURE — 96365 THER/PROPH/DIAG IV INF INIT: CPT

## 2025-06-20 RX ORDER — ALBUMIN (HUMAN) 12.5 G/50ML
12.5 SOLUTION INTRAVENOUS DAILY PRN
Status: DISCONTINUED | OUTPATIENT
Start: 2025-06-20 | End: 2025-06-22 | Stop reason: HOSPADM

## 2025-06-20 RX ORDER — LIDOCAINE HYDROCHLORIDE 10 MG/ML
10 INJECTION, SOLUTION INFILTRATION; PERINEURAL AS NEEDED
Status: DISCONTINUED | OUTPATIENT
Start: 2025-06-20 | End: 2025-06-22 | Stop reason: HOSPADM

## 2025-06-20 RX ORDER — ALBUMIN (HUMAN) 12.5 G/50ML
25 SOLUTION INTRAVENOUS DAILY PRN
Status: DISCONTINUED | OUTPATIENT
Start: 2025-06-20 | End: 2025-06-22 | Stop reason: HOSPADM

## 2025-06-20 RX ADMIN — ALBUMIN (HUMAN) 25 G: 0.25 INJECTION, SOLUTION INTRAVENOUS at 12:42

## 2025-06-20 RX ADMIN — LIDOCAINE HYDROCHLORIDE 10 ML: 10 INJECTION, SOLUTION INFILTRATION; PERINEURAL at 11:15

## 2025-06-20 RX ADMIN — ALBUMIN (HUMAN) 12.5 G: 0.25 INJECTION, SOLUTION INTRAVENOUS at 13:13

## 2025-06-25 LAB
BACTERIA FLD CULT: NORMAL
GRAM STN SPEC: NORMAL
GRAM STN SPEC: NORMAL
LIPASE FLD-CCNC: 10 U/L

## 2025-07-01 RX ORDER — LANSOPRAZOLE 30 MG/1
30 CAPSULE, DELAYED RELEASE ORAL DAILY
Qty: 90 CAPSULE | Refills: 1 | Status: SHIPPED | OUTPATIENT
Start: 2025-07-01

## 2025-07-02 DIAGNOSIS — J44.9 CHRONIC OBSTRUCTIVE PULMONARY DISEASE, UNSPECIFIED COPD TYPE: Chronic | ICD-10-CM

## 2025-07-02 RX ORDER — IPRATROPIUM BROMIDE AND ALBUTEROL SULFATE 2.5; .5 MG/3ML; MG/3ML
SOLUTION RESPIRATORY (INHALATION)
Qty: 1080 ML | Refills: 3 | Status: SHIPPED | OUTPATIENT
Start: 2025-07-02

## 2025-07-09 ENCOUNTER — SPECIALTY PHARMACY (OUTPATIENT)
Dept: PHARMACY | Facility: TELEHEALTH | Age: 55
End: 2025-07-09
Payer: MEDICARE

## 2025-07-09 NOTE — PROGRESS NOTES
"   Specialty Pharmacy Patient Management Program  Refill Outreach     Mc \"BEATRIZ\" was contacted today regarding refills of their medication(s).    Refill Questions      Flowsheet Row Most Recent Value   Changes to allergies? No   Changes to medications? No   New conditions or infections since last clinic visit No   Unplanned office visit, urgent care, ED, or hospital admission in the last 4 weeks  No   How does patient/caregiver feel medication is working? Very good   Financial problems or insurance changes  No   Since the previous refill, were any specialty medication doses or scheduled injections missed or delayed?  No  [0 doses left, next dose due 07/14]   If yes, please provide the amount N/a   Why were doses missed? N/a   Does this patient require a clinical escalation to a pharmacist? No            Delivery Questions      Flowsheet Row Most Recent Value   Delivery method UPS   Delivery address verified with patient/caregiver? Yes   Delivery address Home   Other address preferred N/a   Number of medications in delivery 1   Medication(s) being filled and delivered Abatacept (Orencia ClickJect)   Doses left of specialty medications 0 doses left, next dose due 07/14   Copay verified? Yes   Copay amount $98.50 (Orencia [facility])    Copay form of payment Credit/debit on file   Delivery Date Selection 07/10/25   Signature Required No                 Follow-up: 23 day(s)     Perla Arboleda, Pharmacy Technician  7/9/2025  13:26 EDT    "

## 2025-07-13 DIAGNOSIS — M16.11 PRIMARY OSTEOARTHRITIS OF RIGHT HIP: Primary | ICD-10-CM

## 2025-07-13 DIAGNOSIS — M06.09 RHEUMATOID ARTHRITIS OF MULTIPLE SITES WITH NEGATIVE RHEUMATOID FACTOR: ICD-10-CM

## 2025-07-14 RX ORDER — PREGABALIN 75 MG/1
75 CAPSULE ORAL 2 TIMES DAILY
Qty: 60 CAPSULE | Refills: 2 | Status: SHIPPED | OUTPATIENT
Start: 2025-07-14

## 2025-07-17 ENCOUNTER — LAB (OUTPATIENT)
Dept: LAB | Facility: HOSPITAL | Age: 55
End: 2025-07-17
Payer: COMMERCIAL

## 2025-07-17 ENCOUNTER — TRANSCRIBE ORDERS (OUTPATIENT)
Dept: ADMINISTRATIVE | Facility: HOSPITAL | Age: 55
End: 2025-07-17
Payer: MEDICARE

## 2025-07-17 DIAGNOSIS — D84.9 DEFICIENCY SYNDROME, IMMUNOLOGIC: Primary | ICD-10-CM

## 2025-07-17 DIAGNOSIS — D84.9 DEFICIENCY SYNDROME, IMMUNOLOGIC: ICD-10-CM

## 2025-07-17 PROCEDURE — 86480 TB TEST CELL IMMUN MEASURE: CPT

## 2025-07-17 PROCEDURE — 36415 COLL VENOUS BLD VENIPUNCTURE: CPT

## 2025-07-19 LAB
GAMMA INTERFERON BACKGROUND BLD IA-ACNC: 0.02 IU/ML
M TB IFN-G BLD-IMP: NEGATIVE
M TB IFN-G CD4+ BCKGRND COR BLD-ACNC: 0.01 IU/ML
M TB IFN-G CD4+CD8+ BCKGRND COR BLD-ACNC: 0.02 IU/ML
MITOGEN IGNF BCKGRD COR BLD-ACNC: 7.56 IU/ML
QUANTIFERON INCUBATION: NORMAL
SERVICE CMNT-IMP: NORMAL

## 2025-07-23 ENCOUNTER — TRANSCRIBE ORDERS (OUTPATIENT)
Dept: ADMINISTRATIVE | Facility: HOSPITAL | Age: 55
End: 2025-07-23
Payer: MEDICARE

## 2025-07-23 ENCOUNTER — HOSPITAL ENCOUNTER (OUTPATIENT)
Dept: INFUSION THERAPY | Facility: HOSPITAL | Age: 55
Discharge: HOME | End: 2025-07-23
Payer: COMMERCIAL

## 2025-07-23 VITALS
TEMPERATURE: 97.1 F | OXYGEN SATURATION: 95 % | DIASTOLIC BLOOD PRESSURE: 75 MMHG | RESPIRATION RATE: 17 BRPM | SYSTOLIC BLOOD PRESSURE: 130 MMHG | HEART RATE: 73 BPM

## 2025-07-23 DIAGNOSIS — Z87.891 FORMER SMOKER: Primary | ICD-10-CM

## 2025-07-23 DIAGNOSIS — K70.31 ALCOHOLIC CIRRHOSIS OF LIVER WITH ASCITES: Primary | ICD-10-CM

## 2025-07-23 DIAGNOSIS — K70.31 ALCOHOLIC CIRRHOSIS OF LIVER WITH ASCITES: ICD-10-CM

## 2025-07-23 LAB
DEPRECATED RDW RBC AUTO: 50.4 FL (ref 37–54)
ERYTHROCYTE [DISTWIDTH] IN BLOOD BY AUTOMATED COUNT: 14.7 % (ref 12.3–15.4)
HCT VFR BLD AUTO: 46.3 % (ref 37.5–51)
HGB BLD-MCNC: 15.4 G/DL (ref 13–17.7)
INR PPP: 1.26 (ref 0.9–1.1)
MCH RBC QN AUTO: 30.9 PG (ref 26.6–33)
MCHC RBC AUTO-ENTMCNC: 33.3 G/DL (ref 31.5–35.7)
MCV RBC AUTO: 93 FL (ref 79–97)
PLATELET # BLD AUTO: 228 10*3/MM3 (ref 140–450)
PMV BLD AUTO: 9.7 FL (ref 6–12)
PROTHROMBIN TIME: 15.8 SECONDS (ref 11.7–14.2)
RBC # BLD AUTO: 4.98 10*6/MM3 (ref 4.14–5.8)
WBC NRBC COR # BLD AUTO: 5.21 10*3/MM3 (ref 3.4–10.8)

## 2025-07-23 PROCEDURE — 36415 COLL VENOUS BLD VENIPUNCTURE: CPT

## 2025-07-23 PROCEDURE — 96365 THER/PROPH/DIAG IV INF INIT: CPT

## 2025-07-23 PROCEDURE — 76942 ECHO GUIDE FOR BIOPSY: CPT

## 2025-07-23 PROCEDURE — 85027 COMPLETE CBC AUTOMATED: CPT | Performed by: NURSE PRACTITIONER

## 2025-07-23 PROCEDURE — P9047 ALBUMIN (HUMAN), 25%, 50ML: HCPCS | Performed by: NURSE PRACTITIONER

## 2025-07-23 PROCEDURE — 25010000002 ALBUMIN HUMAN 25% PER 50 ML: Performed by: NURSE PRACTITIONER

## 2025-07-23 PROCEDURE — 85610 PROTHROMBIN TIME: CPT | Performed by: NURSE PRACTITIONER

## 2025-07-23 RX ORDER — ALBUMIN (HUMAN) 12.5 G/50ML
12.5 SOLUTION INTRAVENOUS DAILY PRN
Status: DISCONTINUED | OUTPATIENT
Start: 2025-07-23 | End: 2025-07-25 | Stop reason: HOSPADM

## 2025-07-23 RX ORDER — ALBUMIN (HUMAN) 12.5 G/50ML
25 SOLUTION INTRAVENOUS DAILY PRN
Status: CANCELLED | OUTPATIENT
Start: 2025-07-23

## 2025-07-23 RX ORDER — ALBUMIN (HUMAN) 12.5 G/50ML
25 SOLUTION INTRAVENOUS DAILY PRN
Status: DISCONTINUED | OUTPATIENT
Start: 2025-07-23 | End: 2025-07-25 | Stop reason: HOSPADM

## 2025-07-23 RX ORDER — LIDOCAINE HYDROCHLORIDE 10 MG/ML
10 INJECTION, SOLUTION INFILTRATION; PERINEURAL AS NEEDED
Status: DISCONTINUED | OUTPATIENT
Start: 2025-07-23 | End: 2025-07-25 | Stop reason: HOSPADM

## 2025-07-23 RX ORDER — LIDOCAINE HYDROCHLORIDE 10 MG/ML
10 INJECTION, SOLUTION INFILTRATION; PERINEURAL AS NEEDED
Status: CANCELLED | OUTPATIENT
Start: 2025-07-23

## 2025-07-23 RX ORDER — ALBUMIN (HUMAN) 12.5 G/50ML
12.5 SOLUTION INTRAVENOUS DAILY PRN
Status: CANCELLED | OUTPATIENT
Start: 2025-07-23

## 2025-07-23 RX ADMIN — ALBUMIN (HUMAN) 25 G: 0.25 INJECTION, SOLUTION INTRAVENOUS at 13:05

## 2025-07-23 RX ADMIN — ALBUMIN (HUMAN) 25 G: 0.25 INJECTION, SOLUTION INTRAVENOUS at 12:36

## 2025-08-01 ENCOUNTER — SPECIALTY PHARMACY (OUTPATIENT)
Dept: PHARMACY | Facility: TELEHEALTH | Age: 55
End: 2025-08-01
Payer: MEDICARE

## 2025-08-01 NOTE — PROGRESS NOTES
"   Specialty Pharmacy Patient Management Program  Refill Outreach     Mc \"BEATRIZ\" was contacted today regarding refills of their medication(s).    Refill Questions      Flowsheet Row Most Recent Value   Changes to allergies? No   Changes to medications? No   New conditions or infections since last clinic visit No   Unplanned office visit, urgent care, ED, or hospital admission in the last 4 weeks  No   How does patient/caregiver feel medication is working? Very good   Financial problems or insurance changes  No   Since the previous refill, were any specialty medication doses or scheduled injections missed or delayed?  No  [1 dose left, next dose due 08/04]   If yes, please provide the amount N/a   Why were doses missed? N/a   Does this patient require a clinical escalation to a pharmacist? No            Delivery Questions      Flowsheet Row Most Recent Value   Delivery method UPS   Delivery address verified with patient/caregiver? Yes   Delivery address Home   Other address preferred N/a   Number of medications in delivery 1   Medication(s) being filled and delivered Abatacept (Orencia ClickJect)   Doses left of specialty medications 1 dose left, next dose due 08/04   Copay verified? Yes   Copay amount $99.20 (Orencia [facility])    Copay form of payment Credit/debit on file   Delivery Date Selection 08/05/25   Signature Required No                 Follow-up: 23 day(s)     Perla Arboleda, Pharmacy Technician  8/1/2025  11:14 EDT    "

## 2025-08-14 ENCOUNTER — OFFICE (AMBULATORY)
Dept: URBAN - METROPOLITAN AREA CLINIC 64 | Facility: CLINIC | Age: 55
End: 2025-08-14
Payer: COMMERCIAL

## 2025-08-14 ENCOUNTER — TRANSCRIBE ORDERS (OUTPATIENT)
Dept: ADMINISTRATIVE | Facility: HOSPITAL | Age: 55
End: 2025-08-14
Payer: MEDICARE

## 2025-08-14 VITALS
WEIGHT: 247 LBS | DIASTOLIC BLOOD PRESSURE: 76 MMHG | SYSTOLIC BLOOD PRESSURE: 111 MMHG | HEIGHT: 70 IN | HEART RATE: 80 BPM

## 2025-08-14 DIAGNOSIS — Z86.0101 PERSONAL HISTORY OF ADENOMATOUS AND SERRATED COLON POLYPS: ICD-10-CM

## 2025-08-14 DIAGNOSIS — R13.10 DYSPHAGIA, UNSPECIFIED TYPE: ICD-10-CM

## 2025-08-14 DIAGNOSIS — R10.9 ABDOMINAL PAIN, UNSPECIFIED ABDOMINAL LOCATION: ICD-10-CM

## 2025-08-14 DIAGNOSIS — R11.0 NAUSEA: ICD-10-CM

## 2025-08-14 DIAGNOSIS — K70.30 ALCOHOLIC CIRRHOSIS OF LIVER WITHOUT ASCITES: ICD-10-CM

## 2025-08-14 DIAGNOSIS — R10.9 UNSPECIFIED ABDOMINAL PAIN: ICD-10-CM

## 2025-08-14 DIAGNOSIS — R18.8 OTHER ASCITES: ICD-10-CM

## 2025-08-14 DIAGNOSIS — R13.10 DYSPHAGIA, UNSPECIFIED: ICD-10-CM

## 2025-08-14 DIAGNOSIS — K70.31 ALCOHOLIC CIRRHOSIS OF LIVER WITH ASCITES: Primary | ICD-10-CM

## 2025-08-14 PROCEDURE — 99213 OFFICE O/P EST LOW 20 MIN: CPT | Performed by: NURSE PRACTITIONER

## 2025-08-25 ENCOUNTER — SPECIALTY PHARMACY (OUTPATIENT)
Dept: PHARMACY | Facility: TELEHEALTH | Age: 55
End: 2025-08-25
Payer: MEDICARE

## (undated) DEVICE — TOWEL,OR,DSP,ST,NATURAL,DLX,4/PK,20PK/CS: Brand: MEDLINE

## (undated) DEVICE — PASS SUT PRO BARIATRIC XL W/TROC SWABS

## (undated) DEVICE — 2, DISPOSABLE SUCTION/IRRIGATOR WITH DISPOSABLE TIP: Brand: STRYKEFLOW

## (undated) DEVICE — SUT SILK 3/0 SH CR8 18IN C013D

## (undated) DEVICE — OPTIFIX ABSORBABLE FIXATION SYSTEM 39CM X 5MM CANNULA, 30 ABSORBABLE FASTENERS: Brand: OPTIFIX ABSORBABLE FIXATION SYSTEM

## (undated) DEVICE — ENDOPATH XCEL BLADELESS TROCARS WITH STABILITY SLEEVES: Brand: ENDOPATH XCEL

## (undated) DEVICE — ENDOPATH 5MM CURVED SCISSORS WITH MONOPOLAR CAUTERY: Brand: ENDOPATH

## (undated) DEVICE — PK ENDO GI 50

## (undated) DEVICE — ENDOPATH XCEL WITH OPTIVIEW TECHNOLOGY UNIVERSAL TROCAR STABILITY SLEEVES: Brand: ENDOPATH XCEL OPTIVIEW

## (undated) DEVICE — SUT ETHIB 1 CT1 30IN  X425H

## (undated) DEVICE — SKIN AFFIX SURG ADHESIVE 72/CS 0.55ML: Brand: MEDLINE

## (undated) DEVICE — SUT VIC 0/0 UR6 27IN DYED J603H

## (undated) DEVICE — SOL IRRIG H2O 1000ML STRL

## (undated) DEVICE — PAPR PRNT PK SONY W RIBN UPC55

## (undated) DEVICE — PK MINOR LAPAROTOMY 50

## (undated) DEVICE — GLV SURG TRIUMPH LT PF LTX 7.5 STRL

## (undated) DEVICE — SUT MONOCRYL 4/0 PS2 27IN Y426H ETY426H

## (undated) DEVICE — PK PROC TURNOVER

## (undated) DEVICE — GENERAL LAPAROSCOPY CDS: Brand: MEDLINE INDUSTRIES, INC.

## (undated) DEVICE — VIOLET BRAIDED (POLYGLACTIN 910), SYNTHETIC ABSORBABLE SUTURE: Brand: COATED VICRYL

## (undated) DEVICE — SOL NACL 0.9PCT 1000ML

## (undated) DEVICE — TP SXN YANKR BULB STRL

## (undated) DEVICE — ENDOPATH XCEL WITH OPTIVIEW TECHNOLOGY BLADELESS TROCARS WITH STABILITY SLEEVES: Brand: ENDOPATH XCEL OPTIVIEW

## (undated) DEVICE — PENCL HND ROCKRSWTCH HOLSTR EZ CLEAN TP CRD 10FT

## (undated) DEVICE — CUFF SCD HEMOFORCE SEQ CALF STD MD

## (undated) DEVICE — HARMONIC ACE 5MM DIAMETER SHEARS 36CM SHAFT LENGTH + ADAPTIVE TISSUE TECHNOLOGY FOR USE WITH GENERATOR G11: Brand: HARMONIC ACE

## (undated) DEVICE — SYR LUERLOK 30CC

## (undated) DEVICE — GAUZE,SPONGE,4"X4",32PLY,XRAY,STRL,LF: Brand: MEDLINE

## (undated) DEVICE — TUBING, SUCTION, 1/4" X 12', STRAIGHT: Brand: MEDLINE

## (undated) DEVICE — BITEBLOCK ENDO W/STRAP 60F A/ LF DISP

## (undated) DEVICE — LAPAROSCOPIC GAS CONDITIONING DEVICE.: Brand: INSUFLOW

## (undated) DEVICE — SUT SILK 2/0 SH 30IN K833H

## (undated) DEVICE — GLV SURG TRIUMPH GREEN W/ALOE PF LTX 8 STRL

## (undated) DEVICE — SUT MNCRYL 3/0 PS2 18IN Y497G

## (undated) DEVICE — DRAPE SHEET ULTRAGARD: Brand: MEDLINE

## (undated) DEVICE — DRSNG WND BORDR/ADHS NONADHR/GZ LF 4X4IN STRL